# Patient Record
Sex: FEMALE | Race: WHITE | NOT HISPANIC OR LATINO | Employment: FULL TIME | URBAN - METROPOLITAN AREA
[De-identification: names, ages, dates, MRNs, and addresses within clinical notes are randomized per-mention and may not be internally consistent; named-entity substitution may affect disease eponyms.]

---

## 2017-01-16 ENCOUNTER — TELEPHONE (OUTPATIENT)
Dept: NEPHROLOGY | Facility: CLINIC | Age: 27
End: 2017-01-16

## 2017-01-16 NOTE — TELEPHONE ENCOUNTER
Hello, this is Naeem's office from Medicine Specialty on the 3rd floor at [facility] located at [address] [place] [address]. We are reminding you of your upcoming  appointment on 1/31/2017 at 3:10 pm. Please arrive  30 mins prior to your appointment time for check in. Also, please bring an updated medication list including dose of prescribed and over the counter medications you are currently taking or your labeled medication bottles with you to your appointment. If you have any questions or would like to cancel or reschedule your appointment, please call us at [phone].     If you are having labs draw same day you need a lab appointment scheduled 1 hour prior to your visit.    You are welcome to have your labs done 2-7 days before your appointment at any [facility] or [facility] facility. If you have your labs completed before your appointment, please still come 15 minutes early for check-in.     Thank-You for allowing us to be a member of your Health Care Team,     Breanna CHAMBERS CMA

## 2017-01-31 ENCOUNTER — RESEARCH ENCOUNTER (OUTPATIENT)
Dept: RESEARCH | Facility: CLINIC | Age: 27
End: 2017-01-31

## 2017-01-31 ENCOUNTER — OFFICE VISIT (OUTPATIENT)
Dept: NEPHROLOGY | Facility: CLINIC | Age: 27
End: 2017-01-31
Attending: INTERNAL MEDICINE
Payer: MEDICARE

## 2017-01-31 VITALS
TEMPERATURE: 98.3 F | DIASTOLIC BLOOD PRESSURE: 85 MMHG | OXYGEN SATURATION: 97 % | BODY MASS INDEX: 37.17 KG/M2 | HEIGHT: 66 IN | HEART RATE: 69 BPM | SYSTOLIC BLOOD PRESSURE: 123 MMHG | WEIGHT: 231.3 LBS

## 2017-01-31 DIAGNOSIS — Z94.0 KIDNEY REPLACED BY TRANSPLANT: ICD-10-CM

## 2017-01-31 DIAGNOSIS — Z94.0 S/P KIDNEY TRANSPLANT: Primary | ICD-10-CM

## 2017-01-31 DIAGNOSIS — Z48.298 AFTERCARE FOLLOWING ORGAN TRANSPLANT: ICD-10-CM

## 2017-01-31 DIAGNOSIS — Z94.0 S/P KIDNEY TRANSPLANT: ICD-10-CM

## 2017-01-31 DIAGNOSIS — Z79.899 ENCOUNTER FOR LONG-TERM CURRENT USE OF MEDICATION: ICD-10-CM

## 2017-01-31 LAB
ALBUMIN SERPL-MCNC: 4.1 G/DL (ref 3.4–5)
ALBUMIN UR-MCNC: NEGATIVE MG/DL
ALP SERPL-CCNC: 80 U/L (ref 40–150)
ALT SERPL W P-5'-P-CCNC: 50 U/L (ref 0–50)
ANION GAP SERPL CALCULATED.3IONS-SCNC: 9 MMOL/L (ref 3–14)
APPEARANCE UR: ABNORMAL
AST SERPL W P-5'-P-CCNC: 38 U/L (ref 0–45)
BACTERIA #/AREA URNS HPF: ABNORMAL /HPF
BASOPHILS # BLD AUTO: 0.1 10E9/L (ref 0–0.2)
BASOPHILS NFR BLD AUTO: 0.6 %
BILIRUB SERPL-MCNC: 0.5 MG/DL (ref 0.2–1.3)
BILIRUB UR QL STRIP: NEGATIVE
BUN SERPL-MCNC: 17 MG/DL (ref 7–30)
CALCIUM SERPL-MCNC: 9.3 MG/DL (ref 8.5–10.1)
CHLORIDE SERPL-SCNC: 105 MMOL/L (ref 94–109)
CO2 SERPL-SCNC: 24 MMOL/L (ref 20–32)
COLOR UR AUTO: YELLOW
CREAT SERPL-MCNC: 1.06 MG/DL (ref 0.52–1.04)
CREAT UR-MCNC: 46 MG/DL
DIFFERENTIAL METHOD BLD: NORMAL
EOSINOPHIL # BLD AUTO: 0.1 10E9/L (ref 0–0.7)
EOSINOPHIL NFR BLD AUTO: 1.6 %
ERYTHROCYTE [DISTWIDTH] IN BLOOD BY AUTOMATED COUNT: 12.6 % (ref 10–15)
GFR SERPL CREATININE-BSD FRML MDRD: 62 ML/MIN/1.7M2
GLUCOSE SERPL-MCNC: 80 MG/DL (ref 70–99)
GLUCOSE UR STRIP-MCNC: NEGATIVE MG/DL
HCT VFR BLD AUTO: 43.1 % (ref 35–47)
HGB BLD-MCNC: 14.2 G/DL (ref 11.7–15.7)
HGB UR QL STRIP: NEGATIVE
IMM GRANULOCYTES # BLD: 0 10E9/L (ref 0–0.4)
IMM GRANULOCYTES NFR BLD: 0.4 %
KETONES UR STRIP-MCNC: NEGATIVE MG/DL
LEUKOCYTE ESTERASE UR QL STRIP: ABNORMAL
LYMPHOCYTES # BLD AUTO: 3.6 10E9/L (ref 0.8–5.3)
LYMPHOCYTES NFR BLD AUTO: 43.9 %
MCH RBC QN AUTO: 28.1 PG (ref 26.5–33)
MCHC RBC AUTO-ENTMCNC: 32.9 G/DL (ref 31.5–36.5)
MCV RBC AUTO: 85 FL (ref 78–100)
MONOCYTES # BLD AUTO: 0.6 10E9/L (ref 0–1.3)
MONOCYTES NFR BLD AUTO: 7.2 %
MUCOUS THREADS #/AREA URNS LPF: PRESENT /LPF
NEUTROPHILS # BLD AUTO: 3.8 10E9/L (ref 1.6–8.3)
NEUTROPHILS NFR BLD AUTO: 46.3 %
NITRATE UR QL: NEGATIVE
NRBC # BLD AUTO: 0 10*3/UL
NRBC BLD AUTO-RTO: 0 /100
PH UR STRIP: 6 PH (ref 5–7)
PLATELET # BLD AUTO: 234 10E9/L (ref 150–450)
POTASSIUM SERPL-SCNC: 4.5 MMOL/L (ref 3.4–5.3)
PROT SERPL-MCNC: 8.1 G/DL (ref 6.8–8.8)
PROT UR-MCNC: 0.08 G/L
PROT/CREAT 24H UR: 0.17 G/G CR (ref 0–0.2)
RBC # BLD AUTO: 5.06 10E12/L (ref 3.8–5.2)
RBC #/AREA URNS AUTO: 1 /HPF (ref 0–2)
SODIUM SERPL-SCNC: 138 MMOL/L (ref 133–144)
SP GR UR STRIP: 1.01 (ref 1–1.03)
SQUAMOUS #/AREA URNS AUTO: 7 /HPF (ref 0–1)
URN SPEC COLLECT METH UR: ABNORMAL
UROBILINOGEN UR STRIP-MCNC: 0 MG/DL (ref 0–2)
WBC # BLD AUTO: 8.2 10E9/L (ref 4–11)
WBC #/AREA URNS AUTO: 6 /HPF (ref 0–2)

## 2017-01-31 PROCEDURE — 80053 COMPREHEN METABOLIC PANEL: CPT | Performed by: INTERNAL MEDICINE

## 2017-01-31 PROCEDURE — 84156 ASSAY OF PROTEIN URINE: CPT | Performed by: INTERNAL MEDICINE

## 2017-01-31 PROCEDURE — 87799 DETECT AGENT NOS DNA QUANT: CPT | Performed by: INTERNAL MEDICINE

## 2017-01-31 PROCEDURE — 99213 OFFICE O/P EST LOW 20 MIN: CPT | Mod: ZF

## 2017-01-31 PROCEDURE — 36415 COLL VENOUS BLD VENIPUNCTURE: CPT | Performed by: INTERNAL MEDICINE

## 2017-01-31 PROCEDURE — 85025 COMPLETE CBC W/AUTO DIFF WBC: CPT | Performed by: INTERNAL MEDICINE

## 2017-01-31 PROCEDURE — 81001 URINALYSIS AUTO W/SCOPE: CPT | Performed by: INTERNAL MEDICINE

## 2017-01-31 ASSESSMENT — PAIN SCALES - GENERAL: PAINLEVEL: MODERATE PAIN (4)

## 2017-01-31 NOTE — Clinical Note
1/31/2017       RE: Carol Menjivar  [address] [address]       REASON FOR VISIT:  Ms. Menjivar is a 26-year-old woman here for followup after a living donor kidney transplant in 07/2006 from her sister who is a 1DR match.        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  Creatinine has been stable at 0.8 to 1.1.  Current immunosuppression is cyclosporine and CellCept 750 b.i.d.  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  She is also PPD-positive and treated for that with INH for 9 months in 2005.  Her DSA level has not been checked since 2006, at which time it was negative.       The patient had an episode of shingles in 08/2016 and was treated with Valtrex.  The lesions were localized in the right buttock and hip area.       The patient has been tired for approximately 1 year.  She sleeps 7-8 hours per night and does not wake up feeling refreshed.  She has had 3 episodes of upper respiratory illness in the last 6 months with 3 courses of Augmentin given.  She had low-grade fevers and some sinus symptoms.  She had a negative chest x-ray in November.  She has some mild chest pain that comes and goes across the chest.  It does not radiate.  It is not associated with inspiration.  She has no sweats, no fevers, no weight loss (she has gained 6 pounds).  She has increased stress at work.  She has no joint pains, no problems with urination, no history of UTIs.  She is followed by an eye doctor who is not ready to remove her maturing cataract in the left eye because of concern that this will make her vision worse.  She still has reasonable vision.  She uses a cane to get around but is able to see me and the computer keyboard.       REVIEW OF SYSTEMS:  Comprehensive review of systems was completed and negative except as in the HPI.      PAST  MEDICAL HISTORY:   1.  Status post living donor transplant in 2006.   2.  Senior-Loken syndrome with blindness.   3.  History of pseudotumor cerebri, last episode in 2013, treated with Diamox.    4.  Positive PPD, treated with INH.   5.  CMV disease, treated in 2006.   6.  Shingles, treated in 2016.   7.  Obesity, BMI 37 and stable.       SOCIAL HISTORY:  She exercises on a treadmill.  She does not smoke or drink alcohol.  She has gained    6 pounds since her last clinic visit 1 year ago.  She works in the "[employer].  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  She recently went to [place] with her family (Martha) to celebrate the 10-year anniversary of her transplant.      MEDICATIONS:  See below.      PHYSICAL EXAMINATION:   VITAL SIGNS:  Blood pressure 119/85, weight 231 pounds, BMI 37.   GENERAL:  She is well-developed, well-nourished.   HEENT:  She has a disconjugate gaze.  Normal dentition.   NECK:  No cervical adenopathy.   LUNGS:  Clear bilaterally.   CARDIAC:  Regular sinus rhythm.  No murmurs, rubs or gallops.   LOWER EXTREMITIES:  No peripheral edema.       LABORATORY TESTS:  Protein 0.2 gram per gram.  Creatinine 1.0.  Last cyclosporine level 90 in 10/2016.  Hemoglobin 14.2, white count 8.2, differential unremarkable.  Urinalysis is normal with    7 squamous cells and 6 white cells.  Liver function tests normal, albumin 4.1.   Electrolytes/Renal -   Recent Labs   Lab Test  01/31/17   1654  07/26/13   1156  12/28/12   1019   01/07/09   1250   NA  138  141  141   < >  144   POTASSIUM  4.5  4.4  4.8   < >  4.3   CHLORIDE  105  103  103   < >  112*   CO2  24  29  26   < >  21   BUN  17  12  17   < >  22   CR  1.06*  0.96  1.04   < >  1.42*   GLC  80  91  86   < >  78   LACHO  9.3  9.3  9.3   < >  9.5   MAG   --    --    --    --   1.8   PHOS   --    --    --    --   3.3    < > = values in this interval not displayed.        CBC -   Recent Labs   Lab Test  01/31/17   1654  07/26/13   1156  12/28/12   1019   WBC  8.2  6.0  8.0   HGB  14.2  14.7  15.2   PLT  234  211  228       LFTs -   Recent Labs   Lab Test  01/31/17   1654  12/22/08   0911   ALKPHOS  80  65   BILITOTAL  0.5  0.5   ALT  50  47   AST  38  52*   PROTTOTAL  8.1  7.7   ALBUMIN  4.1  4.3       Coags -   Recent Labs   Lab Test  01/07/09   1250   INR  1.07       Medications    Current Outpatient Prescriptions   Medication Sig Dispense Refill     atenolol (TENORMIN) 25 MG tablet Take 1 tablet (25 mg) by mouth daily 90 tablet 3     cycloSPORINE modified (GENERIC EQUIVALENT) 25 MG capsule Take 4 capsules (100 mg) by mouth 2 times daily 240 capsule 6     CELLCEPT 250 MG PO CAPSULE Take 3 capsules (750 mg) by mouth 2 times daily 180 capsule 11     sulfamethoxazole-trimethoprim (BACTRIM) 400-80 MG per tablet Take 1 tablet by mouth daily 28 tablet 3        IMPRESSION:   1.  Allograft function.  Good.   2.  Immunosuppression.  Stable.  Target cyclosporine .    3.  Fatigue.  She has chronic fatigue which predates her shingles episode.  I did a complete set of labs today.  She has normal liver function, normal kidney function, no anemia, normal white count, and her albumin is 4.1.  We discussed the possibility of this being secondary to stress at work and that perhaps she is not sleeping well.  She does try to drink 4 liters of water every day, so she is up quite a bit at night to urinate.  She will see Dr. Moran to determine whether she would be a candidate for the shingles vaccine after her recent shingles episode and whether there is anything further to be done for her fatigue in terms of infection.  I have also asked her to stop her atenolol since her blood pressure is low at home (100/60).  However, this would not cause fatigue and lack of refreshed sleep.   4.  Recent episode of shingles.  Referral to Dr. Sonja Moran regarding further future prophylaxis.   5.   Hypertension.  Blood pressure control is overly aggressive and atenolol has some CNS effects, although she has been on it for years.  We will stop it for now and monitor the blood pressure.       PLAN:   1.  Patient to decrease water intake and drink only to thirst.    2.  Stop atenolol.    3.  Check EBV and CMV PCR today (sent).    4.  Increase exercise if possible.         ASHLEY RIVAS MD             D: 2017 19:21   T: 2017 08:12   MT: PHYLLIS      Name:     KATHRYN TAYLOR   MRN:      [id number]        Account:      [id number]   :      1990           Service Date: 2017      Document: [id number]

## 2017-01-31 NOTE — NURSING NOTE
"Chief Complaint   Patient presents with     RECHECK     YEARLY EXAM KIDNEY TRANSPLANT       Initial Pulse 66  Temp(Src) 98.3  F (36.8  C) (Oral)  Ht 1.676 m (5' 6\")  Wt 104.917 kg (231 lb 4.8 oz)  BMI 37.35 kg/m2  SpO2 97% Estimated body mass index is 37.35 kg/(m^2) as calculated from the following:    Height as of this encounter: 1.676 m (5' 6\").    Weight as of this encounter: 104.917 kg (231 lb 4.8 oz).  BP completed using cuff size: chris CHAMBERS CMA    "

## 2017-01-31 NOTE — MR AVS SNAPSHOT
After Visit Summary   1/31/2017    Carol Menjivar    MRN: [id number]           Patient Information     Date Of Birth          1990        Visit Information        Provider Department      1/31/2017 3:10 PM Noni Hartley MD [facility]        Today's Diagnoses     S/P kidney transplant    -  1       Care Instructions    1.  Stop atenolol   2.  See Dr Moran re lay, travel  3.  Drink water to thirst          Follow-ups after your visit        Additional Services     Infectious Disease Referral       Consult for Dr Luis Moran.  Pt with shingles, traveling to [facility] this summer.  Kidney transplant                  Follow-up notes from your care team     Return in about 1 year (around 1/31/2018).      Your next 10 appointments already scheduled     Mar 31, 2017  9:00 AM   (Arrive by 8:45 AM)   New Patient Visit with Sonja Moran MD   [facility] ([facility])    [address] [place] [id number] [phone]            Jan 30, 2018  2:45 PM   (Arrive by 2:15 PM)   Return Kidney Transplant with Noni Hartley MD   [facility] ([facility])    [address] [place] [id number] [phone]              Future tests that were ordered for you today     Open Future Orders        Priority Expected Expires Ordered    EBV DNA PCR Quantitative Whole Blood Routine  3/2/2017 1/31/2017    CMV DNA quantification Routine  3/2/2017 1/31/2017    Comprehensive metabolic panel Routine  3/2/2017 1/31/2017    CBC with platelets differential Routine  3/2/2017 1/31/2017            Who to contact     If you have questions or need follow up information about today's clinic visit or your schedule please contact [facility] directly at [phone].  Normal or non-critical lab and imaging results will be communicated to you by MyChart, letter or  "phone within 4 business days after the clinic has received the results. If you do not hear from us within 7 days, please contact the clinic through [employer] or phone. If you have a critical or abnormal lab result, we will notify you by phone as soon as possible.  Submit refill requests through [employer] or call your pharmacy and they will forward the refill request to us. Please allow 3 business days for your refill to be completed.          Additional Information About Your Visit        castaclipharswabr Information     [employer] lets you send messages to your doctor, view your test results, renew your prescriptions, schedule appointments and more. To sign up, go to [email or web address] . Click on \"Log in\" on the left side of the screen, which will take you to the Welcome page. Then click on \"Sign up Now\" on the right side of the page.     You will be asked to enter the access code listed below, as well as some personal information. Please follow the directions to create your username and password.     Your access code is: [email or web address]  Expires: 2017  6:30 AM     Your access code will  in 90 days. If you need help or a new code, please call your [place] clinic or [phone].        Care EveryWhere ID     This is your Care EveryWhere ID. This could be used by other organizations to access your [place] medical records  [id number]        Your Vitals Were     Pulse Temperature Height BMI (Body Mass Index) Pulse Oximetry       69 98.3  F (36.8  C) (Oral) 1.676 m (5' 6\") 37.35 kg/m2 97%        Blood Pressure from Last 3 Encounters:   17 123/85   16 126/85   10/02/14 120/81    Weight from Last 3 Encounters:   17 104.917 kg (231 lb 4.8 oz)   16 102.059 kg (225 lb)   10/02/14 103.874 kg (229 lb)              We Performed the Following     Infectious Disease Referral     Routine UA with micro reflex to culture        Primary Care Provider Office Phone # Fax #    Katharina Leija MD " [phone]       [facility] [address] [place] [address]        Thank you!     Thank you for choosing [facility]  for your care. Our goal is always to provide you with excellent care. Hearing back from our patients is one way we can continue to improve our services. Please take a few minutes to complete the written survey that you may receive in the mail after your visit with us. Thank you!             Your Updated Medication List - Protect others around you: Learn how to safely use, store and throw away your medicines at www.disposemymeds.org.          This list is accurate as of: 1/31/17  4:31 PM.  Always use your most recent med list.                   Brand Name Dispense Instructions for use    atenolol 25 MG tablet    TENORMIN    90 tablet    Take 1 tablet (25 mg) by mouth daily       cycloSPORINE modified 25 MG capsule    GENERIC EQUIVALENT    240 capsule    Take 4 capsules (100 mg) by mouth 2 times daily       mycophenolate capsule     180 capsule    Take 3 capsules (750 mg) by mouth 2 times daily       sulfamethoxazole-trimethoprim 400-80 MG per tablet    BACTRIM    28 tablet    Take 1 tablet by mouth daily

## 2017-02-01 LAB
CMV DNA SPEC NAA+PROBE-ACNC: NORMAL [IU]/ML
CMV DNA SPEC NAA+PROBE-LOG#: NORMAL {LOG_IU}/ML
SPECIMEN SOURCE: NORMAL

## 2017-02-01 NOTE — PROGRESS NOTES
REASON FOR VISIT:  Ms. Menjivar is a 26-year-old woman here for followup after a living donor kidney transplant in 07/2006 from her sister who is a 1DR match.        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  Creatinine has been stable at 0.8 to 1.1.  Current immunosuppression is cyclosporine and CellCept 750 b.i.d.  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  She is also PPD-positive and treated for that with INH for 9 months in 2005.  Her DSA level has not been checked since 2006, at which time it was negative.       The patient had an episode of shingles in 08/2016 and was treated with Valtrex.  The lesions were localized in the right buttock and hip area.       The patient has been tired for approximately 1 year.  She sleeps 7-8 hours per night and does not wake up feeling refreshed.  She has had 3 episodes of upper respiratory illness in the last 6 months with 3 courses of Augmentin given.  She had low-grade fevers and some sinus symptoms.  She had a negative chest x-ray in November.  She has some mild chest pain that comes and goes across the chest.  It does not radiate.  It is not associated with inspiration.  She has no sweats, no fevers, no weight loss (she has gained 6 pounds).  She has increased stress at work.  She has no joint pains, no problems with urination, no history of UTIs.  She is followed by an eye doctor who is not ready to remove her maturing cataract in the left eye because of concern that this will make her vision worse.  She still has reasonable vision.  She uses a cane to get around but is able to see me and the computer keyboard.       REVIEW OF SYSTEMS:  Comprehensive review of systems was completed and negative except as in the HPI.      PAST MEDICAL HISTORY:   1.  Status post living donor transplant in 2006.   2.  Senior-Loken  syndrome with blindness.   3.  History of pseudotumor cerebri, last episode in 2013, treated with Diamox.    4.  Positive PPD, treated with INH.   5.  CMV disease, treated in 2006.   6.  Shingles, treated in 2016.   7.  Obesity, BMI 37 and stable.       SOCIAL HISTORY:  She exercises on a treadmill.  She does not smoke or drink alcohol.  She has gained    6 pounds since her last clinic visit 1 year ago.  She works in the [employer].  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  She recently went to [place] with her family (Martha) to celebrate the 10-year anniversary of her transplant.      MEDICATIONS:  See below.      PHYSICAL EXAMINATION:   VITAL SIGNS:  Blood pressure 119/85, weight 231 pounds, BMI 37.   GENERAL:  She is well-developed, well-nourished.   HEENT:  She has a disconjugate gaze.  Normal dentition.   NECK:  No cervical adenopathy.   LUNGS:  Clear bilaterally.   CARDIAC:  Regular sinus rhythm.  No murmurs, rubs or gallops.   LOWER EXTREMITIES:  No peripheral edema.       LABORATORY TESTS:  Protein 0.2 gram per gram.  Creatinine 1.0.  Last cyclosporine level 90 in 10/2016.  Hemoglobin 14.2, white count 8.2, differential unremarkable.  Urinalysis is normal with    7 squamous cells and 6 white cells.  Liver function tests normal, albumin 4.1.   Electrolytes/Renal -   Recent Labs   Lab Test  01/31/17   1654  07/26/13   1156  12/28/12   1019   01/07/09   1250   NA  138  141  141   < >  144   POTASSIUM  4.5  4.4  4.8   < >  4.3   CHLORIDE  105  103  103   < >  112*   CO2  24  29  26   < >  21   BUN  17  12  17   < >  22   CR  1.06*  0.96  1.04   < >  1.42*   GLC  80  91  86   < >  78   LACHO  9.3  9.3  9.3   < >  9.5   MAG   --    --    --    --   1.8   PHOS   --    --    --    --   3.3    < > = values in this interval not displayed.       CBC -   Recent Labs   Lab Test  01/31/17   1654  07/26/13   1156  12/28/12   1019   WBC   8.2  6.0  8.0   HGB  14.2  14.7  15.2   PLT  234  211  228       LFTs -   Recent Labs   Lab Test  01/31/17   1654  12/22/08   0911   ALKPHOS  80  65   BILITOTAL  0.5  0.5   ALT  50  47   AST  38  52*   PROTTOTAL  8.1  7.7   ALBUMIN  4.1  4.3       Coags -   Recent Labs   Lab Test  01/07/09   1250   INR  1.07       Medications    Current Outpatient Prescriptions   Medication Sig Dispense Refill     atenolol (TENORMIN) 25 MG tablet Take 1 tablet (25 mg) by mouth daily 90 tablet 3     cycloSPORINE modified (GENERIC EQUIVALENT) 25 MG capsule Take 4 capsules (100 mg) by mouth 2 times daily 240 capsule 6     CELLCEPT 250 MG PO CAPSULE Take 3 capsules (750 mg) by mouth 2 times daily 180 capsule 11     sulfamethoxazole-trimethoprim (BACTRIM) 400-80 MG per tablet Take 1 tablet by mouth daily 28 tablet 3        IMPRESSION:   1.  Allograft function.  Good.   2.  Immunosuppression.  Stable.  Target cyclosporine .    3.  Fatigue.  She has chronic fatigue which predates her shingles episode.  I did a complete set of labs today.  She has normal liver function, normal kidney function, no anemia, normal white count, and her albumin is 4.1.  We discussed the possibility of this being secondary to stress at work and that perhaps she is not sleeping well.  She does try to drink 4 liters of water every day, so she is up quite a bit at night to urinate.  She will see Dr. Moran to determine whether she would be a candidate for the shingles vaccine after her recent shingles episode and whether there is anything further to be done for her fatigue in terms of infection.  I have also asked her to stop her atenolol since her blood pressure is low at home (100/60).  However, this would not cause fatigue and lack of refreshed sleep.   4.  Recent episode of shingles.  Referral to Dr. Sonja Moran regarding further future prophylaxis.   5.  Hypertension.  Blood pressure control is overly aggressive and atenolol has some CNS effects,  although she has been on it for years.  We will stop it for now and monitor the blood pressure.       PLAN:   1.  Patient to decrease water intake and drink only to thirst.    2.  Stop atenolol.    3.  Check EBV and CMV PCR today (sent).    4.  Increase exercise if possible.         ASHLEY RIVAS MD             D: 2017 19:21   T: 2017 08:12   MT: DP      Name:     KATHRYN TAYLOR   MRN:      [id number]        Account:      [id number]   :      1990           Service Date: 2017      Document: [id number]

## 2017-02-04 LAB
EBV DNA # SPEC NAA+PROBE: NORMAL {COPIES}/ML
EBV DNA SPEC NAA+PROBE-LOG#: NORMAL {LOG_COPIES}/ML

## 2017-02-21 NOTE — PROGRESS NOTES
Transplant Research Organization Informed Consent Process Documentation  Study Name: Female Urinary Microbiome, Chronic Graft Dysfunction and Kidney Transplantation (IRB # [id number])  ICF Version Date / IRB Approval Date:  Version dt November 11, 2016,   IRB Approval dt: 12/16/2016  Date of Approach/Consent: 1/31/2017    The subject was screened and meets all of the inclusion criteria and none of the exclusion criteria is met.    The subject was told:  -that the study involves research  -the purpose of the research study  -the expected duration of the study and the approximate number of subject sought  -of procedures that are identified as experimental  -of reasonably foreseeable risks or discomforts to the subject  -of any benefits to the subject or others that may be expected from the research  -of alternative procedures and/or treatment  -how the confidentiality of records would be maintained  -whether or not compensation and medical treatments are available should injury occur as a result of the study  -who to contact if they have questions related to the research study or questions regarding research subjects' rights  -that participation is completely voluntary and that their decision to or not to participate will have no impact on their relationships with the N and the staff    No study procedures were completed prior to the consent being obtained.  The use of historical information (lab or assessments) used for the purpose of the study was approved by subject.  The subject was fully aware that we would be reviewing their medical record for the study.  The subject demonstrated an understanding of what the study involved.  Specifically, how this study differed from standard of care at our center and what was required of the subject as part of the study.  The subject reviewed the consent form and was given the opportunity to ask questions before signing.  Questions and concerns were answered by the study  staff and/or study physician.  A copy of the signed informed consent document was provided to the subject.  [] Yes [x] No  The subject was offered a copy of the signed informed consent but declined. [x] Yes [] No  The consent require the use of a :   [] Yes [x]  No     A 'short form' consent was used:     [] Yes [x] No         If yes, provide name of witness: N/A  The subject required a legally-authorized representative (LAR) to sign on their behalf:                                                    [] Yes  [x] No   If yes, please record name of LAR: N/A    Consent obtained in the presence of Tiana Melendez, clinic nurse, as the participant is visually impaired.     Questions to Evaluate Subject Comprehension of Study  Adult or Legally Authorized Representative (LAR) for a Dependent:  Question: Adequate Response? If No, explain what actions were taken   What is the purpose of this study? [x] Yes  [] No   What will you be asked to do to participate in this study?  [x] Yes  [] No   How many study visits will there be? [x] Yes  [] No

## 2017-03-07 DIAGNOSIS — Z94.0 KIDNEY REPLACED BY TRANSPLANT: ICD-10-CM

## 2017-03-07 RX ORDER — MYCOPHENOLATE MOFETIL 250 MG/1
750 CAPSULE ORAL 2 TIMES DAILY
Qty: 180 CAPSULE | Refills: 11 | Status: SHIPPED | OUTPATIENT
Start: 2017-03-07 | End: 2018-04-04

## 2017-03-07 NOTE — TELEPHONE ENCOUNTER
Drug Name: cellcept 250  Last Fill Date: 1/5/17  Quantity: 180    Sharon Conrad   [facility]  [phone]

## 2017-03-30 ENCOUNTER — CARE COORDINATION (OUTPATIENT)
Dept: NEPHROLOGY | Facility: CLINIC | Age: 27
End: 2017-03-30

## 2017-03-30 NOTE — PROGRESS NOTES
Received voicemail that patient was experiencing chest pain following medication changes in January (stopped atenolol). Left message for patient to call back.    Tiana Yost RN

## 2017-04-03 NOTE — PROGRESS NOTES
Reason for Call    Patient reports intermittent chest pain since November. Sits in the lower, central region of the chest. Feels sore, occasionally sharp. None present now. No discernable pattern--pretty random episodes. She stopped atenolol in January, following her appointment. Chest pain worsened 3 weeks ago. She restarted atenolol on Thursday, 3/30. No change in symptoms yet.     Also notes ongoing fatigue, occasional shortness of breath, and GI upset (possibly stress related). Update sent to Dr. Hartley.    Collaboration    Above discussed with Dr. Hartley.      She saw Dr Garcia yesterday for fatigue so we'll see if anything came of that. As for CP, seems unlikely she would have angina.  I can look and see if there are any cardiac effects of her syndrome.  BP was ok in clinic. If she feels better on amlodipine then she can take it.  It does have CNS effects and I thought her BP might be too low.  Can you please call her and see how the CP is now?  Also her last CSA level was too high and she was supposed to repeat it hopefully.     I dont see any cardiac associations but if she keeps having CP her PCp could refer her to cards if there is a concern.       Plan    1. Repeat CSA level  2. Continue on atenolol    Patient was given an opportunity to ask questions and have those questions answered to her satisfaction.  Patient verbalized understanding of instructions provided and agreed to plan of care.    Tiana Yost RN

## 2017-04-04 ENCOUNTER — TELEPHONE (OUTPATIENT)
Dept: TRANSPLANT | Facility: CLINIC | Age: 27
End: 2017-04-04

## 2017-04-04 DIAGNOSIS — Z94.0 KIDNEY REPLACED BY TRANSPLANT: Primary | ICD-10-CM

## 2017-04-04 RX ORDER — CYCLOSPORINE 25 MG/1
100 CAPSULE, LIQUID FILLED ORAL 2 TIMES DAILY
Qty: 240 CAPSULE | Refills: 11 | Status: SHIPPED | OUTPATIENT
Start: 2017-04-04 | End: 2017-04-13

## 2017-04-04 NOTE — TELEPHONE ENCOUNTER
Lupe, I scheduled Carol for lab draw on 04/12/2017 at 09:15AM., not sure if she needs lab order in EPIC.  Thanks!

## 2017-04-12 ENCOUNTER — OFFICE VISIT (OUTPATIENT)
Dept: INFECTIOUS DISEASES | Facility: CLINIC | Age: 27
End: 2017-04-12
Attending: INTERNAL MEDICINE
Payer: MEDICARE

## 2017-04-12 VITALS
SYSTOLIC BLOOD PRESSURE: 138 MMHG | BODY MASS INDEX: 37.61 KG/M2 | HEART RATE: 60 BPM | WEIGHT: 234 LBS | DIASTOLIC BLOOD PRESSURE: 88 MMHG | TEMPERATURE: 97.6 F | HEIGHT: 66 IN

## 2017-04-12 DIAGNOSIS — G93.32 CHRONIC FATIGUE SYNDROME: ICD-10-CM

## 2017-04-12 DIAGNOSIS — G93.32 CHRONIC FATIGUE SYNDROME: Primary | ICD-10-CM

## 2017-04-12 DIAGNOSIS — Z23 NEED FOR PNEUMOCOCCAL VACCINATION: ICD-10-CM

## 2017-04-12 DIAGNOSIS — Z48.298 AFTERCARE FOLLOWING ORGAN TRANSPLANT: ICD-10-CM

## 2017-04-12 DIAGNOSIS — Z79.899 ENCOUNTER FOR LONG-TERM CURRENT USE OF MEDICATION: ICD-10-CM

## 2017-04-12 DIAGNOSIS — R19.7 DIARRHEA, UNSPECIFIED TYPE: ICD-10-CM

## 2017-04-12 DIAGNOSIS — Z94.0 KIDNEY REPLACED BY TRANSPLANT: ICD-10-CM

## 2017-04-12 LAB
ANION GAP SERPL CALCULATED.3IONS-SCNC: 6 MMOL/L (ref 3–14)
BUN SERPL-MCNC: 10 MG/DL (ref 7–30)
CALCIUM SERPL-MCNC: 9.2 MG/DL (ref 8.5–10.1)
CHLORIDE SERPL-SCNC: 105 MMOL/L (ref 94–109)
CO2 SERPL-SCNC: 26 MMOL/L (ref 20–32)
CREAT SERPL-MCNC: 0.94 MG/DL (ref 0.52–1.04)
CREAT UR-MCNC: 36 MG/DL
CRYPTOC AG SPEC QL: NORMAL
CYCLOSPORINE BLD LC/MS/MS-MCNC: 176 UG/L (ref 50–400)
ERYTHROCYTE [DISTWIDTH] IN BLOOD BY AUTOMATED COUNT: 12.4 % (ref 10–15)
GFR SERPL CREATININE-BSD FRML MDRD: 70 ML/MIN/1.7M2
GLUCOSE SERPL-MCNC: 103 MG/DL (ref 70–99)
HCT VFR BLD AUTO: 43.5 % (ref 35–47)
HGB BLD-MCNC: 14.4 G/DL (ref 11.7–15.7)
MCH RBC QN AUTO: 28.1 PG (ref 26.5–33)
MCHC RBC AUTO-ENTMCNC: 33.1 G/DL (ref 31.5–36.5)
MCV RBC AUTO: 85 FL (ref 78–100)
MICRO REPORT STATUS: NORMAL
PLATELET # BLD AUTO: 216 10E9/L (ref 150–450)
POTASSIUM SERPL-SCNC: 4.2 MMOL/L (ref 3.4–5.3)
PROT UR-MCNC: 0.06 G/L
PROT/CREAT 24H UR: 0.16 G/G CR (ref 0–0.2)
RBC # BLD AUTO: 5.12 10E12/L (ref 3.8–5.2)
SODIUM SERPL-SCNC: 137 MMOL/L (ref 133–144)
SPECIMEN SOURCE: NORMAL
TME LAST DOSE: 2120 H
TSH SERPL DL<=0.005 MIU/L-ACNC: 2.27 MU/L (ref 0.4–4)
WBC # BLD AUTO: 6.6 10E9/L (ref 4–11)

## 2017-04-12 PROCEDURE — 86635 COCCIDIOIDES ANTIBODY: CPT | Performed by: INTERNAL MEDICINE

## 2017-04-12 PROCEDURE — 87899 AGENT NOS ASSAY W/OPTIC: CPT | Performed by: INTERNAL MEDICINE

## 2017-04-12 PROCEDURE — 86360 T CELL ABSOLUTE COUNT/RATIO: CPT | Performed by: INTERNAL MEDICINE

## 2017-04-12 PROCEDURE — 80048 BASIC METABOLIC PNL TOTAL CA: CPT | Performed by: INTERNAL MEDICINE

## 2017-04-12 PROCEDURE — 99212 OFFICE O/P EST SF 10 MIN: CPT | Mod: 25

## 2017-04-12 PROCEDURE — 87385 HISTOPLASMA CAPSUL AG IA: CPT | Performed by: INTERNAL MEDICINE

## 2017-04-12 PROCEDURE — 80158 DRUG ASSAY CYCLOSPORINE: CPT | Performed by: INTERNAL MEDICINE

## 2017-04-12 PROCEDURE — G0009 ADMIN PNEUMOCOCCAL VACCINE: HCPCS

## 2017-04-12 PROCEDURE — 86359 T CELLS TOTAL COUNT: CPT | Performed by: INTERNAL MEDICINE

## 2017-04-12 PROCEDURE — 85027 COMPLETE CBC AUTOMATED: CPT | Performed by: INTERNAL MEDICINE

## 2017-04-12 PROCEDURE — 86612 BLASTOMYCES ANTIBODY: CPT | Performed by: INTERNAL MEDICINE

## 2017-04-12 PROCEDURE — 25000128 H RX IP 250 OP 636: Mod: ZF | Performed by: INTERNAL MEDICINE

## 2017-04-12 PROCEDURE — 36415 COLL VENOUS BLD VENIPUNCTURE: CPT | Performed by: INTERNAL MEDICINE

## 2017-04-12 PROCEDURE — 86698 HISTOPLASMA ANTIBODY: CPT | Performed by: INTERNAL MEDICINE

## 2017-04-12 PROCEDURE — 84156 ASSAY OF PROTEIN URINE: CPT | Performed by: INTERNAL MEDICINE

## 2017-04-12 PROCEDURE — 84443 ASSAY THYROID STIM HORMONE: CPT | Performed by: INTERNAL MEDICINE

## 2017-04-12 PROCEDURE — 86606 ASPERGILLUS ANTIBODY: CPT | Performed by: INTERNAL MEDICINE

## 2017-04-12 PROCEDURE — 90670 PCV13 VACCINE IM: CPT | Mod: ZF | Performed by: INTERNAL MEDICINE

## 2017-04-12 PROCEDURE — 87798 DETECT AGENT NOS DNA AMP: CPT | Performed by: INTERNAL MEDICINE

## 2017-04-12 RX ADMIN — PNEUMOCOCCAL 13-VALENT CONJUGATE VACCINE 0.5 ML: 2.2; 2.2; 2.2; 2.2; 2.2; 4.4; 2.2; 2.2; 2.2; 2.2; 2.2; 2.2; 2.2 INJECTION, SUSPENSION INTRAMUSCULAR at 10:49

## 2017-04-12 ASSESSMENT — PAIN SCALES - GENERAL: PAINLEVEL: NO PAIN (0)

## 2017-04-12 NOTE — MR AVS SNAPSHOT
After Visit Summary   4/12/2017    Carol Menjivar    MRN: [id number]           Patient Information     Date Of Birth          1990        Visit Information        Provider Department      4/12/2017 10:00 AM Ronan Garcia MD [facility] and Infectious Diseases        Today's Diagnoses     Chronic fatigue syndrome    -  1    Need for pneumococcal vaccination          Care Instructions    We will give you the prevnar 13 vaccine today.  In 8 weeks or later you can have the 2nd dose of the pneumococcal 23 valent vaccine.  I will check for malaria prophylaxis needed when you go to [facility] and any other vaccines that you might need.  Please have the blood work and CXR done today while you are here.  We will give you a stool kit for you to take home. Please drop off a stool sample to a local [place] lab.          Follow-ups after your visit        Follow-up notes from your care team     Return if symptoms worsen or fail to improve.      Your next 10 appointments already scheduled     Jan 30, 2018  2:45 PM CST   (Arrive by 2:15 PM)   Return Kidney Transplant with Noni Hartley MD   [facility] ([facility])    [address] [place] [id number] [phone]              Future tests that were ordered for you today     Open Future Orders        Priority Expected Expires Ordered    Cryptococcus antigen Routine  4/14/2017 4/12/2017    Parvovirus B19 DNA PCR Routine  4/14/2017 4/12/2017    T cell subset profile Routine  4/14/2017 4/12/2017    X-ray Chest 2 vws* Routine 4/12/2017 4/12/2018 4/12/2017    Histoplasma Capsulatum Antigen (Non Blood) Routine 4/12/2017 4/14/2017 4/12/2017    TSH with free T4 reflex Routine  4/14/2017 4/12/2017    Fungal antibodies Routine 4/12/2017 4/12/2018 4/12/2017            Who to contact     If you have questions or need follow up information about today's clinic visit or your schedule please contact  "[facility] AND INFECTIOUS DISEASES directly at [phone].  Normal or non-critical lab and imaging results will be communicated to you by MyChart, letter or phone within 4 business days after the clinic has received the results. If you do not hear from us within 7 days, please contact the clinic through MyChart or phone. If you have a critical or abnormal lab result, we will notify you by phone as soon as possible.  Submit refill requests through [employer] or call your pharmacy and they will forward the refill request to us. Please allow 3 business days for your refill to be completed.          Additional Information About Your Visit        [employer] Information     [employer] lets you send messages to your doctor, view your test results, renew your prescriptions, schedule appointments and more. To sign up, go to [email or web address] . Click on \"Log in\" on the left side of the screen, which will take you to the Welcome page. Then click on \"Sign up Now\" on the right side of the page.     You will be asked to enter the access code listed below, as well as some personal information. Please follow the directions to create your username and password.     Your access code is: [email or web address]  Expires: 2017  7:30 AM     Your access code will  in 90 days. If you need help or a new code, please call your [facility] clinic or [phone].        Care EveryWhere ID     This is your Care EveryWhere ID. This could be used by other organizations to access your [facility] medical records  [id number]        Your Vitals Were     Pulse Temperature Height BMI (Body Mass Index)          60 97.6  F (36.4  C) (Oral) 1.676 m (5' 6\") 37.77 kg/m2         Blood Pressure from Last 3 Encounters:   17 138/88   17 123/85   16 126/85    Weight from Last 3 Encounters:   17 106.1 kg (234 lb)   17 104.9 kg (231 lb 4.8 oz)   16 102.1 kg (225 lb)               Primary Care Provider Office Phone # " Fax #    Katharina Leija [phone]       [address] [place] [address]        Thank you!     Thank you for choosing [facility] AND INFECTIOUS DISEASES  for your care. Our goal is always to provide you with excellent care. Hearing back from our patients is one way we can continue to improve our services. Please take a few minutes to complete the written survey that you may receive in the mail after your visit with us. Thank you!             Your Updated Medication List - Protect others around you: Learn how to safely use, store and throw away your medicines at www.disposemymeds.org.          This list is accurate as of: 4/12/17 10:32 AM.  Always use your most recent med list.                   Brand Name Dispense Instructions for use    atenolol 25 MG tablet    TENORMIN    90 tablet    Take 1 tablet (25 mg) by mouth daily       cycloSPORINE modified capsule     240 capsule    Take 4 capsules (100 mg) by mouth 2 times daily       mycophenolate capsule     180 capsule    Take 3 capsules (750 mg) by mouth 2 times daily       sulfamethoxazole-trimethoprim 400-80 MG per tablet    BACTRIM    28 tablet    Take 1 tablet by mouth daily

## 2017-04-12 NOTE — PROGRESS NOTES
[facility]  Transplant Infectious Disease Consult Note - New Patient     Patient:  Carol Menjivar, Date of birth 1990, Medical record number [id number]  Date of Visit:  04/12/2017  Consult requested by Dr. Hartley for evaluation of shingles vaccine and fatigue         Assessment and Recommendations:   Recommendations:  - do not recommend live vaccines, this includes the shingles vaccine  - administered prevnar 13 vaccine today.  This should be followed by the pneumococcal 23 valent vaccine in 8 weeks  - Carol declined the influenza vaccine today  - Ordered the following: CXR, cryptococcus antigen, urine histo ag, fungal ab's, parvo B19 PCR and T cell subsets  - Ordered Stool studies: O&P, c.diff, enteric panel, cryptosporidium, microsporidium, giardia.     Will arrange for the following vaccines: Hep A and the Typhoid shot (not oral vaccine).     No f/u scheduled at this time.     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    ID issues:  - Shingles episode 8/2016: treated w/ valtrex, since healed and resolved.  She does not have current shingles episodes, thus I do not recommend secondary prophylaxis.  The shingles vaccine is a live virus, thus also would not recommend the vaccine.  In the near future the non-live shingles vaccine might be available.  She could be a candidate for this.    -  Upcoming trip to [place] in August: Based on the CDC website for travel, Hep A and Typhoid vaccines are needed.  Hep A is two doses 6 months apart.  She can receive the 1st does now, and again in 6 months which will be after her return.  The inactivated Typhoid vaccine (ie the shot not oral vaccine).  This can be repeated every two years if ongoing travel is anticipated.  Malaria prophylaxis: either mefloquine, doxy or malarone.  She does not need the MIROSLAVA or yellow fever vaccine.  Education re eat only cooked or peeled foods, bottled water.   Prevention of mosquito bites (bednets, long sleeve and pants, and using deet.     - Diarrhea:  Ongoing for 1.5 weeks.  Ordered above stool studies.  I do not see that these were collected on last check as of May 11th.     - Fatigue: Ongoing for 1.5 years.  No fevers, weight loss or night sweats. She does have a lot of ROS positive including waking up with a dry throat w/ dry cough, teeth pain, sharp left sided chest pain and mostly sinus symptoms of rhinorrhea, teeth pain, HA w/ ENT evaluations and irrigations.  Tough to put this all together.  Thus far CMV and EBV PCRs undetectable.  TSH checked and was w/in normal level.  Checking above studies, most of which at this time have returned negative.  Urine histo ag negative, fungal abx's negative.     Other ID issues:  - prophylaxis: none  - serostatus: CMV R+, EBV R+  - immunizations: see above  - Immunology:  Checked CD4 count which returned >800.  - isolation: good hand hygiene    Attestation:  I have reviewed today's vital signs, medications, labs and imaging.      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      Past ID history includes + PPD s/p 9 months of INH in 2005, CMV disease and singles 8/2016 treated w/ valtrex.     ID is seeing her today for consideration of shingles vaccine and evaluation of ongoing fatigue.  Carol reports fatigue for 1.5 years, getting worse, work takes a lot longer than it used to. She notes the following: feeling cold for 1.5 years, sharp chest pain on the left side, headaches and fluid in her ears, tooth pain.  She wakes up with a dry throat w/ dry cough and fatigue.  Some rhinorrhea. No fevers, sweats or weight loss.  She does use sinus sprays which help.  Also notable is 1.5 weeks of abdominal pain and diarrhea w/ 5-7 stools / day described as mushy bumps.      TSH  checked 4/12/217, which was normal.  CMV and EBV PCRs also checked 4/12/17 and normal.     She is employed w/ [employer].  She is planning a trip to [facility] in August for two weeks, [place] [employer] [place]       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  Reviewed non contributory  No family history on file.  Reviewed non contributory  Social History     Social History     Marital status: Single     Spouse name: N/A     Number of children: N/A     Years of education: N/A     Occupational History     Not on file.     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  Single, no children.  Blind.       Immunization History   Administered Date(s) Administered     Influenza (IIV3) 01/04/2013       Patient Active Problem List   Diagnosis     S/P kidney transplant     Senior-Loken syndrome     Pseudotumor cerebri     Legally blind     Retinitis pigmentosa     Vitamin D deficiency     Obesity     Insulin resistance     PCOS (polycystic ovarian syndrome)     Encounter for long-term current use of medication     Cataract       Current Outpatient Prescriptions   Medication     NEORAL 25 MG PO CAPSULE     CELLCEPT 250 MG PO CAPSULE     atenolol (TENORMIN) 25 MG tablet     sulfamethoxazole-trimethoprim (BACTRIM) 400-80 MG per tablet     Current  "Facility-Administered Medications   Medication     pneumococcal (PREVNAR 13) injection 0.5 mL         pneumococcal  0.5 mL Intramuscular Once     Infusions:          Allergies:   No Known Allergies       Physical Exam:   /88  Pulse 60  Temp 97.6  F (36.4  C) (Oral)  Ht 1.676 m (5' 6\")  Wt 106.1 kg (234 lb)  BMI 37.77 kg/m2    Physical Examination:  GENERAL:  well-developed, well-nourished, obese female ambulating well.   HEENT:  Head is normocephalic, atraumatic   EYES:  Eyes have anicteric sclerae without conjunctival injection or stigmata of endocarditis.    ENT:  Oropharynx is moist without exudates or ulcers. Tongue is midline. Good dentition.  NECK:  Supple. No cervical lymphadenopathy. Thick neck.   LUNGS:  Clear to auscultation bilateral.   CARDIOVASCULAR:  Regular rate and rhythm with no murmurs, gallops or rubs.  ABDOMEN:  Normal bowel sounds, soft, nontender. No appreciable hepatosplenomegaly.  SKIN:  No acute rashes.  No stigmata of endocarditis.  NEUROLOGIC:  Grossly nonfocal. Active x4 extremities  No CVA or spinal tenderness         Laboratory Data:     CD4 counts    Absolute CD4   Date Value Ref Range Status   05/27/2009 614 mm3 Final   12/22/2008 585 mm3 Final   01/11/2008 434 mm3 Final     Inflammatory Markers  No lab results found.  Immune Globulin Studies  No lab results found.  Metabolic Studies     Recent Labs   Lab Test  04/12/17   0929  01/31/17   1654  07/26/13   1156  12/28/12   1019  07/27/12   1202  07/27/12   1007  08/29/11   0908   NA  137  138  141  141   --   140  142   POTASSIUM  4.2  4.5  4.4  4.8   --   4.4  5.0   CHLORIDE  105  105  103  103   --   109  104   CO2  26  24  29  26   --   20  30   ANIONGAP  6  9  9  12   --   10  8   BUN  10  17  12  17   --   17  15   CR  0.94  1.06*  0.96  1.04  1.36*  1.44*  1.16*   GFRESTIMATED  70  62  71  65  48*  45*  58*   GLC  103*  80  91  86   --   86  94   LACHO  9.2  9.3  9.3  9.3   --   9.2  9.7       Hepatic Studies  Recent " Labs   Lab Test  01/31/17   1654   BILITOTAL  0.5   ALKPHOS  80   ALBUMIN  4.1   AST  38   ALT  50       Hematology Studies    Recent Labs   Lab Test  04/12/17   0929  01/31/17   1654  07/26/13   1156  12/28/12   1019  07/27/12   1007  08/29/11   0908   05/27/09   0906   WBC  6.6  8.2  6.0  8.0  7.2  6.0   < >  3.5*  3.6*   ANEU   --   3.8   --    --    --    --    --   1.8   ALYM   --   3.6   --    --    --    --    --   1.4   GALE   --   0.6   --    --    --    --    --   0.2   AEOS   --   0.1   --    --    --    --    --   0.1   HGB  14.4  14.2  14.7  15.2  14.7  13.9   < >  12.9  13.1   HCT  43.5  43.1  44.0  45.2  44.9  42.8   < >  39.3  39.1   MCV  85  85  85  85  85  83   < >  91  91   PLT  216  234  211  228  185  203   < >  169  172    < > = values in this interval not displayed.       Thyroid Studies     Recent Labs   Lab Test  07/26/13   1156  07/27/12   1202  06/01/09   1223   TSH  1.23  2.38  2.05   T4  1.16   --    --        Urine Studies  Recent Labs   Lab Test  01/31/17   1713  06/01/09   1151   URINEPH  6.0  7.5*   NITRITE  Negative  Negative   LEUKEST  Small*  Negative   WBCU  6*  2       Microbiology:  Parvo B19 PCR blood 4/12/17 negative  Crypto serum 4/12/17 negative  Fungal ab's 4/12/17 negative    Virology:  CMV viral loads    Recent Labs   Lab Test  01/31/17   1654  08/29/11   0908  07/21/11   1106  06/22/11   0910  05/23/11   1002  01/05/11   1102  12/21/10   1534  05/18/10   1542  01/05/10   0948  10/08/09   0717   CSPEC  Plasma, EDTA anticoagulant  Whole blood, EDTA anticoagulant  Whole blood, EDTA anticoagulant  Whole blood, EDTA anticoagulant  Whole blood, EDTA anticoagulant  Whole blood, EDTA anticoagulant  Whole blood, EDTA anticoagulant  Whole blood, EDTA anticoagulant  Whole blood, EDTA anticoagulant  Whole blood, EDTA anticoagulant   CMQNT   --   <100 No CMV DNA detected.  <100 No CMV DNA detected.  <100 No CMV DNA detected.  <100 No CMV DNA detected.  <100  <100  <100  <100   <100       EBV viral loads     EBV DNA Copies/mL   Date Value Ref Range Status   01/31/2017 EBV DNA Not Detected EBVNEG [Copies]/mL Final   06/01/2009 <1000 <1000 Copies/mL Final   12/22/2008 <1000 <1000 Copies/mL Final   07/18/2007 <1000 <1000 Copies/mL Final       Hepatitis C Testing   Hepatitis C Antibody   Date Value Ref Range Status   07/11/2006 Negative NEG Final   04/25/2006 Negative NEG Final       Serostatus:  CMV IgG Antibody   Date Value Ref Range Status   12/21/2007 73.2 EU/mL Final     Comment:     Positive for anti-CMV IgG     CMV IgM Antibody   Date Value Ref Range Status   05/27/2009 3.30 (H) <0.90 Final     Comment:     Positive, suggests current or recent infection.     CMV Antibody IgM Qual   Date Value Ref Range Status   12/21/2007 Positive (A) NEG Final     Comment:      Positive, suggests current or recent infection.   04/25/2006 Negative NEG Final     Herpes Simplex Virus IgG Antibody   Date Value Ref Range Status   04/25/2006 0.21 Index Value Final     Comment:     Negative, suggests no immunologic exposure.       EBV IgG Antibody Interpretation   Date Value Ref Range Status   07/17/2006 Positive, suggests immunologic exposure.  Final   04/25/2006 Positive, suggests immunologic exposure.  Final       Imaging:  CXR: 4/12/17: Lungs are clear. Heart and mediastinum are normal. No pneumothorax. No pleural effusion. No acute bony abnormality.

## 2017-04-12 NOTE — LETTER
4/12/2017       RE: Carol Menjivar  [address] [facility] [address] [facility] [address]     Dear Colleague,    Thank you for referring your patient, Carol Menjivar, to the [facility] AND INFECTIOUS DISEASES at [facility]. Please see a copy of my visit note below.      [facility]  Transplant Infectious Disease Consult Note - New Patient     Patient:  Carol Menjivar, Date of birth 1990, Medical record number [id number]  Date of Visit:  04/12/2017  Consult requested by   for evaluation of          Assessment and Recommendations:   Recommendations:  - do not recommend live vaccines, this includes the shingles vaccine  - administered prevnar 13 vaccine today.  This should be followed by the pneumococcal 23 valent vaccine in 8 weeks  - Carol declined the influenza vaccine today  - Ordered the following: CXR, cryptococcus antigen, urine histo ag, fungal ab's, parvo B19 PCR and T cell subsets  - Ordered Stool studies: O&P, c.diff, enteric panel, cryptosporidium, microsporidium, giardia.     No f/u scheduled at this time.     Assessment:        Attestation:  I have reviewed today's vital signs, medications, labs and imaging.      Ronan Garcia,   Pager [phone]           History of Infectious Disease Illness:         Transplants:  7/18/2006 (Kidney), Postoperative day:  3921    Past Medical History:   Diagnosis Date     Cataract 2015     CMV disease (H) 2006    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     Legally blind      PPD positive, treated 2006    9 months of INH     Pseudotumor cerebri     on Diamox     Retinitis pigmentosa      S/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      No past surgical history on file.  Reviewed non contributory  No family history on file.  Reviewed non contributory  Social History     Social History     Marital status: Single     Spouse name: N/A     Number of children: N/A     Years of  "education: N/A     Occupational History     Not on file.     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  Single, no children.  Blind.     Immunization History   Administered Date(s) Administered     Influenza (IIV3) 01/04/2013     Patient Active Problem List   Diagnosis     S/P kidney transplant     Senior-Loken syndrome     Pseudotumor cerebri     Legally blind     Retinitis pigmentosa     Vitamin D deficiency     Obesity     Insulin resistance     PCOS (polycystic ovarian syndrome)     Encounter for long-term current use of medication     Cataract       Current Outpatient Prescriptions   Medication     NEORAL 25 MG PO CAPSULE     CELLCEPT 250 MG PO CAPSULE     atenolol (TENORMIN) 25 MG tablet     sulfamethoxazole-trimethoprim (BACTRIM) 400-80 MG per tablet     Current Facility-Administered Medications   Medication     pneumococcal (PREVNAR 13) injection 0.5 mL         pneumococcal  0.5 mL Intramuscular Once     Infusions:          Allergies:   No Known Allergies       Physical Exam:   /88  Pulse 60  Temp 97.6  F (36.4  C) (Oral)  Ht 1.676 m (5' 6\")  Wt 106.1 kg (234 lb)  BMI 37.77 kg/m2    Physical Examination:  GENERAL:  well-developed, well-nourished, obese female ambulating well.   HEENT:  Head is normocephalic, atraumatic   EYES:  Eyes have anicteric sclerae without conjunctival injection or stigmata of endocarditis.    ENT:  Oropharynx is moist without exudates or ulcers. Tongue is midline. Good dentition.  NECK:  Supple. No cervical lymphadenopathy. Thick neck.   LUNGS:  Clear to auscultation bilateral.   CARDIOVASCULAR:  Regular rate and rhythm with no murmurs, gallops or rubs.  ABDOMEN:  Normal bowel sounds, soft, nontender. No " appreciable hepatosplenomegaly.  SKIN:  No acute rashes.  No stigmata of endocarditis.  NEUROLOGIC:  Grossly nonfocal. Active x4 extremities  No CVA or spinal tenderness         Laboratory Data:     CD4 counts    Absolute CD4   Date Value Ref Range Status   05/27/2009 614 mm3 Final   12/22/2008 585 mm3 Final   01/11/2008 434 mm3 Final     Inflammatory Markers  No lab results found.  Immune Globulin Studies  No lab results found.  Metabolic Studies     Recent Labs   Lab Test  04/12/17   0929  01/31/17   1654  07/26/13   1156  12/28/12   1019  07/27/12   1202  07/27/12   1007  08/29/11   0908   NA  137  138  141  141   --   140  142   POTASSIUM  4.2  4.5  4.4  4.8   --   4.4  5.0   CHLORIDE  105  105  103  103   --   109  104   CO2  26  24  29  26   --   20  30   ANIONGAP  6  9  9  12   --   10  8   BUN  10  17  12  17   --   17  15   CR  0.94  1.06*  0.96  1.04  1.36*  1.44*  1.16*   GFRESTIMATED  70  62  71  65  48*  45*  58*   GLC  103*  80  91  86   --   86  94   LACHO  9.2  9.3  9.3  9.3   --   9.2  9.7       Hepatic Studies  Recent Labs   Lab Test  01/31/17 1654   BILITOTAL  0.5   ALKPHOS  80   ALBUMIN  4.1   AST  38   ALT  50       Hematology Studies    Recent Labs   Lab Test  04/12/17   0929  01/31/17   1654  07/26/13   1156  12/28/12   1019  07/27/12   1007  08/29/11   0908   05/27/09   0906   WBC  6.6  8.2  6.0  8.0  7.2  6.0   < >  3.5*  3.6*   ANEU   --   3.8   --    --    --    --    --   1.8   ALYM   --   3.6   --    --    --    --    --   1.4   GALE   --   0.6   --    --    --    --    --   0.2   AEOS   --   0.1   --    --    --    --    --   0.1   HGB  14.4  14.2  14.7  15.2  14.7  13.9   < >  12.9  13.1   HCT  43.5  43.1  44.0  45.2  44.9  42.8   < >  39.3  39.1   MCV  85  85  85  85  85  83   < >  91  91   PLT  216  234  211  228  185  203   < >  169  172    < > = values in this interval not displayed.       Thyroid Studies     Recent Labs   Lab Test  07/26/13   1156  07/27/12   1202  06/01/09    1223   TSH  1.23  2.38  2.05   T4  1.16   --    --        Urine Studies  Recent Labs   Lab Test  01/31/17   1713  06/01/09   1151   URINEPH  6.0  7.5*   NITRITE  Negative  Negative   LEUKEST  Small*  Negative   WBCU  6*  2       Microbiology:      Virology:  CMV viral loads    Recent Labs   Lab Test  01/31/17   1654  08/29/11   0908  07/21/11   1106  06/22/11   0910  05/23/11   1002  01/05/11   1102  12/21/10   1534  05/18/10   1542  01/05/10   0948  10/08/09   0717   CSPEC  Plasma, EDTA anticoagulant  Whole blood, EDTA anticoagulant  Whole blood, EDTA anticoagulant  Whole blood, EDTA anticoagulant  Whole blood, EDTA anticoagulant  Whole blood, EDTA anticoagulant  Whole blood, EDTA anticoagulant  Whole blood, EDTA anticoagulant  Whole blood, EDTA anticoagulant  Whole blood, EDTA anticoagulant   CMQNT   --   <100 No CMV DNA detected.  <100 No CMV DNA detected.  <100 No CMV DNA detected.  <100 No CMV DNA detected.  <100  <100  <100  <100  <100       EBV viral loads     EBV DNA Copies/mL   Date Value Ref Range Status   01/31/2017 EBV DNA Not Detected EBVNEG [Copies]/mL Final   06/01/2009 <1000 <1000 Copies/mL Final   12/22/2008 <1000 <1000 Copies/mL Final   07/18/2007 <1000 <1000 Copies/mL Final       Hepatitis C Testing   Hepatitis C Antibody   Date Value Ref Range Status   07/11/2006 Negative NEG Final   04/25/2006 Negative NEG Final       Serostatus:  CMV IgG Antibody   Date Value Ref Range Status   12/21/2007 73.2 EU/mL Final     Comment:     Positive for anti-CMV IgG     CMV IgM Antibody   Date Value Ref Range Status   05/27/2009 3.30 (H) <0.90 Final     Comment:     Positive, suggests current or recent infection.     CMV Antibody IgM Qual   Date Value Ref Range Status   12/21/2007 Positive (A) NEG Final     Comment:      Positive, suggests current or recent infection.   04/25/2006 Negative NEG Final     Herpes Simplex Virus IgG Antibody   Date Value Ref Range Status   04/25/2006 0.21 Index Value Final      Comment:     Negative, suggests no immunologic exposure.       EBV IgG Antibody Interpretation   Date Value Ref Range Status   07/17/2006 Positive, suggests immunologic exposure.  Final   04/25/2006 Positive, suggests immunologic exposure.  Final       Imaging:      Again, thank you for allowing me to participate in the care of your patient.      Sincerely,    Ronan Garcia MD

## 2017-04-12 NOTE — NURSING NOTE
"Chief Complaint   Patient presents with     New Patient     Shingles fatigue and also going to [place] in August       Initial /88  Pulse 60  Temp 97.6  F (36.4  C) (Oral)  Ht 1.676 m (5' 6\")  Wt 106.1 kg (234 lb)  BMI 37.77 kg/m2 Estimated body mass index is 37.77 kg/(m^2) as calculated from the following:    Height as of this encounter: 1.676 m (5' 6\").    Weight as of this encounter: 106.1 kg (234 lb).  Medication Reconciliation: complete  "

## 2017-04-12 NOTE — PATIENT INSTRUCTIONS
We will give you the prevnar 13 vaccine today.  In 8 weeks or later you can have the 2nd dose of the pneumococcal 23 valent vaccine.  I will check for malaria prophylaxis needed when you go to [place] and any other vaccines that you might need.  Please have the blood work and CXR done today while you are here.  We will give you a stool kit for you to take home. Please drop off a stool sample to a local [place] lab.

## 2017-04-13 DIAGNOSIS — Z94.0 KIDNEY REPLACED BY TRANSPLANT: Primary | ICD-10-CM

## 2017-04-13 LAB
CD3 CELLS # BLD: 1684 CELLS/UL (ref 603–2990)
CD3 CELLS NFR BLD: 77 % (ref 49–84)
CD3+CD4+ CELLS # BLD: 830 CELLS/UL (ref 441–2156)
CD3+CD4+ CELLS NFR BLD: 38 % (ref 28–63)
CD3+CD4+ CELLS/CD3+CD8+ CLL BLD: 1.09 % (ref 1.4–2.6)
CD3+CD8+ CELLS # BLD: 759 CELLS/UL (ref 125–1312)
CD3+CD8+ CELLS NFR BLD: 35 % (ref 10–40)
IFC SPECIMEN: ABNORMAL
IMMUNODEFICIENCY MARKERS SPEC-IMP: ABNORMAL

## 2017-04-13 RX ORDER — CYCLOSPORINE 25 MG/1
75 CAPSULE, LIQUID FILLED ORAL 2 TIMES DAILY
Qty: 180 CAPSULE | Refills: 11 | Status: SHIPPED | OUTPATIENT
Start: 2017-04-13 | End: 2017-05-24

## 2017-04-13 NOTE — TELEPHONE ENCOUNTER
Cyclosporine level 176. Reported as 12-hour level.  Neoral dose 100 mg twice a day.    PLAN:  - confirm that this was, in fact, a 12-hour level  - if yes (or at least 11 hours after dose), then decrease Neoral dose to 75 mg twice a day, then check cyclosporine level 1-2 weeks after dose change  - if less than 11 hours, continue on same dose and repeat cyclosporine level taking care to get a good 12-hour level (11-13 hours acceptable)    TASK:  - call patient with questions and instruction per plan.

## 2017-04-13 NOTE — LETTER
PHYSICIAN ORDERS      DATE & TIME ISSUED: 2017 2:02 PM  PATIENT NAME: Carol Menjivar   : 1990     [facility] MR#  [id number]     DIAGNOSIS:  Kidney Transplant   ICD-10 CODE: Z94.0     Please repeat the following lab in 1-2 weeks:  Cyclosporine level      Any questions please call: [phone]    Please fax these results to [phone].    .

## 2017-04-13 NOTE — TELEPHONE ENCOUNTER
Spoke to patient regarding CSA level = 176.  Patient confirms good 12 hour trough level and current dose of 100 mg BID.  Patient verbalizes understanding of medication dose decrease to 75 mg BID and will repeat labs in 2 weeks.  Current lab orders faxed to [facility].

## 2017-04-13 NOTE — LETTER
PHYSICIAN ORDERS      DATE & TIME ISSUED: 2017 2:02 PM  PATIENT NAME: Carol Menjivar   : 1990     [facility] MR#  [id number]     DIAGNOSIS:  Kidney Transplant   ICD-10 CODE: Z94.0     Please repeat the following lab in 1-2 weeks:  Cyclosporine level      Any questions please call: [phone]    Please fax these results to [phone].    .

## 2017-04-14 ENCOUNTER — RECORDS - HEALTHEAST (OUTPATIENT)
Dept: ADMINISTRATIVE | Facility: OTHER | Age: 27
End: 2017-04-14

## 2017-04-15 LAB
ASPERGILLUS AB TITR SER CF: NORMAL {TITER}
B DERMAT AB TITR SER CF: NORMAL {TITER}
COCCIDIOIDES AB TITR SER CF: NORMAL {TITER}
H CAPSUL MYC AB TITR SER CF: NORMAL {TITER}
H CAPSUL YST AB TITR SER CF: NORMAL {TITER}
LAB SCANNED RESULT: NORMAL

## 2017-04-16 LAB
B19V DNA SER QL NAA+PROBE: NORMAL
SPECIMEN SOURCE: NORMAL

## 2017-05-19 PROCEDURE — 80158 DRUG ASSAY CYCLOSPORINE: CPT | Performed by: INTERNAL MEDICINE

## 2017-05-22 DIAGNOSIS — Z79.899 ENCOUNTER FOR LONG-TERM CURRENT USE OF MEDICATION: ICD-10-CM

## 2017-05-22 DIAGNOSIS — Z94.0 KIDNEY REPLACED BY TRANSPLANT: ICD-10-CM

## 2017-05-22 DIAGNOSIS — Z48.298 AFTERCARE FOLLOWING ORGAN TRANSPLANT: Primary | ICD-10-CM

## 2017-05-22 LAB
CYCLOSPORINE BLD LC/MS/MS-MCNC: ABNORMAL UG/L (ref 50–400)
TME LAST DOSE: ABNORMAL H

## 2017-05-23 ENCOUNTER — TELEPHONE (OUTPATIENT)
Dept: TRANSPLANT | Facility: CLINIC | Age: 27
End: 2017-05-23

## 2017-05-23 DIAGNOSIS — Z94.0 KIDNEY REPLACED BY TRANSPLANT: ICD-10-CM

## 2017-05-23 NOTE — TELEPHONE ENCOUNTER
Call placed to patient: No answer. Voice message left requesting patient return call to discuss CSA level. Will try back

## 2017-05-23 NOTE — TELEPHONE ENCOUNTER
CSA level < 25   Goal 75  Please phone patient and ask if last CSA level was good.  Level is low.  If level good, increase CSA dose from 75 mg Bid to 100 mg Bid.  Repeat labs in 1-2 onths

## 2017-05-23 NOTE — LETTER
PHYSICIAN ORDERS      DATE & TIME ISSUED: May 24, 2017 10:53 AM  PATIENT NAME: Carol Menjivar   : 1990     [facility] MR#  [id number]     DIAGNOSIS:  Kidney Transplant  ICD-10 CODE: Z94.0      Please recheck the following lab in 1-2 weeks of dose change    Cyclosporine Level      Any questions please call: [phone]    Please fax these results to [phone].    .

## 2017-05-24 NOTE — TELEPHONE ENCOUNTER
F\U call placed to patient: Patient confirms current dose and an eleven hour lab draw. Patient verbalize understanding to increase dose to 100 mg twice a day and recheck level in 1-2 week. Order and rx sent

## 2017-06-05 ENCOUNTER — TELEPHONE (OUTPATIENT)
Dept: TRANSPLANT | Facility: CLINIC | Age: 27
End: 2017-06-05

## 2017-06-05 NOTE — TELEPHONE ENCOUNTER
Carol will need updated lab order.  Carol is going in this week for the repeat of her CSA  As ordered on 05/24/2017

## 2017-06-05 NOTE — LETTER
PHYSICIAN ORDERS      DATE & TIME ISSUED: 2017 12:12 PM  PATIENT NAME: Carol Menjivar   : 1990     [facility] MR#  [id number]     DIAGNOSIS:  Kidney Transplant  ICD-10 CODE: Z94.0      Please check the following lab this week    Cyclosporine Level    Any questions please call: [phone]    Please fax these results to [phone].    .

## 2017-06-07 ENCOUNTER — TRANSFERRED RECORDS (OUTPATIENT)
Dept: HEALTH INFORMATION MANAGEMENT | Facility: CLINIC | Age: 27
End: 2017-06-07

## 2017-07-11 ENCOUNTER — TELEPHONE (OUTPATIENT)
Dept: INFECTIOUS DISEASES | Facility: CLINIC | Age: 27
End: 2017-07-11

## 2017-07-11 NOTE — TELEPHONE ENCOUNTER
Pt asking Dr. Garcia call her to F/U on previous discussion on trip to [place]. Does not know if she should receive anything specific in preparation. Worried as she is leaving in August, wants to be prepared. Please advise at [phone]. Sent to [facility].

## 2017-07-12 RX ORDER — CYCLOSPORINE 25 MG/1
100 CAPSULE, LIQUID FILLED ORAL 2 TIMES DAILY
Qty: 720 CAPSULE | Refills: 3 | Status: SHIPPED | OUTPATIENT
Start: 2017-07-12 | End: 2018-07-17

## 2017-07-13 ENCOUNTER — TELEPHONE (OUTPATIENT)
Dept: TRANSPLANT | Facility: CLINIC | Age: 27
End: 2017-07-13

## 2017-07-13 DIAGNOSIS — Z48.298 AFTERCARE FOLLOWING ORGAN TRANSPLANT: ICD-10-CM

## 2017-07-13 DIAGNOSIS — Z94.0 KIDNEY REPLACED BY TRANSPLANT: ICD-10-CM

## 2017-07-13 DIAGNOSIS — Z94.0 KIDNEY TRANSPLANT RECIPIENT: Primary | ICD-10-CM

## 2017-07-13 DIAGNOSIS — Z79.899 ENCOUNTER FOR LONG-TERM CURRENT USE OF MEDICATION: ICD-10-CM

## 2017-07-13 DIAGNOSIS — Z79.60 LONG-TERM USE OF IMMUNOSUPPRESSANT MEDICATION: ICD-10-CM

## 2017-07-13 PROCEDURE — 80158 DRUG ASSAY CYCLOSPORINE: CPT | Performed by: INTERNAL MEDICINE

## 2017-07-13 NOTE — LETTER
OUTPATIENT LABORATORY TEST ORDER    Patient Name: Carol Menjivar                   Transplant Date: 07-  YOB: 1990                              Issue Date & Time: July 13, 20178:32 AM  [facility] MR: [id number]                           Exp. Date (1 year after date issued)      Diagnoses: Kidney Transplant (ICD-10 Z94.0)   Long term use of medications (ICD-10  Z79.899)     Lab results to be available on the same day drawn.   Patient should release information to the [facility].  Please fax to the Transplant Center at [phone].      Every 2 Month(s)  ?CBC and Platelet  ?Basic Metabolic Panel (Sodium, Potassium, Chloride, CO2, Creatinine, Urea Nitrogen,       Glucose, Calcium)           Cyclosporine drug level              Every 6 Months       Due:  and                                        ?Urine for protein/creatinine     HIV, HBsAb, HBcAb, HCV  If you have any questions, please call The Transplant Center at [phone] or [phone].    Please fax labs to [phone]  .

## 2017-07-14 ENCOUNTER — TELEPHONE (OUTPATIENT)
Dept: TRANSPLANT | Facility: CLINIC | Age: 27
End: 2017-07-14

## 2017-07-14 LAB
CYCLOSPORINE BLD LC/MS/MS-MCNC: 32 UG/L (ref 50–400)
TME LAST DOSE: ABNORMAL H

## 2017-07-14 NOTE — TELEPHONE ENCOUNTER
Patient states she may have missed 1-2 doses within the last week, will keep dose same and  repeat CSA next week. Order faxed to [facility].

## 2017-07-14 NOTE — LETTER
PHYSICIAN ORDERS      DATE & TIME ISSUED: 2017 2:44 PM  PATIENT NAME: Carol Menjivar   : 1990     [facility] MR#  [id number]     DIAGNOSIS:  Kidney Transplant  ICD-10 CODE: Z94.0     Patient will be into lab to repeat Cyclosporine level.    Any questions please call: [phone]    Please fax these results to [phone].    .

## 2017-07-17 DIAGNOSIS — Z94.0 KIDNEY REPLACED BY TRANSPLANT: ICD-10-CM

## 2017-07-17 RX ORDER — ATENOLOL 25 MG/1
25 TABLET ORAL DAILY
Qty: 90 TABLET | Refills: 3 | Status: SHIPPED | OUTPATIENT
Start: 2017-07-17 | End: 2018-08-24

## 2017-07-17 NOTE — TELEPHONE ENCOUNTER
Last Office Visit with Nephrologist:  2/1/17.  Medication refilled per Nephrology Clinic protocol.    Tiana Yost RN

## 2017-07-17 NOTE — TELEPHONE ENCOUNTER
Drug Name: atenolol 25mg  Last Fill Date: 4/7/17  Quantity: Eliza    Sharon Anamaria   [facility]  [phone]

## 2017-07-19 DIAGNOSIS — Z71.84 TRAVEL ADVICE ENCOUNTER: Primary | ICD-10-CM

## 2017-07-19 RX ORDER — ATOVAQUONE AND PROGUANIL HYDROCHLORIDE 250; 100 MG/1; MG/1
1 TABLET, FILM COATED ORAL DAILY
Qty: 30 TABLET | Refills: 0 | Status: SHIPPED | OUTPATIENT
Start: 2017-07-19 | End: 2018-10-15

## 2017-07-19 NOTE — TELEPHONE ENCOUNTER
"\"Hermelinda Almaraz, I agree that she needs the Hep A and inactivated typhoid vaccine, IM.  I prefer malarone or mefloquine, if there is one or the other that her insurance will cover.       Please let me know if you are able to set those things up.     Much appreciated!   Ronan [id number]\"     Will order in subsequent encounter.  Rufina Sol RN    "

## 2017-07-27 NOTE — NURSING NOTE
LVM with pt and spoke with her mother to have her give us a call back to make appt for vaccines and discuss tx plan.  Rufina Sol RN

## 2017-10-12 DIAGNOSIS — Z79.899 ENCOUNTER FOR LONG-TERM CURRENT USE OF MEDICATION: ICD-10-CM

## 2017-10-12 DIAGNOSIS — Z48.298 AFTERCARE FOLLOWING ORGAN TRANSPLANT: ICD-10-CM

## 2017-10-12 DIAGNOSIS — Z94.0 KIDNEY REPLACED BY TRANSPLANT: ICD-10-CM

## 2017-10-12 PROCEDURE — 80158 DRUG ASSAY CYCLOSPORINE: CPT | Performed by: INTERNAL MEDICINE

## 2017-10-16 LAB
CYCLOSPORINE BLD LC/MS/MS-MCNC: 93 UG/L (ref 50–400)
TME LAST DOSE: NORMAL H

## 2017-12-12 ENCOUNTER — TELEPHONE (OUTPATIENT)
Dept: TRANSPLANT | Facility: CLINIC | Age: 27
End: 2017-12-12

## 2017-12-12 NOTE — TELEPHONE ENCOUNTER
Patient Call: Insurance  Reason for Call: Paperwork questions, Needs letter stating successful Kidney tx for insurance purposes.

## 2017-12-12 NOTE — LETTER
2017        To Whom It May Concern:    Regarding: Carol Menjivar  : 1990        Ms. Menjivar received a kidney transplant on 2006 at the [facility] ([facility]    Carol Menjivar has not required dialysis since date of transplant.      Please contact the transplant office for further questions.      Sincerely,      .

## 2018-01-16 DIAGNOSIS — Z94.0 KIDNEY REPLACED BY TRANSPLANT: Primary | ICD-10-CM

## 2018-01-16 DIAGNOSIS — Z48.298 AFTERCARE FOLLOWING ORGAN TRANSPLANT: ICD-10-CM

## 2018-01-16 DIAGNOSIS — Z79.899 ENCOUNTER FOR LONG-TERM CURRENT USE OF MEDICATION: ICD-10-CM

## 2018-01-17 ENCOUNTER — TELEPHONE (OUTPATIENT)
Dept: TRANSPLANT | Facility: CLINIC | Age: 28
End: 2018-01-17

## 2018-01-17 NOTE — LETTER
The Transplant Center  Room 2[address]200  [facility],  [address]  [address]  Tel [phone]  Toll Free [phone]                OUTPATIENT LABORATORY TEST ORDER    Patient Name: Carol Menjivar  Transplant Date: 7/18/2006 (Kidney)  YOB: 1990  Issue Date & Time:January 17, 20182:01 PM    [facility] MR:  [id number]  Exp. Date (1 year after date issued)      Diagnoses: Kidney Transplant (ICD-10  Z94.0)   Long term use of medications (ICD-10  Z79.899)     Lab results to be available on the same day drawn.   Patient should release information to the [facility].  Please fax to the Transplant Center at [phone].    Every 2 Months  - CBC and Platelet  - Basic Metabolic Panel (Sodium, Potassium, Chloride, CO2, Creatinine, BUN,    Glucose, Calcium)        - Cyclosporine drug level        Every 6 Months: Due: January and July                              - Urine for protein/creatinine ratio                                                                                                                                                                                                                Please fax lab results to the Transplant Office: [phone].   HIV, HBsAb, HBcAb, HCV  If you have any questions, please call The Transplant Center at [phone] or [phone].

## 2018-01-18 ENCOUNTER — TELEPHONE (OUTPATIENT)
Dept: TRANSPLANT | Facility: CLINIC | Age: 28
End: 2018-01-18

## 2018-01-18 NOTE — TELEPHONE ENCOUNTER
[facility] Prior Authorization Team   Phone: [phone]  Fax: [phone]    PA Initiation    Medication: NEORAL 25 MG PO CAPSULE   Insurance Company: [employer] - Phone [phone] Fax [phone]  Pharmacy Filling the Rx: [facility] - [place], [place] - [address]  Filling Pharmacy Phone: [phone]  Filling Pharmacy Fax: [phone]  Start Date: 1/18/2018

## 2018-01-29 NOTE — TELEPHONE ENCOUNTER
[facility] Prior Authorization Team   Phone: [phone]  Fax: [phone]    Prior Authorization Approval    Authorization Effective Date: 1/24/2018  Authorization Expiration Date: 12/31/2099  Medication: NEORAL 25 MG PO CAPSULE   Approved Dose/Quantity: Take 4 capsules (100 mg) by mouth 2 times daily / #240  Reference #: CMM KEY#: [id number]   Insurance Company: [employer] - Phone [phone] Fax [phone]  Expected CoPay: $0.00     CoPay Card Available:      Foundation Assistance Needed:    Which Pharmacy is filling the prescription (Not needed for infusion/clinic administered): [facility] - [place], [place] - [address]  Pharmacy Notified: Yes  Patient Notified: Yes    Approval letter not available at this time.

## 2018-01-30 ENCOUNTER — OFFICE VISIT (OUTPATIENT)
Dept: NEPHROLOGY | Facility: CLINIC | Age: 28
End: 2018-01-30
Attending: INTERNAL MEDICINE
Payer: COMMERCIAL

## 2018-01-30 VITALS
TEMPERATURE: 97.9 F | OXYGEN SATURATION: 97 % | DIASTOLIC BLOOD PRESSURE: 86 MMHG | WEIGHT: 234.4 LBS | BODY MASS INDEX: 37.67 KG/M2 | HEIGHT: 66 IN | SYSTOLIC BLOOD PRESSURE: 121 MMHG | HEART RATE: 75 BPM

## 2018-01-30 DIAGNOSIS — Z48.298 AFTERCARE FOLLOWING ORGAN TRANSPLANT: ICD-10-CM

## 2018-01-30 DIAGNOSIS — Z94.0 S/P KIDNEY TRANSPLANT: Primary | ICD-10-CM

## 2018-01-30 DIAGNOSIS — Z94.0 KIDNEY REPLACED BY TRANSPLANT: ICD-10-CM

## 2018-01-30 DIAGNOSIS — E55.9 VITAMIN D DEFICIENCY: ICD-10-CM

## 2018-01-30 DIAGNOSIS — E66.812 CLASS 2 OBESITY WITHOUT SERIOUS COMORBIDITY WITH BODY MASS INDEX (BMI) OF 37.0 TO 37.9 IN ADULT, UNSPECIFIED OBESITY TYPE: ICD-10-CM

## 2018-01-30 DIAGNOSIS — Z79.899 ENCOUNTER FOR LONG-TERM CURRENT USE OF MEDICATION: ICD-10-CM

## 2018-01-30 DIAGNOSIS — D63.1 ANEMIA IN CHRONIC KIDNEY DISEASE (CODE): ICD-10-CM

## 2018-01-30 DIAGNOSIS — Z94.0 S/P KIDNEY TRANSPLANT: ICD-10-CM

## 2018-01-30 LAB
ANION GAP SERPL CALCULATED.3IONS-SCNC: 6 MMOL/L (ref 3–14)
BUN SERPL-MCNC: 16 MG/DL (ref 7–30)
CALCIUM SERPL-MCNC: 9.1 MG/DL (ref 8.5–10.1)
CHLORIDE SERPL-SCNC: 105 MMOL/L (ref 94–109)
CO2 SERPL-SCNC: 29 MMOL/L (ref 20–32)
CREAT SERPL-MCNC: 1.05 MG/DL (ref 0.52–1.04)
CREAT UR-MCNC: 24 MG/DL
DEPRECATED CALCIDIOL+CALCIFEROL SERPL-MC: 32 UG/L (ref 20–75)
ERYTHROCYTE [DISTWIDTH] IN BLOOD BY AUTOMATED COUNT: 12.5 % (ref 10–15)
FERRITIN SERPL-MCNC: 19 NG/ML (ref 12–150)
GFR SERPL CREATININE-BSD FRML MDRD: 62 ML/MIN/1.7M2
GLUCOSE SERPL-MCNC: 85 MG/DL (ref 70–99)
HCT VFR BLD AUTO: 40.3 % (ref 35–47)
HGB BLD-MCNC: 13.5 G/DL (ref 11.7–15.7)
IRON SATN MFR SERPL: 19 % (ref 15–46)
IRON SERPL-MCNC: 55 UG/DL (ref 35–180)
MCH RBC QN AUTO: 28.4 PG (ref 26.5–33)
MCHC RBC AUTO-ENTMCNC: 33.5 G/DL (ref 31.5–36.5)
MCV RBC AUTO: 85 FL (ref 78–100)
PLATELET # BLD AUTO: 205 10E9/L (ref 150–450)
POTASSIUM SERPL-SCNC: 4 MMOL/L (ref 3.4–5.3)
PROT UR-MCNC: <0.05 G/L
PROT/CREAT 24H UR: NORMAL G/G CR (ref 0–0.2)
PTH-INTACT SERPL-MCNC: 30 PG/ML (ref 12–72)
RBC # BLD AUTO: 4.75 10E12/L (ref 3.8–5.2)
SODIUM SERPL-SCNC: 140 MMOL/L (ref 133–144)
TIBC SERPL-MCNC: 296 UG/DL (ref 240–430)
WBC # BLD AUTO: 7 10E9/L (ref 4–11)

## 2018-01-30 PROCEDURE — 36415 COLL VENOUS BLD VENIPUNCTURE: CPT | Performed by: INTERNAL MEDICINE

## 2018-01-30 PROCEDURE — G0463 HOSPITAL OUTPT CLINIC VISIT: HCPCS | Mod: ZF

## 2018-01-30 PROCEDURE — 80048 BASIC METABOLIC PNL TOTAL CA: CPT | Performed by: INTERNAL MEDICINE

## 2018-01-30 PROCEDURE — 82306 VITAMIN D 25 HYDROXY: CPT | Performed by: INTERNAL MEDICINE

## 2018-01-30 PROCEDURE — 82728 ASSAY OF FERRITIN: CPT | Performed by: INTERNAL MEDICINE

## 2018-01-30 PROCEDURE — 85027 COMPLETE CBC AUTOMATED: CPT | Performed by: INTERNAL MEDICINE

## 2018-01-30 PROCEDURE — 84156 ASSAY OF PROTEIN URINE: CPT | Performed by: INTERNAL MEDICINE

## 2018-01-30 PROCEDURE — 83550 IRON BINDING TEST: CPT | Performed by: INTERNAL MEDICINE

## 2018-01-30 PROCEDURE — 83970 ASSAY OF PARATHORMONE: CPT | Performed by: INTERNAL MEDICINE

## 2018-01-30 PROCEDURE — 83540 ASSAY OF IRON: CPT | Performed by: INTERNAL MEDICINE

## 2018-01-30 ASSESSMENT — PAIN SCALES - GENERAL: PAINLEVEL: NO PAIN (0)

## 2018-01-30 NOTE — NURSING NOTE
"Chief Complaint   Patient presents with     RECHECK     kidney transplant       Initial /86 (BP Location: Right arm, Patient Position: Sitting, Cuff Size: Adult Large)  Pulse 75  Temp 97.9  F (36.6  C) (Oral)  Ht 1.676 m (5' 6\")  Wt 106.3 kg (234 lb 6.4 oz)  SpO2 97%  BMI 37.83 kg/m2 Estimated body mass index is 37.83 kg/(m^2) as calculated from the following:    Height as of this encounter: 1.676 m (5' 6\").    Weight as of this encounter: 106.3 kg (234 lb 6.4 oz).  Medication Reconciliation: complete     Carina Tejada MA    "

## 2018-01-30 NOTE — LETTER
1/30/2018      RE: Carol Menjivar  [address] [facility] [address]       Service Date: 01/30/2018      REASON FOR VISIT:  Ms. Menjivar is a 27-year-old woman here for followup after living donor kidney transplant in 07/2006 from her sister who was a single haplotype match.       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  Baseline creatinine 0.8-1.0.  Current immunosuppression is cyclosporine, CellCept 750 b.i.d.  Most recent cyclosporine level was 93.  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  DSA last checked in 2006 is negative.      The patient has had a variety infections including CMV disease post-transplant, positive PPD treated with INH in 2005, 3 upper respiratory illnesses in 2017, shingles in 2016.      Today in clinic she has a new complaint of palpitations.  When she is falling asleep at night she notices a racing heart rate, she also feels this sometimes in conjunction with chest pain.  When she stopped the atenolol after her last clinic visit, she had more episodes of tachycardia.  She has not seen a cardiologist yet for that.      She has history of vision loss which is stable currently, she sees better out of her right eye.  She had cataract surgery so has better light perception and color vision.  Occasionally has sudden vision loss.  She uses a cane and uses a speaking key board at work.  She is learning brail.      She is interested in medication interactions with pregnancy since she has a boyfriend who she will probably  in the next year.      REVIEW OF SYSTEMS:  Comprehensive review of systems is completed and negative except as above.      PAST MEDICAL HISTORY:   1.  Status post living donor transplant in 2006.   2.  Senior-Loken syndrome with blindness.   3.  History of pseudotumor cerebri, last episode 2013, treated with Diamox.   4.  Positive PPD,  treated with INH remotely.   5.  History of CMV disease treated in 2006.   6.  Shingles treated in 2016.   7.  Obesity, BMI 37 and not changed in the past several years.      SOCIAL HISTORY:  She works in the [employer].  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  She uses a treadmill and the elliptical to get exercise on a regular basis, does not smoke or drink alcohol.      PHYSICAL EXAMINATION:   VITAL SIGNS:  Blood pressure 125/86, weight 234, BMI 37.   HEENT:  Disconjugate gaze.  Most of her vision appears to be out of the right eye.   SKIN:  No skin lesions.   LUNGS:  Clear.   CARDIAC:  Regular sinus rhythm, no murmurs, rubs or gallops.  No peripheral edema.      LABORATORY DATA:  Last cyclosporine level was 93.  Electrolytes/Renal -   Recent Labs   Lab Test  01/30/18   1403  04/12/17   0929  01/31/17   1654   NA  140  137  138   POTASSIUM  4.0  4.2  4.5   CHLORIDE  105  105  105   CO2  29  26  24   BUN  16  10  17   CR  1.05*  0.94  1.06*   GLC  85  103*  80   LACHO  9.1  9.2  9.3       CBC -   Recent Labs   Lab Test  01/30/18   1403  04/12/17   0929  01/31/17   1654   WBC  7.0  6.6  8.2   HGB  13.5  14.4  14.2   PLT  205  216  234       LFTs -   Recent Labs   Lab Test  01/31/17   1654   ALKPHOS  80   BILITOTAL  0.5   ALT  50   AST  38   PROTTOTAL  8.1   ALBUMIN  4.1       Coags - No lab results found.    Iron Panel -   Recent Labs   Lab Test  01/30/18   1403   IRON  55   IRONSAT  19   CONNIE  19       Endocrine -   Recent Labs   Lab Test  04/12/17   1128  07/26/13   1156  07/27/12   1202   A1C   --   5.0  5.3   TSH  2.27  1.23  2.38         IMPRESSION:   1.  Allograft function excellent.   2.  Immunosuppression.  Stable immunosuppression and would not change it at this point.   3.  Possible pregnancy.  I advised her we would switch her CellCept to Imuran if she decides to become pregnant or if there is any risk of that happening without intention.   4.  Palpitations.  She has possible SVT  or some other arrhythmia and she should see a cardiologist.  She will contact her primary care provider to get a referral locally.      PLAN:   1.  No change in immunosuppression.   2.  Cardiology referral.   3.  Patient to see a dermatologist for a baseline check.   4.  Change immunosuppression if pregnancy is contemplated.         NONI RIVAS MD      Current Outpatient Prescriptions   Medication Sig Dispense Refill     atenolol (TENORMIN) 25 MG tablet Take 1 tablet (25 mg) by mouth daily 90 tablet 3     NEORAL 25 MG PO CAPSULE Take 4 capsules (100 mg) by mouth 2 times daily 720 capsule 3     CELLCEPT 250 MG PO CAPSULE Take 3 capsules (750 mg) by mouth 2 times daily 180 capsule 11     sulfamethoxazole-trimethoprim (BACTRIM) 400-80 MG per tablet Take 1 tablet by mouth daily 28 tablet 3     atovaquone-proguanil (MALARONE) 250-100 MG per tablet Take 1 tablet by mouth daily Start 2 days before travel and continue 7 days after return. (Patient not taking: Reported on 2018) 30 tablet 0              D: 2018   T: 2018   MT: LG      Name:     KATHRYN TAYLOR   MRN:      [id number]        Account:      [id number]   :      1990           Service Date: 2018      Document: [id number]        Noni Rivas MD

## 2018-01-30 NOTE — PATIENT INSTRUCTIONS
1.  See cardiologist re SVT symptoms  2.  See derm re skin cancer check  3.  Call if pregnancy is contemplated

## 2018-01-30 NOTE — MR AVS SNAPSHOT
After Visit Summary   1/30/2018    Carol Menjivar    MRN: [id number]           Patient Information     Date Of Birth          1990        Visit Information        Provider Department      1/30/2018 2:45 PM Noni Hartley MD [facility] Ne[facility]        Care Instructions    1.  See cardiologist re SVT symptoms  2.  See derm re skin cancer check  3.  Call if pregnancy is contemplated          Follow-ups after your visit        Follow-up notes from your care team     Return in about 1 year (around 1/30/2019).      Your next 10 appointments already scheduled     Apr 11, 2018 11:15 AM CDT   (Arrive by 11:00 AM)   New Patient Visit with Marilu Walker PA-C   [facility] Dermat[facility] ([facility])    [address]  3rd Floor  [facility] [id number] [phone]            Jan 29, 2019 12:00 PM CST   Lab with  LAB   [facility] Lab ([facility])    [address]  1st Floor  [facility] [id number] [phone]            Jan 29, 2019  1:05 PM CST   (Arrive by 12:35 PM)   Return Kidney Transplant with Noni Hartley MD   [facility]phrology ([facility])    [address]  [address]  [facility] [id number] [phone]              Future tests that were ordered for you today     Open Future Orders        Priority Expected Expires Ordered    Cyclosporine Routine 1/30/2018 1/30/2019 1/30/2018            Who to contact     If you have questions or need follow up information about today's clinic visit or your schedule please contact [facility] NE[facility] directly at [phone].  Normal or non-critical lab and imaging results will be communicated to you by MyChart, letter or phone within 4 business days after the clinic has received the results. If you do not hear from us within 7 days, please contact the clinic through MyChart or phone. If you have a critical or abnormal lab result, we will notify  "you by phone as soon as possible.  Submit refill requests through Y'all or call your pharmacy and they will forward the refill request to us. Please allow 3 business days for your refill to be completed.          Additional Information About Your Visit        Ankihart Information     Y'all lets you send messages to your doctor, view your test results, renew your prescriptions, schedule appointments and more. To sign up, go to [email or web address] . Click on \"Log in\" on the left side of the screen, which will take you to the Welcome page. Then click on \"Sign up Now\" on the right side of the page.     You will be asked to enter the access code listed below, as well as some personal information. Please follow the directions to create your username and password.     Your access code is: [email or web address]  Expires: 2018  6:30 AM     Your access code will  in 90 days. If you need help or a new code, please call your [place] clinic or [phone].        Care EveryWhere ID     This is your Care EveryWhere ID. This could be used by other organizations to access your [place] medical records  [id number]        Your Vitals Were     Pulse Temperature Height Pulse Oximetry BMI (Body Mass Index)       75 97.9  F (36.6  C) (Oral) 1.676 m (5' 6\") 97% 37.83 kg/m2        Blood Pressure from Last 3 Encounters:   18 121/86   17 138/88   17 123/85    Weight from Last 3 Encounters:   18 106.3 kg (234 lb 6.4 oz)   17 106.1 kg (234 lb)   17 104.9 kg (231 lb 4.8 oz)              Today, you had the following     No orders found for display       Primary Care Provider Office Phone # Fax #    Katharina Leija [phone]       [facility] [address] [place] [address]        Equal Access to Services     CATRACHITO GUNDERSON AH: Enzo Jean-Baptiste, chika rudolph, red germansh la'aan ah. So [facility] " [phone].    ATENCIÓN: Si shauna zamudio, tiene a gray disposición servicios gratuitos de asistencia lingüística. Driss flores [phone].    We comply with applicable federal civil rights laws and [place] laws. We do not discriminate on the basis of race, color, national origin, age, disability, sex, sexual orientation, or gender identity.            Thank you!     Thank you for choosing [facility] NE[facility]OLOG[facility]  for your care. Our goal is always to provide you with excellent care. Hearing back from our patients is one way we can continue to improve our services. Please take a few minutes to complete the written survey that you may receive in the mail after your visit with us. Thank you!             Your Updated Medication List - Protect others around you: Learn how to safely use, store and throw away your medicines at www.disposemymeds.org.          This list is accurate as of 1/30/18  4:09 PM.  Always use your most recent med list.                   Brand Name Dispense Instructions for use Diagnosis    atenolol 25 MG tablet    TENORMIN    90 tablet    Take 1 tablet (25 mg) by mouth daily    Kidney replaced by transplant       atovaquone-proguanil 250-100 MG per tablet    MALARONE    30 tablet    Take 1 tablet by mouth daily Start 2 days before travel and continue 7 days after return.    Travel advice encounter       cycloSPORINE modified 25 MG capsule     720 capsule    Take 4 capsules (100 mg) by mouth 2 times daily    Kidney replaced by transplant       mycophenolate 250 MG capsule     180 capsule    Take 3 capsules (750 mg) by mouth 2 times daily    Kidney replaced by transplant       sulfamethoxazole-trimethoprim 400-80 MG per tablet    BACTRIM    28 tablet    Take 1 tablet by mouth daily    Kidney replaced by transplant

## 2018-01-31 ENCOUNTER — TELEPHONE (OUTPATIENT)
Dept: TRANSPLANT | Facility: CLINIC | Age: 28
End: 2018-01-31

## 2018-01-31 DIAGNOSIS — Z94.0 KIDNEY TRANSPLANTED: Primary | ICD-10-CM

## 2018-01-31 DIAGNOSIS — Z48.298 AFTERCARE FOLLOWING ORGAN TRANSPLANT: ICD-10-CM

## 2018-01-31 DIAGNOSIS — T86.10 COMPLICATIONS, KIDNEY TRANSPLANT: ICD-10-CM

## 2018-01-31 NOTE — TELEPHONE ENCOUNTER
January 31, 2018: I spoke with Carol who called to schedule a cardiology appointment. She assured me that Dr. Hartley advised her to see someone.    I told her that I would pass the information to Juan Carlos, her coordinator, who would need to put in an order.    Jolly Quinones  Transplant   [phone]

## 2018-01-31 NOTE — TELEPHONE ENCOUNTER
Cardiology Referral orders placed. C/o palpitaions and some chest pain.  Message sent to Christi Heller to schedule.

## 2018-01-31 NOTE — PROGRESS NOTES
Service Date: 01/30/2018      REASON FOR VISIT:  Ms. Menjivar is a 27-year-old woman here for followup after living donor kidney transplant in 07/2006 from her sister who was a single haplotype match.       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  Baseline creatinine 0.8-1.0.  Current immunosuppression is cyclosporine, CellCept 750 b.i.d.  Most recent cyclosporine level was 93.  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  DSA last checked in 2006 is negative.      The patient has had a variety infections including CMV disease post-transplant, positive PPD treated with INH in 2005, 3 upper respiratory illnesses in 2017, shingles in 2016.      Today in clinic she has a new complaint of palpitations.  When she is falling asleep at night she notices a racing heart rate, she also feels this sometimes in conjunction with chest pain.  When she stopped the atenolol after her last clinic visit, she had more episodes of tachycardia.  She has not seen a cardiologist yet for that.      She has history of vision loss which is stable currently, she sees better out of her right eye.  She had cataract surgery so has better light perception and color vision.  Occasionally has sudden vision loss.  She uses a cane and uses a speaking key board at work.  She is learning brail.      She is interested in medication interactions with pregnancy since she has a boyfriend who she will probably  in the next year.      REVIEW OF SYSTEMS:  Comprehensive review of systems is completed and negative except as above.      PAST MEDICAL HISTORY:   1.  Status post living donor transplant in 2006.   2.  Senior-Loken syndrome with blindness.   3.  History of pseudotumor cerebri, last episode 2013, treated with Diamox.   4.  Positive PPD, treated with INH remotely.   5.  History of CMV disease treated in 2006.   6.  Shingles  treated in 2016.   7.  Obesity, BMI 37 and not changed in the past several years.      SOCIAL HISTORY:  She works in the [employer].  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  She uses a treadmill and the elliptical to get exercise on a regular basis, does not smoke or drink alcohol.      PHYSICAL EXAMINATION:   VITAL SIGNS:  Blood pressure 125/86, weight 234, BMI 37.   HEENT:  Disconjugate gaze.  Most of her vision appears to be out of the right eye.   SKIN:  No skin lesions.   LUNGS:  Clear.   CARDIAC:  Regular sinus rhythm, no murmurs, rubs or gallops.  No peripheral edema.      LABORATORY DATA:  Last cyclosporine level was 93.  Electrolytes/Renal -   Recent Labs   Lab Test  01/30/18   1403  04/12/17   0929  01/31/17   1654   NA  140  137  138   POTASSIUM  4.0  4.2  4.5   CHLORIDE  105  105  105   CO2  29  26  24   BUN  16  10  17   CR  1.05*  0.94  1.06*   GLC  85  103*  80   LACHO  9.1  9.2  9.3       CBC -   Recent Labs   Lab Test  01/30/18   1403  04/12/17   0929  01/31/17   1654   WBC  7.0  6.6  8.2   HGB  13.5  14.4  14.2   PLT  205  216  234       LFTs -   Recent Labs   Lab Test  01/31/17   1654   ALKPHOS  80   BILITOTAL  0.5   ALT  50   AST  38   PROTTOTAL  8.1   ALBUMIN  4.1       Coags - No lab results found.    Iron Panel -   Recent Labs   Lab Test  01/30/18   1403   IRON  55   IRONSAT  19   CONNIE  19       Endocrine -   Recent Labs   Lab Test  04/12/17   1128  07/26/13   1156  07/27/12   1202   A1C   --   5.0  5.3   TSH  2.27  1.23  2.38         IMPRESSION:   1.  Allograft function excellent.   2.  Immunosuppression.  Stable immunosuppression and would not change it at this point.   3.  Possible pregnancy.  I advised her we would switch her CellCept to Imuran if she decides to become pregnant or if there is any risk of that happening without intention.   4.  Palpitations.  She has possible SVT or some other arrhythmia and she should see a cardiologist.  She will contact her primary  care provider to get a referral locally.      PLAN:   1.  No change in immunosuppression.   2.  Cardiology referral.   3.  Patient to see a dermatologist for a baseline check.   4.  Change immunosuppression if pregnancy is contemplated.         ASHLEY RIVAS MD      Current Outpatient Prescriptions   Medication Sig Dispense Refill     atenolol (TENORMIN) 25 MG tablet Take 1 tablet (25 mg) by mouth daily 90 tablet 3     NEORAL 25 MG PO CAPSULE Take 4 capsules (100 mg) by mouth 2 times daily 720 capsule 3     CELLCEPT 250 MG PO CAPSULE Take 3 capsules (750 mg) by mouth 2 times daily 180 capsule 11     sulfamethoxazole-trimethoprim (BACTRIM) 400-80 MG per tablet Take 1 tablet by mouth daily 28 tablet 3     atovaquone-proguanil (MALARONE) 250-100 MG per tablet Take 1 tablet by mouth daily Start 2 days before travel and continue 7 days after return. (Patient not taking: Reported on 2018) 30 tablet 0              D: 2018   T: 2018   MT: POLI      Name:     KATHRYN TAYLOR   MRN:      [id number]        Account:      [id number]   :      1990           Service Date: 2018      Document: [id number]

## 2018-02-01 ENCOUNTER — TELEPHONE (OUTPATIENT)
Dept: PEDIATRICS | Age: 28
End: 2018-02-01

## 2018-02-13 ENCOUNTER — TELEPHONE (OUTPATIENT)
Dept: PEDIATRICS | Age: 28
End: 2018-02-13

## 2018-02-23 ENCOUNTER — TELEPHONE (OUTPATIENT)
Dept: TRANSPLANT | Facility: CLINIC | Age: 28
End: 2018-02-23

## 2018-02-23 NOTE — TELEPHONE ENCOUNTER
PA Initiation    Medication: cellcept  Insurance Company: [employer] - Phone [phone] Fax [phone]  Pharmacy Filling the Rx: [facility] - [place], [place] - [address]  Filling Pharmacy Phone: [phone]  Filling Pharmacy Fax: [phone]  Start Date: 2/23/2018    [facility] Authorization Team   Phone: [phone]  Fax: [phone]

## 2018-03-14 ENCOUNTER — OFFICE VISIT (OUTPATIENT)
Dept: CONSULT | Facility: CLINIC | Age: 28
End: 2018-03-14
Attending: GENETIC COUNSELOR, MS
Payer: COMMERCIAL

## 2018-03-14 ENCOUNTER — TRANSFERRED RECORDS (OUTPATIENT)
Dept: HEALTH INFORMATION MANAGEMENT | Facility: CLINIC | Age: 28
End: 2018-03-14

## 2018-03-14 DIAGNOSIS — H35.50 SENIOR-LOKEN SYNDROME: Primary | ICD-10-CM

## 2018-03-14 DIAGNOSIS — H35.52 RETINITIS PIGMENTOSA: ICD-10-CM

## 2018-03-14 DIAGNOSIS — Z94.0 S/P KIDNEY TRANSPLANT: ICD-10-CM

## 2018-03-14 DIAGNOSIS — H54.8 LEGALLY BLIND: ICD-10-CM

## 2018-03-14 DIAGNOSIS — Q61.5 SENIOR-LOKEN SYNDROME: Primary | ICD-10-CM

## 2018-03-14 DIAGNOSIS — Q87.89 SENIOR-LOKEN SYNDROME: Primary | ICD-10-CM

## 2018-03-14 PROCEDURE — 40000795 ZZHCL STATISTIC DNA PROCESS AND HOLD: Performed by: MEDICAL GENETICS

## 2018-03-14 PROCEDURE — 96040 ZZH GENETIC COUNSELING, EACH 30 MINUTES: CPT | Mod: ZF | Performed by: GENETIC COUNSELOR, MS

## 2018-03-14 PROCEDURE — 36415 COLL VENOUS BLD VENIPUNCTURE: CPT | Performed by: MEDICAL GENETICS

## 2018-03-14 NOTE — LETTER
3/14/2018      RE: Carol Menjivar  [address]  [facility] [address]       Presenting Information: Carol was seen for a genetic counseling visit on 3/14/2018 to facilitate genetic testing for Senior-Loken syndrome. Carol has a history of renal transplant and progressive vision loss and has been given a clinical diagnosis of Senior-Loken syndrome. Some of her records refer to her as having Retinitis Pigmentosa (RP); however, Carol's symptoms don't necessarily fit with RP. She had genetic testing in 2008 for two genes, RP1 and [id number], that was normal. Otherwise, she has not had genetic testing.    Family History:  A three generation pedigree was obtained today and scanned into the EMR.  The following information is significant:  Carol is the youngest of two daughters born to her parents together. Her sister is 31 years of age and is reportedly healthy. Her sister was her kidney donor. The sister has a healthy 3 year old son and is pregnant for her second child.     Gini's father is 61 and has a history of thyroid disease. He has had cataract surgery. Felix's mother is 66 and had a benign neuroma removed from near her ear. This was not an acoustic neuroma. Her mother has also had cataract surgery. Her mother's maternal uncle had renal failure, but the cause is unknown. There is no other family history of renal disease and no history of progressive vision loss.     The families are of [place] (paternal) and  (maternal) ancestry. There is no known consanguinity.      Discussion:   From Genetics Home Reference    Senior-Løken syndrome is a rare disorder characterized by the combination of two specific features: a kidney condition called nephronophthisis and an eye condition known as Adrienne congenital amaurosis.    Nephronophthisis causes fluid-filled cysts to develop in the kidneys beginning in childhood. These cysts impair kidney function, initially causing increased urine production  (polyuria), excessive thirst (polydipsia), general weakness, and extreme tiredness (fatigue). Nephronophthisis leads to end-stage renal disease (ESRD) later in childhood or in adolescence. ESRD is a life-threatening failure of kidney function that occurs when the kidneys are no longer able to filter fluids and waste products from the body effectively.    Adrienne congenital amaurosis primarily affects the retina, which is the specialized tissue at the back of the eye that detects light and color. This condition causes vision problems, including an increased sensitivity to light (photophobia), involuntary movements of the eyes (nystagmus), and extreme farsightedness (hyperopia). Some people with Senior-Løken syndrome develop the signs of Adrienne congenital amaurosis within the first few years of life, while others do not develop vision problems until later in childhood.    This is a recessive genetic condition. We reviewed recessive inheritance. To date, there are 7 genes associated with Senior-Loken syndrome. Not everyone with symptoms of this condition will have an identifiable mutation; therefore, there are likely additional genes for Senior-Loken that have not yet been discovered.     Next Generation Sequencing or NGS (CPT code 53822)  allows us to rapidly sequence DNA so that we can read, chemical base by chemical base, through a gene looking for spelling mistakes or mutations. Using NGS we can sequence large numbers of genes associated with particular symptoms in groups or panels (Senior-Loken panel: RNY461,IQCB1,NPHP1,NPHP4,SDCCAG8,KHUH4WB5,WDR19) This testing will be done by the [facility].    We discussed that there are three possible results from NGS:    Negative: meaning normal or no mutations are identified in the genes that were tested/sequenced    Positive: meaning a mutation that is known to be associated with a particular set of symptoms is identified; this would confirm Carol's  diagnosis and would allow for accurate genetic risk assessment for her future offspring and for relatives' offspring.     Variant of uncertain significance (VUS): meaning a change in the DNA sequence of a particular gene was seen but it is not known if it explains the symptoms.If a VUS is detected, the laboratory may request a blood sample from both parents to help clarify Carol's results.     Results from NGS take approximately 10 weeks to return. I will contact Carol when results are complete. Initially we will hold the sample until we have prior authorization from her insurance company.         Plan:  1. A three generation pedigree was obtained and scanned into the EMR.  2. The genetics and inheritance of Senior-Loken syndrome were reviewed today.  Consent was obtained and blood was drawn and sent for Next Gen Sequencing of CZI989,IQCB1,NPHP1,NPHP4,SDCCAG8,IUGA1MM8,and WDR19  3. Contact information was provided to the family and additional questions or concerns were denied.    Monique Chappell GC  Certified Genetic Counselor  Phone: [phone]    Approximate Time Spent in Consultation: 30 minutes    CC: patient

## 2018-03-14 NOTE — MR AVS SNAPSHOT
After Visit Summary   3/14/2018    Carol Menjivar    MRN: [id number]           Patient Information     Date Of Birth          1990        Visit Information        Provider Department      3/14/2018 10:00 AM Monique Chappell GC [facility]        Today's Diagnoses     Senior-Loken syndrome    -  1    S/P kidney transplant        Legally blind        Retinitis pigmentosa           Follow-ups after your visit        Your next 10 appointments already scheduled     Mar 28, 2018 10:00 AM CDT   (Arrive by 9:45 AM)   NEW PANCREAS/KIDNEY TRANSPLANT WORK-UP with Mayo Lopez MD   [facility] ([facility] [place])    [address]  [address]  [facility] [id number] [phone]            Apr 11, 2018 11:00 AM CDT   (Arrive by 10:45 AM)   New Patient Visit with Delia Cornejo MD   [facility] ([facility])    [address]  3rd Floor  [place] [id number] [phone]            Jan 29, 2019 12:00 PM CST   Lab with  LAB   [facility] ([facility])    [address]  1st Floor  [facility] [id number] [phone]            Jan 29, 2019  1:05 PM CST   (Arrive by 12:35 PM)   Return Kidney Transplant with Noni Hartley MD   [facility] ([facility])    [address]  [address]  [facility] [id number] [phone]              Who to contact     Please call your clinic at [phone] to:    Ask questions about your health    Make or cancel appointments    Discuss your medicines    Learn about your test results    Speak to your doctor            Additional Information About Your Visit        MyChart Information     [facility] is an electronic gateway that provides easy, online access to your medical records. With [facility], you can request a clinic appointment, read your test results, renew a prescription or communicate with your  care team.     To sign up for [facility] visit the website at [email or web address]   You will be asked to enter the access code listed below, as well as some personal information. Please follow the directions to create your username and password.     Your access code is: [email or web address]  Expires: 2018  7:30 AM     Your access code will  in 90 days. If you need help or a new code, please contact your [facility] or call [phone] for assistance.        Care EveryWhere ID     This is your Care EveryWhere ID. This could be used by other organizations to access your [place] medical records  [id number]         Blood Pressure from Last 3 Encounters:   18 121/86   17 138/88   17 123/85    Weight from Last 3 Encounters:   18 234 lb 6.4 oz (106.3 kg)   17 234 lb (106.1 kg)   17 231 lb 4.8 oz (104.9 kg)              We Performed the Following     Next Generation Sequencing        Primary Care Provider Office Phone # Fax #    Katharina Leija [phone]       [address]   [facility] [address]        Equal Access to Services     CATRACHITO GUNDERSON AH: Hadii aad ku hadasho Soomaali, waaxda luqadaha, qaybta kaalmada adeegyada, waxay idiin hayaan patti merida. So [facility] [phone].    ATENCIÓN: Si habla español, tiene a gray disposición servicios gratuitos de asistencia lingüística. Llame al [phone].    We comply with applicable federal civil rights laws and [place] laws. We do not discriminate on the basis of race, color, national origin, age, disability, sex, sexual orientation, or gender identity.            Thank you!     Thank you for choosing [facility]  for your care. Our goal is always to provide you with excellent care. Hearing back from our patients is one way we can continue to improve our services. Please take a few minutes to complete the written survey that you may receive in  the mail after your visit with us. Thank you!             Your Updated Medication List - Protect others around you: Learn how to safely use, store and throw away your medicines at www.disposemymeds.org.          This list is accurate as of 3/14/18 11:59 PM.  Always use your most recent med list.                   Brand Name Dispense Instructions for use Diagnosis    atenolol 25 MG tablet    TENORMIN    90 tablet    Take 1 tablet (25 mg) by mouth daily    Kidney replaced by transplant       atovaquone-proguanil 250-100 MG per tablet    MALARONE    30 tablet    Take 1 tablet by mouth daily Start 2 days before travel and continue 7 days after return.    Travel advice encounter       cycloSPORINE modified 25 MG capsule     720 capsule    Take 4 capsules (100 mg) by mouth 2 times daily    Kidney replaced by transplant       mycophenolate 250 MG capsule     180 capsule    Take 3 capsules (750 mg) by mouth 2 times daily    Kidney replaced by transplant       sulfamethoxazole-trimethoprim 400-80 MG per tablet    BACTRIM    28 tablet    Take 1 tablet by mouth daily    Kidney replaced by transplant

## 2018-03-15 NOTE — PROGRESS NOTES
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 Carol has a history of renal transplant and progressive vision loss and has been given a clinical diagnosis of Senior-Loken syndrome. Some of her records refer to her as having Retinitis Pigmentosa (RP); however, Carol's symptoms don't necessarily fit with RP. She had genetic testing in 2008 for two genes, RP1 and X3YNBF1, that was normal. Otherwise, she has not had genetic testing.    Family History:  A three generation pedigree was obtained today and scanned into the EMR.  The following information is significant:  Carol is the youngest of two daughters born to her parents together. Her sister is 31 years of age and is reportedly healthy. Her sister was her kidney donor. The sister has a healthy 3 year old son and is pregnant for her second child.     Gini's father is 61 and has a history of thyroid disease. He has had cataract surgery. Felix's mother is 66 and had a benign neuroma removed from near her ear. This was not an acoustic neuroma. Her mother has also had cataract surgery. Her mother's maternal uncle had renal failure, but the cause is unknown. There is no other family history of renal disease and no history of progressive vision loss.     The families are of [place] (paternal) and [place] (maternal) ancestry. There is no known consanguinity.      Discussion:   From Genetics Home Reference    Senior-Løken syndrome is a rare disorder characterized by the combination of two specific features: a kidney condition called nephronophthisis and an eye condition known as Adrienne congenital amaurosis.    Nephronophthisis causes fluid-filled cysts to develop in the kidneys beginning in childhood. These cysts impair kidney function, initially causing increased urine production (polyuria), excessive thirst (polydipsia), general weakness, and extreme tiredness (fatigue).  Nephronophthisis leads to end-stage renal disease (ESRD) later in childhood or in adolescence. ESRD is a life-threatening failure of kidney function that occurs when the kidneys are no longer able to filter fluids and waste products from the body effectively.    Adrienne congenital amaurosis primarily affects the retina, which is the specialized tissue at the back of the eye that detects light and color. This condition causes vision problems, including an increased sensitivity to light (photophobia), involuntary movements of the eyes (nystagmus), and extreme farsightedness (hyperopia). Some people with Senior-Løken syndrome develop the signs of Adrienne congenital amaurosis within the first few years of life, while others do not develop vision problems until later in childhood.    This is a recessive genetic condition. We reviewed recessive inheritance. To date, there are 7 genes associated with Senior-Loken syndrome. Not everyone with symptoms of this condition will have an identifiable mutation; therefore, there are likely additional genes for Senior-Loken that have not yet been discovered.     Next Generation Sequencing or NGS (CPT code 38985)  allows us to rapidly sequence DNA so that we can read, chemical base by chemical base, through a gene looking for spelling mistakes or mutations. Using NGS we can sequence large numbers of genes associated with particular symptoms in groups or panels (Senior-Loken panel: ZDR776,IQCB1,NPHP1,NPHP4,SDCCAG8,GMGU5HQ7,WDR19) This testing will be done by the [facility].    We discussed that there are three possible results from NGS:    Negative: meaning normal or no mutations are identified in the genes that were tested/sequenced    Positive: meaning a mutation that is known to be associated with a particular set of symptoms is identified; this would confirm Carol's diagnosis and would allow for accurate genetic risk assessment for her future offspring and  for relatives' offspring.     Variant of uncertain significance (VUS): meaning a change in the DNA sequence of a particular gene was seen but it is not known if it explains the symptoms.If a VUS is detected, the laboratory may request a blood sample from both parents to help clarify Carol's results.     Results from NGS take approximately 10 weeks to return. I will contact Carol when results are complete. Initially we will hold the sample until we have prior authorization from her insurance company.         Plan:  1. A three generation pedigree was obtained and scanned into the EMR.  2. The genetics and inheritance of Senior-Loken syndrome were reviewed today.  Consent was obtained and blood was drawn and sent for Next Gen Sequencing of JQP440,IQCB1,NPHP1,NPHP4,SDCCAG8,KACU4ZC8,and WDR19  3. Contact information was provided to the family and additional questions or concerns were denied.    Monique Chappell GC  Certified Genetic Counselor  Phone: [phone]    Approximate Time Spent in Consultation: 30 minutes    CC: patient

## 2018-03-19 LAB — COPATH REPORT: NORMAL

## 2018-03-20 PROBLEM — R07.9 CHEST PAIN: Status: ACTIVE | Noted: 2018-03-20

## 2018-03-20 PROBLEM — R00.2 PALPITATIONS: Status: ACTIVE | Noted: 2018-03-20

## 2018-03-23 ENCOUNTER — TELEPHONE (OUTPATIENT)
Dept: TRANSPLANT | Facility: CLINIC | Age: 28
End: 2018-03-23

## 2018-03-23 NOTE — TELEPHONE ENCOUNTER
Patient Call: Patient Call: Transplant Lab/Orders  Route to LPN  Post Transplant Days: 4266  When patient is less than 60 days post-transplant, route high priority  Reason for Call: Annual lab reorder  [place] [facility] [place] 03/28/18  May need lab order in EPIC  Callback needed? No

## 2018-03-28 ENCOUNTER — OFFICE VISIT (OUTPATIENT)
Dept: CARDIOLOGY | Facility: CLINIC | Age: 28
End: 2018-03-28
Attending: INTERNAL MEDICINE
Payer: COMMERCIAL

## 2018-03-28 VITALS
HEIGHT: 66 IN | WEIGHT: 236.5 LBS | BODY MASS INDEX: 38.01 KG/M2 | HEART RATE: 88 BPM | OXYGEN SATURATION: 96 % | SYSTOLIC BLOOD PRESSURE: 131 MMHG | DIASTOLIC BLOOD PRESSURE: 89 MMHG

## 2018-03-28 DIAGNOSIS — G47.19 EXCESSIVE DAYTIME SLEEPINESS: ICD-10-CM

## 2018-03-28 DIAGNOSIS — Z94.0 KIDNEY REPLACED BY TRANSPLANT: ICD-10-CM

## 2018-03-28 DIAGNOSIS — Z48.298 AFTERCARE FOLLOWING ORGAN TRANSPLANT: ICD-10-CM

## 2018-03-28 DIAGNOSIS — R00.2 PALPITATIONS: Primary | ICD-10-CM

## 2018-03-28 DIAGNOSIS — Z79.899 ENCOUNTER FOR LONG-TERM CURRENT USE OF MEDICATION: ICD-10-CM

## 2018-03-28 LAB
ANION GAP SERPL CALCULATED.3IONS-SCNC: 5 MMOL/L (ref 3–14)
BUN SERPL-MCNC: 17 MG/DL (ref 7–30)
CALCIUM SERPL-MCNC: 9.3 MG/DL (ref 8.5–10.1)
CHLORIDE SERPL-SCNC: 106 MMOL/L (ref 94–109)
CO2 SERPL-SCNC: 27 MMOL/L (ref 20–32)
CREAT SERPL-MCNC: 1.06 MG/DL (ref 0.52–1.04)
CYCLOSPORINE BLD LC/MS/MS-MCNC: 77 UG/L (ref 50–400)
ERYTHROCYTE [DISTWIDTH] IN BLOOD BY AUTOMATED COUNT: 12.5 % (ref 10–15)
GFR SERPL CREATININE-BSD FRML MDRD: 61 ML/MIN/1.7M2
GLUCOSE SERPL-MCNC: 101 MG/DL (ref 70–99)
HCT VFR BLD AUTO: 42 % (ref 35–47)
HGB BLD-MCNC: 13.8 G/DL (ref 11.7–15.7)
MCH RBC QN AUTO: 27.9 PG (ref 26.5–33)
MCHC RBC AUTO-ENTMCNC: 32.9 G/DL (ref 31.5–36.5)
MCV RBC AUTO: 85 FL (ref 78–100)
PLATELET # BLD AUTO: 221 10E9/L (ref 150–450)
POTASSIUM SERPL-SCNC: 4.2 MMOL/L (ref 3.4–5.3)
RBC # BLD AUTO: 4.95 10E12/L (ref 3.8–5.2)
SODIUM SERPL-SCNC: 138 MMOL/L (ref 133–144)
TME LAST DOSE: NORMAL H
WBC # BLD AUTO: 5.7 10E9/L (ref 4–11)

## 2018-03-28 PROCEDURE — G0463 HOSPITAL OUTPT CLINIC VISIT: HCPCS | Mod: 25,ZF

## 2018-03-28 PROCEDURE — 36415 COLL VENOUS BLD VENIPUNCTURE: CPT | Performed by: INTERNAL MEDICINE

## 2018-03-28 PROCEDURE — 80048 BASIC METABOLIC PNL TOTAL CA: CPT | Performed by: INTERNAL MEDICINE

## 2018-03-28 PROCEDURE — 85027 COMPLETE CBC AUTOMATED: CPT | Performed by: INTERNAL MEDICINE

## 2018-03-28 PROCEDURE — 99203 OFFICE O/P NEW LOW 30 MIN: CPT | Mod: ZP | Performed by: INTERNAL MEDICINE

## 2018-03-28 PROCEDURE — 80158 DRUG ASSAY CYCLOSPORINE: CPT | Performed by: INTERNAL MEDICINE

## 2018-03-28 PROCEDURE — 93010 ELECTROCARDIOGRAM REPORT: CPT | Mod: ZP | Performed by: INTERNAL MEDICINE

## 2018-03-28 PROCEDURE — 93005 ELECTROCARDIOGRAM TRACING: CPT | Mod: ZF

## 2018-03-28 ASSESSMENT — PAIN SCALES - GENERAL: PAINLEVEL: NO PAIN (0)

## 2018-03-28 NOTE — LETTER
3/28/2018      RE: Carol Menjivar  [address]  [facility] [address]       2018.        Katharina Leija MD   [facility] [address] [place] [address]      Dear Dr. Leija:      It was a pleasure participating in the care of your patient, Ms. Carol Menjivar.  As you know, she is a 27-year-old lady whom I see today for palpitations.      Her past medical history is significant for the followin.  Hypertension.   2.  Status post kidney transplant in .   3.  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   4.  Pseudotumor cerebri.   5.  Positive PPD.   6.  Shingles.      She saw Dr. Hartley on 2018 and at that time was complaining of palpitations.  She was sent here for further evaluation.        She has noticed that occasionally when she has taken a hot bath she feels her heart racing for about 5 minutes and it makes her short of breath but not dizzy and she does not lose consciousness.  It happens about once a month and last fall she had another episode while going to bed and felt her heart racing at that time.  It has not increased in frequency or severity but she does not have a good explanation of why it races.  She takes in 2 cups of tea a day, but denies other stimulant use.      REVIEW OF SYSTEMS:  Positive for unrestful sleep and hypersomnolence.  She says it was worse 2 years ago but it is still bothering her now as she complains of chronic fatigue and does not feel refreshed after a full night's sleep.      She otherwise denies maria e chest pressure or pain or exertional chest pain.  She denies shortness of breath, PND, orthopnea, edema, syncope or near-syncope.      In terms of her cardiac risk factors, no history of diabetes, she does have a history of hypertension.  No history of smoking or drinking.  Her  grandfather had heart trouble in his 60s.  She does not know her cholesterol.      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  Her father is a .  Her sister lives in [place] and her roommate in [place] and she likes to travel and visits her friends and relatives.      She otherwise is able to exercise for 3-4 miles an hour on the elliptical machine and treadmill for about an hour at a time.          CURRENT MEDICATIONS:  Atenolol 25 mg a day, Neoral, CellCept, Malarone which is atovaquone/proguanil TMP sulfa.      PHYSICAL EXAMINATION:     VITAL SIGNS:  Blood pressure is 131/89 with a pulse of 88.  Her weight is 236 pounds.   NECK:  Exam reveals no obvious jugular venous distention.   LUNGS:  Clear to auscultation.  Respiratory effort is normal.   CARDIAC:  Reveals a regular rate and rhythm, no obvious murmur or gallop appreciated.   ABDOMEN:  Belly soft, nontender.   EXTREMITIES:  Without gross edema.      EKG today reveals normal sinus rhythm at a rate of 82 beats per minute, no gross ST changes.      LABORATORY DATA:  01/30/2018 potassium 4.0, GFR and hemoglobin normal.  TSH was normal on 04/12/2017.        IMPRESSION:      Carol is a 27-year-old lady with retinitis pigmentosa and Senior-Loken syndrome, status post kidney transplant in 2006, who presents with several active cardiac issues:     1.  Palpitations of unclear etiology.      She experiences her palpitations for 5 minutes about once a month.  She does feel short of breath with them but does not feel dizzy or lightheaded and has not lost consciousness.  They occur sometimes when she is lying down to go to sleep or after or during a hot bath.  Further noninvasive evaluation would be indicated.     2.  Hypersomnolence and unrestful sleep.      The patient feels extremely tired even after a full  night's sleep and has been battling fatigue for quite some time.  Further evaluation would also be indicated.        PLAN:     1.  Echocardiogram to rule out significant structural pathology.     2.  Sleep workup to rule out significant sleep disorder, considering her unrestful sleep and hypersomnolence.     3.  Event monitor to hopefully capture one of her episodes of palpitations for more firm diagnosis and treatment.     4.  Further updates will follow.      Once again, it was a pleasure participating in the care of your patient, Ms. Carol Taylor.  Please feel free to contact me anytime if you have any questions regarding her care in the future.         Sincerely,      ERNESTO CAMACHO MD        cc: Noni Leija MD        D: 2018   T: 2018   MT: BLANCHE      Name:     CAROL TAYLOR   MRN:      [id number]        Account:      [id number]   :      1990      Document: [id number]

## 2018-03-28 NOTE — LETTER
3/28/2018      RE: Carol Menjivar  [address]  [facility] [address]       Dear Colleague,    Thank you for the opportunity to participate in the care of your patient, Carol Menjivar, at the [facility]. Please see a copy of my visit note below.    [id number]      Please do not hesitate to contact me if you have any questions/concerns.     Sincerely,     Mayo Lopez MD

## 2018-03-28 NOTE — PATIENT INSTRUCTIONS
You were seen today in the Cardiovascular Clinic at the [facility].     Cardiology Providers you saw during your visit:  Dr Jessica MD    Diagnosis:  Chest pains and palpitations, Post Kidney Tx in 2006.    Results:  None    Recommendations:  Echocardiogram and 30 day even monitor. Also, referral has been placed for sleep medicine evaluation.  They will contact you to schedule; however, if you do not hear from them, there number is [phone].    Follow-up:  TBD based on echocardiogram and event monitor results.      For scheduling needs [phone] option 1 and the option 1 again.  Nursing questions: [phone] option 1 then chose option 3 for the triage nurse.  For emergencies call 911.    Thank you for your visit today!     Please call if you have any questions or concerns.    Jose Carlos Matthews RN  General Cardiology Nurse Coordinator  [facility]

## 2018-03-28 NOTE — NURSING NOTE
Cardiac Monitors: Patient was informed of need for placement of event monitor. Pt is going to attempt to complete echocardiogram first, so monitor will be placed at later date. Patient demonstrated understanding of this information and agreed to call with further questions or concerns.    Cardiac Testing: Patient given instructions regarding  echocardiogram . Discussed purpose, preparation, procedure and when to expect results reported back to the patient. Patient demonstrated understanding of this information and agreed to call with further questions or concerns.    Med Reconcile: Reviewed and verified all current medications with the patient. The updated medication list was printed and given to the patient.    Return Appointment: Patient given instructions regarding scheduling next clinic visit.  F/U visit will be TBD based off ECHO and event monitor results.  Patient demonstrated understanding of this information and agreed to call with further questions or concerns.    Patient stated she understood all health information given and agreed to call with further questions or concerns.    Allison Moraes LPN

## 2018-03-28 NOTE — MR AVS SNAPSHOT
After Visit Summary   3/28/2018    Carol Menjivar    MRN: [id number]           Patient Information     Date Of Birth          1990        Visit Information        Provider Department      3/28/2018 10:00 AM Mayo Lopez MD [facility] [place]        Today's Diagnoses     Palpitations    -  1    Excessive daytime sleepiness          Care Instructions    You were seen today in the Cardiovascular Clinic at the [facility].     Cardiology Providers you saw during your visit:  Dr Jessica MD    Diagnosis:  Chest pains and palpitations, Post Kidney Tx in 2006.    Results:  None    Recommendations:  Echocardiogram and 30 day even monitor. Also, referral has been placed for sleep medicine evaluation.  They will contact you to schedule; however, if you do not hear from them, there number is [phone].    Follow-up:  TBD based on echocardiogram and event monitor results.      For scheduling needs [phone] option 1 and the option 1 again.  Nursing questions: [phone] option 1 then chose option 3 for the triage nurse.  For emergencies call 911.    Thank you for your visit today!     Please call if you have any questions or concerns.    Jose Carlos Matthews RN  General Cardiology Nurse Coordinator  [facility] - [facility]              Follow-ups after your visit        Additional Services     SLEEP EVALUATION & MANAGEMENT REFERRAL - [facility]  [phone] (Age 2 and up)       Please be aware that coverage of these services is subject to the terms and limitations of your health insurance plan.  Call member services at your health plan with any benefit or coverage questions.      Please bring the following to your appointment:    >>   List of current medications   >>   This referral request   >>   Any documents/labs given to you for this referral                      Your next 10 appointments already scheduled     Apr  11, 2018 10:00 AM CDT   Ech Complete with [id number]   [facility] ([facility])    [address] [facility]  3rd Floor  [facility] [id number] [phone]           1. Please bring or wear a comfortable two-piece outfit. 2. You may eat, drink and take your normal medicines. 3. For any questions that cannot be answered, please contact the ordering physician            Apr 11, 2018 11:00 AM CDT   (Arrive by 10:45 AM)   New Patient Visit with Delia Cornejo MD   [facility] ([facility])    [address] [facility]  3rd Floor  [facility] [id number] [phone]            Apr 11, 2018  3:00 PM CDT   (Arrive by 2:45 PM)   Event Monitor Visit with  Cvc Monitor [facility] ([facility])    [address] [facility] [address]  [address]  [facility] [id number] [phone]            Jan 29, 2019 12:00 PM CST   Lab with  LAB   [facility] ([facility])    [address] [facility]  1st Floor  [facility] [id number] [phone]            Jan 29, 2019  1:05 PM CST   (Arrive by 12:35 PM)   Return Kidney Transplant with Noni Hartley MD   [facility] ([facility])    [address] [place] [address]  [address]  [facility] [id number] [phone]              Future tests that were ordered for you today     Open Future Orders        Priority Expected Expires Ordered    SLEEP EVALUATION & MANAGEMENT REFERRAL - ADULT -[facility]  [phone] (Age 2 and up) Routine  3/28/2019 3/28/2018    Echo Complete Routine 3/28/2018 6/26/2018 3/28/2018    Cardiac Event Monitor - Peds/Adult Routine 3/28/2018 6/26/2018 3/28/2018            Who to contact     If you have questions or need follow up information about today's clinic visit or your schedule please contact [facility] directly at [phone].  Normal or non-critical  "lab and imaging results will be communicated to you by MyChart, letter or phone within 4 business days after the clinic has received the results. If you do not hear from us within 7 days, please contact the clinic through [employer] or phone. If you have a critical or abnormal lab result, we will notify you by phone as soon as possible.  Submit refill requests through [employer] or call your pharmacy and they will forward the refill request to us. Please allow 3 business days for your refill to be completed.          Additional Information About Your Visit        Retellityhar15Five Information     [employer] lets you send messages to your doctor, view your test results, renew your prescriptions, schedule appointments and more. To sign up, go to [email or web address] . Click on \"Log in\" on the left side of the screen, which will take you to the Welcome page. Then click on \"Sign up Now\" on the right side of the page.     You will be asked to enter the access code listed below, as well as some personal information. Please follow the directions to create your username and password.     Your access code is: [email or web address]  Expires: 2018  7:30 AM     Your access code will  in 90 days. If you need help or a new code, please call your [place] clinic or [phone].        Care EveryWhere ID     This is your Care EveryWhere ID. This could be used by other organizations to access your [place] medical records  [id number]        Your Vitals Were     Pulse Height Pulse Oximetry BMI (Body Mass Index)          88 1.676 m (5' 6\") 96% 38.17 kg/m2         Blood Pressure from Last 3 Encounters:   18 131/89   18 121/86   17 138/88    Weight from Last 3 Encounters:   18 107.3 kg (236 lb 8 oz)   18 106.3 kg (234 lb 6.4 oz)   17 106.1 kg (234 lb)              We Performed the Following     EKG 12-lead, tracing only (Same Day)        Primary Care Provider Office Phone # Fax #    Katharina Leija MD " [phone]       [facility] [address] [place] [address]        Equal Access to Services     CATRACHITO GUNDERSON : Hadii aad ku hadaleksandr Jean-Baptiste, wapenelopeda lucassy, qabirdieta kafélixda darlyn, red diazgloria ric So [facility] [phone].    ATENCIÓN: Si habla español, tiene a gray disposición servicios gratuitos de asistencia lingüística. Margieame al [phone].    We comply with applicable federal civil rights laws and [place] laws. We do not discriminate on the basis of race, color, national origin, age, disability, sex, sexual orientation, or gender identity.            Thank you!     Thank you for choosing [facility]  for your care. Our goal is always to provide you with excellent care. Hearing back from our patients is one way we can continue to improve our services. Please take a few minutes to complete the written survey that you may receive in the mail after your visit with us. Thank you!             Your Updated Medication List - Protect others around you: Learn how to safely use, store and throw away your medicines at www.disposemymeds.org.          This list is accurate as of 3/28/18 11:06 AM.  Always use your most recent med list.                   Brand Name Dispense Instructions for use Diagnosis    atenolol 25 MG tablet    TENORMIN    90 tablet    Take 1 tablet (25 mg) by mouth daily    Kidney replaced by transplant       atovaquone-proguanil 250-100 MG per tablet    MALARONE    30 tablet    Take 1 tablet by mouth daily Start 2 days before travel and continue 7 days after return.    Travel advice encounter       cycloSPORINE modified 25 MG capsule     720 capsule    Take 4 capsules (100 mg) by mouth 2 times daily    Kidney replaced by transplant       mycophenolate 250 MG capsule     180 capsule    Take 3 capsules (750 mg) by mouth 2 times daily    Kidney replaced by transplant       sulfamethoxazole-trimethoprim 400-80 MG per tablet    BACTRIM     28 tablet    Take 1 tablet by mouth daily    Kidney replaced by transplant

## 2018-03-28 NOTE — NURSING NOTE
Chief Complaint   Patient presents with     New Patient     27 year old female with history of s/p kidney transplant, palpitations and chest pain-- needs EKG     Vitals were taken and medications were reconciled. EKG was performed.  MICHAEL Correa  9:44 AM

## 2018-03-28 NOTE — PROGRESS NOTES
2018.        Katharina Leija MD   [facility] [address] [place] [address]      Dear Dr. Leija:      It was a pleasure participating in the care of your patient, Ms. Carol Menjivar.  As you know, she is a 27-year-old lady whom I see today for palpitations.      Her past medical history is significant for the followin.  Hypertension.   2.  Status post kidney transplant in .   3.  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   4.  Pseudotumor cerebri.   5.  Positive PPD.   6.  Shingles.      She saw Dr. Hartley on 2018 and at that time was complaining of palpitations.  She was sent here for further evaluation.        She has noticed that occasionally when she has taken a hot bath she feels her heart racing for about 5 minutes and it makes her short of breath but not dizzy and she does not lose consciousness.  It happens about once a month and last fall she had another episode while going to bed and felt her heart racing at that time.  It has not increased in frequency or severity but she does not have a good explanation of why it races.  She takes in 2 cups of tea a day, but denies other stimulant use.      REVIEW OF SYSTEMS:  Positive for unrestful sleep and hypersomnolence.  She says it was worse 2 years ago but it is still bothering her now as she complains of chronic fatigue and does not feel refreshed after a full night's sleep.      She otherwise denies maria e chest pressure or pain or exertional chest pain.  She denies shortness of breath, PND, orthopnea, edema, syncope or near-syncope.      In terms of her cardiac risk factors, no history of diabetes, she does have a history of hypertension.  No history of smoking or drinking.  Her grandfather had heart trouble in his 60s.  She does not know her cholesterol.      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  Her father is a .  Her sister lives in [place] and her roommate in [place] and she likes to travel and visits her friends and relatives.      She otherwise is able to exercise for 3-4 miles an hour on the elliptical machine and treadmill for about an hour at a time.          CURRENT MEDICATIONS:  Atenolol 25 mg a day, Neoral, CellCept, Malarone which is atovaquone/proguanil TMP sulfa.      PHYSICAL EXAMINATION:     VITAL SIGNS:  Blood pressure is 131/89 with a pulse of 88.  Her weight is 236 pounds.   NECK:  Exam reveals no obvious jugular venous distention.   LUNGS:  Clear to auscultation.  Respiratory effort is normal.   CARDIAC:  Reveals a regular rate and rhythm, no obvious murmur or gallop appreciated.   ABDOMEN:  Belly soft, nontender.   EXTREMITIES:  Without gross edema.      EKG today reveals normal sinus rhythm at a rate of 82 beats per minute, no gross ST changes.      LABORATORY DATA:  01/30/2018 potassium 4.0, GFR and hemoglobin normal.  TSH was normal on 04/12/2017.        IMPRESSION:      Carol is a 27-year-old lady with retinitis pigmentosa and Senior-Loken syndrome, status post kidney transplant in 2006, who presents with several active cardiac issues:     1.  Palpitations of unclear etiology.      She experiences her palpitations for 5 minutes about once a month.  She does feel short of breath with them but does not feel dizzy or lightheaded and has not lost consciousness.  They occur sometimes when she is lying down to go to sleep or after or during a hot bath.  Further noninvasive evaluation would be indicated.     2.  Hypersomnolence and unrestful sleep.      The patient feels extremely tired even after a full night's sleep and has been battling fatigue for quite some time.  Further evaluation would also be  indicated.        PLAN:     1.  Echocardiogram to rule out significant structural pathology.     2.  Sleep workup to rule out significant sleep disorder, considering her unrestful sleep and hypersomnolence.     3.  Event monitor to hopefully capture one of her episodes of palpitations for more firm diagnosis and treatment.     4.  Further updates will follow.      Once again, it was a pleasure participating in the care of your patient, Ms. Carol Taylor.  Please feel free to contact me anytime if you have any questions regarding her care in the future.         Sincerely,      ERNESTO CAMACHO MD             D: 2018   T: 2018   MT: BLANCHE      Name:     CAROL TAYLOR   MRN:      [id number]        Account:      [id number]   :      1990      Document: [id number]       cc: Noni Leija MD

## 2018-03-29 LAB — INTERPRETATION ECG - MUSE: NORMAL

## 2018-04-04 DIAGNOSIS — Z94.0 KIDNEY REPLACED BY TRANSPLANT: Primary | ICD-10-CM

## 2018-04-04 RX ORDER — MYCOPHENOLATE MOFETIL 250 MG/1
750 CAPSULE ORAL 2 TIMES DAILY
Qty: 180 CAPSULE | Refills: 11 | Status: SHIPPED | OUTPATIENT
Start: 2018-04-04 | End: 2019-01-25

## 2018-04-04 NOTE — TELEPHONE ENCOUNTER
Drug Name: cellcept 250mg  Last Fill Date: 2/26/18  Quantity: 180      Sharon Anamaria   [facility]  [phone]

## 2018-04-10 ENCOUNTER — TELEPHONE (OUTPATIENT)
Dept: DERMATOLOGY | Facility: CLINIC | Age: 28
End: 2018-04-10

## 2018-04-10 NOTE — TELEPHONE ENCOUNTER
Left message for Pt reminding her of upcoming appt tomorrow at 11 am ( didn't go into details.) Left clinic number in case she had any questions or needed to cancel.

## 2018-04-11 ENCOUNTER — RADIANT APPOINTMENT (OUTPATIENT)
Dept: CARDIOLOGY | Facility: CLINIC | Age: 28
End: 2018-04-11
Payer: COMMERCIAL

## 2018-04-11 ENCOUNTER — OFFICE VISIT (OUTPATIENT)
Dept: DERMATOLOGY | Facility: CLINIC | Age: 28
End: 2018-04-11
Payer: COMMERCIAL

## 2018-04-11 DIAGNOSIS — R00.2 PALPITATIONS: ICD-10-CM

## 2018-04-11 DIAGNOSIS — Z12.83 SKIN EXAM, SCREENING FOR CANCER: Primary | ICD-10-CM

## 2018-04-11 DIAGNOSIS — Z80.8 FAMILY HISTORY OF MELANOMA: ICD-10-CM

## 2018-04-11 DIAGNOSIS — D23.9 DERMATOFIBROMA: ICD-10-CM

## 2018-04-11 DIAGNOSIS — D22.9 MULTIPLE BENIGN NEVI: ICD-10-CM

## 2018-04-11 DIAGNOSIS — Z94.0 HISTORY OF KIDNEY TRANSPLANT: ICD-10-CM

## 2018-04-11 ASSESSMENT — PAIN SCALES - GENERAL: PAINLEVEL: NO PAIN (0)

## 2018-04-11 NOTE — MR AVS SNAPSHOT
After Visit Summary   4/11/2018    Carol Menjivar    MRN: [id number]           Patient Information     Date Of Birth          1990        Visit Information        Provider Department      4/11/2018 11:00 AM Delia Cornejo MD [facility] Dermatology        Care Instructions    Your baseline skin exam looked great! There were no concerning lesions. You should continue to have annual full body skin checks, because transplant patients are at an increased risk of developing skin cancer (particularly squamous cell carcinoma).   You should also be very careful to protect your skin from the sun (see recommendations below).   We will see you in 1 year for your next skin check, but feel free to call for a sooner appointment at any time if you have any new, growing, changing, or bleeding lesions that you are concerned about.     Sun Protection    Recommended use of a broad spectrum sunscreen of at least SPF 30 on all sun exposed sites daily.  Apply 20 minutes prior to exposure and repeat application every two hours or after sweating or swimming.  Avoid any intentional indoor or outdoor tanning.       The ABCDEs of Melanoma    Skin cancer can develop anywhere on the skin. Ask someone for help when checking your skin, especially in hard to see places. If you notice a mole different from others, or that changes, enlarges, itches, or bleeds (even if it is small), you should see a dermatologist.                      Follow-ups after your visit        Follow-up notes from your care team     Return in about 1 year (around 4/11/2019).      Your next 10 appointments already scheduled     Apr 11, 2018  3:00 PM CDT   (Arrive by 2:45 PM)   Event Monitor Visit with  Cvc Monitor [facility] ([facility])    [address] [place] [address]  [address] [place] [id number]   [phone]            Jan 29, 2019 12:00 PM CST   Lab with  LAB   [facility] ([facility])    [address]  1st Floor  [facility] [id number] [phone]            2019  1:05 PM CST   (Arrive by 12:35 PM)   Return Kidney Transplant with Noni Hatrley MD   [facility] Nephrology ([facility])    [address]  [address]  [facility] [id number] [phone]              Who to contact     Please call your clinic at [phone] to:    Ask questions about your health    Make or cancel appointments    Discuss your medicines    Learn about your test results    Speak to your doctor            Additional Information About Your Visit        MyChart Information     Re[employer]edia[employer] is an electronic gateway that provides easy, online access to your medical records. With [employer], you can request a clinic appointment, read your test results, renew a prescription or communicate with your care team.     To sign up for [employer] visit the website at [email or web address] - Share Your Passion!   You will be asked to enter the access code listed below, as well as some personal information. Please follow the directions to create your username and password.     Your access code is: [email or web address]  Expires: 2018  7:30 AM     Your access code will  in 90 days. If you need help or a new code, please contact your [facility] or call [phone] for assistance.        Care EveryWhere ID     This is your Care EveryWhere ID. This could be used by other organizations to access your [place] medical records  [id number]         Blood Pressure from Last 3 Encounters:   18 131/89   18 121/86   17 138/88    Weight from Last 3 Encounters:   18 107.3 kg (236 lb 8 oz)   18 106.3 kg (234 lb 6.4 oz)   17 106.1 kg (234 lb)              Today, you had the following     No orders found for display       Primary Care Provider Office Phone # Fax #    Katharina Leija [phone]       [facility]  CLINIC [address] [place] [address] [address] [place] [address]        Equal Access to Services     CATRACHITO GUNDERSON : Hadii lucita santacruz catrinatrena Marcoali, wapenelopeda luqadaha, qaybta kafélixjhonny armstrongcelsajhonny, red brunolivshahnaz merida. So [facility] [phone].    ATENCIÓN: Si habla español, tiene a gray disposición servicios gratuitos de asistencia lingüística. [facility] [phone].    We comply with applicable federal civil rights laws and [place] laws. We do not discriminate on the basis of race, color, national origin, age, disability, sex, sexual orientation, or gender identity.            Thank you!     Thank you for choosing [facility]ERMAT[facility]  for your care. Our goal is always to provide you with excellent care. Hearing back from our patients is one way we can continue to improve our services. Please take a few minutes to complete the written survey that you may receive in the mail after your visit with us. Thank you!             Your Updated Medication List - Protect others around you: Learn how to safely use, store and throw away your medicines at www.disposemymeds.org.          This list is accurate as of 4/11/18 11:37 AM.  Always use your most recent med list.                   Brand Name Dispense Instructions for use Diagnosis    atenolol 25 MG tablet    TENORMIN    90 tablet    Take 1 tablet (25 mg) by mouth daily    Kidney replaced by transplant       atovaquone-proguanil 250-100 MG per tablet    MALARONE    30 tablet    Take 1 tablet by mouth daily Start 2 days before travel and continue 7 days after return.    Travel advice encounter       cycloSPORINE modified 25 MG capsule     720 capsule    Take 4 capsules (100 mg) by mouth 2 times daily    Kidney replaced by transplant       mycophenolate 250 MG capsule     180 capsule    Take 3 capsules (750 mg) by mouth 2 times daily    Kidney replaced by transplant       sulfamethoxazole-trimethoprim 400-80 MG per tablet    BACTRIM    28 tablet    Take 1 tablet by mouth daily     Kidney replaced by transplant

## 2018-04-11 NOTE — NURSING NOTE
"Dermatology Rooming Note    Carol Menjivar's goals for this visit include:   Chief Complaint   Patient presents with     Skin Check     No spots or mole of concern today. She does note a \"bump\" on her left thigh.      Elda Cabezas, CMA  "

## 2018-04-11 NOTE — PATIENT INSTRUCTIONS
Your baseline skin exam looked great! There were no concerning lesions. You should continue to have annual full body skin checks, because transplant patients are at an increased risk of developing skin cancer (particularly squamous cell carcinoma).   You should also be very careful to protect your skin from the sun (see recommendations below).   We will see you in 1 year for your next skin check, but feel free to call for a sooner appointment at any time if you have any new, growing, changing, or bleeding lesions that you are concerned about.     Sun Protection    Recommended use of a broad spectrum sunscreen of at least SPF 30 on all sun exposed sites daily.  Apply 20 minutes prior to exposure and repeat application every two hours or after sweating or swimming.  Avoid any intentional indoor or outdoor tanning.       The ABCDEs of Melanoma    Skin cancer can develop anywhere on the skin. Ask someone for help when checking your skin, especially in hard to see places. If you notice a mole different from others, or that changes, enlarges, itches, or bleeds (even if it is small), you should see a dermatologist.

## 2018-04-11 NOTE — PROGRESS NOTES
"[facility] Dermatology Note      Dermatology Problem List:  FBSE annually, h/o kidney transplant in 2006 (on cellcept and cyclosporine)  1. Benign nevi, dermatofibroma L thigh  2. Family h/o melanoma in grandfather  3. Benign nevus biopsied years ago    Encounter Date: Apr 11, 2018    CC:   Chief Complaint   Patient presents with     Skin Check     No spots or mole of concern today. She does note a \"bump\" on her left thigh.      History of Present Illness:  Ms. Carol Menjivar is a 27 year old female who presents in consultation from Dr. Noni Hartley for a baseline full body skin examination following kidney transplant in 2006. Patient has no personal history of skin cancer, but reports she did have a benign mole biopsied from her abdomen when she was a teenager. She also has a family history of fatal metastatic melanoma in her grandfather.  Today, Carol's only concern is a bump on her left thigh that has been present for years. She doesn't recall any trauma to the area. No other concerns today; denies any other new, growing, changing, or symptomatic lesions. She is diligent with sun protection.     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    Social History:  The patient works as a LMFT. The patient " denies use of tanning beds.    Family History:  Grandfather with history of fatal metastatic melanoma.    Medications:  Current Outpatient Prescriptions   Medication Sig Dispense Refill     CELLCEPT (BRAND) 250 MG CAPSULE Take 3 capsules (750 mg) by mouth 2 times daily 180 capsule 11     atenolol (TENORMIN) 25 MG tablet Take 1 tablet (25 mg) by mouth daily 90 tablet 3     NEORAL 25 MG PO CAPSULE Take 4 capsules (100 mg) by mouth 2 times daily 720 capsule 3     atovaquone-proguanil (MALARONE) 250-100 MG per tablet Take 1 tablet by mouth daily Start 2 days before travel and continue 7 days after return. (Patient not taking: Reported on 3/28/2018) 30 tablet 0     sulfamethoxazole-trimethoprim (BACTRIM) 400-80 MG per tablet Take 1 tablet by mouth daily (Patient not taking: Reported on 3/28/2018) 28 tablet 3      No Known Allergies    Review of Systems:  -The patient denies frequent sun exposure. The patient denies excessive scarring or problems healing.  -Constitutional: The patient denies fatigue, fevers, chills, unintended weight loss, and night sweats.  -Skin: As above in HPI. No additional skin concerns.    Physical exam:  Vitals: There were no vitals taken for this visit.  GEN: This is a well developed, well-nourished female in no acute distress, in a pleasant mood.    SKIN: Full skin, which includes the head/face, both arms, chest, back, abdomen,both legs, genitalia and/or groin buttocks, digits and/or nails, was examined.  -Crane phototype III  -Trunk, extremities, and left ear with very few medium to dark brown small (<5mm) pigmented macules with even reticular network on dermoscopy.   -Left thigh with small subcutaneous firm nodule that dimples on application of lateral pressure, with overlying hyperpigmented macule.  -There is no lymphadenopathy on palpation of cervical, post auricular, occipital, axillary, inguinal, and popliteal nodes.  -No other lesions of concern on areas examined.      Impression/Plan:  1. H/o kidney transplant in 2006 on immunosuppression; family h/o melanoma in 2nd degree relative    Annual full body skin exams    Discussed photoprotection, self skin exams, and ABCDEs    2. Benign nevi, dermatofibroma    Benign findings, reassurance provided    CC Dr. Noni Hartley on close of this encounter.  Follow-up in 1 year, earlier for new or changing lesions.     Dr. Cornejo staffed the patient.    Staff Involved:  Resident(Teetee Barba)/Staff(as above)  .I, Delia Cornejo MD, saw this patient with the resident and agree with the resident s findings and plan of care as documented in the resident s note.

## 2018-04-11 NOTE — LETTER
"4/11/2018       RE: Carol Menjivar  [address]  [facility] [address]     Dear Colleague,    Thank you for referring your patient, Carol Menjivar, to the [facility] DERMATOLOGY at [facility]. Please see a copy of my visit note below.    [facility] Dermatology Note      Dermatology Problem List:  FBSE annually, h/o kidney transplant in 2006 (on cellcept and cyclosporine)  1. Benign nevi, dermatofibroma L thigh  2. Family h/o melanoma in grandfather  3. Benign nevus biopsied years ago    Encounter Date: Apr 11, 2018    CC:   Chief Complaint   Patient presents with     Skin Check     No spots or mole of concern today. She does note a \"bump\" on her left thigh.      History of Present Illness:  Ms. Carol Menjivar is a 27 year old female who presents in consultation from Dr. Noni Hartley for a baseline full body skin examination following kidney transplant in 2006. Patient has no personal history of skin cancer, but reports she did have a benign mole biopsied from her abdomen when she was a teenager. She also has a family history of fatal metastatic melanoma in her grandfather.  Today, Carol's only concern is a bump on her left thigh that has been present for years. She doesn't recall any trauma to the area. No other concerns today; denies any other new, growing, changing, or symptomatic lesions. She is diligent with sun protection.     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    Social History:  The patient works as a LMFT. The patient denies use of tanning beds.    Family History:  Grandfather with history of fatal metastatic melanoma.    Medications:  Current Outpatient Prescriptions   Medication Sig Dispense Refill     CELLCEPT (BRAND) 250 MG CAPSULE Take 3 capsules (750 mg) by mouth 2 times daily 180 capsule 11     atenolol (TENORMIN) 25 MG tablet Take 1 tablet (25 mg) by mouth daily 90 tablet 3     NEORAL 25 MG PO CAPSULE Take 4 capsules (100 mg) by mouth 2 times daily 720 capsule 3     atovaquone-proguanil (MALARONE) 250-100 MG per tablet Take 1 tablet by mouth daily Start 2 days before travel and continue 7 days after return. (Patient not taking: Reported on 3/28/2018) 30 tablet 0     sulfamethoxazole-trimethoprim (BACTRIM) 400-80 MG per tablet Take 1 tablet by mouth daily (Patient not taking: Reported on 3/28/2018) 28 tablet 3      No Known Allergies    Review of Systems:  -The patient denies frequent sun exposure. The patient denies excessive scarring or problems healing.  -Constitutional: The patient denies fatigue, fevers, chills, unintended weight loss, and night sweats.  -Skin: As above in HPI. No additional skin concerns.    Physical exam:  Vitals: There were no vitals taken for this visit.  GEN: This is a well developed, well-nourished female in no acute distress, in a pleasant mood.    SKIN: Full skin, which includes the head/face, both arms, chest, back, abdomen,both legs, genitalia and/or groin buttocks, digits and/or nails, was examined.  -Crane phototype III  -Trunk, extremities, and left ear with very few medium to dark brown small (<5mm) pigmented macules with even reticular network on dermoscopy.   -Left thigh with small subcutaneous firm  nodule that dimples on application of lateral pressure, with overlying hyperpigmented macule.  -There is no lymphadenopathy on palpation of cervical, post auricular, occipital, axillary, inguinal, and popliteal nodes.  -No other lesions of concern on areas examined.     Impression/Plan:  1. H/o kidney transplant in 2006 on immunosuppression; family h/o melanoma in 2nd degree relative    Annual full body skin exams    Discussed photoprotection, self skin exams, and ABCDEs    2. Benign nevi, dermatofibroma    Benign findings, reassurance provided    Dr. Cornejo staffed the patient.    Staff Involved:  Resident(Teetee Barba)/Staff(as above)  .I, Delia Cornejo MD, saw this patient with the resident and agree with the resident s findings and plan of care as documented in the resident s note.      Again, thank you for allowing me to participate in the care of your patient.      Sincerely,    Delia Cornejo MD    CC Dr. Noni Hartley on close of this encounter.  Follow-up in 1 year, earlier for new or changing lesions.

## 2018-04-19 ENCOUNTER — HOSPITAL ENCOUNTER (OUTPATIENT)
Facility: CLINIC | Age: 28
Setting detail: SPECIMEN
Discharge: HOME | End: 2018-04-19
Admitting: MEDICAL GENETICS
Payer: COMMERCIAL

## 2018-04-19 ENCOUNTER — TELEPHONE (OUTPATIENT)
Dept: CONSULT | Facility: CLINIC | Age: 28
End: 2018-04-19

## 2018-04-19 DIAGNOSIS — H54.8 LEGALLY BLIND: ICD-10-CM

## 2018-04-19 DIAGNOSIS — H35.50 SENIOR-LOKEN SYNDROME: ICD-10-CM

## 2018-04-19 DIAGNOSIS — H35.52 RETINITIS PIGMENTOSA: ICD-10-CM

## 2018-04-19 DIAGNOSIS — Q87.89 SENIOR-LOKEN SYNDROME: ICD-10-CM

## 2018-04-19 DIAGNOSIS — Q61.5 SENIOR-LOKEN SYNDROME: ICD-10-CM

## 2018-04-19 DIAGNOSIS — Z94.0 S/P KIDNEY TRANSPLANT: Primary | ICD-10-CM

## 2018-04-19 DIAGNOSIS — H26.9 CATARACT: ICD-10-CM

## 2018-04-19 PROCEDURE — 81479 UNLISTED MOLECULAR PATHOLOGY: CPT | Performed by: MEDICAL GENETICS

## 2018-04-19 PROCEDURE — 81479 UNLISTED MOLECULAR PATHOLOGY: CPT | Mod: 91 | Performed by: MEDICAL GENETICS

## 2018-04-19 PROCEDURE — 81406 MOPATH PROCEDURE LEVEL 7: CPT | Performed by: MEDICAL GENETICS

## 2018-04-19 NOTE — TELEPHONE ENCOUNTER
Notified Carol that we received prior authorization approval valid from 3/14/18 to 8/31/18. Authorization number is [id number]. Provided Carol with estimated out of pocket cost for testing. Carol expressed understanding and stated that she wants to proceed with testing. We will call when results are available. Carol had no further questions.    Veronica Gonzalez    Division of Genetics  [facility]  P: [phone]

## 2018-06-05 LAB — COPATH REPORT: NORMAL

## 2018-06-07 ENCOUNTER — TELEPHONE (OUTPATIENT)
Dept: CONSULT | Facility: CLINIC | Age: 28
End: 2018-06-07

## 2018-06-07 NOTE — TELEPHONE ENCOUNTER
Left message for Carol. Asked her to call me to discuss test results.    Monique Chappell MS,[facility]  Licensed Genetic Counselor  [email or web address]  [phone]

## 2018-06-19 ENCOUNTER — TELEPHONE (OUTPATIENT)
Dept: CONSULT | Facility: CLINIC | Age: 28
End: 2018-06-19

## 2018-06-19 NOTE — TELEPHONE ENCOUNTER
Carol returned my call to discuss her genetic test results.     Reviewed Next Gen Sequencing results which identified two changes in the SDCCAG8 gene.     This is consistent with a diagnosis of Senior-Loken syndrome. Carol has had a long standing clinical diagnosis Senior-Loken syndrome.     Having her molecular diagnosis will allow for appropriate testing of her partner to determine the risk for recurrence in her offspring. It is likely low, but we would offer SDCCAG8 gene testing to her fiance.    I have recommended that Carol return for a new patient appointment with Dr. Luis Ray sometime this fall. She is agreeable with this plan and asked that a  call her.    A result letter was mailed today to patient and to referring provider.    Monique Chappell MS,[facility]  Licensed Genetic Counselor  [email or web address]  [phone]

## 2018-07-17 DIAGNOSIS — Z94.0 KIDNEY REPLACED BY TRANSPLANT: ICD-10-CM

## 2018-07-17 RX ORDER — CYCLOSPORINE 25 MG/1
100 CAPSULE, LIQUID FILLED ORAL 2 TIMES DAILY
Qty: 720 CAPSULE | Refills: 3 | Status: SHIPPED | OUTPATIENT
Start: 2018-07-17 | End: 2019-08-23

## 2018-07-17 NOTE — TELEPHONE ENCOUNTER
Neoral 25mg  Last FIlled Date 6/19/18  Qty dispensed 240  Thank you very kindly!  Estrella Tolbert [facility]

## 2018-08-24 DIAGNOSIS — Z94.0 KIDNEY REPLACED BY TRANSPLANT: ICD-10-CM

## 2018-08-24 RX ORDER — ATENOLOL 25 MG/1
TABLET ORAL
Qty: 90 TABLET | Refills: 3 | Status: SHIPPED | OUTPATIENT
Start: 2018-08-24 | End: 2019-08-23

## 2018-10-15 ENCOUNTER — OFFICE VISIT (OUTPATIENT)
Dept: FAMILY MEDICINE | Facility: CLINIC | Age: 28
End: 2018-10-15
Payer: COMMERCIAL

## 2018-10-15 VITALS
TEMPERATURE: 97.8 F | RESPIRATION RATE: 18 BRPM | DIASTOLIC BLOOD PRESSURE: 84 MMHG | BODY MASS INDEX: 40.18 KG/M2 | HEART RATE: 79 BPM | SYSTOLIC BLOOD PRESSURE: 120 MMHG | WEIGHT: 256 LBS | OXYGEN SATURATION: 96 % | HEIGHT: 67 IN

## 2018-10-15 DIAGNOSIS — Z00.00 ROUTINE HISTORY AND PHYSICAL EXAMINATION OF ADULT: Primary | ICD-10-CM

## 2018-10-15 DIAGNOSIS — Z94.0 STATUS POST KIDNEY TRANSPLANT: ICD-10-CM

## 2018-10-15 DIAGNOSIS — Z12.4 PAP SMEAR FOR CERVICAL CANCER SCREENING: ICD-10-CM

## 2018-10-15 DIAGNOSIS — H54.8 LEGALLY BLIND: ICD-10-CM

## 2018-10-15 DIAGNOSIS — E28.2 PCOS (POLYCYSTIC OVARIAN SYNDROME): ICD-10-CM

## 2018-10-15 DIAGNOSIS — Z30.017 ENCOUNTER FOR INITIAL PRESCRIPTION OF IMPLANTABLE SUBDERMAL CONTRACEPTIVE: ICD-10-CM

## 2018-10-15 DIAGNOSIS — R53.83 OTHER FATIGUE: ICD-10-CM

## 2018-10-15 DIAGNOSIS — N89.8 VAGINAL DISCHARGE: ICD-10-CM

## 2018-10-15 DIAGNOSIS — H35.52 RETINITIS PIGMENTOSA: ICD-10-CM

## 2018-10-15 LAB
HCG UR QL: NEGATIVE
SPECIMEN SOURCE: NORMAL
WET PREP SPEC: NORMAL

## 2018-10-15 ASSESSMENT — PAIN SCALES - GENERAL: PAINLEVEL: NO PAIN (0)

## 2018-10-15 NOTE — NURSING NOTE
Chief Complaint   Patient presents with     Establish Care     Patient is here to establish care for PCP     Physical     Also here for physical exam; no pap     Contraception     Also here to discus birth control       Rooming Note  Health Maintenance   Health Maintenance Due   Topic Date Due     PAP SCREENING Q3 YR (SYSTEM ASSIGNED)  09/18/2009     INFLUENZA VACCINE (1) 09/01/2018    All health maintenance items discussed to patient.  Patient declined pap smear and Flu vaccine.        Will Dahl CMA ([facility]) at 7:17 AM on 10/15/2018

## 2018-10-15 NOTE — PROGRESS NOTES
[facility]  Primary Care Center   Danny Shi MD  10/15/2018      Chief Complaint:   Establish Care; Physical; and Contraception       History of Present Illness:   Carol Menjivar is a legally blind 28 year old female with a history of Senior-Loken syndrome, retinitis pigmentosa, PCOS, and insulin resistance, who presents alone to establish care for a physical and contraception. She moved to [place] from [place] about 6 months ago. She completed graduate school in [place]. 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     Eye health: She notes that she does have a history of retinitis pigmentosa and is under the care of a physician for this. Carol notes that she does not have full vision impairment and is able to see in certain types of light and shading. She mostly has central vision and notes that her right eyes has better vision than her left.  She also notes a history of cataracts for which she underwent removal surgery in June 2017.     Skin concern: Carol believes that she had an abdominal skin lesion biopsy around the Carol was evaluated by dermatology for a skin check in April 2018. They are currently monitoring 4 moles.     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 She received a kidney from her older sister in 2006 which was placed into her right abdomen. Carol later developed a kidney stone and underwent lithotripsy. Of note, she did have a port placed though never had any shunts or AV fistulas created for treatment.     Immunosuppression: Between 2006 and 2009, Carol was in and out of the hospital frequently and developed CMV disease. She was immunosuppressed during this time and developed a pseudotumor. Carol was treated for this with Diamox. At this time, she is no longer experiencing associated complications.     Fatigue: Over the last couple of years, she has  been experiencing increased fatigue. Carol reports waking up tired and going to bed tired often. She inquires about repeating a TSH level check.     Women's health: As Carol is getting  in about one week, she is looking to start a form of birth control though has not yet done research of options. She has briefly discussed starting birth control with transplant team. Carol inquires about anemia, side effects, and different forms of birth control. She denies recent vaginal discharge. Of note, she has never had a Pap smear as she has never been sexually active. Carol has received the HPV vaccination series in the past.     PCOS: In regard to her PCOS diagnosis, she denies a complete diagnosis though notes that she does have dysmenorrhea. She reports that she did not get her period for about a year while she was in graduate school though she has had periods since.     Health Care Maintenance/Other:  1. Flu shot- deferred     Review of Systems:   Pertinent items are noted in HPI, remainder of complete ROS is negative.      Active Medications:      atenolol (TENORMIN) 25 MG tablet, TAKE ONE TABLET BY MOUTH EVERY DAY, Disp: 90 tablet, Rfl: 3     CELLCEPT (BRAND) 250 MG CAPSULE, Take 3 capsules (750 mg) by mouth 2 times daily, Disp: 180 capsule, Rfl: 11     Lutein-Zeaxanthin (OCUVITE LUTEIN 25 PO), Take 25 mg by mouth daily, Disp: , Rfl:      NEORAL (BRAND) 25 MG CAPSULE, Take 4 capsules (100 mg) by mouth 2 times daily, Disp: 720 capsule, Rfl: 3     Prenatal Vit-Fe Fumarate-FA (PRENATAL PO), Take by mouth daily, Disp: , Rfl:      VITAMIN D, CHOLECALCIFEROL, PO, Take 10,000 Units by mouth daily, Disp: , Rfl: '     Allergies:   Review of patient's allergies indicates no known allergies.      Past Medical History:  Cataract   CMV disease   Legally blind   PPD positive, treated   Pseudotumor cerebri   Retinitis pigmentosa   Senior-Loken syndrome  Vitamin D deficiency   Obesity   Insulin resistance   Polycystic  "ovarian syndrome (PCOS)   Encounter for long-term (current) use of medication   Kidney stone     Past Surgical History:  Right kidney transplant, recipient living and related (2006)   Skin lesion biopsy   Sinus surgery   Cataract surgery   Lithotripsy      Family History:   Melanoma- paternal grandfather (age 69)  Heart disease- maternal side   Type 2 diabetes mellitus   Cataracts- paternal side   Parkinson's- paternal uncle   Alzheimer's disease     She has no family history of Senior-Loken syndrome or retinitis pigmentosa.      Immunization History:   Immunization History   Administered Date(s) Administered     HPV Quadrivalent 06/15/2007, 01/02/2008     Hep B, Peds or Adolescent 12/19/2000, 01/16/2001, 07/10/2001     Influenza (IIV3) PF 01/04/2013     MMR 12/19/2000     Pneumo Conj 13-V (2010&after) 04/12/2017     TD (ADULT, 7+) 01/16/2001, 05/20/2003     TDAP Vaccine (Boostrix) 08/20/2012     Social History:   The patient is single, a nonsmoker, and does not consume alcohol. She is not currently sexually active.      Physical Exam:   /84 (BP Location: Right arm, Patient Position: Sitting, Cuff Size: Adult Large)  Pulse 79  Temp 97.8  F (36.6  C) (Oral)  Resp 18  Ht 1.689 m (5' 6.5\")  Wt 116.1 kg (256 lb)  LMP 10/08/2018  SpO2 96%  Breastfeeding? No  BMI 40.7 kg/m2      BMI= Body mass index is 40.7 kg/(m^2).    GENERAL APPEARANCE: healthy, alert and no distress. Obese, BMI 40.7  EYES: conjunctivae and sclerae normal, pupils are sluggish to light. Exotropia on left eye, right eye follows appropriately looking straight forward.   HENT: ear canals and TM's normal, nose and mouth without ulcers or lesions, oropharynx clear and oral mucous membranes moist  NECK: no adenopathy, no asymmetry, masses, or scars and thyroid normal to palpation  RESP: lungs clear to auscultation - no rales, rhonchi or wheezes  CV: regular rate and rhythm, normal S1 S2, no S3 or S4, no murmur, click or rub, no peripheral edema " and peripheral pulses strong  BREAST: Bilateral normal tissue, no significant tenderness, no nipple discharge, no axillary adenopathy   ABDOMEN: Truncal obesity with a few stria, Mild hirsutism of abdominal and chin. Well healed scar on right side from transplant with fullness in area of transplant otherwise soft, nontender, no hepatosplenomegaly, no masses and bowel sounds normal   (female): normal female external genitalia, Skeene and Bartholin's glands normal, vaginal mucosa pink, moist, vaginal introitus, small hymnal ring on posterior aspect rupture with insertion of speculum, well rugated and normal cervix, adnexae, and uterus without masses. At the end of speculum procedure small amount of bleeding controlled with gauze 4x4s from trace hymen remnant tear posteriorly with no bleeding at end of visit. Vaginal discharge has slight odor, wet prep obtained    MS: no musculoskeletal defects are noted and gait is age appropriate without ataxia  SKIN: no suspicious lesions or rashes, depigmented patch on back. Hirsutism of chin.   NEURO: Normal strength and tone, mentation intact and speech normal  PSYCH: mentation appears normal and affect normal/bright     Assessment and Plan:  Carol Menjivar is a legally blind 28 year old female with a history of Senior-Loken syndrome, retinitis pigmentosa, PCOS, and insulin resistance, who presents alone to establish care for a physical and contraception discussion. She has a wellness form tay ODONNELL completed for her today.    1. Status post kidney transplant  History of Senior-Loken syndrome s/p kidney transplant in 2006 from live relative. Baseline creatine falls around 1.1. Creatinine in March 2018 was 1.06.   - Comprehensive metabolic panel  - TSH with free T4 reflex  - CBC with platelets differential  - Cyclosporine    2. Legally blind with a history of retinitis pigmentosa  Carol is currently having this managed by opthalmology. She should continue to follow up as  indicated.     3. Encounter for initial prescription of implantable subdermal contraceptive in the setting of polycystic ovarian syndrome (PCOS)  Carol has been abstinent from having sexual intercourse in the past. We discussed multiple types of birth control options including oral contraception as well as implants and associated procedures. Discussed taking oral medications near the same time daily, otherwise this may result in vaginal spotting. Condoms were discussed for temporary use as an effective birth control method while transitioning to new birth control. Condoms should be used for about one week after starting birth control. All side effects covered. She is currently abstinent and would like to procedure with Nexplanon at a further date. Information provided.   Urine pregnancy test Pap smear completed today as well as pelvic examination.   - HCG qualitative urine  - etonogestrel (IMPLANON/NEXPLANON) 68 MG IMPL  Dispense: 1 each; Refill: 0     4. Pap smear for cervical cancer screening, small hymenal tear bleeding controlled prior to her leaving  Not currently sexually active. No previous history of Pap smear. Pap smear completed today as well as pelvic examination.   - Pap imaged thin layer screen with HPV - recommended age 30 - 65 years (select HPV order below)  - HPV High Risk Types DNA Cervical    5. Vaginal discharge  Examination reveals slight odorous vaginal discharge, therefore wet prep swab was obtained.   - Wet prep     6. Other fatigue  She has been increasingly fatigued over the last couple of years. January 2018 TSH was normal, hemoglobin normal at 13.8. Creatinine was 1.06 (her baseline is 1.1). Laboratory orders placed for further evaluation of fatigue.   - TSH with free T4 reflex  - CBC with platelets differential  - Cyclosporine  My card provided      Follow-up: Carol shoulder return in about two weeks to have an Nexplanon inserted.  She requires a urine pregnancy today and at Nexplanon  visit.         Scribe Disclosure:  I, Leta Guillaumebisiskpatti, am serving as a scribe to document services personally performed by Danny Shi MD at this visit, based upon the provider's statements to me. All documentation has been reviewed by the aforementioned provider prior to being entered into the official medical record.     Portions of this medical record were completed by a scribe with my edits. UPON MY REVIEW AND AUTHENTICATION BY ELECTRONIC SIGNATURE, this confirms (a) I performed the applicable clinical services, and (b) the record is accurate.   Danny Shi MD

## 2018-10-15 NOTE — PATIENT INSTRUCTIONS
Preparing for Nexplanon Insertion     Prior to the Appointment::  Check with your insurance to ensure you have coverage for the Nexplanon lawrence. We will also check for Prior-Authorization.   If you are NOT on birth control, abstain from unprotected intercourse (sex without condoms) for 7 days prior to the appointment.   Failure to do so will result in Nexplanon lawrence not being placed the day of the appointment.   If you are on birth control, continue your current form of birth control until 7 days after the Nexplanon lawrence is inserted.   You need to be taking this correctly.   If you are missing pills, you also need to abstain from unprotected intercourse for 7 days prior to the appointment.   Failure to do so will result in nexplanon not being placed the day of the appointment.     Day of the Appointment:   Please arrive to lab 30 minutes prior to the scheduled appointment for a urine pregnancy test.     After the Appointment:   You will need to keep your arm dry and clean for 24 hours.

## 2018-10-15 NOTE — MR AVS SNAPSHOT
After Visit Summary   10/15/2018    Carol Menjivar    MRN: [id number]           Patient Information     Date Of Birth          1990        Visit Information        Provider Department      10/15/2018 8:15 AM Danny Shi MD [facility]        Today's Diagnoses     Routine history and physical examination of adult    -  1    Pap smear for cervical cancer screening        Legally blind        Retinitis pigmentosa        PCOS (polycystic ovarian syndrome)        Status post kidney transplant        Other fatigue        Encounter for initial prescription of implantable subdermal contraceptive        Vaginal discharge          Care Instructions    Preparing for Nexplanon Insertion     Prior to the Appointment::  Check with your insurance to ensure you have coverage for the Nexplanon lawrence. We will also check for Prior-Authorization.   If you are NOT on birth control, abstain from unprotected intercourse (sex without condoms) for 7 days prior to the appointment.   Failure to do so will result in Nexplanon lawrence not being placed the day of the appointment.   If you are on birth control, continue your current form of birth control until 7 days after the Nexplanon lawrence is inserted.   You need to be taking this correctly.   If you are missing pills, you also need to abstain from unprotected intercourse for 7 days prior to the appointment.   Failure to do so will result in nexplanon not being placed the day of the appointment.     Day of the Appointment:   Please arrive to lab 30 minutes prior to the scheduled appointment for a urine pregnancy test.     After the Appointment:   You will need to keep your arm dry and clean for 24 hours.             Follow-ups after your visit        Your next 10 appointments already scheduled     Oct 22, 2018  8:15 AM CDT   LAB with [facility] ([facility])    [address] [place] [address]    [phone]           Please do not eat 10-12 hours before your appointment if you are coming in fasting for labs on lipids, cholesterol, or glucose (sugar). This does not apply to pregnant women. Water, hot tea and black coffee (with nothing added) are okay. Do not drink other fluids, diet soda or chew gum.            Oct 30, 2018  5:15 PM CDT   LAB with  LAB   [facility] ([facility])    [address] [place] [address]  [address] [facility] [id number] [phone]           Please do not eat 10-12 hours before your appointment if you are coming in fasting for labs on lipids, cholesterol, or glucose (sugar). This does not apply to pregnant women. Water, hot tea and black coffee (with nothing added) are okay. Do not drink other fluids, diet soda or chew gum.            Oct 30, 2018  5:45 PM CDT   (Arrive by 5:30 PM)   Return Visit with MATTHEW Ramos CNP   [facility] ([facility])    [address] [place] [id number] [phone]            Dec 10, 2018  9:00 AM CST   New Genetic Visit with Regis Ray MD   Peds Genetics ([facility])    [facility]  [address] [place] [address]   [phone]            Dec 10, 2018  9:00 AM CST   Genetic Counseling with Harleen Cuadra GC   Peds Genetics ([facility])    [facility]  [address] [place] [address]   [phone]            Jan 29, 2019 12:00 PM CST   Lab with  LAB   [facility] ([facility])    [address] [place] [id number] [phone]            Jan 29, 2019  1:05 PM CST   (Arrive by 12:35 PM)   Return Kidney Transplant with Noni Hartley MD   [facility] ([facility])    [address] [place] [address]  [address]  [facility] [id number] [phone]              Future tests that  "were ordered for you today     Open Future Orders        Priority Expected Expires Ordered    Comprehensive metabolic panel Routine 10/22/2018 10/15/2019 10/15/2018    TSH with free T4 reflex Routine 10/22/2018 10/15/2019 10/15/2018    CBC with platelets differential Routine 10/22/2018 10/15/2019 10/15/2018    Cyclosporine Routine 10/22/2018 10/15/2019 10/15/2018            Who to contact     Please call your clinic at [phone] to:    Ask questions about your health    Make or cancel appointments    Discuss your medicines    Learn about your test results    Speak to your doctor            Additional Information About Your Visit        [employer] Information     [employer] is an electronic gateway that provides easy, online access to your medical records. With [employer]al, you can request a clinic appointment, read your test results, renew a prescription or communicate with your care team.     To sign up for Ar[employer]al visit the website at [email or web address]   You will be asked to enter the access code listed below, as well as some personal information. Please follow the directions to create your username and password.     Your access code is: [email or web address]  Expires: 2018  6:31 AM     Your access code will  in 90 days. If you need help or a new code, please contact your [facility] or call [phone] for assistance.        Care EveryWhere ID     This is your Care EveryWhere ID. This could be used by other organizations to access your [facility] medical records  [id number]        Your Vitals Were     Pulse Temperature Respirations Height Last Period Pulse Oximetry    79 97.8  F (36.6  C) (Oral) 18 1.689 m (5' 6.5\") 10/08/2018 96%    Breastfeeding? BMI (Body Mass Index)                No 40.7 kg/m2           Blood Pressure from Last 3 Encounters:   10/15/18 120/84   18 131/89   18 121/86    Weight from Last 3 Encounters:   10/15/18 116.1 kg (256 lb)   18 " 107.3 kg (236 lb 8 oz)   01/30/18 106.3 kg (234 lb 6.4 oz)              We Performed the Following     HCG qualitative urine     HPV High Risk Types DNA Cervical     Pap imaged thin layer screen with HPV - recommended age 30 - 65 years (select HPV order below)     Wet prep          Today's Medication Changes          These changes are accurate as of 10/15/18  9:02 AM.  If you have any questions, ask your nurse or doctor.               Start taking these medicines.        Dose/Directions    etonogestrel 68 MG Impl   Commonly known as:  IMPLANON/NEXPLANON   Used for:  Encounter for initial prescription of implantable subdermal contraceptive   Started by:  Danny Shi MD        Dose:  1 each   1 each (68 mg) by Subdermal route once for 1 dose   Quantity:  1 each   Refills:  0            Where to get your medicines      Some of these will need a paper prescription and others can be bought over the counter.  Ask your nurse if you have questions.     You don't need a prescription for these medications     etonogestrel 68 MG Impl                Primary Care Provider Office Phone # Fax #    Katharina Leija [phone]       [address]  [place] [address]        Equal Access to Services     [facility] AH: Hadii lucita santacruz hadasho Sonnamdi, waaxda luqadaha, qaybta kaalmada adeegyada, red streeter . So [facility] [phone].    ATENCIÓN: Si habla español, tiene a gray disposición servicios gratuitos de asistencia lingüística. Llame al [phone].    We comply with applicable federal civil rights laws and [place] laws. We do not discriminate on the basis of race, color, national origin, age, disability, sex, sexual orientation, or gender identity.            Thank you!     Thank you for choosing [facility]  for your care. Our goal is always to provide you with excellent care. Hearing back from our patients is one way we can  continue to improve our services. Please take a few minutes to complete the written survey that you may receive in the mail after your visit with us. Thank you!             Your Updated Medication List - Protect others around you: Learn how to safely use, store and throw away your medicines at www.disposemymeds.org.          This list is accurate as of 10/15/18  9:02 AM.  Always use your most recent med list.                   Brand Name Dispense Instructions for use Diagnosis    atenolol 25 MG tablet    TENORMIN    90 tablet    TAKE ONE TABLET BY MOUTH EVERY DAY    Kidney replaced by transplant       cycloSPORINE modified 25 MG capsule     720 capsule    Take 4 capsules (100 mg) by mouth 2 times daily    Kidney replaced by transplant       etonogestrel 68 MG Impl    IMPLANON/NEXPLANON    1 each    1 each (68 mg) by Subdermal route once for 1 dose    Encounter for initial prescription of implantable subdermal contraceptive       mycophenolate 250 MG capsule     180 capsule    Take 3 capsules (750 mg) by mouth 2 times daily    Kidney replaced by transplant       OCUVITE LUTEIN 25 PO      Take 25 mg by mouth daily        PRENATAL PO      Take by mouth daily        VITAMIN D (CHOLECALCIFEROL) PO      Take 1,000 Units by mouth daily

## 2018-10-15 NOTE — LETTER
Patient:  Carol Taylor  :   1990  MRN:     [id number]        Ms. Carol Taylor  [address]  [facility] [place] [facility] [address]        2018    Dear Ms. Taylor,    Thank you for choosing the [facility] for your healthcare needs.  We appreciate the opportunity to serve you.    The following are your recent test results.     Results for orders placed or performed in visit on 10/15/18   HCG qualitative urine   Result Value Ref Range    HCG Qual Urine Negative NEG^Negative   Pap imaged thin layer screen with HPV - recommended age 30 - 65 years (select HPV order below)   Result Value Ref Range    PAP NIL     Copath Report         Patient Name: CAROL TAYLOR  MR#: [id number]  Specimen #: [id number]  Collected: 10/15/2018  Received: 10/15/2018  Reported: 10/17/2018 09:21  Ordering Phy(s): JOSE MARIA ORTIZ    For improved result formatting, select 'View Enhanced Report Format' under   Linked Documents section.    SPECIMEN/STAIN PROCESS:  Pap imaged thin layer prep screening (Surepath, FocalPoint with guided   screening)       Pap-Cyto x 1, HPV ordered x 1    SOURCE: Cervical, endocervical  ----------------------------------------------------------------   Pap imaged thin layer prep screening (Surepath, FocalPoint with guided   screening)  SPECIMEN ADEQUACY:  Satisfactory for evaluation.  -Transformation zone component present.    CYTOLOGIC INTERPRETATION:    Negative for intraepithelial lesion or malignancy    Electronically signed out by:  ANDREZ Diop (ASCP)    Processed and screened at [facility],   [facility]    CLINICAL HISTORY:  LMP: 10/8/2018  First pap t est,    Papanicolaou Test Limitations:  Cervical cytology is a screening test with   limited sensitivity; regular  screening is critical for cancer prevention; Pap tests are primarily   effective for the diagnosis/prevention of  squamous cell  carcinoma, not adenocarcinomas or other cancers.    TESTING LAB LOCATION:  [facility], [facility] [address] [place]  [address] [phone]    COLLECTION SITE:  Client:  [facility]  Location: [facility] (B)       HPV High Risk Types DNA Cervical   Result Value Ref Range    HPV Source SurePath     HPV 16 DNA Negative NEG^Negative    HPV 18 DNA Negative NEG^Negative    Other HR HPV Negative NEG^Negative    Final Diagnosis This patient's sample is negative for HPV DNA.     Specimen Description Cervical Cells    Wet prep   Result Value Ref Range    Specimen Description Vagina     Wet Prep No motile Trichomonas seen     Wet Prep No yeast seen     Wet Prep PMNs seen  Moderate       Wet Prep No clue cells seen          Your PAP was normal NIL no HPV good result and may have next in 5 years 10/2023.   We did an infection check called a wet prep showing no bacteria or yeast, good result.   Pregnancy test was negative in preparation for contraceptive planning.     Please contact us if you have any questions or concerns.  We look forward to serving your needs in the future.      Best wishes,         Danny Shi MD

## 2018-10-17 LAB
COPATH REPORT: NORMAL
PAP: NORMAL

## 2018-10-18 LAB
FINAL DIAGNOSIS: NORMAL
HPV HR 12 DNA CVX QL NAA+PROBE: NEGATIVE
HPV16 DNA SPEC QL NAA+PROBE: NEGATIVE
HPV18 DNA SPEC QL NAA+PROBE: NEGATIVE
SPECIMEN DESCRIPTION: NORMAL
SPECIMEN SOURCE CVX/VAG CYTO: NORMAL

## 2018-10-22 DIAGNOSIS — Z30.017 ENCOUNTER FOR INITIAL PRESCRIPTION OF IMPLANTABLE SUBDERMAL CONTRACEPTIVE: ICD-10-CM

## 2018-10-22 DIAGNOSIS — Z94.0 STATUS POST KIDNEY TRANSPLANT: ICD-10-CM

## 2018-10-22 DIAGNOSIS — R53.83 OTHER FATIGUE: ICD-10-CM

## 2018-10-22 LAB
ALBUMIN SERPL-MCNC: 3.6 G/DL (ref 3.4–5)
ALP SERPL-CCNC: 72 U/L (ref 40–150)
ALT SERPL W P-5'-P-CCNC: 34 U/L (ref 0–50)
ANION GAP SERPL CALCULATED.3IONS-SCNC: 7 MMOL/L (ref 3–14)
AST SERPL W P-5'-P-CCNC: 26 U/L (ref 0–45)
BASOPHILS # BLD AUTO: 0.1 10E9/L (ref 0–0.2)
BASOPHILS NFR BLD AUTO: 0.8 %
BILIRUB SERPL-MCNC: 0.6 MG/DL (ref 0.2–1.3)
BUN SERPL-MCNC: 13 MG/DL (ref 7–30)
CALCIUM SERPL-MCNC: 8.6 MG/DL (ref 8.5–10.1)
CHLORIDE SERPL-SCNC: 104 MMOL/L (ref 94–109)
CO2 SERPL-SCNC: 27 MMOL/L (ref 20–32)
CREAT SERPL-MCNC: 1.14 MG/DL (ref 0.52–1.04)
CYCLOSPORINE BLD LC/MS/MS-MCNC: 99 UG/L (ref 50–400)
DIFFERENTIAL METHOD BLD: NORMAL
EOSINOPHIL # BLD AUTO: 0.2 10E9/L (ref 0–0.7)
EOSINOPHIL NFR BLD AUTO: 2.5 %
ERYTHROCYTE [DISTWIDTH] IN BLOOD BY AUTOMATED COUNT: 12.4 % (ref 10–15)
GFR SERPL CREATININE-BSD FRML MDRD: 56 ML/MIN/1.7M2
GLUCOSE SERPL-MCNC: 107 MG/DL (ref 70–99)
HCG UR QL: NEGATIVE
HCT VFR BLD AUTO: 40.1 % (ref 35–47)
HGB BLD-MCNC: 13.2 G/DL (ref 11.7–15.7)
IMM GRANULOCYTES # BLD: 0 10E9/L (ref 0–0.4)
IMM GRANULOCYTES NFR BLD: 0.3 %
LYMPHOCYTES # BLD AUTO: 3 10E9/L (ref 0.8–5.3)
LYMPHOCYTES NFR BLD AUTO: 46.5 %
MCH RBC QN AUTO: 28.2 PG (ref 26.5–33)
MCHC RBC AUTO-ENTMCNC: 32.9 G/DL (ref 31.5–36.5)
MCV RBC AUTO: 86 FL (ref 78–100)
MONOCYTES # BLD AUTO: 0.4 10E9/L (ref 0–1.3)
MONOCYTES NFR BLD AUTO: 6.7 %
NEUTROPHILS # BLD AUTO: 2.8 10E9/L (ref 1.6–8.3)
NEUTROPHILS NFR BLD AUTO: 43.2 %
NRBC # BLD AUTO: 0 10*3/UL
NRBC BLD AUTO-RTO: 0 /100
PLATELET # BLD AUTO: 228 10E9/L (ref 150–450)
POTASSIUM SERPL-SCNC: 4.1 MMOL/L (ref 3.4–5.3)
PROT SERPL-MCNC: 7.3 G/DL (ref 6.8–8.8)
RBC # BLD AUTO: 4.68 10E12/L (ref 3.8–5.2)
SODIUM SERPL-SCNC: 138 MMOL/L (ref 133–144)
TME LAST DOSE: NORMAL H
TSH SERPL DL<=0.005 MIU/L-ACNC: 2.15 MU/L (ref 0.4–4)
WBC # BLD AUTO: 6.5 10E9/L (ref 4–11)

## 2018-10-26 NOTE — PROGRESS NOTES
[facility]   Danny Shi MD  10/29/2018     Procedure Note - Etonogestrel Implant Insertion     HPI: Carol Menjivar is a patient of mine, (Dr. Danny Shi) here for Nexplanon/Implanon (etonogestrel implant) insertion.   Indication: unwanted fertility  LMP   10/08-10/12, 2018  STI history:   Negative   Prev Contraception:  Abstinent  Smoking?  No    Counselling and Consent:  Affirmation of informed consent was signed and scanned into the medical record. Risks, benefits and alternatives were discussed. Discussed potential side effects of the etonogestrel implant including the risk of irregular bleeding that may persist across the 3 yrs of use.  Instructed on use of condoms for STI prevention.  Patient's questions were elicited and answered.      Procedure safety checklist was completed:  Yes  Time Out (Pause for the Cause) completed: Yes    Labs: UPT negative 10/15/18, 10/22/18, 10/29/18    Preoperative Diagnosis:  Unwanted fertility  Postoperative Diagnosis:  same     Technique:   Skin prep Betadine  Anesthesia 1.5% lidocaine, with epi. 3 cc's  Suture  No; compression bandage and steri strips over incision  EBL:   minimal  Complications: No  Tolerance:  Pt tolerated procedure well and was in stable condition.     Pt was positioned on exam table with left arm flexed and externally rotated. Area was marked for insertion 8cm from the medial epicondyle along the sulcus between the biceps and triceps. Anesthesia provided at the insertion site and along the insertion track after area was prepped with betadine. Etonogestrel implant was then inserted subdermally in usual fashion. Provider and patient confirmed placement by palpating the device. Pressure dressing applied and procedure complete.     Follow up:  Pt was instructed to call if bleeding, severe pain or foul smell.  Instructed to remove pressure dressing after 24 hours, then may keep insertion site covered with a bandaid until it is  healed.  Instructed that she requires removal or replacement of the device in 3 years.  Lot Number: [id number]    2020.    Removal date: 10/29/2021  Contact me if concerns.  Follow up in 1 year for annual exam.     Scribe Disclosure:   I, Luz Elena Schwab, am serving as a scribe to document services personally performed by Danny Shi MD at this visit, based upon the provider's statements to me. All documentation has been reviewed by the aforementioned provider prior to being entered into the official medical record.     Portions of this medical record were completed by a scribe. UPON MY REVIEW AND AUTHENTICATION BY ELECTRONIC SIGNATURE, this confirms (a) I performed the applicable clinical services, and (b) the record is accurate.     Danny Shi MD

## 2018-10-28 ENCOUNTER — TELEPHONE (OUTPATIENT)
Dept: FAMILY MEDICINE | Facility: CLINIC | Age: 28
End: 2018-10-28

## 2018-10-28 DIAGNOSIS — Z30.017 ENCOUNTER FOR INITIAL PRESCRIPTION OF IMPLANTABLE SUBDERMAL CONTRACEPTIVE: Primary | ICD-10-CM

## 2018-10-28 NOTE — TELEPHONE ENCOUNTER
----- Message from Aicha Douglass sent at 10/25/2018  2:50 PM CDT -----  Regarding: re: lab orders  Per appointment note, patient will need a pregnancy test done in lab on Monday 10/29/18, no orders placed.  Please place before appointment.  For questions please call [phone] thank you.

## 2018-10-28 NOTE — TELEPHONE ENCOUNTER
Diagnoses and all orders for this visit:    Encounter for initial prescription of implantable subdermal contraceptive  -     HCG qualitative urine; Future    Danny Shi MD

## 2018-10-29 ENCOUNTER — OFFICE VISIT (OUTPATIENT)
Dept: FAMILY MEDICINE | Facility: CLINIC | Age: 28
End: 2018-10-29
Payer: COMMERCIAL

## 2018-10-29 VITALS
DIASTOLIC BLOOD PRESSURE: 86 MMHG | TEMPERATURE: 98.6 F | SYSTOLIC BLOOD PRESSURE: 129 MMHG | HEIGHT: 66 IN | OXYGEN SATURATION: 98 % | RESPIRATION RATE: 12 BRPM | HEART RATE: 69 BPM | BODY MASS INDEX: 41.14 KG/M2 | WEIGHT: 256 LBS

## 2018-10-29 DIAGNOSIS — Z94.0 S/P KIDNEY TRANSPLANT: ICD-10-CM

## 2018-10-29 DIAGNOSIS — Z30.017 ENCOUNTER FOR INITIAL PRESCRIPTION OF IMPLANTABLE SUBDERMAL CONTRACEPTIVE: ICD-10-CM

## 2018-10-29 DIAGNOSIS — Z30.017 ENCOUNTER FOR INITIAL PRESCRIPTION OF IMPLANTABLE SUBDERMAL CONTRACEPTIVE: Primary | ICD-10-CM

## 2018-10-29 LAB — HCG UR QL: NEGATIVE

## 2018-10-29 RX ORDER — LIDOCAINE HYDROCHLORIDE AND EPINEPHRINE 15; 5 MG/ML; UG/ML
3 INJECTION, SOLUTION EPIDURAL ONCE
Qty: 3 ML | Refills: 0
Start: 2018-10-29 | End: 2018-10-29

## 2018-10-29 ASSESSMENT — PAIN SCALES - GENERAL: PAINLEVEL: NO PAIN (0)

## 2018-10-29 NOTE — MR AVS SNAPSHOT
After Visit Summary   10/29/2018    Carol Menjivar    MRN: [id number]           Patient Information     Date Of Birth          1990        Visit Information        Provider Department      10/29/2018 7:00 AM Danny Shi MD [facility]        Today's Diagnoses     Encounter for initial prescription of implantable subdermal contraceptive    -  1    S/P kidney transplant          Care Instructions    Chief Complaint   Patient presents with     Contraception     patient is here for a nexplanon insertion     LOUISE MOORE at 6:47 AM on 10/29/2018.  NEXPLANON AFTERCARE INSTRUCTIONS     You may have some pain at the site of the Nexplanon insertion. You can help relieve the discomfort with Tylenol (acetaminophen), Aspirin or Advil (ibuprofen). If your discomfort worsens or you notice redness spreading on the skin around the insertion site, please call the clinic.       Irregular bleeding is common with Nexplanon, especially in the first 6-12 months of use. After one year, approximately 20% of women who use Nexplanon will stop having periods completely. Some women have longer, heavier periods. Some women will have increased spotting between periods. You may find that your periods may be hard to predict.       The Nexplanon does not protect against sexually transmitted infections including the AIDS virus (HIV), warts (HPV), gonorrhea, Chlamydia, and herpes. Condoms should be used to decrease the risk sexually transmitted infections. If you think that you have been exposed to a sexually transmitted infection, please call the clinic.       If you had Nexplanon placed for birth control, it is effective immediately if it was inserted within five days after the start of your period. If you have Nexplanon inserted at any other time during your menstrual cycle, use another method of birth control, like condoms for at least 7 days.       The Nexplanon should be removed and/or replaced  by a health care provider after three years.   Warning Signs   Call the clinic if any of the following occurs:     You have bleeding, pus, or increasing redness, or pain at insertion site.     You have fever or chills     The implant comes out or you have concerns about its location.     You have a positive pregnancy test or suspect you might be pregnant.             Follow-ups after your visit        Your next 10 appointments already scheduled     Dec 10, 2018  9:00 AM CST   New Genetic Visit with Regis Ray MD   Peds Genetics ([facility])    [facility]  [address] [place] [id number] [phone]            Dec 10, 2018  9:00 AM CST   Genetic Counseling with CELINE [facility] ([facility])    [facility]  [address] [place] [id number] [phone]            Jan 29, 2019 12:00 PM CST   Lab with  LAB   [facility] ([facility])    [address] [place] [address]  1st Floor  [facility] [id number] [phone]            Jan 29, 2019  1:05 PM CST   (Arrive by 12:35 PM)   Return Kidney Transplant with Noni Hartley MD   [facility] Nephrology ([facility])    [address] [place] [address]  [address]  [facility] [address] [phone]              Who to contact     Please call your clinic at [phone] to:    Ask questions about your health    Make or cancel appointments    Discuss your medicines    Learn about your test results    Speak to your doctor            Additional Information About Your Visit        Roadmunkhart Information     Codemasters is an electronic gateway that provides easy, online access to your medical records. With Codemasters, you can request a clinic appointment, read your test results, renew a prescription or communicate with your care team.     To sign up for Codemasters visit the website at [email or web address]   You will be asked to enter the  "access code listed below, as well as some personal information. Please follow the directions to create your username and password.     Your access code is: [email or web address]  Expires: 2018  6:31 AM     Your access code will  in 90 days. If you need help or a new code, please contact your [facility] or call [phone] for assistance.        Care EveryWhere ID     This is your Care EveryWhere ID. This could be used by other organizations to access your [place] medical records  [id number]        Your Vitals Were     Pulse Temperature Respirations Height Last Period Pulse Oximetry    69 98.6  F (37  C) 12 1.676 m (5' 6\") 10/08/2018 98%    BMI (Body Mass Index)                   41.32 kg/m2            Blood Pressure from Last 3 Encounters:   10/29/18 129/86   10/15/18 120/84   18 131/89    Weight from Last 3 Encounters:   10/29/18 116.1 kg (256 lb)   10/15/18 116.1 kg (256 lb)   18 107.3 kg (236 lb 8 oz)              Today, you had the following     No orders found for display         Today's Medication Changes          These changes are accurate as of 10/29/18  7:45 AM.  If you have any questions, ask your nurse or doctor.               These medicines have changed or have updated prescriptions.        Dose/Directions    * etonogestrel 68 MG Impl   Commonly known as:  IMPLANON/NEXPLANON   This may have changed:  Another medication with the same name was added. Make sure you understand how and when to take each.   Used for:  Encounter for initial prescription of implantable subdermal contraceptive   Changed by:  Danny Shi MD        Dose:  1 each   1 each (68 mg) by Subdermal route once for 1 dose   Quantity:  1 each   Refills:  0       * etonogestrel 68 MG Impl   Commonly known as:  IMPLANON/NEXPLANON   This may have changed:  You were already taking a medication with the same name, and this prescription was added. Make sure you understand how and when to " take each.   Used for:  Encounter for initial prescription of implantable subdermal contraceptive   Changed by:  Danny Shi MD        Dose:  1 each   1 each (68 mg) by Subdermal route once for 1 dose   Quantity:  1 each   Refills:  0       * Notice:  This list has 2 medication(s) that are the same as other medications prescribed for you. Read the directions carefully, and ask your doctor or other care provider to review them with you.         Where to get your medicines      Some of these will need a paper prescription and others can be bought over the counter.  Ask your nurse if you have questions.     Bring a paper prescription for each of these medications     etonogestrel 68 MG Impl                Primary Care Provider Office Phone # Fax #    Katharina Leija [phone]       [address] [place] [address]        Equal Access to Services     [facility]: Enzo tate Sonnamdi, waaxjhonny luqadaha, qaybta kaalmajhonny santizo, red streeter . So [facility] [phone].    ATENCIÓN: Si habla español, tiene a gray disposición servicios gratuitos de asistencia lingüística. [facility] [phone].    We comply with applicable federal civil rights laws and [place] laws. We do not discriminate on the basis of race, color, national origin, age, disability, sex, sexual orientation, or gender identity.            Thank you!     Thank you for choosing [facility]  for your care. Our goal is always to provide you with excellent care. Hearing back from our patients is one way we can continue to improve our services. Please take a few minutes to complete the written survey that you may receive in the mail after your visit with us. Thank you!             Your Updated Medication List - Protect others around you: Learn how to safely use, store and throw away your medicines at www.disposemymeds.org.          This list is  accurate as of 10/29/18  7:45 AM.  Always use your most recent med list.                   Brand Name Dispense Instructions for use Diagnosis    atenolol 25 MG tablet    TENORMIN    90 tablet    TAKE ONE TABLET BY MOUTH EVERY DAY    Kidney replaced by transplant       cycloSPORINE modified 25 MG capsule     720 capsule    Take 4 capsules (100 mg) by mouth 2 times daily    Kidney replaced by transplant       * etonogestrel 68 MG Impl    IMPLANON/NEXPLANON    1 each    1 each (68 mg) by Subdermal route once for 1 dose    Encounter for initial prescription of implantable subdermal contraceptive       * etonogestrel 68 MG Impl    IMPLANON/NEXPLANON    1 each    1 each (68 mg) by Subdermal route once for 1 dose    Encounter for initial prescription of implantable subdermal contraceptive       mycophenolate 250 MG capsule     180 capsule    Take 3 capsules (750 mg) by mouth 2 times daily    Kidney replaced by transplant       OCUVITE LUTEIN 25 PO      Take 25 mg by mouth daily        PRENATAL PO      Take by mouth daily        VITAMIN D (CHOLECALCIFEROL) PO      Take 1,000 Units by mouth daily        * Notice:  This list has 2 medication(s) that are the same as other medications prescribed for you. Read the directions carefully, and ask your doctor or other care provider to review them with you.

## 2018-10-29 NOTE — NURSING NOTE
Chief Complaint   Patient presents with     Contraception     patient is here for a nexplanon insertion     LOUISE MOORE at 6:46 AM on 10/29/2018.

## 2018-10-29 NOTE — PATIENT INSTRUCTIONS
Chief Complaint   Patient presents with     Contraception     patient is here for a nexplanon insertion     LOUISE MOORE at 6:47 AM on 10/29/2018.  NEXPLANON AFTERCARE INSTRUCTIONS     You may have some pain at the site of the Nexplanon insertion. You can help relieve the discomfort with Tylenol (acetaminophen), Aspirin or Advil (ibuprofen). If your discomfort worsens or you notice redness spreading on the skin around the insertion site, please call the clinic.       Irregular bleeding is common with Nexplanon, especially in the first 6-12 months of use. After one year, approximately 20% of women who use Nexplanon will stop having periods completely. Some women have longer, heavier periods. Some women will have increased spotting between periods. You may find that your periods may be hard to predict.       The Nexplanon does not protect against sexually transmitted infections including the AIDS virus (HIV), warts (HPV), gonorrhea, Chlamydia, and herpes. Condoms should be used to decrease the risk sexually transmitted infections. If you think that you have been exposed to a sexually transmitted infection, please call the clinic.       If you had Nexplanon placed for birth control, it is effective immediately if it was inserted within five days after the start of your period. If you have Nexplanon inserted at any other time during your menstrual cycle, use another method of birth control, like condoms for at least 7 days.       The Nexplanon should be removed and/or replaced by a health care provider after three years.   Warning Signs   Call the clinic if any of the following occurs:     You have bleeding, pus, or increasing redness, or pain at insertion site.     You have fever or chills     The implant comes out or you have concerns about its location.     You have a positive pregnancy test or suspect you might be pregnant.

## 2018-11-26 ENCOUNTER — TELEPHONE (OUTPATIENT)
Dept: INTERNAL MEDICINE | Facility: CLINIC | Age: 28
End: 2018-11-26

## 2018-11-26 DIAGNOSIS — Z30.46 ENCOUNTER FOR SURVEILLANCE OF NEXPLANON SUBDERMAL CONTRACEPTIVE: ICD-10-CM

## 2018-11-26 DIAGNOSIS — N94.10 DYSPAREUNIA IN FEMALE: Primary | ICD-10-CM

## 2018-11-26 NOTE — TELEPHONE ENCOUNTER
Health Call Center    Phone Message    May a detailed message be left on voicemail: yes    Reason for Call: Other: Pt is requesting Dr Shi provide her with a referral to a gynocologist.  please call back at [phone]     Action Taken: Message routed to:  [facility] primary care

## 2018-11-26 NOTE — TELEPHONE ENCOUNTER
[facility]    Phone Message    May a detailed message be left on voicemail: yes    Reason for Call: Other: Pt returning phone call - Please leave a detailed message if pt does not      Action Taken: Message routed to:  [facility] ([facility]): MANPREET

## 2018-11-26 NOTE — TELEPHONE ENCOUNTER
Message left at  the patient's mobile phone to call clinic back.  Robin Villegas LPN at 1:44 PM on 11/26/2018

## 2018-11-26 NOTE — TELEPHONE ENCOUNTER
Message left to patient's mobile voicemail to call clinic back .  Robin Villegas LPN at 3:26 PM on 11/26/2018

## 2018-11-27 NOTE — TELEPHONE ENCOUNTER
Patient was reached by phone and Dr. Shi's message was relayed.  RN let her know about HCG urine test ordered if she wants to drop a sample off at any [place] lab.  RN also gave scheduling number for GYN referral.    Lupe Livingston RN on 11/27/2018 at 10:23 AM

## 2018-11-27 NOTE — TELEPHONE ENCOUNTER
It is normal to not have menses after Nexplanon implant. If she is concerned will order urine preg to confirm negative as she has recent onset of sexual activity.  For dyspareunia will refer to GYN.  Carol chart reviewed today for referral.    Diagnoses and all orders for this visit:    Dyspareunia in female  -     OB/GYN REFERRAL    Encounter for surveillance of Nexplanon subdermal contraceptive  -     OB/GYN REFERRAL  -     HCG qualitative urine; Future      Best wishes,  Danny Shi MD

## 2018-11-27 NOTE — TELEPHONE ENCOUNTER
Patient contacted clinic and stated that she has not had her period after Nexplanon was implanted on 10/29/18.  She also mentioned that she is having difficulty with intercourse as she has just gotten . She would like a referral to gyn.  Will inform ALEX.  Robin Villegas LPN at 9:33 AM on 11/27/2018

## 2018-11-29 ENCOUNTER — OFFICE VISIT (OUTPATIENT)
Dept: OBGYN | Facility: CLINIC | Age: 28
End: 2018-11-29
Attending: MIDWIFE
Payer: COMMERCIAL

## 2018-11-29 VITALS
HEART RATE: 71 BPM | HEIGHT: 66 IN | SYSTOLIC BLOOD PRESSURE: 127 MMHG | BODY MASS INDEX: 41.51 KG/M2 | DIASTOLIC BLOOD PRESSURE: 87 MMHG | WEIGHT: 258.3 LBS

## 2018-11-29 DIAGNOSIS — N94.10 DYSPAREUNIA IN FEMALE: ICD-10-CM

## 2018-11-29 PROCEDURE — G0463 HOSPITAL OUTPT CLINIC VISIT: HCPCS | Mod: ZF

## 2018-11-29 NOTE — NURSING NOTE
Chief Complaint   Patient presents with     Other     newly  and could not get  inside of her doing intercourse.      There are no preventive care reminders to display for this patient.    Patient states this is the first time this has happened. Patient states she has struggled with tampons in the past but never thought it would be an issue sexually. Verónica Johnson CMA on 11/29/2018 at 9:49 AM

## 2018-11-29 NOTE — LETTER
"11/29/2018       RE: Carol Menjivar  [address]  [facility] [address]     Dear Colleague,    Thank you for referring your patient, Carol Menjivar, to the [facility] at [facility]. Please see a copy of my visit note below.      SUBJECTIVE:  Carol Menjivar is an 28 year old  Female, No obstetric history on file., who presents with complaints of dyspareunia.  Pt had not attempted sexual intercourse until wedding night oct 12.  They have been unable to penetrate past the tip of the penis   Predisposing factors   Pt has never been able to use tampons for her menses .  Cycles are regular monthly q 28-30 days prior to nexplanon insert now no bleeding since placement.   They would like to try to conceive in approx 2 years from now.  Plans nexplanon until closer to that time.     Pt states they have tried lubricant  She feels relaxed aroused.  He has been able to insert 1 finger and only the tip of penis.  Pt did have a normal pap/ speculum exam annual exam in October.  Wet prep was negative at that time.  Pt denies any symptoms of vaginitis no itching burning odor to discharge.    She has achieved orgasm with manual stimulation.    Pt states she believes hymen was opened with first pelvic exam in Oct .  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  Abdomen: benign, soft, nontender, no masses.  CVA tenderness to percussion.    Pelvic Exam:  EG/BUS: Normal genitalia and Bartholin's, Urethra, [place]'s normal  Q tip test neg. No pain on touch to EG or with ext exam    Vagina: moist, pink, rugae with creamy, white and odorlesssecretions hymenal ring present patent. Admits 1 FB easily pt comfortable reports not painful    Cervix: no visualized. No speculum used today manual exam only   Uterus:anteverted,  and small, smooth, firm, mobile w/o pain   Adnexa:Within normal limits and No masses, nodularity, tenderness  Rectum:anus normal    POCT UA: not done.  POCT Wet prep:not done  POCT UPT: not done neg at annual prior to nexplanon placement     ASSESSMENT:   Encounter Diagnoses   Name Primary?     Dyspareunia in female           PLAN:       Discussed range of normal.  Referred pt to vaginismus"2,10E+07" for vaginal dilator kit with audio and visual training packet. Discussed silicone based lubricants  Written instructions for spouse also to review as a couple re: gradual use of dilators.  Taking it slow as much time as needed to gradually dilate vaginal opening. Discussed kegel exercises relax, contract and focus on relaxation of the pelvic floor muscles.    Offered referral to PT for pelvic floor therapy biofeedback   Offered referral to human sexuality clinic prn after work with dilators at home if symptoms have not improved.   Offered appt with MD if hymenal ring not loosening with dilators   Return to clinic prn if symptoms persist or worsen.  Discussed per pt request plan for MFM consult re: complex medical hx including transplant prior to attempting conception in next couple of years.  Discussed a plan for nexplanon removal when desires        Again, thank you for allowing me to participate in the care of your patient.      Sincerely,    MATTHEW Barry CNM

## 2018-11-29 NOTE — MR AVS SNAPSHOT
After Visit Summary   11/29/2018    Carol Menjivar    MRN: [id number]           Patient Information     Date Of Birth          1990        Visit Information        Provider Department      11/29/2018 9:30 AM Silvia Elise APRN CN [facility]        Today's Diagnoses     Dyspareunia in female          Care Instructions    Pt referred to [email or web address]           Follow-ups after your visit        Follow-up notes from your care team     Return in 1 month (on 12/29/2018), or if symptoms worsen or fail to improve, for Return if symptoms persist.      Your next 10 appointments already scheduled     Dec 10, 2018  9:00 AM CST   New Genetic Visit with MD Meme [facility] ([facility])    [facility]  [address] [place] [address] [id number] [phone]            Dec 10, 2018  9:00 AM CST   Genetic Counseling with CELINE [facility] ([facility])    [facility]  [address] [place] [address] [phone]            Jan 25, 2019  3:30 PM CST   Lab with  LAB   [facility] ([facility])    [address]  [facility] [address] [phone]            Jan 25, 2019  4:05 PM CST   (Arrive by 3:35 PM)   Return Kidney Transplant with  Kidney/Pancreas Recipient [address] [place] [address] Nephrology ([facility])    [address] [facility]  [address] [place] [address] [phone]              Who to contact     Please call your clinic at [phone] to:    Ask questions about your health    Make or cancel appointments    Discuss your medicines    Learn about your test results    Speak to your doctor            Additional Information About Your Visit        The BoxharSIM Digital Information     INTEX Program is an electronic gateway that provides easy, online access to your medical records. With INTEX Program, you can request a clinic  "appointment, read your test results, renew a prescription or communicate with your care team.     To sign up for Formisimohart visit the website at [email or web address]   You will be asked to enter the access code listed below, as well as some personal information. Please follow the directions to create your username and password.     Your access code is: [email or web address]  Expires: 2018  5:31 AM     Your access code will  in 90 days. If you need help or a new code, please contact your [facility] or call [phone] for assistance.        Care EveryWhere ID     This is your Care EveryWhere ID. This could be used by other organizations to access your [place] medical records  [id number]        Your Vitals Were     Pulse Height BMI (Body Mass Index)             71 1.676 m (5' 6\") 41.69 kg/m2          Blood Pressure from Last 3 Encounters:   18 127/87   10/29/18 129/86   10/15/18 120/84    Weight from Last 3 Encounters:   18 117.2 kg (258 lb 4.8 oz)   10/29/18 116.1 kg (256 lb)   10/15/18 116.1 kg (256 lb)              Today, you had the following     No orders found for display       Primary Care Provider Office Phone # Fax #    Danny Shi [phone]       [address] [address]        Equal Access to Services     BASIM GUNDERSON : Hadii lucita fritzo Sonnamdi, waaxda luqadaha, qaybta kaalmada ademarybethyajhonny, red streeter . So [facility] [place] [phone].    ATENCIÓN: Si habla virgilañol, tiene a gray disposición servicios gratuitos de asistencia lingüística. Llame al [phone].    We comply with applicable federal civil rights laws and [place] laws. We do not discriminate on the basis of race, color, national origin, age, disability, sex, sexual orientation, or gender identity.            Thank you!     Thank you for choosing [facility]  for your care. Our goal is always to provide " you with excellent care. Hearing back from our patients is one way we can continue to improve our services. Please take a few minutes to complete the written survey that you may receive in the mail after your visit with us. Thank you!             Your Updated Medication List - Protect others around you: Learn how to safely use, store and throw away your medicines at www.disposemymeds.org.          This list is accurate as of 11/29/18 11:24 AM.  Always use your most recent med list.                   Brand Name Dispense Instructions for use Diagnosis    atenolol 25 MG tablet    TENORMIN    90 tablet    TAKE ONE TABLET BY MOUTH EVERY DAY    Kidney replaced by transplant       cycloSPORINE modified 25 MG capsule     720 capsule    Take 4 capsules (100 mg) by mouth 2 times daily    Kidney replaced by transplant       etonogestrel 68 MG Impl    IMPLANON/NEXPLANON    1 each    1 each (68 mg) by Subdermal route once for 1 dose    Encounter for initial prescription of implantable subdermal contraceptive       mycophenolate 250 MG capsule     180 capsule    Take 3 capsules (750 mg) by mouth 2 times daily    Kidney replaced by transplant       OCUVITE LUTEIN 25 PO      Take 25 mg by mouth daily        PRENATAL PO      Take by mouth daily        VITAMIN D (CHOLECALCIFEROL) PO      Take 1,000 Units by mouth daily

## 2018-11-29 NOTE — PROGRESS NOTES
"  SUBJECTIVE:  Carol Menjivar is an 28 year old  Female, No obstetric history on file., who presents with complaints of dyspareunia.  Pt had not attempted sexual intercourse until wedding night oct 12.  They have been unable to penetrate past the tip of the penis   Predisposing factors   Pt has never been able to use tampons for her menses .  Cycles are regular monthly q 28-30 days prior to nexplanon insert now no bleeding since placement.   They would like to try to conceive in approx 2 years from now.  Plans nexplanon until closer to that time.     Pt states they have tried lubricant  She feels relaxed aroused.  He has been able to insert 1 finger and only the tip of penis.  Pt did have a normal pap/ speculum exam annual exam in October.  Wet prep was negative at that time.  Pt denies any symptoms of vaginitis no itching burning odor to discharge.    She has achieved orgasm with manual stimulation.    Pt states she believes hymen was opened with first pelvic exam in Oct .  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  Abdomen: benign, soft, nontender, no masses.  CVA tenderness to percussion.    Pelvic Exam:  EG/BUS: Normal genitalia and Bartholin's, Urethra, [place]'s normal  Q tip test neg. No pain on touch to EG or with ext exam    Vagina: moist, pink, rugae with creamy, white and odorlesssecretions hymenal ring " present patent. Admits 1 FB easily pt comfortable reports not painful    Cervix: no visualized. No speculum used today manual exam only   Uterus:anteverted,  and small, smooth, firm, mobile w/o pain   Adnexa:Within normal limits and No masses, nodularity, tenderness  Rectum:anus normal    POCT UA: not done.  POCT Wet prep:not done  POCT UPT: not done neg at annual prior to nexplanon placement     ASSESSMENT:   Encounter Diagnoses   Name Primary?     Dyspareunia in female           PLAN:       Discussed range of normal.  Referred pt to [employer] for vaginal dilator kit with audio and visual training packet. Discussed silicone based lubricants  Written instructions for spouse also to review as a couple re: gradual use of dilators.  Taking it slow as much time as needed to gradually dilate vaginal opening. Discussed kegel exercises relax, contract and focus on relaxation of the pelvic floor muscles.    Offered referral to PT for pelvic floor therapy biofeedback   Offered referral to human sexuality clinic prn after work with dilators at home if symptoms have not improved.   Offered appt with MD if hymenal ring not loosening with dilators   Return to clinic prn if symptoms persist or worsen.  Discussed per pt request plan for MFM consult re: complex medical hx including transplant prior to attempting conception in next couple of years.  Discussed a plan for nexplanon removal when desires

## 2018-12-18 ENCOUNTER — TELEPHONE (OUTPATIENT)
Dept: NEPHROLOGY | Facility: CLINIC | Age: 28
End: 2018-12-18

## 2018-12-18 NOTE — TELEPHONE ENCOUNTER
Left voicemail for patient to call back (follow up from last transplant appointment).    Tiana Yost RN

## 2018-12-19 NOTE — TELEPHONE ENCOUNTER
Spoke with patient for transplant follow up. States she's feeling well overall. Denied questions or concerns ahead of her appointment.    Tiana Yost RN

## 2018-12-22 ENCOUNTER — DOCUMENTATION ONLY (OUTPATIENT)
Dept: TRANSPLANT | Facility: CLINIC | Age: 28
End: 2018-12-22

## 2018-12-22 DIAGNOSIS — Z94.0 KIDNEY TRANSPLANTED: Primary | ICD-10-CM

## 2018-12-22 DIAGNOSIS — Z48.298 AFTERCARE FOLLOWING ORGAN TRANSPLANT: ICD-10-CM

## 2018-12-22 NOTE — LETTER
PHYSICIAN ORDERS    DATE & TIME ISSUED: 2018 11:18 PM  PATIENT NAME: Carol Menjivar   : 1990     [facility] MR# [if applicable]: [id number]     DIAGNOSIS / ICD - 10 CODES    Kidney Transplanted (Z94.0)    After Care Following Organ Transplant (Z48.298)    Long Term Use of Medication (Z79.899)      Every 3 months    Hemogram and Platelet    Basic Metabolic Panel (Sodium,potassium,chloride,CO2,creatinine,urea,nitrogen,glucose,calcium)    Cyclosporine drug level    Every 6 months    Urine for protein creatinine ratio      Patient should release information to the [facility].   Please fax results to the Transplant Center at [phone].  Any questions please call [phone] or [phone].      .

## 2018-12-22 NOTE — LETTER
PHYSICIAN ORDERS    DATE & TIME ISSUED: 2018 11:18 PM  PATIENT NAME: Carol Menjivar   : 1990     [facility] MR# [if applicable]: [id number]     DIAGNOSIS / ICD - 10 CODES    Kidney Transplanted (Z94.0)    After Care Following Organ Transplant (Z48.298)    Long Term Use of Medication (Z79.899)      Every 3 months    Hemogram and Platelet    Basic Metabolic Panel (Sodium,potassium,chloride,CO2,creatinine,urea,nitrogen,glucose,calcium)    Cyclosporine drug level    Every 6 months    Urine for protein creatinine ratio      Patient should release information to the [facility].   Please fax results to the Transplant Center at [phone].  Any questions please call [phone] or [phone].      .

## 2018-12-23 NOTE — PROGRESS NOTES
Chart Prep    Clinic Visit on:  1/25/19    Last lab completed:  10/22/18    Lab letter updated: 12/22/18    Lab orders faxed to:   [facility] [phone] (Phone)  [phone] (Fax)       Lab orders up to date in Epic.

## 2019-01-21 ENCOUNTER — TELEPHONE (OUTPATIENT)
Dept: DERMATOLOGY | Facility: CLINIC | Age: 29
End: 2019-01-21

## 2019-01-21 NOTE — TELEPHONE ENCOUNTER
[facility] Call Center    Phone Message    May a detailed message be left on voicemail: yes    Reason for Call: Other: Pt claims dermatology left her a message but no one on backline claimed the call.  Please check with members who are away.     Action Taken: Message routed to:  [facility] dermatology

## 2019-01-25 ENCOUNTER — OFFICE VISIT (OUTPATIENT)
Dept: NEPHROLOGY | Facility: CLINIC | Age: 29
End: 2019-01-25
Attending: INTERNAL MEDICINE
Payer: COMMERCIAL

## 2019-01-25 VITALS
BODY MASS INDEX: 42.09 KG/M2 | DIASTOLIC BLOOD PRESSURE: 90 MMHG | WEIGHT: 260.8 LBS | OXYGEN SATURATION: 95 % | HEART RATE: 84 BPM | TEMPERATURE: 98.1 F | SYSTOLIC BLOOD PRESSURE: 139 MMHG

## 2019-01-25 DIAGNOSIS — Z48.298 AFTERCARE FOLLOWING ORGAN TRANSPLANT: ICD-10-CM

## 2019-01-25 DIAGNOSIS — D84.9 IMMUNOSUPPRESSION (H): ICD-10-CM

## 2019-01-25 DIAGNOSIS — E55.9 HYPOVITAMINOSIS D: ICD-10-CM

## 2019-01-25 DIAGNOSIS — I15.1 HTN, KIDNEY TRANSPLANT RELATED: ICD-10-CM

## 2019-01-25 DIAGNOSIS — Z30.09 BIRTH CONTROL COUNSELING: ICD-10-CM

## 2019-01-25 DIAGNOSIS — E66.813 CLASS 3 SEVERE OBESITY WITH SERIOUS COMORBIDITY AND BODY MASS INDEX (BMI) OF 40.0 TO 44.9 IN ADULT, UNSPECIFIED OBESITY TYPE (H): ICD-10-CM

## 2019-01-25 DIAGNOSIS — Z94.0 KIDNEY REPLACED BY TRANSPLANT: Primary | ICD-10-CM

## 2019-01-25 DIAGNOSIS — Z94.0 KIDNEY TRANSPLANTED: ICD-10-CM

## 2019-01-25 DIAGNOSIS — Z94.0 HTN, KIDNEY TRANSPLANT RELATED: ICD-10-CM

## 2019-01-25 DIAGNOSIS — E66.01 CLASS 3 SEVERE OBESITY WITH SERIOUS COMORBIDITY AND BODY MASS INDEX (BMI) OF 40.0 TO 44.9 IN ADULT, UNSPECIFIED OBESITY TYPE (H): ICD-10-CM

## 2019-01-25 LAB
ANION GAP SERPL CALCULATED.3IONS-SCNC: 6 MMOL/L (ref 3–14)
BUN SERPL-MCNC: 19 MG/DL (ref 7–30)
CALCIUM SERPL-MCNC: 9 MG/DL (ref 8.5–10.1)
CHLORIDE SERPL-SCNC: 107 MMOL/L (ref 94–109)
CO2 SERPL-SCNC: 26 MMOL/L (ref 20–32)
CREAT SERPL-MCNC: 1.06 MG/DL (ref 0.52–1.04)
CREAT UR-MCNC: 66 MG/DL
CYCLOSPORINE BLD LC/MS/MS-MCNC: 162 UG/L (ref 50–400)
ERYTHROCYTE [DISTWIDTH] IN BLOOD BY AUTOMATED COUNT: 12.4 % (ref 10–15)
GFR SERPL CREATININE-BSD FRML MDRD: 70 ML/MIN/{1.73_M2}
GLUCOSE SERPL-MCNC: 121 MG/DL (ref 70–99)
HCT VFR BLD AUTO: 42.2 % (ref 35–47)
HGB BLD-MCNC: 13.6 G/DL (ref 11.7–15.7)
MCH RBC QN AUTO: 27.4 PG (ref 26.5–33)
MCHC RBC AUTO-ENTMCNC: 32.2 G/DL (ref 31.5–36.5)
MCV RBC AUTO: 85 FL (ref 78–100)
PLATELET # BLD AUTO: 192 10E9/L (ref 150–450)
POTASSIUM SERPL-SCNC: 4.2 MMOL/L (ref 3.4–5.3)
PROT UR-MCNC: 0.31 G/L
PROT/CREAT 24H UR: 0.47 G/G CR (ref 0–0.2)
RBC # BLD AUTO: 4.97 10E12/L (ref 3.8–5.2)
SODIUM SERPL-SCNC: 139 MMOL/L (ref 133–144)
TME LAST DOSE: NORMAL H
WBC # BLD AUTO: 8.6 10E9/L (ref 4–11)

## 2019-01-25 PROCEDURE — G0463 HOSPITAL OUTPT CLINIC VISIT: HCPCS | Mod: ZF

## 2019-01-25 PROCEDURE — 80158 DRUG ASSAY CYCLOSPORINE: CPT | Performed by: INTERNAL MEDICINE

## 2019-01-25 PROCEDURE — 80048 BASIC METABOLIC PNL TOTAL CA: CPT | Performed by: INTERNAL MEDICINE

## 2019-01-25 PROCEDURE — 85027 COMPLETE CBC AUTOMATED: CPT | Performed by: INTERNAL MEDICINE

## 2019-01-25 PROCEDURE — 84156 ASSAY OF PROTEIN URINE: CPT | Performed by: INTERNAL MEDICINE

## 2019-01-25 RX ORDER — MYCOPHENOLIC ACID 180 MG/1
540 TABLET, DELAYED RELEASE ORAL 2 TIMES DAILY
Qty: 540 TABLET | Refills: 3 | Status: SHIPPED | OUTPATIENT
Start: 2019-01-25 | End: 2020-01-30

## 2019-01-25 ASSESSMENT — PAIN SCALES - GENERAL: PAINLEVEL: NO PAIN (0)

## 2019-01-25 NOTE — LETTER
2019      RE: Carol Menjivar  [address]  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    - Baseline Cr ~ 0.8-1.0; Stable   - Proteinuria: Not checked recently   - Date of DSA last checked: Negative Latest DSA: Yes,    - BK Viremia: Not checked recently   - Kidney Tx Biopsy: No    # Immunosuppression: Cyclosporine (goal  Most recent cyclosporine level was 93)   - Changes: Yes - Change to Cellcept to myfortic to alleviate nausea.     # Hypertension: Controlled; Goal BP: < 130/80   - Changes: No    # Mineral Bone Disorder:    - Secondary renal hyperparathyroidism; PTH level is: Not checked recently   - Vitamin D; level is: Normal continue vitamin d.   - Calcium; level is: Normal   - Phosphorus; level is: Not checked recently     # Electrolytes:   - Potassium; level: Normal  - Magnesium; level: Not checked recently  - Bicarbonate; level: Not checked recently    # Skin Cancer Risk:    - Discussed sun protection and recommend regular follow up with Dermatology.    # Medical Compliance: Yes     #obesity: continue to work on weight loss.     #pregnancy planning: The patient is currently not planning on becoming pregnant in the next 12 months.  I did state that in January of next year that if she is planning to become pregnant we could switch her off of Myfortic and onto azathioprine in preparation for pregnancy planning.  At that time we could consider switching cyclosporine to tacrolimus as well.    She understands that she should not attempt pregnancy while on mmf / mpa. She currently uses the implanon for birth control.     Follow-up: 12 months    # Transplant History:  Etiology of kidney failure: Senior-Loken  nery  Tx: LDKT  Transplant: 7/18/2006 (Kidney)  Donor Type: Living Donor Class: Standard Criteria Donor  Significant changes in immunosuppression: None  Significant transplant-related complications: None    Transplant Office Phone Number: [phone]    Assessment and plan was discussed with the patient and she voiced her understanding and agreement.    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    The patient was last evaluated on 1/30/208 by Dr. Hartley. Please see note for further details.    Today, the patient is doing well. She plans to eventually become pregnant in 2020. She reports intermittent nausea, heartburn and heart racing.      Recent Hospitalizations:  [x] No [] Yes    New Medical Issues: [x] No [] Yes    Decreased energy: [x] No [] Yes    Chest pain or SOB with exertion:  [x] No [] Yes    Appetite change or weight change: [x] No [] Yes    Nausea, vomiting or diarrhea:  [] No [x] Yes See above.   Fever, sweats or chills: [x] No [] Yes    Leg swelling: [x] No [] Yes      Other medical issues:  No    Home BP: Doesn't know     Review of Systems:   A comprehensive review of systems was obtained and negative, except as noted in the HPI or past medical history.     Active Medications:     Current Outpatient Medications:      atenolol (TENORMIN) 25 MG tablet, TAKE ONE TABLET BY MOUTH EVERY DAY, Disp: 90 tablet, Rfl: 3     CELLCEPT (BRAND) 250 MG CAPSULE, Take 3 capsules (750 mg) by mouth 2 times daily, Disp: 180 capsule, Rfl: 11     etonogestrel (IMPLANON/NEXPLANON) 68 MG IMPL, 1 each (68 mg) by Subdermal route once for 1 dose, Disp: 1 each, Rfl: 0     etonogestrel (IMPLANON/NEXPLANON) 68 MG IMPL, 1 each (68 mg) by Subdermal route once for 1 dose, Disp: 1  each, Rfl: 0     Lutein-Zeaxanthin (OCUVITE LUTEIN 25 PO), Take 25 mg by mouth daily, Disp: , Rfl:      NEORAL (BRAND) 25 MG CAPSULE, Take 4 capsules (100 mg) by mouth 2 times daily, Disp: 720 capsule, Rfl: 3     Prenatal Vit-Fe Fumarate-FA (PRENATAL PO), Take by mouth daily, Disp: , Rfl:      VITAMIN D, CHOLECALCIFEROL, PO, Take 1,000 Units by mouth daily , Disp: , Rfl:       Allergies:   Patient has no known allergies.      Active Medical Problems:  Senior-Loken syndrome  Pseudotumor cerebri  Legally blind  Retinitis pigmentosa  Vitamin D deficiency  Obesity  Insulin resistance  PCOS (polycystic ovarian syndrome)  Cataract  Chest pains  Palpitations  Encounter for initial prescription of implantable subdermal contraceptive     Social History:   The patient has never smoked or used smokeless tobacco. She also denies alcohol and drug use.    Physical Exam:   There were no vitals taken for this visit.   Wt Readings from Last 4 Encounters:   11/29/18 117.2 kg (258 lb 4.8 oz)   10/29/18 116.1 kg (256 lb)   10/15/18 116.1 kg (256 lb)   03/28/18 107.3 kg (236 lb 8 oz)     GENERAL APPEARANCE: alert and no distress  HENT: mouth without ulcers or lesions  LYMPHATICS: no cervical or supraclavicular nodes  RESP: lungs clear to auscultation - no rales, rhonchi or wheezes  CV: regular rhythm, normal rate, no rub, no murmur  EDEMA: no LE edema bilaterally  ABDOMEN: soft, nondistended, nontender, bowel sounds normal  MS: extremities normal - no gross deformities noted, no evidence of inflammation in joints, no muscle tenderness  SKIN: no rash    Data:     Renal Latest Ref Rng & Units 10/22/2018 3/28/2018 1/30/2018   Na 133 - 144 mmol/L 138 138 140   Na (external) 135 - 145 mmol/L - - -   K 3.4 - 5.3 mmol/L 4.1 4.2 4.0   K (external) 3.5 - 5.0 mmol/L - - -   Cl 94 - 109 mmol/L 104 106 105   Cl (external) 98 - 110 mmol/L - - -   CO2 20 - 32 mmol/L 27 27 29   CO2 (external) 21 - 31 mmol/L - - -   BUN 7 - 30 mg/dL 13 17 16   BUN  (external) 8 - 25 mg/dL - - -   Cr 0.52 - 1.04 mg/dL 1.14(H) 1.06(H) 1.05(H)   Cr (external) 0.57 - 1.11 mg/dL - - -   Glucose 70 - 99 mg/dL 107(H) 101(H) 85   Glucose (external) 65 - 100 mg/dL - - -   Ca  8.5 - 10.1 mg/dL 8.6 9.3 9.1   Ca (external) 8.5 - 10.5 mg/dL - - -   Mg 1.6 - 2.3 mg/dL - - -     Bone Health Latest Ref Rng & Units 1/30/2018 6/1/2009 1/8/2009   Phos 2.5 - 4.5 mg/dL - - -   PTHi 12 - 72 pg/mL 30 65 39   Vit D Def 20 - 75 ug/L 32 - -     Heme Latest Ref Rng & Units 10/22/2018 3/28/2018 1/30/2018   WBC 4.0 - 11.0 10e9/L 6.5 5.7 7.0   WBC (external) 4.5 - 11.0 thou/cu mm - - -   Hgb 11.7 - 15.7 g/dL 13.2 13.8 13.5   Hgb (external) 12.0 - 16.0 g/dL - - -   Plt 150 - 450 10e9/L 228 221 205   Plt (external) 140 - 440 thou/cu mm - - -     Liver Latest Ref Rng & Units 10/22/2018 1/31/2017 12/22/2008   AP 40 - 150 U/L 72 80 65   TBili 0.2 - 1.3 mg/dL 0.6 0.5 0.5   ALT 0 - 50 U/L 34 50 47   AST 0 - 45 U/L 26 38 52(H)   Tot Protein 6.8 - 8.8 g/dL 7.3 8.1 7.7   Albumin 3.4 - 5.0 g/dL 3.6 4.1 4.3     Pancreas Latest Ref Rng & Units 7/26/2013 7/27/2012 6/3/2008   A1C 4.3 - 6.0 % 5.0 5.3 -   Amylase 30 - 110 U/L - - 58   Lipase 20 - 250 U/L - - -     Iron studies Latest Ref Rng & Units 1/30/2018 7/11/2006 6/26/2006   Iron 35 - 180 ug/dL 55 56 75   Iron sat 15 - 46 % 19 - -   Ferritin 12 - 150 ng/mL 19 473(H) 914(H)     [facility] Txp Virology Latest Ref Rng & Units 1/31/2017 8/29/2011 7/21/2011   CMV IgG EU/mL - - -   CMV IgM <0.90 - - -   CMV IgM Interp <0.90 - - -   CVM DNA Quant - Plasma, EDTA anticoagulant Whole blood, EDTA anticoagulant Whole blood, EDTA anticoagulant   CMV Quant <100 Copies/mL - <100  No CMV DNA detected. <100  No CMV DNA detected.   CMV QT Log <2.0 Log copies/mL - <2.0  The Cytomegalovirus DNA Quantitation assay is a real-time polymerase chain   reaction (PCR) utilizing analyte specific reagents manufactured by [facility]   [facility]. Analyte Specific Reagents (ASRs) are used in many  laboratory   tests necessary for standard medical care and generally do not require FDA   approval.   This test was developed and its performance characteristics determined by   [facility].  It has not been   cleared or approved by the US Food and Drug Administration. <2.0  The Cytomegalovirus DNA Quantitation assay is a real-time polymerase chain   reaction (PCR) utilizing analyte specific reagents manufactured by [facility]. Analyte Specific Reagents (ASRs) are used in many laboratory   tests necessary for standard medical care and generally do not require FDA   approval.   This test was developed and its performance characteristics determined by   [facility].  It has not been   cleared or approved by the US Food and Drug Administration.   BK Spec - - - -   BK Res <1000 copies/mL - - -   BK Log <3.0 Log copies/mL - - -   EBV IgG - - - -   Hep B Core NEG - - -   Hep B Surf 0.0 - 4.9 mIU/mL - - -   HIV 1&2 NEG - - -     Scribe Disclosure:   I, Leta Bryan, am serving as a scribe to document services personally performed by Dr. Parker at this visit, based upon the provider's statements to me. All documentation has been reviewed by the aforementioned provider prior to being entered into the official medical record.     Portions of this medical record were completed by a scribe. UPON MY REVIEW AND AUTHENTICATION BY ELECTRONIC SIGNATURE, this confirms (a) I performed the applicable clinical services, and (b) the record is accurate.           Kidney/Pancreas Recipient

## 2019-01-25 NOTE — NURSING NOTE
"Chief Complaint   Patient presents with     RECHECK     Follow up Kidney TX     Vital signs:  Temp: 98.1  F (36.7  C) Temp src: Oral BP: 139/90 Pulse: 84     SpO2: 95 %       Weight: 118.3 kg (260 lb 12.8 oz)  Estimated body mass index is 42.09 kg/m  as calculated from the following:    Height as of 11/29/18: 1.676 m (5' 6\").    Weight as of this encounter: 118.3 kg (260 lb 12.8 oz).        Zahra Vásquez    "

## 2019-01-25 NOTE — PROGRESS NOTES
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    - Baseline Cr ~ 0.8-1.0; Stable   - Proteinuria: Not checked recently   - Date of DSA last checked: Negative Latest DSA: Yes,    - BK Viremia: Not checked recently   - Kidney Tx Biopsy: No    # Immunosuppression: Cyclosporine (goal  Most recent cyclosporine level was 93)   - Changes: Yes - Change to Cellcept to myfortic to alleviate nausea.     # Hypertension: Controlled; Goal BP: < 130/80   - Changes: No    # Mineral Bone Disorder:    - Secondary renal hyperparathyroidism; PTH level is: Not checked recently   - Vitamin D; level is: Normal continue vitamin d.   - Calcium; level is: Normal   - Phosphorus; level is: Not checked recently     # Electrolytes:   - Potassium; level: Normal  - Magnesium; level: Not checked recently  - Bicarbonate; level: Not checked recently    # Skin Cancer Risk:    - Discussed sun protection and recommend regular follow up with Dermatology.    # Medical Compliance: Yes     #obesity: continue to work on weight loss.     #pregnancy planning: The patient is currently not planning on becoming pregnant in the next 12 months.  I did state that in January of next year that if she is planning to become pregnant we could switch her off of Myfortic and onto azathioprine in preparation for pregnancy planning.  At that time we could consider switching cyclosporine to tacrolimus as well.    She understands that she should not attempt pregnancy while on mmf / mpa. She currently uses the implanon for birth control.     Follow-up: 12 months    # Transplant History:  Etiology of kidney failure: Senior-Losusie gabriel  Tx: LDKT  Transplant: 2006 (Kidney)  Donor Type: Living Donor Class: Standard  Criteria Donor  Significant changes in immunosuppression: None  Significant transplant-related complications: None    Transplant Office Phone Number: [phone]    Assessment and plan was discussed with the patient and she voiced her understanding and agreement.    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    The patient was last evaluated on 1/30/208 by Dr. Hartley. Please see note for further details.    Today, the patient is doing well. She plans to eventually become pregnant in 2020. She reports intermittent nausea, heartburn and heart racing.      Recent Hospitalizations:  [x] No [] Yes    New Medical Issues: [x] No [] Yes    Decreased energy: [x] No [] Yes    Chest pain or SOB with exertion:  [x] No [] Yes    Appetite change or weight change: [x] No [] Yes    Nausea, vomiting or diarrhea:  [] No [x] Yes See above.   Fever, sweats or chills: [x] No [] Yes    Leg swelling: [x] No [] Yes      Other medical issues:  No    Home BP: Doesn't know     Review of Systems:   A comprehensive review of systems was obtained and negative, except as noted in the HPI or past medical history.     Active Medications:     Current Outpatient Medications:      atenolol (TENORMIN) 25 MG tablet, TAKE ONE TABLET BY MOUTH EVERY DAY, Disp: 90 tablet, Rfl: 3     CELLCEPT (BRAND) 250 MG CAPSULE, Take 3 capsules (750 mg) by mouth 2 times daily, Disp: 180 capsule, Rfl: 11     etonogestrel (IMPLANON/NEXPLANON) 68 MG IMPL, 1 each (68 mg) by Subdermal route once for 1 dose, Disp: 1 each, Rfl: 0     etonogestrel (IMPLANON/NEXPLANON) 68 MG IMPL, 1 each (68 mg) by Subdermal route once for 1 dose, Disp: 1 each, Rfl: 0     Lutein-Zeaxanthin (OCUVITE LUTEIN 25 PO), Take 25 mg by mouth daily, Disp:  , Rfl:      NEORAL (BRAND) 25 MG CAPSULE, Take 4 capsules (100 mg) by mouth 2 times daily, Disp: 720 capsule, Rfl: 3     Prenatal Vit-Fe Fumarate-FA (PRENATAL PO), Take by mouth daily, Disp: , Rfl:      VITAMIN D, CHOLECALCIFEROL, PO, Take 1,000 Units by mouth daily , Disp: , Rfl:       Allergies:   Patient has no known allergies.      Active Medical Problems:  Senior-Loken syndrome  Pseudotumor cerebri  Legally blind  Retinitis pigmentosa  Vitamin D deficiency  Obesity  Insulin resistance  PCOS (polycystic ovarian syndrome)  Cataract  Chest pains  Palpitations  Encounter for initial prescription of implantable subdermal contraceptive     Social History:   The patient has never smoked or used smokeless tobacco. She also denies alcohol and drug use.    Physical Exam:   There were no vitals taken for this visit.   Wt Readings from Last 4 Encounters:   11/29/18 117.2 kg (258 lb 4.8 oz)   10/29/18 116.1 kg (256 lb)   10/15/18 116.1 kg (256 lb)   03/28/18 107.3 kg (236 lb 8 oz)     GENERAL APPEARANCE: alert and no distress  HENT: mouth without ulcers or lesions  LYMPHATICS: no cervical or supraclavicular nodes  RESP: lungs clear to auscultation - no rales, rhonchi or wheezes  CV: regular rhythm, normal rate, no rub, no murmur  EDEMA: no LE edema bilaterally  ABDOMEN: soft, nondistended, nontender, bowel sounds normal  MS: extremities normal - no gross deformities noted, no evidence of inflammation in joints, no muscle tenderness  SKIN: no rash    Data:     Renal Latest Ref Rng & Units 10/22/2018 3/28/2018 1/30/2018   Na 133 - 144 mmol/L 138 138 140   Na (external) 135 - 145 mmol/L - - -   K 3.4 - 5.3 mmol/L 4.1 4.2 4.0   K (external) 3.5 - 5.0 mmol/L - - -   Cl 94 - 109 mmol/L 104 106 105   Cl (external) 98 - 110 mmol/L - - -   CO2 20 - 32 mmol/L 27 27 29   CO2 (external) 21 - 31 mmol/L - - -   BUN 7 - 30 mg/dL 13 17 16   BUN (external) 8 - 25 mg/dL - - -   Cr 0.52 - 1.04 mg/dL 1.14(H) 1.06(H) 1.05(H)   Cr (external)  0.57 - 1.11 mg/dL - - -   Glucose 70 - 99 mg/dL 107(H) 101(H) 85   Glucose (external) 65 - 100 mg/dL - - -   Ca  8.5 - 10.1 mg/dL 8.6 9.3 9.1   Ca (external) 8.5 - 10.5 mg/dL - - -   Mg 1.6 - 2.3 mg/dL - - -     Bone Health Latest Ref Rng & Units 1/30/2018 6/1/2009 1/8/2009   Phos 2.5 - 4.5 mg/dL - - -   PTHi 12 - 72 pg/mL 30 65 39   Vit D Def 20 - 75 ug/L 32 - -     Heme Latest Ref Rng & Units 10/22/2018 3/28/2018 1/30/2018   WBC 4.0 - 11.0 10e9/L 6.5 5.7 7.0   WBC (external) 4.5 - 11.0 thou/cu mm - - -   Hgb 11.7 - 15.7 g/dL 13.2 13.8 13.5   Hgb (external) 12.0 - 16.0 g/dL - - -   Plt 150 - 450 10e9/L 228 221 205   Plt (external) 140 - 440 thou/cu mm - - -     Liver Latest Ref Rng & Units 10/22/2018 1/31/2017 12/22/2008   AP 40 - 150 U/L 72 80 65   TBili 0.2 - 1.3 mg/dL 0.6 0.5 0.5   ALT 0 - 50 U/L 34 50 47   AST 0 - 45 U/L 26 38 52(H)   Tot Protein 6.8 - 8.8 g/dL 7.3 8.1 7.7   Albumin 3.4 - 5.0 g/dL 3.6 4.1 4.3     Pancreas Latest Ref Rng & Units 7/26/2013 7/27/2012 6/3/2008   A1C 4.3 - 6.0 % 5.0 5.3 -   Amylase 30 - 110 U/L - - 58   Lipase 20 - 250 U/L - - -     Iron studies Latest Ref Rng & Units 1/30/2018 7/11/2006 6/26/2006   Iron 35 - 180 ug/dL 55 56 75   Iron sat 15 - 46 % 19 - -   Ferritin 12 - 150 ng/mL 19 473(H) 914(H)     [facility] Txp Virology Latest Ref Rng & Units 1/31/2017 8/29/2011 7/21/2011   CMV IgG EU/mL - - -   CMV IgM <0.90 - - -   CMV IgM Interp <0.90 - - -   CVM DNA Quant - Plasma, EDTA anticoagulant Whole blood, EDTA anticoagulant Whole blood, EDTA anticoagulant   CMV Quant <100 Copies/mL - <100  No CMV DNA detected. <100  No CMV DNA detected.   CMV QT Log <2.0 Log copies/mL - <2.0  The Cytomegalovirus DNA Quantitation assay is a real-time polymerase chain   reaction (PCR) utilizing analyte specific reagents manufactured by [employer]   [facility]. Analyte Specific Reagents (ASRs) are used in many laboratory   tests necessary for standard medical care and generally do not require FDA   approval.    This test was developed and its performance characteristics determined by   [facility].  It has not been   cleared or approved by the US Food and Drug Administration. <2.0  The Cytomegalovirus DNA Quantitation assay is a real-time polymerase chain   reaction (PCR) utilizing analyte specific reagents manufactured by [facility]. Analyte Specific Reagents (ASRs) are used in many laboratory   tests necessary for standard medical care and generally do not require FDA   approval.   This test was developed and its performance characteristics determined by   [facility].  It has not been   cleared or approved by the US Food and Drug Administration.   BK Spec - - - -   BK Res <1000 copies/mL - - -   BK Log <3.0 Log copies/mL - - -   EBV IgG - - - -   Hep B Core NEG - - -   Hep B Surf 0.0 - 4.9 mIU/mL - - -   HIV 1&2 NEG - - -     Scribe Disclosure:   I, Leta Bryan, am serving as a scribe to document services personally performed by Dr. Parker at this visit, based upon the provider's statements to me. All documentation has been reviewed by the aforementioned provider prior to being entered into the official medical record.     Portions of this medical record were completed by a scribe. UPON MY REVIEW AND AUTHENTICATION BY ELECTRONIC SIGNATURE, this confirms (a) I performed the applicable clinical services, and (b) the record is accurate.

## 2019-01-28 ENCOUNTER — TELEPHONE (OUTPATIENT)
Dept: TRANSPLANT | Facility: CLINIC | Age: 29
End: 2019-01-28

## 2019-01-28 DIAGNOSIS — Z94.0 KIDNEY REPLACED BY TRANSPLANT: Primary | ICD-10-CM

## 2019-01-28 NOTE — TELEPHONE ENCOUNTER
ISSUE:   (s/b 75) - appears this may have been less than a 12 hour trough    PLAN:  Call placed to pt. No answer. Left detailed vm asking for return call.

## 2019-01-29 ENCOUNTER — TELEPHONE (OUTPATIENT)
Dept: TRANSPLANT | Facility: CLINIC | Age: 29
End: 2019-01-29

## 2019-01-29 NOTE — TELEPHONE ENCOUNTER
Second call placed to pt. No answer. Left detailed vm asking that she repeat CSA level, emphasizing 12 hour level.   Call placed to home number, this information has also been relayed to Carol's mother, who voices understanding of above. Lab order placed.

## 2019-01-29 NOTE — TELEPHONE ENCOUNTER
Call returned from pt. She states that this was not a 12 hour level on CSA and she plans to obtain accurate CSA level in the near future. Order in place.

## 2019-06-06 DIAGNOSIS — Z30.46 ENCOUNTER FOR SURVEILLANCE OF NEXPLANON SUBDERMAL CONTRACEPTIVE: ICD-10-CM

## 2019-06-06 DIAGNOSIS — Z94.0 KIDNEY TRANSPLANTED: ICD-10-CM

## 2019-06-06 DIAGNOSIS — Z48.298 AFTERCARE FOLLOWING ORGAN TRANSPLANT: ICD-10-CM

## 2019-06-06 LAB
ANION GAP SERPL CALCULATED.3IONS-SCNC: 6 MMOL/L (ref 3–14)
BUN SERPL-MCNC: 18 MG/DL (ref 7–30)
CALCIUM SERPL-MCNC: 9.4 MG/DL (ref 8.5–10.1)
CHLORIDE SERPL-SCNC: 107 MMOL/L (ref 94–109)
CO2 SERPL-SCNC: 27 MMOL/L (ref 20–32)
CREAT SERPL-MCNC: 1.17 MG/DL (ref 0.52–1.04)
CYCLOSPORINE BLD LC/MS/MS-MCNC: 82 UG/L (ref 50–400)
ERYTHROCYTE [DISTWIDTH] IN BLOOD BY AUTOMATED COUNT: 12.6 % (ref 10–15)
GFR SERPL CREATININE-BSD FRML MDRD: 62 ML/MIN/{1.73_M2}
GLUCOSE SERPL-MCNC: 102 MG/DL (ref 70–99)
HCG UR QL: NEGATIVE
HCT VFR BLD AUTO: 44.2 % (ref 35–47)
HGB BLD-MCNC: 14.1 G/DL (ref 11.7–15.7)
MCH RBC QN AUTO: 27.9 PG (ref 26.5–33)
MCHC RBC AUTO-ENTMCNC: 31.9 G/DL (ref 31.5–36.5)
MCV RBC AUTO: 87 FL (ref 78–100)
PLATELET # BLD AUTO: 204 10E9/L (ref 150–450)
POTASSIUM SERPL-SCNC: 4.8 MMOL/L (ref 3.4–5.3)
RBC # BLD AUTO: 5.06 10E12/L (ref 3.8–5.2)
SODIUM SERPL-SCNC: 139 MMOL/L (ref 133–144)
TME LAST DOSE: NORMAL H
WBC # BLD AUTO: 7.7 10E9/L (ref 4–11)

## 2019-08-23 DIAGNOSIS — Z94.0 KIDNEY REPLACED BY TRANSPLANT: ICD-10-CM

## 2019-08-23 RX ORDER — CYCLOSPORINE 25 MG/1
CAPSULE, LIQUID FILLED ORAL
Qty: 720 CAPSULE | Refills: 3 | Status: SHIPPED | OUTPATIENT
Start: 2019-08-23 | End: 2020-08-18

## 2019-08-23 RX ORDER — ATENOLOL 25 MG/1
TABLET ORAL
Qty: 90 TABLET | Refills: 3 | Status: SHIPPED | OUTPATIENT
Start: 2019-08-23 | End: 2019-12-11 | Stop reason: DRUGHIGH

## 2019-11-12 ENCOUNTER — TELEPHONE (OUTPATIENT)
Dept: TRANSPLANT | Facility: CLINIC | Age: 29
End: 2019-11-12

## 2019-12-11 ENCOUNTER — OFFICE VISIT (OUTPATIENT)
Dept: FAMILY MEDICINE | Facility: CLINIC | Age: 29
End: 2019-12-11
Payer: COMMERCIAL

## 2019-12-11 VITALS
HEART RATE: 86 BPM | WEIGHT: 260 LBS | BODY MASS INDEX: 41.78 KG/M2 | DIASTOLIC BLOOD PRESSURE: 89 MMHG | SYSTOLIC BLOOD PRESSURE: 146 MMHG | OXYGEN SATURATION: 98 % | RESPIRATION RATE: 18 BRPM | HEIGHT: 66 IN

## 2019-12-11 DIAGNOSIS — I10 ESSENTIAL HYPERTENSION: ICD-10-CM

## 2019-12-11 DIAGNOSIS — E55.9 VITAMIN D DEFICIENCY: ICD-10-CM

## 2019-12-11 DIAGNOSIS — Z94.0 STATUS POST KIDNEY TRANSPLANT: ICD-10-CM

## 2019-12-11 DIAGNOSIS — R00.2 PALPITATIONS: ICD-10-CM

## 2019-12-11 DIAGNOSIS — Z00.00 ROUTINE HISTORY AND PHYSICAL EXAMINATION OF ADULT: Primary | ICD-10-CM

## 2019-12-11 DIAGNOSIS — Z23 ENCOUNTER FOR IMMUNIZATION: ICD-10-CM

## 2019-12-11 RX ORDER — METOPROLOL SUCCINATE 50 MG/1
50 TABLET, EXTENDED RELEASE ORAL DAILY
Qty: 90 TABLET | Refills: 3 | Status: SHIPPED | OUTPATIENT
Start: 2019-12-11 | End: 2020-01-06

## 2019-12-11 ASSESSMENT — MIFFLIN-ST. JEOR: SCORE: 1911.1

## 2019-12-11 ASSESSMENT — PAIN SCALES - GENERAL: PAINLEVEL: MILD PAIN (3)

## 2019-12-11 NOTE — NURSING NOTE
Chief Complaint   Patient presents with     Physical     pt. is here for a physical and she states that she has been having heart palpatations/racing since october        Agnieszka Eldridge, EMT

## 2019-12-11 NOTE — PATIENT INSTRUCTIONS
Cardiovascular [phone] [place] [place]   Primary Care Center Medication Refill Request Information:  * Please contact your pharmacy regarding ANY request for medication refills.  ** [facility] Prescription Fax = [phone]  * Please allow 3 business days for routine medication refills.  * Please allow 5 business days for controlled substance medication refills.     Primary Care Center Test Result notification information:  *You will be notified with in 7-10 days of your appointment day regarding the results of your test.  If you are on MyChart you will be notified as soon as the provider has reviewed the results and signed off on them.

## 2019-12-11 NOTE — PROGRESS NOTES
[facility]  Primary Care Center   Danny Shi MD  12/11/2019      Chief Complaint:   Physical  Palpitations   Elevated Blood pressure     History of Present Illness:   Carol Menjivar is a 29 year old legally blind female with a history of Senior-Loken syndrome, retinitis pigmentosa, PCOS, and insulin resistance who presents alone for a physical. She has noted palpations and elevated blood pressure     Rapid heart rate: She has noticed an intermittent rapid heart rate everyday since October 2019. She notes head throbbing pain associated with these episodes. She denies feeling anxious in the moment and notes she could simply be watching TV when this happens. She experiences this at least once a day, noting it could be at night or in the morning. If she experiences this sensation at night, she will feel exhausted the following morning. She additionally reports feeling winded after walking up a flight of stairs. She is currently experiencing this rapid heart rate sensation but her heart rater is regular and in normal range. She has been managing her symptoms with Atenolol 25 mg daily since her renal transplant and Tylenol as needed. She denies any arm pain. She did see cardiology two year ago when she was having heart symptoms and had an echocardiogram normal. At that time, her symptoms were stress related.     Other concerns discussed:  1. Pap smear due in 2023  2. Weight stable for the past year after gain around her marriage ( but she knows she needs to lose weight)     Review of Systems:   Pertinent items are noted in HPI or as in patient entered ROS below, remainder of complete ROS is negative.     Active Medications:      atenolol (TENORMIN) 25 MG tablet, TAKE ONE TABLET BY MOUTH EVERY DAY, Disp: 90 tablet, Rfl: 3     MYFORTIC (BRAND) 180 MG EC tablet, Take 3 tablets (540 mg) by mouth 2 times daily, Disp: 540 tablet, Rfl: 3     NEORAL (BRAND) 25 MG capsule, TAKE 4 CAPSULES BY MOUTH 2 TIMES A DAY (DAW1),  "Disp: 720 capsule, Rfl: 3     Prenatal Vit-Fe Fumarate-FA (PRENATAL PO), Take by mouth daily, Disp: , Rfl:      VITAMIN D, CHOLECALCIFEROL, PO, Take 1,000 Units by mouth daily , Disp: , Rfl:      etonogestrel (IMPLANON/NEXPLANON) 68 MG IMPL, 1 each (68 mg) by Subdermal route once for 1 dose, Disp: 1 each, Rfl: 0     etonogestrel (IMPLANON/NEXPLANON) 68 MG IMPL, 1 each (68 mg) by Subdermal route once for 1 dose, Disp: 1 each, Rfl: 0     Lutein-Zeaxanthin (OCUVITE LUTEIN 25 PO), Take 25 mg by mouth daily, Disp: , Rfl:       Allergies:   Patient has no known allergies.      Past Medical History:  CMV disease  Legally blind  PPD positive, treated  Pseudotumor cerebri  Retinitis pigmentosa  Senior-Loken syndrome  Vitamin D deficiency   Obesity  Polycystic ovarian syndrome  Palpitations  Insulin resistance      Past Surgical History:  Lithotripsy   Nexplanon insertion, left arm - 10/29/18 (removal date 10/29/21)  Sinus surgery - 2001  Transplant kidney recipient living related, right - 07/2006    Family History:   Skin cancer, melanoma - paternal grandfather   Parkinsonism - paternal uncle       Social History:   The patient is single, a nonsmoker, and does not consume alcohol.        Physical Exam:   BP (!) 146/89   Pulse 86   Resp 18   Ht 1.676 m (5' 6\")   Wt 117.9 kg (260 lb)   SpO2 98%   Breastfeeding No   BMI 41.97 kg/m     BMI= Body mass index is 41.97 kg/m .    GENERAL APPEARANCE: healthy, alert and no distress. Obese.  EYES: conjunctivae and sclerae normal, pupils are sluggish to light. Exotropia on left eye, right eye follows appropriately looking straight forward.   HENT: Slight scarring in right ear, left ear canal normal and TM normal, nose and mouth without ulcers or lesions, oropharynx clear and oral mucous membranes moist  NECK: no adenopathy, no asymmetry, masses, or scars and thyroid normal to palpation  RESP: lungs clear to auscultation - no rales, rhonchi or wheezes  CV: regular rate and rhythm, " normal S1 S2, no S3 or S4, no murmur, click or rub, no peripheral edema and peripheral pulses strong  BREAST: Bilateral normal tissue, no significant tenderness, no nipple discharge, no axillary adenopathy   ABDOMEN: Truncal obesity with a few stria, Mild hirsutism of abdominal and chin. Well healed scar on right side from transplant with fullness in area of transplant otherwise soft, nontender, no hepatosplenomegaly, no masses and bowel sounds normal   MS: no musculoskeletal defects are noted and gait is age appropriate without ataxia  SKIN: no suspicious lesions or rashes, depigmented patch on back. Hirsutism of chin.  NEURO: Normal strength and tone, mentation intact and speech normal, negative pronator drift  PSYCH: mentation appears normal and affect normal/bright    Imaging  [facility]  Echocardiography Laboratory     Name: KATHRYN TAYLOR  MRN: [id number]  : 1990  Study Date: 2018 10:05 AM  Age: 27 yrs  Gender: Female  [place] [facility] [place]  Reason For Study: , Palpitations  Ordering Physician: ERNESTO CAMACHO  Referring Physician: ERNESTO CAMACHO  Performed By: Bear Ji     BSA: 2.2 m2  Height: 68 in  Weight: 236 lb  HR: 72  BP: 131/89 mmHg  _____________________________________________________________________________        Procedure  Echocardiogram with two-dimensional, color and spectral Doppler performed.  _____________________________________________________________________________        Interpretation Summary  Left ventricular function, chamber size, wall motion, and wall thickness are  normal.The EF is 55-60%.  The right ventricle is normal size. Global right ventricular function is  normal.  Both atria appear normal.  No major valvular pathology.  Previous study not available for comparison.  _____________________________________________________________________________     Left Ventricle  Left ventricular function, chamber size, wall  motion, and wall thickness are  normal.The EF is 55-60%. Left ventricular diastolic function is normal.     Right Ventricle  The right ventricle is normal size. Global right ventricular function is  normal.     Atria  Both atria appear normal. The atrial septum is intact as assessed by color  Doppler .     Mitral Valve  The mitral valve is normal. Trace mitral insufficiency is present.        Aortic Valve  Aortic valve is normal in structure and function. The aortic valve is  tricuspid.     Tricuspid Valve  The tricuspid valve is normal. Trace tricuspid insufficiency is present.  Pulmonary artery systolic pressure cannot be assessed. The peak velocity of  the tricuspid regurgitant jet is not obtainable.     Pulmonic Valve  The pulmonic valve is normal. Trace pulmonic insufficiency is present.     Vessels  The aorta root is normal. The thoracic aorta is normal. The pulmonary artery  is normal. The inferior vena cava was normal in size with preserved  respiratory variability. Estimated mean right atrial pressure is 3 mmHg.     Pericardium  No pericardial effusion is present.        Compared to Previous Study  Previous study not available for comparison.     Attestation  I have personally viewed the imaging and agree with the interpretation and  report as documented by the fellow, Terence Villarreal, and/or edited by me.     Report approved by: Chloé Womack 04/11/2018 12:43 PM     Assessment and Plan:  Carol Menjivar is a 29 year old legally blind female with a history of Senior-Loken syndrome, retinitis pigmentosa, PCOS, and insulin resistance who presents alone for a physical. She has noted palpations and elevated blood pressure.   Palpitations, Essential hypertension  She has noticed an intermittent rapid heart rate for the past few months with associated head pain. She will switch from Atenolol  to Metoprolol for her blood pressure management as BP is slightly elevated today. Referral made to cardiology for  further evaluation. She will plan to follow-up with her blood pressure at her visit with cardiology. Advised her to go to the ED if she experiences symptoms such as chest pain, nausea, sweating, or weakness.   - metoprolol succinate ER (TOPROL-XL) 50 MG 24 hr tablet  Dispense: 90 tablet; Refill: 3  - CBC with platelets differential  - TSH with free T4 reflex  - CARDIOLOGY EVAL ADULT REFERRAL  - Comprehensive metabolic panel  - Lipid panel reflex to direct LDL Fasting    Encounter for immunization  Due to renal transplant  - Pneumococcal vaccine 23 valent PPSV23  (Pneumovax) [[id number]]  - ADMIN MEDICARE: Pneumococcal Vaccine ()    Status post kidney transplant  She has a follow-up appointment with her new kidney transplant doctor in February 2020. Future fasting routine labs as ordered below as she missed her lab draw in September  - Comprehensive metabolic panel  - Cyclosporine  - Protein  random urine with Creat Ratio  - Parathyroid Hormone Intact  - Vitamin D Deficiency    Vitamin D deficiency  - Vitamin D Deficiency    Follow-up: 6 months     All questions were addressed and the patient voiced understanding and agreement with the above.      Scribe Disclosure:  I, Ashley Ray, am serving as a scribe to document services personally performed by Danny Shi MD at this visit, based upon the provider's statements to me. All documentation has been reviewed by the aforementioned provider prior to being entered into the official medical record.     Portions of this medical record were completed by a scribe. UPON MY REVIEW AND AUTHENTICATION BY ELECTRONIC SIGNATURE, this confirms (a) I performed the applicable clinical services, and (b) the record is accurate.   Danny Shi MD

## 2019-12-12 NOTE — NURSING NOTE
Carol Menjivar comes into clinic today at the request of Jose Guadalupe, Ordering Provider for an immunization/s.    I gave the Pneumococcal 23 immunization/s while the patient was in clinic. They received an informational sheet and I explained the common side effects of the injection. The patient tolerated the procedure well and had no complications while here in clinic.     This service provided today was under the supervising provider of the [facility], who was available if needed.    Chente Cabrera CMA, EMT at 7:51 PM on 12/11/2019.

## 2019-12-13 ENCOUNTER — TELEPHONE (OUTPATIENT)
Dept: TRANSPLANT | Facility: CLINIC | Age: 29
End: 2019-12-13

## 2019-12-13 ENCOUNTER — TELEPHONE (OUTPATIENT)
Dept: FAMILY MEDICINE | Facility: CLINIC | Age: 29
End: 2019-12-13

## 2019-12-13 DIAGNOSIS — Z79.899 ENCOUNTER FOR LONG-TERM CURRENT USE OF MEDICATION: ICD-10-CM

## 2019-12-13 DIAGNOSIS — E55.9 VITAMIN D DEFICIENCY: ICD-10-CM

## 2019-12-13 DIAGNOSIS — R00.2 PALPITATIONS: ICD-10-CM

## 2019-12-13 DIAGNOSIS — Z48.298 AFTERCARE FOLLOWING ORGAN TRANSPLANT: Primary | ICD-10-CM

## 2019-12-13 DIAGNOSIS — Z94.0 STATUS POST KIDNEY TRANSPLANT: ICD-10-CM

## 2019-12-13 DIAGNOSIS — I10 ESSENTIAL HYPERTENSION: ICD-10-CM

## 2019-12-13 DIAGNOSIS — Z94.0 KIDNEY REPLACED BY TRANSPLANT: ICD-10-CM

## 2019-12-13 LAB
ALBUMIN SERPL-MCNC: 3.5 G/DL (ref 3.4–5)
ALP SERPL-CCNC: 92 U/L (ref 40–150)
ALT SERPL W P-5'-P-CCNC: 51 U/L (ref 0–50)
ANION GAP SERPL CALCULATED.3IONS-SCNC: 5 MMOL/L (ref 3–14)
AST SERPL W P-5'-P-CCNC: 21 U/L (ref 0–45)
BASOPHILS # BLD AUTO: 0.1 10E9/L (ref 0–0.2)
BASOPHILS NFR BLD AUTO: 0.7 %
BILIRUB SERPL-MCNC: 0.6 MG/DL (ref 0.2–1.3)
BUN SERPL-MCNC: 18 MG/DL (ref 7–30)
CALCIUM SERPL-MCNC: 9 MG/DL (ref 8.5–10.1)
CHLORIDE SERPL-SCNC: 108 MMOL/L (ref 94–109)
CHOLEST SERPL-MCNC: 186 MG/DL
CO2 SERPL-SCNC: 25 MMOL/L (ref 20–32)
CREAT SERPL-MCNC: 1.31 MG/DL (ref 0.52–1.04)
CREAT UR-MCNC: 79 MG/DL
CYCLOSPORINE BLD LC/MS/MS-MCNC: 86 UG/L (ref 50–400)
DEPRECATED CALCIDIOL+CALCIFEROL SERPL-MC: 26 UG/L (ref 20–75)
DIFFERENTIAL METHOD BLD: NORMAL
EOSINOPHIL # BLD AUTO: 0.1 10E9/L (ref 0–0.7)
EOSINOPHIL NFR BLD AUTO: 1.6 %
ERYTHROCYTE [DISTWIDTH] IN BLOOD BY AUTOMATED COUNT: 13 % (ref 10–15)
GFR SERPL CREATININE-BSD FRML MDRD: 54 ML/MIN/{1.73_M2}
GLUCOSE SERPL-MCNC: 97 MG/DL (ref 70–99)
HCT VFR BLD AUTO: 40.9 % (ref 35–47)
HDLC SERPL-MCNC: 42 MG/DL
HGB BLD-MCNC: 13.3 G/DL (ref 11.7–15.7)
IMM GRANULOCYTES # BLD: 0 10E9/L (ref 0–0.4)
IMM GRANULOCYTES NFR BLD: 0.3 %
LDLC SERPL CALC-MCNC: 98 MG/DL
LYMPHOCYTES # BLD AUTO: 3.4 10E9/L (ref 0.8–5.3)
LYMPHOCYTES NFR BLD AUTO: 46 %
MCH RBC QN AUTO: 28.2 PG (ref 26.5–33)
MCHC RBC AUTO-ENTMCNC: 32.5 G/DL (ref 31.5–36.5)
MCV RBC AUTO: 87 FL (ref 78–100)
MONOCYTES # BLD AUTO: 0.5 10E9/L (ref 0–1.3)
MONOCYTES NFR BLD AUTO: 7 %
NEUTROPHILS # BLD AUTO: 3.3 10E9/L (ref 1.6–8.3)
NEUTROPHILS NFR BLD AUTO: 44.4 %
NONHDLC SERPL-MCNC: 144 MG/DL
NRBC # BLD AUTO: 0 10*3/UL
NRBC BLD AUTO-RTO: 0 /100
PLATELET # BLD AUTO: 210 10E9/L (ref 150–450)
POTASSIUM SERPL-SCNC: 4.4 MMOL/L (ref 3.4–5.3)
PROT SERPL-MCNC: 7.4 G/DL (ref 6.8–8.8)
PROT UR-MCNC: 0.3 G/L
PROT/CREAT 24H UR: 0.38 G/G CR (ref 0–0.2)
PTH-INTACT SERPL-MCNC: 36 PG/ML (ref 18–80)
RBC # BLD AUTO: 4.72 10E12/L (ref 3.8–5.2)
SODIUM SERPL-SCNC: 138 MMOL/L (ref 133–144)
TME LAST DOSE: 1930 H
TRIGL SERPL-MCNC: 228 MG/DL
TSH SERPL DL<=0.005 MIU/L-ACNC: 2.91 MU/L (ref 0.4–4)
WBC # BLD AUTO: 7.5 10E9/L (ref 4–11)

## 2019-12-13 NOTE — LETTER
PHYSICIAN ORDERS      DATE & TIME ISSUED: 2019 12:28 PM  PATIENT NAME: Carol Menjivar   : 1990     [facility] MR# [if applicable]: [id number]     DIAGNOSIS:  Kidney transplant   ICD-10 CODE: Z94.0      Please complete the following lab in 1-2 weeks  BMP    Any questions please call: [phone] option 5    Please fax results to [phone].    .

## 2019-12-13 NOTE — TELEPHONE ENCOUNTER
ISSUE:  Cr 1.3 (baseline ~ 1.0)  Cyclosporine level 86- within goal range  Lab orders need to be updated    PLAN:  Pt recently seen by PCP with concerns of heart palpitations, rise in Cr may be cardiac related  Call pt and check if she is dehydrated? Any recent illness? New or missed meds?  Recommend repeating labs in 1-2 weeks    OUTCOME:  Call placed to patient. LVM requesting return call to transplant office to discuss lab results.     LPN TASK:   Update standing lab orders (> 5 years post kidney tx, on CSA, change MD to Dr Parker)  Place orders for repeat BMP

## 2019-12-13 NOTE — TELEPHONE ENCOUNTER
December 13, 2019  Please call her to review labs.  Results for KATHRYN TAYLOR (MRN [id number]) as of 12/13/2019 14:30   Ref. Range 12/13/2019 07:01 12/13/2019 07:08   Sodium Latest Ref Range: 133 - 144 mmol/L 138    Potassium Latest Ref Range: 3.4 - 5.3 mmol/L 4.4    Chloride Latest Ref Range: 94 - 109 mmol/L 108    Carbon Dioxide Latest Ref Range: 20 - 32 mmol/L 25    Urea Nitrogen Latest Ref Range: 7 - 30 mg/dL 18    Creatinine Latest Ref Range: 0.52 - 1.04 mg/dL 1.31 (H)    GFR Estimate Latest Ref Range: >60 mL/min/1.73_m2 54 (L)    GFR Estimate If Black Latest Ref Range: >60 mL/min/1.73_m2 63    Calcium Latest Ref Range: 8.5 - 10.1 mg/dL 9.0    Anion Gap Latest Ref Range: 3 - 14 mmol/L 5    Albumin Latest Ref Range: 3.4 - 5.0 g/dL 3.5    Protein Total Latest Ref Range: 6.8 - 8.8 g/dL 7.4    Bilirubin Total Latest Ref Range: 0.2 - 1.3 mg/dL 0.6    Alkaline Phosphatase Latest Ref Range: 40 - 150 U/L 92    ALT Latest Ref Range: 0 - 50 U/L 51 (H)    AST Latest Ref Range: 0 - 45 U/L 21    Cholesterol Latest Ref Range: <200 mg/dL 186    Creatinine Urine Latest Units: mg/dL  79   HDL Cholesterol Latest Ref Range: >49 mg/dL 42 (L)    LDL Cholesterol Calculated Latest Ref Range: <100 mg/dL 98    Non HDL Cholesterol Latest Ref Range: <130 mg/dL 144 (H)    Protein Random Urine Latest Units: g/L  0.30   Protein Total Urine g/gr Creatinine Latest Ref Range: 0 - 0.2 g/g Cr  0.38 (H)   Triglycerides Latest Ref Range: <150 mg/dL 228 (H)      Her creatinine has increased therefore we need to keep her blood pressure under better control.   Please call her pharmacy to stop previous Atenolol and make sure they received the Metoprolol order.   Please make a follow-up appointment with me within 2-4 weeks after starting the metoprolol.  Need to inform her renal transplant team as I think she has a change of providers and would recommend earlier appt.  Best wishes,  Danny Shi MD

## 2019-12-13 NOTE — TELEPHONE ENCOUNTER
Patient Call: Voicemail  Date/Time: Dec 13, 2019 11:42:46 AM  Reason for call: Pt called regarding her labs

## 2019-12-13 NOTE — TELEPHONE ENCOUNTER
Patient was reached by phone, and an appt was made for her to follow up with Dr. Shi on 1/6/20.  Pharmacy was called and message was relayed to update med list with metoprolol, and discontinue atenolol.    Lupe Livingston RN on 12/13/2019 at 3:59 PM

## 2019-12-13 NOTE — TELEPHONE ENCOUNTER
Call returned to patient. Patient state that her PCP stopped her atenolol; began her on metoprolol; and instructed her to schedule an appointment with a cardiologist. Patient note that she was dehydrated d\t traveling over the holiday w\e. Patient v\u to improve hydration and repeat labs in 1-2 weeks. Order sent

## 2019-12-16 ENCOUNTER — TRANSFERRED RECORDS (OUTPATIENT)
Dept: HEALTH INFORMATION MANAGEMENT | Facility: CLINIC | Age: 29
End: 2019-12-16

## 2019-12-16 NOTE — TELEPHONE ENCOUNTER
RECORDS RECEIVED FROM:   DATE RECEIVED:   NOTES STATUS DETAILS   OFFICE NOTE from referring provider    N/A    OFFICE NOTE from other cardiologist    Internal Saw Dr. Lopez 3-28-18   DISCHARGE SUMMARY from hospital    N/A    DISCHARGE REPORT from the ER   N/A    OPERATIVE REPORT    N/A    MEDICATION LIST   Internal    LABS     BMP   Internal 6-6-19   CBC   Internal 12-13-19   CMP   Internal 12-13-19   Lipids   Internal 12-13-19   TSH   Internal 12-13-19   DIAGNOSTIC PROCEDURES     EKG   Internal 3-28-18   Monitor Reports   N/A    IMAGING (DISC & REPORT)      Echo   Internal 4-11-18   Stress Tests   N/A    Cath   N/A    MRI/MRA   N/A    CT/CTA   N/A

## 2020-01-06 ENCOUNTER — OFFICE VISIT (OUTPATIENT)
Dept: FAMILY MEDICINE | Facility: CLINIC | Age: 30
End: 2020-01-06
Payer: COMMERCIAL

## 2020-01-06 VITALS
HEART RATE: 88 BPM | BODY MASS INDEX: 43.79 KG/M2 | DIASTOLIC BLOOD PRESSURE: 91 MMHG | SYSTOLIC BLOOD PRESSURE: 132 MMHG | WEIGHT: 271.3 LBS | OXYGEN SATURATION: 98 %

## 2020-01-06 DIAGNOSIS — R00.2 PALPITATIONS: ICD-10-CM

## 2020-01-06 DIAGNOSIS — I10 ESSENTIAL HYPERTENSION: ICD-10-CM

## 2020-01-06 DIAGNOSIS — E28.2 PCOS (POLYCYSTIC OVARIAN SYNDROME): ICD-10-CM

## 2020-01-06 DIAGNOSIS — Z94.0 STATUS POST KIDNEY TRANSPLANT: ICD-10-CM

## 2020-01-06 DIAGNOSIS — H35.52 RETINITIS PIGMENTOSA: ICD-10-CM

## 2020-01-06 DIAGNOSIS — Z94.0 S/P KIDNEY TRANSPLANT: ICD-10-CM

## 2020-01-06 DIAGNOSIS — I10 ESSENTIAL HYPERTENSION: Primary | ICD-10-CM

## 2020-01-06 LAB
ANION GAP SERPL CALCULATED.3IONS-SCNC: 4 MMOL/L (ref 3–14)
BUN SERPL-MCNC: 17 MG/DL (ref 7–30)
CALCIUM SERPL-MCNC: 9.4 MG/DL (ref 8.5–10.1)
CHLORIDE SERPL-SCNC: 108 MMOL/L (ref 94–109)
CO2 SERPL-SCNC: 27 MMOL/L (ref 20–32)
CREAT SERPL-MCNC: 1.14 MG/DL (ref 0.52–1.04)
GFR SERPL CREATININE-BSD FRML MDRD: 64 ML/MIN/{1.73_M2}
GLUCOSE SERPL-MCNC: 99 MG/DL (ref 70–99)
POTASSIUM SERPL-SCNC: 4.1 MMOL/L (ref 3.4–5.3)
SODIUM SERPL-SCNC: 139 MMOL/L (ref 133–144)

## 2020-01-06 RX ORDER — METOPROLOL SUCCINATE 50 MG/1
100 TABLET, EXTENDED RELEASE ORAL DAILY
Qty: 90 TABLET | Refills: 3 | COMMUNITY
Start: 2020-01-06 | End: 2020-02-04

## 2020-01-06 ASSESSMENT — PAIN SCALES - GENERAL: PAINLEVEL: NO PAIN (0)

## 2020-01-06 NOTE — PROGRESS NOTES
"[facility]  Primary Care Center   Danny Shi MD  01/06/2020      Chief Complaint:   Hypertension  Dyspnea with exertion    History of Present Illness:   Carol Guillaume is a 29 year old female with a history of Senior-Loken syndrome s/p kidney transplant, pseudotumor cerebri, retinitis pigmentosa, and polycystic ovarian syndrome (PCOS) who presents for follow up of hypertension and dyspnea with exertion    Palpitations and chest pain:  She switched to metoprolol succinate and feels her cardiac symptoms are slightly improved, but she still has \"random, sharp\" chest pain and dyspnea with exertion. She denies heart racing or palpitations in the last two weeks, but is still winded going up stairs and still feels exhausted and fatigued. She will be seeing cardiology on 1/17. She does not have a blood pressure cuff at home so has not been checking at home. Her blood pressure in clinic today is 156/88 and pulse is 111. Recheck at the end of the visit: 132/91 with pulse of 88. She reports she is not anxious this morning and was not in any hurry getting to the clinic. Of note, her weight is up 11 pounds since our visit on 12/11 and she is not sure if this could be due to the holidays. She does not think she has taken lisinopril before but is contemplating pregnancy in the next few years.  She currently has Nexplanon and needs to discuss medications for renal transplant change prior to considering conception.  Of note her blood pressure has increased since change of renal med to Myfortic. She denies leg edema.    Senior-Loken syndrome s/p kidney transplant:  She last saw nephrology in January 2019 and at that time switched from Cellcept trade name to generic (Myfortic) due to side effects. She felt improved on the switch but wondering if this could contribute to her increased blood pressure.  She anticipates trying to get pregnant in the next year, so is planning on discussing this at her next nephrology visit in a month " to switch her medications appropriately. She does not currently have an OB/Gyn. She is still taking prenatal vitamins daily.    Lipid labs:  Discussed that her triglyceride levels were high (228) when checked on 12/13. Encouraged her to try DASH diet. She is agreeable to meet with a dietician at this time and is comfortable working on lifestyle changes.     Review of Systems:   Pertinent items are noted in HPI or as in patient entered ROS below, remainder of complete ROS is negative.     Active Medications:      etonogestrel (IMPLANON/NEXPLANON) 68 MG IMPL, 1 each (68 mg) by Subdermal route once for 1 dose, Disp: 1 each, Rfl: 0     Lutein-Zeaxanthin (OCUVITE LUTEIN 25 PO), Take 25 mg by mouth daily, Disp: , Rfl:      metoprolol succinate ER (TOPROL-XL) 50 MG 24 hr tablet, Take 1 tablet (50 mg) by mouth daily, Disp: 90 tablet, Rfl: 3     MYFORTIC (BRAND) 180 MG EC tablet, Take 3 tablets (540 mg) by mouth 2 times daily, Disp: 540 tablet, Rfl: 3     NEORAL (BRAND) 25 MG capsule, TAKE 4 CAPSULES BY MOUTH 2 TIMES A DAY (DAW1), Disp: 720 capsule, Rfl: 3     Prenatal Vit-Fe Fumarate-FA (PRENATAL PO), Take by mouth daily, Disp: , Rfl:      VITAMIN D, CHOLECALCIFEROL, PO, Take 1,000 Units by mouth daily , Disp: , Rfl:       Allergies:   Patient has no known allergies.      Past Medical History:  Cataract  CMV viremia  Legally blind  PPD positive, treated  Pseudotumor cerebri  Retinitis pigmentosa  Senior-Loken syndrome  Vitamin D deficiency  Obesity  Insulin resistance  PCOS     Past Surgical History:  Cataract IOL, bilateral - 06/2017  Lithotripsy  Nexplanon insertion - 10/29/2018  Sinus surgery - 2001  Transplant kidney recipient living unrelated, right - 07/2006    Family History:   Skin cancer - paternal grandfather (melanoma)  Parkinsonism - paternal uncle     Social History:   Carol is , a nonsmoker, and does not consume alcohol. 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  Was  end of November 2018. She is blind.     Physical Exam:   BP (!) 132/91 (BP Location: Right arm, Patient Position: Sitting, Cuff Size: Adult Large)   Pulse 88   Wt 123.1 kg (271 lb 4.8 oz)   SpO2 98%   BMI 43.79 kg/m     GENERAL APPEARANCE: healthy, alert and no distress. Obese.  EYES: conjunctivae and sclerae normalt. Exotropia on left eye, right eye follows appropriately looking straight forward. Legally blind.  RESP: lungs clear to auscultation - no rales, rhonchi or wheezes  CV: regular rate and rhythm, normal S1 S2, no S3 or S4, no murmur, click or rub, no peripheral edema and peripheral pulses strong  MS: no musculoskeletal defects are noted and gait is age appropriate without ataxia  SKIN: no suspicious lesions or rashes, depigmented patch on back. Hirsutism of chin.  NEURO: Normal strength and tone, mentation intact and speech normal  PSYCH: mentation appears normal and affect normal/bright   PHQ-2 Score:     PHQ-2 ( 1999 [employer]) 1/6/2020 12/11/2019   Q1: Little interest or pleasure in doing things 0 0   Q2: Feeling down, depressed or hopeless 0 0   PHQ-2 Score 0 0   Results for KATHRYN PEÑALOZA (MRN [id number]) as of 1/6/2020 11:52   Ref. Range 1/6/2020 07:46   Sodium Latest Ref Range: 133 - 144 mmol/L 139   Potassium Latest Ref Range: 3.4 - 5.3 mmol/L 4.1   Chloride Latest Ref Range: 94 - 109 mmol/L 108   Carbon Dioxide Latest Ref Range: 20 - 32 mmol/L 27   Urea Nitrogen Latest Ref Range: 7 - 30 mg/dL 17   Creatinine Latest Ref Range: 0.52 - 1.04 mg/dL 1.14 (H)   GFR Estimate Latest Ref Range: >60 mL/min/1.73_m2 64   GFR Estimate If Black Latest Ref Range: >60 mL/min/1.73_m2 74   Calcium Latest Ref Range: 8.5 - 10.1 mg/dL 9.4   Anion Gap Latest Ref Range: 3 - 14 mmol/L 4   Glucose Latest Ref Range: 70 - 99 mg/dL 99       Assessment and Plan:  Kathryn Menjivar is a 29 year old female with a history of Senior-Loken syndrome s/p kidney transplant, pseudotumor cerebri,  retinitis pigmentosa, and polycystic ovarian syndrome (PCOS) who presents for follow up of hypertension and chest pain.    Essential hypertension  Her blood pressure in clinic today is still elevated at 156/88 and pulse is 111. Provided DME order for blood pressure cuff, discussed goal is under 140/90, but ideally under 130/80. Discussed trial of ACE inhibitor, but given she is considering pregnancy in the next year, will hold off.  Indicated she should mention pregnancy interest to cardiology when she meets with them this month. Will increase dose of metoprolol succinate to 100 mg, once daily. She can take two tablets of 50 mg until her supply runs out, then can fill 100 mg tablets. Encouraged her to work on lifestyle changes including lowering sodium, fats, and carbohydrates in her diet. Provided referral to nutrition for help with these changes.  - order for DME  Dispense: 1 Device; Refill: 0  - metoprolol succinate ER (TOPROL-XL) 50 MG 24 hr tablet  Dispense: 90 tablet; Refill: 3  - Basic metabolic panel  - NUTRITION REFERRAL    Status post kidney transplant  Noted that her anti-rejection medication, Myfortic, has side effects of racing heart and elevated blood pressure. Will recheck creatinine. She intends to follow-up with nephrology in a month and will discuss her interest in pregnancy with them.  - Basic metabolic panel  Labs returned after visit indicating creatinine is better therefore improvement with BP control  Palpitations  She is no longer having palpitations or racing heart, but still reports fleeting chest pain and shortness of breath with exertion. She is meeting with cardiology on 1/17 for further evaluation.   - metoprolol succinate ER (TOPROL-XL) 50 MG 24 hr tablet  Dispense: 90 tablet; Refill: 3    Retinitis pigmentosa  She is legally blind due to retinitis pigmentosa. No current concerns.    PCOS (polycystic ovarian syndrome)  Encouraged her to continue taking prenatal vitamin daily when  trying to conceive, and she will need her Nexplanon implant removed when she is ready to start this process. She has a history of PCOS. Recommended that she follow her pregnancy with a high-risk OB.    Follow-up: Return in about 2 months (around 3/6/2020).    She will have renal and cardiology evaluation in the interim.  Scribe Disclosure:  I, Mirlande Campos, am serving as a scribe to document services personally performed by Danny Shi MD at this visit, based upon the provider's statements to me. All documentation has been reviewed by the aforementioned provider prior to being entered into the official medical record.     Portions of this medical record were completed by a scribe. UPON MY REVIEW AND AUTHENTICATION BY ELECTRONIC SIGNATURE, this confirms (a) I performed the applicable clinical services, and (b) the record is accurate.   Danny Shi MD

## 2020-01-17 ENCOUNTER — PRE VISIT (OUTPATIENT)
Dept: CARDIOLOGY | Facility: CLINIC | Age: 30
End: 2020-01-17

## 2020-01-17 ENCOUNTER — OFFICE VISIT (OUTPATIENT)
Dept: CARDIOLOGY | Facility: CLINIC | Age: 30
End: 2020-01-17
Attending: FAMILY MEDICINE
Payer: COMMERCIAL

## 2020-01-17 ENCOUNTER — ANCILLARY PROCEDURE (OUTPATIENT)
Dept: CARDIOLOGY | Facility: CLINIC | Age: 30
End: 2020-01-17
Attending: INTERNAL MEDICINE
Payer: COMMERCIAL

## 2020-01-17 VITALS
OXYGEN SATURATION: 80 % | SYSTOLIC BLOOD PRESSURE: 152 MMHG | HEIGHT: 66 IN | BODY MASS INDEX: 43.55 KG/M2 | HEART RATE: 96 BPM | DIASTOLIC BLOOD PRESSURE: 98 MMHG | WEIGHT: 271 LBS

## 2020-01-17 DIAGNOSIS — I15.1 HYPERTENSION SECONDARY TO OTHER RENAL DISORDERS: ICD-10-CM

## 2020-01-17 DIAGNOSIS — H35.50 SENIOR-LOKEN SYNDROME: ICD-10-CM

## 2020-01-17 DIAGNOSIS — R00.2 PALPITATIONS: ICD-10-CM

## 2020-01-17 DIAGNOSIS — E78.5 DYSLIPIDEMIA: ICD-10-CM

## 2020-01-17 DIAGNOSIS — R06.09 DYSPNEA ON EXERTION: ICD-10-CM

## 2020-01-17 DIAGNOSIS — Q61.5 SENIOR-LOKEN SYNDROME: ICD-10-CM

## 2020-01-17 DIAGNOSIS — R06.09 DYSPNEA ON EXERTION: Primary | ICD-10-CM

## 2020-01-17 DIAGNOSIS — Q87.89 SENIOR-LOKEN SYNDROME: ICD-10-CM

## 2020-01-17 PROCEDURE — G0463 HOSPITAL OUTPT CLINIC VISIT: HCPCS | Mod: 25,ZF

## 2020-01-17 PROCEDURE — 99204 OFFICE O/P NEW MOD 45 MIN: CPT | Mod: ZP | Performed by: INTERNAL MEDICINE

## 2020-01-17 PROCEDURE — 93010 ELECTROCARDIOGRAM REPORT: CPT | Mod: ZP | Performed by: INTERNAL MEDICINE

## 2020-01-17 PROCEDURE — 93005 ELECTROCARDIOGRAM TRACING: CPT | Mod: ZF

## 2020-01-17 RX ADMIN — Medication 5 ML: at 14:30

## 2020-01-17 ASSESSMENT — PAIN SCALES - GENERAL: PAINLEVEL: NO PAIN (0)

## 2020-01-17 ASSESSMENT — MIFFLIN-ST. JEOR: SCORE: 1961

## 2020-01-17 NOTE — PATIENT INSTRUCTIONS
"You were seen today in the Cardiovascular Clinic at the [facility].     Cardiology Providers you saw during your visit: Dr. Marium Urrutia     Diagnosis:   Encounter Diagnoses   Name Primary?     Palpitations      Dyspnea on exertion Yes     Senior-Loken syndrome      Hypertension secondary to other renal disorders         Results: Discussed with you today EKG, echo      Orders:   Orders Placed This Encounter   Procedures     Follow-Up with Cardiologist     EKG 12-lead, tracing only (Same Day)     Echocardiogram Complete       Current Medication List  Current Outpatient Medications   Medication Sig Dispense Refill     Lutein-Zeaxanthin (OCUVITE LUTEIN 25 PO) Take 25 mg by mouth daily       metoprolol succinate ER (TOPROL-XL) 50 MG 24 hr tablet Take 2 tablets (100 mg) by mouth daily 90 tablet 3     MYFORTIC (BRAND) 180 MG EC tablet Take 3 tablets (540 mg) by mouth 2 times daily 540 tablet 3     NEORAL (BRAND) 25 MG capsule TAKE 4 CAPSULES BY MOUTH 2 TIMES A DAY (DAW1) 720 capsule 3     order for DME Equipment being ordered: Digital home blood pressure monitor kit with pulse indicator adult cuff 1 Device 0     Prenatal Vit-Fe Fumarate-FA (PRENATAL PO) Take by mouth daily       VITAMIN D, CHOLECALCIFEROL, PO Take 1,000 Units by mouth daily        etonogestrel (IMPLANON/NEXPLANON) 68 MG IMPL 1 each (68 mg) by Subdermal route once for 1 dose 1 each 0       Recommendations:   1. Keep up the exercise routine. Try to do it at least 5 days/week.  2. DASH diet.  3. Schedule an echo at your convenience.  4. Follow up with Dr. Marium Urrutia in 3 months.      Please feel free to call me with any questions or concerns.       Jatinder Sorenson LPN     Questions: [phone].   First press #1 for thesweetlink and then press #4 for \"Medical Questions\" to reach us Cardiology Nurses.     [phone].   First press #1 for the [place] and then press #1     On Call Cardiologist for after hours or on weekends: " [phone]   option #4 and ask to speak to the on-call Cardiologist.          If you need a medication refill please contact your pharmacy.  Please allow 3 business days for your refill to be completed.  ________________________________________________________________________________________________________________________________    Dietary Approaches to Stop Hypertension (The DASH Diet)   What is hypertension?   Hypertension is the term for blood pressure that is consistently higher than normal. Blood pressure is the force of blood against artery walls. Blood pressure can be unhealthy if it is above 120/80. The higher your blood pressure, the greater the health risk.     High blood pressure can be controlled if you take these steps:   Maintain a healthy weight.   Be physically active.   Follow a healthy eating plan, which includes foods lower in salt and sodium.   If you drink alcoholic beverages, do so in moderation.   As noted in this list, diet affects high blood pressure. Following the DASH diet and reducing the amount of sodium in your diet will help lower your blood pressure. It will also help prevent high blood pressure.     What is the DASH diet?   Dietary Approaches to Stop Hypertension (DASH) is a diet that is low in saturated fat, cholesterol, and total fat. It emphasizes fruits, vegetables, and low-fat dairy foods. The DASH diet also includes whole-grain products, fish, poultry, and nuts. It encourages fewer servings of red meat, sweets, and sugar-containing beverages. It is rich in magnesium, potassium, and calcium, as well as protein and fiber.     How do I get started on the DASH diet?   The DASH diet requires no special foods and has no hard-to-follow recipes. Start by seeing how DASH compares with your current eating habits.  The DASH eating plan shown is based on 2,000 calories a day. Your health care provider or a dietitian can help you determine how many calories a day you need. Most adults  need somewhere between 1600 and 2800 calories a day. Serving sizes will vary between 1/2 cup and 1 1/4 cups.     Check the product's nutrition label to determine serving sizes of particular products.    Food Group   Number of Servings   Examples of Serving Size    Grains and grain products   7 to 8   1 slice of bread    1 cup ready-to-eat cold cereal    1/2 cup cooked rice, pasta, or   cereal    Vegetables   4 to 5   1 cup raw leafy vegetable    1/2 cup cooked vegetable    6 oz. vegetable juice    Fruits   4 to 5   1 medium fruit       1/4 cup dried fruit    1/2 cup fresh, frozen, or canned fruit    6 oz fruit juice    Low-fat or fat-free dairy foods   2 to 3   8 oz milk    1 cup yogurt    1 1/2 oz cheese    Lean meats, poultry, or fish   2 or fewer   3 oz cooked lean meat, skinless poultry, or fish    Nuts, seeds, and dry beans   4 to 5 per week   1/3 cup or 1 1/2 oz nuts    1 tablespoon or 1/2 oz seeds    1/2 cup cooked dry beans     Fats and oils   2 to 3   1 teaspoon soft margarine    1 tablespoon low-fat mayonnaise    2 tablespoons light salad dressing    1 teaspoon vegetable oil   Sweets   5 per week   1 tablespoon sugar              8 oz lemonade    1 tablespoon jelly or jam     1/2 oz jelly beans     Make changes gradually. Here are some suggestions that might help:     If you now eat 1 or 2 servings of vegetables a day, add a serving at lunch and another at dinner.   If you don't eat fruit now or have only juice at breakfast, add a serving to your meals or have it as a snack.   Drink milk or water with lunch or dinner instead of soda, sugar-sweetened tea, or alcohol. Choose low-fat (1%) or fat-free (skim) dairy products to reduce how much saturated fat, total fat, cholesterol, and calories you eat. If you have trouble digesting dairy products, try taking lactase enzyme pills or drops (available at drugstores and groceries) with the dairy foods. Or buy lactose-free milk or milk with lactase enzyme added to  it.   Read food labels on margarines and salad dressings to choose products lowest in fat.   If you now eat large portions of meat, cut back gradually--by a half or a third at each meal. Limit meat to 6 ounces a day (2 servings). Three to four ounces is about the size of a deck of cards.   Have 2 or more vegetarian-style (meatless) meals each week. Increase servings of vegetables, rice, pasta, and beans in all meals. Try casseroles and pasta, and stir-balderrama dishes, which have less meat and more vegetables, grains, and beans.   Use fruits canned in their own juice. Fresh fruits require little or no preparation. Dried fruits are a good choice to carry with you or to have ready in the car.   Try these snacks ideas: unsalted pretzels or nuts mixed with raisins, bethany crackers, low-fat and fat-free yogurt and frozen yogurt, popcorn with no salt or butter added, and raw vegetables.   Choose whole grain foods to get more nutrients, including minerals and fiber. For example, choose whole-wheat bread or whole-grain cereals.   Use fresh, frozen, or no-salt-added canned vegetables.     Remember to also reduce the salt and sodium in your diet. Try to have no more than 2000 milligrams (mg) of sodium per day, with a goal of further reducing the sodium to 1500 mg per day. Three important ways to reduce sodium are:     Use reduced-sodium or no-salt-added food products.   Use less salt when you prepare foods and do not add salt to your food at the table.   Read fool labels. Aim for foods that are less than 5 percent of the daily value of sodium.     The DASH eating plan was not designed for weight loss. But it contains many lower calorie foods, such as fruits and vegetables. You can make it lower in calories by replacing higher calorie foods with more fruits and vegetables. Some ideas to increase fruits and vegetables and decrease calories include:     Eat a medium apple instead of four shortbread cookies. You'll save 80 calories.    Eat 1/4 cup of dried apricots instead of a 2-ounce bag of pork rinds. You'll save 230 calories.   Have a hamburger that's 3 ounces instead of 6 ounces. Add a 1/2 cup serving of carrots and a 1/2 cup serving of spinach. You'll save more than 200 calories.   Instead of 5 ounces of chicken, have a stir balderrama with 2 ounces of chicken and 1 and 1/2 cups of raw vegetables. Use a small amount of vegetable oil. You'll save 50 calories.   Have a 1/2 cup serving of low-fat frozen yogurt instead of a 1 and 1/2 ounce milk chocolate bar. You'll save about 110 calories.   Use low-fat or fat-free condiments, such as fat free salad dressings.   Eat smaller portions--cut back gradually.   Use food labels to compare fat content in packaged foods. Items marked low-fat or fat-free may be lower in fat without being lower in calories than their regular versions.   Limit foods with lots of added sugar, such as pies, flavored yogurts, candy bars, ice cream, sherbet, regular soft drinks, and fruit drinks.   Drink water or club soda instead of cola or other soda drinks.     Based on National Institutes of Health Guidelines. Published by [employer].   Copyright   2004 [employer] and/or one of its subsidiaries. All Rights Reserved.

## 2020-01-17 NOTE — NURSING NOTE
Chief Complaint   Patient presents with     New Patient     reason for visit: present for consult for palpitations     Vitals were taken and medications were reconciled. EKG Performed.  Jimena Blandon  12:26 PM

## 2020-01-17 NOTE — PROGRESS NOTES
CARDIOLOGY CLINIC FOLLOW UP    HPI:  Ms. Carol Guillaume is a 28 yo female who receives primary care from Dr. Danny Shi. She was referred for evaluation of palpitations and dyspnea.     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 She was evaluated for palpitations by my colleague, Dr. Lopez, in 2018. Echo was normal. Cardiac monitor was ordered but not completed. 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     Today her main complaint is fatigue and dyspnea on exertion. Symptoms started in September. She notices fatigue and dyspnea with grocery shopping and 1 flight of stairs. Last week she started doing working out with exercise videos from the [employer] and thinks her fatigue is mildly improved. No orthopnea, PND, LE edema.     Her palpitations returned this fall but Dr. Shi changed her atenolol to metoprolol and her palpitations essentially resolved. She had some sharp chest discomfort with the palpitations which has also resolved. She continues to have low grade discomfort over her left chest and around to her left back. It is pretty constant since October. No change with her exercising, eating , position or deep breathing. It is not tender. She wonders if the pain is related to changing from cellcept to to myfortic which happened in February 2019.     ASSESSMENT AND PLAN  Ms. 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      #. Dyspnea on exertion  #. Fatigue  No other heart failure symptoms and she appears euvolemic on exam. Senior Loken syndrome is  associated with septal defect and valve abnormalities. No ASD/VSD noted on prior echo and no valve abnormalities. They were unable to assess PA pressure  - repeat echo for LV function, assess PA pressure, bubble study  - continue exercising to improve conditioning  - she has follow up with Dr. Parker in nephrology to discuss immunosuppression regimen     #. HTN  BP is elevated, may be due to change from atenolol to metoprolol.  She would like to get pregnant in the near future, limiting her therapeutic options.  - I will defer to Dr. Parker and Dr. Shi for antihypertensive choice and timing    #. Dyslipidemia: primarily elevated TGs  - diet has been poor in the past  - I gave her information for DASH diet. If she is unable to make some changes and lose some weight, we may need to start medication in the future    Follow up in 3 months.     Thank you for the opportunity to participate in the care of Ms. Carol Guillaume. Please feel free to contact me with any additional questions or concerns.    El Urrutia MD    Preventive Cardiology  Pager: [phone]    PAST MEDICAL HISTORY:  Patient Active Problem List   Diagnosis     S/P kidney transplant     Senior-Loken syndrome     Pseudotumor cerebri     Legally blind     Retinitis pigmentosa     Vitamin D deficiency     Obesity     Insulin resistance     PCOS (polycystic ovarian syndrome)     Encounter for long-term current use of medication     Cataract     Chest pain     Palpitations     Encounter for initial prescription of implantable subdermal contraceptive     Past Medical History:   Diagnosis Date     Cataract 2015     CMV disease (H) 2006    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     Legally blind      PPD positive, treated 2006    9 months of INH     Pseudotumor cerebri     on Diamox     Retinitis pigmentosa      S/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        CURRENT MEDICATIONS:  Current Outpatient Medications    Medication Sig Dispense Refill     etonogestrel (IMPLANON/NEXPLANON) 68 MG IMPL 1 each (68 mg) by Subdermal route once for 1 dose 1 each 0     Lutein-Zeaxanthin (OCUVITE LUTEIN 25 PO) Take 25 mg by mouth daily       metoprolol succinate ER (TOPROL-XL) 50 MG 24 hr tablet Take 2 tablets (100 mg) by mouth daily 90 tablet 3     MYFORTIC (BRAND) 180 MG EC tablet Take 3 tablets (540 mg) by mouth 2 times daily 540 tablet 3     NEORAL (BRAND) 25 MG capsule TAKE 4 CAPSULES BY MOUTH 2 TIMES A DAY (DAW1) 720 capsule 3     order for DME Equipment being ordered: Digital home blood pressure monitor kit with pulse indicator adult cuff 1 Device 0     Prenatal Vit-Fe Fumarate-FA (PRENATAL PO) Take by mouth daily       VITAMIN D, CHOLECALCIFEROL, PO Take 1,000 Units by mouth daily          PAST SURGICAL HISTORY:  Past Surgical History:   Procedure Laterality Date     cataract bilateral  06/2017     LITHOTRIPSY       nexplanon Left 10/29/2018    Lot Number: [id number] Nexplanon removal date 10/29/2021     SINUS SURGERY  2001     TRANSPLANT KIDNEY RECIPIENT LIVING RELATED Right 07/2006       ALLERGIES  Patient has no known allergies.    FAMILY HX:  Family History   Problem Relation Age of Onset     Skin Cancer Paternal Grandfather         melanoma     Parkinsonism Other         paternal uncle       SOCIAL HX:  Social History     Tobacco Use     Smoking status: Never Smoker     Smokeless tobacco: Never Used   Substance Use Topics     Alcohol use: No     Drug use: No        ROS:  Comprehensive ROS is negative except as noted in HPI.    VITAL SIGNS:  There were no vitals taken for this visit.  There is no height or weight on file to calculate BMI.  Wt Readings from Last 2 Encounters:   01/06/20 123.1 kg (271 lb 4.8 oz)   12/11/19 117.9 kg (260 lb)       PHYSICAL EXAM  Gen: pleasant woman sitting comfortably in NAD  Head: nc/at  Eyes: partially blind  ENT: no OP lesions or erythema  Neck: supple, no LAD  CV: nml s1/s2, no murmur or gallop;  no JVD  Chest: clear lungs  Abd: soft, NT, NABS  Ext: warm, no LE edema  Skin: no rash on limited exam  Neuro: awake, alert, oriented, nml speech and gait  Vasc: 2+ bilateral carotid, brachial, radial, DP and PT pulses; no bruits noted    LABS: personally reviewed  CMP  Recent Labs   Lab Test 01/06/20  0746 12/13/19  0701 06/06/19  0752 01/25/19  1446 10/22/18  0737  01/31/17  1654    138 139 139 138   < > 138   POTASSIUM 4.1 4.4 4.8 4.2 4.1   < > 4.5   CHLORIDE 108 108 107 107 104   < > 105   CO2 27 25 27 26 27   < > 24   ANIONGAP 4 5 6 6 7   < > 9   GLC 99 97 102* 121* 107*   < > 80   BUN 17 18 18 19 13   < > 17   CR 1.14* 1.31* 1.17* 1.06* 1.14*   < > 1.06*   GFRESTIMATED 64 54* 62 70 56*   < > 62   GFRESTBLACK 74 63 72 81 68   < > 74   LACHO 9.4 9.0 9.4 9.0 8.6   < > 9.3   PROTTOTAL  --  7.4  --   --  7.3  --  8.1   ALBUMIN  --  3.5  --   --  3.6  --  4.1   BILITOTAL  --  0.6  --   --  0.6  --  0.5   ALKPHOS  --  92  --   --  72  --  80   AST  --  21  --   --  26  --  38   ALT  --  51*  --   --  34  --  50    < > = values in this interval not displayed.     CBC  Recent Labs   Lab Test 12/13/19  0701 06/06/19  0752 01/25/19  1446 10/22/18  0737   WBC 7.5 7.7 8.6 6.5   RBC 4.72 5.06 4.97 4.68   HGB 13.3 14.1 13.6 13.2   HCT 40.9 44.2 42.2 40.1   MCV 87 87 85 86   MCH 28.2 27.9 27.4 28.2   MCHC 32.5 31.9 32.2 32.9   RDW 13.0 12.6 12.4 12.4    204 192 228     INRNo lab results found.  Recent Labs   Lab Test 12/13/19  0701   CHOL 186   HDL 42*   LDL 98   TRIG 228*       Most recent EKG: personally reviewed and discussed with the patient  1/17/20: NSR, J-point elevation in lateral leads, normal variant    Most recent ECHO: personally reviewed and discussed with the patient  4/11/18 TTE  Left ventricular function, chamber size, wall motion, and wall thickness are normal.The EF is 55-60%.  The right ventricle is normal size. Global right ventricular function is normal.  Both atria appear normal.  No major  valvular pathology.  Previous study not available for comparison.    Most recent STRESS TEST:    NA    Most recent CARDIAC CATH:   NA    Total time spent: 60 minutes, of which >50% was spent in face-to-face patient evaluation, reviewing data with patient, individualized education and coordination of care.

## 2020-01-17 NOTE — LETTER
1/17/2020      RE: Carol Guillaume  [address] [address]       Dear Colleague,    Thank you for the opportunity to participate in the care of your patient, Carol Guillaume, at the [facility] HEART [facility] at [facility]. Please see a copy of my visit note below.    CARDIOLOGY CLINIC FOLLOW UP    HPI:  Ms. Carol Guillaume is a 30 yo female who receives primary care from Dr. Danny Shi. She was referred for evaluation of palpitations and dyspnea.     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 She was evaluated for palpitations by my colleague, Dr. Lopez, in 2018. Echo was normal. Cardiac monitor was ordered but not completed. 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     Today her main complaint is fatigue and dyspnea on exertion. Symptoms started in September. She notices fatigue and dyspnea with grocery shopping and 1 flight of stairs. Last week she started doing working out with exercise videos from the [employer] and thinks her fatigue is mildly improved. No orthopnea, PND, LE edema.     Her palpitations returned this fall but Dr. Shi changed her atenolol to metoprolol and her palpitations essentially resolved. She had some sharp chest discomfort with the palpitations which has also resolved. She continues to have low grade discomfort over her left chest and around to her left back. It is pretty constant since October. No change with her exercising, eating , position or deep breathing. It is not tender. She wonders if the pain is related to changing from cellcept to to myfortic which happened in February 2019.     ASSESSMENT AND PLAN  Ms. 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      #. Dyspnea on exertion  #. Fatigue  No other heart failure symptoms and she appears euvolemic on exam. Senior Loken syndrome is associated with septal defect and valve abnormalities. No ASD/VSD noted on prior echo and no valve abnormalities. They were unable to assess PA pressure  - repeat echo for LV function, assess PA pressure, bubble study  - continue exercising to improve conditioning  - she has follow up with Dr. Parker in nephrology to discuss immunosuppression regimen     #. HTN  BP is elevated, may be due to change from atenolol to metoprolol.  She would like to get pregnant in the near future, limiting her therapeutic options.  - I will defer to Dr. Parker and Dr. Shi for antihypertensive choice and timing    #. Dyslipidemia: primarily elevated TGs  - diet has been poor in the past  - I gave her information for DASH diet. If she is unable to make some changes and lose some weight, we may need to start medication in the future    Follow up in 3 months.     Thank you for the opportunity to participate in the care of Ms. Carol Guillaume. Please feel free to contact me with any additional questions or concerns.    El Urrutia MD    Preventive Cardiology  Pager: [phone]    PAST MEDICAL HISTORY:  Patient Active Problem List   Diagnosis     S/P kidney transplant     Senior-Loken syndrome     Pseudotumor cerebri     Legally blind     Retinitis pigmentosa     Vitamin D deficiency     Obesity     Insulin resistance     PCOS (polycystic ovarian syndrome)     Encounter for long-term current use of medication     Cataract     Chest pain     Palpitations     Encounter for initial prescription of implantable subdermal contraceptive     Past Medical History:   Diagnosis Date     Cataract 2015     CMV disease (H)  2006    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     Legally blind      PPD positive, treated 2006    9 months of INH     Pseudotumor cerebri     on Diamox     Retinitis pigmentosa      S/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        CURRENT MEDICATIONS:  Current Outpatient Medications   Medication Sig Dispense Refill     etonogestrel (IMPLANON/NEXPLANON) 68 MG IMPL 1 each (68 mg) by Subdermal route once for 1 dose 1 each 0     Lutein-Zeaxanthin (OCUVITE LUTEIN 25 PO) Take 25 mg by mouth daily       metoprolol succinate ER (TOPROL-XL) 50 MG 24 hr tablet Take 2 tablets (100 mg) by mouth daily 90 tablet 3     MYFORTIC (BRAND) 180 MG EC tablet Take 3 tablets (540 mg) by mouth 2 times daily 540 tablet 3     NEORAL (BRAND) 25 MG capsule TAKE 4 CAPSULES BY MOUTH 2 TIMES A DAY (DAW1) 720 capsule 3     order for DME Equipment being ordered: Digital home blood pressure monitor kit with pulse indicator adult cuff 1 Device 0     Prenatal Vit-Fe Fumarate-FA (PRENATAL PO) Take by mouth daily       VITAMIN D, CHOLECALCIFEROL, PO Take 1,000 Units by mouth daily          PAST SURGICAL HISTORY:  Past Surgical History:   Procedure Laterality Date     cataract bilateral  06/2017     LITHOTRIPSY       nexplanon Left 10/29/2018    Lot Number: [id number] Nexplanon removal date 10/29/2021     SINUS SURGERY  2001     TRANSPLANT KIDNEY RECIPIENT LIVING RELATED Right 07/2006       ALLERGIES  Patient has no known allergies.    FAMILY HX:  Family History   Problem Relation Age of Onset     Skin Cancer Paternal Grandfather         melanoma     Parkinsonism Other         paternal uncle       SOCIAL HX:  Social History     Tobacco Use     Smoking status: Never Smoker     Smokeless tobacco: Never Used   Substance Use Topics     Alcohol use: No     Drug use: No        ROS:  Comprehensive ROS is negative except as noted in HPI.    VITAL SIGNS:  There were no vitals taken for this visit.  There is no height or weight on file to  calculate BMI.  Wt Readings from Last 2 Encounters:   01/06/20 123.1 kg (271 lb 4.8 oz)   12/11/19 117.9 kg (260 lb)       PHYSICAL EXAM  Gen: pleasant woman sitting comfortably in NAD  Head: nc/at  Eyes: partially blind  ENT: no OP lesions or erythema  Neck: supple, no LAD  CV: nml s1/s2, no murmur or gallop; no JVD  Chest: clear lungs  Abd: soft, NT, NABS  Ext: warm, no LE edema  Skin: no rash on limited exam  Neuro: awake, alert, oriented, nml speech and gait  Vasc: 2+ bilateral carotid, brachial, radial, DP and PT pulses; no bruits noted    LABS: personally reviewed  CMP  Recent Labs   Lab Test 01/06/20  0746 12/13/19  0701 06/06/19  0752 01/25/19  1446 10/22/18  0737  01/31/17  1654    138 139 139 138   < > 138   POTASSIUM 4.1 4.4 4.8 4.2 4.1   < > 4.5   CHLORIDE 108 108 107 107 104   < > 105   CO2 27 25 27 26 27   < > 24   ANIONGAP 4 5 6 6 7   < > 9   GLC 99 97 102* 121* 107*   < > 80   BUN 17 18 18 19 13   < > 17   CR 1.14* 1.31* 1.17* 1.06* 1.14*   < > 1.06*   GFRESTIMATED 64 54* 62 70 56*   < > 62   GFRESTBLACK 74 63 72 81 68   < > 74   LACHO 9.4 9.0 9.4 9.0 8.6   < > 9.3   PROTTOTAL  --  7.4  --   --  7.3  --  8.1   ALBUMIN  --  3.5  --   --  3.6  --  4.1   BILITOTAL  --  0.6  --   --  0.6  --  0.5   ALKPHOS  --  92  --   --  72  --  80   AST  --  21  --   --  26  --  38   ALT  --  51*  --   --  34  --  50    < > = values in this interval not displayed.     CBC  Recent Labs   Lab Test 12/13/19  0701 06/06/19  0752 01/25/19  1446 10/22/18  0737   WBC 7.5 7.7 8.6 6.5   RBC 4.72 5.06 4.97 4.68   HGB 13.3 14.1 13.6 13.2   HCT 40.9 44.2 42.2 40.1   MCV 87 87 85 86   MCH 28.2 27.9 27.4 28.2   MCHC 32.5 31.9 32.2 32.9   RDW 13.0 12.6 12.4 12.4    204 192 228     INRNo lab results found.  Recent Labs   Lab Test 12/13/19  0701   CHOL 186   HDL 42*   LDL 98   TRIG 228*       Most recent EKG: personally reviewed and discussed with the patient  1/17/20: NSR, J-point elevation in lateral leads, normal  variant    Most recent ECHO: personally reviewed and discussed with the patient  4/11/18 TTE  Left ventricular function, chamber size, wall motion, and wall thickness are normal.The EF is 55-60%.  The right ventricle is normal size. Global right ventricular function is normal.  Both atria appear normal.  No major valvular pathology.  Previous study not available for comparison.    Most recent STRESS TEST:    NA    Most recent CARDIAC CATH:   NA    Total time spent: 60 minutes, of which >50% was spent in face-to-face patient evaluation, reviewing data with patient, individualized education and coordination of care.         Please do not hesitate to contact me if you have any questions/concerns.     Sincerely,     El Urrutia MD

## 2020-01-20 LAB — INTERPRETATION ECG - MUSE: NORMAL

## 2020-01-30 DIAGNOSIS — Z94.0 KIDNEY REPLACED BY TRANSPLANT: ICD-10-CM

## 2020-01-30 DIAGNOSIS — D84.9 IMMUNOSUPPRESSION (H): ICD-10-CM

## 2020-01-30 RX ORDER — MYCOPHENOLIC ACID 180 MG/1
540 TABLET, DELAYED RELEASE ORAL 2 TIMES DAILY
Qty: 540 TABLET | Refills: 3 | Status: SHIPPED | OUTPATIENT
Start: 2020-01-30 | End: 2021-08-19 | Stop reason: ALTCHOICE

## 2020-01-31 DIAGNOSIS — R00.2 PALPITATIONS: ICD-10-CM

## 2020-01-31 DIAGNOSIS — I10 ESSENTIAL HYPERTENSION: ICD-10-CM

## 2020-02-03 RX ORDER — METOPROLOL SUCCINATE 50 MG/1
100 TABLET, EXTENDED RELEASE ORAL DAILY
Qty: 90 TABLET | Refills: 3 | OUTPATIENT
Start: 2020-02-03

## 2020-02-04 DIAGNOSIS — R00.2 PALPITATIONS: ICD-10-CM

## 2020-02-04 DIAGNOSIS — I10 ESSENTIAL HYPERTENSION: ICD-10-CM

## 2020-02-04 RX ORDER — METOPROLOL SUCCINATE 50 MG/1
100 TABLET, EXTENDED RELEASE ORAL DAILY
Qty: 180 TABLET | Refills: 3 | Status: SHIPPED | OUTPATIENT
Start: 2020-02-04 | End: 2020-02-20

## 2020-02-04 NOTE — TELEPHONE ENCOUNTER
metoprolol succinate ER (TOPROL-XL) 50 MG 24 hr tablet      HISTORICAL ONLY ON MEDICATION LIST    Last Office Visit : 1-6-2020  Future Office visit:  none    Routing refill request to provider for review/approval because:  Medication is reported/historical.      Kathleen M Doege RN

## 2020-02-04 NOTE — TELEPHONE ENCOUNTER
"Note from last office visit with Dr. Shi on 1/6/20:     \"Will increase dose of metoprolol succinate to 100 mg, once daily. She can take two tablets of 50 mg until her supply runs out, then can fill 100 mg tablets.\"    Lupe Livingston RN on 2/4/2020 at 1:25 PM    "

## 2020-02-04 NOTE — TELEPHONE ENCOUNTER
Please send rx for metoprolol (Rx from 1/6/2020 marked as historical, please send rx)    Thank you!  Estrella Tolbert CPhT  [facility]

## 2020-02-20 ENCOUNTER — OFFICE VISIT (OUTPATIENT)
Dept: NEPHROLOGY | Facility: CLINIC | Age: 30
End: 2020-02-20
Attending: INTERNAL MEDICINE
Payer: COMMERCIAL

## 2020-02-20 VITALS
HEIGHT: 66 IN | TEMPERATURE: 98.4 F | SYSTOLIC BLOOD PRESSURE: 147 MMHG | DIASTOLIC BLOOD PRESSURE: 98 MMHG | HEART RATE: 79 BPM | WEIGHT: 271.5 LBS | BODY MASS INDEX: 43.63 KG/M2 | OXYGEN SATURATION: 97 %

## 2020-02-20 DIAGNOSIS — Z48.298 AFTERCARE FOLLOWING ORGAN TRANSPLANT: Primary | ICD-10-CM

## 2020-02-20 PROCEDURE — G0463 HOSPITAL OUTPT CLINIC VISIT: HCPCS | Mod: ZF

## 2020-02-20 RX ORDER — CARVEDILOL 25 MG/1
12.5 TABLET ORAL 2 TIMES DAILY WITH MEALS
Qty: 180 TABLET | Refills: 3 | Status: SHIPPED | OUTPATIENT
Start: 2020-02-20 | End: 2020-04-17

## 2020-02-20 ASSESSMENT — MIFFLIN-ST. JEOR: SCORE: 1963.27

## 2020-02-20 ASSESSMENT — PAIN SCALES - GENERAL: PAINLEVEL: NO PAIN (0)

## 2020-02-20 NOTE — PROGRESS NOTES
CHRONIC TRANSPLANT NEPHROLOGY VISIT    Assessment & Plan   # LDKT: Stable   - Baseline Cr ~ 1.1-1.3; previously 0.8-1.0   - Proteinuria: Minimal (0.2-0.5 grams)   - Date DSA Last Checked: Not Known      Latest DSA: Not checked recently due to time from transplant   - BK Viremia: Not checked recently due to time from transplant   - Kidney Tx Biopsy: No    # Immunosuppression: Cyclosporine (goal ) and Mycophenolic acid (goal not followed)   - Changes: No    - Changes will be made after cyclosporine level is updated/optimized and patient is within 10 pounds of goal weight, as determined by weight management clinic and high risk maternal - fetal medicine.     - Will stop MPA and start a 5 day regimen of 10 mg prednisone.     - Will start 2 mg/kg Imuran daily, overlapping with the 5 day prednisone and 1 day MPA.    # Infection Prophylaxis:   - PJP: None    # Hypertension: Borderline control;  Goal BP: < 130/80   - Changes: Yes - stop metoprolol. Switch to carvedilol 12.5 mg BID.     - If not controlled in two weeks, would consider increasing to 25 mg BID.    - If more control is needed, would consider adding amlodipine 5 mg nightly.     - Patient was informed of her higher risk for pre-eclampsia, and the importance of starting pregnancy with well controlled blood pressure.     # Mineral Bone Disorder:   - Secondary renal hyperparathyroidism; PTH level: Normal (18-80 pg/ml)        On treatment: None  - Vitamin D; level: Normal        On Supplement: Yes  - Calcium; level: Normal        On Supplement: No  - Phosphorus; level: Not checked recently        On Supplement: No    # Electrolytes:   - Potassium; level: Normal        On Supplement: No  - Magnesium; level: Not checked recently        On Supplement: No  - Bicarbonate; level: Normal        On Supplement: No    # Heart Palpitations: Patient has been worked up by Cardiology. All tests came back negative or normal.     # Pregnancy Planning: The patient has been on  Nexplanon for about a year now. She would like to switch her medications to prepare for conception.    - Recommended waiting three months from switch to let medications get out of system and determine any rejection risks. Patient was informed of birth defects associated with mycophenolate.    - Informed of need to follow closely with high risk maternal - fetal medicine. Referral placed today.    - Educated on necessary qualifications and risks of pregnancy to mother, baby, and kidney.     # Weight Loss: Patient would like to lose weight prior to becoming pregnant to lower risks.    - Referral for weight management clinic.     # Skin Cancer Risk:   - Discussed sun protection and recommend regular follow up with Dermatology.   - Referral placed for Dermatology.     # Medical Compliance: No.  Evidence of labs less than recommended.  - Discussed importance of checking labs regularly as recommended, taking medications as prescribed and attending scheduled medical appointments.    # Transplant History:  Etiology of Kidney Failure: Senior-Loken syndrome   Tx: LDKT  Transplant: 7/18/2006 (Kidney)  Donor Type: Living Donor Class: Standard Criteria Donor  Significant changes in immunosuppression: Generic mycophenolate caused chest pain and palpitations.   Significant transplant-related complications: None    Transplant Office Phone Number: [phone]    Assessment and plan was discussed with the patient and she voiced her understanding and agreement.    Return visit: Return in about 6 months (around 8/20/2020).    Chief Complaint   Ms. Guillaume is a 29 year old here for routine follow up.    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 She was last seen in clinic by Dr. Parker on 1/25/19; please see that note for further details.     Since she was last seen in clinic, the patient reports that she developed sharp  chest pains and heart palpitations after switching from CellCept to Myfortic. She was worked up by cardiology, and all tests came back negative or normal. She did not have a stress test completed at that time. Her blood pressure has also increased. She denies any night sweats or chills. No nausea or vomiting. She denied any other symptoms or concerns today.     Today, the patient came with questions about getting pregnant. She would like to become pregnant sometime in the next year. She denies any history of gout, and she was warned that she cannot be started on any gout or uric acid related medications while on azathioprine. She plans to lose weight prior to pregnancy. She has not had chicken pox before, and only received the vaccines. She had no other questions or concerns in clinic today.     Recent Hospitalizations:  [x] No [] Yes    New Medical Issues: [x] No [] Yes    Decreased energy: [x] No [] Yes    Chest pain or SOB with exertion:  [] No [x] Yes Chest pain    Appetite change or weight change: [x] No [] Yes    Nausea, vomiting or diarrhea:  [x] No [] Yes    Fever, sweats or chills: [x] No [] Yes    Leg swelling: [x] No [] Yes      Home BP: 140-150's systolic     Review of Systems   A comprehensive review of systems was obtained and negative, except as noted in the HPI or PMH.    Problem List   Patient Active Problem List   Diagnosis     S/P kidney transplant     Senior-Loken syndrome     Pseudotumor cerebri     Legally blind     Retinitis pigmentosa     Vitamin D deficiency     Obesity     Insulin resistance     PCOS (polycystic ovarian syndrome)     Encounter for long-term current use of medication     Cataract     Chest pain     Palpitations     Encounter for initial prescription of implantable subdermal contraceptive     Dyslipidemia     Hypertension secondary to other renal disorders       Social History   Social History     Tobacco Use     Smoking status: Never Smoker     Smokeless tobacco: Never Used  "  Substance Use Topics     Alcohol use: No     Drug use: No       Allergies   No Known Allergies    Medications   Current Outpatient Medications   Medication Sig     carvedilol 25 MG PO tablet Take 0.5 tablets (12.5 mg) by mouth 2 times daily (with meals)     etonogestrel (IMPLANON/NEXPLANON) 68 MG IMPL 1 each (68 mg) by Subdermal route once for 1 dose     MYFORTIC (BRAND) 180 MG EC tablet Take 3 tablets (540 mg) by mouth 2 times daily     NEORAL (BRAND) 25 MG capsule TAKE 4 CAPSULES BY MOUTH 2 TIMES A DAY (DAW1)     order for DME Equipment being ordered: Digital home blood pressure monitor kit with pulse indicator adult cuff     Prenatal Vit-Fe Fumarate-FA (PRENATAL PO) Take by mouth daily     VITAMIN D, CHOLECALCIFEROL, PO Take 1,000 Units by mouth daily      No current facility-administered medications for this visit.      Medications Discontinued During This Encounter   Medication Reason     Lutein-Zeaxanthin (OCUVITE LUTEIN 25 PO)      metoprolol succinate ER (TOPROL-XL) 50 MG 24 hr tablet        Physical Exam   Vital Signs: BP (!) 147/98   Pulse 79   Temp 98.4  F (36.9  C) (Oral)   Ht 1.676 m (5' 6\")   Wt 123.2 kg (271 lb 8 oz)   SpO2 97%   BMI 43.82 kg/m      GENERAL APPEARANCE: alert and no distress  HENT: mouth without ulcers or lesions  LYMPHATICS: no cervical or supraclavicular nodes  RESP: lungs clear to auscultation - no rales, rhonchi or wheezes  CV: regular rhythm, normal rate, no rub, no murmur  EDEMA: no LE edema bilaterally  ABDOMEN: soft, nondistended, nontender, bowel sounds normal  MS: extremities normal - no gross deformities noted, no evidence of inflammation in joints, no muscle tenderness  SKIN: no rash  TX KIDNEY: normal      Data     Renal Latest Ref Rng & Units 1/6/2020 12/13/2019 6/6/2019   Na 133 - 144 mmol/L 139 138 139   Na (external) 135 - 145 mmol/L - - -   K 3.4 - 5.3 mmol/L 4.1 4.4 4.8   K (external) 3.5 - 5.0 mmol/L - - -   Cl 94 - 109 mmol/L 108 108 107   Cl (external) 98 " - 110 mmol/L - - -   CO2 20 - 32 mmol/L 27 25 27   CO2 (external) 21 - 31 mmol/L - - -   BUN 7 - 30 mg/dL 17 18 18   BUN (external) 8 - 25 mg/dL - - -   Cr 0.52 - 1.04 mg/dL 1.14(H) 1.31(H) 1.17(H)   Cr (external) 0.57 - 1.11 mg/dL - - -   Glucose 70 - 99 mg/dL 99 97 102(H)   Glucose (external) 65 - 100 mg/dL - - -   Ca  8.5 - 10.1 mg/dL 9.4 9.0 9.4   Ca (external) 8.5 - 10.5 mg/dL - - -   Mg 1.6 - 2.3 mg/dL - - -     Bone Health Latest Ref Rng & Units 12/13/2019 1/30/2018 6/1/2009   Phos 2.5 - 4.5 mg/dL - - -   PTHi 18 - 80 pg/mL 36 30 65   Vit D Def 20 - 75 ug/L 26 32 -     Heme Latest Ref Rng & Units 12/13/2019 6/6/2019 1/25/2019   WBC 4.0 - 11.0 10e9/L 7.5 7.7 8.6   WBC (external) 4.5 - 11.0 thou/cu mm - - -   Hgb 11.7 - 15.7 g/dL 13.3 14.1 13.6   Hgb (external) 12.0 - 16.0 g/dL - - -   Plt 150 - 450 10e9/L 210 204 192   Plt (external) 140 - 440 thou/cu mm - - -     Liver Latest Ref Rng & Units 12/13/2019 10/22/2018 1/31/2017   AP 40 - 150 U/L 92 72 80   TBili 0.2 - 1.3 mg/dL 0.6 0.6 0.5   ALT 0 - 50 U/L 51(H) 34 50   AST 0 - 45 U/L 21 26 38   Tot Protein 6.8 - 8.8 g/dL 7.4 7.3 8.1   Albumin 3.4 - 5.0 g/dL 3.5 3.6 4.1     Pancreas Latest Ref Rng & Units 7/26/2013 7/27/2012 6/3/2008   A1C 4.3 - 6.0 % 5.0 5.3 -   Amylase 30 - 110 U/L - - 58   Lipase 20 - 250 U/L - - -     Iron studies Latest Ref Rng & Units 1/30/2018 7/11/2006 6/26/2006   Iron 35 - 180 ug/dL 55 56 75   Iron sat 15 - 46 % 19 - -   Ferritin 12 - 150 ng/mL 19 473(H) 914(H)     [facility] Txp Virology Latest Ref Rng & Units 1/31/2017 8/29/2011 7/21/2011   CMV IgG EU/mL - - -   CMV IgM <0.90 - - -   CMV IgM Interp <0.90 - - -   CVM DNA Quant - Plasma, EDTA anticoagulant Whole blood, EDTA anticoagulant Whole blood, EDTA anticoagulant   CMV Quant <100 Copies/mL - <100  No CMV DNA detected. <100  No CMV DNA detected.   CMV QT Log <2.0 Log copies/mL - <2.0  The Cytomegalovirus DNA Quantitation assay is a real-time polymerase chain   reaction (PCR) utilizing  analyte specific reagents manufactured by [facility]. Analyte Specific Reagents (ASRs) are used in many laboratory   tests necessary for standard medical care and generally do not require FDA   approval.   This test was developed and its performance characteristics determined by   [facility].  It has not been   cleared or approved by the US Food and Drug Administration. <2.0  The Cytomegalovirus DNA Quantitation assay is a real-time polymerase chain   reaction (PCR) utilizing analyte specific reagents manufactured by [facility]. Analyte Specific Reagents (ASRs) are used in many laboratory   tests necessary for standard medical care and generally do not require FDA   approval.   This test was developed and its performance characteristics determined by   [facility].  It has not been   cleared or approved by the US Food and Drug Administration.   BK Spec - - - -   BK Res <1000 copies/mL - - -   BK Log <3.0 Log copies/mL - - -   EBV IgG - - - -   Hep B Core NEG - - -   Hep B Surf 0.0 - 4.9 mIU/mL - - -   HIV 1&2 NEG - - -     Scribe Disclosure:  I, Fe Carpenter, am serving as a scribe to document services personally performed by Mykel Joy M.D. at this visit, based upon the provider's statements to me. All documentation has been reviewed by the aforementioned provider prior to being entered into the official medical record.

## 2020-02-20 NOTE — LETTER
2/20/2020      RE: Carol GARSIA Markell  [id number] [id number]       CHRONIC TRANSPLANT NEPHROLOGY VISIT    Assessment & Plan   # LDKT: Stable   - Baseline Cr ~ 1.1-1.3; previously 0.8-1.0   - Proteinuria: Minimal (0.2-0.5 grams)   - Date DSA Last Checked: Not Known      Latest DSA: Not checked recently due to time from transplant   - BK Viremia: Not checked recently due to time from transplant   - Kidney Tx Biopsy: No    # Immunosuppression: Cyclosporine (goal ) and Mycophenolic acid (goal not followed)   - Changes: No    - Changes will be made after cyclosporine level is updated/optimized and patient is within 10 pounds of goal weight, as determined by weight management clinic and high risk maternal - fetal medicine.     - Will stop MPA and start a 5 day regimen of 10 mg prednisone.     - Will start 2 mg/kg Imuran daily, overlapping with the 5 day prednisone and 1 day MPA.    # Infection Prophylaxis:   - PJP: None    # Hypertension: Borderline control;  Goal BP: < 130/80   - Changes: Yes - stop metoprolol. Switch to carvedilol 12.5 mg BID.     - If not controlled in two weeks, would consider increasing to 25 mg BID.    - If more control is needed, would consider adding amlodipine 5 mg nightly.     - Patient was informed of her higher risk for pre-eclampsia, and the importance of starting pregnancy with well controlled blood pressure.     # Mineral Bone Disorder:   - Secondary renal hyperparathyroidism; PTH level: Normal (18-80 pg/ml)        On treatment: None  - Vitamin D; level: Normal        On Supplement: Yes  - Calcium; level: Normal        On Supplement: No  - Phosphorus; level: Not checked recently        On Supplement: No    # Electrolytes:   - Potassium; level: Normal        On Supplement: No  - Magnesium; level: Not checked recently        On Supplement: No  - Bicarbonate; level: Normal        On Supplement: No    # Heart Palpitations: Patient has been worked up by Cardiology. All  tests came back negative or normal.     # Pregnancy Planning: The patient has been on Nexplanon for about a year now. She would like to switch her medications to prepare for conception.    - Recommended waiting three months from switch to let medications get out of system and determine any rejection risks. Patient was informed of birth defects associated with mycophenolate.    - Informed of need to follow closely with high risk maternal - fetal medicine. Referral placed today.    - Educated on necessary qualifications and risks of pregnancy to mother, baby, and kidney.     # Weight Loss: Patient would like to lose weight prior to becoming pregnant to lower risks.    - Referral for weight management clinic.     # Skin Cancer Risk:   - Discussed sun protection and recommend regular follow up with Dermatology.   - Referral placed for Dermatology.     # Medical Compliance: No.  Evidence of labs less than recommended.  - Discussed importance of checking labs regularly as recommended, taking medications as prescribed and attending scheduled medical appointments.    # Transplant History:  Etiology of Kidney Failure: Senior-Loken syndrome   Tx: LDKT  Transplant: 7/18/2006 (Kidney)  Donor Type: Living Donor Class: Standard Criteria Donor  Significant changes in immunosuppression: Generic mycophenolate caused chest pain and palpitations.   Significant transplant-related complications: None    Transplant Office Phone Number: [phone]    Assessment and plan was discussed with the patient and she voiced her understanding and agreement.    Return visit: Return in about 6 months (around 8/20/2020).    Chief Complaint   Ms. Guillaume is a 29 year old here for routine follow up.    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 She was last seen in clinic by Dr. Parker on 1/25/19; please see that note for further  details.     Since she was last seen in clinic, the patient reports that she developed sharp chest pains and heart palpitations after switching from CellCept to Myfortic. She was worked up by cardiology, and all tests came back negative or normal. She did not have a stress test completed at that time. Her blood pressure has also increased. She denies any night sweats or chills. No nausea or vomiting. She denied any other symptoms or concerns today.     Today, the patient came with questions about getting pregnant. She would like to become pregnant sometime in the next year. She denies any history of gout, and she was warned that she cannot be started on any gout or uric acid related medications while on azathioprine. She plans to lose weight prior to pregnancy. She has not had chicken pox before, and only received the vaccines. She had no other questions or concerns in clinic today.     Recent Hospitalizations:  [x] No [] Yes    New Medical Issues: [x] No [] Yes    Decreased energy: [x] No [] Yes    Chest pain or SOB with exertion:  [] No [x] Yes Chest pain    Appetite change or weight change: [x] No [] Yes    Nausea, vomiting or diarrhea:  [x] No [] Yes    Fever, sweats or chills: [x] No [] Yes    Leg swelling: [x] No [] Yes      Home BP: 140-150's systolic     Review of Systems   A comprehensive review of systems was obtained and negative, except as noted in the HPI or PMH.    Problem List   Patient Active Problem List   Diagnosis     S/P kidney transplant     Senior-Loken syndrome     Pseudotumor cerebri     Legally blind     Retinitis pigmentosa     Vitamin D deficiency     Obesity     Insulin resistance     PCOS (polycystic ovarian syndrome)     Encounter for long-term current use of medication     Cataract     Chest pain     Palpitations     Encounter for initial prescription of implantable subdermal contraceptive     Dyslipidemia     Hypertension secondary to other renal disorders       Social History  "  Social History     Tobacco Use     Smoking status: Never Smoker     Smokeless tobacco: Never Used   Substance Use Topics     Alcohol use: No     Drug use: No       Allergies   No Known Allergies    Medications   Current Outpatient Medications   Medication Sig     carvedilol 25 MG PO tablet Take 0.5 tablets (12.5 mg) by mouth 2 times daily (with meals)     etonogestrel (IMPLANON/NEXPLANON) 68 MG IMPL 1 each (68 mg) by Subdermal route once for 1 dose     MYFORTIC (BRAND) 180 MG EC tablet Take 3 tablets (540 mg) by mouth 2 times daily     NEORAL (BRAND) 25 MG capsule TAKE 4 CAPSULES BY MOUTH 2 TIMES A DAY (DAW1)     order for DME Equipment being ordered: Digital home blood pressure monitor kit with pulse indicator adult cuff     Prenatal Vit-Fe Fumarate-FA (PRENATAL PO) Take by mouth daily     VITAMIN D, CHOLECALCIFEROL, PO Take 1,000 Units by mouth daily      No current facility-administered medications for this visit.      Medications Discontinued During This Encounter   Medication Reason     Lutein-Zeaxanthin (OCUVITE LUTEIN 25 PO)      metoprolol succinate ER (TOPROL-XL) 50 MG 24 hr tablet        Physical Exam   Vital Signs: BP (!) 147/98   Pulse 79   Temp 98.4  F (36.9  C) (Oral)   Ht 1.676 m (5' 6\")   Wt 123.2 kg (271 lb 8 oz)   SpO2 97%   BMI 43.82 kg/m      GENERAL APPEARANCE: alert and no distress  HENT: mouth without ulcers or lesions  LYMPHATICS: no cervical or supraclavicular nodes  RESP: lungs clear to auscultation - no rales, rhonchi or wheezes  CV: regular rhythm, normal rate, no rub, no murmur  EDEMA: no LE edema bilaterally  ABDOMEN: soft, nondistended, nontender, bowel sounds normal  MS: extremities normal - no gross deformities noted, no evidence of inflammation in joints, no muscle tenderness  SKIN: no rash  TX KIDNEY: normal      Data     Renal Latest Ref Rng & Units 1/6/2020 12/13/2019 6/6/2019   Na 133 - 144 mmol/L 139 138 139   Na (external) 135 - 145 mmol/L - - -   K 3.4 - 5.3 mmol/L " 4.1 4.4 4.8   K (external) 3.5 - 5.0 mmol/L - - -   Cl 94 - 109 mmol/L 108 108 107   Cl (external) 98 - 110 mmol/L - - -   CO2 20 - 32 mmol/L 27 25 27   CO2 (external) 21 - 31 mmol/L - - -   BUN 7 - 30 mg/dL 17 18 18   BUN (external) 8 - 25 mg/dL - - -   Cr 0.52 - 1.04 mg/dL 1.14(H) 1.31(H) 1.17(H)   Cr (external) 0.57 - 1.11 mg/dL - - -   Glucose 70 - 99 mg/dL 99 97 102(H)   Glucose (external) 65 - 100 mg/dL - - -   Ca  8.5 - 10.1 mg/dL 9.4 9.0 9.4   Ca (external) 8.5 - 10.5 mg/dL - - -   Mg 1.6 - 2.3 mg/dL - - -     Bone Health Latest Ref Rng & Units 12/13/2019 1/30/2018 6/1/2009   Phos 2.5 - 4.5 mg/dL - - -   PTHi 18 - 80 pg/mL 36 30 65   Vit D Def 20 - 75 ug/L 26 32 -     Heme Latest Ref Rng & Units 12/13/2019 6/6/2019 1/25/2019   WBC 4.0 - 11.0 10e9/L 7.5 7.7 8.6   WBC (external) 4.5 - 11.0 thou/cu mm - - -   Hgb 11.7 - 15.7 g/dL 13.3 14.1 13.6   Hgb (external) 12.0 - 16.0 g/dL - - -   Plt 150 - 450 10e9/L 210 204 192   Plt (external) 140 - 440 thou/cu mm - - -     Liver Latest Ref Rng & Units 12/13/2019 10/22/2018 1/31/2017   AP 40 - 150 U/L 92 72 80   TBili 0.2 - 1.3 mg/dL 0.6 0.6 0.5   ALT 0 - 50 U/L 51(H) 34 50   AST 0 - 45 U/L 21 26 38   Tot Protein 6.8 - 8.8 g/dL 7.4 7.3 8.1   Albumin 3.4 - 5.0 g/dL 3.5 3.6 4.1     Pancreas Latest Ref Rng & Units 7/26/2013 7/27/2012 6/3/2008   A1C 4.3 - 6.0 % 5.0 5.3 -   Amylase 30 - 110 U/L - - 58   Lipase 20 - 250 U/L - - -     Iron studies Latest Ref Rng & Units 1/30/2018 7/11/2006 6/26/2006   Iron 35 - 180 ug/dL 55 56 75   Iron sat 15 - 46 % 19 - -   Ferritin 12 - 150 ng/mL 19 473(H) 914(H)     [facility] Txp Virology Latest Ref Rng & Units 1/31/2017 8/29/2011 7/21/2011   CMV IgG EU/mL - - -   CMV IgM <0.90 - - -   CMV IgM Interp <0.90 - - -   CVM DNA Quant - Plasma, EDTA anticoagulant Whole blood, EDTA anticoagulant Whole blood, EDTA anticoagulant   CMV Quant <100 Copies/mL - <100  No CMV DNA detected. <100  No CMV DNA detected.   CMV QT Log <2.0 Log copies/mL - <2.0  The  Cytomegalovirus DNA Quantitation assay is a real-time polymerase chain   reaction (PCR) utilizing analyte specific reagents manufactured by [facility]. Analyte Specific Reagents (ASRs) are used in many laboratory   tests necessary for standard medical care and generally do not require FDA   approval.   This test was developed and its performance characteristics determined by   [facility].  It has not been   cleared or approved by the US Food and Drug Administration. <2.0  The Cytomegalovirus DNA Quantitation assay is a real-time polymerase chain   reaction (PCR) utilizing analyte specific reagents manufactured by [facility]. Analyte Specific Reagents (ASRs) are used in many laboratory   tests necessary for standard medical care and generally do not require FDA   approval.   This test was developed and its performance characteristics determined by   [facility].  It has not been   cleared or approved by the US Food and Drug Administration.   BK Spec - - - -   BK Res <1000 copies/mL - - -   BK Log <3.0 Log copies/mL - - -   EBV IgG - - - -   Hep B Core NEG - - -   Hep B Surf 0.0 - 4.9 mIU/mL - - -   HIV 1&2 NEG - - -     Scribe Disclosure:  Fe ODONNELL, am serving as a scribe to document services personally performed by Mykel Joy M.D. at this visit, based upon the provider's statements to me. All documentation has been reviewed by the aforementioned provider prior to being entered into the official medical record.         Kidney/Pancreas Recipient

## 2020-02-20 NOTE — LETTER
2/20/2020       RE: Carol Guillaume  [address] [address]     Dear Colleague,    Thank you for referring your patient, Carol Guillaume, to the [facility] NEPHROLOGY at [facility]. Please see a copy of my visit note below.    CHRONIC TRANSPLANT NEPHROLOGY VISIT    Assessment & Plan   # LDKT: Stable   - Baseline Cr ~ 1.1-1.3; previously 0.8-1.0   - Proteinuria: Minimal (0.2-0.5 grams)   - Date DSA Last Checked: Not Known      Latest DSA: Not checked recently due to time from transplant   - BK Viremia: Not checked recently due to time from transplant   - Kidney Tx Biopsy: No    # Immunosuppression: Cyclosporine (goal ) and Mycophenolic acid (goal not followed)   - Changes: No    - Changes will be made after cyclosporine level is updated/optimized and patient is within 10 pounds of goal weight, as determined by weight management clinic and high risk maternal - fetal medicine.     - Will stop MPA and start a 5 day regimen of 10 mg prednisone.     - Will start 2 mg/kg Imuran daily, overlapping with the 5 day prednisone and 1 day MPA.    # Infection Prophylaxis:   - PJP: None    # Hypertension: Borderline control;  Goal BP: < 130/80   - Changes: Yes - stop metoprolol. Switch to carvedilol 12.5 mg BID.     - If not controlled in two weeks, would consider increasing to 25 mg BID.    - If more control is needed, would consider adding amlodipine 5 mg nightly.     - Patient was informed of her higher risk for pre-eclampsia, and the importance of starting pregnancy with well controlled blood pressure.     # Mineral Bone Disorder:   - Secondary renal hyperparathyroidism; PTH level: Normal (18-80 pg/ml)        On treatment: None  - Vitamin D; level: Normal        On Supplement: Yes  - Calcium; level: Normal        On Supplement: No  - Phosphorus; level: Not checked recently        On Supplement: No    # Electrolytes:   - Potassium; level: Normal        On Supplement: No  -  Magnesium; level: Not checked recently        On Supplement: No  - Bicarbonate; level: Normal        On Supplement: No    # Heart Palpitations: Patient has been worked up by Cardiology. All tests came back negative or normal.     # Pregnancy Planning: The patient has been on Nexplanon for about a year now. She would like to switch her medications to prepare for conception.    - Recommended waiting three months from switch to let medications get out of system and determine any rejection risks. Patient was informed of birth defects associated with mycophenolate.    - Informed of need to follow closely with high risk maternal - fetal medicine. Referral placed today.    - Educated on necessary qualifications and risks of pregnancy to mother, baby, and kidney.     # Weight Loss: Patient would like to lose weight prior to becoming pregnant to lower risks.    - Referral for weight management clinic.     # Skin Cancer Risk:   - Discussed sun protection and recommend regular follow up with Dermatology.   - Referral placed for Dermatology.     # Medical Compliance: No.  Evidence of labs less than recommended.  - Discussed importance of checking labs regularly as recommended, taking medications as prescribed and attending scheduled medical appointments.    # Transplant History:  Etiology of Kidney Failure: Senior-Loken syndrome   Tx: LDKT  Transplant: 7/18/2006 (Kidney)  Donor Type: Living Donor Class: Standard Criteria Donor  Significant changes in immunosuppression: Generic mycophenolate caused chest pain and palpitations.   Significant transplant-related complications: None    Transplant Office Phone Number: [phone]    Assessment and plan was discussed with the patient and she voiced her understanding and agreement.    Return visit: Return in about 6 months (around 8/20/2020).    Chief Complaint   Ms. Guillaume is a 29 year old here for routine follow up.    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 She was last seen in clinic by Dr. Parker on 1/25/19; please see that note for further details.     Since she was last seen in clinic, the patient reports that she developed sharp chest pains and heart palpitations after switching from CellCept to Myfortic. She was worked up by cardiology, and all tests came back negative or normal. She did not have a stress test completed at that time. Her blood pressure has also increased. She denies any night sweats or chills. No nausea or vomiting. She denied any other symptoms or concerns today.     Today, the patient came with questions about getting pregnant. She would like to become pregnant sometime in the next year. She denies any history of gout, and she was warned that she cannot be started on any gout or uric acid related medications while on azathioprine. She plans to lose weight prior to pregnancy. She has not had chicken pox before, and only received the vaccines. She had no other questions or concerns in clinic today.     Recent Hospitalizations:  [x] No [] Yes    New Medical Issues: [x] No [] Yes    Decreased energy: [x] No [] Yes    Chest pain or SOB with exertion:  [] No [x] Yes Chest pain    Appetite change or weight change: [x] No [] Yes    Nausea, vomiting or diarrhea:  [x] No [] Yes    Fever, sweats or chills: [x] No [] Yes    Leg swelling: [x] No [] Yes      Home BP: 140-150's systolic     Review of Systems   A comprehensive review of systems was obtained and negative, except as noted in the HPI or PMH.    Problem List   Patient Active Problem List   Diagnosis     S/P kidney transplant     Senior-Loken syndrome     Pseudotumor cerebri     Legally blind     Retinitis pigmentosa     Vitamin D deficiency     Obesity     Insulin resistance     PCOS (polycystic ovarian syndrome)     Encounter for long-term current use of medication     Cataract     Chest pain      "Palpitations     Encounter for initial prescription of implantable subdermal contraceptive     Dyslipidemia     Hypertension secondary to other renal disorders       Social History   Social History     Tobacco Use     Smoking status: Never Smoker     Smokeless tobacco: Never Used   Substance Use Topics     Alcohol use: No     Drug use: No       Allergies   No Known Allergies    Medications   Current Outpatient Medications   Medication Sig     carvedilol 25 MG PO tablet Take 0.5 tablets (12.5 mg) by mouth 2 times daily (with meals)     etonogestrel (IMPLANON/NEXPLANON) 68 MG IMPL 1 each (68 mg) by Subdermal route once for 1 dose     MYFORTIC (BRAND) 180 MG EC tablet Take 3 tablets (540 mg) by mouth 2 times daily     NEORAL (BRAND) 25 MG capsule TAKE 4 CAPSULES BY MOUTH 2 TIMES A DAY (DAW1)     order for DME Equipment being ordered: Digital home blood pressure monitor kit with pulse indicator adult cuff     Prenatal Vit-Fe Fumarate-FA (PRENATAL PO) Take by mouth daily     VITAMIN D, CHOLECALCIFEROL, PO Take 1,000 Units by mouth daily      No current facility-administered medications for this visit.      Medications Discontinued During This Encounter   Medication Reason     Lutein-Zeaxanthin (OCUVITE LUTEIN 25 PO)      metoprolol succinate ER (TOPROL-XL) 50 MG 24 hr tablet        Physical Exam   Vital Signs: BP (!) 147/98   Pulse 79   Temp 98.4  F (36.9  C) (Oral)   Ht 1.676 m (5' 6\")   Wt 123.2 kg (271 lb 8 oz)   SpO2 97%   BMI 43.82 kg/m      GENERAL APPEARANCE: alert and no distress  HENT: mouth without ulcers or lesions  LYMPHATICS: no cervical or supraclavicular nodes  RESP: lungs clear to auscultation - no rales, rhonchi or wheezes  CV: regular rhythm, normal rate, no rub, no murmur  EDEMA: no LE edema bilaterally  ABDOMEN: soft, nondistended, nontender, bowel sounds normal  MS: extremities normal - no gross deformities noted, no evidence of inflammation in joints, no muscle tenderness  SKIN: no rash  TX " KIDNEY: normal      Data     Renal Latest Ref Rng & Units 1/6/2020 12/13/2019 6/6/2019   Na 133 - 144 mmol/L 139 138 139   Na (external) 135 - 145 mmol/L - - -   K 3.4 - 5.3 mmol/L 4.1 4.4 4.8   K (external) 3.5 - 5.0 mmol/L - - -   Cl 94 - 109 mmol/L 108 108 107   Cl (external) 98 - 110 mmol/L - - -   CO2 20 - 32 mmol/L 27 25 27   CO2 (external) 21 - 31 mmol/L - - -   BUN 7 - 30 mg/dL 17 18 18   BUN (external) 8 - 25 mg/dL - - -   Cr 0.52 - 1.04 mg/dL 1.14(H) 1.31(H) 1.17(H)   Cr (external) 0.57 - 1.11 mg/dL - - -   Glucose 70 - 99 mg/dL 99 97 102(H)   Glucose (external) 65 - 100 mg/dL - - -   Ca  8.5 - 10.1 mg/dL 9.4 9.0 9.4   Ca (external) 8.5 - 10.5 mg/dL - - -   Mg 1.6 - 2.3 mg/dL - - -     Bone Health Latest Ref Rng & Units 12/13/2019 1/30/2018 6/1/2009   Phos 2.5 - 4.5 mg/dL - - -   PTHi 18 - 80 pg/mL 36 30 65   Vit D Def 20 - 75 ug/L 26 32 -     Heme Latest Ref Rng & Units 12/13/2019 6/6/2019 1/25/2019   WBC 4.0 - 11.0 10e9/L 7.5 7.7 8.6   WBC (external) 4.5 - 11.0 thou/cu mm - - -   Hgb 11.7 - 15.7 g/dL 13.3 14.1 13.6   Hgb (external) 12.0 - 16.0 g/dL - - -   Plt 150 - 450 10e9/L 210 204 192   Plt (external) 140 - 440 thou/cu mm - - -     Liver Latest Ref Rng & Units 12/13/2019 10/22/2018 1/31/2017   AP 40 - 150 U/L 92 72 80   TBili 0.2 - 1.3 mg/dL 0.6 0.6 0.5   ALT 0 - 50 U/L 51(H) 34 50   AST 0 - 45 U/L 21 26 38   Tot Protein 6.8 - 8.8 g/dL 7.4 7.3 8.1   Albumin 3.4 - 5.0 g/dL 3.5 3.6 4.1     Pancreas Latest Ref Rng & Units 7/26/2013 7/27/2012 6/3/2008   A1C 4.3 - 6.0 % 5.0 5.3 -   Amylase 30 - 110 U/L - - 58   Lipase 20 - 250 U/L - - -     Iron studies Latest Ref Rng & Units 1/30/2018 7/11/2006 6/26/2006   Iron 35 - 180 ug/dL 55 56 75   Iron sat 15 - 46 % 19 - -   Ferritin 12 - 150 ng/mL 19 473(H) 914(H)     [facility] Txp Virology Latest Ref Rng & Units 1/31/2017 8/29/2011 7/21/2011   CMV IgG EU/mL - - -   CMV IgM <0.90 - - -   CMV IgM Interp <0.90 - - -   CVM DNA Quant - Plasma, EDTA anticoagulant Whole  blood, EDTA anticoagulant Whole blood, EDTA anticoagulant   CMV Quant <100 Copies/mL - <100  No CMV DNA detected. <100  No CMV DNA detected.   CMV QT Log <2.0 Log copies/mL - <2.0  The Cytomegalovirus DNA Quantitation assay is a real-time polymerase chain   reaction (PCR) utilizing analyte specific reagents manufactured by [employer]   [facility]. Analyte Specific Reagents (ASRs) are used in many laboratory   tests necessary for standard medical care and generally do not require FDA   approval.   This test was developed and its performance characteristics determined by   [facility].  It has not been   cleared or approved by the US Food and Drug Administration. <2.0  The Cytomegalovirus DNA Quantitation assay is a real-time polymerase chain   reaction (PCR) utilizing analyte specific reagents manufactured by [facility]   [facility]. Analyte Specific Reagents (ASRs) are used in many laboratory   tests necessary for standard medical care and generally do not require FDA   approval.   This test was developed and its performance characteristics determined by   [facility].  It has not been   cleared or approved by the US Food and Drug Administration.   BK Spec - - - -   BK Res <1000 copies/mL - - -   BK Log <3.0 Log copies/mL - - -   EBV IgG - - - -   Hep B Core NEG - - -   Hep B Surf 0.0 - 4.9 mIU/mL - - -   HIV 1&2 NEG - - -     Scribe Disclosure:  I, Fe Carpenter, am serving as a scribe to document services personally performed by Mykel Joy M.D. at this visit, based upon the provider's statements to me. All documentation has been reviewed by the aforementioned provider prior to being entered into the official medical record.         Again, thank you for allowing me to participate in the care of your patient.      Sincerely,    Kidney/Pancreas Recipient

## 2020-02-25 DIAGNOSIS — H35.50 SENIOR-LOKEN SYNDROME: Primary | ICD-10-CM

## 2020-02-25 DIAGNOSIS — Q61.5 SENIOR-LOKEN SYNDROME: Primary | ICD-10-CM

## 2020-02-25 DIAGNOSIS — Q87.89 SENIOR-LOKEN SYNDROME: Primary | ICD-10-CM

## 2020-02-25 DIAGNOSIS — Z94.0 S/P KIDNEY TRANSPLANT: ICD-10-CM

## 2020-03-03 ENCOUNTER — OFFICE VISIT (OUTPATIENT)
Dept: DERMATOLOGY | Facility: CLINIC | Age: 30
End: 2020-03-03
Payer: COMMERCIAL

## 2020-03-03 DIAGNOSIS — D22.9 MULTIPLE BENIGN NEVI: ICD-10-CM

## 2020-03-03 DIAGNOSIS — L81.4 SOLAR LENTIGO: ICD-10-CM

## 2020-03-03 DIAGNOSIS — D18.01 CHERRY ANGIOMA: ICD-10-CM

## 2020-03-03 DIAGNOSIS — Z94.0 HISTORY OF RENAL TRANSPLANTATION: Primary | ICD-10-CM

## 2020-03-03 DIAGNOSIS — D23.9 DERMATOFIBROMA: ICD-10-CM

## 2020-03-03 ASSESSMENT — PAIN SCALES - GENERAL: PAINLEVEL: NO PAIN (0)

## 2020-03-03 NOTE — LETTER
3/3/2020       RE: Carol Guillaume  [address] [address]     Dear Colleague,    Thank you for referring your patient, Carol Guillaume, to the [facility] DERMATOLOGY at [facility]. Please see a copy of my visit note below.    [facility] Dermatology Note    Dermatology Problem List:  FBSE annually, h/o kidney transplant in 2006 (on cellcept and cyclosporine)  1. Benign nevi, dermatofibroma L thigh  2. Family h/o melanoma in grandfather  3. Benign nevus biopsied years ago    Encounter Date: Mar 3, 2020    CC:  Chief Complaint   Patient presents with     Derm Problem     Carol is here for a transplant skin check, states no concerns.      History of Present Illness:  Ms. Carol Guillaume is a 29 year old female who presents for a skin check. She has a history of a kidney transplant in 2006 and it currently on mycophenolate and cyclosporine. Today she would like to have a full body skin examination and the lesions on her trunk and extremities evaluated.     The patient otherwise denies any new or concerning lesions. No bleeding, painful, pruritic, or changing lesions. They report no personal history of skin cancer. There is a family history of skin cancer (melanoma in grandfather). A history of immunosuppression ( s/p kidney transplant in 2006 on mycophenolate and cyclosporine). No occupational exposure to ultraviolet light or other forms of radiation. Patient is a therapist.  Health otherwise stable. No other skin concerns.     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 She has never used smokeless tobacco. She reports that she does not drink alcohol or use drugs.    Family History:  Family History   Problem Relation Age of Onset     Skin Cancer Paternal Grandfather         melanoma     Hyperlipidemia Mother      Parkinsonism Other         paternal uncle     Atrial fibrillation Paternal Grandmother      Medications:  Current Outpatient Medications   Medication Sig Dispense Refill     carvedilol 25 MG PO tablet Take 0.5 tablets (12.5 mg) by mouth 2 times daily (with meals) 180 tablet 3     MYFORTIC (BRAND) 180 MG EC tablet Take 3 tablets (540 mg) by mouth 2 times daily 540 tablet 3     NEORAL (BRAND) 25 MG capsule TAKE 4 CAPSULES BY MOUTH 2 TIMES A DAY (DAW1) 720 capsule 3     order for DME Equipment being ordered: Digital home blood pressure monitor kit with pulse indicator adult cuff 1 Device 0     Prenatal Vit-Fe Fumarate-FA (PRENATAL PO) Take by mouth daily       VITAMIN D, CHOLECALCIFEROL, PO Take 1,000 Units by mouth daily        etonogestrel (IMPLANON/NEXPLANON) 68 MG IMPL 1 each (68 mg) by Subdermal route once for 1 dose 1 each 0     No Known Allergies    Review of Systems:  -Constitutional: Patient is otherwise  feeling well, in usual state of health.   -Skin: As above in HPI. No additional skin concerns.    Physical exam:  Vitals: There were no vitals taken for this visit.  GEN: This is a well developed, well-nourished female in no acute distress, in a pleasant mood.    SKIN: Full skin, which includes the head/face, both arms, chest, back, abdomen,both legs, genitalia and/or groin buttocks, digits and/or nails, was examined.  - Crane Type III  - Scattered brown macules on sun exposed areas.  - Dome shaped bright red papules on the trunk and extremities.   - Multiple regular brown pigmented macules and papules with normal symmetric reticular network on the trunk and extremities.   - Firm tan/flesh colored papule that dimples with lateral pressure on the left leg.  - Well healed scars on the trunk from prior biopsies.   - No other lesions of concern on areas examined.     Impression/Plan:    1. Hx of kidney transplant in 2006 on immunosuppression    Recommend sunscreens SPF #30 or greater, protective clothing and avoidance of tanning beds.    ABCDs of melanoma were discussed and self skin checks were advised.     2. Multiple clinically benign lesions: solar lentigines, nevi    Recommend sunscreens SPF #30 or greater, protective clothing and avoidance of tanning beds.    ABCDs of melanoma were discussed and self skin checks were advised.     3. Benign findings including: cherry angioma    No further intervention required. Patient to report changes.     Patient reassured of the benign nature of these lesions.    4. Dermatofibroma    We discussed the natural etiology and provided reassurance of the benign nature of the lesions.     Follow-up in one year for a FBSE, earlier for new or changing lesions.     Staff Involved:  Scribe/Staff    Scribe Disclosure  I, Lupe Bacon, am serving as a scribe to document services personally performed by Dr. Harjit Cheung MD, based on data collection and the provider's statements to me.      Provider Disclosure:   The documentation recorded by the scribe accurately reflects the services I personally performed and the decisions made by me.    Harjit Cheung MD    Department of Dermatology  [facility]: Phone: [phone], Fax:[phone]  [facility]: Phone: [phone] Fax: [phone]

## 2020-03-03 NOTE — NURSING NOTE
Dermatology Rooming Note    Carol Guillaume's goals for this visit include:   Chief Complaint   Patient presents with     Derm Problem     Carol is here for a transplant skin check, states no concerns.      Rufina Royal LPN

## 2020-03-04 NOTE — PROGRESS NOTES
[facility] Dermatology Note    Dermatology Problem List:  FBSE annually, h/o kidney transplant in 2006 (on cellcept and cyclosporine)  1. Benign nevi, dermatofibroma L thigh  2. Family h/o melanoma in grandfather  3. Benign nevus biopsied years ago    Encounter Date: Mar 3, 2020    CC:  Chief Complaint   Patient presents with     Derm Problem     Carol is here for a transplant skin check, states no concerns.      History of Present Illness:  Ms. Carol Guillaume is a 29 year old female who presents for a skin check. She has a history of a kidney transplant in 2006 and it currently on mycophenolate and cyclosporine. Today she would like to have a full body skin examination and the lesions on her trunk and extremities evaluated.     The patient otherwise denies any new or concerning lesions. No bleeding, painful, pruritic, or changing lesions. They report no personal history of skin cancer. There is a family history of skin cancer (melanoma in grandfather). A history of immunosuppression ( s/p kidney transplant in 2006 on mycophenolate and cyclosporine). No occupational exposure to ultraviolet light or other forms of radiation. Patient is a therapist.  Health otherwise stable. No other skin concerns.     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 She has never used smokeless tobacco. She reports that she does not drink alcohol or use drugs.    Family History:  Family History   Problem Relation Age of Onset     Skin Cancer Paternal Grandfather         melanoma     Hyperlipidemia Mother      Parkinsonism Other         paternal uncle     Atrial fibrillation Paternal Grandmother      Medications:  Current Outpatient Medications   Medication Sig Dispense Refill     carvedilol 25 MG PO tablet Take 0.5 tablets (12.5 mg) by mouth 2 times daily (with meals) 180 tablet 3     MYFORTIC (BRAND) 180 MG EC tablet Take 3 tablets (540 mg) by mouth 2 times daily 540 tablet 3     NEORAL (BRAND) 25 MG capsule TAKE 4 CAPSULES BY MOUTH 2 TIMES A DAY (DAW1) 720 capsule 3     order for DME Equipment being ordered: Digital home blood pressure monitor kit with pulse indicator adult cuff 1 Device 0     Prenatal Vit-Fe Fumarate-FA (PRENATAL PO) Take by mouth daily       VITAMIN D, CHOLECALCIFEROL, PO Take 1,000 Units by mouth daily        etonogestrel (IMPLANON/NEXPLANON) 68 MG IMPL 1 each (68 mg) by Subdermal route once for 1 dose 1 each 0     No Known Allergies    Review of Systems:  -Constitutional: Patient is otherwise feeling well, in usual state of health.   -Skin: As above in HPI. No additional skin concerns.    Physical exam:  Vitals: There were no vitals taken for this visit.  GEN: This is a well developed, well-nourished female in no acute distress, in a pleasant mood.    SKIN: Full skin, which  includes the head/face, both arms, chest, back, abdomen,both legs, genitalia and/or groin buttocks, digits and/or nails, was examined.  - Crane Type III  - Scattered brown macules on sun exposed areas.  - Dome shaped bright red papules on the trunk and extremities.   - Multiple regular brown pigmented macules and papules with normal symmetric reticular network on the trunk and extremities.   - Firm tan/flesh colored papule that dimples with lateral pressure on the left leg.  - Well healed scars on the trunk from prior biopsies.   - No other lesions of concern on areas examined.     Impression/Plan:    1. Hx of kidney transplant in 2006 on immunosuppression    Recommend sunscreens SPF #30 or greater, protective clothing and avoidance of tanning beds.    ABCDs of melanoma were discussed and self skin checks were advised.     2. Multiple clinically benign lesions: solar lentigines, nevi    Recommend sunscreens SPF #30 or greater, protective clothing and avoidance of tanning beds.    ABCDs of melanoma were discussed and self skin checks were advised.     3. Benign findings including: cherry angioma    No further intervention required. Patient to report changes.     Patient reassured of the benign nature of these lesions.    4. Dermatofibroma    We discussed the natural etiology and provided reassurance of the benign nature of the lesions.     Follow-up in one year for a FBSE, earlier for new or changing lesions.     Staff Involved:  Scribe/Staff    Scribe Disclosure  I, Lupe Bacon, am serving as a scribe to document services personally performed by Dr. Harjit Cheung MD, based on data collection and the provider's statements to me.     Provider Disclosure:   The documentation recorded by the scribe accurately reflects the services I personally performed and the decisions made by me.    Harjit Cheung MD    Department of Dermatology  [facility]  Clinics: Phone: [phone], Fax:[phone]  [facility]: Phone: [phone] Fax: [phone]

## 2020-03-09 ENCOUNTER — ALLIED HEALTH/NURSE VISIT (OUTPATIENT)
Dept: SURGERY | Facility: CLINIC | Age: 30
End: 2020-03-09
Payer: COMMERCIAL

## 2020-03-09 ENCOUNTER — OFFICE VISIT (OUTPATIENT)
Dept: ENDOCRINOLOGY | Facility: CLINIC | Age: 30
End: 2020-03-09
Payer: COMMERCIAL

## 2020-03-09 VITALS
WEIGHT: 269.8 LBS | HEART RATE: 81 BPM | DIASTOLIC BLOOD PRESSURE: 99 MMHG | HEIGHT: 66 IN | OXYGEN SATURATION: 99 % | SYSTOLIC BLOOD PRESSURE: 138 MMHG | BODY MASS INDEX: 43.36 KG/M2

## 2020-03-09 DIAGNOSIS — E66.01 MORBID OBESITY (H): Primary | ICD-10-CM

## 2020-03-09 ASSESSMENT — MIFFLIN-ST. JEOR: SCORE: 1955.55

## 2020-03-09 ASSESSMENT — PAIN SCALES - GENERAL: PAINLEVEL: NO PAIN (0)

## 2020-03-09 NOTE — PROGRESS NOTES
New Medical Weight Management Consult    PATIENT:  Carol Guillaume  MRN:         [id number]  :         1990    Dear Danny Shi,    I had the pleasure of seeing your patient, Carol Guillaume.  Full intake/assessment done to determine barriers to weight loss success and develop a treatment plan.  Carol Guillaume is a 29 year old female interested in treatment of medical problems associated with weight.      Wants to get pregnant within the next year. Needs to lose weight before she gets pregnant.   She has the nexplanon, and is planning to take it out after she loses weight.   Goal weight has not been specified for her.     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  Chronic HTN even after kidney transplant. Now on carvedilol.   Had cardiology eval in 2020, all normal (including echo).     Weight out of highschool was 150 lbs.   Gained weight in college (had pseudotumor cerebri) and was on meds that caused weight gain.   Gained 10 lbs in grad school( weight in the 210-220).   Got  then. Stopped walking then. Gained another 40 lbs since.   Max 271 lb in Dec 2019.     Diet:   Cut out sugar in her tea  Less processed food.    Early BF at 5 am: yogurt + egg  8 am: apple sauce or yogurt  11 am: protein shake  3:30 pm: apple cider  4:30 pm: dinner: varies  Bedtime snack: fruits or nuts  Goes to bed at 8-9 pm    Exercise: got a gym membership. Is going twice a week.      Her weight today is 269 lbs 12.8 oz, Body mass index is 43.55 kg/m ., and she has the following co-morbidities:     3/9/2020   I have the following health issues associated with obesity: Heart Disease, High Blood Pressure   I have the following symptoms associated with obesity: Back Pain, Fatigue       Patient Goals 3/9/2020   I am interested in having a healthier weight to diminish current health problems: Yes   I am interested in having a healthier weight in order to  "prevent future health problems: Yes   I am interested in having a healthier weight in order to have a future surgery: No       Referring Provider 3/9/2020   Please name the provider who referred you to Medical Weight Management.  If you do not know, please answer: \"I Don't Know\". Dr. Nichols       Wt Readings from Last 4 Encounters:   03/09/20 122.4 kg (269 lb 12.8 oz)   02/20/20 123.2 kg (271 lb 8 oz)   01/17/20 122.9 kg (271 lb)   01/06/20 123.1 kg (271 lb 4.8 oz)       Weight History 3/9/2020   How concerned are you about your weight? Somewhat Concerned   Would you describe your weight gain as gradual? Yes   I became overweight: As an Adult   The following factors have contributed to my weight gain:  Started on Medication that Caused Weight Gain, Other   Please list the other factors.  Got    I have tried the following methods to lose weight: Watching Portions or Calories, Exercise   My lowest weight since age 16 was: 215   My highest weight since age 16 was: 269   The most weight I have ever lost was: (lbs) 10   I have the following family history of obesity/being overweight:  My mother is overweight, My father is overweight   Has anyone in your family had weight loss surgery? No   How has your weight changed over the last year?  Gained   How many pounds? 40       Diet Recall 3/9/2020   Glass juice/day 0   Glass milk/day 1   Glass sugary drink/day 0   How many cans/bottles of sugar pop/soda/tea/sports drinks do you drink in a day? 0   Diet drink/day 0   Alcohol Never       Eating Habits 3/9/2020   Generally, my meals include foods like these: bread, pasta, rice, potatoes, corn, crackers, sweet dessert, pop, or juice. A Few Times a Week   Generally, my meals include foods like these: fried meats, brats, burgers, french fries, pizza, cheese, chips, or ice cream. Once a Week   Eat fast food (like McDonalds, [employer], Taco Bell). Less Than Weekly   Eat at a buffet or sit-down restaurant. Less Than Weekly "   Eat most of my meals in front of the TV or computer. Never   Often skip meals, eat at random times, have no regular eating times. A Few Times a Week   Rarely sit down for a meal but snack or graze throughout.  Never   Eat extra snacks between meals. Almost Everyday   Eat most of my food at the end of the day. A Few Times a Week   Eat in the middle of the night or wake up at night to eat. Never   Eat extra snacks to prevent or correct low blood sugar. Never   Eat to prevent acid reflux or stomach pain. Once a Week   Worry about not having enough food to eat. Never   Have you been to the food shelf at least a few times this year? No   I eat when I am depressed. Once a Week   I eat when I am stressed. A Few Times a Week   I eat when I am bored. Never   I eat when I am anxious. A Few Times a Week   I eat when I am happy or as a reward. Once a Week   I feel hungry all the time even if I just have eaten. Never   Feeling full is important to me. Almost Everyday   I finish all the food on my plate even if I am already full. Almost Everyday   I can't resist eating delicious food or walk past the good food/smell. Never   I eat/snack without noticing that I am eating. Never   I eat when I am preparing the meal. Once a Week   I eat more than usual when I see others eating. Once a Week   I have trouble not eating sweets, ice cream, cookies, or chips if they are around the house. A Few Times a Week   I think about food all day. Never   What foods, if any, do you crave? Sweets/Candy/Chocolate   Please list any other foods you crave? back and forth between sugar and salt       Activity/Exercise History 3/9/2020   How much of a typical 12 hour day do you spend sitting? Most of the Day   How much of a typical 12 hour day do you spend lying down? Less Than Half the Day   How much of a typical day do you spend walking/standing? Less Than Half the Day   How many hours (not including work) do you spend on the TV/Video  Games/Computer/Tablet/Phone? 2-3 Hours   How many times a week are you active for the purpose of exercise? 2-3 Times a Week   What keeps you from being more active? Other   How many total minutes do you spend doing some activity for the purpose of exercising when you exercise? More Than 30 Minutes       ROS    PAST MEDICAL HISTORY:  Past Medical History:   Diagnosis Date     Cataract 2015     CMV disease (H) 2006    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     Legally blind      PPD positive, treated 2006    9 months of INH     Pseudotumor cerebri     on Diamox     Retinitis pigmentosa      S/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        Work/Social History Reviewed With Patient 3/9/2020   My employment status is: Full-Time   My job is: mental health professional   How much of your job is spent on the computer or phone? 75%   How many hours do you spend commuting to work daily?  4   What is your marital status? /In a Relationship   If in a relationship, is your significant other overweight? No   Do you have children? Yes   If you have children, are they overweight? No   Who do you live with?   and daughter   Are they supportive of your health goals? Yes   Who does the food shopping?  me       Mental Health History Reviewed With Patient 3/9/2020   Have you ever been physically or sexually abused? No   If yes, do you feel that the abuse is affecting your weight? N/A   If yes, would you like to talk to a counselor about the abuse? N/A   How often in the past 2 weeks have you felt little interest or pleasure in doing things? Not at all   Over the past 2 weeks how often have you felt down, depressed, or hopeless? Not at all       Sleep History Reviewed With Patient 3/9/2020   How many hours do you sleep at night? 8   Do you think that you snore loudly or has anybody ever heard you snore loudly (louder than talking or so loud it can be heard behind a shut door)? No   Has anyone seen or  "heard you stop breathing during your sleep? No   Do you often feel tired, fatigued, or sleepy during the day? Yes   Do you have a TV/Computer in your bedroom? No       MEDICATIONS:   Current Outpatient Medications   Medication Sig Dispense Refill     carvedilol 25 MG PO tablet Take 0.5 tablets (12.5 mg) by mouth 2 times daily (with meals) 180 tablet 3     MYFORTIC (BRAND) 180 MG EC tablet Take 3 tablets (540 mg) by mouth 2 times daily 540 tablet 3     NEORAL (BRAND) 25 MG capsule TAKE 4 CAPSULES BY MOUTH 2 TIMES A DAY (DAW1) 720 capsule 3     order for DME Equipment being ordered: Digital home blood pressure monitor kit with pulse indicator adult cuff 1 Device 0     Prenatal Vit-Fe Fumarate-FA (PRENATAL PO) Take by mouth daily       VITAMIN D, CHOLECALCIFEROL, PO Take 1,000 Units by mouth daily        etonogestrel (IMPLANON/NEXPLANON) 68 MG IMPL 1 each (68 mg) by Subdermal route once for 1 dose 1 each 0       ALLERGIES:   No Known Allergies    PHYSICAL EXAM:  BP (!) 138/99 (BP Location: Left arm, Patient Position: Sitting, Cuff Size: Adult Large)   Pulse 81   Ht 1.676 m (5' 6\")   Wt 122.4 kg (269 lb 12.8 oz)   SpO2 99%   BMI 43.55 kg/m     A & O x 3  HEENT: NCAT, mucous membranes moist  Respirations unlabored  Location of obesity: Central Obesity    ASSESSMENT:  Carol is a patient with mature onset moribid obesity with significant element of familial/genetic influence and with current health consequences. She does need aggressive weight loss plan due to morbid obesity, need to get pregnant.  Carol Guillaume eats a high carb diet, eats a high fat diet, tends to snack/graze throughout day, rarely sitting to eat a true meal and has a disorganized meal pattern.    Her problem is complicated by poor lifestyle choices    PLAN:    Diet:   - quantity: track calories using myfitnesspal  - quality: reduce sugar. Consolidate two breakfasts in 1 .  - timing: consolidate, shorten eating window. Try to eat only 3 times a day. "   Stop cider, reduce apple sauce, avoid sweetened yogurt.     Activity: continue going to gym    Meds: she may be a good candidate for topiramate in the future if she fails dietary approaches first.       RTC:    6 weeks.    TIME: 45 min spent on evaluation, management, counseling, education, & motivational interviewing with greater than 50 % of the total time was spent on counseling and coordinating care    Sincerely,    Damaso Neves MD

## 2020-03-09 NOTE — PATIENT INSTRUCTIONS
"Nutrition Goals  1) Consume 3 meals per day following 9\" Plate method (1/2 plate non-starchy vegetables/fruit, 1/4 plate lean protein, 1/4 plate whole grain starch - no more than 1/2 cup carb/meal)  2) Use Ensure Max instead Plus   3) Eat slowly (20-30 minutes per meal), chewing foods well (25 chews per bite/applesauce consistency)  4) Avoid calorie containing beverages - use herbal tea instead  5) Increase exercise as able. Start with 2 times for 30 mins each times. Bringing your gym bag with you to work.     *Protein Shake Criteria: no more than 210 Calories, at least 20 grams of protein, and less than 10 grams of sugar     Meal Replacement Shake Options:    [employer] [facility] smoothie (160 Calories, 20 g protein)   Premier Protein (160 Calories, 30 g protein)  Slim Fast Advanced Nutrition (180 Calories, 20 g protein)  Muscle Milk, lactose-free, 17 oz bottle (210 Calories, 30 g protein)  Integrated Supplements, no artificial sugars (110 Calories, 20 g protein)  Genepro, unflavored protein powder (60 Calories, 30 g protein)    Follow-up with RD in 2-3 months    Tesha Oquendo, ISABEL, LD  If you would like to schedule or reschedule an appointment with the RD, please call [phone]    Interested in working with a health ?  Health coaches work with you to improve your overall health and wellbeing.  They look at the whole person, and may involve discussion of different areas of life, including, but not limited to the four pillars of health (sleep, exercise, nutrition, and stress management). Discuss with your care team if you would like to start working a health .    Health Coaching-3 Pack:    $99 for three health coaching visits    Visits may be done in person or via phone    Coaching is a partnership between the  and the client; Coaches do not prescribe or diagnose    Coaching helps inspire the client to reach his/her personal goals          "

## 2020-03-09 NOTE — PROGRESS NOTES
"New Weight Management Nutrition Consultation    Carol Guillaume is a 29 year old female presents today for new weight management nutrition consultation.  Patient referred by Dr. Neves on March 9, 2020.    Patient with Co-morbidities of obesity including:  Type II DM no  Renal Failure no  Sleep apnea no  Hypertension yes   Dyslipidemia yes  Joint pain no  Back pain no  GERD no     H/o kidney txp (2006)  Pt states she needs to lose weight to get pregnant.   Pt is blind.     Anthropometrics:  Estimated body mass index is 43.55 kg/m  as calculated from the following:    Height as of an earlier encounter on 3/9/20: 1.676 m (5' 6\").    Weight as of an earlier encounter on 3/9/20: 122.4 kg (269 lb 12.8 oz).     Wt Readings from Last 10 Encounters:   03/09/20 122.4 kg (269 lb 12.8 oz)   02/20/20 123.2 kg (271 lb 8 oz)   01/17/20 122.9 kg (271 lb)   01/06/20 123.1 kg (271 lb 4.8 oz)   12/11/19 117.9 kg (260 lb)   01/25/19 118.3 kg (260 lb 12.8 oz)   11/29/18 117.2 kg (258 lb 4.8 oz)   10/29/18 116.1 kg (256 lb)   10/15/18 116.1 kg (256 lb)   03/28/18 107.3 kg (236 lb 8 oz)       Medications for Weight Loss:  None    NUTRITION HISTORY  See MD note for details.    - avoids seafood  - Takes prenatal with vitamin D  - Liks to cook  - Pt states her mother was a medical professional and she is familiar with a lot of dietary guidelines.     Recent food recall:  Breakfast (5am): 1 cup yogurt ([employer] - reduced fat) + HB egg with 6-10 oz tea (half black tea + 1 % milk)  - 8 am: applesauce or yogurt; oat bran cereal  Lunch: Protein shake (Ensure Plus)  - 3-4 pm: apple cider   Dinner (4:30-5pm): Fruits, veggies, meats (chicken), occ pasta; salad with baked chicken   - HS Snacks: occ cookies, ice cream   Beverages: Tea, apple cider, water    Alcohol: None  Dining out: Once a month    Physical Activity:  Got a gym membership this year, difficult to make it to gym and tired     Work:  therapist   Sleep: 7-8 hours per " "night    MALNUTRITION  % Intake: No decreased intake noted  % Weight Loss: None noted  Subcutaneous Fat Loss: None observed  Muscle Loss: None observed  Fluid Accumulation/Edema: None noted  Malnutrition Diagnosis: Patient does not meet two of the established criteria necessary for diagnosing malnutrition    Nutrition Prescription  Recommended energy/nutrient modification.  Volumetrics    Nutrition Diagnosis  Obesity r/t long history of self-monitoring deficit and excessive energy intake aeb BMI >30.    Nutrition Intervention  Materials/education provided on Volumetric eating to help satiety level on fewer calories; portion control and healthy food choices (Plate Method and Volumetrics handouts), 100 calorie snack choices, meal and snack planning, and mindful eating. Pt using protein shake for past week as replacement for lunch. Shake she was using is too high in calories. Discussed protein shake criteria and gave recommendations for alternate options. Encouraged pt to reduce intake of calorie containing beverages, and discussed calorie free alternatives. Discussed low calorie, low sugar snack options to use if hungry between meals. Discussed importance of exercise and pt states she wants to start bringing exercise close to work so she can do a workout video between appointments. Provided pt with list of goals and RD contact info.     Patient Understanding: good  Expected Compliance: good  Follow-Up Plans: Meal planning     Nutrition Goals  1) Consume 3 meals per day following 9\" Plate method (1/2 plate non-starchy vegetables/fruit, 1/4 plate lean protein, 1/4 plate whole grain starch - no more than 1/2 cup carb/meal)  2) Use Ensure Max instead Plus   3) Eat slowly (20-30 minutes per meal), chewing foods well (25 chews per bite/applesauce consistency)  4) Avoid calorie containing beverages - use herbal tea instead  5) Increase exercise as able. Start with 2 times for 30 mins each times. Bringing your gym bag with " you to work.     *Protein Shake Criteria: no more than 210 Calories, at least 20 grams of protein, and less than 10 grams of sugar     Meal Replacement Shake Options:   [facility] smoothie (160 Calories, 20 g protein)   Premier Protein (160 Calories, 30 g protein)  Slim Fast Advanced Nutrition (180 Calories, 20 g protein)  Muscle Milk, lactose-free, 17 oz bottle (210 Calories, 30 g protein)  Integrated Supplements, no artificial sugars (110 Calories, 20 g protein)  Genepro, unflavored protein powder (60 Calories, 30 g protein)      Follow-Up:  PRN    Time spent with patient: 30 minutes.  Tesha Oquendo RD, LD

## 2020-03-09 NOTE — LETTER
3/9/2020       RE: Carol Guillaume  [address] [facility] [address]     Dear Colleague,    Thank you for referring your patient, Carol Guillaume, to the [facility] MEDICAL WEIGHT MANAGEMENT at [facility]. Please see a copy of my visit note below.      New Medical Weight Management Consult    PATIENT:  Carol Guillaume  MRN:         [id number]  :         1990    Dear Danny Shi,    I had the pleasure of seeing your patient, Carol Guillaume.  Full intake/assessment done to determine barriers to weight loss success and develop a treatment plan.  Carol Guillaume is a 29 year old female interested in treatment of medical problems associated with weight.      Wants to get pregnant within the next year. Needs to lose weight before she gets pregnant.   She has the nexplanon, and is planning to take it out after she loses weight.   Goal weight has not been specified for her.     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  Chronic HTN even after kidney transplant. Now on carvedilol.   Had cardiology eval in 2020, all normal (including echo).     Weight out of highschool was 150 lbs.   Gained weight in college (had pseudotumor cerebri) and was on meds that caused weight gain.   Gained 10 lbs in grad school( weight in the 210-220).   Got  then. Stopped walking then. Gained another 40 lbs since.   Max 271 lb in Dec 2019.     Diet:   Cut out sugar in her tea  Less processed food.    Early BF at 5 am: yogurt + egg  8 am: apple sauce or yogurt  11 am: protein shake  3:30 pm: apple cider  4:30 pm: dinner: varies  Bedtime snack: fruits or nuts  Goes to bed at 8-9 pm    Exercise: got a gym membership. Is going twice a week.      Her weight today is 269 lbs 12.8 oz, Body mass index is 43.55 kg/m ., and she has the following co-morbidities:     3/9/2020   I have the following health issues associated with obesity:  "Heart Disease, High Blood Pressure   I have the following symptoms associated with obesity: Back Pain, Fatigue       Patient Goals 3/9/2020   I am interested in having a healthier weight to diminish current health problems: Yes   I am interested in having a healthier weight in order to prevent future health problems: Yes   I am interested in having a healthier weight in order to have a future surgery: No       Referring Provider 3/9/2020   Please name the provider who referred you to Medical Weight Management.  If you do not know, please answer: \"I Don't Know\". Dr. Nichols       Wt Readings from Last 4 Encounters:   03/09/20 122.4 kg (269 lb 12.8 oz)   02/20/20 123.2 kg (271 lb 8 oz)   01/17/20 122.9 kg (271 lb)   01/06/20 123.1 kg (271 lb 4.8 oz)       Weight History 3/9/2020   How concerned are you about your weight? Somewhat Concerned   Would you describe your weight gain as gradual? Yes   I became overweight: As an Adult   The following factors have contributed to my weight gain:  Started on Medication that Caused Weight Gain, Other   Please list the other factors.  Got    I have tried the following methods to lose weight: Watching Portions or Calories, Exercise   My lowest weight since age 16 was: 215   My highest weight since age 16 was: 269   The most weight I have ever lost was: (lbs) 10   I have the following family history of obesity/being overweight:  My mother is overweight, My father is overweight   Has anyone in your family had weight loss surgery? No   How has your weight changed over the last year?  Gained   How many pounds? 40       Diet Recall 3/9/2020   Glass juice/day 0   Glass milk/day 1   Glass sugary drink/day 0   How many cans/bottles of sugar pop/soda/tea/sports drinks do you drink in a day? 0   Diet drink/day 0   Alcohol Never       Eating Habits 3/9/2020   Generally, my meals include foods like these: bread, pasta, rice, potatoes, corn, crackers, sweet dessert, pop, or juice. A Few " Times a Week   Generally, my meals include foods like these: fried meats, brats, burgers, french fries, pizza, cheese, chips, or ice cream. Once a Week   Eat fast food (like McDonal[employer], [employer], [email or web address]). Less Than Weekly   Eat at a buffet or sit-down restaurant. Less Than Weekly   Eat most of my meals in front of the TV or computer. Never   Often skip meals, eat at random times, have no regular eating times. A Few Times a Week   Rarely sit down for a meal but snack or graze throughout.  Never   Eat extra snacks between meals. Almost Everyday   Eat most of my food at the end of the day. A Few Times a Week   Eat in the middle of the night or wake up at night to eat. Never   Eat extra snacks to prevent or correct low blood sugar. Never   Eat to prevent acid reflux or stomach pain. Once a Week   Worry about not having enough food to eat. Never   Have you been to the food shelf at least a few times this year? No   I eat when I am depressed. Once a Week   I eat when I am stressed. A Few Times a Week   I eat when I am bored. Never   I eat when I am anxious. A Few Times a Week   I eat when I am happy or as a reward. Once a Week   I feel hungry all the time even if I just have eaten. Never   Feeling full is important to me. Almost Everyday   I finish all the food on my plate even if I am already full. Almost Everyday   I can't resist eating delicious food or walk past the good food/smell. Never   I eat/snack without noticing that I am eating. Never   I eat when I am preparing the meal. Once a Week   I eat more than usual when I see others eating. Once a Week   I have trouble not eating sweets, ice cream, cookies, or chips if they are around the house. A Few Times a Week   I think about food all day. Never   What foods, if any, do you crave? Sweets/Candy/Chocolate   Please list any other foods you crave? back and forth between sugar and salt       Activity/Exercise History 3/9/2020   How much of a typical 12 hour day do  you spend sitting? Most of the Day   How much of a typical 12 hour day do you spend lying down? Less Than Half the Day   How much of a typical day do you spend walking/standing? Less Than Half the Day   How many hours (not including work) do you spend on the TV/Video Games/Computer/Tablet/Phone? 2-3 Hours   How many times a week are you active for the purpose of exercise? 2-3 Times a Week   What keeps you from being more active? Other   How many total minutes do you spend doing some activity for the purpose of exercising when you exercise? More Than 30 Minutes       ROS    PAST MEDICAL HISTORY:  Past Medical History:   Diagnosis Date     Cataract 2015     CMV disease (H) 2006    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     Legally blind      PPD positive, treated 2006    9 months of INH     Pseudotumor cerebri     on Diamox     Retinitis pigmentosa      S/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        Work/Social History Reviewed With Patient 3/9/2020   My employment status is: Full-Time   My job is: mental health professional   How much of your job is spent on the computer or phone? 75%   How many hours do you spend commuting to work daily?  4   What is your marital status? /In a Relationship   If in a relationship, is your significant other overweight? No   Do you have children? Yes   If you have children, are they overweight? No   Who do you live with?   and daughter   Are they supportive of your health goals? Yes   Who does the food shopping?  me       Mental Health History Reviewed With Patient 3/9/2020   Have you ever been physically or sexually abused? No   If yes, do you feel that the abuse is affecting your weight? N/A   If yes, would you like to talk to a counselor about the abuse? N/A   How often in the past 2 weeks have you felt little interest or pleasure in doing things? Not at all   Over the past 2 weeks how often have you felt down, depressed, or hopeless? Not at all  "      Sleep History Reviewed With Patient 3/9/2020   How many hours do you sleep at night? 8   Do you think that you snore loudly or has anybody ever heard you snore loudly (louder than talking or so loud it can be heard behind a shut door)? No   Has anyone seen or heard you stop breathing during your sleep? No   Do you often feel tired, fatigued, or sleepy during the day? Yes   Do you have a TV/Computer in your bedroom? No       MEDICATIONS:   Current Outpatient Medications   Medication Sig Dispense Refill     carvedilol 25 MG PO tablet Take 0.5 tablets (12.5 mg) by mouth 2 times daily (with meals) 180 tablet 3     MYFORTIC (BRAND) 180 MG EC tablet Take 3 tablets (540 mg) by mouth 2 times daily 540 tablet 3     NEORAL (BRAND) 25 MG capsule TAKE 4 CAPSULES BY MOUTH 2 TIMES A DAY (DAW1) 720 capsule 3     order for DME Equipment being ordered: Digital home blood pressure monitor kit with pulse indicator adult cuff 1 Device 0     Prenatal Vit-Fe Fumarate-FA (PRENATAL PO) Take by mouth daily       VITAMIN D, CHOLECALCIFEROL, PO Take 1,000 Units by mouth daily        etonogestrel (IMPLANON/NEXPLANON) 68 MG IMPL 1 each (68 mg) by Subdermal route once for 1 dose 1 each 0       ALLERGIES:   No Known Allergies    PHYSICAL EXAM:  BP (!) 138/99 (BP Location: Left arm, Patient Position: Sitting, Cuff Size: Adult Large)   Pulse 81   Ht 1.676 m (5' 6\")   Wt 122.4 kg (269 lb 12.8 oz)   SpO2 99%   BMI 43.55 kg/m     A & O x 3  HEENT: NCAT, mucous membranes moist  Respirations unlabored  Location of obesity: Central Obesity    ASSESSMENT:  Craol is a patient with mature onset moribid obesity with significant element of familial/genetic influence and with current health consequences. She does need aggressive weight loss plan due to morbid obesity, need to get pregnant.  Carol Guillaume eats a high carb diet, eats a high fat diet, tends to snack/graze throughout day, rarely sitting to eat a true meal and has a disorganized meal " pattern.    Her problem is complicated by poor lifestyle choices    PLAN:    Diet:   - quantity: track calories using myfitnesspal  - quality: reduce sugar. Consolidate two breakfasts in 1 .  - timing: consolidate, shorten eating window. Try to eat only 3 times a day.   Stop cider, reduce apple sauce, avoid sweetened yogurt.     Activity: continue going to gym    Meds: she may be a good candidate for topiramate in the future if she fails dietary approaches first.       RTC:    6 weeks.    TIME: 45 min spent on evaluation, management, counseling, education, & motivational interviewing with greater than 50 % of the total time was spent on counseling and coordinating care    Sincerely,    Damaso Neves MD

## 2020-03-09 NOTE — NURSING NOTE
"(   Chief Complaint   Patient presents with     Consult     New weight management.     )    ( Weight: 122.4 kg (269 lb 12.8 oz) )  ( Height: 167.6 cm (5' 6\") )  ( BMI (Calculated): 43.55 )  (   )  (   )  (   )  (   )  ( Waist Circumference (cm): 134 cm )  (   )    ( BP: (!) 138/99 )  (   )  (   )  (   )  ( Pulse: 81 )  (   )  ( SpO2: 99 % )    (   Patient Active Problem List   Diagnosis     S/P kidney transplant     Senior-Loken syndrome     Pseudotumor cerebri     Legally blind     Retinitis pigmentosa     Vitamin D deficiency     Obesity     Insulin resistance     PCOS (polycystic ovarian syndrome)     Encounter for long-term current use of medication     Cataract     Chest pain     Palpitations     Encounter for initial prescription of implantable subdermal contraceptive     Dyslipidemia     Hypertension secondary to other renal disorders    )  (   Current Outpatient Medications   Medication Sig Dispense Refill     carvedilol 25 MG PO tablet Take 0.5 tablets (12.5 mg) by mouth 2 times daily (with meals) 180 tablet 3     MYFORTIC (BRAND) 180 MG EC tablet Take 3 tablets (540 mg) by mouth 2 times daily 540 tablet 3     NEORAL (BRAND) 25 MG capsule TAKE 4 CAPSULES BY MOUTH 2 TIMES A DAY (DAW1) 720 capsule 3     order for DME Equipment being ordered: Digital home blood pressure monitor kit with pulse indicator adult cuff 1 Device 0     Prenatal Vit-Fe Fumarate-FA (PRENATAL PO) Take by mouth daily       VITAMIN D, CHOLECALCIFEROL, PO Take 1,000 Units by mouth daily        etonogestrel (IMPLANON/NEXPLANON) 68 MG IMPL 1 each (68 mg) by Subdermal route once for 1 dose 1 each 0    )  ( Diabetes Eval:    )    ( Pain Eval:  No Pain (0) )    ( Wound Eval:       )    (   History   Smoking Status     Never Smoker   Smokeless Tobacco     Never Used    )    ( Signed By:  Aleida Marx, EMT; March 9, 2020; 12:31 PM )    "

## 2020-04-13 ENCOUNTER — TELEPHONE (OUTPATIENT)
Dept: MATERNAL FETAL MEDICINE | Facility: CLINIC | Age: 30
End: 2020-04-13

## 2020-04-13 NOTE — TELEPHONE ENCOUNTER
Writer called and left message for Carol that due to Covid 19 we are cancelling preconception consults at this time. Patient to call back with questions and/or concerns. Suri Malhotra RN

## 2020-04-16 NOTE — PROGRESS NOTES
"Carol Guillaume is a 29 year old female who is being evaluated via a billable telephone visit.       The patient has been notified of following:      \"This telephone visit will be conducted via a call between you and your physician/provider. We have found that certain health care needs can be provided without the need for a physical exam.  This service lets us provide the care you need with a short phone conversation.  If a prescription is necessary we can send it directly to your pharmacy.  If lab work is needed we can place an order for that and you can then stop by our lab to have the test done at a later time.     Telephone visits are billed at different rates depending on your insurance coverage. During this emergency period, for some insurers they may be billed the same as an in-person visit.  Please reach out to your insurance provider with any questions.     If during the course of the call the physician/provider feels a telephone visit is not appropriate, you will not be charged for this service.\"     Patient has given verbal consent for Telephone visit?  Yes     How would you like to obtain your AVS? Mail a copy    Phone call duration: 16 minutes    Additional provider notes:    CARDIOLOGY CLINIC FOLLOW UP    HPI:  Ms. Carol Guillaume is a 30 yo female who receives primary care from Dr. Danny Shi. She was referred for evaluation of palpitations and dyspnea.     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 She was evaluated for palpitations by my colleague, Dr. Lopez, in 2018. Echo was normal. 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     Her main complaint was fatigue and dyspnea on exertion. Symptoms started in September 2019. She notices fatigue and " dyspnea with grocery shopping and 1 flight of stairs. Palpitations returned but improved with metoprolol.     Interval History  Since the last visit, Carol started a workout program including gym 3 times/week where she would go on the elliptical for 40 minutes and a home video workout once a week. She also had a visit in the weight loss clinic. While on the elliptical, she does experience tachycardia which led to dyspnea but she is able to continue and complete the workout. No chest pain or lightheadedness. She does not have any palpitations or dyspnea at rest. No orthopnea or PND. Unfortunately with pandemic, she is now house bound, working from and homeschooling, so her workout time has dropped.     Dr. Parker changed her metoprolol to carvedilol and her systolic blood pressure decreased but her blood pressure in early March was still 138/99. She does not have a working BP cuff right now.     ASSESSMENT AND PLAN  Ms. 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      #. Dyspnea on exertion  #. Fatigue  No other heart failure symptoms and she appears euvolemic on exam. Senior Loken syndrome is associated with septal defect and valve abnormalities. Her echo showed normal ventricular function, no valve abnormalities, no shunting and normal PA pressure. I suspect much of her dyspnea is related to deconditioning and weight.   - continue exercising to improve conditioning  - continue follow up in weight loss clinic  - if her symptoms do not improve with weight loss or she develops symptoms of angina, we can pursue a stress test    #. HTN  BP improved but was still elevated on carvedilol.   - will increase carvedilol to 25mg BID    #. Dyslipidemia: primarily elevated TGs  - work on lifestyle modifications for  now    Follow up in 6 months.     Thank you for the opportunity to participate in the care of Ms. Carol Guillaume. Please feel free to contact me with any additional questions or concerns.    El Urrutia MD    Preventive Cardiology  Pager: [phone]    PAST MEDICAL HISTORY:  Patient Active Problem List   Diagnosis     S/P kidney transplant     Senior-Loken syndrome     Pseudotumor cerebri     Legally blind     Retinitis pigmentosa     Vitamin D deficiency     Obesity     Insulin resistance     PCOS (polycystic ovarian syndrome)     Encounter for long-term current use of medication     Cataract     Chest pain     Palpitations     Encounter for initial prescription of implantable subdermal contraceptive     Dyslipidemia     Hypertension secondary to other renal disorders     Past Medical History:   Diagnosis Date     Cataract 2015     CMV disease (H) 2006    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     Legally blind      PPD positive, treated 2006    9 months of INH     Pseudotumor cerebri     on Diamox     Retinitis pigmentosa      S/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        CURRENT MEDICATIONS:  Current Outpatient Medications   Medication Sig Dispense Refill     carvedilol 25 MG PO tablet Take 0.5 tablets (12.5 mg) by mouth 2 times daily (with meals) 180 tablet 3     etonogestrel (IMPLANON/NEXPLANON) 68 MG IMPL 1 each (68 mg) by Subdermal route once for 1 dose 1 each 0     MYFORTIC (BRAND) 180 MG EC tablet Take 3 tablets (540 mg) by mouth 2 times daily 540 tablet 3     NEORAL (BRAND) 25 MG capsule TAKE 4 CAPSULES BY MOUTH 2 TIMES A DAY (DAW1) 720 capsule 3     order for DME Equipment being ordered: Digital home blood pressure monitor kit with pulse indicator adult cuff 1 Device 0     Prenatal Vit-Fe Fumarate-FA (PRENATAL PO) Take by mouth daily       VITAMIN D, CHOLECALCIFEROL, PO Take 1,000 Units by mouth daily          PAST SURGICAL HISTORY:  Past Surgical History:    Procedure Laterality Date     cataract bilateral  06/2017     LITHOTRIPSY       nexplanon Left 10/29/2018    Lot Number: [id number] Nexplanon removal date 10/29/2021     SINUS SURGERY  2001     TRANSPLANT KIDNEY RECIPIENT LIVING RELATED Right 07/2006       ALLERGIES  Patient has no known allergies.    FAMILY HX:  Family History   Problem Relation Age of Onset     Skin Cancer Paternal Grandfather         melanoma     Hyperlipidemia Mother      Parkinsonism Other         paternal uncle     Atrial fibrillation Paternal Grandmother        SOCIAL HX:  Social History     Tobacco Use     Smoking status: Never Smoker     Smokeless tobacco: Never Used   Substance Use Topics     Alcohol use: No     Drug use: No        ROS:  Comprehensive ROS is negative except as noted in HPI.    VITAL SIGNS:  There were no vitals taken for this visit.  There is no height or weight on file to calculate BMI.  Wt Readings from Last 2 Encounters:   03/09/20 122.4 kg (269 lb 12.8 oz)   02/20/20 123.2 kg (271 lb 8 oz)       PHYSICAL EXAM  Not done for telephone visit.    LABS: personally reviewed  CMP  Recent Labs   Lab Test 01/06/20  0746 12/13/19  0701 06/06/19  0752 01/25/19  1446 10/22/18  0737  01/31/17  1654    138 139 139 138   < > 138   POTASSIUM 4.1 4.4 4.8 4.2 4.1   < > 4.5   CHLORIDE 108 108 107 107 104   < > 105   CO2 27 25 27 26 27   < > 24   ANIONGAP 4 5 6 6 7   < > 9   GLC 99 97 102* 121* 107*   < > 80   BUN 17 18 18 19 13   < > 17   CR 1.14* 1.31* 1.17* 1.06* 1.14*   < > 1.06*   GFRESTIMATED 64 54* 62 70 56*   < > 62   GFRESTBLACK 74 63 72 81 68   < > 74   LACHO 9.4 9.0 9.4 9.0 8.6   < > 9.3   PROTTOTAL  --  7.4  --   --  7.3  --  8.1   ALBUMIN  --  3.5  --   --  3.6  --  4.1   BILITOTAL  --  0.6  --   --  0.6  --  0.5   ALKPHOS  --  92  --   --  72  --  80   AST  --  21  --   --  26  --  38   ALT  --  51*  --   --  34  --  50    < > = values in this interval not displayed.     CBC  Recent Labs   Lab Test 12/13/19  0715  06/06/19  0752 01/25/19  1446 10/22/18  0737   WBC 7.5 7.7 8.6 6.5   RBC 4.72 5.06 4.97 4.68   HGB 13.3 14.1 13.6 13.2   HCT 40.9 44.2 42.2 40.1   MCV 87 87 85 86   MCH 28.2 27.9 27.4 28.2   MCHC 32.5 31.9 32.2 32.9   RDW 13.0 12.6 12.4 12.4    204 192 228     INRNo lab results found.  Recent Labs   Lab Test 12/13/19  0701   CHOL 186   HDL 42*   LDL 98   TRIG 228*       Most recent EKG: personally reviewed  1/17/20: NSR, J-point elevation in lateral leads, normal variant    Most recent ECHO: personally reviewed and discussed with the patient  1/17/20 TTE  Left ventricular function, chamber size, wall motion, and wall thickness are normal.The EF is 60-65%.  Right ventricular function, chamber size, wall motion, and thickness are normal.  No significant valvular pathology.  Bubble study is negative for shunt.    Most recent STRESS TEST:    NA    Most recent CARDIAC CATH:   NA

## 2020-04-17 ENCOUNTER — VIRTUAL VISIT (OUTPATIENT)
Dept: CARDIOLOGY | Facility: CLINIC | Age: 30
End: 2020-04-17
Attending: INTERNAL MEDICINE
Payer: COMMERCIAL

## 2020-04-17 DIAGNOSIS — R00.2 PALPITATIONS: ICD-10-CM

## 2020-04-17 DIAGNOSIS — R06.09 DYSPNEA ON EXERTION: Primary | ICD-10-CM

## 2020-04-17 DIAGNOSIS — I15.1 HYPERTENSION SECONDARY TO OTHER RENAL DISORDERS: ICD-10-CM

## 2020-04-17 DIAGNOSIS — Z48.298 AFTERCARE FOLLOWING ORGAN TRANSPLANT: ICD-10-CM

## 2020-04-17 PROCEDURE — 99214 OFFICE O/P EST MOD 30 MIN: CPT | Mod: 95 | Performed by: INTERNAL MEDICINE

## 2020-04-17 RX ORDER — CARVEDILOL 25 MG/1
25 TABLET ORAL 2 TIMES DAILY WITH MEALS
Qty: 180 TABLET | Refills: 3 | Status: SHIPPED | OUTPATIENT
Start: 2020-04-17 | End: 2020-08-20

## 2020-04-17 NOTE — PATIENT INSTRUCTIONS
"You were seen today in the Cardiovascular Clinic at the [facility].     Cardiology Providers you saw during your visit: Dr. Marium Urrutia     Diagnosis:   Encounter Diagnoses   Name Primary?     Palpitations      Dyspnea on exertion Yes     Hypertension secondary to other renal disorders      Aftercare following organ transplant         Results: Discussed with you today echo      Orders:   Orders Placed This Encounter   Procedures     Follow-Up with Cardiologist       Current Medication List  Current Outpatient Medications   Medication Sig Dispense Refill     carvedilol (COREG) 25 MG tablet Take 1 tablet (25 mg) by mouth 2 times daily (with meals) 180 tablet 3     MYFORTIC (BRAND) 180 MG EC tablet Take 3 tablets (540 mg) by mouth 2 times daily 540 tablet 3     NEORAL (BRAND) 25 MG capsule TAKE 4 CAPSULES BY MOUTH 2 TIMES A DAY (DAW1) 720 capsule 3     order for DME Equipment being ordered: Digital home blood pressure monitor kit with pulse indicator adult cuff 1 Device 0     Prenatal Vit-Fe Fumarate-FA (PRENATAL PO) Take by mouth daily       VITAMIN D, CHOLECALCIFEROL, PO Take 1,000 Units by mouth daily        etonogestrel (IMPLANON/NEXPLANON) 68 MG IMPL 1 each (68 mg) by Subdermal route once for 1 dose 1 each 0         Medications Discontinued:  Medications Discontinued During This Encounter   Medication Reason     carvedilol 25 MG PO tablet          Recommendations:   1. Increase carvedilol to 25mg BID.  2. Follow up with Dr. Marium Urrutia in 6 months       Please feel free to call me with any questions or concerns.       Jatinder Sorenson LPN     Questions: [phone].   First press #1 for [employer] and then press #4 for \"Medical Questions\" to reach us Cardiology Nurses.     [phone].   First press #1 for the [employer] and then press #1     On Call Cardiologist for after hours or on weekends: [phone]   option #4 and ask to speak to the on-call Cardiologist.          If you need " a medication refill please contact your pharmacy.  Please allow 3 business days for your refill to be completed.  ________________________________________________________________________________________________________________________________       MAR

## 2020-04-17 NOTE — NURSING NOTE
"Carol Guillaume is a 29 year old female who is being evaluated via a billable telephone visit.      The patient has been notified of following:     \"This telephone visit will be conducted via a call between you and your physician/provider. We have found that certain health care needs can be provided without the need for a physical exam.  This service lets us provide the care you need with a short phone conversation.  If a prescription is necessary we can send it directly to your pharmacy.  If lab work is needed we can place an order for that and you can then stop by our lab to have the test done at a later time.    Telephone visits are billed at different rates depending on your insurance coverage. During this emergency period, for some insurers they may be billed the same as an in-person visit.  Please reach out to your insurance provider with any questions.    If during the course of the call the physician/provider feels a telephone visit is not appropriate, you will not be charged for this service.\"    Patient has given verbal consent for Telephone visit?  Yes    How would you like to obtain your AVS? Mail a copy    Medications were reconciled.     Zahra Donald  1:17 PM      "

## 2020-06-05 ENCOUNTER — TELEPHONE (OUTPATIENT)
Dept: TRANSPLANT | Facility: CLINIC | Age: 30
End: 2020-06-05

## 2020-06-05 NOTE — TELEPHONE ENCOUNTER
Filling elsewhere?   Received: Yesterday   Message Contents   Mackenzie Vasquez, RIANNA Hoffmann, Linda Vora, ALEX Mckeon!     Do you know if Carol is filling at another pharmacy?  We last filled her Neoral and MPA on 11/14/19 for a 90 days supply.  We have not filled her new dose of carvedilol of 25mg bid last sent on 4/17/20.     Mackenzie      OUTCOME: Call placed to patient with concern she was late to re-fill her medications: Neoral and Myfortic. Asking for an update; Reminded she is due for labs on VM message as well. Of note, patient had an appointment 2/20/20 to discuss pre-pregnancy planning with Dr. Joy:    # Immunosuppression: Cyclosporine (goal ) and Mycophenolic acid (goal not followed)              - Changes: No               - Changes will be made after cyclosporine level is updated/optimized and patient is within 10 pounds of goal weight, as determined by weight management clinic and high risk maternal - fetal medicine.                           - Will stop MPA and start a 5 day regimen of 10 mg prednisone.                           - Will start 2 mg/kg Imuran daily, overlapping with the 5 day prednisone and 1 day MPA.

## 2020-06-09 NOTE — TELEPHONE ENCOUNTER
Unable to Reach Carol on cell regarding late to refill immunosuppressant medication notice from pharmacy.

## 2020-06-15 NOTE — TELEPHONE ENCOUNTER
"Carol called in to report she refilled prescriptions for immunosuppressant medications today. She reports this happens at least once a year (referring to being told she is late to fill) but she is taking her medications. She is aware she is overdue for lab-work due to COVID-19 concerns and her immunosuppressed status. She reports her appts to see high risk pregnancy provider were cancelled and she was to have her birth control \"Nexplanon\" implant removed in May but that was considered non-essential. She is close to goal on the weight loss management. She is aware of need to update coordinator as plans move forward.    "

## 2020-06-16 ENCOUNTER — TELEPHONE (OUTPATIENT)
Dept: FAMILY MEDICINE | Facility: CLINIC | Age: 30
End: 2020-06-16

## 2020-06-16 ENCOUNTER — NURSE TRIAGE (OUTPATIENT)
Dept: NURSING | Facility: CLINIC | Age: 30
End: 2020-06-16

## 2020-06-16 DIAGNOSIS — Z97.5 NEXPLANON IN PLACE: Primary | ICD-10-CM

## 2020-06-16 NOTE — TELEPHONE ENCOUNTER
Per Patient is returning a call from Dr. Shi and wanting to get a call back, Please Advise. Patient states she is able to answer to call before 1:00pm today or after 4:00pm today

## 2020-06-16 NOTE — TELEPHONE ENCOUNTER
[facility] Call Center    Phone Message    May a detailed message be left on voicemail: yes     Reason for Call: Other: Pt has birth control implant. No cycle for 1.5 years. Last two days heavy menstruation. Please call pt to discuss.      Action Taken: Message routed to:  [facility] ([facility]    Travel Screening: Not Applicable

## 2020-06-16 NOTE — TELEPHONE ENCOUNTER
"    Additional Information    Negative: Shock suspected (e.g., cold/pale/clammy skin, too weak to stand, low BP, rapid pulse)    Negative: Difficult to awaken or acting confused (e.g., disoriented, slurred speech)    Negative: Passed out (i.e., lost consciousness, collapsed and was not responding)    Negative: Sounds like a life-threatening emergency to the triager    Negative: Followed a genital area injury    Negative: Pregnant > 20 weeks  (5 months or more)    Negative: Pregnant < 20 weeks  (less than 5 months)    Negative: Postpartum (from 0 to 6 weeks after delivery)    Negative: Bleeding occurring > 12 months after menopause    Negative: Bleeding from sexual abuse or rape    Negative: [1] Vaginal discharge is main symptom AND [2] small amount of blood    Negative: SEVERE abdominal pain    Negative: SEVERE dizziness (e.g., unable to stand, requires support to walk, feels like passing out now)    Negative: SEVERE vaginal bleeding (i.e., soaking 2 pads or tampons per hour and present 2 or more hours; 1 menstrual cup every 2 hours)    Negative: Patient sounds very sick or weak to the triager    MODERATE vaginal bleeding (i.e., soaking 1 pad or tampon per hour and present > 6 hours; 1 menstrual cup every 6 hours)    Answer Assessment - Initial Assessment Questions  1. AMOUNT: \"Describe the bleeding that you are having.\"     - SPOTTING: spotting, or pinkish / brownish mucous discharge; does not fill panti-liner or pad     - MILD:  less than 1 pad / hour; less than patient's usual menstrual bleeding    - MODERATE: 1-2 pads / hour; 1 menstrual cup every 6 hours; small-medium blood clots (e.g., pea, grape, small coin)    - SEVERE: soaking 2 or more pads/hour for 2 or more hours; 1 menstrual cup every 2 hours; bleeding not contained by pads or continuous red blood from vagina; large blood clots (e.g., golf ball, large coin)       Mild-moderate  2. ONSET: \"When did the bleeding begin?\" \"Is it continuing now?\"      Last " "night  3. MENSTRUAL PERIOD: \"When was the last normal menstrual period?\" \"How is this different than your period?\"      No periods with nexplanon for 1.5 years, this is heavier than her normal were  4. REGULARITY: \"How regular are your periods?\"      n/a  5. ABDOMINAL PAIN: \"Do you have any pain?\" \"How bad is the pain?\"  (e.g., Scale 1-10; mild, moderate, or severe)    - MILD (1-3): doesn't interfere with normal activities, abdomen soft and not tender to touch     - MODERATE (4-7): interferes with normal activities or awakens from sleep, tender to touch     - SEVERE (8-10): excruciating pain, doubled over, unable to do any normal activities       no  6. PREGNANCY: \"Could you be pregnant?\" \"Are you sexually active?\" \"Did you recently give birth?\"      no  7. BREASTFEEDING: \"Are you breastfeeding?\"      no  8. HORMONES: \"Are you taking any hormone medications, prescription or OTC?\" (e.g., birth control pills, estrogen)      no  9. BLOOD THINNERS: \"Do you take any blood thinners?\" (e.g., Coumadin/warfarin, Pradaxa/dabigatran, aspirin)      no  10. CAUSE: \"What do you think is causing the bleeding?\" (e.g., recent gyn surgery, recent gyn procedure; known bleeding disorder, cervical cancer, polycystic ovarian disease, fibroids)          ?  11. HEMODYNAMIC STATUS: \"Are you weak or feeling lightheaded?\" If so, ask: \"Can you stand and walk normally?\"         Ok   12. OTHER SYMPTOMS: \"What other symptoms are you having with the bleeding?\" (e.g., passed tissue, vaginal discharge, fever, menstrual-type cramps)        no    Protocols used: VAGINAL BLEEDING - [id number]      "

## 2020-06-16 NOTE — TELEPHONE ENCOUNTER
June 16, 2020 10:23 AM   I tried to call her. Left number to call back with time frame and number I may reach her. It is acceptable to have vaginal bleeding intermittently with Nexplanon use.   Please indicate if she has further questions.  Best wishes,  Danny Shi MD    She has a heavy menstrual flow yesterday last menses 12/2018. I reviewed  She may have spotting to normal menses with the device. She does not have signs of pregnancy.  Offered to place order for urine pregnancy if any concerns but she does not need to complete this test as she is having normal symptoms.  Danny Shi MD

## 2020-06-18 ENCOUNTER — NURSE TRIAGE (OUTPATIENT)
Dept: NURSING | Facility: CLINIC | Age: 30
End: 2020-06-18

## 2020-06-19 ENCOUNTER — NURSE TRIAGE (OUTPATIENT)
Dept: NURSING | Facility: CLINIC | Age: 30
End: 2020-06-19

## 2020-06-19 ENCOUNTER — TRANSFERRED RECORDS (OUTPATIENT)
Dept: HEALTH INFORMATION MANAGEMENT | Facility: CLINIC | Age: 30
End: 2020-06-19

## 2020-06-19 NOTE — TELEPHONE ENCOUNTER
She has the Nexlon implant implant, and did not have a period for 18 months and started her period on Monday and it has been a heavy flow. She is going through 1 to 2 pads in a 1 to 2 hours and soaking through her cloths. She has occasional light headedness nothing constant. Undecided if she will go in or not right now, but knows she needs to be seen if she continues to bleed heavy or becomes lightheaded again.    Melita Malhotra RN/ [facility]        Reason for Disposition    SEVERE vaginal bleeding (i.e., soaking 2 pads or tampons per hour and present 2 or more hours; 1 menstrual cup every 2 hours)    Additional Information    Negative: Shock suspected (e.g., cold/pale/clammy skin, too weak to stand, low BP, rapid pulse)    Negative: Difficult to awaken or acting confused (e.g., disoriented, slurred speech)    Negative: Passed out (i.e., lost consciousness, collapsed and was not responding)    Negative: Sounds like a life-threatening emergency to the triager    Negative: SEVERE abdominal pain    Negative: SEVERE dizziness (e.g., unable to stand, requires support to walk, feels like passing out now)    Protocols used: VAGINAL BLEEDING - [id number]

## 2020-06-19 NOTE — TELEPHONE ENCOUNTER
"Going to ED for moderate-heavy vaginal bleeding since 6/15    Additional Information    Negative: SEVERE vaginal bleeding (e.g., continuous red blood from vagina, or large blood clots) and very weak (can't stand)    Negative: Passed out (i.e., fainted, collapsed and was not responding)    Negative: Difficult to awaken or acting confused (e.g., disoriented, slurred speech)    Negative: Shock suspected (e.g., cold/pale/clammy skin, too weak to stand, low BP, rapid pulse)    Negative: Sounds like a life-threatening emergency to the triager    Negative: Pregnant > 20 weeks (5 months or more)    Negative: Pregnant < 20 weeks (less than 5 months)    Negative: Postpartum (from 0 to 6 weeks after delivery)    Negative: Vaginal discharge is the main symptom and bleeding is slight    Negative: SEVERE abdominal pain (e.g., excruciating)    Negative: SEVERE dizziness (e.g., unable to stand, requires support to walk, feels like passing out now)    MODERATE vaginal bleeding (i.e., soaking pad or tampon per hour and present > 6 hours; 1 menstrual cup every 6 hours)    Answer Assessment - Initial Assessment Questions  1. AMOUNT: \"Describe the bleeding that you are having.\"     - SPOTTING: spotting, or pinkish / brownish mucous discharge; does not fill panti-liner or pad     - MILD:  less than 1 pad / hour; less than patient's usual menstrual bleeding    - MODERATE: 1-2 pads / hour; 1 menstrual cup every 6 hours; small-medium blood clots (e.g., pea, grape, small coin)    - SEVERE: soaking 2 or more pads/hour for 2 or more hours; 1 menstrual cup every 2 hours; bleeding not contained by pads or continuous red blood from vagina; large blood clots (e.g., golf ball, large coin)       Today is moderate with what she thinks may be clots, she is visually impaired.  Did have something squishy gel like this morning, large in size.  Yesterday for 2 hours between 1-3pm was soaking 1 pad every 30 minutes  2. ONSET: \"When did the bleeding begin?\" " "\"Is it continuing now?\"      Started Monday 6/15/20  3. MENSTRUAL PERIOD: \"When was the last normal menstrual period?\" \"How is this different than your period?\"      Hasn't had menstral cycle since 1/2019, does have nexplanon in place, placed 10/2018  4. REGULARITY: \"How regular are your periods?\"      N/A  5. ABDOMINAL PAIN: \"Do you have any pain?\" \"How bad is the pain?\"  (e.g., Scale 1-10; mild, moderate, or severe)    - MILD (1-3): doesn't interfere with normal activities, abdomen soft and not tender to touch     - MODERATE (4-7): interferes with normal activities or awakens from sleep, tender to touch     - SEVERE (8-10): excruciating pain, doubled over, unable to do any normal activities       Had 9/10 pain yesterday which needed to take tylenol for.  Denies pain/cramping at this time  6. PREGNANCY: \"Could you be pregnant?\" \"Are you sexually active?\" \"Did you recently give birth?\"      Doesn't think could be pregnant as has nexplanon in place.  Is sexually active  7. BREASTFEEDING: \"Are you breastfeeding?\"      N/A  8. HORMONES: \"Are you taking any hormone medications, prescription or OTC?\" (e.g., birth control pills, estrogen)      Nexplanon  9. BLOOD THINNERS: \"Do you take any blood thinners?\" (e.g., Coumadin/warfarin, Pradaxa/dabigatran, aspirin)      No  10. CAUSE: \"What do you think is causing the bleeding?\" (e.g., recent gyn surgery, recent gyn procedure; known bleeding disorder, cervical cancer, polycystic ovarian disease, fibroids)          Doesn't know  11. HEMODYNAMIC STATUS: \"Are you weak or feeling lightheaded?\" If so, ask: \"Can you stand and walk normally?\"         Yesterday felt dizzy when stood up during the 2 hour time frame when was soaking a pad every half hour, denies dizziness today, denies shortness of breath,  did not say anything to her this morning about looking pale, skin is warm and dry  12. OTHER SYMPTOMS: \"What other symptoms are you having with the bleeding?\" (e.g., passed " "tissue, vaginal discharge, fever, menstrual-type cramps)        Denies fever, Monday and Thursday felt \"like I was swimming in blood\"    Protocols used: VAGINAL BLEEDING - [id number]      "

## 2020-06-23 ENCOUNTER — TELEPHONE (OUTPATIENT)
Dept: TRANSPLANT | Facility: CLINIC | Age: 30
End: 2020-06-23

## 2020-06-23 DIAGNOSIS — N39.0 URINARY TRACT INFECTION: ICD-10-CM

## 2020-06-23 DIAGNOSIS — Z94.0 KIDNEY TRANSPLANTED: Primary | ICD-10-CM

## 2020-06-23 DIAGNOSIS — Z48.298 AFTERCARE FOLLOWING ORGAN TRANSPLANT: ICD-10-CM

## 2020-06-23 NOTE — TELEPHONE ENCOUNTER
Pt has an upcming GYN appointment  She wants to review oour recommendations so when she is there she has our optios  Now she has an implant in place  Has had her menses for a wk   Needs to review pregnancy and meds

## 2020-06-23 NOTE — TELEPHONE ENCOUNTER
Patient Call: Voicemail  Date/Time: 6/22/20 @ 3:30 PM  Reason for call: recommendation on contreception

## 2020-06-24 NOTE — TELEPHONE ENCOUNTER
Reports her Neplanon birthcontrol implant may not be working properly. Informed to use at least 2 methods of birthcontrol. Still on mycophenolate. Sees GYN first week of July to discuss removal and for recommendations. She was Lightheaded with heavy vaginal bleeding went to [facility] on 6/19. On iron. Asked about whether BMP was drawn while there or just a CBC. Hgb 11.9. UA/UC done. Denies signs and symptoms of UTI. She states no one followed up on Urine culture results. She was not prescribed antibiotics. Staff message to Dr. Joy to review her UA/UC.

## 2020-06-26 RX ORDER — CEFPODOXIME PROXETIL 100 MG/1
100 TABLET, FILM COATED ORAL 2 TIMES DAILY
Qty: 14 TABLET | Refills: 0 | Status: SHIPPED | OUTPATIENT
Start: 2020-06-26 | End: 2020-07-03

## 2020-06-26 NOTE — TELEPHONE ENCOUNTER
Per Dr. Joy:  UA was abnormal with > 100 WBC and RBC; culture grew group B strep    - if the culture is positive I almost always treat specially if they have symptoms and I am sure its not contaminant. If no allergies to cefpodoxime start 100 mg po bid for 7 days     OUTCOME: Prescription sent to [facility] for positive urine culture. BMP & UA/UC ordered to be done after completion.

## 2020-07-08 ENCOUNTER — TELEPHONE (OUTPATIENT)
Dept: TRANSPLANT | Facility: CLINIC | Age: 30
End: 2020-07-08

## 2020-07-08 NOTE — TELEPHONE ENCOUNTER
Last Hemoglobin was when patient went to ED for vaginal bleeding per chart review. Returned call to Carol.    She seen GYN on Monday 7/6/20: removed birth control. Starting maxroprogestrone (sp?). Hgb down in clinic 10.2. Started on Iron pill 45 mg 2 weeks ago.     Next Monday 7/13/20 has pelvic ultrasound to r/o cyst or tear.     Wonders if kidney could also be cause of low hemoglobin. Carol notes no swelling or edema.     Anemia referral not appropriate for acute bleeding. Follow up with Gynecology to find cause; patient agrees with this. Patient has not yet started birthcontrol pills. Prescription just received by [facility] yesterday per Carol's report.     ___________________________________________

## 2020-08-14 ENCOUNTER — PRE VISIT (OUTPATIENT)
Dept: MATERNAL FETAL MEDICINE | Facility: CLINIC | Age: 30
End: 2020-08-14

## 2020-08-17 DIAGNOSIS — Z79.899 ENCOUNTER FOR LONG-TERM CURRENT USE OF MEDICATION: ICD-10-CM

## 2020-08-17 DIAGNOSIS — N39.0 URINARY TRACT INFECTION: ICD-10-CM

## 2020-08-17 DIAGNOSIS — Z94.0 KIDNEY TRANSPLANTED: ICD-10-CM

## 2020-08-17 DIAGNOSIS — Z97.5 NEXPLANON IN PLACE: ICD-10-CM

## 2020-08-17 DIAGNOSIS — Z48.298 AFTERCARE FOLLOWING ORGAN TRANSPLANT: ICD-10-CM

## 2020-08-17 DIAGNOSIS — Z94.0 KIDNEY REPLACED BY TRANSPLANT: ICD-10-CM

## 2020-08-17 LAB
ALBUMIN UR-MCNC: >499 MG/DL
ANION GAP SERPL CALCULATED.3IONS-SCNC: 7 MMOL/L (ref 3–14)
APPEARANCE UR: ABNORMAL
BACTERIA #/AREA URNS HPF: ABNORMAL /HPF
BILIRUB UR QL STRIP: NEGATIVE
BUN SERPL-MCNC: 25 MG/DL (ref 7–30)
CALCIUM SERPL-MCNC: 9 MG/DL (ref 8.5–10.1)
CHLORIDE SERPL-SCNC: 105 MMOL/L (ref 94–109)
CO2 SERPL-SCNC: 27 MMOL/L (ref 20–32)
COLOR UR AUTO: YELLOW
CREAT SERPL-MCNC: 1.6 MG/DL (ref 0.52–1.04)
CYCLOSPORINE BLD LC/MS/MS-MCNC: 208 UG/L (ref 50–400)
ERYTHROCYTE [DISTWIDTH] IN BLOOD BY AUTOMATED COUNT: 12.3 % (ref 10–15)
GFR SERPL CREATININE-BSD FRML MDRD: 42 ML/MIN/{1.73_M2}
GLUCOSE SERPL-MCNC: 111 MG/DL (ref 70–99)
GLUCOSE UR STRIP-MCNC: NEGATIVE MG/DL
HCG UR QL: NEGATIVE
HCT VFR BLD AUTO: 38.6 % (ref 35–47)
HGB BLD-MCNC: 12.3 G/DL (ref 11.7–15.7)
HGB UR QL STRIP: ABNORMAL
HYALINE CASTS #/AREA URNS LPF: 4 /LPF (ref 0–2)
KETONES UR STRIP-MCNC: NEGATIVE MG/DL
LEUKOCYTE ESTERASE UR QL STRIP: ABNORMAL
MCH RBC QN AUTO: 27.7 PG (ref 26.5–33)
MCHC RBC AUTO-ENTMCNC: 31.9 G/DL (ref 31.5–36.5)
MCV RBC AUTO: 87 FL (ref 78–100)
MUCOUS THREADS #/AREA URNS LPF: PRESENT /LPF
NITRATE UR QL: NEGATIVE
PH UR STRIP: 5 PH (ref 5–7)
PLATELET # BLD AUTO: 213 10E9/L (ref 150–450)
POTASSIUM SERPL-SCNC: 4.1 MMOL/L (ref 3.4–5.3)
RBC # BLD AUTO: 4.44 10E12/L (ref 3.8–5.2)
RBC #/AREA URNS AUTO: 4 /HPF (ref 0–2)
SODIUM SERPL-SCNC: 139 MMOL/L (ref 133–144)
SOURCE: ABNORMAL
SP GR UR STRIP: 1.01 (ref 1–1.03)
SQUAMOUS #/AREA URNS AUTO: 4 /HPF (ref 0–1)
TME LAST DOSE: NORMAL H
UROBILINOGEN UR STRIP-MCNC: 0 MG/DL (ref 0–2)
WBC # BLD AUTO: 7.8 10E9/L (ref 4–11)
WBC #/AREA URNS AUTO: 14 /HPF (ref 0–5)

## 2020-08-18 ENCOUNTER — TELEPHONE (OUTPATIENT)
Dept: TRANSPLANT | Facility: CLINIC | Age: 30
End: 2020-08-18

## 2020-08-18 DIAGNOSIS — N39.0 URINARY TRACT INFECTION: Primary | ICD-10-CM

## 2020-08-18 DIAGNOSIS — Z94.0 KIDNEY REPLACED BY TRANSPLANT: ICD-10-CM

## 2020-08-18 LAB
BACTERIA SPEC CULT: ABNORMAL
BACTERIA SPEC CULT: ABNORMAL
Lab: ABNORMAL
SPECIMEN SOURCE: ABNORMAL

## 2020-08-18 RX ORDER — CYCLOSPORINE 25 MG/1
75 CAPSULE, LIQUID FILLED ORAL 2 TIMES DAILY
Qty: 540 CAPSULE | Refills: 3 | Status: SHIPPED | OUTPATIENT
Start: 2020-08-18 | End: 2021-08-20

## 2020-08-18 NOTE — TELEPHONE ENCOUNTER
Call placed to patient. Patient confirms current dose and accurate trough level. Denies any recent illness or s\s of UTI. No that she recently switched birth control pills. Patient v\u to improve hydration; decrease CSA to 75 mg bid and repeat labs in 1-2 weeks.

## 2020-08-18 NOTE — LETTER
PHYSICIAN ORDERS      DATE & TIME ISSUED: 2020 1:55 PM  PATIENT NAME: Carol Guillaume   : 1990     [facility] MR# [if applicable]: [id number]     DIAGNOSIS:  Kidney transplant   ICD-10 CODE: Z94.0      Please complete the following labs in 1-2 weeks  Cyclosporine level (ensure 12 hours between last dose and blood draw)  BMP    Any questions please call: [phone] option 5    please fax results to [phone].    .

## 2020-08-18 NOTE — TELEPHONE ENCOUNTER
ISSUE:   Cyclosporine level 208 on 8/17/20 at 0717, goal , dose Neoral brand 100 mg BID. Last dose recorded as 8/16/20 at 0730 ap?    Creatinine elevated 1.60; baseline 1.1-1.3  Urinalysis; Urine culture pending. (This order was placed end of June, but just now collected?) Will discuss need for antibiotic with provider.    PLAN:   Please call patient and confirm this was an accurate 12-hour trough. Verify Cyclosporine dose 100 mg BID. Confirm no new medications or illness. Confirm no missed doses. If accurate trough and accurate dose, decrease Cyclosporine dose to 75 mg BID and repeat labs in 1-2 weeks.     What symptoms is the patient having that she had UA/UC collected?  Please assess for s/s of UTI/Kidney Infection:  Fever?  Fatigue/Malaise?  Dysuria/Difficulty voiding?   Retention/inability to empty bladder fully?  Bladder spasm/Abdominal discomfort? Flank/lower back discomfort?  Nausea/Vomiting?  Frequency/Increased urge to urinate?  Cloudy/dark/bloody urine?  Odor/Foul Smelling urine?  Confusion/Falls?    Ensure to hydrate and repeat labs in 1-2 weeks.     Linda Hoffmann, RN, BSN  Solid Organ Transplant, Post Kidney and Pancreas  Transplant Care Coordinator  [phone] option 5

## 2020-08-19 NOTE — PROGRESS NOTES
Maternal-Fetal Medicine Consultation  Requesting Provider Dr. JASON Joy, Nephrology  Performing Provider Dr. BREANA Benítez, [facility]    Carol Guillaume  : 1990  MRN: [id number]    REFERRAL:  Carol Guillaume is a 29 year old sent by Dr. Joy from Nephrology clinic for MFM preconception consultation.    I spent a total of 30 minutes face to face with Carol during today's office   Visit. Over 50% of this time was spent counseling the patient and/or coordinating care regarding pregnancy in the context of a kidney transplant and chronic hypertension as well as an autosomal recessive genetic disorder.       HPI:  Carol Guillaume is a 29 year old G0 here for MFM consultation.    She presents alone today.    She has a history of living donor kidney transplant in  from her sister. 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 She is currently on mycophenolate and cyclosporine and denies history of of graft rejection. Her baseline creatinine is 1.1-1.3. She has an appointment with Dr. Joy, transplant nephrology, to discuss altering her medications. She also has a history of hypertension and was previously on atenolol. She was switched to Carvedilol 25 mg BID and reports good blood pressure control. She also has a history of palpitations for which she was seen by cardiology and underwent normal echocardiogram. She states that her symptoms have overall improved, but does note she has had chest pain on some of her medications before, with a negative workup.     Patient was previously using Nexplanon for contraception, but this was removed in  when she had heavy vaginal bleeding. She is on birth control pills at this time, but reports that she does miss pills occasionally. She is not currently on prenatal vitamins.      She was planning on stopping her mycophenolate earlier, but the pandemic  complicated things. She is aware that she needs reliable contraception with this medication due to significant teratogenic effects. We have recommended barrier contraception in the context of her challenges with taking her progesterone birth control regularly.     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 Auth Provider   amoxicillin-clavulanate (AUGMENTIN) 875-125 MG tablet Take 1 tablet by mouth 2 times daily for 10 days   Mykel Joy MD   carvedilol (COREG) 25 MG tablet Take 1 tablet (25 mg) by mouth 2 times daily (with meals)   El Huber MD   etonogestrel (IMPLANON/NEXPLANON) 68 MG IMPL 1 each (68 mg) by Subdermal route once for 1 dose Taking  Danny Shi MD   MYFORTIC (BRAND) 180 MG EC tablet Take 3 tablets (540 mg) by mouth 2 times daily Taking  Crhis Parker MD   NEORAL (BRAND) 25  MG capsule Take 3 capsules (75 mg) by mouth 2 times daily   Mykel Joy MD   order for DME Equipment being ordered: Digital home blood pressure monitor kit with pulse indicator adult cuff Taking  Danny Shi MD   Prenatal Vit-Fe Fumarate-FA (PRENATAL PO) Take by mouth daily Taking  Reported, Patient   VITAMIN D, CHOLECALCIFEROL, PO Take 1,000 Units by mouth daily  Taking  Reported, Patient       Allergies:  Patient has no known allergies.    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 Lives with  and niece who they have adopted, as well as a cat. Feels safe at home.   Denies use of alcohol, drugs or smoking.    Family History:  Family History   Problem Relation Age of Onset     Skin Cancer Paternal Grandfather         melanoma     Hyperlipidemia Mother      Parkinsonism Other         paternal uncle     Atrial fibrillation Paternal Grandmother      Denies history of genetic disorders, preeclampsia, thromboembolic disease, bleeding disorders, mental retardation    Partner History:  Age: 33  Has cerebral palsy diagnosed age 5, history of cardiomyopathy    ROS:  10-point ROS negative except as in HPI     PHYSICAL EXAM:  Deferred     ASSESSMENT:  Carol Guillaume is a 29 year old G0 here for MFM consultation regarding history of kidney transplant.    History of Kidney Transplant  Patients with kidney disease and kidney transplant have special considerations in pregnancy and these patients are considered high risk. Pregnancies in women with preexisting renal disease are significantly more likely to develop gestational hypertension, preeclampsia, eclampsia.  Increased risk of poor fetal outcomes such as  delivery and fetal growth restriction are present and can be related to immunosuppressant medication. These risks are increased in women with uncontrolled hypertension, though Baljinder blood pressure appears  well-controlled at this time.    An important consideration to how pregnancy will be affect graft function is presence of recent history of graft rejection. If there has been no graft rejection in the past 3 months then it is less likely that graft rejection will take place during pregnancy. Another indicator of how the pregnancy will have affect kidney function is the baseline kidney function prior to pregnancy. Creatinine less than 1.5 is associated with improved outcomes during pregnancy.     Another important consideration to preconception counseling is medication management. Carol is currently on mycophenolate, which is a class D medication. It is associated with spontaneous miscarriage, limb and facial anomalies, cardiac defects, and congenital diaphragmatic hernia. It is recommended that this medication be stopped at least 6 weeks prior to conception.    Cyclosporine is a calcineurin inhibitor, that is metabolized by cytochrome P450, which is upregulated in pregnancy. As a result of this as well as the increase volume of distribution, the dose of this medication generally increases % during pregnancy. It is associated with preeclampsia,  birth, and fetal growth restriction.       RECOMMENDATIONS:  History of Kidney Transplant    Reviewed that mycophenolate is contraindicated during pregnancy due to its teratogenicity. Reviewed that it is recommended to effectively abstain from pregnancy for ideally 3 months after cessation of this medication. Patient to discuss medication adjustment with Dr. Joy, transplant nephrology prior to making any adjustments to her medications. Per her appointment in 2020, patient will likely switch to Imuran and continue cyclosporine.    Continue close follow up with transplant nephrology    During pregnancy, recommend increased frequency of prenatal visits: every 2-4 weeks until the third trimester, then weekly thereafter    Consideration for initiation of  low dose aspirin for prevention of preeclampsia, if nephrology feels this is safe for her graft.    Early detection and treatment of asymptomatic bacteriuria with urine culture at least every trimester    Assessment of renal function every 4-8 weeks, or per nephrology recommendations    Monitor for signs and symptoms of preeclampsia    Monthly ultrasound for fetal growth after initial anatomy scan at 18 weeks    Weekly  surveillance with BPP (or NST and MVP) at 32 weeks  gestation     Delivery is indicated based on secondary sequelae (e.g. hypertension, preeclampsia, fetal growth restriction, fetal distress) and underlying disease; generally 37 to 39 weeks  gestation so as to avoid irreversible renal injury     reserved for obstetric indications. If this is indicated, will consult transplant surgery to be present at the time of the delivery to reduce risk of injury to the graft.    Once pregnant, patient to follow with [facility] team for her pregnancy care. Instructed to call our care coordinators [phone] with pregnancy.     This was discussed with Carol and all questions were answered to her satisfaction.       The patient was seen and evaluated with Dr. Benítez.    Thank you for allowing us to participate in the care of your patient. Please do not hesitate to contact us if you have further questions regarding the management of your patient.     I acted as a scribe for Dr. Benítez.    Garrett Han MD  Maternal Fetal Medicine Fellow    This note, as scribed, accurately reflects the examination, my impressions, and the plan as I discussed it with the patient.     Janice Benítez MD     Maternal Fetal Medicine  [facility] [phone]  [email or web address]

## 2020-08-19 NOTE — TELEPHONE ENCOUNTER
Message   Received: 2 days ago   Message Contents   Jonathan Ordonez MD Blaisdell, Linda Vora RN               Elevated serum creatinine and recommend good hydration and repeat labs.      UA/UC positive for 50,000-100,000 colonies/mL streptococcus agalactiae sero group B. Staff message sent to Dr. Joy for antibiotic orders.

## 2020-08-19 NOTE — TELEPHONE ENCOUNTER
RE: Antibiotic orders needed   Due: Today   Received: Today   Message Contents   Mykel Joy MD Blaisdell, Linda Vora, RN               Augmentin 875 mg po bid for 10 days     OUTCOME: Left VM message for Carol and [facility] prescription [facility]. Also notified Carol via [employer] message.

## 2020-08-20 ENCOUNTER — VIRTUAL VISIT (OUTPATIENT)
Dept: NEPHROLOGY | Facility: CLINIC | Age: 30
End: 2020-08-20
Attending: INTERNAL MEDICINE
Payer: COMMERCIAL

## 2020-08-20 ENCOUNTER — OFFICE VISIT (OUTPATIENT)
Dept: MATERNAL FETAL MEDICINE | Facility: CLINIC | Age: 30
End: 2020-08-20
Attending: INTERNAL MEDICINE
Payer: COMMERCIAL

## 2020-08-20 VITALS — WEIGHT: 269 LBS | TEMPERATURE: 98.2 F | BODY MASS INDEX: 43.42 KG/M2

## 2020-08-20 DIAGNOSIS — Q61.5 SENIOR-LOKEN SYNDROME: ICD-10-CM

## 2020-08-20 DIAGNOSIS — D84.9 IMMUNOSUPPRESSION (H): ICD-10-CM

## 2020-08-20 DIAGNOSIS — I15.1 HTN, KIDNEY TRANSPLANT RELATED: Primary | ICD-10-CM

## 2020-08-20 DIAGNOSIS — Q87.89 SENIOR-LOKEN SYNDROME: Primary | ICD-10-CM

## 2020-08-20 DIAGNOSIS — Z48.298 AFTERCARE FOLLOWING ORGAN TRANSPLANT: ICD-10-CM

## 2020-08-20 DIAGNOSIS — Q87.89 SENIOR-LOKEN SYNDROME: ICD-10-CM

## 2020-08-20 DIAGNOSIS — H35.50 SENIOR-LOKEN SYNDROME: Primary | ICD-10-CM

## 2020-08-20 DIAGNOSIS — Z94.0 HTN, KIDNEY TRANSPLANT RELATED: Primary | ICD-10-CM

## 2020-08-20 DIAGNOSIS — E55.9 VITAMIN D DEFICIENCY: ICD-10-CM

## 2020-08-20 DIAGNOSIS — H35.50 SENIOR-LOKEN SYNDROME: ICD-10-CM

## 2020-08-20 DIAGNOSIS — Z94.0 KIDNEY REPLACED BY TRANSPLANT: ICD-10-CM

## 2020-08-20 DIAGNOSIS — Z94.0 S/P KIDNEY TRANSPLANT: ICD-10-CM

## 2020-08-20 DIAGNOSIS — Q61.5 SENIOR-LOKEN SYNDROME: Primary | ICD-10-CM

## 2020-08-20 PROCEDURE — 40001009 ZZH VIDEO/TELEPHONE VISIT; NO CHARGE

## 2020-08-20 PROCEDURE — 96040 ZZH GENETIC COUNSELING, EACH 30 MINUTES: CPT | Mod: ZF | Performed by: GENETIC COUNSELOR, MS

## 2020-08-20 RX ORDER — CEPHALEXIN 500 MG/1
500 CAPSULE ORAL 3 TIMES DAILY
Qty: 30 CAPSULE | Refills: 0 | Status: SHIPPED | OUTPATIENT
Start: 2020-08-20 | End: 2020-08-20

## 2020-08-20 RX ORDER — LABETALOL 200 MG/1
200 TABLET, FILM COATED ORAL 2 TIMES DAILY
Qty: 180 TABLET | Refills: 3 | Status: SHIPPED | OUTPATIENT
Start: 2020-08-20 | End: 2020-11-19 | Stop reason: ALTCHOICE

## 2020-08-20 ASSESSMENT — PAIN SCALES - GENERAL: PAINLEVEL: SEVERE PAIN (6)

## 2020-08-20 NOTE — PROGRESS NOTES
"Carol Guillaume is a 29 year old female who is being evaluated via a billable video visit.      The patient has been notified of following:     \"This video visit will be conducted via a call between you and your physician/provider. We have found that certain health care needs can be provided without the need for an in-person physical exam.  This service lets us provide the care you need with a video conversation.  If a prescription is necessary we can send it directly to your pharmacy.  If lab work is needed we can place an order for that and you can then stop by our lab to have the test done at a later time.    Video visits are billed at different rates depending on your insurance coverage.  Please reach out to your insurance provider with any questions.    If during the course of the call the physician/provider feels a video visit is not appropriate, you will not be charged for this service.\"    Patient has given verbal consent for Video visit? Yes  How would you like to obtain your AVS? MyChart  Will anyone else be joining your video visit? No    Video-Visit Details    Type of service:  Video Visit    Video Start Time: 1608  Video End Time: 1638    Originating Location (pt. Location): Home    Distant Location (provider location):  [facility]     Platform used for Video Visit: [employer]    Jonathan Ordonez MD      CHRONIC TRANSPLANT NEPHROLOGY VISIT    Assessment & Plan   # LDKT: Increased creatinine at ~ 1.6 with last labs due to unclear etiology.  Patient's CSA level was increased from unclear reason.  Will repeat labs, along with UA and UPC and follow.  Would consider kidney transplant biopsy for creatinine 1.6 or higher.   - Baseline Cr ~ 1.1-1.3   - Proteinuria: Minimal (0.2-0.5 grams)   - Date DSA Last Checked: Aug/2006      Latest DSA: Not checked recently due to time from transplant   - BK Viremia: Not checked recently due to time from transplant   - Kidney Tx Biopsy: Jan 08, 2009; Result: No " Writer spoke with patient contact to inform patient need to be seen in clinic. Call dropped.    diagnostic evidence of acute rejection.  Unremarkable.    # Immunosuppression: Cyclosporine (goal ) and Mycophenolic acid (dose 540 mg every 12 hours)   - Changes: Not at this time due to higher creatinine and reassessment, but with patient wanting to become pregnant, discussed the need to change mycophenolate mofetil to azathioprine prior attempting to become pregnant.  When patient is ready for the change, would start azathioprine at 250 mg daily (~ 2 mg/kg).    # Infection Prophylaxis:   - PJP: None    # Hypertension: Borderline control;  Goal BP: < 130/80   - Changes: Yes - Will change carvedilol to labetalol 200 mg bid.    # Mineral Bone Disorder:   - Vitamin D; level: Low        On supplement: Yes  - Calcium; level: Normal        On supplement: No    # Obesity: Stable weight.   - Recommend weight loss for overall health by increasing exercise and watching caloric intake.    # Menorrhagia: Appears related to birth control and now stopped after changing to progesterone pill.  Followed by Gynecology.    # Pregnancy Planning: Patient is interested in trying to become pregnant.  She has met with high risk OB.  Discussed potential risk to the kidney and the baby with CKD in pregnancy.   - Patient was counseled to not try and become pregnant until recommended medication are made due to risk to the fetus.   - Will first recheck kidney function with recent mild ATILIO for unclear reason.   - Once ready to move forward, will change mycophenolate mofetil to azathioprine, as noted above.  Recommend patient be stable on new medication for at least three months prior to attempting to become pregnant.    # Skin Cancer Risk:    - Discussed sun protection and recommend regular follow up with Dermatology.    # Medical Compliance: Yes     # COVID-19 Virus Review: Discussed COVID-19 virus and the potential medical risks.  Reviewed preventative health recommendations, which includes washing hands for 20 seconds, avoid touching  your face, and social distancing.  Asked patient to inform the transplant center if they are exposed or diagnosed with this virus.    # Transplant History:  Etiology of Kidney Failure: Senior-Loken Syndrome  Tx: LDKT  Transplant: 7/18/2006 (Kidney)  Donor Type: Living Donor Class: Standard Criteria Donor  Significant changes in immunosuppression: Generic mycophenolate mofetil caused chest pain and heart palpitations  Significant transplant-related complications: None    Transplant Office Phone Number: [phone]    Assessment and plan was discussed with the patient and she voiced her understanding and agreement.    Return visit: Return in about 6 months (around 2/20/2021).    Jonathan Ordonez MD    Chief Complaint   Ms. Guillaume is a 29 year old here for kidney transplant and immunosuppression management.    History of Present Illness    Ms. Guillaume reports feeling good overall with some medical complaints.  She has been bothered by menorrhagia all through June with bleeding for 28 days straight and a drop in her hemoglobin.  She saw her Gynecologist and it was felt secondary to Nexplanon implant.  The Nexplanon was stopped and she was started on progesterone pill.  She has not had any menses since.    Her energy level is good, although a bit lower after recent menstrual bleeding, but had been normal.  She isn't too active and really gets minimal exercise.  Denies any chest pain, but some shortness of breath with exertion, which she feels is due to deconditioning.  Appetite is good and weight is stable.  No nausea, vomiting or diarrhea.  No fever, sweats or chills.  No leg swelling.    Recent Hospitalizations:  [x] No [] Yes    New Medical Issues: [] No [x] Yes See above   Decreased energy: [] No [x] Yes With recent menorrhagia   Chest pain or SOB with exertion:  [] No [x] Yes Some shortness of breath   Appetite change or weight change: [x] No [] Yes    Nausea, vomiting or diarrhea:  [x] No [] Yes    Fever, sweats  or chills: [x] No [] Yes    Leg swelling: [x] No [] Yes      Home BP: 130/90s    Review of Systems   A comprehensive review of systems was obtained and negative, except as noted in the HPI or PMH.    Problem List   Patient Active Problem List   Diagnosis     Kidney replaced by transplant     Senior-Loken syndrome     Pseudotumor cerebri     Legally blind     Retinitis pigmentosa     Vitamin D deficiency     Obesity     Insulin resistance     PCOS (polycystic ovarian syndrome)     Cataract     Palpitations     Dyslipidemia     HTN, kidney transplant related     Aftercare following organ transplant     Immunosuppression (H)       Social History   Social History     Tobacco Use     Smoking status: Never Smoker     Smokeless tobacco: Never Used   Substance Use Topics     Alcohol use: No     Drug use: No       Allergies   No Known Allergies    Medications   Current Outpatient Medications   Medication Sig     labetalol (NORMODYNE) 200 MG tablet Take 1 tablet (200 mg) by mouth 2 times daily     MYFORTIC (BRAND) 180 MG EC tablet Take 3 tablets (540 mg) by mouth 2 times daily     NEORAL (BRAND) 25 MG capsule Take 3 capsules (75 mg) by mouth 2 times daily     Prenatal Vit-Fe Fumarate-FA (PRENATAL PO) Take by mouth daily     VITAMIN D, CHOLECALCIFEROL, PO Take 1,000 Units by mouth daily      No current facility-administered medications for this visit.      Medications Discontinued During This Encounter   Medication Reason     etonogestrel (IMPLANON/NEXPLANON) 68 MG IMPL      order for DME      cephALEXin (KEFLEX) 500 MG capsule      carvedilol (COREG) 25 MG tablet        Physical Exam   Vital Signs: Temp 98.2  F (36.8  C) (Temporal)   Wt 122 kg (269 lb)   BMI 43.42 kg/m      GENERAL APPEARANCE: alert and no distress  HENT: no obvious abnormalities on appearance  RESP: breathing appears unremarkable with normal rate, no audible wheezing or cough and no apparent shortness of breath with conversation  MS: extremities normal -  no gross deformities noted  SKIN: no apparent rash and normal skin tone  NEURO: speech is clear with no obvious neurological deficits  PSYCH: mentation appears normal and affect normal    Data     Renal Latest Ref Rng & Units 8/17/2020 1/6/2020 12/13/2019   Na 133 - 144 mmol/L 139 139 138   Na (external) 135 - 145 mmol/L - - -   K 3.4 - 5.3 mmol/L 4.1 4.1 4.4   K (external) 3.5 - 5.0 mmol/L - - -   Cl 94 - 109 mmol/L 105 108 108   Cl (external) 98 - 110 mmol/L - - -   CO2 20 - 32 mmol/L 27 27 25   CO2 (external) 21 - 31 mmol/L - - -   BUN 7 - 30 mg/dL 25 17 18   BUN (external) 8 - 25 mg/dL - - -   Cr 0.52 - 1.04 mg/dL 1.60(H) 1.14(H) 1.31(H)   Cr (external) 0.57 - 1.11 mg/dL - - -   Glucose 70 - 99 mg/dL 111(H) 99 97   Glucose (external) 65 - 100 mg/dL - - -   Ca  8.5 - 10.1 mg/dL 9.0 9.4 9.0   Ca (external) 8.5 - 10.5 mg/dL - - -   Mg 1.6 - 2.3 mg/dL - - -     Bone Health Latest Ref Rng & Units 12/13/2019 1/30/2018 6/1/2009   Phos 2.5 - 4.5 mg/dL - - -   PTHi 18 - 80 pg/mL 36 30 65   Vit D Def 20 - 75 ug/L 26 32 -     Heme Latest Ref Rng & Units 8/17/2020 12/13/2019 6/6/2019   WBC 4.0 - 11.0 10e9/L 7.8 7.5 7.7   WBC (external) 4.5 - 11.0 thou/cu mm - - -   Hgb 11.7 - 15.7 g/dL 12.3 13.3 14.1   Hgb (external) 12.0 - 16.0 g/dL - - -   Plt 150 - 450 10e9/L 213 210 204   Plt (external) 140 - 440 thou/cu mm - - -   ABSOLUTE NEUTROPHIL 1.6 - 8.3 10e9/L - 3.3 -   ABSOLUTE LYMPHOCYTES 0.8 - 5.3 10e9/L - 3.4 -   ABSOLUTE MONOCYTES 0.0 - 1.3 10e9/L - 0.5 -   ABSOLUTE EOSINOPHILS 0.0 - 0.7 10e9/L - 0.1 -   ABSOLUTE BASOPHILS 0.0 - 0.2 10e9/L - 0.1 -   ABS IMMATURE GRANULOCYTES 0 - 0.4 10e9/L - 0.0 -   ABSOLUTE NUCLEATED RBC - - 0.0 -     Liver Latest Ref Rng & Units 12/13/2019 10/22/2018 1/31/2017   AP 40 - 150 U/L 92 72 80   TBili 0.2 - 1.3 mg/dL 0.6 0.6 0.5   ALT 0 - 50 U/L 51(H) 34 50   AST 0 - 45 U/L 21 26 38   Tot Protein 6.8 - 8.8 g/dL 7.4 7.3 8.1   Albumin 3.4 - 5.0 g/dL 3.5 3.6 4.1     Pancreas Latest Ref Rng & Units  7/26/2013 7/27/2012 6/3/2008   A1C 4.3 - 6.0 % 5.0 5.3 -   Amylase 30 - 110 U/L - - 58   Lipase 20 - 250 U/L - - -     Iron studies Latest Ref Rng & Units 1/30/2018 7/11/2006 6/26/2006   Iron 35 - 180 ug/dL 55 56 75   Iron sat 15 - 46 % 19 - -   Ferritin 12 - 150 ng/mL 19 473(H) 914(H)     [facility] Txp Virology Latest Ref Rng & Units 1/31/2017 8/29/2011 7/21/2011   CMV IgG EU/mL - - -   CMV IgM <0.90 - - -   CMV IgM Interp <0.90 - - -   CVM DNA Quant - Plasma, EDTA anticoagulant Whole blood, EDTA anticoagulant Whole blood, EDTA anticoagulant   CMV Quant <100 Copies/mL - <100  No CMV DNA detected. <100  No CMV DNA detected.   CMV QT Log <2.0 Log copies/mL - <2.0  The Cytomegalovirus DNA Quantitation assay is a real-time polymerase chain   reaction (PCR) utilizing analyte specific reagents manufactured by [employer]   [facility]. Analyte Specific Reagents (ASRs) are used in many laboratory   tests necessary for standard medical care and generally do not require FDA   approval.   This test was developed and its performance characteristics determined by   [facility].  It has not been   cleared or approved by the US Food and Drug Administration. <2.0  The Cytomegalovirus DNA Quantitation assay is a real-time polymerase chain   reaction (PCR) utilizing analyte specific reagents manufactured by [facility]   [facility]. Analyte Specific Reagents (ASRs) are used in many laboratory   tests necessary for standard medical care and generally do not require FDA   approval.   This test was developed and its performance characteristics determined by   [facility].  It has not been   cleared or approved by the US Food and Drug Administration.   CMV QUANT IU/ML CMVND [IU]/mL CMV DNA Not Detected   The BARBI AmpliPrep/BARBI TaqMan CMV Test is an FDA-approved in vitro nucleic   acid amplification test for the quantitation of cytomegalovirus DNA in human    plasma (EDTA plasma) using the [employer] AmpliPrep Instrument for automated viral   nucleic acid extraction and the [employer] TaqMan Analyzer or BARBI TaqMan for   automated Real Time amplification and detection of the viral nucleic acid   target.   Titer results are reported in International Units/mL (IU/mL using 1st WHO   International standard for Human Cytomegalovirus for Nucleic Acid Amplification   based assays. The conversion factor between CMV DNA copis/mL (as defined by the   Roche BARBI TaqMan CMV test) and International Units is the CMV DNA   concentration in IU/mL x 1.1 copies/IU = CMV DNA in copies/mL.   This assay has received FDA approval for the testing of human plasma only. The   [facility] at the [facility], [place], has validated the pe  rformance characteristics of the Roche   CMV assay for plasma, bronchial alveolar lavage/wash and urine.   - -   LOG IU/ML OF CMVQNT <2.1 [Log:IU]/mL Not Calculated - -   BK Spec - - - -   BK Res <1000 copies/mL - - -   BK Log <3.0 Log copies/mL - - -   EBV IgG - - - -   EBV DNA COPIES/ML EBVNEG [Copies]/mL EBV DNA Not Detected - -   EBV DNA LOG OF COPIES <2.7 [Log:copies]/mL Not Calculated   The Real-Time quantitative EBV assay was developed and its performance   characteristics determined by the [facility] at   the [facility] in [place], [place].  The   primers and probes are Analyte Specific Reagents (ASRs) manufactured  by   [employer].   ASRs are used in many laboratory tests necessary for standard medical care and   generally do not require U.S. Food and Drug Administration approval.  The FDA   has determined that such clearance or approval is not necessary.  This test is   used for clinical purposes.  It should not be regarded as investigational or   research.   This laboratory is certified under the Clinical Laboratory Improvement   Amendments  of 1988 (CLIA-88) as qualified to perform high complexity clinical   laboratory testing.   The quantitative range of this assay is 500-22,500,00 copies/mL (2.7-7.4 log   copies/mL).  A negative result does not rule out the presence of PCR inhibitors     in the patient specimen or EBV DNA nucleic acid in concentrations below the   level of detection of the assay.  Inhibition may also lead to underestimation   of viral quantitation.   - -   Hep B Core NEG - - -   Hep B Surf 0.0 - 4.9 mIU/mL - - -   HIV 1&2 NEG - - -

## 2020-08-20 NOTE — LETTER
"8/20/2020       RE: Carol Guillaume  [address] [address]     Dear Colleague,    Thank you for referring your patient, Carol Guillaume, to the [facility] NEPHROLOGY at [facility]. Please see a copy of my visit note below.    Carol Guillaume is a 29 year old female who is being evaluated via a billable video visit.      The patient has been notified of following:     \"This video visit will be conducted via a call between you and your physician/provider. We have found that certain health care needs can be provided without the need for an in-person physical exam.  This service lets us provide the care you need with a video conversation.  If a prescription is necessary we can send it directly to your pharmacy.  If lab work is needed we can place an order for that and you can then stop by our lab to have the test done at a later time.    Video visits are billed at different rates depending on your insurance coverage.  Please reach out to your insurance provider with any questions.    If during the course of the call the physician/provider feels a video visit is not appropriate, you will not be charged for this service.\"    Patient has given verbal consent for Video visit? Yes  How would you like to obtain your AVS? MyChart  Will anyone else be joining your video visit? No    Video-Visit Details    Type of service:  Video Visit    Video Start Time: 1608  Video End Time: 1638    Originating Location (pt. Location): Home    Distant Location (provider location):  [facility] NEPHROLOGY     Platform used for Video Visit: [employer]    Jonathan Ordonez MD      CHRONIC TRANSPLANT NEPHROLOGY VISIT    Assessment & Plan   # LDKT: Increased creatinine at ~ 1.6 with last labs due to unclear etiology.  Patient's CSA level was increased from unclear reason.  Will repeat labs, along with UA and UPC and follow.  Would consider kidney transplant biopsy for creatinine 1.6 or higher.   - " Baseline Cr ~ 1.1-1.3   - Proteinuria: Minimal (0.2-0.5 grams)   - Date DSA Last Checked: Aug/2006      Latest DSA: Not checked recently due to time from transplant   - BK Viremia: Not checked recently due to time from transplant   - Kidney Tx Biopsy: Jan 08, 2009; Result: No diagnostic evidence of acute rejection.  Unremarkable.    # Immunosuppression: Cyclosporine (goal ) and Mycophenolic acid (dose 540 mg every 12 hours)   - Changes: Not at this time due to higher creatinine and reassessment, but with patient wanting to become pregnant, discussed the need to change mycophenolate mofetil to azathioprine prior attempting to become pregnant.  When patient is ready for the change, would start azathioprine at 250 mg daily (~ 2 mg/kg).    # Infection Prophylaxis:   - PJP: None    # Hypertension: Borderline control;  Goal BP: < 130/80   - Changes: Yes - Will change carvedilol to labetalol 200 mg bid.    # Mineral Bone Disorder:   - Vitamin D; level: Low        On supplement: Yes  - Calcium; level: Normal        On supplement: No    # Obesity: Stable weight.   - Recommend weight loss for overall health by increasing exercise and watching caloric intake.    # Menorrhagia: Appears related to birth control and now stopped after changing to progesterone pill.  Followed by Gynecology.    # Pregnancy Planning: Patient is interested in trying to become pregnant.  She has met with high risk OB.  Discussed potential risk to the kidney and the baby with CKD in pregnancy.   - Patient was counseled to not try and become pregnant until recommended medication are made due to risk to the fetus.   - Will first recheck kidney function with recent mild ATILIO for unclear reason.   - Once ready to move forward, will change mycophenolate mofetil to azathioprine, as noted above.  Recommend patient be stable on new medication for at least three months prior to attempting to become pregnant.    # Skin Cancer Risk:    - Discussed sun  protection and recommend regular follow up with Dermatology.    # Medical Compliance: Yes     # COVID-19 Virus Review: Discussed COVID-19 virus and the potential medical risks.  Reviewed preventative health recommendations, which includes washing hands for 20 seconds, avoid touching your face, and social distancing.  Asked patient to inform the transplant center if they are exposed or diagnosed with this virus.    # Transplant History:  Etiology of Kidney Failure: Senior-Loken Syndrome  Tx: LDKT  Transplant: 7/18/2006 (Kidney)  Donor Type: Living Donor Class: Standard Criteria Donor  Significant changes in immunosuppression: Generic mycophenolate mofetil caused chest pain and heart palpitations  Significant transplant-related complications: None    Transplant Office Phone Number: [phone]    Assessment and plan was discussed with the patient and she voiced her understanding and agreement.    Return visit: Return in about 6 months (around 2/20/2021).    Jonathan Ordonez MD    Chief Complaint   Ms. Guillaume is a 29 year old here for kidney transplant and immunosuppression management.    History of Present Illness    Ms. Guillaume reports feeling good overall with some medical complaints.  She has been bothered by menorrhagia all through June with bleeding for 28 days straight and a drop in her hemoglobin.  She saw her Gynecologist and it was felt secondary to Nexplanon implant.  The Nexplanon was stopped and she was started on progesterone pill.  She has not had any menses since.    Her energy level is good, although a bit lower after recent menstrual bleeding, but had been normal.  She isn't too active and really gets minimal exercise.  Denies any chest pain, but some shortness of breath with exertion, which she feels is due to deconditioning.  Appetite is good and weight is stable.  No nausea, vomiting or diarrhea.  No fever, sweats or chills.  No leg swelling.    Recent Hospitalizations:  [x] No [] Yes    New  Medical Issues: [] No [x] Yes See above   Decreased energy: [] No [x] Yes With recent menorrhagia   Chest pain or SOB with exertion:  [] No [x] Yes Some shortness of breath   Appetite change or weight change: [x] No [] Yes    Nausea, vomiting or diarrhea:  [x] No [] Yes    Fever, sweats or chills: [x] No [] Yes    Leg swelling: [x] No [] Yes      Home BP: 130/90s    Review of Systems   A comprehensive review of systems was obtained and negative, except as noted in the HPI or PMH.    Problem List   Patient Active Problem List   Diagnosis     Kidney replaced by transplant     Senior-Loken syndrome     Pseudotumor cerebri     Legally blind     Retinitis pigmentosa     Vitamin D deficiency     Obesity     Insulin resistance     PCOS (polycystic ovarian syndrome)     Cataract     Palpitations     Dyslipidemia     HTN, kidney transplant related     Aftercare following organ transplant     Immunosuppression (H)       Social History   Social History     Tobacco Use     Smoking status: Never Smoker     Smokeless tobacco: Never Used   Substance Use Topics     Alcohol use: No     Drug use: No       Allergies   No Known Allergies    Medications   Current Outpatient Medications   Medication Sig     labetalol (NORMODYNE) 200 MG tablet Take 1 tablet (200 mg) by mouth 2 times daily     MYFORTIC (BRAND) 180 MG EC tablet Take 3 tablets (540 mg) by mouth 2 times daily     NEORAL (BRAND) 25 MG capsule Take 3 capsules (75 mg) by mouth 2 times daily     Prenatal Vit-Fe Fumarate-FA (PRENATAL PO) Take by mouth daily     VITAMIN D, CHOLECALCIFEROL, PO Take 1,000 Units by mouth daily      No current facility-administered medications for this visit.      Medications Discontinued During This Encounter   Medication Reason     etonogestrel (IMPLANON/NEXPLANON) 68 MG IMPL      order for DME      cephALEXin (KEFLEX) 500 MG capsule      carvedilol (COREG) 25 MG tablet        Physical Exam   Vital Signs: Temp 98.2  F (36.8  C) (Temporal)   Wt 122  kg (269 lb)   BMI 43.42 kg/m      GENERAL APPEARANCE: alert and no distress  HENT: no obvious abnormalities on appearance  RESP: breathing appears unremarkable with normal rate, no audible wheezing or cough and no apparent shortness of breath with conversation  MS: extremities normal - no gross deformities noted  SKIN: no apparent rash and normal skin tone  NEURO: speech is clear with no obvious neurological deficits  PSYCH: mentation appears normal and affect normal    Data     Renal Latest Ref Rng & Units 8/17/2020 1/6/2020 12/13/2019   Na 133 - 144 mmol/L 139 139 138   Na (external) 135 - 145 mmol/L - - -   K 3.4 - 5.3 mmol/L 4.1 4.1 4.4   K (external) 3.5 - 5.0 mmol/L - - -   Cl 94 - 109 mmol/L 105 108 108   Cl (external) 98 - 110 mmol/L - - -   CO2 20 - 32 mmol/L 27 27 25   CO2 (external) 21 - 31 mmol/L - - -   BUN 7 - 30 mg/dL 25 17 18   BUN (external) 8 - 25 mg/dL - - -   Cr 0.52 - 1.04 mg/dL 1.60(H) 1.14(H) 1.31(H)   Cr (external) 0.57 - 1.11 mg/dL - - -   Glucose 70 - 99 mg/dL 111(H) 99 97   Glucose (external) 65 - 100 mg/dL - - -   Ca  8.5 - 10.1 mg/dL 9.0 9.4 9.0   Ca (external) 8.5 - 10.5 mg/dL - - -   Mg 1.6 - 2.3 mg/dL - - -     Bone Health Latest Ref Rng & Units 12/13/2019 1/30/2018 6/1/2009   Phos 2.5 - 4.5 mg/dL - - -   PTHi 18 - 80 pg/mL 36 30 65   Vit D Def 20 - 75 ug/L 26 32 -     Heme Latest Ref Rng & Units 8/17/2020 12/13/2019 6/6/2019   WBC 4.0 - 11.0 10e9/L 7.8 7.5 7.7   WBC (external) 4.5 - 11.0 thou/cu mm - - -   Hgb 11.7 - 15.7 g/dL 12.3 13.3 14.1   Hgb (external) 12.0 - 16.0 g/dL - - -   Plt 150 - 450 10e9/L 213 210 204   Plt (external) 140 - 440 thou/cu mm - - -   ABSOLUTE NEUTROPHIL 1.6 - 8.3 10e9/L - 3.3 -   ABSOLUTE LYMPHOCYTES 0.8 - 5.3 10e9/L - 3.4 -   ABSOLUTE MONOCYTES 0.0 - 1.3 10e9/L - 0.5 -   ABSOLUTE EOSINOPHILS 0.0 - 0.7 10e9/L - 0.1 -   ABSOLUTE BASOPHILS 0.0 - 0.2 10e9/L - 0.1 -   ABS IMMATURE GRANULOCYTES 0 - 0.4 10e9/L - 0.0 -   ABSOLUTE NUCLEATED RBC - - 0.0 -     Liver  Latest Ref Rng & Units 12/13/2019 10/22/2018 1/31/2017   AP 40 - 150 U/L 92 72 80   TBili 0.2 - 1.3 mg/dL 0.6 0.6 0.5   ALT 0 - 50 U/L 51(H) 34 50   AST 0 - 45 U/L 21 26 38   Tot Protein 6.8 - 8.8 g/dL 7.4 7.3 8.1   Albumin 3.4 - 5.0 g/dL 3.5 3.6 4.1     Pancreas Latest Ref Rng & Units 7/26/2013 7/27/2012 6/3/2008   A1C 4.3 - 6.0 % 5.0 5.3 -   Amylase 30 - 110 U/L - - 58   Lipase 20 - 250 U/L - - -     Iron studies Latest Ref Rng & Units 1/30/2018 7/11/2006 6/26/2006   Iron 35 - 180 ug/dL 55 56 75   Iron sat 15 - 46 % 19 - -   Ferritin 12 - 150 ng/mL 19 473(H) 914(H)     [facility] Txp Virology Latest Ref Rng & Units 1/31/2017 8/29/2011 7/21/2011   CMV IgG EU/mL - - -   CMV IgM <0.90 - - -   CMV IgM Interp <0.90 - - -   CVM DNA Quant - Plasma, EDTA anticoagulant Whole blood, EDTA anticoagulant Whole blood, EDTA anticoagulant   CMV Quant <100 Copies/mL - <100  No CMV DNA detected. <100  No CMV DNA detected.   CMV QT Log <2.0 Log copies/mL - <2.0  The Cytomegalovirus DNA Quantitation assay is a real-time polymerase chain   reaction (PCR) utilizing analyte specific reagents manufactured by [employer]   [facility]. Analyte Specific Reagents (ASRs) are used in many laboratory   tests necessary for standard medical care and generally do not require FDA   approval.   This test was developed and its performance characteristics determined by   [facility].  It has not been   cleared or approved by the US Food and Drug Administration. <2.0  The Cytomegalovirus DNA Quantitation assay is a real-time polymerase chain   reaction (PCR) utilizing analyte specific reagents manufactured by [employer]   [facility]. Analyte Specific Reagents (ASRs) are used in many laboratory   tests necessary for standard medical care and generally do not require FDA   approval.   This test was developed and its performance characteristics determined by   [facility].  It  has not been   cleared or approved by the US Food and Drug Administration.   CMV QUANT IU/ML CMVND [IU]/mL CMV DNA Not Detected   The BARBI AmpliPrep/BARBI TaqMan CMV Test is an FDA-approved in vitro nucleic   acid amplification test for the quantitation of cytomegalovirus DNA in human   plasma (EDTA plasma) using the [employer] AmpliPrep Instrument for automated viral   nucleic acid extraction and the BARBI TaqMan Analyzer or BARBI TaqMan for   automated Real Time amplification and detection of the viral nucleic acid   target.   Titer results are reported in International Units/mL (IU/mL using 1st WHO   International standard for Human Cytomegalovirus for Nucleic Acid Amplification   based assays. The conversion factor between CMV DNA copis/mL (as defined by the   Roche BARBI TaqMan CMV test) and International Units is the CMV DNA   concentration in IU/mL x 1.1 copies/IU = CMV DNA in copies/mL.   This assay has received FDA approval for the testing of human plasma only. The   Infectious Disease Diagnostic Laboratory at the [facility], [place], has validated the pe  rformance characteristics of the Roche   CMV assay for plasma, bronchial alveolar lavage/wash and urine.   - -   LOG IU/ML OF CMVQNT <2.1 [Log:IU]/mL Not Calculated - -   BK Spec - - - -   BK Res <1000 copies/mL - - -   BK Log <3.0 Log copies/mL - - -   EBV IgG - - - -   EBV DNA COPIES/ML EBVNEG [Copies]/mL EBV DNA Not Detected - -   EBV DNA LOG OF COPIES <2.7 [Log:copies]/mL Not Calculated   The Real-Time quantitative EBV assay was developed and its performance   characteristics determined by the Infectious Diseases Diagnostic Laboratory at   the [facility] in [place], [place].  The   primers and probes are Analyte Specific Reagents (ASRs) manufactured  by   [employer].   ASRs are used in many laboratory tests necessary for standard medical care and   generally do not require U.S. Food and Drug  Administration approval.  The FDA   has determined that such clearance or approval is not necessary.  This test is   used for clinical purposes.  It should not be regarded as investigational or   research.   This laboratory is certified under the Clinical Laboratory Improvement   Amendments of 1988 (CLIA-88) as qualified to perform high complexity clinical   laboratory testing.   The quantitative range of this assay is 500-22,500,00 copies/mL (2.7-7.4 log   copies/mL).  A negative result does not rule out the presence of PCR inhibitors     in the patient specimen or EBV DNA nucleic acid in concentrations below the   level of detection of the assay.  Inhibition may also lead to underestimation   of viral quantitation.   - -   Hep B Core NEG - - -   Hep B Surf 0.0 - 4.9 mIU/mL - - -   HIV 1&2 NEG - - -                    Again, thank you for allowing me to participate in the care of your patient.      Sincerely,    Kidney/Pancreas Recipient

## 2020-08-20 NOTE — PROGRESS NOTES
Pt presents to [facility] for a preconception consult due to hx of kidney transplant. Pt met with Gc. See separate note for today's discussion. Pt met with Dr. Benítez and Dr. Han. See note for today's consult discussion and recommendations. Questions answered. No further follow up at [facility] at this time. Pt discharged stable. Teetee Ahn RN

## 2020-08-20 NOTE — PROGRESS NOTES
[facility]  Genetic Counseling Consult    Patient: Carol Guillaume YOB: 1990   Date of Service: 20      Carol Guillaume was seen at [facility] for preconception genetic consultation to discuss pregnancy risks and management due to renal transplant in .       Impression/Plan:   Carol had a preconception genetic counseling encounter only. Carol's spouse, David was not present. However, they may be interested in carrier screening for him for Senior-Loken syndrome, of which Carol has a diagnosis. They are encouraged to contact us at any time to set up that testing.    Carol also had a Maternal Fetal Medicine consultation given her complex medical history . Please see that note for more details.     Pregnancy History:   /Parity: G0     Current Age: 29 year old  Carol and her partner, David, 33, are not actively trying for pregnancy at this point. However, she does admit to taking some oral contraceptive pills at inconsistent times. Prior Carol was on Nexapolon but needed to have it removed after complications.     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 This testing was performed in 2018 and confirmed a clinical diagnosis. 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 Carol's sister was the renal donor. Please see genetics notes from 2018.    Carol had a MFM consultation today to discuss her current medications and recommendations for a future pregnancy        Family History:   A three-generation pedigree was obtained, and is scanned under the  Media  tab.   The following significant findings were reported by Carol:    Carol's family  "history is largely unremarkable. Both Carol's mother and father have a history of cataract surgery. Her father also has a history of thyroid disease. Her mother has no siblings. Her mother's maternal uncle had a history of renal failure and recently passed away. His cause of renal failures seems unrelated to Carol's history.     Carol's partner, Julian Hernandez, has a history of healthy problems. He was diagnosed with cerebral palsy at age 3 from \"in utero event\" which causes some muscle coordination problems such as gripping a pencil or pulling his socks on. He also needed growth hormones pre-puberty and Carol is unsure if an evaluation ever explained the need for the hormones. He also has a diagnosis of ADHD. In addition, David was recently diagnosed with cardiomyopathy and now has a pacemaker. Carol also mentions that David's mother recently informed him she was told he could possibly not have children. Carol is unsure what his mother meant with this comment and thought maybe she was implying risks associated with the hormone treatments. They are planning to speak to this mother more. Per Carol, David is schedule to see someone in genetics soon, likely in cardiology. Given his complex medical history it is difficult to determine if the different health problems are related.       David's sister has a history of multiple mental health disorders. She has two children, ages 8 and 11. David and Carol have full custody of the younger daughter and partial custody of the older daughter. They are both healthy but recovering from childhood trauma.       David's mother has dystronia secondary to a blood clot. The remaining family history is negative for blood clots. His maternal grandfather  in his 40-50s from sudden cardiac complication. We discussed this could be related to David's cardiomyopathy.     David's paternal family history is significant for heart " attacks.       Otherwise, the reported family history is negative for multiple miscarriages, stillbirths, birth defects, mental retardation, known genetic conditions, and consanguinity.       Carrier Screening:   Expanded carrier screening for mutations in a large panel of genes associated with autosomal recessive conditions including cystic fibrosis, spinal muscular atrophy, and others, is now available.    The patient has declined the carrier screening options reviewed today. However she is aware these options are available at any time.    Carol and David are interested in having carrier screening for him specifically for Senior-Loken syndrome. She mentioned David has an upcoming appointment with cardiology genetics and he may ask for the testing there. We discussed that group may not be prepared to set up testing for that but they can notify us and MFM could always help with the carrier testing as well. We discussed that given the incidence of the condition at 1in 100,000 there is an approximately 1 in 136 or 0.3% chance for them to have a child with the condition. If David was found to be a carrier, the chance would increase to 25%. We briefly discussed that couples whom are both carrier for an autosotomal recessive condition can choose IVF to screen the embryos for the condition as well as test prenatally with diagnostic testing.        Risk Assessment for Chromosome Conditions:   The risk for fetal chromosome abnormalities increases with maternal age. We discussed briefly that Carol is in the low-risk category for aneuploidy right now and that chances increase with age. We did not discuss the conditions in detail, However, we did review what a typical genetic counseling visit is like during the first trimester of an active pregnancy.       At age 31 at delivery, the risk to have a baby with Down syndrome is 1 in 625.     At age 31 at delivery, the risk to have a baby with any chromosome  abnormality is 1 in 312.       At age 35 at delivery, the risk to have a baby with Down syndrome is 1 in 385.     At age 35 at delivery, the risk to have a baby with any chromosome abnormality is 1 in 202.       At age 37 at delivery, the risk to have a baby with Down syndrome is 1 in 227.    At age 37 at delivery, the risk to have a baby with any chromosome abnormality is 1 in 129.        Testing Options:   We briefly discussed the following options:   Non-invasive Prenatal Testing (NIPT)    Maternal plasma cell-free DNA testing; first trimester ultrasound with nuchal translucency and nasal bone assessment is recommended, when appropriate    Screens for fetal trisomy 21, trisomy 13, trisomy 18, and sex chromosome aneuploidy    Cannot screen for open neural tube defects; maternal serum AFP after 15 weeks is recommended     Genetic Amniocentesis    Invasive procedure typically performed in the second trimester by which amniotic fluid is obtained for the purpose of chromosome analysis and/or other prenatal genetic analysis    Diagnostic results; >99% sensitivity for fetal chromosome abnormalities    AFAFP measurement tests for open neural tube defects     Comprehensive (Level II) ultrasound: Detailed ultrasound performed between 18-22 weeks gestation to screen for major birth defects and markers for aneuploidy.    We reviewed the benefits and limitations of this testing.  Screening tests provide a risk assessment specific to the pregnancy for certain fetal chromosome abnormalities, but cannot definitively diagnose or exclude a fetal chromosome abnormality.  Follow-up genetic counseling and consideration of diagnostic testing is recommended with any abnormal screening result.      It was a pleasure to be involved with Carol s care. Face-to-face time of the meeting was 45 minutes.    Zahra Frias MS, [facility]  Licensed Genetic Counselor   [facility]  Maternal Fetal Medicine  [email or web address]  Office: [phone]  Pager [phone]  MFM: [phone]   Fax: [phone]

## 2020-08-20 NOTE — LETTER
"8/20/2020      RE: Carol Guillaume  [address] [facility] [address]       Carol Guillaume is a 29 year old female who is being evaluated via a billable video visit.      The patient has been notified of following:     \"This video visit will be conducted via a call between you and your physician/provider. We have found that certain health care needs can be provided without the need for an in-person physical exam.  This service lets us provide the care you need with a video conversation.  If a prescription is necessary we can send it directly to your pharmacy.  If lab work is needed we can place an order for that and you can then stop by our lab to have the test done at a later time.    Video visits are billed at different rates depending on your insurance coverage.  Please reach out to your insurance provider with any questions.    If during the course of the call the physician/provider feels a video visit is not appropriate, you will not be charged for this service.\"    Patient has given verbal consent for Video visit? Yes  How would you like to obtain your AVS? MyChart  Will anyone else be joining your video visit? No    Video-Visit Details    Type of service:  Video Visit    Video Start Time: 1608  Video End Time: 1638    Originating Location (pt. Location): Home    Distant Location (provider location):  [facility]     Platform used for Video Visit: [employer]    Jonathan Ordonez MD      CHRONIC TRANSPLANT NEPHROLOGY VISIT    Assessment & Plan   # LDKT: Increased creatinine at ~ 1.6 with last labs due to unclear etiology.  Patient's CSA level was increased from unclear reason.  Will repeat labs, along with UA and UPC and follow.  Would consider kidney transplant biopsy for creatinine 1.6 or higher.   - Baseline Cr ~ 1.1-1.3   - Proteinuria: Minimal (0.2-0.5 grams)   - Date DSA Last Checked: Aug/2006      Latest DSA: Not checked recently due to time from transplant   - BK Viremia: Not checked " recently due to time from transplant   - Kidney Tx Biopsy: Jan 08, 2009; Result: No diagnostic evidence of acute rejection.  Unremarkable.    # Immunosuppression: Cyclosporine (goal ) and Mycophenolic acid (dose 540 mg every 12 hours)   - Changes: Not at this time due to higher creatinine and reassessment, but with patient wanting to become pregnant, discussed the need to change mycophenolate mofetil to azathioprine prior attempting to become pregnant.  When patient is ready for the change, would start azathioprine at 250 mg daily (~ 2 mg/kg).    # Infection Prophylaxis:   - PJP: None    # Hypertension: Borderline control;  Goal BP: < 130/80   - Changes: Yes - Will change carvedilol to labetalol 200 mg bid.    # Mineral Bone Disorder:   - Vitamin D; level: Low        On supplement: Yes  - Calcium; level: Normal        On supplement: No    # Obesity: Stable weight.   - Recommend weight loss for overall health by increasing exercise and watching caloric intake.    # Menorrhagia: Appears related to birth control and now stopped after changing to progesterone pill.  Followed by Gynecology.    # Pregnancy Planning: Patient is interested in trying to become pregnant.  She has met with high risk OB.  Discussed potential risk to the kidney and the baby with CKD in pregnancy.   - Patient was counseled to not try and become pregnant until recommended medication are made due to risk to the fetus.   - Will first recheck kidney function with recent mild ATILIO for unclear reason.   - Once ready to move forward, will change mycophenolate mofetil to azathioprine, as noted above.  Recommend patient be stable on new medication for at least three months prior to attempting to become pregnant.    # Skin Cancer Risk:    - Discussed sun protection and recommend regular follow up with Dermatology.    # Medical Compliance: Yes     # COVID-19 Virus Review: Discussed COVID-19 virus and the potential medical risks.  Reviewed preventative  health recommendations, which includes washing hands for 20 seconds, avoid touching your face, and social distancing.  Asked patient to inform the transplant center if they are exposed or diagnosed with this virus.    # Transplant History:  Etiology of Kidney Failure: Senior-Loken Syndrome  Tx: LDKT  Transplant: 7/18/2006 (Kidney)  Donor Type: Living Donor Class: Standard Criteria Donor  Significant changes in immunosuppression: Generic mycophenolate mofetil caused chest pain and heart palpitations  Significant transplant-related complications: None    Transplant Office Phone Number: [phone]    Assessment and plan was discussed with the patient and she voiced her understanding and agreement.    Return visit: Return in about 6 months (around 2/20/2021).    Jonathan Ordonez MD    Chief Complaint   Ms. Guillaume is a 29 year old here for kidney transplant and immunosuppression management.    History of Present Illness    Ms. Guillaume reports feeling good overall with some medical complaints.  She has been bothered by menorrhagia all through June with bleeding for 28 days straight and a drop in her hemoglobin.  She saw her Gynecologist and it was felt secondary to Nexplanon implant.  The Nexplanon was stopped and she was started on progesterone pill.  She has not had any menses since.    Her energy level is good, although a bit lower after recent menstrual bleeding, but had been normal.  She isn't too active and really gets minimal exercise.  Denies any chest pain, but some shortness of breath with exertion, which she feels is due to deconditioning.  Appetite is good and weight is stable.  No nausea, vomiting or diarrhea.  No fever, sweats or chills.  No leg swelling.    Recent Hospitalizations:  [x] No [] Yes    New Medical Issues: [] No [x] Yes See above   Decreased energy: [] No [x] Yes With recent menorrhagia   Chest pain or SOB with exertion:  [] No [x] Yes Some shortness of breath   Appetite change or weight  change: [x] No [] Yes    Nausea, vomiting or diarrhea:  [x] No [] Yes    Fever, sweats or chills: [x] No [] Yes    Leg swelling: [x] No [] Yes      Home BP: 130/90s    Review of Systems   A comprehensive review of systems was obtained and negative, except as noted in the HPI or PMH.    Problem List   Patient Active Problem List   Diagnosis     Kidney replaced by transplant     Senior-Loken syndrome     Pseudotumor cerebri     Legally blind     Retinitis pigmentosa     Vitamin D deficiency     Obesity     Insulin resistance     PCOS (polycystic ovarian syndrome)     Cataract     Palpitations     Dyslipidemia     HTN, kidney transplant related     Aftercare following organ transplant     Immunosuppression (H)       Social History   Social History     Tobacco Use     Smoking status: Never Smoker     Smokeless tobacco: Never Used   Substance Use Topics     Alcohol use: No     Drug use: No       Allergies   No Known Allergies    Medications   Current Outpatient Medications   Medication Sig     labetalol (NORMODYNE) 200 MG tablet Take 1 tablet (200 mg) by mouth 2 times daily     MYFORTIC (BRAND) 180 MG EC tablet Take 3 tablets (540 mg) by mouth 2 times daily     NEORAL (BRAND) 25 MG capsule Take 3 capsules (75 mg) by mouth 2 times daily     Prenatal Vit-Fe Fumarate-FA (PRENATAL PO) Take by mouth daily     VITAMIN D, CHOLECALCIFEROL, PO Take 1,000 Units by mouth daily      No current facility-administered medications for this visit.      Medications Discontinued During This Encounter   Medication Reason     etonogestrel (IMPLANON/NEXPLANON) 68 MG IMPL      order for DME      cephALEXin (KEFLEX) 500 MG capsule      carvedilol (COREG) 25 MG tablet        Physical Exam   Vital Signs: Temp 98.2  F (36.8  C) (Temporal)   Wt 122 kg (269 lb)   BMI 43.42 kg/m      GENERAL APPEARANCE: alert and no distress  HENT: no obvious abnormalities on appearance  RESP: breathing appears unremarkable with normal rate, no audible wheezing or  cough and no apparent shortness of breath with conversation  MS: extremities normal - no gross deformities noted  SKIN: no apparent rash and normal skin tone  NEURO: speech is clear with no obvious neurological deficits  PSYCH: mentation appears normal and affect normal    Data     Renal Latest Ref Rng & Units 8/17/2020 1/6/2020 12/13/2019   Na 133 - 144 mmol/L 139 139 138   Na (external) 135 - 145 mmol/L - - -   K 3.4 - 5.3 mmol/L 4.1 4.1 4.4   K (external) 3.5 - 5.0 mmol/L - - -   Cl 94 - 109 mmol/L 105 108 108   Cl (external) 98 - 110 mmol/L - - -   CO2 20 - 32 mmol/L 27 27 25   CO2 (external) 21 - 31 mmol/L - - -   BUN 7 - 30 mg/dL 25 17 18   BUN (external) 8 - 25 mg/dL - - -   Cr 0.52 - 1.04 mg/dL 1.60(H) 1.14(H) 1.31(H)   Cr (external) 0.57 - 1.11 mg/dL - - -   Glucose 70 - 99 mg/dL 111(H) 99 97   Glucose (external) 65 - 100 mg/dL - - -   Ca  8.5 - 10.1 mg/dL 9.0 9.4 9.0   Ca (external) 8.5 - 10.5 mg/dL - - -   Mg 1.6 - 2.3 mg/dL - - -     Bone Health Latest Ref Rng & Units 12/13/2019 1/30/2018 6/1/2009   Phos 2.5 - 4.5 mg/dL - - -   PTHi 18 - 80 pg/mL 36 30 65   Vit D Def 20 - 75 ug/L 26 32 -     Heme Latest Ref Rng & Units 8/17/2020 12/13/2019 6/6/2019   WBC 4.0 - 11.0 10e9/L 7.8 7.5 7.7   WBC (external) 4.5 - 11.0 thou/cu mm - - -   Hgb 11.7 - 15.7 g/dL 12.3 13.3 14.1   Hgb (external) 12.0 - 16.0 g/dL - - -   Plt 150 - 450 10e9/L 213 210 204   Plt (external) 140 - 440 thou/cu mm - - -   ABSOLUTE NEUTROPHIL 1.6 - 8.3 10e9/L - 3.3 -   ABSOLUTE LYMPHOCYTES 0.8 - 5.3 10e9/L - 3.4 -   ABSOLUTE MONOCYTES 0.0 - 1.3 10e9/L - 0.5 -   ABSOLUTE EOSINOPHILS 0.0 - 0.7 10e9/L - 0.1 -   ABSOLUTE BASOPHILS 0.0 - 0.2 10e9/L - 0.1 -   ABS IMMATURE GRANULOCYTES 0 - 0.4 10e9/L - 0.0 -   ABSOLUTE NUCLEATED RBC - - 0.0 -     Liver Latest Ref Rng & Units 12/13/2019 10/22/2018 1/31/2017   AP 40 - 150 U/L 92 72 80   TBili 0.2 - 1.3 mg/dL 0.6 0.6 0.5   ALT 0 - 50 U/L 51(H) 34 50   AST 0 - 45 U/L 21 26 38   Tot Protein 6.8 - 8.8 g/dL  7.4 7.3 8.1   Albumin 3.4 - 5.0 g/dL 3.5 3.6 4.1     Pancreas Latest Ref Rng & Units 7/26/2013 7/27/2012 6/3/2008   A1C 4.3 - 6.0 % 5.0 5.3 -   Amylase 30 - 110 U/L - - 58   Lipase 20 - 250 U/L - - -     Iron studies Latest Ref Rng & Units 1/30/2018 7/11/2006 6/26/2006   Iron 35 - 180 ug/dL 55 56 75   Iron sat 15 - 46 % 19 - -   Ferritin 12 - 150 ng/mL 19 473(H) 914(H)     [facility] Txp Virology Latest Ref Rng & Units 1/31/2017 8/29/2011 7/21/2011   CMV IgG EU/mL - - -   CMV IgM <0.90 - - -   CMV IgM Interp <0.90 - - -   CVM DNA Quant - Plasma, EDTA anticoagulant Whole blood, EDTA anticoagulant Whole blood, EDTA anticoagulant   CMV Quant <100 Copies/mL - <100  No CMV DNA detected. <100  No CMV DNA detected.   CMV QT Log <2.0 Log copies/mL - <2.0  The Cytomegalovirus DNA Quantitation assay is a real-time polymerase chain   reaction (PCR) utilizing analyte specific reagents manufactured by [employer]   [facility]. Analyte Specific Reagents (ASRs) are used in many laboratory   tests necessary for standard medical care and generally do not require FDA   approval.   This test was developed and its performance characteristics determined by   [facility].  It has not been   cleared or approved by the US Food and Drug Administration. <2.0  The Cytomegalovirus DNA Quantitation assay is a real-time polymerase chain   reaction (PCR) utilizing analyte specific reagents manufactured by [employer]   [facility]. Analyte Specific Reagents (ASRs) are used in many laboratory   tests necessary for standard medical care and generally do not require FDA   approval.   This test was developed and its performance characteristics determined by   [facility].  It has not been   cleared or approved by the US Food and Drug Administration.   CMV QUANT IU/ML CMVND [IU]/mL CMV DNA Not Detected   The BARBI AmpliPrep/BARBI TaqMan CMV Test is an FDA-approved in vitro  nucleic   acid amplification test for the quantitation of cytomegalovirus DNA in human   plasma (EDTA plasma) using the [employer] AmpliPrep Instrument for automated viral   nucleic acid extraction and the BARBI TaqMan Analyzer or BARBI TaqMan for   automated Real Time amplification and detection of the viral nucleic acid   target.   Titer results are reported in International Units/mL (IU/mL using 1st WHO   International standard for Human Cytomegalovirus for Nucleic Acid Amplification   based assays. The conversion factor between CMV DNA copis/mL (as defined by the   Roche BARBI TaqMan CMV test) and International Units is the CMV DNA   concentration in IU/mL x 1.1 copies/IU = CMV DNA in copies/mL.   This assay has received FDA approval for the testing of human plasma only. The   Infectious Disease Diagnostic Laboratory at the [facility], [place], has validated the pe  rformance characteristics of the Roche   CMV assay for plasma, bronchial alveolar lavage/wash and urine.   - -   LOG IU/ML OF CMVQNT <2.1 [Log:IU]/mL Not Calculated - -   BK Spec - - - -   BK Res <1000 copies/mL - - -   BK Log <3.0 Log copies/mL - - -   EBV IgG - - - -   EBV DNA COPIES/ML EBVNEG [Copies]/mL EBV DNA Not Detected - -   EBV DNA LOG OF COPIES <2.7 [Log:copies]/mL Not Calculated   The Real-Time quantitative EBV assay was developed and its performance   characteristics determined by the Infectious Diseases Diagnostic Laboratory at   the [facility] in [place], [place].  The   primers and probes are Analyte Specific Reagents (ASRs) manufactured  by   [employer].   ASRs are used in many laboratory tests necessary for standard medical care and   generally do not require U.S. Food and Drug Administration approval.  The FDA   has determined that such clearance or approval is not necessary.  This test is   used for clinical purposes.  It should not be regarded as investigational or    research.   This laboratory is certified under the Clinical Laboratory Improvement   Amendments of 1988 (CLIA-88) as qualified to perform high complexity clinical   laboratory testing.   The quantitative range of this assay is 500-22,500,00 copies/mL (2.7-7.4 log   copies/mL).  A negative result does not rule out the presence of PCR inhibitors     in the patient specimen or EBV DNA nucleic acid in concentrations below the   level of detection of the assay.  Inhibition may also lead to underestimation   of viral quantitation.   - -   Hep B Core NEG - - -   Hep B Surf 0.0 - 4.9 mIU/mL - - -   HIV 1&2 NEG - - -     Jonathan Ordonez MD

## 2020-08-20 NOTE — TELEPHONE ENCOUNTER
"Spoke with Carol regarding positive urinalysis and urine culture as well as prescribed Augmentin to treat it. She states she can not take Augmentin because \"it tears up her stomach and causes diarrhea for weeks.\" She is asymptomatic and wonders if it is possible for the results to be mis-interpreted. She has a virtual appointment this afternoon with the transplant nephrologist and will discuss with then. Message back to Dr. Joy asking if there was an alternative antibiotic she could take for treatment instead of Augmentin.   "

## 2020-08-20 NOTE — TELEPHONE ENCOUNTER
Alternative antibiotic orders received from Dr. Joy:    RE: Antibiotic orders needed   Due: Yesterday   Received: Today   Message Contents   Mykel Joy MD Blaisdell, Linda Vora, RN               Keflex 500 mg po tid for 10 days      OUTCOME: Left VM message for Carol that Dr. Joy had prescribed alternative antibiotic, Keflex, (with med instructions) which was sent to the [facility] pharmacy. She may still discuss findings at her appointment.

## 2020-08-24 ENCOUNTER — TELEPHONE (OUTPATIENT)
Dept: MATERNAL FETAL MEDICINE | Facility: CLINIC | Age: 30
End: 2020-08-24

## 2020-08-24 NOTE — TELEPHONE ENCOUNTER
Carol completed a genetic counseling and MFM consultation with Dr. Janice Benítez on 8/20/2020. See consult note for complete discussion.     Carol called today stating that her OBGYN, Yudy Shrestha, completed an ultrasound recently in preconception and noted two uterine fibroids. [facility] asked Carol to discuss these findings with Dr. Benítez at her consultation to determine if these fibroids should be removed prior to attempting conception. Carol had forgotten to bring up this question at her consult and is inquiring if she could obtain this information now. This message has been forwarded to Suri Dennison [facility]. She will contact patient.    Janet Colon MS, [facility]  Maternal Fetal Medicine  [facility]  Ph: [phone]  [email or web address]

## 2020-08-24 NOTE — TELEPHONE ENCOUNTER
Pt calling with question about having fibroids removed. Consult with SUN on 8/20 only addressed transplant and known problems. No information about fibroids was disclosed on 8/20. Writer will f/u with GERARDO CARPIO.    Suri Dennison RN

## 2020-08-25 PROBLEM — Z48.298 AFTERCARE FOLLOWING ORGAN TRANSPLANT: Status: ACTIVE | Noted: 2020-08-25

## 2020-08-25 PROBLEM — Z30.017 ENCOUNTER FOR INITIAL PRESCRIPTION OF IMPLANTABLE SUBDERMAL CONTRACEPTIVE: Status: RESOLVED | Noted: 2018-10-29 | Resolved: 2020-08-25

## 2020-08-25 PROBLEM — Z94.0 HTN, KIDNEY TRANSPLANT RELATED: Status: ACTIVE | Noted: 2020-01-17

## 2020-08-25 PROBLEM — D84.9 IMMUNOSUPPRESSION (H): Status: ACTIVE | Noted: 2020-08-25

## 2020-08-25 PROBLEM — R07.9 CHEST PAIN: Status: RESOLVED | Noted: 2018-03-20 | Resolved: 2020-08-25

## 2020-08-26 ENCOUNTER — TELEPHONE (OUTPATIENT)
Dept: TRANSPLANT | Facility: CLINIC | Age: 30
End: 2020-08-26

## 2020-08-26 DIAGNOSIS — Z94.0 KIDNEY REPLACED BY TRANSPLANT: Primary | ICD-10-CM

## 2020-08-26 DIAGNOSIS — R39.89 SUSPECTED UTI: ICD-10-CM

## 2020-08-26 DIAGNOSIS — Z48.298 AFTERCARE FOLLOWING ORGAN TRANSPLANT: ICD-10-CM

## 2020-08-26 NOTE — TELEPHONE ENCOUNTER
Mery, MD Tahira Lennon Christin Rebecca, RN               See clinic note.  Patient needs repeat labs.      repeat labs, along with UA and UPC and follow.  Would consider kidney transplant biopsy for creatinine 1.6 or higher.    PLAN:  Repeat BMP, CBC, cyclosporine, UA/UC, UPC  Review plan and necessary changes to IS if pt wishes to try get pregnant    OUTCOME:   Call placed to pt. LVM requesting pt get repeat labs and return call to transplant office.     LPN TASK:  Enter lab orders

## 2020-08-26 NOTE — LETTER
PHYSICIAN ORDERS      DATE & TIME ISSUED: 2020 7:42 AM  PATIENT NAME: Carol Guillaume   : 1990     [facility] MR# [if applicable]: [id number]     DIAGNOSIS:  Kidney Transplant  ICD-10 CODE: Z94.0     Please repeat the following labs:  BMP  CBC  Cyclosporine drug level  UA/UC  Random urine protein/creatinine ratio     Any questions please call: [phone]  Please fax lab results to [phone].    .

## 2020-08-26 NOTE — TELEPHONE ENCOUNTER
Patient Call: Voicemail  Date/Time: 8/26/20 2:27PM  Reason for call: Pt was speaking with coordinator, Tawanna, and the phone was accidentally disconnected. Please call back.

## 2020-08-27 NOTE — TELEPHONE ENCOUNTER
Left VM message requesting repeat post-transplant labs listed in Tawanna's encounter 8/26/20. Also, reiterated importance of IS med changes that would be needed should Carol be family planning. Lab orders sent to [facility] lab on file and placed in [facility].

## 2020-10-05 DIAGNOSIS — Z11.59 ENCOUNTER FOR SCREENING FOR OTHER VIRAL DISEASES: Primary | ICD-10-CM

## 2020-10-20 ENCOUNTER — APPOINTMENT (OUTPATIENT)
Dept: LAB | Facility: CLINIC | Age: 30
End: 2020-10-20
Payer: COMMERCIAL

## 2020-10-20 ENCOUNTER — TELEPHONE (OUTPATIENT)
Dept: TRANSPLANT | Facility: CLINIC | Age: 30
End: 2020-10-20

## 2020-10-20 DIAGNOSIS — Z48.298 AFTERCARE FOLLOWING ORGAN TRANSPLANT: ICD-10-CM

## 2020-10-20 DIAGNOSIS — Z79.899 ENCOUNTER FOR LONG-TERM CURRENT USE OF MEDICATION: ICD-10-CM

## 2020-10-20 DIAGNOSIS — Z11.59 ENCOUNTER FOR SCREENING FOR OTHER VIRAL DISEASES: ICD-10-CM

## 2020-10-20 DIAGNOSIS — Z94.0 KIDNEY REPLACED BY TRANSPLANT: ICD-10-CM

## 2020-10-20 LAB
ANION GAP SERPL CALCULATED.3IONS-SCNC: 4 MMOL/L (ref 3–14)
BUN SERPL-MCNC: 20 MG/DL (ref 7–30)
CALCIUM SERPL-MCNC: 8.9 MG/DL (ref 8.5–10.1)
CHLORIDE SERPL-SCNC: 107 MMOL/L (ref 94–109)
CO2 SERPL-SCNC: 29 MMOL/L (ref 20–32)
CREAT SERPL-MCNC: 1.52 MG/DL (ref 0.52–1.04)
CYCLOSPORINE BLD LC/MS/MS-MCNC: 42 UG/L (ref 50–400)
ERYTHROCYTE [DISTWIDTH] IN BLOOD BY AUTOMATED COUNT: 12.4 % (ref 10–15)
GFR SERPL CREATININE-BSD FRML MDRD: 45 ML/MIN/{1.73_M2}
GLUCOSE SERPL-MCNC: 110 MG/DL (ref 70–99)
HCT VFR BLD AUTO: 41.3 % (ref 35–47)
HGB BLD-MCNC: 13 G/DL (ref 11.7–15.7)
MCH RBC QN AUTO: 26.3 PG (ref 26.5–33)
MCHC RBC AUTO-ENTMCNC: 31.5 G/DL (ref 31.5–36.5)
MCV RBC AUTO: 83 FL (ref 78–100)
PLATELET # BLD AUTO: 242 10E9/L (ref 150–450)
POTASSIUM SERPL-SCNC: 4.2 MMOL/L (ref 3.4–5.3)
RBC # BLD AUTO: 4.95 10E12/L (ref 3.8–5.2)
SARS-COV-2 RNA SPEC QL NAA+PROBE: NOT DETECTED
SODIUM SERPL-SCNC: 139 MMOL/L (ref 133–144)
SPECIMEN SOURCE: NORMAL
TME LAST DOSE: ABNORMAL H
WBC # BLD AUTO: 7.9 10E9/L (ref 4–11)

## 2020-10-20 PROCEDURE — 80048 BASIC METABOLIC PNL TOTAL CA: CPT | Performed by: PATHOLOGY

## 2020-10-20 PROCEDURE — U0003 INFECTIOUS AGENT DETECTION BY NUCLEIC ACID (DNA OR RNA); SEVERE ACUTE RESPIRATORY SYNDROME CORONAVIRUS 2 (SARS-COV-2) (CORONAVIRUS DISEASE [COVID-19]), AMPLIFIED PROBE TECHNIQUE, MAKING USE OF HIGH THROUGHPUT TECHNOLOGIES AS DESCRIBED BY CMS-2020-01-R: HCPCS | Mod: 90 | Performed by: PATHOLOGY

## 2020-10-20 PROCEDURE — 99000 SPECIMEN HANDLING OFFICE-LAB: CPT | Performed by: PATHOLOGY

## 2020-10-20 PROCEDURE — 80158 DRUG ASSAY CYCLOSPORINE: CPT | Mod: 90 | Performed by: PATHOLOGY

## 2020-10-20 PROCEDURE — 36415 COLL VENOUS BLD VENIPUNCTURE: CPT | Performed by: PATHOLOGY

## 2020-10-20 PROCEDURE — 85027 COMPLETE CBC AUTOMATED: CPT | Performed by: PATHOLOGY

## 2020-10-20 NOTE — TELEPHONE ENCOUNTER
Cyclosporine = 42  (10/19/20)  Goal   Current CSA dose 75 mg BID    Dose decreased last August to 75 bid from 100 bid d/t level of 208    PLAN:   Call Carol Guillaume and confirm this was a good 12-hour trough. Verify dose 75 mg BID.   Confirm no new medications or illness (virgil. Diarrhea).  If good trough, increase dose to 100 mg in AM and 75 mg in PM.   Recheck level in 1-2 weeks and make sure it is a good trough. If it still remains below goal then we can go back to prev dose of 100 mg BID.

## 2020-10-20 NOTE — TELEPHONE ENCOUNTER
ISSUE:  Baseline creatinine, per most recent MD visit = 1.1 - 1.3.  Current value 1.52, plan for biopsy if >1.6 without explanation.    PLAN:  Encourage good oral hydration.  Repeat labs in 1-2 weeks.

## 2020-10-20 NOTE — TELEPHONE ENCOUNTER
Call placed to patient. Patient state that she is very dehydrated. Patient v\u to improve hydration and repeat labs in 1-2 weeks. Order sent

## 2020-10-20 NOTE — LETTER
PHYSICIAN ORDERS      DATE & TIME ISSUED: 2020 12:40 PM  PATIENT NAME: Carol Guillaume   : 1990     [facility] MR# [if applicable]: [id number]     DIAGNOSIS:  Kidney transplant   ICD-10 CODE: Z94.0      Please complete the following lab in 1-2 weeks  BMP    Any questions please call: [phone] option 5    Please fax results to [phone].    .

## 2020-10-20 NOTE — LETTER
PHYSICIAN ORDERS      DATE & TIME ISSUED: 2020 11:29 AM  PATIENT NAME: Carol Guillaume   : 1990     [facility] MR# [if applicable]: [id number]     DIAGNOSIS:  Kidney transplant   ICD-10 CODE: Z94.0      Please complete the following lab in 1-2 weeks  Tacrolimus level ( ensure 12 hours between last dose and blood draw)    Any questions please call: [phone] option 5    Please fax results to [phone].    .

## 2020-10-22 NOTE — TELEPHONE ENCOUNTER
Call placed to patient. Patient confirms current dose however inaccurate trough level. Patient note that she will repeat level with bmp in 1-2 weeks. Order sent

## 2020-10-23 ENCOUNTER — HOSPITAL ENCOUNTER (OUTPATIENT)
Facility: CLINIC | Age: 30
Discharge: HOME | End: 2020-10-23
Attending: OBSTETRICS & GYNECOLOGY | Admitting: OBSTETRICS & GYNECOLOGY
Payer: COMMERCIAL

## 2020-10-23 ENCOUNTER — ANESTHESIA EVENT (OUTPATIENT)
Dept: SURGERY | Facility: CLINIC | Age: 30
End: 2020-10-23
Payer: COMMERCIAL

## 2020-10-23 ENCOUNTER — ANESTHESIA (OUTPATIENT)
Dept: SURGERY | Facility: CLINIC | Age: 30
End: 2020-10-23
Payer: COMMERCIAL

## 2020-10-23 VITALS
SYSTOLIC BLOOD PRESSURE: 140 MMHG | OXYGEN SATURATION: 100 % | WEIGHT: 275.3 LBS | HEIGHT: 68 IN | HEART RATE: 72 BPM | DIASTOLIC BLOOD PRESSURE: 96 MMHG | RESPIRATION RATE: 16 BRPM | TEMPERATURE: 97.2 F | BODY MASS INDEX: 41.72 KG/M2

## 2020-10-23 DIAGNOSIS — Z98.890 STATUS POST HYSTEROSCOPY: Primary | ICD-10-CM

## 2020-10-23 LAB — B-HCG SERPL-ACNC: <1 IU/L (ref 0–5)

## 2020-10-23 PROCEDURE — 250N000003 HC SEVOFLURANE, EA 15 MIN: Performed by: OBSTETRICS & GYNECOLOGY

## 2020-10-23 PROCEDURE — 370N000001 HC ANESTHESIA TECHNICAL FEE, 1ST 30 MIN: Performed by: OBSTETRICS & GYNECOLOGY

## 2020-10-23 PROCEDURE — 360N000020 HC SURGERY LEVEL 3 1ST 30 MIN: Performed by: OBSTETRICS & GYNECOLOGY

## 2020-10-23 PROCEDURE — 250N000009 HC RX 250: Performed by: NURSE ANESTHETIST, CERTIFIED REGISTERED

## 2020-10-23 PROCEDURE — 250N000011 HC RX IP 250 OP 636: Performed by: NURSE ANESTHETIST, CERTIFIED REGISTERED

## 2020-10-23 PROCEDURE — 761N000001 HC RECOVERY PHASE 1 LEVEL 1 FIRST HR: Performed by: OBSTETRICS & GYNECOLOGY

## 2020-10-23 PROCEDURE — 258N000001 HC RX 258: Performed by: OBSTETRICS & GYNECOLOGY

## 2020-10-23 PROCEDURE — 250N000011 HC RX IP 250 OP 636: Performed by: ANESTHESIOLOGY

## 2020-10-23 PROCEDURE — 258N000003 HC RX IP 258 OP 636: Performed by: NURSE ANESTHETIST, CERTIFIED REGISTERED

## 2020-10-23 PROCEDURE — 370N000002 HC ANESTHESIA TECHNICAL FEE, EACH ADDTL 15 MIN: Performed by: OBSTETRICS & GYNECOLOGY

## 2020-10-23 PROCEDURE — 36415 COLL VENOUS BLD VENIPUNCTURE: CPT | Performed by: OBSTETRICS & GYNECOLOGY

## 2020-10-23 PROCEDURE — 88305 TISSUE EXAM BY PATHOLOGIST: CPT | Mod: TC | Performed by: OBSTETRICS & GYNECOLOGY

## 2020-10-23 PROCEDURE — 360N000021 HC SURGERY LEVEL 3 EA 15 ADDTL MIN: Performed by: OBSTETRICS & GYNECOLOGY

## 2020-10-23 PROCEDURE — 88305 TISSUE EXAM BY PATHOLOGIST: CPT | Mod: 26 | Performed by: PATHOLOGY

## 2020-10-23 PROCEDURE — 761N000007 HC RECOVERY PHASE 2 EACH 15 MINS: Performed by: OBSTETRICS & GYNECOLOGY

## 2020-10-23 PROCEDURE — 999N000139 HC STATISTIC PRE-PROCEDURE ASSESSMENT II: Performed by: OBSTETRICS & GYNECOLOGY

## 2020-10-23 PROCEDURE — 272N000001 HC OR GENERAL SUPPLY STERILE: Performed by: OBSTETRICS & GYNECOLOGY

## 2020-10-23 PROCEDURE — 250N000009 HC RX 250: Performed by: OBSTETRICS & GYNECOLOGY

## 2020-10-23 PROCEDURE — 84702 CHORIONIC GONADOTROPIN TEST: CPT | Performed by: OBSTETRICS & GYNECOLOGY

## 2020-10-23 PROCEDURE — 250N000013 HC RX MED GY IP 250 OP 250 PS 637: Performed by: OBSTETRICS & GYNECOLOGY

## 2020-10-23 RX ORDER — FENTANYL CITRATE 50 UG/ML
25-50 INJECTION, SOLUTION INTRAMUSCULAR; INTRAVENOUS
Status: DISCONTINUED | OUTPATIENT
Start: 2020-10-23 | End: 2020-10-23 | Stop reason: HOSPADM

## 2020-10-23 RX ORDER — ONDANSETRON 4 MG/1
4 TABLET, ORALLY DISINTEGRATING ORAL EVERY 30 MIN PRN
Status: DISCONTINUED | OUTPATIENT
Start: 2020-10-23 | End: 2020-10-23 | Stop reason: HOSPADM

## 2020-10-23 RX ORDER — NALOXONE HYDROCHLORIDE 0.4 MG/ML
.1-.4 INJECTION, SOLUTION INTRAMUSCULAR; INTRAVENOUS; SUBCUTANEOUS
Status: DISCONTINUED | OUTPATIENT
Start: 2020-10-23 | End: 2020-10-23 | Stop reason: HOSPADM

## 2020-10-23 RX ORDER — LABETALOL HYDROCHLORIDE 5 MG/ML
10 INJECTION, SOLUTION INTRAVENOUS
Status: DISCONTINUED | OUTPATIENT
Start: 2020-10-23 | End: 2020-10-23 | Stop reason: HOSPADM

## 2020-10-23 RX ORDER — FENTANYL CITRATE 50 UG/ML
INJECTION, SOLUTION INTRAMUSCULAR; INTRAVENOUS PRN
Status: DISCONTINUED | OUTPATIENT
Start: 2020-10-23 | End: 2020-10-23

## 2020-10-23 RX ORDER — ONDANSETRON 2 MG/ML
INJECTION INTRAMUSCULAR; INTRAVENOUS PRN
Status: DISCONTINUED | OUTPATIENT
Start: 2020-10-23 | End: 2020-10-23

## 2020-10-23 RX ORDER — SODIUM CHLORIDE, SODIUM LACTATE, POTASSIUM CHLORIDE, CALCIUM CHLORIDE 600; 310; 30; 20 MG/100ML; MG/100ML; MG/100ML; MG/100ML
INJECTION, SOLUTION INTRAVENOUS CONTINUOUS
Status: DISCONTINUED | OUTPATIENT
Start: 2020-10-23 | End: 2020-10-23 | Stop reason: HOSPADM

## 2020-10-23 RX ORDER — SODIUM CHLORIDE, SODIUM LACTATE, POTASSIUM CHLORIDE, CALCIUM CHLORIDE 600; 310; 30; 20 MG/100ML; MG/100ML; MG/100ML; MG/100ML
INJECTION, SOLUTION INTRAVENOUS CONTINUOUS PRN
Status: DISCONTINUED | OUTPATIENT
Start: 2020-10-23 | End: 2020-10-23

## 2020-10-23 RX ORDER — MEPERIDINE HYDROCHLORIDE 25 MG/ML
12.5 INJECTION INTRAMUSCULAR; INTRAVENOUS; SUBCUTANEOUS
Status: DISCONTINUED | OUTPATIENT
Start: 2020-10-23 | End: 2020-10-23 | Stop reason: HOSPADM

## 2020-10-23 RX ORDER — ACETAMINOPHEN 325 MG/1
650 TABLET ORAL EVERY 4 HOURS PRN
Qty: 50 TABLET | Refills: 0 | Status: SHIPPED | OUTPATIENT
Start: 2020-10-23

## 2020-10-23 RX ORDER — FENTANYL CITRATE 50 UG/ML
25-50 INJECTION, SOLUTION INTRAMUSCULAR; INTRAVENOUS EVERY 5 MIN PRN
Status: DISCONTINUED | OUTPATIENT
Start: 2020-10-23 | End: 2020-10-23 | Stop reason: HOSPADM

## 2020-10-23 RX ORDER — HYDROMORPHONE HYDROCHLORIDE 1 MG/ML
.3-.5 INJECTION, SOLUTION INTRAMUSCULAR; INTRAVENOUS; SUBCUTANEOUS EVERY 10 MIN PRN
Status: DISCONTINUED | OUTPATIENT
Start: 2020-10-23 | End: 2020-10-23 | Stop reason: HOSPADM

## 2020-10-23 RX ORDER — PROPOFOL 10 MG/ML
INJECTION, EMULSION INTRAVENOUS PRN
Status: DISCONTINUED | OUTPATIENT
Start: 2020-10-23 | End: 2020-10-23

## 2020-10-23 RX ORDER — OXYCODONE HYDROCHLORIDE 5 MG/1
10 TABLET ORAL
Status: DISCONTINUED | OUTPATIENT
Start: 2020-10-23 | End: 2020-10-23 | Stop reason: HOSPADM

## 2020-10-23 RX ORDER — MAGNESIUM HYDROXIDE 1200 MG/15ML
LIQUID ORAL PRN
Status: DISCONTINUED | OUTPATIENT
Start: 2020-10-23 | End: 2020-10-23 | Stop reason: HOSPADM

## 2020-10-23 RX ORDER — ONDANSETRON 2 MG/ML
4 INJECTION INTRAMUSCULAR; INTRAVENOUS EVERY 30 MIN PRN
Status: DISCONTINUED | OUTPATIENT
Start: 2020-10-23 | End: 2020-10-23 | Stop reason: HOSPADM

## 2020-10-23 RX ORDER — LIDOCAINE HYDROCHLORIDE 20 MG/ML
INJECTION, SOLUTION INFILTRATION; PERINEURAL PRN
Status: DISCONTINUED | OUTPATIENT
Start: 2020-10-23 | End: 2020-10-23

## 2020-10-23 RX ORDER — ACETAMINOPHEN 325 MG/1
975 TABLET ORAL ONCE
Status: COMPLETED | OUTPATIENT
Start: 2020-10-23 | End: 2020-10-23

## 2020-10-23 RX ADMIN — SODIUM CHLORIDE, POTASSIUM CHLORIDE, SODIUM LACTATE AND CALCIUM CHLORIDE: 600; 310; 30; 20 INJECTION, SOLUTION INTRAVENOUS at 07:28

## 2020-10-23 RX ADMIN — FENTANYL CITRATE 50 MCG: 0.05 INJECTION, SOLUTION INTRAMUSCULAR; INTRAVENOUS at 08:39

## 2020-10-23 RX ADMIN — MIDAZOLAM 2 MG: 1 INJECTION INTRAMUSCULAR; INTRAVENOUS at 07:29

## 2020-10-23 RX ADMIN — FENTANYL CITRATE 50 MCG: 50 INJECTION, SOLUTION INTRAMUSCULAR; INTRAVENOUS at 07:46

## 2020-10-23 RX ADMIN — PROPOFOL 200 MG: 10 INJECTION, EMULSION INTRAVENOUS at 07:35

## 2020-10-23 RX ADMIN — FENTANYL CITRATE 50 MCG: 50 INJECTION, SOLUTION INTRAMUSCULAR; INTRAVENOUS at 07:34

## 2020-10-23 RX ADMIN — ONDANSETRON 4 MG: 2 INJECTION INTRAMUSCULAR; INTRAVENOUS at 07:41

## 2020-10-23 RX ADMIN — ACETAMINOPHEN 975 MG: 325 TABLET, FILM COATED ORAL at 06:52

## 2020-10-23 RX ADMIN — LIDOCAINE HYDROCHLORIDE 100 MG: 20 INJECTION, SOLUTION INFILTRATION; PERINEURAL at 07:34

## 2020-10-23 ASSESSMENT — MIFFLIN-ST. JEOR: SCORE: 2007.25

## 2020-10-23 NOTE — OP NOTE
[facility]  Gynecology Surgery    Carol Guillaume  [id number]  October 23, 2020      Preoperative diagnosis: abnormal uterine bleeding, suspected endometrial polyps    Postoperative diagnosis: as above    Procedure: hysteroscopy, D&C, myosure polypectomy    Surgeon: Kierra Tracy MD    Anesthesia: general    Findings: normal-sized anteverted uterus deviated to left side, bilateral tubal ostia visualized, endometrial cavity lined with numerous polyps all appearing to measure <1 cm    Specimens: endometrial currettings    Complications: none    EBL: 2 mL    IVF: 500 mL    Fluid deficit: 140 mL    Indication and consent: This is a 31 yo who had been experiencing abnormal uterine bleeding. This first began while she was using a subdermal contraceptive implant, so this was removed per her request to see if it was the source. She continued to have bothersome bleeding. An ultrasound and then a sonohysterogram were suggestive of endometrial polyps, and resection was recommended to improve bleeding. The risks of the procedure including bleeding, infection, uterine perforation, injury to surrounding structures, need for reoperation were discussed with the patient. She expressed understanding and consented to proceed.    Procedure: The patient was brought to the operating room with IV fluids running. She was prepped and draped in the dorsal lithotomy position in [facility]. Her bladder was straight catheterized for urine. A timeout was performed to identify the patient and procedure. A speculum was placed in the vagina. The anterior lip of the cervix was grasped with a tenaculum. The cervix was dilated to 8 mm with Hegar dilators. The hysteroscope was inserted via the cervix into the uterus with the findings noted above. The outflow port was removed from the hysteroscope and replaced with the Myosure device. The Myosure was used to resect the polyp in its entirety. A smooth curette was used to curettage  throughout the entirety of the uterus and good cry was noted throughout. The tenaculum was removed from the anterior lip of the cervix. Tenaculum sites were hemostatic.  The patient tolerated the procedure well. All counts were correct x2. The patient was brought to the recovery room in stable condition.       Kierra Tracy MD  10/23/2020 0805

## 2020-10-23 NOTE — ANESTHESIA CARE TRANSFER NOTE
Patient: Carol Guillaume    Procedure(s):  HYSTEROSCOPY, DILATION AND CURRETAGE, MYOSURE    Diagnosis: Uterine leiomyoma, unspecified location [D25.9]  Uterine bleeding [N93.9]  Diagnosis Additional Information: No value filed.    Anesthesia Type:   General     Note:  Airway :Face Mask  Patient transferred to:PACU  Comments: Spontaneously breathing.  Adequate tidal volumes.  VSS. LMA removed without complications.  O2 via FM. To PACU. Handoff Report: Identifed the Patient, Identified the Reponsible Provider, Reviewed the pertinent medical history, Discussed the surgical course, Reviewed Intra-OP anesthesia mangement and issues during anesthesia, Set expectations for post-procedure period and Allowed opportunity for questions and acknowledgement of understanding      Vitals: (Last set prior to Anesthesia Care Transfer)    CRNA VITALS  10/23/2020 0739 - 10/23/2020 0815      10/23/2020             Resp Rate (set):  10                Electronically Signed By: MATTHEW Tiwari CRNA  October 23, 2020  8:15 AM

## 2020-10-23 NOTE — ANESTHESIA PREPROCEDURE EVALUATION
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             ROS/MED HX    ENT/Pulmonary:     (+)JAYDEN risk factors hypertension, obese, , . .   (-) sleep apnea   Neurologic:       Cardiovascular:     (+) Dyslipidemia, hypertension----. : . . . :. .       METS/Exercise Tolerance:     Hematologic:         Musculoskeletal:         GI/Hepatic:        (-) GERD   Renal/Genitourinary:     (+) chronic renal disease, Pt has history of transplant,       Endo:     (+) Obesity, .      Psychiatric:     (+) psychiatric history anxiety and depression      Infectious Disease:         Malignancy:         Other: Comment: PCOS; chronic fatigue; legally blind;                         Physical Exam  Normal systems: cardiovascular, pulmonary and dental    Airway   Mallampati: III  TM distance: >3 FB  Neck ROM: full    Dental     Cardiovascular       Pulmonary             Lab Results   Component Value Date    WBC 7.9 10/20/2020    HGB 13.0 10/20/2020    HCT 41.3 10/20/2020     10/20/2020    CRP <3.0 2008     10/20/2020    POTASSIUM 4.2 10/20/2020    CHLORIDE 107 10/20/2020    CO2 29  "10/20/2020    BUN 20 10/20/2020    CR 1.52 (H) 10/20/2020     (H) 10/20/2020    LACHO 8.9 10/20/2020    PHOS 3.3 01/07/2009    MAG 1.8 01/07/2009    ALBUMIN 3.5 12/13/2019    PROTTOTAL 7.4 12/13/2019    ALT 51 (H) 12/13/2019    AST 21 12/13/2019    ALKPHOS 92 12/13/2019    BILITOTAL 0.6 12/13/2019    LIPASE 236 07/17/2007    AMYLASE 58 06/03/2008    PTT 32 07/16/2007    INR 1.07 01/07/2009    TSH 2.91 12/13/2019    T4 1.16 07/26/2013    HCG Negative 08/17/2020    HCGS Negative 07/17/2007       Preop Vitals  BP Readings from Last 3 Encounters:   10/23/20 (!) 152/105   03/09/20 (!) 138/99   02/20/20 (!) 147/98    Pulse Readings from Last 3 Encounters:   03/09/20 81   02/20/20 79   01/17/20 96      Resp Readings from Last 3 Encounters:   10/23/20 16   12/11/19 18   10/29/18 12    SpO2 Readings from Last 3 Encounters:   10/23/20 96%   03/09/20 99%   02/20/20 97%      Temp Readings from Last 1 Encounters:   10/23/20 36.6  C (97.8  F) (Oral)    Ht Readings from Last 1 Encounters:   10/23/20 1.727 m (5' 8\")      Wt Readings from Last 1 Encounters:   10/23/20 124.9 kg (275 lb 4.8 oz)    Estimated body mass index is 41.86 kg/m  as calculated from the following:    Height as of this encounter: 1.727 m (5' 8\").    Weight as of this encounter: 124.9 kg (275 lb 4.8 oz).       Anesthesia Plan      History & Physical Review  History and physical reviewed and following examination; no interval change.    ASA Status:  3 .    NPO Status:  > 8 hours    Plan for General with Intravenous induction. Maintenance will be Balanced.    PONV prophylaxis:  Ondansetron (or other 5HT-3)         Postoperative Care  Postoperative pain management:  IV analgesics.      Consents  Anesthetic plan, risks, benefits and alternatives discussed with:  Patient..                 MONICA GREEN MD  "

## 2020-10-23 NOTE — ANESTHESIA POSTPROCEDURE EVALUATION
Patient: Carol Guillaume    Procedure(s):  HYSTEROSCOPY, DILATION AND CURRETAGE, MYOSURE    Diagnosis:Uterine leiomyoma, unspecified location [D25.9]  Uterine bleeding [N93.9]  Diagnosis Additional Information: No value filed.    Anesthesia Type:  General    Note:  Anesthesia Post Evaluation    Patient location during evaluation: PACU  Patient participation: Able to fully participate in evaluation  Level of consciousness: awake  Pain management: adequate  Airway patency: patent  Cardiovascular status: acceptable  Respiratory status: acceptable  Hydration status: acceptable  PONV: none     Anesthetic complications: None          Last vitals:  Vitals:    10/23/20 0845 10/23/20 0900 10/23/20 0950   BP: (!) 143/94 (!) 153/96 (!) 140/96   Pulse: 75 72    Resp: 13 12 16   Temp: 35.2  C (95.4  F) 36.2  C (97.2  F)    SpO2: 96% 98% 100%         Electronically Signed By: MONICA GREEN MD  October 23, 2020  11:44 AM   normal...

## 2020-10-23 NOTE — DISCHARGE INSTRUCTIONS
Please call the office if you experience  - bleeding through more than one pad per hour persistently  - passage of blood clots greater than the size of pollo  - temperature greater than 100.4  - abnormal vaginal discharge  - inability to tolerate food or fluids  - pain uncontrolled with oral medications.    Please make an appointment to follow-up in the office in 4 weeks.      Today you were given 975 mg of Tylenol at 7 am. The recommended daily maximum dose is 4000 mg.     Same Day Surgery Discharge Instructions for  Sedation and General Anesthesia       It's not unusual to feel dizzy, light-headed or faint for up to 24 hours after surgery or while taking pain medication.  If you have these symptoms: sit for a few minutes before standing and have someone assist you when you get up to walk or use the bathroom.      You should rest and relax for the next 24 hours. We recommend you make arrangements to have an adult stay with you for at least 24 hours after your discharge.  Avoid hazardous and strenuous activity.      DO NOT DRIVE any vehicle or operate mechanical equipment for 24 hours following the end of your surgery.  Even though you may feel normal, your reactions may be affected by the medication you have received.      Do not drink alcoholic beverages for 24 hours following surgery.       Slowly progress to your regular diet as you feel able. It's not unusual to feel nauseated and/or vomit after receiving anesthesia.  If you develop these symptoms, drink clear liquids (apple juice, ginger ale, broth, 7-up, etc. ) until you feel better.  If your nausea and vomiting persists for 24 hours, please notify your surgeon.        All narcotic pain medications, along with inactivity and anesthesia, can cause constipation. Drinking plenty of liquids and increasing fiber intake will help.      For any questions of a medical nature, call your surgeon.      Do not make important decisions for 24 hours.      If you had  general anesthesia, you may have a sore throat for a couple of days related to the breathing tube used during surgery.  You may use Cepacol lozenges to help with this discomfort.  If it worsens or if you develop a fever, contact your surgeon.       If you feel your pain is not well managed with the pain medications prescribed by your surgeon, please contact your surgeon's office to let them know so they can address your concerns.       CoVid 19 Information    We want to give you information regarding Covid. Please consult your primary care provider with any questions you might have.     Patient who have symptoms (cough, fever, or shortness of breath), need to isolate for 7 days from when symptoms started OR 72 hours after fever resolves (without fever reducing medications) AND improvement of respiratory symptoms (whichever is longer).      Isolate yourself at home (in own room/own bathroom if possible)    Do Not allow any visitors    Do Not go to work or school    Do Not go to [facility],  centers, shopping, or other public places.    Do Not shake hands.    Avoid close and intimate contact with others (hugging, kissing).    Follow CDC recommendations for household cleaning of frequently touched services.     After the initial 7 days, continue to isolate yourself from household members as much as possible. To continue decrease the risk of community spread and exposure, you and any members of your household should limit activities in public for 14 days after starting home isolation.     You can reference the following CDC link for helpful home isolation/care tips:  https://www.cdc.gov/coronavirus/2019-ncov/downloads/10Things.pdf    Protect Others:    Cover Your Mouth and Nose with a mask, disposable tissue or wash cloth to avoid spreading germs to others.    Wash your hands and face frequently with soap and water    Call Your Primary Doctor If: Breathing difficulty develops or you become worse.    For more  information about COVID19 and options for caring for yourself at home, please visit the CDC website at https://www.cdc.gov/coronavirus/2019-ncov/about/steps-when-sick.html  For more options for care at [facility], please visit our website at [email or web address]      [facility]  D & C Surgery  Discharge Instructions  ACTIVITY:   You may resume normal activities including lifting as needed. It is permissible to drive a car and to climb stairs. Baths or showers are perfectly acceptable.   CHECK-UP:   You should be seen 1 month after discharge unless home instruction sheet states otherwise. Please phone the office the day after procedure and schedule an appointment with your physician.  VAGINAL DISCHARGE:   You may have some vaginal bleeding or discharge for about a week after discharge. You should avoid douches, tampons, and intercourse for the first week.  TEMPERATURE:   If you develop temperature levels to over 100.4 , your physician should be called immediately.  STITCHES:   There is usually a stitch under the skin incisions which will dissolve and does not need to be removed. The bandaids may be removed at any time.  DIET:  Treasure or light diet is advisable the day of surgery. If nausea persists, continue this diet. If the nausea is severe, call the physician.  [facility]  [address], [address]  [place], [place] [address]  [phone]    .              [facility]

## 2020-10-23 NOTE — ANESTHESIA PROCEDURE NOTES
Airway   Date/Time: 10/23/2020 7:37 AM   Patient location during procedure: OR    Staff -   Performed By: CRNA    Indications and Patient Condition  Indications for airway management: consuelo-procedural  Induction type:intravenousMask difficulty assessment: 0 - not attempted    Final Airway Details  Final airway type: supraglottic airway    Endotracheal Airway Details   Secured with: pink tape    Post intubation assessment   Placement verified by: capnometry, equal breath sounds and chest rise   Number of attempts at approach: 1  Secured with:pink tape  Ease of procedure: easy  Dentition: Intact and Unchanged

## 2020-10-29 LAB — COPATH REPORT: NORMAL

## 2020-11-06 ENCOUNTER — TRANSFERRED RECORDS (OUTPATIENT)
Dept: HEALTH INFORMATION MANAGEMENT | Facility: CLINIC | Age: 30
End: 2020-11-06

## 2020-11-13 ENCOUNTER — TELEPHONE (OUTPATIENT)
Dept: TRANSPLANT | Facility: CLINIC | Age: 30
End: 2020-11-13

## 2020-11-13 DIAGNOSIS — Z94.0 KIDNEY TRANSPLANTED: Primary | ICD-10-CM

## 2020-11-13 NOTE — TELEPHONE ENCOUNTER
ISSUE:   Last CSA level below goal, unable to reach pt regarding dose change    From: Topher Chapman [facility]   Sent: 11/12/2020   3:35 PM CST   To: Linda Hoffmann, RN     Carol is about 8 days late to fill neoral.  We have filled Myfortic for a whole year, 11/14/2019 was the last time.     PLAN:  Call pt and check if she is taking cyclosporine as prescribed? Any missed doses?    OUTCOME:   Spoke with pt. Reports she is taking csa 75 mg BID, denies any missed doses. Will connect with pharmacy when she needs refill.   Is planning to repeat level.

## 2020-11-16 ENCOUNTER — HEALTH MAINTENANCE LETTER (OUTPATIENT)
Age: 30
End: 2020-11-16

## 2020-11-19 ENCOUNTER — TELEPHONE (OUTPATIENT)
Dept: TRANSPLANT | Facility: CLINIC | Age: 30
End: 2020-11-19

## 2020-11-19 DIAGNOSIS — I12.9 HYPERTENSIVE RENAL DISEASE: Primary | ICD-10-CM

## 2020-11-19 RX ORDER — CARVEDILOL 25 MG/1
25 TABLET ORAL 2 TIMES DAILY WITH MEALS
Qty: 60 TABLET | Refills: 11 | Status: SHIPPED | OUTPATIENT
Start: 2020-11-19 | End: 2022-01-21

## 2020-11-19 NOTE — TELEPHONE ENCOUNTER
"Carol takes labetalol 200 mg PO BID. Her BPs are averaging 150/100 sometimes diastolic 180. Remains high both before and after taking her labetalol. Pulse averages 80. She was switched to labetalol as part of family planning in April from carvedilol. She states she took an old prescription for carvedilol yesterday and it helped. Informed Carol that carvedilol and labetalol are in the same drug family (beta blocker) and that she should not take extra medication without guidance of Provider. She verbalized understanding. She started Carvedilol in January after having chest pain in December 2019 on Atenolol and BP was up then as well per patient report. She was recently told she needs to lose weight, eliminate processed foods and sugars from her diet. She states she was coping with anxiety by eating. Doesn't add salt to food but will make dietary changes as recommended by her doctors. She cut out tea from her diet 2 weeks ago because the caffeine was making her heart race and it is not anymore.     Atenolol till Dec 2019 worked well, then BP was up and having Chest pain. January started Carvedilol. Switched to labetalol in April due to pregnancy. Heart not racing anymore. Normally drink tea in morning but stopped due to cafeteria. Carol states family planning is on hold due to low grade uterine cancer. She underwent a hysteroscopy D&C on 10/23/20. Did MRI last week and awaiting results. States \"the hope is to place Mirena IUD-progesterone only IUD for 3 months then another D& C hysteroscopy surgery. If her body responds to IUD the she will try for pregnancy mid 2021. If no response, then may have to wait till Jan 2022\"    Will discuss BP med and high BP with Dr. Joy.    Addendum: Reviewed BP with Dr. Joy 11/19/20: Dr. Joy states we may increase Carol's labetalol to 300 mg PO BID however if Carol feels strongly about returning to carvedilol (Coreg) she can return to the dose she was taking before. This writer " checked with [facility] on last dispensed carvedilol prescription. Last fill was on Ilene 15 for carvedilol 25mg BID. Writer attempted to call back Carol to review med change, left VM message asking for return call.

## 2020-11-19 NOTE — TELEPHONE ENCOUNTER
Returned call to Carol. She prefers to go back to carvedilol 25 mg PO BID because she feels she responds better. Will send script for this to  Specialty pharmacy. Verbalized understanding of plan and will continue to check her blood pressures.

## 2020-11-19 NOTE — TELEPHONE ENCOUNTER
Patient called regarding elevated BP and wondered if her medications can be adjusted? Patient stated she has been under a lot of stress and has found out there is cancer on her uterus.

## 2020-11-23 ENCOUNTER — TELEPHONE (OUTPATIENT)
Dept: CARDIOLOGY | Facility: CLINIC | Age: 30
End: 2020-11-23

## 2020-11-25 ENCOUNTER — TELEPHONE (OUTPATIENT)
Dept: NEPHROLOGY | Facility: CLINIC | Age: 30
End: 2020-11-25

## 2020-11-25 NOTE — TELEPHONE ENCOUNTER
Prior Authorization Approval    Authorization Effective Date: 10/24/2020  Authorization Expiration Date: 11/23/2021  Medication: Neoral  Approved Dose/Quantity: 540  Reference #: [id number]    Insurance Company: [employer] - Phone [phone] Fax [phone]  Expected CoPay:       CoPay Card Available:      Foundation Assistance Needed:    Which Pharmacy is filling the prescription (Not needed for infusion/clinic administered): [facility] - [place], [place] - [address]  Pharmacy Notified: Yes  Patient Notified: Yes

## 2020-12-28 ENCOUNTER — TRANSFERRED RECORDS (OUTPATIENT)
Dept: HEALTH INFORMATION MANAGEMENT | Facility: CLINIC | Age: 30
End: 2020-12-28

## 2021-01-01 NOTE — TELEPHONE ENCOUNTER
ISSUE:  CsA level 32, s/b , dose 100 mg BID    PLAN:   Please call pt and confirm 12-hour trough, dose, no new medications. If good trough, increase dose to 150 mg BID - recheck level 7 days           This note was copied from the mother's chart.         Mom/Baby home today. Mom states BF is going better. Baby has lost 10% of birth weight. We discussed C/S and infant weight loss. They will be seeing the Pediatrician on Wednesday for 48 hr F/U. The Peds has also ordered baby to be supplemented after BF. Can be Formula/EBM. As of today Mom is expresses small amounts of Breast milk. As Mom's volume of Breast milk increases her formula amounts should decrease. It is OK to use your BM as the supplement. Written and Verbal Discharge Instructions were given along with our magnet.      Breastfeeding Discharge Instructions:  Routine rounding for follow-up and discharge education. Discussed having importance of well-balanced diet and additional 500 calories per day, hydrate to thirst, limit caffeine intake, and continue PNV. Alcohol or illicit drug use while breastfeeding is not advised and any new prescription medication or over-the-counter medications should be verified for safety while breastfeeding. Discussed process of milk production, milk \"coming in\", as well as engorgement and treatment recommendations. Emphasized that mother should be BF at least 8 times per 24hours to establish and maintain adequate milk supply. If baby is not latching, then recommend that mom uses breast pump and give baby any EBM produced. Encouraged to not store/save milk until infant past birth weight and BF is well established or per the recommendation of the Pediatrician. Referred to breast milk storage handout in postpartum booklet. AAP guidelines recommend f/u with Pediatrician within 24-48 hours for breastfeeding dyads. Should monitor infant’s I/Os and report to Pediatrician. Once mature milk supply is established, baby should be voiding at least 6 times per day and stooling 3-5 times per day. Infants are generally back to birth weight within 10-14 days. Given Lactation Department magnet with hotline number and to call for any questions  or concerns.     Mom  • Diet, Nutrition, Hydration, Limit Caffeine  • Continue PNV, medications, no alcohol/tobacco/drugs  • Milk coming in, engorgement, flu-like symptoms  • BF/pumping at least 8 times per 24 hours, storage guidelines in PP book  Baby  • 8 feedings, 3-5 stools, >6 wet diapers per 24 hours  • Back to birth weight 10-14 days  • F/U Peds in 24-48 hours  • Call for questions or concerns, Magnet/Business card

## 2021-01-15 ENCOUNTER — HEALTH MAINTENANCE LETTER (OUTPATIENT)
Age: 31
End: 2021-01-15

## 2021-01-18 ENCOUNTER — TELEPHONE (OUTPATIENT)
Dept: TRANSPLANT | Facility: CLINIC | Age: 31
End: 2021-01-18

## 2021-01-18 NOTE — TELEPHONE ENCOUNTER
Message  Received: 3 days ago  Message Contents   Topher Chapman, [facility]  [place], Linda Vora, RN             Carol last filled Neoral with us on 12/02/20.  All of last year she only filled 4 months of neoral with us and no mycophenolic acid.     Topher Chapman [facility]   Specialty Pharmacist [phone]      OUTCOME: Chart reviewed-  Per last visit note: August 2020:  # Immunosuppression: Cyclosporine (goal ) and Mycophenolic acid (dose 540 mg every 12 hours)              - Changes: Not at this time due to higher creatinine and reassessment, but with patient wanting to become pregnant, discussed the need to change mycophenolate mofetil to azathioprine prior attempting to become pregnant.  When patient is ready for the change, would start azathioprine at 250 mg daily (~ 2 mg/kg).    VM message left for Carol to inquire about refills/any missed doses. Asked for call back.

## 2021-01-21 NOTE — TELEPHONE ENCOUNTER
Second VM message left for Carol asking for call back to discuss her IS medication-due for refill. Last fill on 12/2/20.

## 2021-01-27 ENCOUNTER — TELEPHONE (OUTPATIENT)
Dept: TRANSPLANT | Facility: CLINIC | Age: 31
End: 2021-01-27

## 2021-02-01 DIAGNOSIS — Z79.899 ENCOUNTER FOR LONG-TERM CURRENT USE OF MEDICATION: ICD-10-CM

## 2021-02-01 DIAGNOSIS — Z94.0 KIDNEY REPLACED BY TRANSPLANT: ICD-10-CM

## 2021-02-01 DIAGNOSIS — Z48.298 AFTERCARE FOLLOWING ORGAN TRANSPLANT: ICD-10-CM

## 2021-02-01 LAB
ANION GAP SERPL CALCULATED.3IONS-SCNC: 6 MMOL/L (ref 3–14)
BUN SERPL-MCNC: 25 MG/DL (ref 7–30)
CALCIUM SERPL-MCNC: 8.9 MG/DL (ref 8.5–10.1)
CHLORIDE SERPL-SCNC: 104 MMOL/L (ref 94–109)
CO2 SERPL-SCNC: 29 MMOL/L (ref 20–32)
CREAT SERPL-MCNC: 1.72 MG/DL (ref 0.52–1.04)
CYCLOSPORINE BLD LC/MS/MS-MCNC: 149 UG/L (ref 50–400)
ERYTHROCYTE [DISTWIDTH] IN BLOOD BY AUTOMATED COUNT: 13.3 % (ref 10–15)
GFR SERPL CREATININE-BSD FRML MDRD: 39 ML/MIN/{1.73_M2}
GLUCOSE SERPL-MCNC: 106 MG/DL (ref 70–99)
HCT VFR BLD AUTO: 41.3 % (ref 35–47)
HGB BLD-MCNC: 13 G/DL (ref 11.7–15.7)
MCH RBC QN AUTO: 27 PG (ref 26.5–33)
MCHC RBC AUTO-ENTMCNC: 31.5 G/DL (ref 31.5–36.5)
MCV RBC AUTO: 86 FL (ref 78–100)
PLATELET # BLD AUTO: 220 10E9/L (ref 150–450)
POTASSIUM SERPL-SCNC: 4.5 MMOL/L (ref 3.4–5.3)
RBC # BLD AUTO: 4.82 10E12/L (ref 3.8–5.2)
SODIUM SERPL-SCNC: 139 MMOL/L (ref 133–144)
TME LAST DOSE: 2000 H
WBC # BLD AUTO: 7.6 10E9/L (ref 4–11)

## 2021-02-01 PROCEDURE — 80158 DRUG ASSAY CYCLOSPORINE: CPT | Mod: 90 | Performed by: PATHOLOGY

## 2021-02-01 PROCEDURE — 80048 BASIC METABOLIC PNL TOTAL CA: CPT | Performed by: PATHOLOGY

## 2021-02-01 PROCEDURE — 85027 COMPLETE CBC AUTOMATED: CPT | Performed by: PATHOLOGY

## 2021-02-01 PROCEDURE — 36415 COLL VENOUS BLD VENIPUNCTURE: CPT | Performed by: PATHOLOGY

## 2021-02-02 ENCOUNTER — TELEPHONE (OUTPATIENT)
Dept: TRANSPLANT | Facility: CLINIC | Age: 31
End: 2021-02-02

## 2021-02-02 DIAGNOSIS — Z94.0 KIDNEY REPLACED BY TRANSPLANT: Primary | ICD-10-CM

## 2021-02-02 DIAGNOSIS — R79.89 ELEVATED SERUM CREATININE: ICD-10-CM

## 2021-02-02 DIAGNOSIS — Z48.298 AFTERCARE FOLLOWING ORGAN TRANSPLANT: ICD-10-CM

## 2021-02-02 DIAGNOSIS — Z79.899 ENCOUNTER FOR LONG-TERM CURRENT USE OF MEDICATION: ICD-10-CM

## 2021-02-02 DIAGNOSIS — R39.89 SUSPECTED UTI: ICD-10-CM

## 2021-02-02 NOTE — TELEPHONE ENCOUNTER
ISSUE: Creatinine 1.72 on 2/1/21 0653.     # LDKT: Increased creatinine at ~ 1.6 with last labs due to unclear etiology.  Patient's CSA level was increased from unclear reason.  Will repeat labs, along with UA and UPC and follow.  Would consider kidney transplant biopsy for creatinine 1.6 or higher.              - Baseline Cr ~ 1.1-1.3    Message  Received: Yesterday  Message Contents   Jonathan Ordonez MD Blaisdell, Christin Rebecca, ALEX             Elevated serum creatinine and recommend good hydration and repeat labs.     PLAN:   Any recent illness/fever? Denies  Any new or missed medications? No missed meds. Started Meerion IUD.   Volume status/weight/edema? No swelling.  Changes in UOP? Color? August was treated for UTI. Lack of drinking water.  Are you drinking 2-3L water/day? Dehydrated   Pain over graft sites? Have pain but has uterine cancer right now. Pain feels more like ovarian.    OUTCOME: Verbalized understanding to hydrate and repeat creatinine. Message sent to Dr. Ordonez.    Addendum:  Message  Received: Today  Message Contents   Jonathan Ordonez MD Blaisdell, Christin Rebecca, ALEX             agree    Previous Messages    ----- Message -----   From: Linda Hoffmann RN   Sent: 2/2/2021  11:50 AM CST   To: Jonathan Ordonez MD     Other than dehydration she doesn't have reason for elevated creatinine. She has uterine cancer and we haven't checked for UTI since August if you want repeat UA/UC, (but has no symptoms)? Last visit note gave threshold of 1.6 for kidney txp biopsy. She will hydrate and repeat, if still elevated above 1.6 will revisit with you about biopsy.         UA/UC ordered.

## 2021-02-02 NOTE — TELEPHONE ENCOUNTER
ISSUE:   Cyclosporine level 149 on 2/1/21 0654, goal , dose Neoral 75 mg BID. Last dose time recorded as 2000. (11 hour trough?)    PLAN:   Please call patient and confirm this was an accurate 12-hour trough. Verify Cyclosporine dose 75 mg BID. Confirm no new medications or illness. Confirm no missed doses. If accurate trough and accurate dose, stay on same dose Cyclosporine and repeat labs in 2 weeks.    OUTCOME:   Spoke with patient, they confirm accurate trough level and current dose 75 mg BID. Patient confirmed to stay on same dose and to repeat labs in 1-2 weeks. Orders sent to preferred lab for repeat labs ([facility]). Patient voiced understanding of plan.

## 2021-02-18 ENCOUNTER — TELEPHONE (OUTPATIENT)
Dept: TRANSPLANT | Facility: CLINIC | Age: 31
End: 2021-02-18

## 2021-02-18 NOTE — TELEPHONE ENCOUNTER
RNCC received direct voicemail message from Carol inquiring about transplant's recommendation for COVID vaccine. She may have opportunity to get thru work. Carol had not read the [employer] message on 1/12/21 regarding the vaccine. Okay to get. Read the message to Carol. She has no further questions at this time.

## 2021-02-19 DIAGNOSIS — Z11.59 ENCOUNTER FOR SCREENING FOR OTHER VIRAL DISEASES: ICD-10-CM

## 2021-03-02 DIAGNOSIS — Z11.59 ENCOUNTER FOR SCREENING FOR OTHER VIRAL DISEASES: ICD-10-CM

## 2021-03-02 LAB
LABORATORY COMMENT REPORT: NORMAL
SARS-COV-2 RNA RESP QL NAA+PROBE: NEGATIVE
SARS-COV-2 RNA RESP QL NAA+PROBE: NORMAL
SPECIMEN SOURCE: NORMAL
SPECIMEN SOURCE: NORMAL

## 2021-03-02 PROCEDURE — U0005 INFEC AGEN DETEC AMPLI PROBE: HCPCS | Mod: 90 | Performed by: PATHOLOGY

## 2021-03-02 PROCEDURE — U0003 INFECTIOUS AGENT DETECTION BY NUCLEIC ACID (DNA OR RNA); SEVERE ACUTE RESPIRATORY SYNDROME CORONAVIRUS 2 (SARS-COV-2) (CORONAVIRUS DISEASE [COVID-19]), AMPLIFIED PROBE TECHNIQUE, MAKING USE OF HIGH THROUGHPUT TECHNOLOGIES AS DESCRIBED BY CMS-2020-01-R: HCPCS | Mod: 90 | Performed by: PATHOLOGY

## 2021-03-04 ENCOUNTER — ANESTHESIA EVENT (OUTPATIENT)
Dept: SURGERY | Facility: CLINIC | Age: 31
End: 2021-03-04
Payer: COMMERCIAL

## 2021-03-05 ENCOUNTER — ANESTHESIA (OUTPATIENT)
Dept: SURGERY | Facility: CLINIC | Age: 31
End: 2021-03-05
Payer: COMMERCIAL

## 2021-03-05 ENCOUNTER — HOSPITAL ENCOUNTER (OUTPATIENT)
Facility: CLINIC | Age: 31
Discharge: HOME | End: 2021-03-05
Attending: OBSTETRICS & GYNECOLOGY | Admitting: OBSTETRICS & GYNECOLOGY
Payer: COMMERCIAL

## 2021-03-05 VITALS
DIASTOLIC BLOOD PRESSURE: 107 MMHG | HEIGHT: 66 IN | BODY MASS INDEX: 43.82 KG/M2 | TEMPERATURE: 97.1 F | RESPIRATION RATE: 12 BRPM | SYSTOLIC BLOOD PRESSURE: 157 MMHG | HEART RATE: 89 BPM | OXYGEN SATURATION: 99 % | WEIGHT: 272.7 LBS

## 2021-03-05 DIAGNOSIS — Z98.890 S/P LAPAROSCOPIC PROCEDURE: Primary | ICD-10-CM

## 2021-03-05 LAB
B-HCG SERPL-ACNC: <1 IU/L (ref 0–5)
CREAT SERPL-MCNC: 1.7 MG/DL (ref 0.52–1.04)
GFR SERPL CREATININE-BSD FRML MDRD: 39 ML/MIN/{1.73_M2}
POTASSIUM SERPL-SCNC: 4.5 MMOL/L (ref 3.4–5.3)

## 2021-03-05 PROCEDURE — 258N000003 HC RX IP 258 OP 636: Performed by: NURSE ANESTHETIST, CERTIFIED REGISTERED

## 2021-03-05 PROCEDURE — 258N000001 HC RX 258: Performed by: OBSTETRICS & GYNECOLOGY

## 2021-03-05 PROCEDURE — 250N000009 HC RX 250: Performed by: NURSE ANESTHETIST, CERTIFIED REGISTERED

## 2021-03-05 PROCEDURE — 84132 ASSAY OF SERUM POTASSIUM: CPT | Performed by: OBSTETRICS & GYNECOLOGY

## 2021-03-05 PROCEDURE — 88305 TISSUE EXAM BY PATHOLOGIST: CPT | Mod: TC | Performed by: OBSTETRICS & GYNECOLOGY

## 2021-03-05 PROCEDURE — 82565 ASSAY OF CREATININE: CPT | Performed by: OBSTETRICS & GYNECOLOGY

## 2021-03-05 PROCEDURE — 84702 CHORIONIC GONADOTROPIN TEST: CPT | Mod: GZ | Performed by: OBSTETRICS & GYNECOLOGY

## 2021-03-05 PROCEDURE — 36415 COLL VENOUS BLD VENIPUNCTURE: CPT | Performed by: OBSTETRICS & GYNECOLOGY

## 2021-03-05 PROCEDURE — 710N000009 HC RECOVERY PHASE 1, LEVEL 1, PER MIN: Performed by: OBSTETRICS & GYNECOLOGY

## 2021-03-05 PROCEDURE — 272N000001 HC OR GENERAL SUPPLY STERILE: Performed by: OBSTETRICS & GYNECOLOGY

## 2021-03-05 PROCEDURE — 370N000017 HC ANESTHESIA TECHNICAL FEE, PER MIN: Performed by: OBSTETRICS & GYNECOLOGY

## 2021-03-05 PROCEDURE — 250N000025 HC SEVOFLURANE, PER MIN: Performed by: OBSTETRICS & GYNECOLOGY

## 2021-03-05 PROCEDURE — 710N000012 HC RECOVERY PHASE 2, PER MINUTE: Performed by: OBSTETRICS & GYNECOLOGY

## 2021-03-05 PROCEDURE — 250N000009 HC RX 250: Performed by: OBSTETRICS & GYNECOLOGY

## 2021-03-05 PROCEDURE — 250N000013 HC RX MED GY IP 250 OP 250 PS 637: Performed by: OBSTETRICS & GYNECOLOGY

## 2021-03-05 PROCEDURE — 999N000141 HC STATISTIC PRE-PROCEDURE NURSING ASSESSMENT: Performed by: OBSTETRICS & GYNECOLOGY

## 2021-03-05 PROCEDURE — 360N000076 HC SURGERY LEVEL 3, PER MIN: Performed by: OBSTETRICS & GYNECOLOGY

## 2021-03-05 PROCEDURE — 250N000011 HC RX IP 250 OP 636: Performed by: ANESTHESIOLOGY

## 2021-03-05 PROCEDURE — 250N000011 HC RX IP 250 OP 636: Performed by: NURSE ANESTHETIST, CERTIFIED REGISTERED

## 2021-03-05 PROCEDURE — 88305 TISSUE EXAM BY PATHOLOGIST: CPT | Mod: 26 | Performed by: PATHOLOGY

## 2021-03-05 PROCEDURE — 82565 ASSAY OF CREATININE: CPT | Performed by: ANESTHESIOLOGY

## 2021-03-05 DEVICE — IMPLANTABLE DEVICE
Type: IMPLANTABLE DEVICE | Site: UTERUS | Status: NON-FUNCTIONAL
Removed: 2021-09-13

## 2021-03-05 RX ORDER — PROPOFOL 10 MG/ML
INJECTION, EMULSION INTRAVENOUS PRN
Status: DISCONTINUED | OUTPATIENT
Start: 2021-03-05 | End: 2021-03-05

## 2021-03-05 RX ORDER — SODIUM CHLORIDE, SODIUM LACTATE, POTASSIUM CHLORIDE, CALCIUM CHLORIDE 600; 310; 30; 20 MG/100ML; MG/100ML; MG/100ML; MG/100ML
INJECTION, SOLUTION INTRAVENOUS CONTINUOUS
Status: DISCONTINUED | OUTPATIENT
Start: 2021-03-05 | End: 2021-03-05 | Stop reason: HOSPADM

## 2021-03-05 RX ORDER — LIDOCAINE HYDROCHLORIDE 10 MG/ML
INJECTION, SOLUTION INFILTRATION; PERINEURAL
Status: DISCONTINUED
Start: 2021-03-05 | End: 2021-03-05 | Stop reason: HOSPADM

## 2021-03-05 RX ORDER — HYDROMORPHONE HYDROCHLORIDE 1 MG/ML
.3-.5 INJECTION, SOLUTION INTRAMUSCULAR; INTRAVENOUS; SUBCUTANEOUS EVERY 10 MIN PRN
Status: DISCONTINUED | OUTPATIENT
Start: 2021-03-05 | End: 2021-03-05 | Stop reason: HOSPADM

## 2021-03-05 RX ORDER — BUPIVACAINE HYDROCHLORIDE AND EPINEPHRINE 2.5; 5 MG/ML; UG/ML
INJECTION, SOLUTION INFILTRATION; PERINEURAL PRN
Status: DISCONTINUED | OUTPATIENT
Start: 2021-03-05 | End: 2021-03-05 | Stop reason: HOSPADM

## 2021-03-05 RX ORDER — MEPERIDINE HYDROCHLORIDE 25 MG/ML
12.5 INJECTION INTRAMUSCULAR; INTRAVENOUS; SUBCUTANEOUS
Status: DISCONTINUED | OUTPATIENT
Start: 2021-03-05 | End: 2021-03-05 | Stop reason: HOSPADM

## 2021-03-05 RX ORDER — LABETALOL HYDROCHLORIDE 5 MG/ML
10 INJECTION, SOLUTION INTRAVENOUS
Status: DISCONTINUED | OUTPATIENT
Start: 2021-03-05 | End: 2021-03-05 | Stop reason: HOSPADM

## 2021-03-05 RX ORDER — ONDANSETRON 2 MG/ML
INJECTION INTRAMUSCULAR; INTRAVENOUS PRN
Status: DISCONTINUED | OUTPATIENT
Start: 2021-03-05 | End: 2021-03-05

## 2021-03-05 RX ORDER — DEXAMETHASONE SODIUM PHOSPHATE 4 MG/ML
INJECTION, SOLUTION INTRA-ARTICULAR; INTRALESIONAL; INTRAMUSCULAR; INTRAVENOUS; SOFT TISSUE PRN
Status: DISCONTINUED | OUTPATIENT
Start: 2021-03-05 | End: 2021-03-05

## 2021-03-05 RX ORDER — FENTANYL CITRATE 50 UG/ML
25-50 INJECTION, SOLUTION INTRAMUSCULAR; INTRAVENOUS
Status: CANCELLED | OUTPATIENT
Start: 2021-03-05

## 2021-03-05 RX ORDER — FENTANYL CITRATE 50 UG/ML
INJECTION, SOLUTION INTRAMUSCULAR; INTRAVENOUS PRN
Status: DISCONTINUED | OUTPATIENT
Start: 2021-03-05 | End: 2021-03-05

## 2021-03-05 RX ORDER — BUPIVACAINE HYDROCHLORIDE AND EPINEPHRINE 2.5; 5 MG/ML; UG/ML
INJECTION, SOLUTION EPIDURAL; INFILTRATION; INTRACAUDAL; PERINEURAL
Status: DISCONTINUED
Start: 2021-03-05 | End: 2021-03-05 | Stop reason: HOSPADM

## 2021-03-05 RX ORDER — ACETAMINOPHEN 325 MG/1
975 TABLET ORAL ONCE
Status: DISCONTINUED | OUTPATIENT
Start: 2021-03-05 | End: 2021-03-05 | Stop reason: HOSPADM

## 2021-03-05 RX ORDER — FENTANYL CITRATE 0.05 MG/ML
25-50 INJECTION, SOLUTION INTRAMUSCULAR; INTRAVENOUS
Status: DISCONTINUED | OUTPATIENT
Start: 2021-03-05 | End: 2021-03-05 | Stop reason: HOSPADM

## 2021-03-05 RX ORDER — OXYCODONE HYDROCHLORIDE 5 MG/1
5-10 TABLET ORAL EVERY 4 HOURS PRN
Qty: 10 TABLET | Refills: 0 | Status: SHIPPED | OUTPATIENT
Start: 2021-03-05 | End: 2021-04-15

## 2021-03-05 RX ORDER — NALOXONE HYDROCHLORIDE 0.4 MG/ML
0.2 INJECTION, SOLUTION INTRAMUSCULAR; INTRAVENOUS; SUBCUTANEOUS
Status: DISCONTINUED | OUTPATIENT
Start: 2021-03-05 | End: 2021-03-05 | Stop reason: HOSPADM

## 2021-03-05 RX ORDER — LIDOCAINE HYDROCHLORIDE 20 MG/ML
INJECTION, SOLUTION INFILTRATION; PERINEURAL PRN
Status: DISCONTINUED | OUTPATIENT
Start: 2021-03-05 | End: 2021-03-05

## 2021-03-05 RX ORDER — HYDRALAZINE HYDROCHLORIDE 20 MG/ML
2.5-5 INJECTION INTRAMUSCULAR; INTRAVENOUS EVERY 10 MIN PRN
Status: DISCONTINUED | OUTPATIENT
Start: 2021-03-05 | End: 2021-03-05 | Stop reason: HOSPADM

## 2021-03-05 RX ORDER — ONDANSETRON 2 MG/ML
4 INJECTION INTRAMUSCULAR; INTRAVENOUS EVERY 30 MIN PRN
Status: DISCONTINUED | OUTPATIENT
Start: 2021-03-05 | End: 2021-03-05 | Stop reason: HOSPADM

## 2021-03-05 RX ORDER — ACETAMINOPHEN 325 MG/1
975 TABLET ORAL ONCE
Status: COMPLETED | OUTPATIENT
Start: 2021-03-05 | End: 2021-03-05

## 2021-03-05 RX ORDER — MAGNESIUM HYDROXIDE 1200 MG/15ML
LIQUID ORAL PRN
Status: DISCONTINUED | OUTPATIENT
Start: 2021-03-05 | End: 2021-03-05 | Stop reason: HOSPADM

## 2021-03-05 RX ORDER — NALOXONE HYDROCHLORIDE 0.4 MG/ML
0.4 INJECTION, SOLUTION INTRAMUSCULAR; INTRAVENOUS; SUBCUTANEOUS
Status: DISCONTINUED | OUTPATIENT
Start: 2021-03-05 | End: 2021-03-05 | Stop reason: HOSPADM

## 2021-03-05 RX ORDER — ACETAMINOPHEN 325 MG/1
975 TABLET ORAL EVERY 6 HOURS PRN
Qty: 50 TABLET | Refills: 0 | Status: SHIPPED | OUTPATIENT
Start: 2021-03-05 | End: 2021-04-15

## 2021-03-05 RX ORDER — HYDROXYZINE HYDROCHLORIDE 50 MG/ML
50 INJECTION, SOLUTION INTRAMUSCULAR ONCE
Status: DISCONTINUED | OUTPATIENT
Start: 2021-03-05 | End: 2021-03-05 | Stop reason: HOSPADM

## 2021-03-05 RX ORDER — DEXAMETHASONE SODIUM PHOSPHATE 4 MG/ML
4 INJECTION, SOLUTION INTRA-ARTICULAR; INTRALESIONAL; INTRAMUSCULAR; INTRAVENOUS; SOFT TISSUE
Status: DISCONTINUED | OUTPATIENT
Start: 2021-03-05 | End: 2021-03-05 | Stop reason: HOSPADM

## 2021-03-05 RX ORDER — PROPOFOL 10 MG/ML
INJECTION, EMULSION INTRAVENOUS CONTINUOUS PRN
Status: DISCONTINUED | OUTPATIENT
Start: 2021-03-05 | End: 2021-03-05

## 2021-03-05 RX ORDER — OXYCODONE HYDROCHLORIDE 5 MG/1
5 TABLET ORAL
Status: DISCONTINUED | OUTPATIENT
Start: 2021-03-05 | End: 2021-03-05 | Stop reason: HOSPADM

## 2021-03-05 RX ORDER — ONDANSETRON 4 MG/1
4 TABLET, ORALLY DISINTEGRATING ORAL EVERY 30 MIN PRN
Status: DISCONTINUED | OUTPATIENT
Start: 2021-03-05 | End: 2021-03-05 | Stop reason: HOSPADM

## 2021-03-05 RX ORDER — SODIUM CHLORIDE, SODIUM LACTATE, POTASSIUM CHLORIDE, CALCIUM CHLORIDE 600; 310; 30; 20 MG/100ML; MG/100ML; MG/100ML; MG/100ML
INJECTION, SOLUTION INTRAVENOUS CONTINUOUS PRN
Status: DISCONTINUED | OUTPATIENT
Start: 2021-03-05 | End: 2021-03-05

## 2021-03-05 RX ORDER — ALBUTEROL SULFATE 0.83 MG/ML
2.5 SOLUTION RESPIRATORY (INHALATION) EVERY 4 HOURS PRN
Status: DISCONTINUED | OUTPATIENT
Start: 2021-03-05 | End: 2021-03-05 | Stop reason: HOSPADM

## 2021-03-05 RX ADMIN — ACETAMINOPHEN 975 MG: 325 TABLET, FILM COATED ORAL at 06:06

## 2021-03-05 RX ADMIN — FENTANYL CITRATE 25 MCG: 50 INJECTION, SOLUTION INTRAMUSCULAR; INTRAVENOUS at 07:35

## 2021-03-05 RX ADMIN — LIDOCAINE HYDROCHLORIDE 100 MG: 20 INJECTION, SOLUTION INFILTRATION; PERINEURAL at 07:35

## 2021-03-05 RX ADMIN — HYDROMORPHONE HYDROCHLORIDE 0.5 MG: 1 INJECTION, SOLUTION INTRAMUSCULAR; INTRAVENOUS; SUBCUTANEOUS at 10:15

## 2021-03-05 RX ADMIN — PHENYLEPHRINE HYDROCHLORIDE 100 MCG: 10 INJECTION INTRAVENOUS at 07:52

## 2021-03-05 RX ADMIN — PHENYLEPHRINE HYDROCHLORIDE 100 MCG: 10 INJECTION INTRAVENOUS at 08:19

## 2021-03-05 RX ADMIN — PHENYLEPHRINE HYDROCHLORIDE 50 MCG: 10 INJECTION INTRAVENOUS at 07:49

## 2021-03-05 RX ADMIN — FENTANYL CITRATE 50 MCG: 50 INJECTION, SOLUTION INTRAMUSCULAR; INTRAVENOUS at 07:51

## 2021-03-05 RX ADMIN — PHENYLEPHRINE HYDROCHLORIDE 0.2 MCG/KG/MIN: 10 INJECTION INTRAVENOUS at 08:30

## 2021-03-05 RX ADMIN — PHENYLEPHRINE HYDROCHLORIDE 100 MCG: 10 INJECTION INTRAVENOUS at 07:59

## 2021-03-05 RX ADMIN — PHENYLEPHRINE HYDROCHLORIDE 50 MCG: 10 INJECTION INTRAVENOUS at 07:47

## 2021-03-05 RX ADMIN — FENTANYL CITRATE 50 MCG: 50 INJECTION, SOLUTION INTRAMUSCULAR; INTRAVENOUS at 08:35

## 2021-03-05 RX ADMIN — DEXAMETHASONE SODIUM PHOSPHATE 4 MG: 4 INJECTION, SOLUTION INTRA-ARTICULAR; INTRALESIONAL; INTRAMUSCULAR; INTRAVENOUS; SOFT TISSUE at 07:39

## 2021-03-05 RX ADMIN — HYDROMORPHONE HYDROCHLORIDE 0.5 MG: 1 INJECTION, SOLUTION INTRAMUSCULAR; INTRAVENOUS; SUBCUTANEOUS at 09:53

## 2021-03-05 RX ADMIN — FENTANYL CITRATE 50 MCG: 0.05 INJECTION, SOLUTION INTRAMUSCULAR; INTRAVENOUS at 10:07

## 2021-03-05 RX ADMIN — PROPOFOL 200 MG: 10 INJECTION, EMULSION INTRAVENOUS at 07:35

## 2021-03-05 RX ADMIN — SODIUM CHLORIDE, POTASSIUM CHLORIDE, SODIUM LACTATE AND CALCIUM CHLORIDE: 600; 310; 30; 20 INJECTION, SOLUTION INTRAVENOUS at 08:24

## 2021-03-05 RX ADMIN — ONDANSETRON 4 MG: 2 INJECTION INTRAMUSCULAR; INTRAVENOUS at 07:44

## 2021-03-05 RX ADMIN — SUGAMMADEX 250 MG: 100 INJECTION, SOLUTION INTRAVENOUS at 09:05

## 2021-03-05 RX ADMIN — SODIUM CHLORIDE, POTASSIUM CHLORIDE, SODIUM LACTATE AND CALCIUM CHLORIDE: 600; 310; 30; 20 INJECTION, SOLUTION INTRAVENOUS at 07:31

## 2021-03-05 RX ADMIN — FENTANYL CITRATE 25 MCG: 50 INJECTION, SOLUTION INTRAMUSCULAR; INTRAVENOUS at 08:15

## 2021-03-05 RX ADMIN — PHENYLEPHRINE HYDROCHLORIDE 50 MCG: 10 INJECTION INTRAVENOUS at 07:43

## 2021-03-05 RX ADMIN — PHENYLEPHRINE HYDROCHLORIDE 150 MCG: 10 INJECTION INTRAVENOUS at 07:55

## 2021-03-05 RX ADMIN — PHENYLEPHRINE HYDROCHLORIDE 100 MCG: 10 INJECTION INTRAVENOUS at 07:58

## 2021-03-05 RX ADMIN — MIDAZOLAM 2 MG: 1 INJECTION INTRAMUSCULAR; INTRAVENOUS at 07:33

## 2021-03-05 RX ADMIN — PROPOFOL 75 MCG/KG/MIN: 10 INJECTION, EMULSION INTRAVENOUS at 07:35

## 2021-03-05 RX ADMIN — ROCURONIUM BROMIDE 50 MG: 10 INJECTION INTRAVENOUS at 08:06

## 2021-03-05 ASSESSMENT — MIFFLIN-ST. JEOR: SCORE: 1963.71

## 2021-03-05 NOTE — DISCHARGE INSTRUCTIONS
Today you were given 975 mg of Tylenol at 6:06am on 3/5/21. The recommended daily maximum dose is 4000 mg.       Same Day Surgery Discharge Instructions for  Sedation and General Anesthesia       It's not unusual to feel dizzy, light-headed or faint for up to 24 hours after surgery or while taking pain medication.  If you have these symptoms: sit for a few minutes before standing and have someone assist you when you get up to walk or use the bathroom.      You should rest and relax for the next 24 hours. We recommend you make arrangements to have an adult stay with you for at least 24 hours after your discharge.  Avoid hazardous and strenuous activity.      DO NOT DRIVE any vehicle or operate mechanical equipment for 24 hours following the end of your surgery.  Even though you may feel normal, your reactions may be affected by the medication you have received.      Do not drink alcoholic beverages for 24 hours following surgery.       Slowly progress to your regular diet as you feel able. It's not unusual to feel nauseated and/or vomit after receiving anesthesia.  If you develop these symptoms, drink clear liquids (apple juice, ginger ale, broth, 7-up, etc. ) until you feel better.  If your nausea and vomiting persists for 24 hours, please notify your surgeon.        All narcotic pain medications, along with inactivity and anesthesia, can cause constipation. Drinking plenty of liquids and increasing fiber intake will help.      For any questions of a medical nature, call your surgeon.      Do not make important decisions for 24 hours.      If you had general anesthesia, you may have a sore throat for a couple of days related to the breathing tube used during surgery.  You may use Cepacol lozenges to help with this discomfort.  If it worsens or if you develop a fever, contact your surgeon.       If you feel your pain is not well managed with the pain medications prescribed by your surgeon, please contact your  surgeon's office to let them know so they can address your concerns.       CoVid 19 Information    We want to give you information regarding Covid. Please consult your primary care provider with any questions you might have.     Patient who have symptoms (cough, fever, or shortness of breath), need to isolate for 7 days from when symptoms started OR 72 hours after fever resolves (without fever reducing medications) AND improvement of respiratory symptoms (whichever is longer).      Isolate yourself at home (in own room/own bathroom if possible)    Do Not allow any visitors    Do Not go to work or school    Do Not go to Rastafari,  centers, shopping, or other public places.    Do Not shake hands.    Avoid close and intimate contact with others (hugging, kissing).    Follow CDC recommendations for household cleaning of frequently touched services.     After the initial 7 days, continue to isolate yourself from household members as much as possible. To continue decrease the risk of community spread and exposure, you and any members of your household should limit activities in public for 14 days after starting home isolation.     You can reference the following CDC link for helpful home isolation/care tips:  https://www.cdc.gov/coronavirus/2019-ncov/downloads/10Things.pdf    Protect Others:    Cover Your Mouth and Nose with a mask, disposable tissue or wash cloth to avoid spreading germs to others.    Wash your hands and face frequently with soap and water    Call Your Primary Doctor If: Breathing difficulty develops or you become worse.    For more information about COVID19 and options for caring for yourself at home, please visit the CDC website at https://www.cdc.gov/coronavirus/2019-ncov/about/steps-when-sick.html  For more options for care at [facility], please visit our website at [email or web address]

## 2021-03-05 NOTE — ANESTHESIA POSTPROCEDURE EVALUATION
Patient: Carol Guillaume    Procedure(s):  HYSTEROSCOPY, WITH DILATION AND CURETTAGE OF UTERUS USING  MORCELLATOR  REMOVE MIRENA INTRAUTERINE DEVICE  INSERTION MIRENA INTRAUTERINE DEVICE  Laparoscopy diagnostic (gyn)    Diagnosis:Endometrial hyperplasia, unspecified [N85.00]  Diagnosis Additional Information: No value filed.    Anesthesia Type:  General    Note:  Disposition: Outpatient   Postop Pain Control: Uneventful            Sign Out: Well controlled pain   PONV: No   Neuro/Psych: Uneventful            Sign Out: Acceptable/Baseline neuro status   Airway/Respiratory: Uneventful            Sign Out: Acceptable/Baseline resp. status   CV/Hemodynamics: Uneventful            Sign Out: Acceptable CV status   Other NRE: NONE   DID A NON-ROUTINE EVENT OCCUR? No         Last vitals:  Vitals:    03/05/21 0604 03/05/21 0930 03/05/21 0945   BP: (!) 137/90 127/88 135/89   Pulse: 82 90 84   Resp: 16 21 12   Temp: 36.2  C (97.2  F) 36.2  C (97.1  F)    SpO2: 97% 96% 97%       Last vitals prior to Anesthesia Care Transfer:  CRNA VITALS  3/5/2021 0858 - 3/5/2021 0958      3/5/2021             NIBP:  127/87    Ht Rate:  80    SpO2:  99 %    EKG:  Sinus rhythm          Electronically Signed By: Darlene Ramos MD  March 5, 2021  10:04 AM

## 2021-03-05 NOTE — ANESTHESIA CARE TRANSFER NOTE
Patient: Carol Guillaume    Procedure(s):  HYSTEROSCOPY, WITH DILATION AND CURETTAGE OF UTERUS USING  MORCELLATOR  REMOVE MIRENA INTRAUTERINE DEVICE  INSERTION MIRENA INTRAUTERINE DEVICE  Laparoscopy diagnostic (gyn)    Diagnosis: Endometrial hyperplasia, unspecified [N85.00]  Diagnosis Additional Information: No value filed.    Anesthesia Type:   General     Note:    Oropharynx: oropharynx clear of all foreign objects  Level of Consciousness: drowsy  Oxygen Supplementation: face mask  Level of Supplemental Oxygen (L/min / FiO2): 8  Independent Airway: airway patency satisfactory and stable  Dentition: dentition unchanged  Vital Signs Stable: post-procedure vital signs reviewed and stable  Report to RN Given: handoff report given  Patient transferred to: PACU    Handoff Report: Identifed the Patient, Identified the Reponsible Provider, Reviewed the pertinent medical history, Discussed the surgical course, Reviewed Intra-OP anesthesia mangement and issues during anesthesia, Set expectations for post-procedure period and Allowed opportunity for questions and acknowledgement of understanding      Vitals: (Last set prior to Anesthesia Care Transfer)  CRNA VITALS  3/5/2021 0858 - 3/5/2021 0933      3/5/2021             Pulse:  86    SpO2:  99 %    Resp Rate (observed):  15    Resp Rate (set):  10        Electronically Signed By: MATTHEW Goodrich CRNA  March 5, 2021  9:33 AM

## 2021-03-05 NOTE — OP NOTE
Hysteroscopy Procedure Note    Date of Service: 3/5/2021    Surgeon(s):Surgeon(s) and Role:     * Fiorella Cunningham MD - Primary     Anesthesia Staff: Anesthesiologist: Darlene Ramos MD  CRNA: Halie Hylton APRN CRNA; Vielka Cisneros APRN CRNA  OR Staff: Circulator: Marina Sweeney RN  Relief Scrub: Ilene Johnson  Scrub Person: Shelby Aponte; Ana Maria Felton                              Intraop consult: Dr Garth Liz                                        Pre-Operative Diagnoses:   1. 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 Desires conservative management to retain fertility    Post-Operative Diagnoses: same  3. Uterine perforation  4. Infarcted epiploica     Procedure(s) performed: Exam under anesthesia, hysteroscopy, endometrial curettings with myosure device, removal of IUD, replacement of Mirena intrauterine device (from [facility] stock Lot [id number], Exp Jun 2023), diagnostic laparoscopy.     Type of Anesthesia used: general, with LMA    Complications:  uterine perforation    Estimated Blood Loss:  15mL    Antibiotics: not indicated    UOP:  200 mL clear yellow    IVF: 1200 mL    Findings: normal sized uterus without adnexal fullness. Moderate proliferation within endometrial cavity. Perforation noted at posterior internal cervical os. Blood noted within abdominal cavity. Upon laparoscopy - normal appearing uterus with posterior perforation, hemostatic. Normal bilateral tubes and ovaries. Dark purple hemorrhagic mass from colon - identified by Dr Garth Liz as infarcted epiploica and no further management indicated.   Fluid Deficit 1400  mL      Specimens: endometrial curettings    Indications: Carol Guillaume is a 30 year old female with AUB initially taken to hysteroscopy 10/2020 and noted to have Complex atypical hyperplasia with foci bordering on well differentiated adenocarcinoma. She had a  consult with Dr. Gallardo of [facility] and, in light of Carol's desire to maintain fertility, was recommended to proceed with Mirena IUD with hysteroscopy, D&C, and replacement of IUD every 3-4 months. This is her first exchange.  She has had very light periods with the Mirena and overall tolerating it well.     Procedure:  The patient was taken to the OR. General anesthesia obtained and LMA placed. SCDs placed bilaterally for DVT ppx. Time out was performed. EUA was performed and uterus was found to be normal size and mid position. Patient was placed in dorsal lithotomy position, prepped and draped in a normal sterile fashion. The bladder was drained with a straight catheter.   A sterile speculum was placed in the patients vagina. A single tooth tenaculum was then applied to the anterior cervix. Mirena strings grasped with ring forceps and Mirena removed intact. Lidocaine was infiltrated into the cervix for a cervical block. The uterus was sounded to 8 cm. Fernández dilators were used to dilate the cervix to 23 mm. Hysteroscope was introduced. Bowel was initially noted and a uterine perforation was identified. Hysteroscope was slowly removed then reinserted carefully to follow endocervical canal into the endometrial cavity. Proliferative endometrium was noted. Myosure was introduced and global curettings under direct visualization were obtained. Total cutting time with Myosure was 1 -2 minutes.  Contents were sent to pathology. Cavity was found to be clear and hemostatic. No additional masses identified. Hysteroscope and myosure were removed. Fluid deficit was 1400 ml.  There was good hemostasis. Speculum was removed and a sponge stick inserted into the vagina.    Gown and gloves were changed and attention was turned to the abdomen.  The skin just inferior to the umbilicus was infiltrated with local and an incision was made. An 5 mm Visiport was then placed, under direct visualization with each layer being identified until  intraperitoneal. Intraperitoneal placement was confirmed. The abdomen was infiltrated with CO2 gas to 15 mm Hg. At this time, we did a survey of the pelvis and abdomen which revealed 5x3 cm dark purple mass off of colon. Mass was not identifiable and intraop consut obtained for identification and recommendations. Further evaluation revealed normal pelvic anatomy, smooth peritoneal surfaces, smooth liver edge. At this time, we placed additional 5mm port in LLQ under direct visualization. Uterus inspected and site of perforation noted at posterior uterine wall and junction of TORIE/cervix just right of midline. Hemostasis was noted and no intervention needed. No apparent injuries to surrounding organs or vessels.     Speculum was replaced into vagina. With direct visualization laparosopically, Mirena (from [employer] stock) placed per manufacturers guidelines into endometrial cavity and strings trimmed to 3 cm. Speculum removed.     Dr. Garth Liz entered OR and noted colonic mass consistent with infarcted epiploica and did not recommend further evaluation or management. (Typically managed outpatient to control symptoms).     Gas allowed to escape from abdomen. Port in LLQ removed under direct visualization. Umbilical port removed.      The patient was taken out of Trendelenburg.  All incisions were closed with 4-0 Monocryl as well as skin glue.     Tenaculum was removed from vagina and hemostasis was noted.     Sponge, instrument, and needle counts were correct x2 as reported to the surgeon.     The patient was extubated, and awoken from anesthesia without difficulty. She was taken to the PACU in stable condition.      Fiorella Cunningham MD  [phone]  03/05/21 9:23 AM

## 2021-03-05 NOTE — ANESTHESIA PREPROCEDURE EVALUATION
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     Depression      Fatigue      High cholesterol      Hypertension      Insomnia      Insulin resistance      Legally blind      Obesity      PCOS (polycystic ovarian syndrome)      PPD positive, treated     9 months of INH     Pseudotumor cerebri     on Diamox     Retinitis pigmentosa      S/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      Uncomplicated asthma     as a child     Viremia       Past Surgical History:   Procedure Laterality Date     cataract bilateral  2017     DILATION AND CURETTAGE, OPERATIVE HYSTEROSCOPY WITH MORCELLATOR, COMBINED N/A 10/23/2020    Procedure: HYSTEROSCOPY, DILATION AND CURRETAGE, MYOSURE;  Surgeon: Kierra Tracy MD;  Location: [facility] OR     LITHOTRIPSY       LITHOTRIPSY       nexplanon Left 10/29/2018    Lot Number: [id number] Nexplanon removal date 10/29/2021     SINUS SURGERY  2001     TONSILLECTOMY       TRANSPLANT KIDNEY RECIPIENT LIVING RELATED Right 2006     wisdom teeth        No Known Allergies   Social History     Tobacco Use     Smoking status: Never Smoker     Smokeless tobacco: Never Used   Substance Use Topics     Alcohol use: No      Wt Readings from Last 1 Encounters:   10/23/20 124.9 kg (275 lb 4.8 oz)        Anesthesia Evaluation   Pt has had prior anesthetic.     No history of anesthetic complications       ROS/MED HX  ENT/Pulmonary: Comment: Legally blind    (+) Intermittent, asthma Treatment: Inhaler prn,   (-) sleep apnea    Neurologic: Comment: Pseudotumor cerebri      Cardiovascular:     (+) Dyslipidemia hypertension-----    METS/Exercise Tolerance:     Hematologic:       Musculoskeletal:       GI/Hepatic:    (-) GERD   Renal/Genitourinary: Comment: PCOS    (+) renal disease, type: CRI, Pt has history of transplant, Nephrolithiasis ,     Endo:     (+) Obesity,     Psychiatric/Substance Use:     (+) psychiatric history anxiety and depression     Infectious Disease: Comment: CMV      Malignancy:       Other:            Physical Exam    Airway        Mallampati: II   TM distance: > 3 FB   Neck ROM: full   Mouth opening: > 3 cm    Respiratory Devices and Support         Dental  no notable dental history         Cardiovascular   cardiovascular exam normal          Pulmonary   pulmonary exam normal                OUTSIDE LABS:  CBC:   Lab Results   Component Value Date    WBC 7.6 02/01/2021    WBC 7.9 10/20/2020    HGB 13.0 02/01/2021    HGB 13.0 10/20/2020    HCT 41.3 02/01/2021    HCT 41.3 10/20/2020     02/01/2021     10/20/2020     BMP:   Lab Results   Component Value Date     02/01/2021     10/20/2020    POTASSIUM 4.5 02/01/2021    POTASSIUM 4.2 10/20/2020    CHLORIDE 104 02/01/2021    CHLORIDE 107 10/20/2020    CO2 29 02/01/2021    CO2 29 10/20/2020    BUN 25 02/01/2021    BUN 20 10/20/2020    CR 1.72 (H) 02/01/2021    CR 1.52 (H) 10/20/2020     (H) 02/01/2021     (H) 10/20/2020     COAGS:   Lab Results   Component Value Date    PTT 32 07/16/2007    INR 1.07 01/07/2009     POC:   Lab Results   Component Value Date     (H) 07/17/2007    HCG Negative 08/17/2020    HCGS Negative 07/17/2007     HEPATIC:   Lab Results   Component Value Date    ALBUMIN 3.5 12/13/2019    PROTTOTAL 7.4 12/13/2019    ALT 51 (H) 12/13/2019    AST 21 12/13/2019    ALKPHOS 92 12/13/2019    BILITOTAL 0.6 12/13/2019     OTHER:   Lab Results   Component Value Date    A1C 5.0 07/26/2013    LACHO 8.9 02/01/2021    PHOS  3.3 01/07/2009    MAG 1.8 01/07/2009    LIPASE 236 07/17/2007    AMYLASE 58 06/03/2008    TSH 2.91 12/13/2019    T4 1.16 07/26/2013    CRP <3.0 06/03/2008       Anesthesia Plan    ASA Status:  3   NPO Status:  NPO Appropriate    Anesthesia Type: General.     - Airway: LMA   Induction: Intravenous, Propofol.   Maintenance: Balanced.        Consents    Anesthesia Plan(s) and associated risks, benefits, and realistic alternatives discussed. Questions answered and patient/representative(s) expressed understanding.     - Discussed with:  Patient         Postoperative Care    Pain management: Multi-modal analgesia.   PONV prophylaxis: Ondansetron (or other 5HT-3), Background Propofol Infusion     Comments:         H&P reviewed: Unable to attach H&P to encounter due to EHR limitations. H&P Update: appropriate H&P reviewed, patient examined. No interval changes since H&P (within 30 days).         Darlene Ramos MD

## 2021-03-05 NOTE — OR NURSING
Patient BP's elevated at discharged. Patient stated that she will take her second doses of BP's medication. No chest pain or SOB. Patient discharge instable condition by wheelchair.

## 2021-03-09 LAB — COPATH REPORT: NORMAL

## 2021-03-10 ENCOUNTER — TELEPHONE (OUTPATIENT)
Dept: TRANSPLANT | Facility: CLINIC | Age: 31
End: 2021-03-10

## 2021-03-10 NOTE — TELEPHONE ENCOUNTER
Patient had surgery last week not pertaining the kidney. Patient has some burning sensation and and stated there was some complication and would like a return call

## 2021-03-11 NOTE — TELEPHONE ENCOUNTER
Left Carol message to please follow up with surgeon team for any complications not related to her transplanted kidney. If needed, may update RNCC with return call.

## 2021-03-12 NOTE — TELEPHONE ENCOUNTER
"Second return call to Carol. She spoke with surgeon team on Monday but has had a number of symptoms she is concerned about.  1) Pressure on right side of body. Mon, Tues, Wed had burning sensation that is now gone.   2) Having chest pain, when sitting on couch; described as Intermittently Sharp pain or squeezing.   3) Shortness of breath is not unusual for her but coupled with chest pain is concerning. Reports she had hard time talking on Monday and Tuesday raspy voice which has improved but she felt was related to intubation. Throat irritation was causing a cough.  4) Racing heart rate when laying down which she also states she has had for years.     With recent surgery 3/5/21, concern for clot that could be causing her symptoms. Carol says her surgical team \"made an oops with giving her a medication meant to be given by muscular route thru her IV, but she not sure what medication it was and they would not tell her.\"     Recommended Carol go to ED to be evaluated by Provider. Her creatinine was also elevated on 3/5/21 to 1.7 and may need IV fluids.   "

## 2021-04-05 ENCOUNTER — TELEPHONE (OUTPATIENT)
Dept: TRANSPLANT | Facility: CLINIC | Age: 31
End: 2021-04-05

## 2021-04-05 NOTE — TELEPHONE ENCOUNTER
ISSUE:  Received: 3 days ago  Message Contents   Topher Chapman, [facility]  Linda Hoffmann, RN             Carol is over 2 weeks late to fill Neoral and has not filled Myfortic with us for over a year.  She has not returned our calls.     Topher Chapman [facility]   Specialty Pharmacist [phone]      RNCC spoke to patient 1/21/21 and was told she had not missed Myfortic doses and had plenty supply.    PLAN:   Please call Carol and ensure she is taking her medication as prescribed, Myfortic (540 mg) BID and Neoral (75 mg) BID. Any missed doses? Where has she been refilling her meds if not at  Specialty Pharmacy?    LPN: RNBRADLEY routed for call.

## 2021-04-06 NOTE — TELEPHONE ENCOUNTER
Call placed to patient. No answer. Voice message left requesting a return call to discuss medications and the pharmacy that she is using.

## 2021-04-13 ENCOUNTER — OFFICE VISIT (OUTPATIENT)
Dept: DERMATOLOGY | Facility: CLINIC | Age: 31
End: 2021-04-13
Payer: COMMERCIAL

## 2021-04-13 DIAGNOSIS — Z80.8 FAMILY HISTORY OF MELANOMA: ICD-10-CM

## 2021-04-13 DIAGNOSIS — Z94.0 HISTORY OF RENAL TRANSPLANTATION: ICD-10-CM

## 2021-04-13 DIAGNOSIS — L82.1 SEBORRHEIC KERATOSIS: ICD-10-CM

## 2021-04-13 DIAGNOSIS — D22.9 MULTIPLE BENIGN NEVI: Primary | ICD-10-CM

## 2021-04-13 PROCEDURE — 99213 OFFICE O/P EST LOW 20 MIN: CPT | Performed by: DERMATOLOGY

## 2021-04-13 ASSESSMENT — PAIN SCALES - GENERAL: PAINLEVEL: NO PAIN (0)

## 2021-04-13 NOTE — LETTER
4/13/2021       RE: Carol Guillaume  [address]  [facility] [address]     Dear Colleague,    Thank you for referring your patient, Carol Guillaume, to the [facility] DERMATOLOG[facility]INIC [facility]. Please see a copy of my visit note below.    [facility] Dermatology Note  Encounter Date: Apr 13, 2021  Office Visit     Dermatology Problem List:  # Pertinent PMH: h/o kidney transplant in 2006.  1. Family h/o melanoma in grandfather  2. Benign nevus biopsied years ago     ____________________________________________    Assessment & Plan:     # Multiple benign nevi.   # Fhx melanoma.  Counseled on ABCDEs of melanoma and sun protection. Asked patient to return sooner if noticing changing or symptomatic lesions.  - Monitor nevi bilateral ears, photos in chart (ddx lentigo)    # Hypopigmented patch, L back, measuring 5.5 cm with few uniform brown macules within. Favor pigmentary mosaicism but she isn't sure how long it's been there and doesn't think she was born with it. She is going to check with her mom and let me know.     # Seborrheic keratosis. Discussed the natural history and benign nature of this lesion. Reassurance provided that no additional treatment is necessary. Interestingly the one on her right inferior breast does have a two-tone appearance but still favor SK and benign.     # History of organ transplant. Patient aware that she is at higher risk for cutaneous malignancy given history of transplant. Monitor for changing or symptomatic lesions. Continue frequent skin checks and photoprotection.    Procedures Performed:   None    Follow-up: 3 months for recheck back, breast, ears - sooner if concerns.     Staff:     Ethel Oquendo MD    Department of Dermatology  [facility]: Phone: [phone], Fax:  [phone]  4/17/2021    ____________________________________________    CC: Skin Check (moustapha is coming in today for a skin check)    HPI:  Ms. Moustapha Guillaume is a(n) 30 year old female who presents today as a return patient for skin check.  No major concerns.  No personal hx of skin cancer.  No painful, bleeding, non-healing, or otherwise symptomatic lesions.     Unfortunately she was recently diagnosed with uterine cancer.    Patient is otherwise feeling well, without additional skin concerns.     Labs Reviewed:  N/A    Physical Exam:  Vitals: There were no vitals taken for this visit.  SKIN: Total skin excluding the undergarment areas was performed. The exam included the head/face, neck, both arms, chest, back, abdomen, both legs, digits and/or nails.   - 5.5 cm hypopigmented patch left back with few uniform brown macules within  - waxy stuck on papule under left breast with medial light brown and lateral dark brown.  - few medium brown macules on trunk and extremities, one on left ear measures 2 mm and one on R ear measures 1 mm.  - No other lesions of concern on areas examined.     Medications:  Current Outpatient Medications   Medication     acetaminophen (TYLENOL) 325 MG tablet     carvedilol (COREG) 25 MG tablet     ELDERBERRY PO     levonorgestrel (MIRENA) 20 MCG/24HR IUD     MYFORTIC (BRAND) 180 MG EC tablet     NEORAL (BRAND) 25 MG capsule     Prenatal Vit-Fe Fumarate-FA (PRENATAL PO)     VITAMIN D, CHOLECALCIFEROL, PO     amLODIPine (NORVASC) 5 MG tablet     No current facility-administered medications for this visit.       Past Medical History:   Patient Active Problem List   Diagnosis     Kidney replaced by transplant     Senior-Loken syndrome     Pseudotumor cerebri     Legally blind     Retinitis pigmentosa     Vitamin D deficiency     Obesity     Insulin resistance     PCOS (polycystic ovarian syndrome)     Cataract     Palpitations     Dyslipidemia     HTN, kidney transplant related      Aftercare following organ transplant     Immunosuppression (H)     Past Medical History:   Diagnosis Date     Anemia      Anxiety      Cataract 2015     CMV (cytomegalovirus infection) (H)      CMV disease (H) 2006    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     Depression      Fatigue      High cholesterol      Hypertension      Insomnia      Insulin resistance      Legally blind      Obesity      PCOS (polycystic ovarian syndrome)      PPD positive, treated 2006    9 months of INH     Pseudotumor cerebri     on Diamox     Retinitis pigmentosa      S/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      Uncomplicated asthma     as a child     Viremia        CC Referred Self, MD  No address on file on close of this encounter.

## 2021-04-13 NOTE — NURSING NOTE
Dermatology Rooming Note    Moustapha Guillaume's goals for this visit include:   Chief Complaint   Patient presents with     Skin Check     moustapha is coming in today for a skin check     Delfina Robertson CMA on 4/13/2021 at 3:11 PM

## 2021-04-15 ENCOUNTER — VIRTUAL VISIT (OUTPATIENT)
Dept: NEPHROLOGY | Facility: CLINIC | Age: 31
End: 2021-04-15
Attending: INTERNAL MEDICINE
Payer: COMMERCIAL

## 2021-04-15 VITALS — DIASTOLIC BLOOD PRESSURE: 110 MMHG | SYSTOLIC BLOOD PRESSURE: 150 MMHG

## 2021-04-15 DIAGNOSIS — Z48.298 AFTERCARE FOLLOWING ORGAN TRANSPLANT: ICD-10-CM

## 2021-04-15 DIAGNOSIS — I12.9 HYPERTENSION, RENAL: Primary | ICD-10-CM

## 2021-04-15 PROCEDURE — 99214 OFFICE O/P EST MOD 30 MIN: CPT | Mod: 95 | Performed by: INTERNAL MEDICINE

## 2021-04-15 RX ORDER — AMLODIPINE BESYLATE 5 MG/1
5 TABLET ORAL DAILY
Qty: 90 TABLET | Refills: 3 | Status: SHIPPED | OUTPATIENT
Start: 2021-04-15 | End: 2022-08-31

## 2021-04-15 ASSESSMENT — PAIN SCALES - GENERAL: PAINLEVEL: NO PAIN (0)

## 2021-04-15 NOTE — LETTER
4/15/2021      RE: Carol Guillaume  [address]  [facility] [address]       Attempted to reach pt to check in for video visit she did not answer. I will try again.  Zahra Vásquez CMA    Carol is a 30 year old who is being evaluated via a billable video visit.      How would you like to obtain your AVS? Mail a copy  If the video visit is dropped, the invitation should be resent by: Send to e-mail at: [email or web address]  Will anyone else be joining your video visit? No      Video-Visit Details    Type of service:  Video Visit    Originating Location (pt. Location): Home    Distant Location (provider location):  [facility]PHROLOGY [facility] [place]     Platform used for Video Visit: Payoff        CHRONIC TRANSPLANT NEPHROLOGY VISIT    Assessment & Plan   # Uterine cancer stage one: currently getting uterus salvage treatment, will assess the current immunosuppression and see if we can reduce it.     # LDKT:    - Baseline Cr ~ 1.1-1.3   - Proteinuria: recent proteinuria on a UA 2020 will need repeated    - Date DSA Last Checked: Aug/2006      Latest DSA: Not checked recently due to time from transplant   - BK Viremia: Not checked recently due to time from transplant   - Kidney Tx Biopsy: Jan 08, 2009; Result: No diagnostic evidence of acute rejection.  Unremarkable.    # Immunosuppression: Cyclosporine (goal ) and Mycophenolic acid (dose 540 mg every 12 hours)   - Changes: Not at this time    # Infection Prophylaxis:   - PJP: None    # Hypertension: Borderline control;  Goal BP: < 130/80   - Changes: Yes - Will change carvedilol to labetalol 200 mg bid.    # Mineral Bone Disorder:   - Vitamin D; level: Low        On supplement: Yes  - Calcium; level: Normal        On supplement: No    # Obesity: Stable weight.   - Recommend weight loss for overall health by increasing exercise and watching caloric intake.    # Skin Cancer Risk:    - Discussed sun protection and recommend regular follow up  with Dermatology.    # Medical Compliance: Yes     # COVID-19 Virus Review: Discussed COVID-19 virus and the potential medical risks.  Reviewed preventative health recommendations, which includes washing hands for 20 seconds, avoid touching your face, and social distancing.  Asked patient to inform the transplant center if they are exposed or diagnosed with this virus.    # Transplant History:  Etiology of Kidney Failure: Senior-Loken Syndrome  Tx: LDKT  Transplant: 7/18/2006 (Kidney)  Donor Type: Living Donor Class: Standard Criteria Donor  Significant changes in immunosuppression: Generic mycophenolate mofetil caused chest pain and heart palpitations  Significant transplant-related complications: None    Transplant Office Phone Number: [phone]    Assessment and plan was discussed with the patient and she voiced her understanding and agreement.    Return visit: No follow-ups on file.    Mykel Joy MD    Chief Complaint   Ms. Guillaume is a 30 year old here for kidney transplant and immunosuppression management.    History of Present Illness   Ms. 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  She is maintained on cyclosporine and Myfortic.  Her baseline creatinine used to be in the 1.3 range.  Recently her creatinine has been elevated and we have not had repeat labs in a while.  In August 2020 she was found to have proteinuria on a UA without quantification.  She has not had repeated labs since because of Covid and trying to stay at home.  She was recently diagnosed with uterine cancer and they have been trying different therapies with the idea to salvage the uterus for future pregnancy.  She is taking her medications regularly.  She notes that her blood pressure has been elevated at times 150/110.  She is on a full dose carvedilol and we discussed adding a small dose Norvasc 5 mg daily.  We discussed the need to update her labs including the urine  protein and drug levels to see if we can help reduce her immune suppression in the settings of uterine cancer stage I.    Home BP: 130/90s    Review of Systems   A comprehensive review of systems was obtained and negative, except as noted in the HPI or PMH.    Problem List   Patient Active Problem List   Diagnosis     Kidney replaced by transplant     Senior-Loken syndrome     Pseudotumor cerebri     Legally blind     Retinitis pigmentosa     Vitamin D deficiency     Obesity     Insulin resistance     PCOS (polycystic ovarian syndrome)     Cataract     Palpitations     Dyslipidemia     HTN, kidney transplant related     Aftercare following organ transplant     Immunosuppression (H)       Social History   Social History     Tobacco Use     Smoking status: Never Smoker     Smokeless tobacco: Never Used   Substance Use Topics     Alcohol use: No     Drug use: No       Allergies   No Known Allergies    Medications   Current Outpatient Medications   Medication Sig     acetaminophen (TYLENOL) 325 MG tablet Take 3 tablets (975 mg) by mouth every 6 hours as needed for mild pain     acetaminophen (TYLENOL) 325 MG tablet Take 2 tablets (650 mg) by mouth every 4 hours as needed for mild pain     carvedilol (COREG) 25 MG tablet Take 1 tablet (25 mg) by mouth 2 times daily (with meals)     ELDERBERRY PO      levonorgestrel (MIRENA) 20 MCG/24HR IUD 1 each by Intrauterine route once     MYFORTIC (BRAND) 180 MG EC tablet Take 3 tablets (540 mg) by mouth 2 times daily     NEORAL (BRAND) 25 MG capsule Take 3 capsules (75 mg) by mouth 2 times daily     oxyCODONE (ROXICODONE) 5 MG tablet Take 1-2 tablets (5-10 mg) by mouth every 4 hours as needed for moderate to severe pain     Prenatal Vit-Fe Fumarate-FA (PRENATAL PO) Take by mouth daily     VITAMIN D, CHOLECALCIFEROL, PO Take 1,000 Units by mouth daily      No current facility-administered medications for this visit.      There are no discontinued medications.    Physical Exam    Vital Signs: BP (!) 150/110   Within normal limits for video visit no acute distress no visible facial rashes normal speech and mentation.    Data     Renal Latest Ref Rng & Units 3/5/2021 2/1/2021 10/20/2020   Na 133 - 144 mmol/L - 139 139   Na (external) 135 - 145 mmol/L - - -   K 3.4 - 5.3 mmol/L 4.5 4.5 4.2   K (external) 3.5 - 5.0 mmol/L - - -   Cl 94 - 109 mmol/L - 104 107   Cl (external) 98 - 110 mmol/L - - -   CO2 20 - 32 mmol/L - 29 29   CO2 (external) 21 - 31 mmol/L - - -   BUN 7 - 30 mg/dL - 25 20   BUN (external) 8 - 25 mg/dL - - -   Cr 0.52 - 1.04 mg/dL 1.70(H) 1.72(H) 1.52(H)   Cr (external) 0.57 - 1.11 mg/dL - - -   Glucose 70 - 99 mg/dL - 106(H) 110(H)   Glucose (external) 65 - 100 mg/dL - - -   Ca  8.5 - 10.1 mg/dL - 8.9 8.9   Ca (external) 8.5 - 10.5 mg/dL - - -   Mg 1.6 - 2.3 mg/dL - - -     Bone Health Latest Ref Rng & Units 12/13/2019 1/30/2018 6/1/2009   Phos 2.5 - 4.5 mg/dL - - -   PTHi 18 - 80 pg/mL 36 30 65   Vit D Def 20 - 75 ug/L 26 32 -     Heme Latest Ref Rng & Units 2/1/2021 10/20/2020 8/17/2020   WBC 4.0 - 11.0 10e9/L 7.6 7.9 7.8   WBC (external) 4.5 - 11.0 thou/cu mm - - -   Hgb 11.7 - 15.7 g/dL 13.0 13.0 12.3   Hgb (external) 12.0 - 16.0 g/dL - - -   Plt 150 - 450 10e9/L 220 242 213   Plt (external) 140 - 440 thou/cu mm - - -   ABSOLUTE NEUTROPHIL 1.6 - 8.3 10e9/L - - -   ABSOLUTE LYMPHOCYTES 0.8 - 5.3 10e9/L - - -   ABSOLUTE MONOCYTES 0.0 - 1.3 10e9/L - - -   ABSOLUTE EOSINOPHILS 0.0 - 0.7 10e9/L - - -   ABSOLUTE BASOPHILS 0.0 - 0.2 10e9/L - - -   ABS IMMATURE GRANULOCYTES 0 - 0.4 10e9/L - - -   ABSOLUTE NUCLEATED RBC - - - -     Liver Latest Ref Rng & Units 12/13/2019 10/22/2018 1/31/2017   AP 40 - 150 U/L 92 72 80   TBili 0.2 - 1.3 mg/dL 0.6 0.6 0.5   ALT 0 - 50 U/L 51(H) 34 50   AST 0 - 45 U/L 21 26 38   Tot Protein 6.8 - 8.8 g/dL 7.4 7.3 8.1   Albumin 3.4 - 5.0 g/dL 3.5 3.6 4.1     Pancreas Latest Ref Rng & Units 7/26/2013 7/27/2012 6/3/2008   A1C 4.3 - 6.0 % 5.0 5.3 -    Amylase 30 - 110 U/L - - 58   Lipase 20 - 250 U/L - - -     Iron studies Latest Ref Rng & Units 1/30/2018 7/11/2006 6/26/2006   Iron 35 - 180 ug/dL 55 56 75   Iron sat 15 - 46 % 19 - -   Ferritin 12 - 150 ng/mL 19 473(H) 914(H)     [facility] Txp Virology Latest Ref Rng & Units 1/31/2017 8/29/2011 7/21/2011   CMV IgG EU/mL - - -   CMV IgM <0.90 - - -   CMV IgM Interp <0.90 - - -   CVM DNA Quant - Plasma, EDTA anticoagulant Whole blood, EDTA anticoagulant Whole blood, EDTA anticoagulant   CMV Quant <100 Copies/mL - <100  No CMV DNA detected. <100  No CMV DNA detected.   CMV QT Log <2.0 Log copies/mL - <2.0  The Cytomegalovirus DNA Quantitation assay is a real-time polymerase chain   reaction (PCR) utilizing analyte specific reagents manufactured by [employer]   [facility]. Analyte Specific Reagents (ASRs) are used in many laboratory   tests necessary for standard medical care and generally do not require FDA   approval.   This test was developed and its performance characteristics determined by   [facility].  It has not been   cleared or approved by the US Food and Drug Administration. <2.0  The Cytomegalovirus DNA Quantitation assay is a real-time polymerase chain   reaction (PCR) utilizing analyte specific reagents manufactured by [employer]   [facility]. Analyte Specific Reagents (ASRs) are used in many laboratory   tests necessary for standard medical care and generally do not require FDA   approval.   This test was developed and its performance characteristics determined by   [facility].  It has not been   cleared or approved by the US Food and Drug Administration.   CMV QUANT IU/ML CMVND [IU]/mL CMV DNA Not Detected   The BARBI AmpliPrep/BARBI TaqMan CMV Test is an FDA-approved in vitro nucleic   acid amplification test for the quantitation of cytomegalovirus DNA in human   plasma (EDTA plasma) using the [employer] AmpliPrep  Instrument for automated viral   nucleic acid extraction and the BARBI TaqMan Analyzer or [employer] for   automated Real Time amplification and detection of the viral nucleic acid   target.   Titer results are reported in International Units/mL (IU/mL using 1st WHO   International standard for Human Cytomegalovirus for Nucleic Acid Amplification   based assays. The conversion factor between CMV DNA copis/mL (as defined by the   Roche BARBI TaqMan CMV test) and International Units is the CMV DNA   concentration in IU/mL x 1.1 copies/IU = CMV DNA in copies/mL.   This assay has received FDA approval for the testing of human plasma only. The   Infectious Disease Diagnostic Laboratory at the [facility], [place], has validated the pe  rformance characteristics of the Roche   CMV assay for plasma, bronchial alveolar lavage/wash and urine.   - -   LOG IU/ML OF CMVQNT <2.1 [Log:IU]/mL Not Calculated - -   BK Spec - - - -   BK Res <1000 copies/mL - - -   BK Log <3.0 Log copies/mL - - -   EBV IgG - - - -   EBV DNA COPIES/ML EBVNEG [Copies]/mL EBV DNA Not Detected - -   EBV DNA LOG OF COPIES <2.7 [Log:copies]/mL Not Calculated   The Real-Time quantitative EBV assay was developed and its performance   characteristics determined by the Infectious Diseases Diagnostic Laboratory at   the [facility] in [place], [place].  The   primers and probes are Analyte Specific Reagents (ASRs) manufactured  by   [employer].   ASRs are used in many laboratory tests necessary for standard medical care and   generally do not require U.S. Food and Drug Administration approval.  The FDA   has determined that such clearance or approval is not necessary.  This test is   used for clinical purposes.  It should not be regarded as investigational or   research.   This laboratory is certified under the Clinical Laboratory Improvement   Amendments of 1988 (CLIA-88) as qualified to perform high  complexity clinical   laboratory testing.   The quantitative range of this assay is 500-22,500,00 copies/mL (2.7-7.4 log   copies/mL).  A negative result does not rule out the presence of PCR inhibitors     in the patient specimen or EBV DNA nucleic acid in concentrations below the   level of detection of the assay.  Inhibition may also lead to underestimation   of viral quantitation.   - -   Hep B Core NEG - - -   Hep B Surf 0.0 - 4.9 mIU/mL - - -   HIV 1&2 NEG - - -       Mykel Joy MD

## 2021-04-15 NOTE — PROGRESS NOTES
CHRONIC TRANSPLANT NEPHROLOGY VISIT    Assessment & Plan   # Uterine cancer stage one: currently getting uterus salvage treatment, will assess the current immunosuppression and see if we can reduce it.     # LDKT:    - Baseline Cr ~ 1.1-1.3   - Proteinuria: recent proteinuria on a UA 2020 will need repeated    - Date DSA Last Checked: Aug/2006      Latest DSA: Not checked recently due to time from transplant   - BK Viremia: Not checked recently due to time from transplant   - Kidney Tx Biopsy: Jan 08, 2009; Result: No diagnostic evidence of acute rejection.  Unremarkable.    # Immunosuppression: Cyclosporine (goal ) and Mycophenolic acid (dose 540 mg every 12 hours)   - Changes: Not at this time    # Infection Prophylaxis:   - PJP: None    # Hypertension: Borderline control;  Goal BP: < 130/80   - Changes: Yes - Will change carvedilol to labetalol 200 mg bid.    # Mineral Bone Disorder:   - Vitamin D; level: Low        On supplement: Yes  - Calcium; level: Normal        On supplement: No    # Obesity: Stable weight.   - Recommend weight loss for overall health by increasing exercise and watching caloric intake.    # Skin Cancer Risk:    - Discussed sun protection and recommend regular follow up with Dermatology.    # Medical Compliance: Yes     # COVID-19 Virus Review: Discussed COVID-19 virus and the potential medical risks.  Reviewed preventative health recommendations, which includes washing hands for 20 seconds, avoid touching your face, and social distancing.  Asked patient to inform the transplant center if they are exposed or diagnosed with this virus.    # Transplant History:  Etiology of Kidney Failure: Senior-Loken Syndrome  Tx: LDKT  Transplant: 7/18/2006 (Kidney)  Donor Type: Living Donor Class: Standard Criteria Donor  Significant changes in immunosuppression: Generic mycophenolate mofetil caused chest pain and heart palpitations  Significant transplant-related complications: None    Transplant  Office Phone Number: [phone]    Assessment and plan was discussed with the patient and she voiced her understanding and agreement.    Return visit: No follow-ups on file.    Mykel Joy MD    Chief Complaint   Ms. Guillaume is a 30 year old here for kidney transplant and immunosuppression management.    History of Present Illness   Ms. 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  She is maintained on cyclosporine and Myfortic.  Her baseline creatinine used to be in the 1.3 range.  Recently her creatinine has been elevated and we have not had repeat labs in a while.  In August 2020 she was found to have proteinuria on a UA without quantification.  She has not had repeated labs since because of Covid and trying to stay at home.  She was recently diagnosed with uterine cancer and they have been trying different therapies with the idea to salvage the uterus for future pregnancy.  She is taking her medications regularly.  She notes that her blood pressure has been elevated at times 150/110.  She is on a full dose carvedilol and we discussed adding a small dose Norvasc 5 mg daily.  We discussed the need to update her labs including the urine protein and drug levels to see if we can help reduce her immune suppression in the settings of uterine cancer stage I.    Home BP: 130/90s    Review of Systems   A comprehensive review of systems was obtained and negative, except as noted in the HPI or PMH.    Problem List   Patient Active Problem List   Diagnosis     Kidney replaced by transplant     Senior-Loken syndrome     Pseudotumor cerebri     Legally blind     Retinitis pigmentosa     Vitamin D deficiency     Obesity     Insulin resistance     PCOS (polycystic ovarian syndrome)     Cataract     Palpitations     Dyslipidemia     HTN, kidney transplant related     Aftercare following organ transplant     Immunosuppression (H)       Social History   Social History      Tobacco Use     Smoking status: Never Smoker     Smokeless tobacco: Never Used   Substance Use Topics     Alcohol use: No     Drug use: No       Allergies   No Known Allergies    Medications   Current Outpatient Medications   Medication Sig     acetaminophen (TYLENOL) 325 MG tablet Take 3 tablets (975 mg) by mouth every 6 hours as needed for mild pain     acetaminophen (TYLENOL) 325 MG tablet Take 2 tablets (650 mg) by mouth every 4 hours as needed for mild pain     carvedilol (COREG) 25 MG tablet Take 1 tablet (25 mg) by mouth 2 times daily (with meals)     ELDERBERRY PO      levonorgestrel (MIRENA) 20 MCG/24HR IUD 1 each by Intrauterine route once     MYFORTIC (BRAND) 180 MG EC tablet Take 3 tablets (540 mg) by mouth 2 times daily     NEORAL (BRAND) 25 MG capsule Take 3 capsules (75 mg) by mouth 2 times daily     oxyCODONE (ROXICODONE) 5 MG tablet Take 1-2 tablets (5-10 mg) by mouth every 4 hours as needed for moderate to severe pain     Prenatal Vit-Fe Fumarate-FA (PRENATAL PO) Take by mouth daily     VITAMIN D, CHOLECALCIFEROL, PO Take 1,000 Units by mouth daily      No current facility-administered medications for this visit.      There are no discontinued medications.    Physical Exam   Vital Signs: BP (!) 150/110   Within normal limits for video visit no acute distress no visible facial rashes normal speech and mentation.    Data     Renal Latest Ref Rng & Units 3/5/2021 2/1/2021 10/20/2020   Na 133 - 144 mmol/L - 139 139   Na (external) 135 - 145 mmol/L - - -   K 3.4 - 5.3 mmol/L 4.5 4.5 4.2   K (external) 3.5 - 5.0 mmol/L - - -   Cl 94 - 109 mmol/L - 104 107   Cl (external) 98 - 110 mmol/L - - -   CO2 20 - 32 mmol/L - 29 29   CO2 (external) 21 - 31 mmol/L - - -   BUN 7 - 30 mg/dL - 25 20   BUN (external) 8 - 25 mg/dL - - -   Cr 0.52 - 1.04 mg/dL 1.70(H) 1.72(H) 1.52(H)   Cr (external) 0.57 - 1.11 mg/dL - - -   Glucose 70 - 99 mg/dL - 106(H) 110(H)   Glucose (external) 65 - 100 mg/dL - - -   Ca  8.5 -  10.1 mg/dL - 8.9 8.9   Ca (external) 8.5 - 10.5 mg/dL - - -   Mg 1.6 - 2.3 mg/dL - - -     Bone Health Latest Ref Rng & Units 12/13/2019 1/30/2018 6/1/2009   Phos 2.5 - 4.5 mg/dL - - -   PTHi 18 - 80 pg/mL 36 30 65   Vit D Def 20 - 75 ug/L 26 32 -     Heme Latest Ref Rng & Units 2/1/2021 10/20/2020 8/17/2020   WBC 4.0 - 11.0 10e9/L 7.6 7.9 7.8   WBC (external) 4.5 - 11.0 thou/cu mm - - -   Hgb 11.7 - 15.7 g/dL 13.0 13.0 12.3   Hgb (external) 12.0 - 16.0 g/dL - - -   Plt 150 - 450 10e9/L 220 242 213   Plt (external) 140 - 440 thou/cu mm - - -   ABSOLUTE NEUTROPHIL 1.6 - 8.3 10e9/L - - -   ABSOLUTE LYMPHOCYTES 0.8 - 5.3 10e9/L - - -   ABSOLUTE MONOCYTES 0.0 - 1.3 10e9/L - - -   ABSOLUTE EOSINOPHILS 0.0 - 0.7 10e9/L - - -   ABSOLUTE BASOPHILS 0.0 - 0.2 10e9/L - - -   ABS IMMATURE GRANULOCYTES 0 - 0.4 10e9/L - - -   ABSOLUTE NUCLEATED RBC - - - -     Liver Latest Ref Rng & Units 12/13/2019 10/22/2018 1/31/2017   AP 40 - 150 U/L 92 72 80   TBili 0.2 - 1.3 mg/dL 0.6 0.6 0.5   ALT 0 - 50 U/L 51(H) 34 50   AST 0 - 45 U/L 21 26 38   Tot Protein 6.8 - 8.8 g/dL 7.4 7.3 8.1   Albumin 3.4 - 5.0 g/dL 3.5 3.6 4.1     Pancreas Latest Ref Rng & Units 7/26/2013 7/27/2012 6/3/2008   A1C 4.3 - 6.0 % 5.0 5.3 -   Amylase 30 - 110 U/L - - 58   Lipase 20 - 250 U/L - - -     Iron studies Latest Ref Rng & Units 1/30/2018 7/11/2006 6/26/2006   Iron 35 - 180 ug/dL 55 56 75   Iron sat 15 - 46 % 19 - -   Ferritin 12 - 150 ng/mL 19 473(H) 914(H)     [facility] Txp Virology Latest Ref Rng & Units 1/31/2017 8/29/2011 7/21/2011   CMV IgG EU/mL - - -   CMV IgM <0.90 - - -   CMV IgM Interp <0.90 - - -   CVM DNA Quant - Plasma, EDTA anticoagulant Whole blood, EDTA anticoagulant Whole blood, EDTA anticoagulant   CMV Quant <100 Copies/mL - <100  No CMV DNA detected. <100  No CMV DNA detected.   CMV QT Log <2.0 Log copies/mL - <2.0  The Cytomegalovirus DNA Quantitation assay is a real-time polymerase chain   reaction (PCR) utilizing analyte specific reagents  manufactured by [facility]   [facility]. Analyte Specific Reagents (ASRs) are used in many laboratory   tests necessary for standard medical care and generally do not require FDA   approval.   This test was developed and its performance characteristics determined by   [facility].  It has not been   cleared or approved by the US Food and Drug Administration. <2.0  The Cytomegalovirus DNA Quantitation assay is a real-time polymerase chain   reaction (PCR) utilizing analyte specific reagents manufactured by [facility]. Analyte Specific Reagents (ASRs) are used in many laboratory   tests necessary for standard medical care and generally do not require FDA   approval.   This test was developed and its performance characteristics determined by   [facility].  It has not been   cleared or approved by the US Food and Drug Administration.   CMV QUANT IU/ML CMVND [IU]/mL CMV DNA Not Detected   The BARBI AmpliPrep/BARBI TaqMan CMV Test is an FDA-approved in vitro nucleic   acid amplification test for the quantitation of cytomegalovirus DNA in human   plasma (EDTA plasma) using the [employer] AmpliPrep Instrument for automated viral   nucleic acid extraction and the [employer] TaqMan Analyzer or BARBI TaqMan for   automated Real Time amplification and detection of the viral nucleic acid   target.   Titer results are reported in International Units/mL (IU/mL using 1st WHO   International standard for Human Cytomegalovirus for Nucleic Acid Amplification   based assays. The conversion factor between CMV DNA copis/mL (as defined by the   Roche BARBI TaqMan CMV test) and International Units is the CMV DNA   concentration in IU/mL x 1.1 copies/IU = CMV DNA in copies/mL.   This assay has received FDA approval for the testing of human plasma only. The   Infect[facility] Diagnostic Laboratory at the [facility],  [place] has validated the pe  rformance characteristics of the Roche   CMV assay for plasma, bronchial alveolar lavage/wash and urine.   - -   LOG IU/ML OF CMVQNT <2.1 [Log:IU]/mL Not Calculated - -   BK Spec - - - -   BK Res <1000 copies/mL - - -   BK Log <3.0 Log copies/mL - - -   EBV IgG - - - -   EBV DNA COPIES/ML EBVNEG [Copies]/mL EBV DNA Not Detected - -   EBV DNA LOG OF COPIES <2.7 [Log:copies]/mL Not Calculated   The Real-Time quantitative EBV assay was developed and its performance   characteristics determined by the Infectious Diseases Diagnostic Laboratory at   the [facility] in [place], [place].  The   primers and probes are Analyte Specific Reagents (ASRs) manufactured  by   [employer].   ASRs are used in many laboratory tests necessary for standard medical care and   generally do not require U.S. Food and Drug Administration approval.  The FDA   has determined that such clearance or approval is not necessary.  This test is   used for clinical purposes.  It should not be regarded as investigational or   research.   This laboratory is certified under the Clinical Laboratory Improvement   Amendments of 1988 (CLIA-88) as qualified to perform high complexity clinical   laboratory testing.   The quantitative range of this assay is 500-22,500,00 copies/mL (2.7-7.4 log   copies/mL).  A negative result does not rule out the presence of PCR inhibitors     in the patient specimen or EBV DNA nucleic acid in concentrations below the   level of detection of the assay.  Inhibition may also lead to underestimation   of viral quantitation.   - -   Hep B Core NEG - - -   Hep B Surf 0.0 - 4.9 mIU/mL - - -   HIV 1&2 NEG - - -

## 2021-04-15 NOTE — LETTER
4/15/2021       RE: Carol Guillaume  [address]  [facility] [address]     Dear Colleague,    Thank you for referring your patient, Carol Guillaume, to the [facility] [place] at [facility]. Please see a copy of my visit note below.    Attempted to reach pt to check in for video visit she did not answer. I will try again.  Zahra Vásquez, VIDYA    Carol is a 30 year old who is being evaluated via a billable video visit.      How would you like to obtain your AVS? Mail a copy  If the video visit is dropped, the invitation should be resent by: Send to e-mail at: [email or web address]  Will anyone else be joining your video visit? No      Video-Visit Details    Type of service:  Video Visit    Originating Location (pt. Location): Home    Distant Location (provider location):  [facility] [place]     Platform used for Video Visit: [employer]        CHRONIC TRANSPLANT NEPHROLOGY VISIT    Assessment & Plan   # Uterine cancer stage one: currently getting uterus salvage treatment, will assess the current immunosuppression and see if we can reduce it.     # LDKT:    - Baseline Cr ~ 1.1-1.3   - Proteinuria: recent proteinuria on a UA 2020 will need repeated    - Date DSA Last Checked: Aug/2006      Latest DSA: Not checked recently due to time from transplant   - BK Viremia: Not checked recently due to time from transplant   - Kidney Tx Biopsy: Jan 08, 2009; Result: No diagnostic evidence of acute rejection.  Unremarkable.    # Immunosuppression: Cyclosporine (goal ) and Mycophenolic acid (dose 540 mg every 12 hours)   - Changes: Not at this time    # Infection Prophylaxis:   - PJP: None    # Hypertension: Borderline control;  Goal BP: < 130/80   - Changes: Yes - Will change carvedilol to labetalol 200 mg bid.    # Mineral Bone Disorder:   - Vitamin D; level: Low        On supplement: Yes  - Calcium; level: Normal         On supplement: No    # Obesity: Stable weight.   - Recommend weight loss for overall health by increasing exercise and watching caloric intake.    # Skin Cancer Risk:    - Discussed sun protection and recommend regular follow up with Dermatology.    # Medical Compliance: Yes     # COVID-19 Virus Review: Discussed COVID-19 virus and the potential medical risks.  Reviewed preventative health recommendations, which includes washing hands for 20 seconds, avoid touching your face, and social distancing.  Asked patient to inform the transplant center if they are exposed or diagnosed with this virus.    # Transplant History:  Etiology of Kidney Failure: Senior-Loken Syndrome  Tx: LDKT  Transplant: 7/18/2006 (Kidney)  Donor Type: Living Donor Class: Standard Criteria Donor  Significant changes in immunosuppression: Generic mycophenolate mofetil caused chest pain and heart palpitations  Significant transplant-related complications: None    Transplant Office Phone Number: [phone]    Assessment and plan was discussed with the patient and she voiced her understanding and agreement.    Return visit: No follow-ups on file.    Mykel Joy MD    Chief Complaint   Ms. Guillaume is a 30 year old here for kidney transplant and immunosuppression management.    History of Present Illness   Ms. 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  She is maintained on cyclosporine and Myfortic.  Her baseline creatinine used to be in the 1.3 range.  Recently her creatinine has been elevated and we have not had repeat labs in a while.  In August 2020 she was found to have proteinuria on a UA without quantification.  She has not had repeated labs since because of Covid and trying to stay at home.  She was recently diagnosed with uterine cancer and they have been trying different therapies with the idea to salvage the uterus for future pregnancy.  She is taking her medications  regularly.  She notes that her blood pressure has been elevated at times 150/110.  She is on a full dose carvedilol and we discussed adding a small dose Norvasc 5 mg daily.  We discussed the need to update her labs including the urine protein and drug levels to see if we can help reduce her immune suppression in the settings of uterine cancer stage I.    Home BP: 130/90s    Review of Systems   A comprehensive review of systems was obtained and negative, except as noted in the HPI or PMH.    Problem List   Patient Active Problem List   Diagnosis     Kidney replaced by transplant     Senior-Loken syndrome     Pseudotumor cerebri     Legally blind     Retinitis pigmentosa     Vitamin D deficiency     Obesity     Insulin resistance     PCOS (polycystic ovarian syndrome)     Cataract     Palpitations     Dyslipidemia     HTN, kidney transplant related     Aftercare following organ transplant     Immunosuppression (H)       Social History   Social History     Tobacco Use     Smoking status: Never Smoker     Smokeless tobacco: Never Used   Substance Use Topics     Alcohol use: No     Drug use: No       Allergies   No Known Allergies    Medications   Current Outpatient Medications   Medication Sig     acetaminophen (TYLENOL) 325 MG tablet Take 3 tablets (975 mg) by mouth every 6 hours as needed for mild pain     acetaminophen (TYLENOL) 325 MG tablet Take 2 tablets (650 mg) by mouth every 4 hours as needed for mild pain     carvedilol (COREG) 25 MG tablet Take 1 tablet (25 mg) by mouth 2 times daily (with meals)     ELDERBERRY PO      levonorgestrel (MIRENA) 20 MCG/24HR IUD 1 each by Intrauterine route once     MYFORTIC (BRAND) 180 MG EC tablet Take 3 tablets (540 mg) by mouth 2 times daily     NEORAL (BRAND) 25 MG capsule Take 3 capsules (75 mg) by mouth 2 times daily     oxyCODONE (ROXICODONE) 5 MG tablet Take 1-2 tablets (5-10 mg) by mouth every 4 hours as needed for moderate to severe pain     Prenatal Vit-Fe Fumarate-FA  (PRENATAL PO) Take by mouth daily     VITAMIN D, CHOLECALCIFEROL, PO Take 1,000 Units by mouth daily      No current facility-administered medications for this visit.      There are no discontinued medications.    Physical Exam   Vital Signs: BP (!) 150/110   Within normal limits for video visit no acute distress no visible facial rashes normal speech and mentation.    Data     Renal Latest Ref Rng & Units 3/5/2021 2/1/2021 10/20/2020   Na 133 - 144 mmol/L - 139 139   Na (external) 135 - 145 mmol/L - - -   K 3.4 - 5.3 mmol/L 4.5 4.5 4.2   K (external) 3.5 - 5.0 mmol/L - - -   Cl 94 - 109 mmol/L - 104 107   Cl (external) 98 - 110 mmol/L - - -   CO2 20 - 32 mmol/L - 29 29   CO2 (external) 21 - 31 mmol/L - - -   BUN 7 - 30 mg/dL - 25 20   BUN (external) 8 - 25 mg/dL - - -   Cr 0.52 - 1.04 mg/dL 1.70(H) 1.72(H) 1.52(H)   Cr (external) 0.57 - 1.11 mg/dL - - -   Glucose 70 - 99 mg/dL - 106(H) 110(H)   Glucose (external) 65 - 100 mg/dL - - -   Ca  8.5 - 10.1 mg/dL - 8.9 8.9   Ca (external) 8.5 - 10.5 mg/dL - - -   Mg 1.6 - 2.3 mg/dL - - -     Bone Health Latest Ref Rng & Units 12/13/2019 1/30/2018 6/1/2009   Phos 2.5 - 4.5 mg/dL - - -   PTHi 18 - 80 pg/mL 36 30 65   Vit D Def 20 - 75 ug/L 26 32 -     Heme Latest Ref Rng & Units 2/1/2021 10/20/2020 8/17/2020   WBC 4.0 - 11.0 10e9/L 7.6 7.9 7.8   WBC (external) 4.5 - 11.0 thou/cu mm - - -   Hgb 11.7 - 15.7 g/dL 13.0 13.0 12.3   Hgb (external) 12.0 - 16.0 g/dL - - -   Plt 150 - 450 10e9/L 220 242 213   Plt (external) 140 - 440 thou/cu mm - - -   ABSOLUTE NEUTROPHIL 1.6 - 8.3 10e9/L - - -   ABSOLUTE LYMPHOCYTES 0.8 - 5.3 10e9/L - - -   ABSOLUTE MONOCYTES 0.0 - 1.3 10e9/L - - -   ABSOLUTE EOSINOPHILS 0.0 - 0.7 10e9/L - - -   ABSOLUTE BASOPHILS 0.0 - 0.2 10e9/L - - -   ABS IMMATURE GRANULOCYTES 0 - 0.4 10e9/L - - -   ABSOLUTE NUCLEATED RBC - - - -     Liver Latest Ref Rng & Units 12/13/2019 10/22/2018 1/31/2017   AP 40 - 150 U/L 92 72 80   TBili 0.2 - 1.3 mg/dL 0.6 0.6 0.5   ALT  0 - 50 U/L 51(H) 34 50   AST 0 - 45 U/L 21 26 38   Tot Protein 6.8 - 8.8 g/dL 7.4 7.3 8.1   Albumin 3.4 - 5.0 g/dL 3.5 3.6 4.1     Pancreas Latest Ref Rng & Units 7/26/2013 7/27/2012 6/3/2008   A1C 4.3 - 6.0 % 5.0 5.3 -   Amylase 30 - 110 U/L - - 58   Lipase 20 - 250 U/L - - -     Iron studies Latest Ref Rng & Units 1/30/2018 7/11/2006 6/26/2006   Iron 35 - 180 ug/dL 55 56 75   Iron sat 15 - 46 % 19 - -   Ferritin 12 - 150 ng/mL 19 473(H) 914(H)     [facility] Txp Virology Latest Ref Rng & Units 1/31/2017 8/29/2011 7/21/2011   CMV IgG EU/mL - - -   CMV IgM <0.90 - - -   CMV IgM Interp <0.90 - - -   CVM DNA Quant - Plasma, EDTA anticoagulant Whole blood, EDTA anticoagulant Whole blood, EDTA anticoagulant   CMV Quant <100 Copies/mL - <100  No CMV DNA detected. <100  No CMV DNA detected.   CMV QT Log <2.0 Log copies/mL - <2.0  The Cytomegalovirus DNA Quantitation assay is a real-time polymerase chain   reaction (PCR) utilizing analyte specific reagents manufactured by [employer]   [facility]. Analyte Specific Reagents (ASRs) are used in many laboratory   tests necessary for standard medical care and generally do not require FDA   approval.   This test was developed and its performance characteristics determined by   [facility].  It has not been   cleared or approved by the US Food and Drug Administration. <2.0  The Cytomegalovirus DNA Quantitation assay is a real-time polymerase chain   reaction (PCR) utilizing analyte specific reagents manufactured by [employer]   [facility]. Analyte Specific Reagents (ASRs) are used in many laboratory   tests necessary for standard medical care and generally do not require FDA   approval.   This test was developed and its performance characteristics determined by   [facility].  It has not been   cleared or approved by the US Food and Drug Administration.   CMV QUANT IU/ML CMVND [IU]/mL CMV DNA Not  Detected   The BARBI AmpliPrep/BARBI TaqMan CMV Test is an FDA-approved in vitro nucleic   acid amplification test for the quantitation of cytomegalovirus DNA in human   plasma (EDTA plasma) using the [employer] AmpliPrep Instrument for automated viral   nucleic acid extraction and the BARBI TaqMan Analyzer or BARBI TaqMan for   automated Real Time amplification and detection of the viral nucleic acid   target.   Titer results are reported in International Units/mL (IU/mL using 1st WHO   International standard for Human Cytomegalovirus for Nucleic Acid Amplification   based assays. The conversion factor between CMV DNA copis/mL (as defined by the   Roche BARBI TaqMan CMV test) and International Units is the CMV DNA   concentration in IU/mL x 1.1 copies/IU = CMV DNA in copies/mL.   This assay has received FDA approval for the testing of human plasma only. The   Infectious Disease Diagnostic Laboratory at the [facility], [place], has validated the pe  rformance characteristics of the Roche   CMV assay for plasma, bronchial alveolar lavage/wash and urine.   - -   LOG IU/ML OF CMVQNT <2.1 [Log:IU]/mL Not Calculated - -   BK Spec - - - -   BK Res <1000 copies/mL - - -   BK Log <3.0 Log copies/mL - - -   EBV IgG - - - -   EBV DNA COPIES/ML EBVNEG [Copies]/mL EBV DNA Not Detected - -   EBV DNA LOG OF COPIES <2.7 [Log:copies]/mL Not Calculated   The Real-Time quantitative EBV assay was developed and its performance   characteristics determined by the Infectious Diseases Diagnostic Laboratory at   the [facility] in [place], [place].  The   primers and probes are Analyte Specific Reagents (ASRs) manufactured  by   [employer].   ASRs are used in many laboratory tests necessary for standard medical care and   generally do not require U.S. Food and Drug Administration approval.  The FDA   has determined that such clearance or approval is not necessary.  This test is   used  for clinical purposes.  It should not be regarded as investigational or   research.   This laboratory is certified under the Clinical Laboratory Improvement   Amendments of 1988 (CLIA-88) as qualified to perform high complexity clinical   laboratory testing.   The quantitative range of this assay is 500-22,500,00 copies/mL (2.7-7.4 log   copies/mL).  A negative result does not rule out the presence of PCR inhibitors     in the patient specimen or EBV DNA nucleic acid in concentrations below the   level of detection of the assay.  Inhibition may also lead to underestimation   of viral quantitation.   - -   Hep B Core NEG - - -   Hep B Surf 0.0 - 4.9 mIU/mL - - -   HIV 1&2 NEG - - -       Again, thank you for allowing me to participate in the care of your patient.      Sincerely,    Mykel Joy MD

## 2021-04-15 NOTE — PROGRESS NOTES
"Carol is a 30 year old who is being evaluated via a billable video visit.      How would you like to obtain your AVS? Mail a copy  If the video visit is dropped, the invitation should be resent by: Send to e-mail at: [email or web address]  Will anyone else be joining your video visit? No  {If patient encounters technical issues they should call [phone] :[id number]}    Video Start Time: {video visit start/end time for provider to select:152948}  Video-Visit Details    Type of service:  Video Visit    Video End Time:{video visit start/end time for provider to select:152948}    Originating Location (pt. Location): {video visit patient location:219168::\"Home\"}    Distant Location (provider location):  [facility] [place]     Platform used for Video Visit: {Virtual Visit Platforms:305747::\"[employer]\"}    "

## 2021-04-15 NOTE — PROGRESS NOTES
Attempted to reach pt to check in for video visit she did not answer. I will try again.  Zahra Vásquez CMA    Carol is a 30 year old who is being evaluated via a billable video visit.      How would you like to obtain your AVS? Mail a copy  If the video visit is dropped, the invitation should be resent by: Send to e-mail at: [email or web address]  Will anyone else be joining your video visit? No      Video-Visit Details    Type of service:  Video Visit    Originating Location (pt. Location): Home    Distant Location (provider location):  [facility] [place]     Platform used for Video Visit: [employer]

## 2021-04-16 ENCOUNTER — TELEPHONE (OUTPATIENT)
Dept: NEPHROLOGY | Facility: CLINIC | Age: 31
End: 2021-04-16

## 2021-04-18 NOTE — PROGRESS NOTES
[facility] Dermatology Note  Encounter Date: Apr 13, 2021  Office Visit     Dermatology Problem List:  # Pertinent PMH: h/o kidney transplant in 2006.  1. Family h/o melanoma in grandfather  2. Benign nevus biopsied years ago     ____________________________________________    Assessment & Plan:     # Multiple benign nevi.   # Fhx melanoma.  Counseled on ABCDEs of melanoma and sun protection. Asked patient to return sooner if noticing changing or symptomatic lesions.  - Monitor nevi bilateral ears, photos in chart (ddx lentigo)    # Hypopigmented patch, L back, measuring 5.5 cm with few uniform brown macules within. Favor pigmentary mosaicism but she isn't sure how long it's been there and doesn't think she was born with it. She is going to check with her mom and let me know.     # Seborrheic keratosis. Discussed the natural history and benign nature of this lesion. Reassurance provided that no additional treatment is necessary. Interestingly the one on her right inferior breast does have a two-tone appearance but still favor SK and benign.     # History of organ transplant. Patient aware that she is at higher risk for cutaneous malignancy given history of transplant. Monitor for changing or symptomatic lesions. Continue frequent skin checks and photoprotection.    Procedures Performed:   None    Follow-up: 3 months for recheck back, breast, ears - sooner if concerns.     Staff:     Ethel Oquendo MD    Department of Dermatology  [facility]: Phone: [phone], Fax: [phone]  4/17/2021    ____________________________________________    CC: Skin Check (moustapha is coming in today for a skin check)    HPI:  Ms. Moustapha Guillaume is a(n) 30 year old female who presents today as a return patient for skin check.  No major concerns.  No personal hx of skin cancer.  No painful, bleeding, non-healing, or  otherwise symptomatic lesions.     Unfortunately she was recently diagnosed with uterine cancer.    Patient is otherwise feeling well, without additional skin concerns.     Labs Reviewed:  N/A    Physical Exam:  Vitals: There were no vitals taken for this visit.  SKIN: Total skin excluding the undergarment areas was performed. The exam included the head/face, neck, both arms, chest, back, abdomen, both legs, digits and/or nails.   - 5.5 cm hypopigmented patch left back with few uniform brown macules within  - waxy stuck on papule under left breast with medial light brown and lateral dark brown.  - few medium brown macules on trunk and extremities, one on left ear measures 2 mm and one on R ear measures 1 mm.  - No other lesions of concern on areas examined.     Medications:  Current Outpatient Medications   Medication     acetaminophen (TYLENOL) 325 MG tablet     carvedilol (COREG) 25 MG tablet     ELDERBERRY PO     levonorgestrel (MIRENA) 20 MCG/24HR IUD     MYFORTIC (BRAND) 180 MG EC tablet     NEORAL (BRAND) 25 MG capsule     Prenatal Vit-Fe Fumarate-FA (PRENATAL PO)     VITAMIN D, CHOLECALCIFEROL, PO     amLODIPine (NORVASC) 5 MG tablet     No current facility-administered medications for this visit.       Past Medical History:   Patient Active Problem List   Diagnosis     Kidney replaced by transplant     Senior-Loken syndrome     Pseudotumor cerebri     Legally blind     Retinitis pigmentosa     Vitamin D deficiency     Obesity     Insulin resistance     PCOS (polycystic ovarian syndrome)     Cataract     Palpitations     Dyslipidemia     HTN, kidney transplant related     Aftercare following organ transplant     Immunosuppression (H)     Past Medical History:   Diagnosis Date     Anemia      Anxiety      Cataract 2015     CMV (cytomegalovirus infection) (H)      CMV disease (H) 2006    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     Depression      Fatigue      High cholesterol      Hypertension       Insomnia      Insulin resistance      Legally blind      Obesity      PCOS (polycystic ovarian syndrome)      PPD positive, treated 2006    9 months of INH     Pseudotumor cerebri     on Diamox     Retinitis pigmentosa      S/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      Uncomplicated asthma     as a child     Viremia        CC Referred Self, MD  No address on file on close of this encounter.

## 2021-05-24 ENCOUNTER — TELEPHONE (OUTPATIENT)
Dept: TRANSPLANT | Facility: CLINIC | Age: 31
End: 2021-05-24

## 2021-05-31 ENCOUNTER — RECORDS - HEALTHEAST (OUTPATIENT)
Dept: ADMINISTRATIVE | Facility: CLINIC | Age: 31
End: 2021-05-31

## 2021-07-30 ENCOUNTER — TRANSFERRED RECORDS (OUTPATIENT)
Dept: HEALTH INFORMATION MANAGEMENT | Facility: CLINIC | Age: 31
End: 2021-07-30

## 2021-08-16 ENCOUNTER — LAB (OUTPATIENT)
Dept: LAB | Facility: CLINIC | Age: 31
End: 2021-08-16

## 2021-08-16 ENCOUNTER — OFFICE VISIT (OUTPATIENT)
Dept: FAMILY MEDICINE | Facility: CLINIC | Age: 31
End: 2021-08-16
Payer: COMMERCIAL

## 2021-08-16 VITALS
HEART RATE: 77 BPM | HEIGHT: 66 IN | WEIGHT: 276 LBS | SYSTOLIC BLOOD PRESSURE: 138 MMHG | BODY MASS INDEX: 44.36 KG/M2 | DIASTOLIC BLOOD PRESSURE: 92 MMHG | OXYGEN SATURATION: 100 %

## 2021-08-16 DIAGNOSIS — R63.5 ABNORMAL WEIGHT GAIN: ICD-10-CM

## 2021-08-16 DIAGNOSIS — I15.1 HTN, KIDNEY TRANSPLANT RELATED: ICD-10-CM

## 2021-08-16 DIAGNOSIS — E28.2 PCOS (POLYCYSTIC OVARIAN SYNDROME): ICD-10-CM

## 2021-08-16 DIAGNOSIS — N18.32 STAGE 3B CHRONIC KIDNEY DISEASE (H): ICD-10-CM

## 2021-08-16 DIAGNOSIS — Z01.818 PREOP GENERAL PHYSICAL EXAM: ICD-10-CM

## 2021-08-16 DIAGNOSIS — Z94.0 HTN, KIDNEY TRANSPLANT RELATED: ICD-10-CM

## 2021-08-16 DIAGNOSIS — H54.8 LEGALLY BLIND: ICD-10-CM

## 2021-08-16 DIAGNOSIS — Z01.818 PREOP GENERAL PHYSICAL EXAM: Primary | ICD-10-CM

## 2021-08-16 DIAGNOSIS — Z94.0 KIDNEY REPLACED BY TRANSPLANT: ICD-10-CM

## 2021-08-16 PROBLEM — N18.30 CHRONIC KIDNEY DISEASE, STAGE 3 (H): Status: ACTIVE | Noted: 2021-08-16

## 2021-08-16 LAB
ANION GAP SERPL CALCULATED.3IONS-SCNC: 7 MMOL/L (ref 3–14)
BASOPHILS # BLD AUTO: 0.1 10E3/UL (ref 0–0.2)
BASOPHILS NFR BLD AUTO: 1 %
BUN SERPL-MCNC: 28 MG/DL (ref 7–30)
CALCIUM SERPL-MCNC: 9.1 MG/DL (ref 8.5–10.1)
CHLORIDE BLD-SCNC: 108 MMOL/L (ref 94–109)
CO2 SERPL-SCNC: 26 MMOL/L (ref 20–32)
CREAT SERPL-MCNC: 2.23 MG/DL (ref 0.52–1.04)
EOSINOPHIL # BLD AUTO: 0.2 10E3/UL (ref 0–0.7)
EOSINOPHIL NFR BLD AUTO: 2 %
ERYTHROCYTE [DISTWIDTH] IN BLOOD BY AUTOMATED COUNT: 12.6 % (ref 10–15)
GFR SERPL CREATININE-BSD FRML MDRD: 28 ML/MIN/1.73M2
GLUCOSE BLD-MCNC: 102 MG/DL (ref 70–99)
HCT VFR BLD AUTO: 40.5 % (ref 35–47)
HGB BLD-MCNC: 13 G/DL (ref 11.7–15.7)
IMM GRANULOCYTES # BLD: 0 10E3/UL
IMM GRANULOCYTES NFR BLD: 0 %
LYMPHOCYTES # BLD AUTO: 2.9 10E3/UL (ref 0.8–5.3)
LYMPHOCYTES NFR BLD AUTO: 34 %
MCH RBC QN AUTO: 27.3 PG (ref 26.5–33)
MCHC RBC AUTO-ENTMCNC: 32.1 G/DL (ref 31.5–36.5)
MCV RBC AUTO: 85 FL (ref 78–100)
MONOCYTES # BLD AUTO: 0.6 10E3/UL (ref 0–1.3)
MONOCYTES NFR BLD AUTO: 7 %
NEUTROPHILS # BLD AUTO: 4.7 10E3/UL (ref 1.6–8.3)
NEUTROPHILS NFR BLD AUTO: 56 %
NRBC # BLD AUTO: 0 10E3/UL
NRBC BLD AUTO-RTO: 0 /100
PLATELET # BLD AUTO: 228 10E3/UL (ref 150–450)
POTASSIUM BLD-SCNC: 4.7 MMOL/L (ref 3.4–5.3)
RBC # BLD AUTO: 4.76 10E6/UL (ref 3.8–5.2)
SODIUM SERPL-SCNC: 141 MMOL/L (ref 133–144)
TSH SERPL DL<=0.005 MIU/L-ACNC: 1.85 MU/L (ref 0.4–4)
WBC # BLD AUTO: 8.5 10E3/UL (ref 4–11)

## 2021-08-16 PROCEDURE — 85025 COMPLETE CBC W/AUTO DIFF WBC: CPT | Performed by: PATHOLOGY

## 2021-08-16 PROCEDURE — 80048 BASIC METABOLIC PNL TOTAL CA: CPT | Performed by: PATHOLOGY

## 2021-08-16 PROCEDURE — 36415 COLL VENOUS BLD VENIPUNCTURE: CPT | Performed by: PATHOLOGY

## 2021-08-16 PROCEDURE — 84443 ASSAY THYROID STIM HORMONE: CPT | Performed by: PATHOLOGY

## 2021-08-16 PROCEDURE — 99204 OFFICE O/P NEW MOD 45 MIN: CPT | Performed by: FAMILY MEDICINE

## 2021-08-16 SDOH — SOCIAL STABILITY: SOCIAL INSECURITY: WITHIN THE LAST YEAR, HAVE YOU BEEN HUMILIATED OR EMOTIONALLY ABUSED IN OTHER WAYS BY YOUR PARTNER OR EX-PARTNER?: NO

## 2021-08-16 SDOH — HEALTH STABILITY: MENTAL HEALTH
STRESS IS WHEN SOMEONE FEELS TENSE, NERVOUS, ANXIOUS, OR CAN'T SLEEP AT NIGHT BECAUSE THEIR MIND IS TROUBLED. HOW STRESSED ARE YOU?: RATHER MUCH

## 2021-08-16 SDOH — SOCIAL STABILITY: SOCIAL INSECURITY
WITHIN THE LAST YEAR, HAVE YOU BEEN KICKED, HIT, SLAPPED, OR OTHERWISE PHYSICALLY HURT BY YOUR PARTNER OR EX-PARTNER?: NO

## 2021-08-16 SDOH — SOCIAL STABILITY: SOCIAL INSECURITY: WITHIN THE LAST YEAR, HAVE YOU BEEN AFRAID OF YOUR PARTNER OR EX-PARTNER?: NO

## 2021-08-16 SDOH — ECONOMIC STABILITY: INCOME INSECURITY: IN THE LAST 12 MONTHS, WAS THERE A TIME WHEN YOU WERE NOT ABLE TO PAY THE MORTGAGE OR RENT ON TIME?: NO

## 2021-08-16 SDOH — ECONOMIC STABILITY: HOUSING INSECURITY
IN THE LAST 12 MONTHS, WAS THERE A TIME WHEN YOU DID NOT HAVE A STEADY PLACE TO SLEEP OR SLEPT IN A SHELTER (INCLUDING NOW)?: NO

## 2021-08-16 SDOH — HEALTH STABILITY: PHYSICAL HEALTH: ON AVERAGE, HOW MANY DAYS PER WEEK DO YOU ENGAGE IN MODERATE TO STRENUOUS EXERCISE (LIKE A BRISK WALK)?: 5 DAYS

## 2021-08-16 SDOH — HEALTH STABILITY: MENTAL HEALTH: HOW OFTEN DO YOU HAVE A DRINK CONTAINING ALCOHOL?: NEVER

## 2021-08-16 SDOH — HEALTH STABILITY: MENTAL HEALTH: HOW OFTEN DO YOU HAVE 6 OR MORE DRINKS ON ONE OCCASION?: NEVER

## 2021-08-16 SDOH — ECONOMIC STABILITY: INCOME INSECURITY: HOW HARD IS IT FOR YOU TO PAY FOR THE VERY BASICS LIKE FOOD, HOUSING, MEDICAL CARE, AND HEATING?: NOT HARD AT ALL

## 2021-08-16 SDOH — ECONOMIC STABILITY: FOOD INSECURITY: WITHIN THE PAST 12 MONTHS, THE FOOD YOU BOUGHT JUST DIDN'T LAST AND YOU DIDN'T HAVE MONEY TO GET MORE.: NEVER TRUE

## 2021-08-16 SDOH — SOCIAL STABILITY: SOCIAL NETWORK
IN A TYPICAL WEEK, HOW MANY TIMES DO YOU TALK ON THE PHONE WITH FAMILY, FRIENDS, OR NEIGHBORS?: MORE THAN THREE TIMES A WEEK

## 2021-08-16 SDOH — SOCIAL STABILITY: SOCIAL NETWORK: ARE YOU MARRIED, WIDOWED, DIVORCED, SEPARATED, NEVER MARRIED, OR LIVING WITH A PARTNER?: MARRIED

## 2021-08-16 SDOH — SOCIAL STABILITY: SOCIAL INSECURITY
WITHIN THE LAST YEAR, HAVE TO BEEN RAPED OR FORCED TO HAVE ANY KIND OF SEXUAL ACTIVITY BY YOUR PARTNER OR EX-PARTNER?: NO

## 2021-08-16 SDOH — SOCIAL STABILITY: SOCIAL NETWORK
DO YOU BELONG TO ANY CLUBS OR ORGANIZATIONS SUCH AS CHURCH GROUPS UNIONS, FRATERNAL OR ATHLETIC GROUPS, OR SCHOOL GROUPS?: YES

## 2021-08-16 SDOH — ECONOMIC STABILITY: TRANSPORTATION INSECURITY
IN THE PAST 12 MONTHS, HAS THE LACK OF TRANSPORTATION KEPT YOU FROM MEDICAL APPOINTMENTS OR FROM GETTING MEDICATIONS?: YES

## 2021-08-16 SDOH — SOCIAL STABILITY: SOCIAL NETWORK: HOW OFTEN DO YOU GET TOGETHER WITH FRIENDS OR RELATIVES?: ONCE A WEEK

## 2021-08-16 SDOH — ECONOMIC STABILITY: FOOD INSECURITY: WITHIN THE PAST 12 MONTHS, YOU WORRIED THAT YOUR FOOD WOULD RUN OUT BEFORE YOU GOT MONEY TO BUY MORE.: NEVER TRUE

## 2021-08-16 SDOH — SOCIAL STABILITY: SOCIAL NETWORK: HOW OFTEN DO YOU ATTEND CHURCH OR RELIGIOUS SERVICES?: MORE THAN 4 TIMES PER YEAR

## 2021-08-16 SDOH — HEALTH STABILITY: PHYSICAL HEALTH: ON AVERAGE, HOW MANY MINUTES DO YOU ENGAGE IN EXERCISE AT THIS LEVEL?: 20 MIN

## 2021-08-16 SDOH — ECONOMIC STABILITY: TRANSPORTATION INSECURITY
IN THE PAST 12 MONTHS, HAS LACK OF TRANSPORTATION KEPT YOU FROM MEETINGS, WORK, OR FROM GETTING THINGS NEEDED FOR DAILY LIVING?: YES

## 2021-08-16 ASSESSMENT — MIFFLIN-ST. JEOR: SCORE: 1978.68

## 2021-08-16 ASSESSMENT — PAIN SCALES - GENERAL: PAINLEVEL: MODERATE PAIN (5)

## 2021-08-16 NOTE — NURSING NOTE
Chief Complaint   Patient presents with     Pre-Op Exam     pt here for preop       Luiza Marx CMA, EMT at 2:51 PM on 8/16/2021.

## 2021-08-16 NOTE — PATIENT INSTRUCTIONS

## 2021-08-16 NOTE — PROGRESS NOTES
[facility] [address]  [address] [facility] [address]  Phone: [phone]  Fax: [phone]  Primary Provider: Danny Shi  Pre-op Performing Provider: DANNY SHI      PREOPERATIVE EVALUATION:  Today's date: 8/16/2021      Carol Guillaume is a 30 year old female with a history of Senior-Loken syndrome s/p kidney transplant, pseudotumor cerebri, retinitis pigmentosa, and polycystic ovarian syndrome (PCOS), stage 1 endometrial cancer who presents for preoperative evaluation.  Surgical Information:  Surgery/Procedure: Dilation and Curettage ( D&C) Removal Mirena and Replacement  Surgery Location: [facility]  Surgeon: Dr Lisbeth Gallardo  Surgery Date: 9/13/2021  Time of Surgery: 11:30 am arrive 9:30 am  Where patient plans to recover: At home with family  Fax number for surgical facility: Note does not need to be faxed, will be available electronically in Epic.    Type of Anesthesia Anticipated: to be determined Please talk with Anesthesia prior to surgery as she has high blood pressure related to anesthesia medication    Assessment & Plan   Carol Guillaume is a 30 year old female with a history of Senior-Loken syndrome s/p kidney transplant on immunosuppression , pseudotumor cerebri, retinitis pigmentosa, and polycystic ovarian syndrome (PCOS), stage 1 endometrial cancer diagnosed 10/2020 desires to conceive therefore conservation therapy with D& C every 3 months Mirena to decrease endometrial hyperplasia but if any worsening pathology would need to consider hysterectomy. She is due for D& C. Last surgery was March 2021 had a bladder perforation. She feels her bladder is acceptable at this time. She notes very high blood pressure with last anesthesia.    The proposed surgical procedure is considered INTERMEDIATE risk.    Preop general physical exam  Please talk with Anesthesia prior to surgery as she has high blood pressure related to anesthesia  medication    - Basic metabolic panel  - CBC with platelets differential    PCOS (polycystic ovarian syndrome)    - TSH with free T4 reflex    Legally blind    Kidney replaced by transplant  HTN, kidney transplant related  Stage 3b chronic kidney disease  Creatinine came back after visit significant change from previous. Need to contact her renal team regarding level.    Abnormal weight gain     - TSH with free T4 reflex           Risks and Recommendations:  The patient has the following additional risks and recommendations for perioperative complications:   - Consult Hospitalist / IM to assist with post-op medical management   - Morbid obesity (BMI >40)   - History of urinary retention   - HX of renal transplant on immunosuppression recent increase in creatinine    Medication Instructions:  Patient is to take all scheduled medications on the day of surgery   - Beta Blockers: Continue taking on the day of surgery.    RECOMMENDATION:  APPROVAL GIVEN to proceed with proposed procedure, without further diagnostic evaluation.  45 minutes spent on the date of the encounter doing chart review, history, exam, diagnostics review, documentation, counseling and coordination of cares as noted.            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 She is due for D& C. Last surgery was March 2021 had a bladder perforation. She feels her bladder is acceptable at this time. She notes very high blood pressure with last anesthesia.      Preop Questions 8/16/2021   1. Have you ever had a heart  attack or stroke? No   2. Have you ever had surgery on your heart or blood vessels, such as a stent placement, a coronary artery bypass, or surgery on an artery in your head, neck, heart, or legs? No   3. Do you have chest pain with activity? No   4. Do you have a history of  heart failure? No   5. Do you currently have a cold, bronchitis or symptoms of other infection? No   6. Do you have a cough, shortness of breath, or wheezing? No   7. Do you or anyone in your family have previous history of blood clots? No   8. Do you or does anyone in your family have a serious bleeding problem such as prolonged bleeding following surgeries or cuts? No   9. Have you ever had problems with anemia or been told to take iron pills? No   10. Have you had any abnormal blood loss such as black, tarry or bloody stools, or abnormal vaginal bleeding? No   11. Have you ever had a blood transfusion? No   12. Are you willing to have a blood transfusion if it is medically needed before, during, or after your surgery? Yes   13. Have you or any of your relatives ever had problems with anesthesia? Yes high blood pressure   14. Do you have sleep apnea, excessive snoring or daytime drowsiness? No   15. Do you have any artifical heart valves or other implanted medical devices like a pacemaker, defibrillator, or continuous glucose monitor? No   16. Do you have artificial joints? No   17. Are you allergic to latex? No   18. Is there any chance that you may be pregnant? No     Health Care Directive:  Patient does not have a Health Care Directive or Living Will: Discussed advance care planning with patient; information given to patient to review.   She indicates she will review her wishes with her  David who would be her health care agent.  Preoperative Review of :  03/05/2021  1   03/05/2021  Oxycodone Hcl 5 MG Tablet  10.00  2 Pa Eyad   [id number]   Milind (4549)   0/0         reviewed - controlled substances prescribed by other outside  provider(s). Remote HX after last surgery      Status of Chronic Conditions:  HYPERLIPIDEMIA - Patient has a long history of significant Hyperlipidemia requiring medication for treatment with recent good control. Patient reports no problems or side effects with the medication.     HYPERTENSION - Patient has longstanding history of HTN , currently denies any symptoms referable to elevated blood pressure. Specifically denies chest pain, palpitations, dyspnea, orthopnea, PND or peripheral edema. Blood pressure readings have not been in normal range slightly high. Current medication regimen is as listed below. Patient denies any side effects of medication.  She has HX of high blood pressure with some anesthesia medications and a side effect of a muscle relaxant during last surgical procedure.    RENAL INSUFFICIENCY - Patient has a longstanding history of moderate-severe chronic renal insufficiency. Last Cr see results She has a HX of renal transplant on  Immunosuppression      Review of Systems  Constitutional, neuro, ENT, endocrine, pulmonary, cardiac, gastrointestinal, genitourinary, musculoskeletal, integument and psychiatric systems are negative, except as otherwise noted.    Patient Active Problem List    Diagnosis Date Noted     Chronic kidney disease, stage 3 08/16/2021     Priority: Medium     EIN (endometrial intraepithelial neoplasia) 08/09/2021     Priority: Medium     Aftercare following organ transplant 08/25/2020     Priority: Medium     Immunosuppression (H) 08/25/2020     Priority: Medium     Dyslipidemia 01/17/2020     Priority: Medium     HTN, kidney transplant related 01/17/2020     Priority: Medium     Palpitations 03/20/2018     Priority: Medium     Cataract      Priority: Medium     Vitamin D deficiency 07/26/2013     Priority: Medium     Problem list name updated by automated process. Provider to review       Morbid obesity (H) 07/26/2013     Priority: Medium     Insulin resistance 07/26/2013      Priority: Medium     PCOS (polycystic ovarian syndrome) 07/26/2013     Priority: Medium     Kidney replaced by transplant 05/16/2012     Priority: Medium     Senior-Loken syndrome      Priority: Medium     Pseudotumor cerebri      Priority: Medium     Legally blind      Priority: Medium     Retinitis pigmentosa      Priority: Medium      Past Medical History:   Diagnosis Date     Anemia      Anxiety      Cataract 2015     CMV (cytomegalovirus infection) (H)      CMV disease (H) 2006    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     Depression      Fatigue      High cholesterol      Hypertension      Insomnia      Insulin resistance      Legally blind      Obesity      PCOS (polycystic ovarian syndrome)      PPD positive, treated 2006    9 months of INH     Pseudotumor cerebri     on Diamox     Retinitis pigmentosa      S/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      Uncomplicated asthma     as a child     Viremia      Past Surgical History:   Procedure Laterality Date     cataract bilateral  06/2017     DILATION AND CURETTAGE, OPERATIVE HYSTEROSCOPY WITH MORCELLATOR, COMBINED N/A 10/23/2020    Procedure: HYSTEROSCOPY, DILATION AND CURRETAGE, MYOSURE;  Surgeon: Kierra Tracy MD;  Location:  OR     DILATION AND CURETTAGE, OPERATIVE HYSTEROSCOPY WITH MORCELLATOR, COMBINED N/A 03/05/2021    Procedure: HYSTEROSCOPY, WITH DILATION AND CURETTAGE OF UTERUS USING  MORCELLATOR;  Surgeon: Fiorella Cunningham MD;  Location:  OR     INSERT INTRAUTERINE DEVICE N/A 03/05/2021    Procedure: INSERTION MIRENA INTRAUTERINE DEVICE;  Surgeon: Fiorella Cunningham MD;  Location:  OR     LAPAROSCOPY DIAGNOSTIC (GYN)  03/05/2021    Procedure: Laparoscopy diagnostic (gyn);  Surgeon: Fiorella Cunningham MD;  Location:  OR     LITHOTRIPSY       LITHOTRIPSY       REMOVE INTRAUTERINE DEVICE N/A 03/05/2021    Procedure: REMOVE MIRENA INTRAUTERINE DEVICE;  Surgeon: Fiorella Cunningham MD;  Location:  OR     SINUS  "SURGERY  2001     TONSILLECTOMY       TRANSPLANT KIDNEY RECIPIENT LIVING RELATED Right 07/2006     wisdom teeth       Current Outpatient Medications   Medication Sig Dispense Refill     acetaminophen (TYLENOL) 325 MG tablet Take 2 tablets (650 mg) by mouth every 4 hours as needed for mild pain 50 tablet 0     amLODIPine (NORVASC) 5 MG tablet Take 1 tablet (5 mg) by mouth daily 90 tablet 3     carvedilol (COREG) 25 MG tablet Take 1 tablet (25 mg) by mouth 2 times daily (with meals) 60 tablet 11     ELDERBERRY PO        levonorgestrel (MIRENA) 20 MCG/24HR IUD 1 each by Intrauterine route once       MYFORTIC (BRAND) 180 MG EC tablet Take 3 tablets (540 mg) by mouth 2 times daily 540 tablet 3     NEORAL (BRAND) 25 MG capsule Take 3 capsules (75 mg) by mouth 2 times daily 540 capsule 3     Prenatal Vit-Fe Fumarate-FA (PRENATAL PO) Take by mouth daily       VITAMIN D, CHOLECALCIFEROL, PO Take 1,000 Units by mouth daily          No Known Allergies     Social History     Tobacco Use     Smoking status: Never Smoker     Smokeless tobacco: Never Used   Substance Use Topics     Alcohol use: No     Family History   Problem Relation Age of Onset     Hyperlipidemia Mother      Sjogren's Father      Hashimoto's thyroiditis Father      Other - See Comments Maternal Grandfather         surgical complication     Atrial fibrillation Paternal Grandmother      Arthritis Paternal Grandmother      Skin Cancer Paternal Grandfather         melanoma     Parkinsonism Other         paternal uncle     History   Drug Use No         Objective     BP (!) 138/92 (BP Location: Right arm, Patient Position: Sitting, Cuff Size: Adult Large)   Pulse 77   Ht 1.676 m (5' 6\")   Wt 125.2 kg (276 lb)   SpO2 100%   BMI 44.55 kg/m      Physical Exam     GENERAL APPEARANCE: healthy, alert and no distress. Obese.  EYES: conjunctivae and sclerae normal, pupils are sluggish to light. Exotropia on left eye, right eye follows appropriately looking straight " forward.   HENT: Slight scarring in right ear, left ear canal normal and TM normal, mask removed briefly mouth without ulcers or lesions, oropharynx clear and oral mucous membranes moist  NECK: no adenopathy, no asymmetry, masses, or scars and thyroid normal to palpation  RESP: lungs clear to auscultation - no rales, rhonchi or wheezes  CV: regular rate and rhythm, normal S1 S2, no S3 or S4, no murmur, click or rub, no peripheral edema and peripheral pulses strong  ABDOMEN: Truncal obesity with a few stria, Mild hirsutism of abdominal and chin. Well healed scar on right side from transplant with fullness in area of transplant otherwise soft, nontender, no hepatosplenomegaly, no masses and bowel sounds normal   MS: no musculoskeletal defects are noted and gait is age appropriate without ataxia  SKIN: no suspicious lesions or rashes, depigmented patch on back. Hirsutism of chin.  NEURO: Normal strength and tone, mentation intact and speech normal, negative pronator drift  PSYCH: mentation appears normal and affect normal/bright. She feels anxious related to the medications for anesthesia              Recent Labs   Lab Test 03/05/21  0559 02/01/21  0653 10/20/20  0640   HGB  --  13.0 13.0   PLT  --  220 242   NA  --  139 139   POTASSIUM 4.5 4.5 4.2   CR 1.70* 1.72* 1.52*        Diagnostics:  Results for orders placed or performed in visit on 08/16/21   CBC with platelets differential     Status: None    Narrative    The following orders were created for panel order CBC with platelets differential.  Procedure                               Abnormality         Status                     ---------                               -----------         ------                     CBC with platelets and d...[[id number]]                      Final result                 Please view results for these tests on the individual orders.   TSH with free T4 reflex     Status: Normal   Result Value Ref Range    TSH 1.85 0.40 - 4.00 mU/L    Basic metabolic panel     Status: Abnormal   Result Value Ref Range    Sodium 141 133 - 144 mmol/L    Potassium 4.7 3.4 - 5.3 mmol/L    Chloride 108 94 - 109 mmol/L    Carbon Dioxide (CO2) 26 20 - 32 mmol/L    Anion Gap 7 3 - 14 mmol/L    Urea Nitrogen 28 7 - 30 mg/dL    Creatinine 2.23 (H) 0.52 - 1.04 mg/dL    Calcium 9.1 8.5 - 10.1 mg/dL    Glucose 102 (H) 70 - 99 mg/dL    GFR Estimate 28 (L) >60 mL/min/1.73m2   CBC with platelets and differential     Status: None   Result Value Ref Range    WBC Count 8.5 4.0 - 11.0 10e3/uL    RBC Count 4.76 3.80 - 5.20 10e6/uL    Hemoglobin 13.0 11.7 - 15.7 g/dL    Hematocrit 40.5 35.0 - 47.0 %    MCV 85 78 - 100 fL    MCH 27.3 26.5 - 33.0 pg    MCHC 32.1 31.5 - 36.5 g/dL    RDW 12.6 10.0 - 15.0 %    Platelet Count 228 150 - 450 10e3/uL    % Neutrophils 56 %    % Lymphocytes 34 %    % Monocytes 7 %    % Eosinophils 2 %    % Basophils 1 %    % Immature Granulocytes 0 %    NRBCs per 100 WBC 0 <1 /100    Absolute Neutrophils 4.7 1.6 - 8.3 10e3/uL    Absolute Lymphocytes 2.9 0.8 - 5.3 10e3/uL    Absolute Monocytes 0.6 0.0 - 1.3 10e3/uL    Absolute Eosinophils 0.2 0.0 - 0.7 10e3/uL    Absolute Basophils 0.1 0.0 - 0.2 10e3/uL    Absolute Immature Granulocytes 0.0 <=0.0 10e3/uL    Absolute NRBCs 0.0 10e3/uL   Above results came back after visit. Creatinine elevated.      Revised Cardiac Risk Index (RCRI):  The patient has the following serious cardiovascular risks for perioperative complications:   - Serum Creatinine >2.0 mg/dl = 1 point     RCRI Interpretation: 1 point: Class II (low risk - 0.9% complication rate)           Signed Electronically by: Danny Shi MD  Copy of this evaluation report is provided to requesting physician.

## 2021-08-16 NOTE — Clinical Note
Need preop to be provided to Dr Gallardo, not able to find her in the system.  Best wishes,  Danny Shi MD

## 2021-08-17 ENCOUNTER — TELEPHONE (OUTPATIENT)
Dept: TRANSPLANT | Facility: CLINIC | Age: 31
End: 2021-08-17

## 2021-08-17 DIAGNOSIS — Z48.298 AFTERCARE FOLLOWING ORGAN TRANSPLANT: Primary | ICD-10-CM

## 2021-08-17 DIAGNOSIS — Z79.899 ENCOUNTER FOR LONG-TERM CURRENT USE OF MEDICATION: ICD-10-CM

## 2021-08-17 DIAGNOSIS — Z94.0 KIDNEY TRANSPLANTED: ICD-10-CM

## 2021-08-17 DIAGNOSIS — Z20.822 COVID-19 RULED OUT: ICD-10-CM

## 2021-08-17 DIAGNOSIS — Z94.0 KIDNEY REPLACED BY TRANSPLANT: ICD-10-CM

## 2021-08-17 DIAGNOSIS — D84.9 IMMUNOSUPPRESSION (H): ICD-10-CM

## 2021-08-17 DIAGNOSIS — R79.89 ELEVATED SERUM CREATININE: ICD-10-CM

## 2021-08-17 NOTE — RESULT ENCOUNTER NOTE
Dear Carol Guillaume   Your kidney function has increased creatinine 2.23 GFR 28 and the lab did not draw Dr Joy's labs. Please follow-up with your kidney team about next steps and make sure you are hydrating.   The remainder of  the results are acceptable.  Best wishes,  Danny Shi MD

## 2021-08-17 NOTE — TELEPHONE ENCOUNTER
Message  Received: Today  Danny Shi MD  P [facility] Rooming Staff-  Cc: Mykel Joy MD; Linda Hoffmann, ALEX    Dear Carol Guillaume   Your kidney function has increased creatinine 2.23 GFR 28 and the lab did not draw Dr Joy's labs. Please follow-up with your kidney team about next steps and make sure you are hydrating.    The remainder of  the results are acceptable.   Best wishes,   Danny Shi MD       See previous encounter on this matter.   Thank you,  Linda Hoffmann, RN, BSN  Solid Organ Transplant, Post Kidney and Pancreas  Transplant Care Coordinator  [phone]

## 2021-08-17 NOTE — TELEPHONE ENCOUNTER
ISSUE: Creatinine 2.23 on 8/16/21 1618. Up from 1.7 on 3/5/31. BMP was ordered by other provider. However, she has not gotten her post-transplant labs at frequency recommended; minimum every 3 months.    Message  Received: Yesterday  Mykel Joy MD Blaisdell, Linda Vora, RN  Please find out why is her cr elevated and needs drug level     PLAN:    How are you feeling? Still dealing with uterine cancer. Tired but since 2015. 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 Now has pain from belly button to pelvic region and left to right hip. States she has felt dehydrated since 12/2019; BP not under control 139/92 yesterday; generally above goal 130/80. HR 70-80. Taking Carvedilol 25 mg BID, Amlodipine 5 mg prescribed by Dr. Joy.  Any recent illness/fever? No fever  Any new or missed medications? Not recently missed dose Neoral. How about Myfortic? States not taking since Feb due to cancer, was told by oncologist sometime before March 5th not to take. Are you receiving chemo? [place] local chemo implanted.  Are you drinking 2-3L water/day? Trying to drink this much.  Caffeine? Avoiding caffeine but have held of  Volume status/weight/edema? No swelling. SHe thought she has last what 272 lb in Nov. 276 lb now.   Changes in UOP? Color? Some times feels like stream is slow but empties bladder. No changes with color. No recent UTIs.   Pain over graft sites? R side pain, yes pain from left to right hip. If press on scar, that does not hurt. Majority of pain is a handswith away from where kidney is.     Asked to get cyclosporine level. She states she will be out of town Friday to Monday then has to work Tues-Thur so she is planning on getting CSA level next Friday. Asked to get tomorrow Thur 8/19; she states she has transportation issues. Explained concern for rejection given not taking Myfortic. Generally, if told to stop  immunosuppression should clear with transplant team first. Will inform Dr. Ordonez.        Linda Hoffmann, RN, BSN  Solid Organ Transplant, Post Kidney and Pancreas  Transplant Care Coordinator  [phone]

## 2021-08-17 NOTE — TELEPHONE ENCOUNTER
Call placed to patients. No answer. Voice message left with instructions listed below. Will try back   Medical Necessity Clause: This procedure was medically necessary because the lesions that were treated were: Render Note In Bullet Format When Appropriate: No Treatment Number (Will Not Render If 0): 0 Consent: The patient's consent was obtained including but not limited to risks of crusting, scabbing, blistering, scarring, darker or lighter pigmentary change, recurrence, incomplete removal and infection. Detail Level: Detailed Anesthesia Volume In Cc: 0.5 Post-Care Instructions: I reviewed with the patient in detail post-care instructions. Patient is to wear sunprotection, and avoid picking at any of the treated lesions. Pt may apply Vaseline to crusted or scabbing areas. Medical Necessity Information: It is in your best interest to select a reason for this procedure from the list below. All of these items fulfill various CMS LCD requirements except the new and changing color options.

## 2021-08-18 DIAGNOSIS — Z11.59 ENCOUNTER FOR SCREENING FOR OTHER VIRAL DISEASES: ICD-10-CM

## 2021-08-18 NOTE — TELEPHONE ENCOUNTER
OUTCOME: Orders for IR R renal biopsy placed per Dr. Ordonez; seconded by Dr. Joy (see below messages). Re-ordered mycophenolic acid 180 mg tablets and notified Carol of plans via phone call but had to leave VM message.   __________________________________________________________________  Message  Received: Today  Jonathan Ordonez MD Blaisdell, Christin Rebecca, RN  Caller: Unspecified (Yesterday,  4:50 PM)  Not sure what is going on with her care here.  I have included Dr. Joy since he saw her for follow up in April.  I would recommend restarting mycophenolic acid 540 mg bid and obtaining a kidney transplant biopsy as soon as possible.  I cannot see who her oncologist is and if they reached out to Dr. Joy or someone else about her immunosuppression.         Message  Received: Today  Linda Hoffmann RN Riad, Mykel Ann MD; Jonathan Ordonez MD  Cc: Linda Hoffmann RN  Caller: Unspecified (2 days ago,  4:50 PM)  She also has been telling me she was taking her meds regularly and that she had plenty stock when I would call about late to fill reminders from pharmacy. I had to specifically ask if she was taking each med to get this information from her yesterday. Thank you will set up Linda aldana           Previous Messages       ----- Message -----   From: Mykel Joy MD   Sent: 8/18/2021   5:46 PM CDT   To: Jonathan Ordonez MD, *     Agree with bx   I am not aware of the MPA changes   ----- Message -----   From: Linda Hoffmann RN   Sent: 8/18/2021   4:46 PM CDT   To: Mykel Joy MD     Please second biopsy plan and comment. Thanks Dr. Joy

## 2021-08-19 RX ORDER — MYCOPHENOLIC ACID 180 MG/1
540 TABLET, DELAYED RELEASE ORAL 2 TIMES DAILY
Qty: 540 TABLET | Refills: 3 | Status: SHIPPED | OUTPATIENT
Start: 2021-08-19 | End: 2022-08-29

## 2021-08-19 NOTE — TELEPHONE ENCOUNTER
Transplant Coordinator Renal Biopsy Communication    Call placed to Carol Guillaume to discuss indication for kidney transplant biopsy per Dr. Ordonez/Dr. Joy.     Indication for transplant renal biopsy: Elevated Creatinine   Laterality: right (verified by renal txp ultrasound on 12/23/10)  Date and time of biopsy: 8/27/21 11AM   Date and time of covid swab: 8/24/21 (provided central scheduling number)    Patient location within 70 miles of [facility]: Yes.      Carol Guillaume's medication list was reviewed.   Anticoagulant: No.  Ibuprofen: No.  Fish Oil:  No.  Medications held: Elderberry PO (hasn't taken for 2 weeks)    Recent blood pressure readings are 139/92. Instructed to take medication, especially blood pressure medications, before arriving.    Procedure expectations and duration of stay discussed. Expressed pt can expect a phone call from hospital IR team to confirm biopsy date/time/location/directions/review of medications. No solid food 6 hours prior; clear liquids okay until 2 hours prior to procedure, then NPO at 9 AM.  Patient verbalized understanding they will need to arrive by 9:30 AM on 8/27/21 and plan to stay 4-5 hours post biopsy for monitoring. Labs at arrival including CSA level. Discussed need for  if patient chooses to receive conscious sedation. Pt has no additional questions at this time. Transplant Office phone number given to pt for future questions.      Linda Hoffmann RN  Kidney/Pancreas Transplant Coordinator  [phone] option 5

## 2021-08-20 ENCOUNTER — TELEPHONE (OUTPATIENT)
Dept: TRANSPLANT | Facility: CLINIC | Age: 31
End: 2021-08-20

## 2021-08-20 DIAGNOSIS — Z94.0 KIDNEY REPLACED BY TRANSPLANT: ICD-10-CM

## 2021-08-20 RX ORDER — CYCLOSPORINE 25 MG/1
75 CAPSULE, LIQUID FILLED ORAL 2 TIMES DAILY
Qty: 540 CAPSULE | Refills: 3 | Status: SHIPPED | OUTPATIENT
Start: 2021-08-20 | End: 2021-09-07

## 2021-08-20 NOTE — TELEPHONE ENCOUNTER
Carol returned call and spoke with txp admin. States states the [facility] did not get our refill request.     [facility] reviewed scripts. Both Neoral and MPA confirmed received. Call placed to [facility] pharmacist. She states they are backed up but scripts received. She will expedite. Informed that Carol would be calling back regarding delivery.     Linda Hoffmann RN, BSN  Solid Organ Transplant, Post Kidney and Pancreas  Transplant Care Coordinator  [phone]

## 2021-08-20 NOTE — TELEPHONE ENCOUNTER
RN[facility] reordered Neoral 25 mg capsules and mycophenolic acid was ordered yesterday 8/19/21. Reached out to  Specialty pharmacy to call patient regarding delivery.     Returned call to Carol. Left VM Message with pharmacy phone number so that she may call herself to set up delivery. Advised to call prior to 2:30-3 pm due to last shipment of the day if needed urgently.    Linda Hoffmann, RN, BSN  Solid Organ Transplant, Post Kidney and Pancreas  Transplant Care Coordinator  [phone]

## 2021-08-20 NOTE — TELEPHONE ENCOUNTER
Patient Call: Medication Refill  Route to LPN  Instruct the patient to first contact their pharmacy. If they have called their pharmacy and require further assistance, route to LPN.    Pharmacy Name: [facility]  Pharmacy Location: [place]  Name of Medication: Mycophenolic Acid 180 mg and Neoral 25 mg  When will the patient be out of this medication?: Less than 24 hours (Medardo SUE, then page if no answer)  Patient is out of her MPA medication

## 2021-08-24 ENCOUNTER — LAB (OUTPATIENT)
Dept: LAB | Facility: CLINIC | Age: 31
End: 2021-08-24

## 2021-08-24 DIAGNOSIS — Z94.0 KIDNEY TRANSPLANTED: ICD-10-CM

## 2021-08-24 DIAGNOSIS — Z79.899 ENCOUNTER FOR LONG-TERM CURRENT USE OF MEDICATION: ICD-10-CM

## 2021-08-24 DIAGNOSIS — R79.89 ELEVATED SERUM CREATININE: ICD-10-CM

## 2021-08-24 DIAGNOSIS — Z48.298 AFTERCARE FOLLOWING ORGAN TRANSPLANT: ICD-10-CM

## 2021-08-24 DIAGNOSIS — Z20.822 COVID-19 RULED OUT: ICD-10-CM

## 2021-08-24 LAB — SARS-COV-2 RNA RESP QL NAA+PROBE: NEGATIVE

## 2021-08-24 PROCEDURE — U0003 INFECTIOUS AGENT DETECTION BY NUCLEIC ACID (DNA OR RNA); SEVERE ACUTE RESPIRATORY SYNDROME CORONAVIRUS 2 (SARS-COV-2) (CORONAVIRUS DISEASE [COVID-19]), AMPLIFIED PROBE TECHNIQUE, MAKING USE OF HIGH THROUGHPUT TECHNOLOGIES AS DESCRIBED BY CMS-2020-01-R: HCPCS | Performed by: INTERNAL MEDICINE

## 2021-08-27 ENCOUNTER — HOSPITAL ENCOUNTER (OUTPATIENT)
Facility: CLINIC | Age: 31
Discharge: HOME | End: 2021-08-27
Attending: INTERNAL MEDICINE | Admitting: RADIOLOGY
Payer: COMMERCIAL

## 2021-08-27 ENCOUNTER — APPOINTMENT (OUTPATIENT)
Dept: MEDSURG UNIT | Facility: CLINIC | Age: 31
End: 2021-08-27
Attending: INTERNAL MEDICINE
Payer: COMMERCIAL

## 2021-08-27 ENCOUNTER — APPOINTMENT (OUTPATIENT)
Dept: INTERVENTIONAL RADIOLOGY/VASCULAR | Facility: CLINIC | Age: 31
End: 2021-08-27
Attending: INTERNAL MEDICINE
Payer: COMMERCIAL

## 2021-08-27 ENCOUNTER — TELEPHONE (OUTPATIENT)
Dept: TRANSPLANT | Facility: CLINIC | Age: 31
End: 2021-08-27

## 2021-08-27 VITALS
DIASTOLIC BLOOD PRESSURE: 112 MMHG | RESPIRATION RATE: 16 BRPM | TEMPERATURE: 98.1 F | OXYGEN SATURATION: 98 % | SYSTOLIC BLOOD PRESSURE: 162 MMHG | WEIGHT: 276 LBS | BODY MASS INDEX: 44.36 KG/M2 | HEART RATE: 87 BPM | HEIGHT: 66 IN

## 2021-08-27 DIAGNOSIS — Z94.0 KIDNEY TRANSPLANTED: ICD-10-CM

## 2021-08-27 DIAGNOSIS — R79.89 ELEVATED SERUM CREATININE: ICD-10-CM

## 2021-08-27 DIAGNOSIS — Z48.298 AFTERCARE FOLLOWING ORGAN TRANSPLANT: ICD-10-CM

## 2021-08-27 DIAGNOSIS — Z48.298 AFTERCARE FOLLOWING ORGAN TRANSPLANT: Primary | ICD-10-CM

## 2021-08-27 LAB
ABO/RH(D): NORMAL
ALBUMIN UR-MCNC: 300 MG/DL
ANION GAP SERPL CALCULATED.3IONS-SCNC: 5 MMOL/L (ref 3–14)
ANTIBODY SCREEN: NEGATIVE
APPEARANCE UR: ABNORMAL
APTT PPP: 33 SECONDS (ref 22–38)
B-HCG SERPL-ACNC: <1 IU/L (ref 0–5)
BACTERIA #/AREA URNS HPF: ABNORMAL /HPF
BILIRUB UR QL STRIP: NEGATIVE
BUN SERPL-MCNC: 25 MG/DL (ref 7–30)
CALCIUM SERPL-MCNC: 9.3 MG/DL (ref 8.5–10.1)
CHLORIDE BLD-SCNC: 108 MMOL/L (ref 94–109)
CO2 SERPL-SCNC: 26 MMOL/L (ref 20–32)
COLOR UR AUTO: ABNORMAL
CREAT SERPL-MCNC: 2.19 MG/DL (ref 0.52–1.04)
CREAT UR-MCNC: 131 MG/DL
CREAT UR-MCNC: 131 MG/DL
CYCLOSPORINE BLD LC/MS/MS-MCNC: 524 UG/L (ref 50–400)
ERYTHROCYTE [DISTWIDTH] IN BLOOD BY AUTOMATED COUNT: 12.6 % (ref 10–15)
GFR SERPL CREATININE-BSD FRML MDRD: 29 ML/MIN/1.73M2
GLUCOSE BLD-MCNC: 105 MG/DL (ref 70–99)
GLUCOSE UR STRIP-MCNC: NEGATIVE MG/DL
HCT VFR BLD AUTO: 41 % (ref 35–47)
HGB BLD-MCNC: 13.1 G/DL (ref 11.7–15.7)
HGB UR QL STRIP: ABNORMAL
HYALINE CASTS: 7 /LPF
INR PPP: 1.03 (ref 0.85–1.15)
KETONES UR STRIP-MCNC: NEGATIVE MG/DL
LEUKOCYTE ESTERASE UR QL STRIP: ABNORMAL
MCH RBC QN AUTO: 27.4 PG (ref 26.5–33)
MCHC RBC AUTO-ENTMCNC: 32 G/DL (ref 31.5–36.5)
MCV RBC AUTO: 86 FL (ref 78–100)
MICROALBUMIN UR-MCNC: 3390 MG/L
MICROALBUMIN/CREAT UR: 2587.79 MG/G CR (ref 0–25)
NITRATE UR QL: NEGATIVE
PH UR STRIP: 6 [PH] (ref 5–7)
PLATELET # BLD AUTO: 243 10E3/UL (ref 150–450)
POTASSIUM BLD-SCNC: 4.1 MMOL/L (ref 3.4–5.3)
PROT UR-MCNC: 3.62 G/L
PROT/CREAT 24H UR: 2.76 G/G CR (ref 0–0.2)
RBC # BLD AUTO: 4.78 10E6/UL (ref 3.8–5.2)
RBC URINE: 12 /HPF
SODIUM SERPL-SCNC: 139 MMOL/L (ref 133–144)
SP GR UR STRIP: 1.01 (ref 1–1.03)
SPECIMEN EXPIRATION DATE: NORMAL
SQUAMOUS EPITHELIAL: 4 /HPF
TME LAST DOSE: ABNORMAL H
TME LAST DOSE: ABNORMAL H
TRANSITIONAL EPI: <1 /HPF
UROBILINOGEN UR STRIP-MCNC: NORMAL MG/DL
WBC # BLD AUTO: 7 10E3/UL (ref 4–11)
WBC URINE: 5 /HPF

## 2021-08-27 PROCEDURE — 250N000009 HC RX 250: Performed by: RADIOLOGY

## 2021-08-27 PROCEDURE — 81001 URINALYSIS AUTO W/SCOPE: CPT | Performed by: INTERNAL MEDICINE

## 2021-08-27 PROCEDURE — 88348 ELECTRON MICROSCOPY DX: CPT | Mod: 26 | Performed by: PATHOLOGY

## 2021-08-27 PROCEDURE — 85730 THROMBOPLASTIN TIME PARTIAL: CPT | Mod: GZ | Performed by: NURSE PRACTITIONER

## 2021-08-27 PROCEDURE — 84702 CHORIONIC GONADOTROPIN TEST: CPT | Mod: GZ | Performed by: NURSE PRACTITIONER

## 2021-08-27 PROCEDURE — 250N000011 HC RX IP 250 OP 636: Performed by: RADIOLOGY

## 2021-08-27 PROCEDURE — 36415 COLL VENOUS BLD VENIPUNCTURE: CPT | Performed by: INTERNAL MEDICINE

## 2021-08-27 PROCEDURE — 50200 RENAL BIOPSY PERQ: CPT | Performed by: RADIOLOGY

## 2021-08-27 PROCEDURE — 999N000134 HC STATISTIC PP CARE STAGE 3

## 2021-08-27 PROCEDURE — 88313 SPECIAL STAINS GROUP 2: CPT | Mod: 26 | Performed by: PATHOLOGY

## 2021-08-27 PROCEDURE — 88348 ELECTRON MICROSCOPY DX: CPT | Mod: TC | Performed by: INTERNAL MEDICINE

## 2021-08-27 PROCEDURE — 82043 UR ALBUMIN QUANTITATIVE: CPT | Performed by: INTERNAL MEDICINE

## 2021-08-27 PROCEDURE — 81378 HLA I & II TYPING HR: CPT

## 2021-08-27 PROCEDURE — 85610 PROTHROMBIN TIME: CPT | Performed by: NURSE PRACTITIONER

## 2021-08-27 PROCEDURE — 80048 BASIC METABOLIC PNL TOTAL CA: CPT | Performed by: INTERNAL MEDICINE

## 2021-08-27 PROCEDURE — 88350 IMFLUOR EA ADDL 1ANTB STN PX: CPT | Mod: 26 | Performed by: PATHOLOGY

## 2021-08-27 PROCEDURE — 86832 HLA CLASS I HIGH DEFIN QUAL: CPT | Performed by: INTERNAL MEDICINE

## 2021-08-27 PROCEDURE — 36415 COLL VENOUS BLD VENIPUNCTURE: CPT | Performed by: NURSE PRACTITIONER

## 2021-08-27 PROCEDURE — 86900 BLOOD TYPING SEROLOGIC ABO: CPT | Performed by: INTERNAL MEDICINE

## 2021-08-27 PROCEDURE — 87799 DETECT AGENT NOS DNA QUANT: CPT | Performed by: INTERNAL MEDICINE

## 2021-08-27 PROCEDURE — 88346 IMFLUOR 1ST 1ANTB STAIN PX: CPT | Mod: 26 | Performed by: PATHOLOGY

## 2021-08-27 PROCEDURE — 77002 NEEDLE LOCALIZATION BY XRAY: CPT | Mod: 26 | Performed by: RADIOLOGY

## 2021-08-27 PROCEDURE — 86833 HLA CLASS II HIGH DEFIN QUAL: CPT | Performed by: INTERNAL MEDICINE

## 2021-08-27 PROCEDURE — 36415 COLL VENOUS BLD VENIPUNCTURE: CPT

## 2021-08-27 PROCEDURE — 88305 TISSUE EXAM BY PATHOLOGIST: CPT | Mod: 26 | Performed by: PATHOLOGY

## 2021-08-27 PROCEDURE — 85027 COMPLETE CBC AUTOMATED: CPT | Performed by: NURSE PRACTITIONER

## 2021-08-27 PROCEDURE — 84156 ASSAY OF PROTEIN URINE: CPT | Performed by: INTERNAL MEDICINE

## 2021-08-27 PROCEDURE — 272N000653 IR RENAL BIOPSY RIGHT

## 2021-08-27 PROCEDURE — 80158 DRUG ASSAY CYCLOSPORINE: CPT | Performed by: INTERNAL MEDICINE

## 2021-08-27 RX ORDER — HYDRALAZINE HYDROCHLORIDE 20 MG/ML
10 INJECTION INTRAMUSCULAR; INTRAVENOUS ONCE
Status: COMPLETED | OUTPATIENT
Start: 2021-08-27 | End: 2021-08-27

## 2021-08-27 RX ORDER — SODIUM CHLORIDE 9 MG/ML
INJECTION, SOLUTION INTRAVENOUS CONTINUOUS
Status: DISCONTINUED | OUTPATIENT
Start: 2021-08-27 | End: 2021-08-27 | Stop reason: HOSPADM

## 2021-08-27 RX ORDER — LIDOCAINE HYDROCHLORIDE 10 MG/ML
1-30 INJECTION, SOLUTION EPIDURAL; INFILTRATION; INTRACAUDAL; PERINEURAL
Status: COMPLETED | OUTPATIENT
Start: 2021-08-27 | End: 2021-08-27

## 2021-08-27 RX ORDER — LIDOCAINE 40 MG/G
CREAM TOPICAL
Status: DISCONTINUED | OUTPATIENT
Start: 2021-08-27 | End: 2021-08-27 | Stop reason: HOSPADM

## 2021-08-27 RX ADMIN — LIDOCAINE HYDROCHLORIDE 10 ML: 10 INJECTION, SOLUTION EPIDURAL; INFILTRATION; INTRACAUDAL; PERINEURAL at 11:55

## 2021-08-27 RX ADMIN — HYDRALAZINE HYDROCHLORIDE 10 MG: 20 INJECTION INTRAMUSCULAR; INTRAVENOUS at 12:03

## 2021-08-27 ASSESSMENT — MIFFLIN-ST. JEOR: SCORE: 1978.68

## 2021-08-27 NOTE — PROGRESS NOTES
Patient Name: Carol Guillaume  Medical Record Number: [id number]  Today's Date: 8/27/2021    Procedure: Right side transplanted kidney biopsy  Proceduralist: Dr. Yarbrough    Procedure Start: 1135  Procedure end: 1155  Sedation medications administered: n/a     Report given to: 2A RN  : n/a    Other Notes: Pt arrived to IR room 6 from . Consent reviewed. Pt denies any questions or concerns regarding procedure. Pt positioned supine and monitored per protocol. Pt tolerated procedure without any noted complications. Renal pathology unable to attend procedure. 4 hours bedrest, starting at 1200. Pt transferred back to .

## 2021-08-27 NOTE — PROGRESS NOTES
Voided a second time.  This writer visualized second urine, which was clear, light yellow; no blood or clots noted.  Denies pain.

## 2021-08-27 NOTE — PROGRESS NOTES
Tolerated bedrest without issues.  Tolerated food, fluids, ambulation and urination without problems.  RLQ biopsy site CDI.  Denies pain.  PIV removed.  Discharge instructions reviewed.  Will discharge home with  when transportation arrives.

## 2021-08-27 NOTE — PROGRESS NOTES
Prepped for renal biopsy.  Denies pain.   waiting in room with her.  H & P current.  Labs pending.  Consent signed.

## 2021-08-27 NOTE — DISCHARGE INSTRUCTIONS
[facility]    Interventional Radiology  Patient Instructions Following Renal Biopsy    AFTER YOU GO HOME  ? If you were given sedation DO NOT drive or operate machinery at home or at work for at least 24 hours  ? DO relax and take it easy for 48 hours, no strenuous activity for 24 hours  ? DO drink plenty of fluids  ? DO resume your regular diet, unless otherwise instructed by your Primary Physician  ? Keep the dressing dry and in place for 24 hours.  ? DO NOT SMOKE FOR AT LEAST 24 HOURS, if you have been given any medications that were to help you relax or sedate you during your procedure  ? DO NOT drink alcoholic beverages the day of your procedure  ? DO NOT do any strenuous exercise or lifting (> 10 lbs) for at least 7 days following your procedure  ? DO NOT take a bath or shower for at least 12 hours following your procedure  ? Remove dressing after shower the next day. Replace with Band aid for 2 days.  Never leave a wet dressing in place.  ? DO NOT make any important or legal decisions for 24 hours following your procedure  ? There should be minimum drainage from the biopsy site    CALL THE PHYSICIAN IF:  ? You start bleeding from the procedure site.  If you do start to bleed from that site, lie down flat and hold pressure on the site for a minimum of 10 minutes.  Your physician will tell you if you need to return to the hospital  ? You develop nausea or vomiting  ? You have excessive swelling, redness, or tenderness at the site  ? You have drainage that looks like it is infected.  ? You experience severe pain  ? You develop hives or a rash or unexplained itching  ? You develop shortness of breath  ? You develop a temperature of 101 degrees F or greater  ? You develop bloody clots or red urine after you are discharged        [facility] INTERVENTIONAL RADIOLOGY DEPARTMENT  Procedure Physician:          Dr. Yarbrough                           Date of procedure: August 27, 2021  Telephone  Numbers: [phone] Monday-Friday 8:00 am to 4:30 pm  [phone] After 4:30 pm Monday-Friday, Weekends & Holidays.   Ask for the Interventional Radiologist on call.  Someone is on call 24 hrs/day  [facility] toll free number: [phone] Monday-Friday 8:00 am to 4:30 pm  [facility] Emergency Dept: [phone]

## 2021-08-27 NOTE — PROGRESS NOTES
Returned from IR to 2A post procedure.  Denies pain.  RLQ puncture site C/D/I.   in room with her.  Provided with po food & fluids.

## 2021-08-27 NOTE — PROCEDURES
[facility]    Procedure: IR Procedure Note    Date/Time: 8/27/2021 12:54 PM  Performed by: Sho Yarbrough MD  Authorized by: Sho Yarbrough MD     UNIVERSAL PROTOCOL   Site Marked: NA  Prior Images Obtained and Reviewed:  Yes  Required items: Required blood products, implants, devices and special equipment available    Patient identity confirmed:  Verbally with patient, arm band, provided demographic data and hospital-assigned identification number  Patient was reevaluated immediately before administering moderate or deep sedation or anesthesia  Confirmation Checklist:  Patient's identity using two indicators, relevant allergies, procedure was appropriate and matched the consent or emergent situation and correct equipment/implants were available  Time out: Immediately prior to the procedure a time out was called    Universal Protocol: the Joint Commission Universal Protocol was followed    Preparation: Patient was prepped and draped in usual sterile fashion           ANESTHESIA    Anesthesia: Local infiltration  Local Anesthetic:  Lidocaine 1% without epinephrine  Anesthetic Total (mL):  10      SEDATION    Patient Sedated: No    See dictated procedure note for full details.  Findings: No sedation    Specimens: none    Complications: None    Condition: Stable    Plan: Bedrest x 4 hours  Path results pending. Nephrology service did not attend to assess samples    PROCEDURE   Patient Tolerance:  Patient tolerated the procedure well with no immediate complications  Describe Procedure: 4 x 18 G core lower pole  Path results pending. Nephrology service did not attend to assess samples  Length of time physician/provider present for 1:1 monitoring during sedation: 0

## 2021-08-28 NOTE — TELEPHONE ENCOUNTER
Received critical CSA level of 524.  Attempted to reach Carol, but no answer. Left message for her to return call to  to have on call transplant coord paged.

## 2021-08-29 ENCOUNTER — RESULT FOLLOW UP (OUTPATIENT)
Dept: NEPHROLOGY | Facility: CLINIC | Age: 31
End: 2021-08-29

## 2021-08-29 DIAGNOSIS — D84.9 IMMUNOSUPPRESSION (H): Primary | ICD-10-CM

## 2021-08-29 NOTE — H&P (VIEW-ONLY)
Called patient to update about kidney bx results:    Preliminary kidney biopsy results:  Ordered for worsening Cr (note uptrend in Cr 1.1-1.3 to 1.7 in Aug 2020 now up to 2.2 and worsening proteinuria 2.7 g/g this year)  Chronic active ABMR t3,pvc2,cg3,c4d3+ 20% IFTA; no DSA sent  Current IS CSA 75/75 /540  Hx noted: early stage uterine Ca per last note  with plan for salvage, at the time some consideration for reducing IS in view of cancer      Of note, patient has been off MPA since Feb-->Aug just started last Mon which likely contributed to cABMR. limited options to up-titrate IS further or switch to tacrolimus given ongoing uterine cancer Tx and the plan for salvage Tx. Could also add ARB to control BP/proteinuria     Plan  - just resumed  mg po bid last week, cont CSA 75 mg po bid (obtain an accurate 12 hr trough)  -repeat labs 1 week after starting losartan 25 mg po every day   (hold the day of surgery; She is scheduled for 1 more surgery D & C in 1 week-atypical cells, D&C:  [facility])  -monitor BP daily over the next 2 weeks, goal <130/80  - f/up clinic 1 month /

## 2021-08-30 ENCOUNTER — TELEPHONE (OUTPATIENT)
Dept: TRANSPLANT | Facility: CLINIC | Age: 31
End: 2021-08-30

## 2021-08-30 DIAGNOSIS — R80.9 PROTEINURIA: ICD-10-CM

## 2021-08-30 DIAGNOSIS — Z94.0 KIDNEY REPLACED BY TRANSPLANT: Primary | ICD-10-CM

## 2021-08-30 DIAGNOSIS — Z48.298 AFTERCARE FOLLOWING ORGAN TRANSPLANT: ICD-10-CM

## 2021-08-30 DIAGNOSIS — Z52.008 UNSPECIFIED DONOR, OTHER BLOOD: ICD-10-CM

## 2021-08-30 DIAGNOSIS — Z94.0 KIDNEY REPLACED BY TRANSPLANT: ICD-10-CM

## 2021-08-30 LAB — BKV DNA # SPEC NAA+PROBE: NOT DETECTED COPIES/ML

## 2021-08-30 RX ORDER — LOSARTAN POTASSIUM 25 MG/1
25 TABLET ORAL DAILY
Qty: 30 TABLET | Refills: 11 | Status: SHIPPED | OUTPATIENT
Start: 2021-08-30 | End: 2021-12-27

## 2021-08-30 NOTE — TELEPHONE ENCOUNTER
OUTCOME (to below staff message Plan):   -Losartan 25 mg PO daily ordered to  mailorder specialty pharmacy. Patient verbalized understanding to hold on day of future surgery with Gynecology 9/13.  - Routine post-transplant labs ordered including CSA level repeat. Not 12 hr trough on 8/27/21 result; continue CSA 75 mg BID. Carol states [facility] RN told her to take her meds prior to procedure so she was confused.   - Monitor BP and keep log over next 2 weeks; verbalized understanding to call in with log readings in 2 weeks.  - Scheduling message sent to book appt in 1 month.     Patient Questions:  -What can she do for pain/tenderness at biopsy site? RNCC instructed to use warm pack and Tylenol to help with this.   - Change diet for worsening GFR? Referral to dietician to discuss. (msg sent to schedulers). Recommended activity for elevated BG.     Linda Hoffmann RN, BSN  Solid Organ Transplant, Post Kidney and Pancreas  Transplant Care Coordinator  [phone]      _____________________________________________________________  Message  Received: Yesterday  Feliberto Littlejohn MD Blaisdell, Linda Vora, ALEX; Mykel Joy MD  Called patient to update about kidney bx results:       Preliminary kidney biopsy results:   Ordered for worsening Cr (note uptrend in Cr 1.1-1.3 to 1.7 in Aug 2020 now up to 2.2 and worsening proteinuria 2.7 g/g this year)   Chronic active ABMR t3,pvc2,cg3,c4d3+ 20% IFTA; no DSA sent   Current IS CSA 75/75 /540   Hx noted: early stage uterine Ca per last note  with plan for salvage, at the time some consideration for reducing IS in view of cancer           Of note, patient has been off MPA since Feb-->Aug just started last Mon which likely contributed to cABMR. limited options to up-titrate IS further or switch to tacrolimus given ongoing uterine cancer Tx and the plan for salvage Tx. Could also add ARB to control BP/proteinuria       Plan   - start losartan 25 mg po every  day (hold on the day of future surgery with GYN)   -repeat labs in 1 week, needs accurate 12 hr CSA trough   - she just resumed  mg po bid last week, cont CSA 75 mg po bid (obtain an accurate 12 hr trough)   -monitor BP daily over the next 2 weeks, goal <130/80   - f/up clinic 1 month /        thanks

## 2021-08-30 NOTE — PROGRESS NOTES
Pt evaluated via billable telephone visit d/t COVID-19 restrictions. Time spent: 15 min    [facility]  Outpatient MNT     Time Spent: 15 minutes  Visit Type: Initial  Referring Physician: Benita  Reason for RD Visit: Chronic kidney disease; worsening GFR post kidney transplant. Hx of PCOS, insulin resistance and elevated triglycerides   Pt accompanied by: self    Nutrition Assessment  Pt would like to review renal diet. She reports prev following a special diet prior to txp. Labs done Friday and nurse told her foods to limit. Nurse told her to not eat boiled eggs, avocado, whole grains, salads d/t phosphorus.   K has been wnl, eGFR 20s   Only 1 phos on file in 10 years- 5.3 last week   Was drinking coffee/tea- stopped in July d/t acid reflux     Prior to Friday would have salad for meals, some pasta (spaghetti, casserole), homemade waffles/pancakes with sausage and crews; hot dogs w/ veggie. Ate out 1x/week.   Protein- chicken, eggs    Appetite: ?nerves since labs last week labs, so some nausea  Food allergies/intolerances: ?seafood intolerance   Meal prep & grocery shopping: pt   Issues chewing or swallowing: no  N/V/D/C: no  Food access concerns: on/off, but reports no current concerns    Vitamins, Supplements, Pertinent Meds: stopped- was taking vit D, vit C (daily- 500-1000 mg/day), magnesium (stopped), zinc, prenatal   Herbal Medicines/Supplements: elderberry    Weight hx: no edema; weight overall stable    Diet Recall (as of Friday)  Breakfast Tortilla w/ jelly    Lunch Noodles w/ OO and a few slices of deli turkey   Dinner    Snacks Air popped popcorn w/ OO, pineapple   Beverages Apple juice, water   Alcohol None    Dining out 1x/week      Physical Activity  Sedentary job  Tries to move around as much as she can     Nutrition Diagnosis  Food and nutrition-related knowledge deficit related to length of time since MNT for CKD as evidenced by pt report, request for RD visit.     Nutrition Intervention  1.  Reviewed labs. K has been wnl, not indicating further dietary modification at this time. Pt reports starting a new med and Dr said K could increase, but pt unsure about follow up lab testing to see if hyperkalemia will be an issue.   2. Reviewed 1 slightly elevated phos lab. Will be helpful to monitor trends over time, as 1 lab is hard to interpret (could have been impacted partially by what she ate leading up to lab draw). Reviewed naturally occurring phos foods vs synthetic phos foods and how they impact labs. Will send list of foods via AVS.     Patient Understanding: Pt verbalized understanding of education provided.  Expected Engagement: Good  Follow-Up Plans: PRN     Nutrition Goals  Pt to verbalize understanding of nutrition education provided     Janet Dey, RD, LD, CCTD

## 2021-08-30 NOTE — PROGRESS NOTES
Orders placed for HLA recipient typing    ----- Message -----   From: Jaclyn Montana   Sent: 8/27/2021   1:32 PM CDT   To: Lani Nj, Len Gooden, *   Subject: Updated typing orders                             Luis Mckeon,     Immunology Lab received a sample for DSA today.  We will need to update her HLA typing along with the donor's to complete the testing.  Please place the following orders:     -[id number] under the recipient's chart   -[id number] under the donor's chart     We have samples that can be used for both typings, so no additional tubes will need to be collected.     Let us know if you have any questions. [phone]     Thank you,   Jaclyn SALAZAR

## 2021-08-30 NOTE — TELEPHONE ENCOUNTER
Spoke with the patient and confirmed [facility] appointments on 8/31/21.  Informed patient an itinerary can be accessed on velingot.

## 2021-08-31 LAB
PATH REPORT.COMMENTS IMP SPEC: NORMAL
PATH REPORT.FINAL DX SPEC: NORMAL
PATH REPORT.GROSS SPEC: NORMAL
PATH REPORT.MICROSCOPIC SPEC OTHER STN: NORMAL
PATH REPORT.RELEVANT HX SPEC: NORMAL
PHOTO IMAGE: NORMAL

## 2021-09-01 ENCOUNTER — TELEPHONE (OUTPATIENT)
Dept: TRANSPLANT | Facility: CLINIC | Age: 31
End: 2021-09-01

## 2021-09-01 NOTE — TELEPHONE ENCOUNTER
Post Kidney and Pancreas Transplant Team Conference  Date: 9/1/2021  Transplant Coordinator: Pauly Elias     Attendees:  [x]  Dr. Ordonez  [x] Lupe Hopkins LPN     []  Dr. Joy [x] Pauly Elias RN [] Ellie Muniz LPN   [x]  Dr. Littlejohn [x] Ankita Orozco, ALEX    [x]  Dr. Owens [] Mary Anne Zaragoza, ALEX [x] Charly Morrison, PharmD   [x] Dr. Montelongo [] Shelby Hector, ALEX    [x] Dr. Ching [] Juan Carlos Portillo RN    [x] Dr. Rodriguez [] Linda Hoffmann RN    [] Dr. Blanchard [] Tawanna Ornelas RN    []  Dr. Rowe [] Yareli Luna, ALEX    [] Surgery Fellow [x] Melony Montana RN    [x] Tereza Harris, KALLIE [] Janet Mcneil RN        Verbal Plan Read Back:   Pt needs follow up with Dr. Joy after upcoming surgery    Routed to RN Coordinator   Lupe Hopkins LPN

## 2021-09-01 NOTE — TELEPHONE ENCOUNTER
Second scheduling request sent to transplant schedulers. [employer] message sent to Carol.       Carol also needs 1 month follow up in acute clinic with Dr. Joy if nothing available in chronic.     Appointment Request: transplant nephrology   New or Return Visit: Return   In Person Visit?: No   Reason for Visit/Diagnosis: Chronic active ABMR (renal biopsy 8/27/21); proteinura   Orders Placed: N/A   Appointment Timeframe Requested: 1 month   Patient Aware? Yes.   Patient Needs Call to Confirm? Yes.   Physician Override Approved? Yes, Per Dr. Littlejohn staff msg book with Dr. Joy       Thank you,   Linda Hoffmann RN

## 2021-09-02 LAB
A*: NORMAL
A*LOCUS SEROLOGIC EQUIVALENT: 3
A*LOCUS: NORMAL
A*SEROLOGIC EQUIVALENT: 68
A2: 2982
ABTEST METHOD: NORMAL
B*: NORMAL
B*LOCUS SEROLOGIC EQUIVALENT: 61
B*LOCUS: NORMAL
B*SEROLOGIC EQUIVALENT: 56
B44: 880
BW-1: NORMAL
C*: NORMAL
C*LOCUS SEROLOGIC EQUIVALENT: 1
C*LOCUS: NORMAL
C*SEROLOGIC EQUIVALENT: 15
DONOR IDENTIFICATION: NORMAL
DPA1*: NORMAL
DPB1*: NORMAL
DPB1*LOCUS NMDP: NORMAL
DPB1*LOCUS: NORMAL
DPB1*NMDP: NORMAL
DQA1*02: 5390
DQA1*: NORMAL
DQA1*LOCUS: NORMAL
DQB1*: NORMAL
DQB1*LOCUS SEROLOGIC EQUIVALENT: 7
DQB1*LOCUS: NORMAL
DQB1*SEROLOGIC EQUIVALENT: 5
DQB2: NORMAL
DR53: NORMAL
DR7: 789
DRB1*: NORMAL
DRB1*LOCUS SEROLOGIC EQUIVALENT: 1
DRB1*LOCUS: NORMAL
DRB1*SEROLOGIC EQUIVALENT: 8
DRSSO TEST METHOD: NORMAL
DSA COMMENTS: NORMAL
DSA PRESENT: YES
DSA TEST METHOD: NORMAL
ORGAN: NORMAL
SA 1 CELL: NORMAL
SA 1 TEST METHOD: NORMAL
SA 2 CELL: NORMAL
SA 2 TEST METHOD: NORMAL
SA1 HI RISK ABY: NORMAL
SA1 MOD RISK ABY: NORMAL
SA2 HI RISK ABY: NORMAL
SA2 MOD RISK ABY: NORMAL
UNACCEPTABLE ANTIGENS: NORMAL
UNOS CPRA: 97
ZZZSA 1  COMMENTS: NORMAL
ZZZSA 2 COMMENTS: NORMAL

## 2021-09-03 ENCOUNTER — TELEPHONE (OUTPATIENT)
Dept: TRANSPLANT | Facility: CLINIC | Age: 31
End: 2021-09-03

## 2021-09-03 ENCOUNTER — LAB (OUTPATIENT)
Dept: LAB | Facility: CLINIC | Age: 31
End: 2021-09-03
Attending: INTERNAL MEDICINE
Payer: COMMERCIAL

## 2021-09-03 DIAGNOSIS — E83.39 HYPERPHOSPHATEMIA: ICD-10-CM

## 2021-09-03 DIAGNOSIS — Z48.298 AFTERCARE FOLLOWING ORGAN TRANSPLANT: ICD-10-CM

## 2021-09-03 DIAGNOSIS — Z94.0 KIDNEY REPLACED BY TRANSPLANT: ICD-10-CM

## 2021-09-03 DIAGNOSIS — Z94.0 KIDNEY REPLACED BY TRANSPLANT: Primary | ICD-10-CM

## 2021-09-03 LAB
ALBUMIN SERPL-MCNC: 3.4 G/DL (ref 3.4–5)
ALBUMIN UR-MCNC: 100 MG/DL
ANION GAP SERPL CALCULATED.3IONS-SCNC: 8 MMOL/L (ref 3–14)
APPEARANCE UR: ABNORMAL
BILIRUB UR QL STRIP: NEGATIVE
BUN SERPL-MCNC: 34 MG/DL (ref 7–30)
CALCIUM SERPL-MCNC: 9.3 MG/DL (ref 8.5–10.1)
CHLORIDE BLD-SCNC: 106 MMOL/L (ref 94–109)
CO2 SERPL-SCNC: 25 MMOL/L (ref 20–32)
COLOR UR AUTO: YELLOW
CREAT SERPL-MCNC: 2.18 MG/DL (ref 0.52–1.04)
CYCLOSPORINE BLD LC/MS/MS-MCNC: 152 UG/L (ref 50–400)
ERYTHROCYTE [DISTWIDTH] IN BLOOD BY AUTOMATED COUNT: 12.4 % (ref 10–15)
GFR SERPL CREATININE-BSD FRML MDRD: 29 ML/MIN/1.73M2
GLUCOSE BLD-MCNC: 109 MG/DL (ref 70–99)
GLUCOSE UR STRIP-MCNC: NEGATIVE MG/DL
HCT VFR BLD AUTO: 39.7 % (ref 35–47)
HGB BLD-MCNC: 13.2 G/DL (ref 11.7–15.7)
HGB UR QL STRIP: ABNORMAL
KETONES UR STRIP-MCNC: NEGATIVE MG/DL
LEUKOCYTE ESTERASE UR QL STRIP: NEGATIVE
MCH RBC QN AUTO: 27.6 PG (ref 26.5–33)
MCHC RBC AUTO-ENTMCNC: 33.2 G/DL (ref 31.5–36.5)
MCV RBC AUTO: 83 FL (ref 78–100)
MUCOUS THREADS #/AREA URNS LPF: PRESENT /LPF
MYCOPHENOLATE SERPL LC/MS/MS-MCNC: 10.89 MG/L (ref 1–3.5)
MYCOPHENOLATE-G SERPL LC/MS/MS-MCNC: 157.9 MG/L (ref 30–95)
NITRATE UR QL: NEGATIVE
PH UR STRIP: 5 [PH] (ref 5–7)
PHOSPHATE SERPL-MCNC: 5.3 MG/DL (ref 2.5–4.5)
PLATELET # BLD AUTO: 225 10E3/UL (ref 150–450)
POTASSIUM BLD-SCNC: 4.2 MMOL/L (ref 3.4–5.3)
RBC # BLD AUTO: 4.79 10E6/UL (ref 3.8–5.2)
RBC URINE: 1 /HPF
SODIUM SERPL-SCNC: 139 MMOL/L (ref 133–144)
SP GR UR STRIP: 1.01 (ref 1–1.03)
SQUAMOUS EPITHELIAL: 2 /HPF
TME LAST DOSE: ABNORMAL H
TME LAST DOSE: ABNORMAL H
TME LAST DOSE: NORMAL H
TME LAST DOSE: NORMAL H
UROBILINOGEN UR STRIP-MCNC: NORMAL MG/DL
WBC # BLD AUTO: 7.7 10E3/UL (ref 4–11)
WBC URINE: 3 /HPF

## 2021-09-03 PROCEDURE — 86832 HLA CLASS I HIGH DEFIN QUAL: CPT | Performed by: PATHOLOGY

## 2021-09-03 PROCEDURE — 80069 RENAL FUNCTION PANEL: CPT | Performed by: PATHOLOGY

## 2021-09-03 PROCEDURE — 86833 HLA CLASS II HIGH DEFIN QUAL: CPT | Performed by: PATHOLOGY

## 2021-09-03 PROCEDURE — 80158 DRUG ASSAY CYCLOSPORINE: CPT | Mod: 90 | Performed by: PATHOLOGY

## 2021-09-03 PROCEDURE — 85027 COMPLETE CBC AUTOMATED: CPT | Performed by: PATHOLOGY

## 2021-09-03 PROCEDURE — 36415 COLL VENOUS BLD VENIPUNCTURE: CPT | Performed by: PATHOLOGY

## 2021-09-03 PROCEDURE — 80180 DRUG SCRN QUAN MYCOPHENOLATE: CPT | Mod: 90 | Performed by: PATHOLOGY

## 2021-09-03 PROCEDURE — 81001 URINALYSIS AUTO W/SCOPE: CPT | Performed by: PATHOLOGY

## 2021-09-03 NOTE — TELEPHONE ENCOUNTER
ISSUE:  Elevated phosphorous level.    PLAN:  DECREASE dietary intake of phosphorous. AVOID: dairy, avocado, nuts, beans, brown or wild rice, and whole grains, and baked goods.    Repeat labs with phosphorous level in 1 week.

## 2021-09-03 NOTE — LETTER
PHYSICIAN ORDERS      DATE & TIME ISSUED: September 3, 2021 3:13 PM  PATIENT NAME: Carol Guillaume   : 1990     [facility] MR# [if applicable]: [id number]     DIAGNOSIS:  Kidney transplant   ICD-10 CODE: Z94.0      Please repeat the following labs in one week  Cyclosporine level (ensure 12 hours between last dose and blood draw)  BMP  CBC with platelets and differential  Phosphorus level    Any questions please call: [phone] option 5    Please fax results to [phone].      Feliberto Littlejohn MD

## 2021-09-03 NOTE — TELEPHONE ENCOUNTER
Patient Call: General  Route to LPN    Reason for call: connect with pt regarding new medication and elevated abs     Call back needed? Yes    Return Call Needed  Same as documented in contacts section  When to return call?: Greater than one day: Route standard priority

## 2021-09-07 ENCOUNTER — ALLIED HEALTH/NURSE VISIT (OUTPATIENT)
Dept: TRANSPLANT | Facility: CLINIC | Age: 31
End: 2021-09-07
Payer: COMMERCIAL

## 2021-09-07 DIAGNOSIS — N18.4 CHRONIC KIDNEY DISEASE, STAGE 4, SEVERELY DECREASED GFR (H): Primary | ICD-10-CM

## 2021-09-07 DIAGNOSIS — Z48.298 AFTERCARE FOLLOWING ORGAN TRANSPLANT: ICD-10-CM

## 2021-09-07 DIAGNOSIS — Z94.0 KIDNEY REPLACED BY TRANSPLANT: ICD-10-CM

## 2021-09-07 PROCEDURE — 97802 MEDICAL NUTRITION INDIV IN: CPT | Mod: TEL | Performed by: DIETITIAN, REGISTERED

## 2021-09-07 RX ORDER — CYCLOSPORINE 25 MG/1
CAPSULE, LIQUID FILLED ORAL
Qty: 450 CAPSULE | Refills: 3 | Status: SHIPPED | OUTPATIENT
Start: 2021-09-07 | End: 2022-11-17

## 2021-09-07 NOTE — PATIENT INSTRUCTIONS
"Hi Carol,  Your potassium level has been within normal for a long time. No need to limit the below foods, but I will try to figure out a plan to watch your labs with the new med you mentioned. Even further below, phosphorus foods. The biggest impact foods will be processed foods/packaged foods, etc. Please let me know if you have any questions.    Janet       To manage your potassium, choose more \"low\" potassium foods and avoid/limit \"high\" potassium foods.   - Low potassium foods (1/2 cup serving): asparagus, beets (canned or pickled), broccoli (raw), cabbage, carrots (cooked), cauliflower, celery, corn, cucumber, eggplant, green or wax beans, lettuce (iceberg), mixed vegetables, mushrooms (fresh), onion, peas, peppers, spinach (raw), zucchini, apricot (canned), apples and apple juice, blueberries, cherries, cranberries, fruit cocktail, grapefruit, grapes and grape juice, mandarin oranges, peaches, pears, plums, pineapple, raspberries, rhubarb, strawberries, tangerines, watermelon  - High potassium foods: artichoke, Bamboo shoots, beets (cooked), bok dwaine, broccoli (cooked), [place] sprouts, carrots (raw), mushrooms (canned), potato (sweet, white, yams), potato chips or French fries, squash or pumpkin, spinach and other greens (cooked), tomatoes and tomato products, vegetable juice, apricots (fresh or dried), avocado, banana, cantaloupe or honeydew, dates and figs, dried fruit, grapefruit juice, kiwi, mason, nectarine, orange or orange juice, papaya, pomegranate juice, prunes or prune juice, raisins     To manage your phosphorus intake, limit/avoid the following:  Milk and milk products (including cottage cheese, ice cream, yogurt, cheese, custard, and pudding), whole-grain breads, bran, whole-grain cereal, wheat germ, dahl's yeast, legumes (dried or canned beans, baked beans, split peas, lentils), nuts, nut butters (peanut or almond butter), seeds, chocolate and cocoa, cola (Coke, Pepsi, Dr Pepper), beer, iván, " soy products, and processed/packaged/convenience foods with added phosphorus

## 2021-09-07 NOTE — TELEPHONE ENCOUNTER
ISSUE 1):  Received: Today  Kristie Dey, ISABEL Hoffmann, Linda Vora, ALEX Mckeon,   I just talked with Carol.   She mentioned she started on a new BP med that may cause hyperkalemia. She is unsure if there is a plan for repeat labs to see if that becomes an issue? I told her I would [place] back. Thanks!     Janet     PLAN:  RNCC called to review labs with Carol.   K 4.2 on 9/3/21. Stable BMP with exception of Phos level elevation.    Gia Hector RN   9/3/2021 12:00 PM CDT Back to Top      Stable transplant labs after starting losartan.  Elevated phos level.  Reduce phos intake and repeat in 1-2 weeks.      OUTCOME: VM message left for Carol.     Sent resources on Phosphorus via [employer].  _____________________________________________________________  ISSUE 2:   Cyclosporine level 152 on 9/3/2021 0655, goal , Neoral dose 75 mg BID.    Mycophenolic acid level 10.89 on 9/3/21, goal 1.0-3.5, Mycophenolic acid dose 540 mg BID.    Carol confirmed last dose of both 9/2/21 8PM. Did not take prior to lab draw; 11 hour trough.     PLAN:   Please call patient and confirm this was an accurate 12-hour trough. Verify Cyclosporine dose 75 mg BID. Confirm no new medications or illness. Confirm no missed doses. If accurate trough and accurate dose, decrease Cyclosporine dose to 75 mg AM/50 mg PM and repeat labs in 1-2 weeks.    Please call patient and confirm this was an accurate 12-hour trough. Verify Mycophenolic acid dose 540 mg BID. Confirm no new medications or illness. Confirm no missed doses. If accurate trough and accurate dose, stay on same dose and will check with MD on plan.     GI side effects on MPA? Carol reports she hasn't noticed any differences with starting mycophenolic acid. She has mild nausea and diarrhea from chemo treatment and sometimes has stomach discomfort she relates to nerves. Has had acid reflux on and off for 2 years. Expands on mild diarrhea; over past 3 days has  gone two lose stools. States today has been a good day. She hasn't eaten anything heavy, only some pineapple this morning.     Discussed Losartan, BP and labs. Carol had just started losartan 25 mg daily, 4 days before having labs checked on 9/3/21. Would like to repeat BMP in 1-2 weeks. Carol states her BP still runs high around 140/90; sBP 150 at times. She gets mild chest discomfort when elevated. Takes her medication then BP decreases a little but still above 130/80 goal. She thinks some of her discomfort is anxiety coupled with increase HR. Confirms she is also taking amlodipine 5 mg daily and carvedilol 25 mg BID. Will ask provider if he would like to make medication change for HTN.    OUTCOME:   Left VM message asking Carol to return call to discuss labs.   Carol returned call and we reviewed above plan.    Lab orders placed for repeat BMP, CSA, and Phos.    Linda Hoffmann RN, BSN  Solid Organ Transplant, Post Kidney and Pancreas  Transplant Care Coordinator  [phone]     Addendum: Next txp neph appt 9/29/21. [employer] message sent to Carol regarding below MD comment.    Message  Received: Yesterday  Mykel Joy MD Blaisdell, Christin Rebecca, RN  Caller: Unspecified (5 days ago,  2:14 PM)  Would consider tacrolimus switch if she can tolerate it   Would wait on BP adjustment until she is seen           Previous Messages       ----- Message -----   From: Linda Hoffmann, RN   Sent: 9/7/2021   2:00 PM CDT   To: Mykel Joy MD, *     ----- Message from Linda Hoffmann RN sent at 9/7/2021  2:00 PM CDT -----   Dr. Joy, Do you want to repeat MPA level on same dose or reduce dose? 11 hr trough. She restarted about 2 weeks ago 540 mg BID. I did reduce her CSA dose slightly. Are we still targeting CSA goal  or should goal be higher given recent biopsy/rejection. She was started on losartan 25 mg daily. BP still elevated 140-150/90. Any med changes for HTN?

## 2021-09-07 NOTE — TELEPHONE ENCOUNTER
Critical lab value sent to ALEX Orozco covering patients RNCC.   Please see message below    Thanks   ALIN Berger, LPN   Solid Organ Transplant

## 2021-09-08 LAB
A2: 2612
B44: 646
DONOR IDENTIFICATION: NORMAL
DQA1*02: 9173
DQB2: NORMAL
DR53: NORMAL
DR7: 605
DSA COMMENTS: NORMAL
DSA PRESENT: YES
DSA TEST METHOD: NORMAL
ORGAN: NORMAL
SA 1 CELL: NORMAL
SA 1 TEST METHOD: NORMAL
SA 2 CELL: NORMAL
SA 2 TEST METHOD: NORMAL
SA1 HI RISK ABY: NORMAL
SA1 MOD RISK ABY: NORMAL
SA2 HI RISK ABY: NORMAL
SA2 MOD RISK ABY: NORMAL
UNACCEPTABLE ANTIGENS: NORMAL
UNOS CPRA: 98
ZZZSA 1  COMMENTS: NORMAL
ZZZSA 2 COMMENTS: NORMAL

## 2021-09-09 ENCOUNTER — LAB (OUTPATIENT)
Dept: LAB | Facility: CLINIC | Age: 31
End: 2021-09-09
Attending: OBSTETRICS & GYNECOLOGY
Payer: COMMERCIAL

## 2021-09-09 ENCOUNTER — DOCUMENTATION ONLY (OUTPATIENT)
Dept: TRANSPLANT | Facility: CLINIC | Age: 31
End: 2021-09-09

## 2021-09-09 DIAGNOSIS — E83.39 HYPERPHOSPHATEMIA: ICD-10-CM

## 2021-09-09 DIAGNOSIS — Z94.0 KIDNEY REPLACED BY TRANSPLANT: ICD-10-CM

## 2021-09-09 DIAGNOSIS — Z48.298 AFTERCARE FOLLOWING ORGAN TRANSPLANT: ICD-10-CM

## 2021-09-09 DIAGNOSIS — Z11.59 ENCOUNTER FOR SCREENING FOR OTHER VIRAL DISEASES: ICD-10-CM

## 2021-09-09 LAB
ANION GAP SERPL CALCULATED.3IONS-SCNC: 9 MMOL/L (ref 3–14)
BUN SERPL-MCNC: 32 MG/DL (ref 7–30)
CALCIUM SERPL-MCNC: 9.2 MG/DL (ref 8.5–10.1)
CHLORIDE BLD-SCNC: 104 MMOL/L (ref 94–109)
CO2 SERPL-SCNC: 24 MMOL/L (ref 20–32)
CREAT SERPL-MCNC: 2.2 MG/DL (ref 0.52–1.04)
CYCLOSPORINE BLD LC/MS/MS-MCNC: 128 UG/L (ref 50–400)
GFR SERPL CREATININE-BSD FRML MDRD: 29 ML/MIN/1.73M2
GLUCOSE BLD-MCNC: 110 MG/DL (ref 70–99)
PHOSPHATE SERPL-MCNC: 4.8 MG/DL (ref 2.5–4.5)
POTASSIUM BLD-SCNC: 4.3 MMOL/L (ref 3.4–5.3)
SARS-COV-2 RNA RESP QL NAA+PROBE: NEGATIVE
SODIUM SERPL-SCNC: 137 MMOL/L (ref 133–144)
TME LAST DOSE: NORMAL H
TME LAST DOSE: NORMAL H

## 2021-09-09 PROCEDURE — 80158 DRUG ASSAY CYCLOSPORINE: CPT | Mod: 90 | Performed by: PATHOLOGY

## 2021-09-09 PROCEDURE — 36415 COLL VENOUS BLD VENIPUNCTURE: CPT | Performed by: PATHOLOGY

## 2021-09-09 PROCEDURE — U0003 INFECTIOUS AGENT DETECTION BY NUCLEIC ACID (DNA OR RNA); SEVERE ACUTE RESPIRATORY SYNDROME CORONAVIRUS 2 (SARS-COV-2) (CORONAVIRUS DISEASE [COVID-19]), AMPLIFIED PROBE TECHNIQUE, MAKING USE OF HIGH THROUGHPUT TECHNOLOGIES AS DESCRIBED BY CMS-2020-01-R: HCPCS | Mod: 90 | Performed by: PATHOLOGY

## 2021-09-09 PROCEDURE — 80048 BASIC METABOLIC PNL TOTAL CA: CPT | Mod: 90 | Performed by: PATHOLOGY

## 2021-09-09 PROCEDURE — 84100 ASSAY OF PHOSPHORUS: CPT | Mod: 90 | Performed by: PATHOLOGY

## 2021-09-09 PROCEDURE — U0005 INFEC AGEN DETEC AMPLI PROBE: HCPCS | Mod: 90 | Performed by: PATHOLOGY

## 2021-09-09 NOTE — PROGRESS NOTES
Received call from Carol. She had repeated labs and wanted some direction. K remains wnl. Phos has come down but still slightly high. We reviewed foods she normally consumes to limit (sausage, hot dogs, dairy). We reviewed ok to sub almond milk or almond milk ice cream (avoid chocolate), as she reports consuming dairy most days.

## 2021-09-10 NOTE — RESULT ENCOUNTER NOTE
Cyclosporine level 128 after recent dose reduction. Likely at goal of . This appears to be short trough (~9.75 hrs); drawn at 0616 AM. Last dose date and time recorded as 9/8/21 2030. No dose change at this time.

## 2021-09-13 ENCOUNTER — HOSPITAL ENCOUNTER (OUTPATIENT)
Facility: CLINIC | Age: 31
Discharge: HOME | End: 2021-09-13
Attending: OBSTETRICS & GYNECOLOGY | Admitting: OBSTETRICS & GYNECOLOGY
Payer: COMMERCIAL

## 2021-09-13 ENCOUNTER — ANESTHESIA (OUTPATIENT)
Dept: SURGERY | Facility: CLINIC | Age: 31
End: 2021-09-13
Payer: COMMERCIAL

## 2021-09-13 ENCOUNTER — ANESTHESIA EVENT (OUTPATIENT)
Dept: SURGERY | Facility: CLINIC | Age: 31
End: 2021-09-13
Payer: COMMERCIAL

## 2021-09-13 VITALS
RESPIRATION RATE: 14 BRPM | BODY MASS INDEX: 43.84 KG/M2 | DIASTOLIC BLOOD PRESSURE: 92 MMHG | WEIGHT: 272.8 LBS | TEMPERATURE: 96.9 F | HEART RATE: 64 BPM | OXYGEN SATURATION: 99 % | HEIGHT: 66 IN | SYSTOLIC BLOOD PRESSURE: 126 MMHG

## 2021-09-13 DIAGNOSIS — E83.39 HYPERPHOSPHATEMIA: Primary | ICD-10-CM

## 2021-09-13 DIAGNOSIS — N85.02 EIN (ENDOMETRIAL INTRAEPITHELIAL NEOPLASIA): Primary | ICD-10-CM

## 2021-09-13 LAB
B-HCG SERPL-ACNC: <1 IU/L (ref 0–5)
POTASSIUM BLD-SCNC: 4.8 MMOL/L (ref 3.4–5.3)

## 2021-09-13 PROCEDURE — 258N000003 HC RX IP 258 OP 636: Performed by: NURSE ANESTHETIST, CERTIFIED REGISTERED

## 2021-09-13 PROCEDURE — 84132 ASSAY OF SERUM POTASSIUM: CPT | Performed by: ANESTHESIOLOGY

## 2021-09-13 PROCEDURE — 360N000075 HC SURGERY LEVEL 2, PER MIN: Performed by: OBSTETRICS & GYNECOLOGY

## 2021-09-13 PROCEDURE — 370N000017 HC ANESTHESIA TECHNICAL FEE, PER MIN: Performed by: OBSTETRICS & GYNECOLOGY

## 2021-09-13 PROCEDURE — 272N000001 HC OR GENERAL SUPPLY STERILE: Performed by: OBSTETRICS & GYNECOLOGY

## 2021-09-13 PROCEDURE — 84702 CHORIONIC GONADOTROPIN TEST: CPT | Performed by: OBSTETRICS & GYNECOLOGY

## 2021-09-13 PROCEDURE — 36415 COLL VENOUS BLD VENIPUNCTURE: CPT | Performed by: OBSTETRICS & GYNECOLOGY

## 2021-09-13 PROCEDURE — 250N000011 HC RX IP 250 OP 636: Performed by: OBSTETRICS & GYNECOLOGY

## 2021-09-13 PROCEDURE — 88342 IMHCHEM/IMCYTCHM 1ST ANTB: CPT | Mod: TC | Performed by: OBSTETRICS & GYNECOLOGY

## 2021-09-13 PROCEDURE — 250N000011 HC RX IP 250 OP 636: Performed by: NURSE ANESTHETIST, CERTIFIED REGISTERED

## 2021-09-13 PROCEDURE — 710N000012 HC RECOVERY PHASE 2, PER MINUTE: Performed by: OBSTETRICS & GYNECOLOGY

## 2021-09-13 PROCEDURE — 999N000141 HC STATISTIC PRE-PROCEDURE NURSING ASSESSMENT: Performed by: OBSTETRICS & GYNECOLOGY

## 2021-09-13 PROCEDURE — 250N000009 HC RX 250: Performed by: OBSTETRICS & GYNECOLOGY

## 2021-09-13 PROCEDURE — 710N000009 HC RECOVERY PHASE 1, LEVEL 1, PER MIN: Performed by: OBSTETRICS & GYNECOLOGY

## 2021-09-13 DEVICE — IMPLANTABLE DEVICE
Type: IMPLANTABLE DEVICE | Site: UTERUS | Status: NON-FUNCTIONAL
Removed: 2022-05-09

## 2021-09-13 RX ORDER — SODIUM CHLORIDE 9 MG/ML
INJECTION, SOLUTION INTRAVENOUS CONTINUOUS PRN
Status: DISCONTINUED | OUTPATIENT
Start: 2021-09-13 | End: 2021-09-13

## 2021-09-13 RX ORDER — PROPOFOL 10 MG/ML
INJECTION, EMULSION INTRAVENOUS CONTINUOUS PRN
Status: DISCONTINUED | OUTPATIENT
Start: 2021-09-13 | End: 2021-09-13

## 2021-09-13 RX ORDER — HYDROMORPHONE HCL IN WATER/PF 6 MG/30 ML
0.2 PATIENT CONTROLLED ANALGESIA SYRINGE INTRAVENOUS EVERY 5 MIN PRN
Status: DISCONTINUED | OUTPATIENT
Start: 2021-09-13 | End: 2021-09-13 | Stop reason: HOSPADM

## 2021-09-13 RX ORDER — CEFAZOLIN SODIUM IN 0.9 % NACL 3 G/100 ML
3 INTRAVENOUS SOLUTION, PIGGYBACK (ML) INTRAVENOUS SEE ADMIN INSTRUCTIONS
Status: DISCONTINUED | OUTPATIENT
Start: 2021-09-13 | End: 2021-09-13 | Stop reason: HOSPADM

## 2021-09-13 RX ORDER — OXYCODONE HYDROCHLORIDE 5 MG/1
5 TABLET ORAL EVERY 6 HOURS PRN
Qty: 4 TABLET | Refills: 0 | Status: SHIPPED | OUTPATIENT
Start: 2021-09-13 | End: 2021-09-16

## 2021-09-13 RX ORDER — SODIUM CHLORIDE 9 MG/ML
INJECTION, SOLUTION INTRAVENOUS CONTINUOUS
Status: DISCONTINUED | OUTPATIENT
Start: 2021-09-13 | End: 2021-09-13 | Stop reason: HOSPADM

## 2021-09-13 RX ORDER — FENTANYL CITRATE 0.05 MG/ML
25-50 INJECTION, SOLUTION INTRAMUSCULAR; INTRAVENOUS
Status: DISCONTINUED | OUTPATIENT
Start: 2021-09-13 | End: 2021-09-13 | Stop reason: HOSPADM

## 2021-09-13 RX ORDER — FENTANYL CITRATE 50 UG/ML
INJECTION, SOLUTION INTRAMUSCULAR; INTRAVENOUS PRN
Status: DISCONTINUED | OUTPATIENT
Start: 2021-09-13 | End: 2021-09-13

## 2021-09-13 RX ORDER — ALBUTEROL SULFATE 0.83 MG/ML
2.5 SOLUTION RESPIRATORY (INHALATION) EVERY 4 HOURS PRN
Status: DISCONTINUED | OUTPATIENT
Start: 2021-09-13 | End: 2021-09-13 | Stop reason: HOSPADM

## 2021-09-13 RX ORDER — FENTANYL CITRATE 0.05 MG/ML
25 INJECTION, SOLUTION INTRAMUSCULAR; INTRAVENOUS EVERY 5 MIN PRN
Status: DISCONTINUED | OUTPATIENT
Start: 2021-09-13 | End: 2021-09-13 | Stop reason: HOSPADM

## 2021-09-13 RX ORDER — MEPERIDINE HYDROCHLORIDE 25 MG/ML
12.5 INJECTION INTRAMUSCULAR; INTRAVENOUS; SUBCUTANEOUS
Status: DISCONTINUED | OUTPATIENT
Start: 2021-09-13 | End: 2021-09-13 | Stop reason: HOSPADM

## 2021-09-13 RX ORDER — HYDRALAZINE HYDROCHLORIDE 20 MG/ML
2.5-5 INJECTION INTRAMUSCULAR; INTRAVENOUS EVERY 10 MIN PRN
Status: DISCONTINUED | OUTPATIENT
Start: 2021-09-13 | End: 2021-09-13 | Stop reason: HOSPADM

## 2021-09-13 RX ORDER — ACETAMINOPHEN 325 MG/1
975 TABLET ORAL ONCE
Status: DISCONTINUED | OUTPATIENT
Start: 2021-09-13 | End: 2021-09-13 | Stop reason: HOSPADM

## 2021-09-13 RX ORDER — ONDANSETRON 4 MG/1
4 TABLET, ORALLY DISINTEGRATING ORAL EVERY 30 MIN PRN
Status: DISCONTINUED | OUTPATIENT
Start: 2021-09-13 | End: 2021-09-13 | Stop reason: HOSPADM

## 2021-09-13 RX ORDER — SENNOSIDES A AND B 8.6 MG/1
1-3 TABLET, FILM COATED ORAL 2 TIMES DAILY PRN
Qty: 30 TABLET | Refills: 0 | Status: SHIPPED | OUTPATIENT
Start: 2021-09-13 | End: 2021-09-30

## 2021-09-13 RX ORDER — LABETALOL HYDROCHLORIDE 5 MG/ML
10 INJECTION, SOLUTION INTRAVENOUS
Status: DISCONTINUED | OUTPATIENT
Start: 2021-09-13 | End: 2021-09-13 | Stop reason: HOSPADM

## 2021-09-13 RX ORDER — EPINEPHRINE 1 MG/ML
INJECTION, SOLUTION INTRAMUSCULAR; SUBCUTANEOUS
Status: DISCONTINUED
Start: 2021-09-13 | End: 2021-09-13 | Stop reason: HOSPADM

## 2021-09-13 RX ORDER — ONDANSETRON 2 MG/ML
4 INJECTION INTRAMUSCULAR; INTRAVENOUS EVERY 30 MIN PRN
Status: DISCONTINUED | OUTPATIENT
Start: 2021-09-13 | End: 2021-09-13 | Stop reason: HOSPADM

## 2021-09-13 RX ORDER — ONDANSETRON 2 MG/ML
INJECTION INTRAMUSCULAR; INTRAVENOUS PRN
Status: DISCONTINUED | OUTPATIENT
Start: 2021-09-13 | End: 2021-09-13

## 2021-09-13 RX ORDER — OXYCODONE HYDROCHLORIDE 5 MG/1
5 TABLET ORAL
Status: DISCONTINUED | OUTPATIENT
Start: 2021-09-13 | End: 2021-09-13 | Stop reason: HOSPADM

## 2021-09-13 RX ORDER — IBUPROFEN 600 MG/1
600 TABLET, FILM COATED ORAL EVERY 6 HOURS PRN
Qty: 30 TABLET | Refills: 1 | Status: SHIPPED | OUTPATIENT
Start: 2021-09-13 | End: 2021-09-30

## 2021-09-13 RX ORDER — OXYCODONE HYDROCHLORIDE 5 MG/1
5 TABLET ORAL EVERY 4 HOURS PRN
Status: DISCONTINUED | OUTPATIENT
Start: 2021-09-13 | End: 2021-09-13 | Stop reason: HOSPADM

## 2021-09-13 RX ORDER — LIDOCAINE 40 MG/G
CREAM TOPICAL
Status: DISCONTINUED | OUTPATIENT
Start: 2021-09-13 | End: 2021-09-13 | Stop reason: HOSPADM

## 2021-09-13 RX ORDER — CEFAZOLIN SODIUM IN 0.9 % NACL 3 G/100 ML
3 INTRAVENOUS SOLUTION, PIGGYBACK (ML) INTRAVENOUS
Status: COMPLETED | OUTPATIENT
Start: 2021-09-13 | End: 2021-09-13

## 2021-09-13 RX ORDER — ACETAMINOPHEN 325 MG/1
975 TABLET ORAL ONCE
Status: DISCONTINUED | OUTPATIENT
Start: 2021-09-13 | End: 2021-09-13 | Stop reason: CLARIF

## 2021-09-13 RX ORDER — BUPIVACAINE HYDROCHLORIDE AND EPINEPHRINE 5; 5 MG/ML; UG/ML
INJECTION, SOLUTION PERINEURAL PRN
Status: DISCONTINUED | OUTPATIENT
Start: 2021-09-13 | End: 2021-09-13 | Stop reason: HOSPADM

## 2021-09-13 RX ORDER — BUPIVACAINE HYDROCHLORIDE 5 MG/ML
INJECTION, SOLUTION EPIDURAL; INTRACAUDAL
Status: DISCONTINUED
Start: 2021-09-13 | End: 2021-09-13 | Stop reason: HOSPADM

## 2021-09-13 RX ADMIN — FENTANYL CITRATE 50 MCG: 50 INJECTION, SOLUTION INTRAMUSCULAR; INTRAVENOUS at 12:02

## 2021-09-13 RX ADMIN — ONDANSETRON 4 MG: 2 INJECTION INTRAMUSCULAR; INTRAVENOUS at 12:07

## 2021-09-13 RX ADMIN — Medication 3 G: at 11:59

## 2021-09-13 RX ADMIN — SODIUM CHLORIDE: 9 INJECTION, SOLUTION INTRAVENOUS at 11:59

## 2021-09-13 RX ADMIN — MIDAZOLAM 2 MG: 1 INJECTION INTRAMUSCULAR; INTRAVENOUS at 12:02

## 2021-09-13 RX ADMIN — PROPOFOL 150 MCG/KG/MIN: 10 INJECTION, EMULSION INTRAVENOUS at 12:05

## 2021-09-13 ASSESSMENT — COPD QUESTIONNAIRES: COPD: 0

## 2021-09-13 ASSESSMENT — MIFFLIN-ST. JEOR: SCORE: 1964.16

## 2021-09-13 ASSESSMENT — LIFESTYLE VARIABLES: TOBACCO_USE: 0

## 2021-09-13 ASSESSMENT — ENCOUNTER SYMPTOMS: DYSRHYTHMIAS: 0

## 2021-09-13 NOTE — BRIEF OP NOTE
[facility]    Brief Operative Note    Pre-operative diagnosis: Complex atypical endometrial hyperplasia [N85.02]  Post-operative diagnosis Same as pre-operative diagnosis    Procedure: Procedure(s):  DILATION AND CURETTAGE,  MIRENA INTRAUTERINE DEVICE EXCHANGE  Surgeon: Surgeon(s) and Role:     * Lisbeth Gallardo MD - Primary  Anesthesia: General   Estimated blood loss: Minimal  Drains: None  Specimens:   ID Type Source Tests Collected by Time Destination   1 : ENDOMETRIAL CURETTINGS Curettings Endometrium SURGICAL PATHOLOGY EXAM Lisbeth Gallardo MD 9/13/2021 12:16 PM      Findings:   None.  Complications: None.  Implants:   Implant Name Type Inv. Item Serial No.  Lot No. LRB No. Used Action   MIRENA  INTRAUTERINE SYSTEM   [id number] [facility] [id number] N/A 1 Explanted

## 2021-09-13 NOTE — ANESTHESIA CARE TRANSFER NOTE
Patient: Carol Guillaume    Procedure(s):  DILATION AND CURETTAGE,  MIRENA INTRAUTERINE DEVICE EXCHANGE    Diagnosis: Complex atypical endometrial hyperplasia [N85.02]  Diagnosis Additional Information: No value filed.    Anesthesia Type:   MAC     Note:    Oropharynx: oropharynx clear of all foreign objects  Level of Consciousness: awake  Oxygen Supplementation: face mask  Level of Supplemental Oxygen (L/min / FiO2): 6  Independent Airway: airway patency satisfactory and stable  Dentition: dentition unchanged      Patient transferred to: PACU  Comments: At end of procedure, spontaneous respirations, patient alert to voice, able to follow commands. Oxygen via facemask at 6 liters per minute to PACU. Oxygen tubing connected to wall O2 in PACU, SpO2, NiBP, and EKG monitors and alarms on and functioning, Pranav Hugger warmer connected to patient gown, report on patient's clinical status given to PACU RN, RN questions answered.  Handoff Report: Identifed the Patient, Identified the Reponsible Provider, Reviewed the pertinent medical history, Discussed the surgical course, Reviewed Intra-OP anesthesia mangement and issues during anesthesia, Set expectations for post-procedure period and Allowed opportunity for questions and acknowledgement of understanding      Vitals:  Vitals Value Taken Time   /74 09/13/21 1312   Temp 36.6  C (97.9  F) 09/13/21 1300   Pulse 70 09/13/21 1312   Resp 8 09/13/21 1312   SpO2 100 % 09/13/21 1312   Vitals shown include unvalidated device data.    Electronically Signed By: MATTHEW Quijano CRNA  September 13, 2021  1:47 PM

## 2021-09-13 NOTE — ANESTHESIA POSTPROCEDURE EVALUATION
Patient: Carol Guillaume    Procedure(s):  DILATION AND CURETTAGE,  MIRENA INTRAUTERINE DEVICE EXCHANGE    Diagnosis:Complex atypical endometrial hyperplasia [N85.02]  Diagnosis Additional Information: No value filed.    Anesthesia Type:  MAC    Note:     Postop Pain Control: Uneventful            Sign Out: Well controlled pain   PONV: No   Neuro/Psych: Uneventful            Sign Out: Acceptable/Baseline neuro status   Airway/Respiratory: Uneventful            Sign Out: Acceptable/Baseline resp. status   CV/Hemodynamics: Uneventful            Sign Out: Acceptable CV status; No obvious hypovolemia; No obvious fluid overload   Other NRE: NONE   DID A NON-ROUTINE EVENT OCCUR? No           Last vitals:  Vitals Value Taken Time   /74 09/13/21 1312   Temp 36.6  C (97.9  F) 09/13/21 1300   Pulse 70 09/13/21 1312   Resp 8 09/13/21 1312   SpO2 100 % 09/13/21 1312   Vitals shown include unvalidated device data.    Electronically Signed By: Kelvin Brantley MD  September 13, 2021  6:44 PM

## 2021-09-13 NOTE — DISCHARGE INSTRUCTIONS
Same Day Surgery Discharge Instructions for  Sedation and General Anesthesia       It's not unusual to feel dizzy, light-headed or faint for up to 24 hours after surgery or while taking pain medication.  If you have these symptoms: sit for a few minutes before standing and have someone assist you when you get up to walk or use the bathroom.      You should rest and relax for the next 24 hours. We recommend you make arrangements to have an adult stay with you for at least 24 hours after your discharge.  Avoid hazardous and strenuous activity.      DO NOT DRIVE any vehicle or operate mechanical equipment for 24 hours following the end of your surgery.  Even though you may feel normal, your reactions may be affected by the medication you have received.      Do not drink alcoholic beverages for 24 hours following surgery.       Slowly progress to your regular diet as you feel able. It's not unusual to feel nauseated and/or vomit after receiving anesthesia.  If you develop these symptoms, drink clear liquids (apple juice, ginger ale, broth, 7-up, etc. ) until you feel better.  If your nausea and vomiting persists for 24 hours, please notify your surgeon.        All narcotic pain medications, along with inactivity and anesthesia, can cause constipation. Drinking plenty of liquids and increasing fiber intake will help.      For any questions of a medical nature, call your surgeon.      Do not make important decisions for 24 hours.      If you had general anesthesia, you may have a sore throat for a couple of days related to the breathing tube used during surgery.  You may use Cepacol lozenges to help with this discomfort.  If it worsens or if you develop a fever, contact your surgeon.       If you feel your pain is not well managed with the pain medications prescribed by your surgeon, please contact your surgeon's office to let them know so they can address your concerns.       CoVid 19 Information    We want to give you  information regarding Covid. Please consult your primary care provider with any questions you might have.     Patient who have symptoms (cough, fever, or shortness of breath), need to isolate for 7 days from when symptoms started OR 72 hours after fever resolves (without fever reducing medications) AND improvement of respiratory symptoms (whichever is longer).      Isolate yourself at home (in own room/own bathroom if possible)    Do Not allow any visitors    Do Not go to work or school    Do Not go to Rastafari,  centers, shopping, or other public places.    Do Not shake hands.    Avoid close and intimate contact with others (hugging, kissing).    Follow CDC recommendations for household cleaning of frequently touched services.     After the initial 7 days, continue to isolate yourself from household members as much as possible. To continue decrease the risk of community spread and exposure, you and any members of your household should limit activities in public for 14 days after starting home isolation.     You can reference the following CDC link for helpful home isolation/care tips:  https://www.cdc.gov/coronavirus/2019-ncov/downloads/10Things.pdf    Protect Others:    Cover Your Mouth and Nose with a mask, disposable tissue or wash cloth to avoid spreading germs to others.    Wash your hands and face frequently with soap and water    Call Your Primary Doctor If: Breathing difficulty develops or you become worse.    For more information about COVID19 and options for caring for yourself at home, please visit the CDC website at https://www.cdc.gov/coronavirus/2019-ncov/about/steps-when-sick.html  For more options for care at [facility], please visit our website at [email or web address]    **If you have questions or concerns about your procedure,   call Dr. Gallardo [phone]**

## 2021-09-13 NOTE — OP NOTE
Procedure Date: 09/13/2021    PREOPERATIVE DIAGNOSIS:  Complex atypical endometrial hyperplasia.    POSTOPERATIVE DIAGNOSIS:  Complex atypical endometrial hyperplasia.    SURGEON:  Lisbeth Gallardo MD    PROCEDURE:  Removal of a Mirena, dilatation and curettage of the endometrium, and replacement of Mirena IUD.    ANESTHESIA:  MAC with paracervical block.     INDICATIONS FOR PROCEDURE:  The patient is a 30-year-old who 06/19/2020 presented to the Emergency Room with complaints of heavy vaginal bleeding.  She was subsequently evaluated by Dr. Fiorella Cunningham.  Sonogram revealed multiple polyps less than 1 cm in size.  She underwent a hysteroscopy, D and C, MyoSure polypectomy by Dr. Kierra Tracy on 10/23/2020. Pathology revealed complex atypical hyperplasia with foci bordering on well-differentiated adenocarcinoma.  She was seen in consultation in my office on 11/06/2020.  I recommended conservative management with MRI to rule out myometrial invasion and it was negative, then to proceed with D and C and Mirena every 3 to 4 months to evaluate response.  MRI of the pelvis was unremarkable and showed an intact junctional zone.  On 11/30/2020, she underwent an EUA, hysteroscopy and Mirena placement.  On 03/05/2021, she underwent an EUA, hysteroscopy, ECC and Mirena exchange.  This resulted in uterine perforation and an infarcted epiploica, but ultimately successful Mirena placement.  Final pathology revealed small areas of residual complex endometrial hyperplasia with atypia, but was negative for malignancy.  The patient was subsequently monitored.  She underwent a pelvic ultrasound recently, which showed the IUD to be in good place, the uterus was 5.7 x 4.8 x 2.7 and the endometrial thickness was not well visualized, but appeared to be 6 mm where visualized.  She was brought to the operating room for surveillance D and C.    FINDINGS:  The patient had very little tissue in her endometrium.  The IUD was then placed  appropriately.    DESCRIPTION OF PROCEDURE:  The patient was taken to the operating room.  She was placed in a supine position.  MAC anesthesia was utilized.  Once she was sedated, she was repositioned in high lithotomy position.  She was prepped and draped in the usual sterile fashion.  A timeout was conducted and everyone agreed upon the procedure.      I inserted a Graves speculum and easily visualized the cervix.  The cervix was grasped at 12 o'clock with a single tooth tenaculum.  I injected 10 mL of 0.5% Marcaine with epinephrine at 3 and 9 o'clock, performing a paracervical block.  I then sounded the uterus to approximately 9 cm.  I easily dilated the endocervix.  I placed Telfa along the posterior blade of the speculum and then did a serial curetting with a small curette in all areas of the endometrium.  There was very little tissue obtained.  The Telfa was removed and sent for pathologic evaluation, being endometrial curettings.  I then inserted a Mirena IUD to approximately 9 cm and discharged into the uterine cavity.  The string was cut approximately 3 cm below the external left os.  These instruments were removed from her vagina.  Her anesthesia was reversed and she was taken to recovery room in stable condition.    ESTIMATED BLOOD LOSS:  5 mL    Lisbeth Gallardo MD        D: 2021   T: 2021   MT: MARCY    Name:     KATHRYN PEÑALOZA  MRN:      [id number]        Account:        [id number]   :      1990           Procedure Date: 2021     Document: [id number]    cc:  MD Danny Moralez MD

## 2021-09-13 NOTE — ANESTHESIA PREPROCEDURE EVALUATION
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     Depression      Fatigue      High cholesterol      Hypertension      Insomnia      Insulin resistance      Legally blind      Obesity      PCOS (polycystic ovarian syndrome)      PPD positive, treated     9 months of INH     Pseudotumor cerebri     on Diamox     Retinitis pigmentosa      S/P kidney transplant      Senior-Loken syndrome      Uncomplicated asthma     as a child     Viremia       Past Surgical History:   Procedure Laterality Date     cataract bilateral  2017     DILATION AND CURETTAGE, OPERATIVE HYSTEROSCOPY WITH MORCELLATOR, COMBINED N/A 10/23/2020    Procedure: HYSTEROSCOPY, DILATION AND CURRETAGE, MYOSURE;  Surgeon: Kierra Tracy MD;  Location:  OR     DILATION AND CURETTAGE, OPERATIVE HYSTEROSCOPY WITH MORCELLATOR, COMBINED N/A 2021    Procedure: HYSTEROSCOPY, WITH DILATION AND CURETTAGE OF UTERUS USING  MORCELLATOR;  Surgeon: Fiorella Cunningham MD;  Location:  OR     INSERT INTRAUTERINE DEVICE N/A 2021    Procedure: INSERTION MIRENA INTRAUTERINE DEVICE;  Surgeon: Fiorella Cunningham MD;  Location:  OR     IR RENAL BIOPSY RIGHT  2021     LAPAROSCOPY DIAGNOSTIC (GYN)  2021    Procedure: Laparoscopy diagnostic (gyn);  Surgeon: Fiorella Cunnignham MD;  Location:  OR     LITHOTRIPSY       LITHOTRIPSY       REMOVE INTRAUTERINE DEVICE N/A 2021    Procedure: REMOVE MIRENA INTRAUTERINE DEVICE;  Surgeon: Fiorella Cunningham MD;  Location:  OR     SINUS SURGERY       TONSILLECTOMY        TRANSPLANT KIDNEY RECIPIENT LIVING RELATED Right 07/2006     wisdom teeth        No Known Allergies   Social History     Tobacco Use     Smoking status: Never Smoker     Smokeless tobacco: Never Used   Substance Use Topics     Alcohol use: No      Wt Readings from Last 1 Encounters:   08/27/21 125.2 kg (276 lb)        Anesthesia Evaluation   Pt has had prior anesthetic.     History of anesthetic complications   medication concerns related to prior procedures.    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  Right ventricular function, chamber size, wall motion, and thickness are  normal.  No significant valvular pathology.  Stress Test: Date: Results:    ECG Reviewed: Date: Results:    Cath: Date: Results:   (-) CAD, CHF and arrhythmias   METS/Exercise Tolerance:     Hematologic:       Musculoskeletal:       GI/Hepatic:    (-) GERD and liver disease   Renal/Genitourinary: Comment: Senior-Loken syndrome    (+) renal disease, type: CRI, Pt has history of transplant,     Endo: Comment: BMI 45  On immunosuppression, no chronic steroids    (+) Obesity,  (-) Type I DM and Type II DM   Psychiatric/Substance Use:     (+) psychiatric history anxiety and depression     Infectious Disease:       Malignancy:   (+) Malignancy, History of Other.Other CA Endometrial intraepithelial neoplasia status post.    Other: Comment: PCOS           Physical Exam    Airway        Mallampati: II   TM distance: > 3 FB   Neck ROM: full   Mouth opening: > 3 cm    Respiratory Devices and Support         Dental  no notable dental history          Cardiovascular          Rhythm and rate: regular     Pulmonary           breath sounds clear to auscultation           OUTSIDE LABS:  CBC:   Lab Results   Component Value Date    WBC 7.7 09/03/2021    WBC 7.0 08/27/2021    HGB 13.2 09/03/2021    HGB 13.1 08/27/2021    HCT 39.7 09/03/2021    HCT 41.0 08/27/2021     09/03/2021     08/27/2021     BMP:   Lab Results   Component Value Date     09/09/2021     09/03/2021    POTASSIUM 4.3 09/09/2021    POTASSIUM 4.2 09/03/2021    CHLORIDE 104 09/09/2021    CHLORIDE 106 09/03/2021    CO2 24 09/09/2021    CO2 25 09/03/2021    BUN 32 (H) 09/09/2021    BUN 34 (H) 09/03/2021    CR 2.20 (H) 09/09/2021    CR 2.18 (H) 09/03/2021     (H) 09/09/2021     (H) 09/03/2021     COAGS:   Lab Results   Component Value Date    PTT 33 08/27/2021    INR 1.03 08/27/2021     POC:   Lab Results   Component Value Date     (H) 07/17/2007    HCG Negative 08/17/2020    HCGS Negative 07/17/2007     HEPATIC:   Lab Results   Component Value Date    ALBUMIN 3.4 09/03/2021    PROTTOTAL 7.4 12/13/2019    ALT 51 (H) 12/13/2019    AST 21 12/13/2019    ALKPHOS 92 12/13/2019    BILITOTAL 0.6 12/13/2019     OTHER:   Lab Results   Component Value Date    A1C 5.0 07/26/2013    LACHO 9.2 09/09/2021    PHOS 4.8 (H) 09/09/2021    MAG 1.8 01/07/2009    LIPASE 236 07/17/2007    AMYLASE 58 06/03/2008    TSH 1.85 08/16/2021    T4 1.16 07/26/2013    CRP <3.0 06/03/2008       Anesthesia Plan    ASA Status:  3      Anesthesia Type: MAC.              Consents    Anesthesia Plan(s) and associated risks, benefits, and realistic alternatives discussed. Questions answered and patient/representative(s) expressed understanding.     - Discussed with:  Patient         Postoperative Care    Pain management: Multi-modal analgesia.   PONV prophylaxis: Ondansetron (or other 5HT-3)     Comments:    Normal saline for IV fluid            Kelvin Brantley MD

## 2021-09-13 NOTE — PROGRESS NOTES
"[facility] received MyChart message from Carol regarding \"muscle shakes,\" presumed to be due to elevated phosphorus levels trending down since starting on Phoslo (calcium acetate). Her calcium and potassium levels are within normal limits. Will continue to monitor phosphorus levels with repeat level in 1-2 weeks for inquire with transplant nephrologist on need to increase Phoslo dose.     Linda Hoffmann RN, BSN  Solid Organ Transplant, [facility]  Transplant Care Coordinator  [phone]       "
Mag repleted   Recheck with AM labs

## 2021-09-17 PROCEDURE — 88305 TISSUE EXAM BY PATHOLOGIST: CPT | Mod: 26 | Performed by: PATHOLOGY

## 2021-09-17 PROCEDURE — 88342 IMHCHEM/IMCYTCHM 1ST ANTB: CPT | Mod: 26 | Performed by: PATHOLOGY

## 2021-09-18 ENCOUNTER — HEALTH MAINTENANCE LETTER (OUTPATIENT)
Age: 31
End: 2021-09-18

## 2021-09-27 ENCOUNTER — TELEPHONE (OUTPATIENT)
Dept: TRANSPLANT | Facility: CLINIC | Age: 31
End: 2021-09-27

## 2021-09-30 ENCOUNTER — VIRTUAL VISIT (OUTPATIENT)
Dept: NEPHROLOGY | Facility: CLINIC | Age: 31
End: 2021-09-30
Attending: INTERNAL MEDICINE
Payer: COMMERCIAL

## 2021-09-30 VITALS — WEIGHT: 172 LBS | BODY MASS INDEX: 27.76 KG/M2

## 2021-09-30 DIAGNOSIS — I15.1 HTN, KIDNEY TRANSPLANT RELATED: ICD-10-CM

## 2021-09-30 DIAGNOSIS — D84.9 IMMUNOSUPPRESSION (H): ICD-10-CM

## 2021-09-30 DIAGNOSIS — R80.1 PERSISTENT PROTEINURIA: Primary | ICD-10-CM

## 2021-09-30 DIAGNOSIS — Z48.298 AFTERCARE FOLLOWING ORGAN TRANSPLANT: ICD-10-CM

## 2021-09-30 DIAGNOSIS — Z94.0 HTN, KIDNEY TRANSPLANT RELATED: ICD-10-CM

## 2021-09-30 PROCEDURE — 99214 OFFICE O/P EST MOD 30 MIN: CPT | Mod: 95 | Performed by: INTERNAL MEDICINE

## 2021-09-30 ASSESSMENT — PAIN SCALES - GENERAL: PAINLEVEL: MODERATE PAIN (4)

## 2021-09-30 NOTE — LETTER
9/30/2021       RE: Carol Guillaume  [address]  [facility] [address]     Dear Colleague,    Thank you for referring your patient, Carol Guillaume, to the [facility] [place] at [facility]. Please see a copy of my visit note below.    Carol is a 31 year old who is being evaluated via a billable video visit.       How would you like to obtain your AVS? MyChart  If the video visit is dropped, the invitation should be resent by: Text to cell phone: [phone]  Will anyone else be joining your video visit? No      Video Start Time:4:37  Video-Visit Details    Type of service:  Video Visit    Video End Time:4:56    Originating Location (pt. Location): Home    Distant Location (provider location):  [facility] [place]     Platform used for Video Visit: Vijay    Called patient to update about kidney bx results:    Preliminary kidney biopsy results:  Ordered for worsening Cr (note uptrend in Cr 1.1-1.3 to 1.7 in Aug 2020 now up to 2.2 and worsening proteinuria 2.7 g/g this year)  Chronic active ABMR t3,pvc2,cg3,c4d3+ 20% IFTA; no DSA sent  Current IS CSA 75/75 /540        Of note, patient has been off MPA since Feb-->Aug just started last Mon which likely contributed to cABMR. limited options to up-titrate IS further or switch to tacrolimus given ongoing uterine cancer Tx and the plan for salvage Tx. 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  Unremarkable.   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  Clinical correlation is recommended.    # Immunosuppression: Cyclosporine (goal ) and Mycophenolic acid (dose 540 mg every 12 hours)   - Changes: Not at this time of note she has been off MPA since Feb and was resumed in Aug after kidney Tx bx showed chronic active BMR & DSA with high titer class II DSA    # Infection Prophylaxis:   - PJP: None    # Hypertension: Borderline control;  Goal BP: < 130/80   - Changes: no, monitor home BP with orthostatic given symptoms, losartan just added to control proteinuria & BP, could uptitrate /stop amlodipine if persistent proteinuria on next check, Cr/K stable    # Mineral Bone Disorder:   - Vitamin D; level: Low        On supplement: Yes  - Calcium; level: Normal        On supplement: No    # Obesity: Stable weight.   - Recommend weight loss for overall health by increasing exercise and watching caloric intake.    # Skin Cancer Risk:    - Discussed sun protection and recommend regular follow up with Dermatology.    # Medical Compliance: Yes     # COVID-19 Virus Review: Discussed COVID-19 virus and the potential medical risks.  Reviewed preventative health recommendations, which includes washing hands for 20 seconds, avoid touching your face, and social distancing.  Asked patient to inform the transplant center if they are exposed or diagnosed with this  virus.    ## COVID-19 Virus Review: Discussed COVID-19 virus and the potential medical risks.  Reviewed preventative health recommendations, including wearing a mask where appropriate.  Recommended COVID vaccination should be up to date with either an initial vaccination or booster shot when appropriate.  Asked the patient to inform the transplant center if they are exposed or diagnosed with this virus.    # COVID Vaccination Up To Date: no counseled but reluctant        # Transplant History:  Etiology of Kidney Failure: Senior-Loken Syndrome  Tx: LDKT  Transplant: 7/18/2006 (Kidney)  Donor Type: Living Donor Class: Standard Criteria Donor  Significant changes in immunosuppression: Generic mycophenolate mofetil caused chest pain and heart palpitations  Significant transplant-related complications: None    Transplant Office Phone Number: [phone]    Assessment and plan was discussed with the patient and she voiced her understanding and agreement.    Return visit: Return in about 3 months (around 12/30/2021).    Feliberto Littlejohn MD    Chief Complaint   Ms. Guillaume is a 31 year old here for kidney transplant and immunosuppression management.    History of Present Illness   Ms. 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 She was restarted on  mg po bid & losartan was added to control BP & proteinuria.  She notes chronic fatigue but no fevers, chills, night sweats no weight changes. She also notes chronic sob on exertion, occasional palpitations, no chest pain. She  has been evaluated by cards in the past,echo in 2020 ws unremarkable. She has some acid reflux at night but prefers to stay off meds for this and works on dietary modification. BP better controlled, no leg swelling. She has been reluctant to get the covid vaccine. 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    Current IS CSA 75/75 /540    Home BP: 130/90s    Review of Systems   A comprehensive review of systems was obtained and negative, except as noted in the HPI or PMH.    Problem List   Patient Active Problem List   Diagnosis     Kidney replaced by transplant     Senior-Loken syndrome     Pseudotumor cerebri     Legally blind     Retinitis pigmentosa     Vitamin D deficiency     Morbid obesity (H)     Insulin resistance     PCOS (polycystic ovarian syndrome)     Cataract     Palpitations     Dyslipidemia     HTN, kidney transplant related     Aftercare following organ transplant     Immunosuppression (H)     EIN (endometrial intraepithelial neoplasia)     Chronic kidney disease, stage 3 (H)       Social History   Social History     Tobacco Use     Smoking status: Never Smoker     Smokeless tobacco: Never Used   Vaping Use     Vaping Use: Never used   Substance Use Topics     Alcohol use: No     Drug use: No       Allergies   No Known Allergies    Medications   Current Outpatient Medications   Medication Sig     acetaminophen (TYLENOL) 325 MG tablet Take 2 tablets (650 mg) by mouth every 4 hours as needed for mild pain     amLODIPine (NORVASC) 5 MG tablet Take 1 tablet (5 mg) by mouth daily     carvedilol (COREG) 25 MG tablet Take 1 tablet (25 mg) by mouth 2 times daily (with meals)     levonorgestrel (MIRENA) 20 MCG/24HR IUD 1 each by Intrauterine route once     losartan (COZAAR) 25 MG tablet Take 1 tablet (25 mg) by mouth daily     mycophenolic acid (GENERIC EQUIVALENT) 180 MG EC tablet Take 3 tablets (540 mg) by mouth 2 times daily     NEORAL (BRAND) 25 MG capsule Take 3 capsules (75 mg) by mouth  every morning AND 2 capsules (50 mg) every evening.     Prenatal Vit-Fe Fumarate-FA (PRENATAL PO) Take by mouth daily     VITAMIN D, CHOLECALCIFEROL, PO Take 1,000 Units by mouth daily      No current facility-administered medications for this visit.     Medications Discontinued During This Encounter   Medication Reason     ibuprofen (ADVIL/MOTRIN) 600 MG tablet      senna (SENOKOT) 8.6 MG tablet      ELDERBERRY PO        Physical Exam   Vital Signs: Wt 78 kg (172 lb)   BMI 27.76 kg/m    Within normal limits for video visit no acute distress no visible facial rashes normal speech and mentation.    Data     Renal Latest Ref Rng & Units 9/13/2021 9/9/2021 9/3/2021   Na 133 - 144 mmol/L - 137 139   Na (external) 135 - 145 mmol/L - - -   K 3.4 - 5.3 mmol/L 4.8 4.3 4.2   K (external) 3.5 - 5.0 mmol/L - - -   Cl 94 - 109 mmol/L - 104 106   Cl (external) 98 - 110 mmol/L - - -   CO2 20 - 32 mmol/L - 24 25   CO2 (external) 21 - 31 mmol/L - - -   BUN 7 - 30 mg/dL - 32(H) 34(H)   BUN (external) 8 - 25 mg/dL - - -   Cr 0.52 - 1.04 mg/dL - 2.20(H) 2.18(H)   Cr (external) 0.57 - 1.11 mg/dL - - -   Glucose 70 - 99 mg/dL - 110(H) 109(H)   Glucose (external) 65 - 100 mg/dL - - -   Ca  8.5 - 10.1 mg/dL - 9.2 9.3   Ca (external) 8.5 - 10.5 mg/dL - - -   Mg 1.6 - 2.3 mg/dL - - -     Bone Health Latest Ref Rng & Units 9/9/2021 9/3/2021 12/13/2019   Phos 2.5 - 4.5 mg/dL 4.8(H) 5.3(H) -   PTHi 18 - 80 pg/mL - - 36   Vit D Def 20 - 75 ug/L - - 26     Heme Latest Ref Rng & Units 9/3/2021 8/27/2021 8/16/2021   WBC 4.0 - 11.0 10e3/uL 7.7 7.0 8.5   WBC (external) 4.5 - 11.0 thou/cu mm - - -   Hgb 11.7 - 15.7 g/dL 13.2 13.1 13.0   Hgb (external) 12.0 - 16.0 g/dL - - -   Plt 150 - 450 10e3/uL 225 243 228   Plt (external) 140 - 440 thou/cu mm - - -   ABSOLUTE NEUTROPHIL 1.6 - 8.3 10e9/L - - -   ABSOLUTE LYMPHOCYTES 0.8 - 5.3 10e9/L - - -   ABSOLUTE MONOCYTES 0.0 - 1.3 10e9/L - - -   ABSOLUTE EOSINOPHILS 0.0 - 0.7 10e9/L - - -   ABSOLUTE  BASOPHILS 0.0 - 0.2 10e9/L - - -   ABS IMMATURE GRANULOCYTES 0 - 0.4 10e9/L - - -   ABSOLUTE NUCLEATED RBC - - - -     Liver Latest Ref Rng & Units 9/3/2021 12/13/2019 10/22/2018   AP 40 - 150 U/L - 92 72   TBili 0.2 - 1.3 mg/dL - 0.6 0.6   ALT 0 - 50 U/L - 51(H) 34   AST 0 - 45 U/L - 21 26   Tot Protein 6.8 - 8.8 g/dL - 7.4 7.3   Albumin 3.4 - 5.0 g/dL 3.4 3.5 3.6     Pancreas Latest Ref Rng & Units 7/26/2013 7/27/2012 6/3/2008   A1C 4.3 - 6.0 % 5.0 5.3 -   Amylase 30 - 110 U/L - - 58   Lipase 20 - 250 U/L - - -     Iron studies Latest Ref Rng & Units 1/30/2018 7/11/2006 6/26/2006   Iron 35 - 180 ug/dL 55 56 75   Iron sat 15 - 46 % 19 - -   Ferritin 12 - 150 ng/mL 19 473(H) 914(H)     [facility] Txp Virology Latest Ref Rng & Units 1/31/2017 8/29/2011 7/21/2011   CMV IgG EU/mL - - -   CMV IgM <0.90 - - -   CMV IgM Interp <0.90 - - -   CVM DNA Quant - Plasma, EDTA anticoagulant Whole blood, EDTA anticoagulant Whole blood, EDTA anticoagulant   CMV Quant <100 Copies/mL - <100  No CMV DNA detected. <100  No CMV DNA detected.   CMV QT Log <2.0 Log copies/mL - <2.0  The Cytomegalovirus DNA Quantitation assay is a real-time polymerase chain   reaction (PCR) utilizing analyte specific reagents manufactured by [employer]   [facility]. Analyte Specific Reagents (ASRs) are used in many laboratory   tests necessary for standard medical care and generally do not require FDA   approval.   This test was developed and its performance characteristics determined by   [facility].  It has not been   cleared or approved by the US Food and Drug Administration. <2.0  The Cytomegalovirus DNA Quantitation assay is a real-time polymerase chain   reaction (PCR) utilizing analyte specific reagents manufactured by [facility]   [facility]. Analyte Specific Reagents (ASRs) are used in many laboratory   tests necessary for standard medical care and generally do not require FDA   approval.   This test was  developed and its performance characteristics determined by   [facility].  It has not been   cleared or approved by the US Food and Drug Administration.   CMV QUANT IU/ML CMVND [IU]/mL CMV DNA Not Detected   The BARBI AmpliPrep/BARBI TaqMan CMV Test is an FDA-approved in vitro nucleic   acid amplification test for the quantitation of cytomegalovirus DNA in human   plasma (EDTA plasma) using the [employer]BR[employer] AmpliPrep Instrument for automated viral   nucleic acid extraction and the [employer]BI TaqMan Analyzer or BARBI TaqMan for   automated Real Time amplification and detection of the viral nucleic acid   target.   Titer results are reported in International Units/mL (IU/mL using 1st WHO   International standard for Human Cytomegalovirus for Nucleic Acid Amplification   based assays. The conversion factor between CMV DNA copis/mL (as defined by the   Roche BARBI TaqMan CMV test) and International Units is the CMV DNA   concentration in IU/mL x 1.1 copies/IU = CMV DNA in copies/mL.   This assay has received FDA approval for the testing of human plasma only. The   [facility] Laboratory at the [facility], [place], has validated the pe  rformance characteristics of the Roche   CMV assay for plasma, bronchial alveolar lavage/wash and urine.   - -   LOG IU/ML OF CMVQNT <2.1 [Log:IU]/mL Not Calculated - -   BK Spec - - - -   BK Res <1000 copies/mL - - -   BK Log <3.0 Log copies/mL - - -   EBV IgG - - - -   EBV DNA COPIES/ML EBVNEG [Copies]/mL EBV DNA Not Detected - -   EBV DNA LOG OF COPIES <2.7 [Log:copies]/mL Not Calculated   The Real-Time quantitative EBV assay was developed and its performance   characteristics determined by the [facility] Laboratory at   the [facility] in [place], [place].  The   primers and probes are Analyte Specific Reagents (ASRs) manufactured  by   [employer].   ASRs  are used in many laboratory tests necessary for standard medical care and   generally do not require U.S. Food and Drug Administration approval.  The FDA   has determined that such clearance or approval is not necessary.  This test is   used for clinical purposes.  It should not be regarded as investigational or   research.   This laboratory is certified under the Clinical Laboratory Improvement   Amendments of 1988 (CLIA-88) as qualified to perform high complexity clinical   laboratory testing.   The quantitative range of this assay is 500-22,500,00 copies/mL (2.7-7.4 log   copies/mL).  A negative result does not rule out the presence of PCR inhibitors     in the patient specimen or EBV DNA nucleic acid in concentrations below the   level of detection of the assay.  Inhibition may also lead to underestimation   of viral quantitation.   - -   Hep B Core NEG - - -   Hep B Surf 0.0 - 4.9 mIU/mL - - -   HIV 1&2 NEG - - -               Again, thank you for allowing me to participate in the care of your patient.      Sincerely,    Feliberto Littlejohn MD

## 2021-09-30 NOTE — PROGRESS NOTES
Carol is a 31 year old who is being evaluated via a billable video visit.       How would you like to obtain your AVS? [employer]  If the video visit is dropped, the invitation should be resent by: Text to cell phone: [phone]  Will anyone else be joining your video visit? No      Video Start Time:4:37  Video-Visit Details    Type of service:  Video Visit    Video End Time:4:56    Originating Location (pt. Location): Home    Distant Location (provider location):  [facility] [place]     Platform used for Video Visit: Vijay    Called patient to update about kidney bx results:    Preliminary kidney biopsy results:  Ordered for worsening Cr (note uptrend in Cr 1.1-1.3 to 1.7 in Aug 2020 now up to 2.2 and worsening proteinuria 2.7 g/g this year)  Chronic active ABMR t3,pvc2,cg3,c4d3+ 20% IFTA; no DSA sent  Current IS CSA 75/75 /540        Of note, patient has been off MPA since Feb-->Aug just started last Mon which likely contributed to cABMR. limited options to up-titrate IS further or switch to tacrolimus given ongoing uterine cancer Tx and the plan for salvage Tx. 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  Unremarkable.   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  Clinical correlation is recommended.    # Immunosuppression: Cyclosporine (goal ) and Mycophenolic acid (dose 540 mg every 12 hours)   - Changes: Not at this time of note she has been off MPA since Feb and was resumed in Aug after kidney Tx bx showed chronic active BMR & DSA with high titer class II DSA    # Infection Prophylaxis:   - PJP: None    # Hypertension: Borderline control;  Goal BP: < 130/80   - Changes: no, monitor home BP with orthostatic given symptoms, losartan just added to control proteinuria & BP, could uptitrate /stop amlodipine if persistent proteinuria on next check, Cr/K stable    # Mineral Bone Disorder:   - Vitamin D; level: Low        On supplement: Yes  - Calcium; level: Normal        On supplement: No    # Obesity: Stable weight.   - Recommend weight loss for overall health by increasing exercise and watching caloric intake.    # Skin Cancer Risk:    - Discussed sun protection and recommend regular follow up with Dermatology.    # Medical Compliance: Yes     # COVID-19 Virus Review: Discussed COVID-19 virus and the potential medical risks.  Reviewed preventative health recommendations, which includes washing hands for 20 seconds, avoid touching your face, and social distancing.  Asked patient to inform the transplant center if they are exposed or diagnosed with this virus.    ## COVID-19 Virus Review: Discussed COVID-19 virus and the potential medical risks.  Reviewed preventative health recommendations, including wearing a mask where appropriate.  Recommended COVID vaccination should be up to date with either an initial vaccination or booster shot when appropriate.  Asked the  patient to inform the transplant center if they are exposed or diagnosed with this virus.    # COVID Vaccination Up To Date: no counseled but reluctant        # Transplant History:  Etiology of Kidney Failure: Senior-Loken Syndrome  Tx: LDKT  Transplant: 7/18/2006 (Kidney)  Donor Type: Living Donor Class: Standard Criteria Donor  Significant changes in immunosuppression: Generic mycophenolate mofetil caused chest pain and heart palpitations  Significant transplant-related complications: None    Transplant Office Phone Number: [phone]    Assessment and plan was discussed with the patient and she voiced her understanding and agreement.    Return visit: Return in about 3 months (around 12/30/2021).    Feliberto Littlejohn MD    Chief Complaint   Ms. Guillaume is a 31 year old here for kidney transplant and immunosuppression management.    History of Present Illness   Ms. 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 She was restarted on  mg po bid & losartan was added to control BP & proteinuria.  She notes chronic fatigue but no fevers, chills, night sweats no weight changes. She also notes chronic sob on exertion, occasional palpitations, no chest pain. She has been evaluated by cards in the past,echo in 2020 ws unremarkable. She has some acid reflux at night but prefers to stay off meds for this and works on dietary modification. BP better controlled, no leg swelling. She has been reluctant to get the covid vaccine. 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    Current IS CSA 75/75 /540    Home BP: 130/90s    Review of Systems   A comprehensive review of systems was obtained and negative, except as noted in the HPI or PMH.    Problem List   Patient Active Problem List   Diagnosis     Kidney replaced by transplant     Senior-Loken syndrome     Pseudotumor cerebri     Legally blind     Retinitis pigmentosa     Vitamin D deficiency     Morbid obesity (H)     Insulin resistance     PCOS (polycystic ovarian syndrome)     Cataract     Palpitations     Dyslipidemia     HTN, kidney transplant related     Aftercare following organ transplant     Immunosuppression (H)     EIN (endometrial intraepithelial neoplasia)     Chronic kidney disease, stage 3 (H)       Social History   Social History     Tobacco Use     Smoking status: Never Smoker     Smokeless tobacco: Never Used   Vaping Use     Vaping Use: Never used   Substance Use Topics     Alcohol use: No     Drug use: No       Allergies   No Known Allergies    Medications   Current Outpatient Medications   Medication Sig     acetaminophen (TYLENOL) 325 MG tablet Take 2 tablets (650 mg) by mouth every 4 hours as needed for mild pain     amLODIPine (NORVASC) 5 MG tablet Take 1 tablet (5 mg) by mouth daily     carvedilol (COREG) 25 MG tablet Take 1 tablet (25 mg) by mouth 2 times daily (with meals)     levonorgestrel (MIRENA) 20 MCG/24HR IUD 1 each by Intrauterine route once     losartan (COZAAR) 25 MG tablet Take 1 tablet (25 mg) by mouth daily     mycophenolic acid (GENERIC EQUIVALENT) 180 MG EC tablet Take 3 tablets (540 mg) by mouth 2 times daily     NEORAL (BRAND) 25 MG capsule Take 3 capsules (75 mg) by mouth every morning AND 2 capsules (50 mg) every evening.     Prenatal Vit-Fe Fumarate-FA (PRENATAL PO) Take by mouth daily     VITAMIN D, CHOLECALCIFEROL, PO Take 1,000 Units by mouth daily      No current facility-administered medications for this visit.     Medications Discontinued During This Encounter    Medication Reason     ibuprofen (ADVIL/MOTRIN) 600 MG tablet      senna (SENOKOT) 8.6 MG tablet      ELDERBERRY PO        Physical Exam   Vital Signs: Wt 78 kg (172 lb)   BMI 27.76 kg/m    Within normal limits for video visit no acute distress no visible facial rashes normal speech and mentation.    Data     Renal Latest Ref Rng & Units 9/13/2021 9/9/2021 9/3/2021   Na 133 - 144 mmol/L - 137 139   Na (external) 135 - 145 mmol/L - - -   K 3.4 - 5.3 mmol/L 4.8 4.3 4.2   K (external) 3.5 - 5.0 mmol/L - - -   Cl 94 - 109 mmol/L - 104 106   Cl (external) 98 - 110 mmol/L - - -   CO2 20 - 32 mmol/L - 24 25   CO2 (external) 21 - 31 mmol/L - - -   BUN 7 - 30 mg/dL - 32(H) 34(H)   BUN (external) 8 - 25 mg/dL - - -   Cr 0.52 - 1.04 mg/dL - 2.20(H) 2.18(H)   Cr (external) 0.57 - 1.11 mg/dL - - -   Glucose 70 - 99 mg/dL - 110(H) 109(H)   Glucose (external) 65 - 100 mg/dL - - -   Ca  8.5 - 10.1 mg/dL - 9.2 9.3   Ca (external) 8.5 - 10.5 mg/dL - - -   Mg 1.6 - 2.3 mg/dL - - -     Bone Health Latest Ref Rng & Units 9/9/2021 9/3/2021 12/13/2019   Phos 2.5 - 4.5 mg/dL 4.8(H) 5.3(H) -   PTHi 18 - 80 pg/mL - - 36   Vit D Def 20 - 75 ug/L - - 26     Heme Latest Ref Rng & Units 9/3/2021 8/27/2021 8/16/2021   WBC 4.0 - 11.0 10e3/uL 7.7 7.0 8.5   WBC (external) 4.5 - 11.0 thou/cu mm - - -   Hgb 11.7 - 15.7 g/dL 13.2 13.1 13.0   Hgb (external) 12.0 - 16.0 g/dL - - -   Plt 150 - 450 10e3/uL 225 243 228   Plt (external) 140 - 440 thou/cu mm - - -   ABSOLUTE NEUTROPHIL 1.6 - 8.3 10e9/L - - -   ABSOLUTE LYMPHOCYTES 0.8 - 5.3 10e9/L - - -   ABSOLUTE MONOCYTES 0.0 - 1.3 10e9/L - - -   ABSOLUTE EOSINOPHILS 0.0 - 0.7 10e9/L - - -   ABSOLUTE BASOPHILS 0.0 - 0.2 10e9/L - - -   ABS IMMATURE GRANULOCYTES 0 - 0.4 10e9/L - - -   ABSOLUTE NUCLEATED RBC - - - -     Liver Latest Ref Rng & Units 9/3/2021 12/13/2019 10/22/2018   AP 40 - 150 U/L - 92 72   TBili 0.2 - 1.3 mg/dL - 0.6 0.6   ALT 0 - 50 U/L - 51(H) 34   AST 0 - 45 U/L - 21 26   Tot Protein 6.8  - 8.8 g/dL - 7.4 7.3   Albumin 3.4 - 5.0 g/dL 3.4 3.5 3.6     Pancreas Latest Ref Rng & Units 7/26/2013 7/27/2012 6/3/2008   A1C 4.3 - 6.0 % 5.0 5.3 -   Amylase 30 - 110 U/L - - 58   Lipase 20 - 250 U/L - - -     Iron studies Latest Ref Rng & Units 1/30/2018 7/11/2006 6/26/2006   Iron 35 - 180 ug/dL 55 56 75   Iron sat 15 - 46 % 19 - -   Ferritin 12 - 150 ng/mL 19 473(H) 914(H)     [facility] Txp Virology Latest Ref Rng & Units 1/31/2017 8/29/2011 7/21/2011   CMV IgG EU/mL - - -   CMV IgM <0.90 - - -   CMV IgM Interp <0.90 - - -   CVM DNA Quant - Plasma, EDTA anticoagulant Whole blood, EDTA anticoagulant Whole blood, EDTA anticoagulant   CMV Quant <100 Copies/mL - <100  No CMV DNA detected. <100  No CMV DNA detected.   CMV QT Log <2.0 Log copies/mL - <2.0  The Cytomegalovirus DNA Quantitation assay is a real-time polymerase chain   reaction (PCR) utilizing analyte specific reagents manufactured by [employer]   [facility]. Analyte Specific Reagents (ASRs) are used in many laboratory   tests necessary for standard medical care and generally do not require FDA   approval.   This test was developed and its performance characteristics determined by   [facility].  It has not been   cleared or approved by the US Food and Drug Administration. <2.0  The Cytomegalovirus DNA Quantitation assay is a real-time polymerase chain   reaction (PCR) utilizing analyte specific reagents manufactured by [employer]   [facility]. Analyte Specific Reagents (ASRs) are used in many laboratory   tests necessary for standard medical care and generally do not require FDA   approval.   This test was developed and its performance characteristics determined by   [facility].  It has not been   cleared or approved by the US Food and Drug Administration.   CMV QUANT IU/ML CMVND [IU]/mL CMV DNA Not Detected   The BRABI AmpliPrep/BARBI TaqMan CMV Test is an FDA-approved in  vitro nucleic   acid amplification test for the quantitation of cytomegalovirus DNA in human   plasma (EDTA plasma) using the BARBI AmpliPrep Instrument for automated viral   nucleic acid extraction and the BARBI TaqMan Analyzer or BARBI TaqMan for   automated Real Time amplification and detection of the viral nucleic acid   target.   Titer results are reported in International Units/mL (IU/mL using 1st WHO   International standard for Human Cytomegalovirus for Nucleic Acid Amplification   based assays. The conversion factor between CMV DNA copis/mL (as defined by the   Roche BARBI TaqMan CMV test) and International Units is the CMV DNA   concentration in IU/mL x 1.1 copies/IU = CMV DNA in copies/mL.   This assay has received FDA approval for the testing of human plasma only. The   Infectious Disease Diagnostic Laboratory at the [facility], [place], has validated the pe  rformance characteristics of the Roche   CMV assay for plasma, bronchial alveolar lavage/wash and urine.   - -   LOG IU/ML OF CMVQNT <2.1 [Log:IU]/mL Not Calculated - -   BK Spec - - - -   BK Res <1000 copies/mL - - -   BK Log <3.0 Log copies/mL - - -   EBV IgG - - - -   EBV DNA COPIES/ML EBVNEG [Copies]/mL EBV DNA Not Detected - -   EBV DNA LOG OF COPIES <2.7 [Log:copies]/mL Not Calculated   The Real-Time quantitative EBV assay was developed and its performance   characteristics determined by the Infect[facility] Diseases Diagnostic Laboratory at   the [facility] in [place], [place].  The   primers and probes are Analyte Specific Reagents (ASRs) manufactured  by   [employer].   ASRs are used in many laboratory tests necessary for standard medical care and   generally do not require U.S. Food and Drug Administration approval.  The FDA   has determined that such clearance or approval is not necessary.  This test is   used for clinical purposes.  It should not be regarded as investigational or    research.   This laboratory is certified under the Clinical Laboratory Improvement   Amendments of 1988 (CLIA-88) as qualified to perform high complexity clinical   laboratory testing.   The quantitative range of this assay is 500-22,500,00 copies/mL (2.7-7.4 log   copies/mL).  A negative result does not rule out the presence of PCR inhibitors     in the patient specimen or EBV DNA nucleic acid in concentrations below the   level of detection of the assay.  Inhibition may also lead to underestimation   of viral quantitation.   - -   Hep B Core NEG - - -   Hep B Surf 0.0 - 4.9 mIU/mL - - -   HIV 1&2 NEG - - -

## 2021-10-01 NOTE — PATIENT INSTRUCTIONS
Monitor home BP closely with orthostatics over the next 2 weeks    If symptomatic hypotension, would reduce amlodipine dose

## 2021-10-19 ENCOUNTER — LAB (OUTPATIENT)
Dept: LAB | Facility: CLINIC | Age: 31
End: 2021-10-19
Payer: COMMERCIAL

## 2021-10-19 ENCOUNTER — TELEPHONE (OUTPATIENT)
Dept: TRANSPLANT | Facility: CLINIC | Age: 31
End: 2021-10-19

## 2021-10-19 DIAGNOSIS — Z79.899 ENCOUNTER FOR LONG-TERM CURRENT USE OF MEDICATION: ICD-10-CM

## 2021-10-19 DIAGNOSIS — Z48.298 AFTERCARE FOLLOWING ORGAN TRANSPLANT: ICD-10-CM

## 2021-10-19 DIAGNOSIS — Z94.0 KIDNEY REPLACED BY TRANSPLANT: ICD-10-CM

## 2021-10-19 DIAGNOSIS — Z48.298 AFTERCARE FOLLOWING ORGAN TRANSPLANT: Primary | ICD-10-CM

## 2021-10-19 LAB
ANION GAP SERPL CALCULATED.3IONS-SCNC: 5 MMOL/L (ref 3–14)
BUN SERPL-MCNC: 40 MG/DL (ref 7–30)
CALCIUM SERPL-MCNC: 9.1 MG/DL (ref 8.5–10.1)
CHLORIDE BLD-SCNC: 109 MMOL/L (ref 94–109)
CO2 SERPL-SCNC: 26 MMOL/L (ref 20–32)
CREAT SERPL-MCNC: 2.39 MG/DL (ref 0.52–1.04)
ERYTHROCYTE [DISTWIDTH] IN BLOOD BY AUTOMATED COUNT: 13.1 % (ref 10–15)
GFR SERPL CREATININE-BSD FRML MDRD: 26 ML/MIN/1.73M2
GLUCOSE BLD-MCNC: 105 MG/DL (ref 70–99)
HCT VFR BLD AUTO: 35.8 % (ref 35–47)
HGB BLD-MCNC: 11.8 G/DL (ref 11.7–15.7)
HOLD SPECIMEN: NORMAL
MCH RBC QN AUTO: 28.1 PG (ref 26.5–33)
MCHC RBC AUTO-ENTMCNC: 33 G/DL (ref 31.5–36.5)
MCV RBC AUTO: 85 FL (ref 78–100)
PLATELET # BLD AUTO: 232 10E3/UL (ref 150–450)
POTASSIUM BLD-SCNC: 4.3 MMOL/L (ref 3.4–5.3)
RBC # BLD AUTO: 4.2 10E6/UL (ref 3.8–5.2)
SODIUM SERPL-SCNC: 140 MMOL/L (ref 133–144)
WBC # BLD AUTO: 7.1 10E3/UL (ref 4–11)

## 2021-10-19 PROCEDURE — 80158 DRUG ASSAY CYCLOSPORINE: CPT | Mod: 90 | Performed by: PATHOLOGY

## 2021-10-19 PROCEDURE — 80048 BASIC METABOLIC PNL TOTAL CA: CPT | Performed by: PATHOLOGY

## 2021-10-19 PROCEDURE — 36415 COLL VENOUS BLD VENIPUNCTURE: CPT | Performed by: PATHOLOGY

## 2021-10-19 PROCEDURE — 85027 COMPLETE CBC AUTOMATED: CPT | Performed by: PATHOLOGY

## 2021-10-19 PROCEDURE — 80197 ASSAY OF TACROLIMUS: CPT | Mod: 90 | Performed by: PATHOLOGY

## 2021-10-19 NOTE — TELEPHONE ENCOUNTER
[facility] lab called needs updated lab orders in [facility] patient had labs done this morning.

## 2021-10-20 ENCOUNTER — TELEPHONE (OUTPATIENT)
Dept: TRANSPLANT | Facility: CLINIC | Age: 31
End: 2021-10-20

## 2021-10-20 DIAGNOSIS — Z48.298 AFTERCARE FOLLOWING ORGAN TRANSPLANT: ICD-10-CM

## 2021-10-20 DIAGNOSIS — Z94.0 KIDNEY REPLACED BY TRANSPLANT: Primary | ICD-10-CM

## 2021-10-20 DIAGNOSIS — Z79.899 ENCOUNTER FOR LONG-TERM CURRENT USE OF MEDICATION: ICD-10-CM

## 2021-10-20 LAB
CYCLOSPORINE BLD LC/MS/MS-MCNC: 68 UG/L (ref 50–400)
TACROLIMUS BLD-MCNC: <3 UG/L (ref 5–15)
TME LAST DOSE: ABNORMAL H
TME LAST DOSE: ABNORMAL H
TME LAST DOSE: NORMAL H
TME LAST DOSE: NORMAL H

## 2021-10-20 NOTE — TELEPHONE ENCOUNTER
ISSUE:  Undetectable Tac level, pt currently on CSA and MPA.    PLAN:   Contact drug analysis lab and request add on of CSA level, if not possible enter new order and contact patient to redraw CSA level in 1-2 weeks.  Ask lab if credit can be issued for Tac level done yesterday.  Update standing orders - change tac to CSA.    LPN TASK:  See above plan.

## 2021-10-21 ENCOUNTER — TELEPHONE (OUTPATIENT)
Dept: TRANSPLANT | Facility: CLINIC | Age: 31
End: 2021-10-21

## 2021-10-21 DIAGNOSIS — Z94.0 KIDNEY TRANSPLANTED: Primary | ICD-10-CM

## 2021-10-21 NOTE — TELEPHONE ENCOUNTER
This does not appear to be a good 12 hour trough.    LPN task:  Please call and repeat CSA level in 2 weeks. Make sure it is a good trough to avoid additional lab draws.

## 2021-10-21 NOTE — TELEPHONE ENCOUNTER
ISSUE:  Creatinine slightly elevated from baseline. Notified Dr. Littlejohn, received the following response:    Feliberto Littlejohn MD Duncanson, Sarah, RN  Ok chronic AMR and on losartan monitor in 2weeks     PLAN:   Order placed for creatinine recheck, [employer] message sent to patient informing to have lab done in two weeks to recheck creatinine, encouraged to message or call with questions.

## 2021-10-21 NOTE — LETTER
PHYSICIAN ORDERS      DATE & TIME ISSUED: 2021 12:51 PM  PATIENT NAME: Carol Guillaume   : 1990     [facility] MR# [if applicable]: [id number]     DIAGNOSIS:  Kidney transplant   ICD-10 CODE: Z94.0      Please repeat the following level in 1-2 weeks  Cyclosporine level (ensure 12 hours between last dose and blood draw)    Any questions please call: [phone] option [phone]    Please fax results to [phone].      Feliberto Littlejohn MD

## 2021-11-23 ENCOUNTER — TELEPHONE (OUTPATIENT)
Dept: TRANSPLANT | Facility: CLINIC | Age: 31
End: 2021-11-23
Payer: COMMERCIAL

## 2021-11-23 ENCOUNTER — TELEPHONE (OUTPATIENT)
Dept: FAMILY MEDICINE | Facility: CLINIC | Age: 31
End: 2021-11-23
Payer: COMMERCIAL

## 2021-11-23 DIAGNOSIS — Q87.89 SENIOR-LOKEN SYNDROME: Primary | ICD-10-CM

## 2021-11-23 DIAGNOSIS — H35.50 SENIOR-LOKEN SYNDROME: Primary | ICD-10-CM

## 2021-11-23 DIAGNOSIS — Q61.5 SENIOR-LOKEN SYNDROME: Primary | ICD-10-CM

## 2021-11-23 NOTE — LETTER
Carol Guillaume  [address] [place] [address] [facility] [address]  : 1990  MRN:  [id number]      2021          To whom it may concern:    Carol Guillaume decline to get the covid-19 vaccine due to her complex medical conditions, such as Senior-Loken syndrome.  Senior Loken syndrome (SLS) is a rare syndrome that mainly affects the kidneys and eyes.  Although it is recommended for patient to get the covid-19 vaccine, please excuse Carol for not getting the covid vaccine.  Allow Carol to wear a mask where it is appropriate.     Contact us if you have any questions or concerns.        Sincerely,      Danny Shi MD  Family Medicine

## 2021-11-23 NOTE — TELEPHONE ENCOUNTER
[facility] Call Center    Phone Message    May a detailed message be left on voicemail: yes     Reason for Call: Patient calling about a fax number for the paperwork to be sent to. Please fax to [phone] sean Catalan    Action Taken: Message routed to:  [facility] P[facility]    Travel Screening: Not Applicable

## 2021-11-23 NOTE — TELEPHONE ENCOUNTER
Called patient and left VM.  Dr. Shi did not approve patient letter request stating patient declined to get covid vaccine due to her medical condition such as her syndrome.  Suggest patient to obtain letter request from another provider.  Advise to request letter from patient nephrologist.        Will Dahl CMA ([facility]) at 5:08 PM on 11/23/2021

## 2021-11-23 NOTE — TELEPHONE ENCOUNTER
Patient Call: Needs letter for work Unable to receive the covid vaccination due to her cancer  Please call Carol       Call back needed? Yes    Return Call Needed  Same as documented in contacts section  When to return call?: Same day: Route [facility] [employer]

## 2021-11-23 NOTE — TELEPHONE ENCOUNTER
Spoke to patient.  There are no know medical exemption for the covid vaccine unless patient has adverse reaction to one of the covid vaccine components.  Both Dr. Shi and nephrologist recommend patient to get the covid vaccine.  Patient gave verbal understanding.      She would like to request letter of patient declining to get the covid vaccine because of her medical condition such as Senior-Loken syndrome that can cause medical issues.    Patient gave verbal orders to mention Senior-Loken syndrome on letter.       Fax number confirmed.     Patient would like a call when letter approved by Dr. Shi and faxed.    Will Dahl CMA ([facility]) at 4:10 PM on 11/23/2021

## 2021-11-23 NOTE — TELEPHONE ENCOUNTER
9/30/2021   COVID-19 Virus Review: Discussed COVID-19 virus and the potential medical risks.  Reviewed preventative health recommendations, including wearing a mask where appropriate.  Recommended COVID vaccination should be up to date with either an initial vaccination or booster shot when appropriate.  From her nephrologist.  She should have covid vaccine.  No known medical exemption.  Danny Shi MD

## 2021-11-23 NOTE — TELEPHONE ENCOUNTER
[facility] Call Center    Phone Message    May a detailed message be left on voicemail: yes     Reason for Call: Form or Letter   Type or form/letter needing completion: covid vaccine exemption letter to employer    The patient was calling to get a letter to her employer to be exempt from getting the Covid Vaccine due to her chronic kidney failure and complicated Medical history, please review and follow up with the patient to address any questions or concerns thank you.    Provider: Danny Shi MD  Date form needed: asap  Once completed: Fax form to: Will Call Back       Action Taken: Message routed to:  [facility] pcc    Travel Screening: Not Applicable

## 2021-11-23 NOTE — TELEPHONE ENCOUNTER
November 23, 2021  I will not approve of the letter as she should have the vaccine. Senior-Loken Syndrome does not qualify her to not have the vaccine.   She will have to obtain the letter from another provider.   She is at high risk for Covid and should have the vaccine  Danny Shi MD

## 2021-12-14 NOTE — TELEPHONE ENCOUNTER
Carol returned call. She has 9 boxes of unopened Neoral with quantity of 30 tablets each. Also, states she has Myfortic. No missed doses. Aware she needs labs; will update orders. Carol was encouraged to call [facility] with update.           Melolabial Transposition Flap Text: The defect edges were debeveled with a #15 scalpel blade.  Given the location of the defect and the proximity to free margins a melolabial flap was deemed most appropriate.  Using a sterile surgical marker, an appropriate melolabial transposition flap was drawn incorporating the defect.    The area thus outlined was incised deep to adipose tissue with a #15 scalpel blade.  The skin margins were undermined to an appropriate distance in all directions utilizing iris scissors.

## 2021-12-27 ENCOUNTER — LAB (OUTPATIENT)
Dept: LAB | Facility: CLINIC | Age: 31
End: 2021-12-27
Payer: COMMERCIAL

## 2021-12-27 ENCOUNTER — TELEPHONE (OUTPATIENT)
Dept: TRANSPLANT | Facility: CLINIC | Age: 31
End: 2021-12-27

## 2021-12-27 ENCOUNTER — VIRTUAL VISIT (OUTPATIENT)
Dept: NEPHROLOGY | Facility: CLINIC | Age: 31
End: 2021-12-27
Attending: INTERNAL MEDICINE
Payer: COMMERCIAL

## 2021-12-27 DIAGNOSIS — Z94.0 KIDNEY REPLACED BY TRANSPLANT: ICD-10-CM

## 2021-12-27 DIAGNOSIS — Z94.0 KIDNEY TRANSPLANTED: ICD-10-CM

## 2021-12-27 DIAGNOSIS — Z48.298 AFTERCARE FOLLOWING ORGAN TRANSPLANT: Primary | ICD-10-CM

## 2021-12-27 DIAGNOSIS — D84.9 IMMUNOSUPPRESSION (H): ICD-10-CM

## 2021-12-27 DIAGNOSIS — Z79.899 ENCOUNTER FOR LONG-TERM CURRENT USE OF MEDICATION: ICD-10-CM

## 2021-12-27 DIAGNOSIS — D64.9 ANEMIA: Primary | ICD-10-CM

## 2021-12-27 DIAGNOSIS — Z48.298 AFTERCARE FOLLOWING ORGAN TRANSPLANT: ICD-10-CM

## 2021-12-27 DIAGNOSIS — Z94.0 HTN, KIDNEY TRANSPLANT RELATED: ICD-10-CM

## 2021-12-27 DIAGNOSIS — I15.1 HTN, KIDNEY TRANSPLANT RELATED: ICD-10-CM

## 2021-12-27 LAB
ANION GAP SERPL CALCULATED.3IONS-SCNC: 9 MMOL/L (ref 3–14)
BASOPHILS # BLD AUTO: 0.1 10E3/UL (ref 0–0.2)
BASOPHILS NFR BLD AUTO: 1 %
BUN SERPL-MCNC: 28 MG/DL (ref 7–30)
CALCIUM SERPL-MCNC: 9.1 MG/DL (ref 8.5–10.1)
CHLORIDE BLD-SCNC: 110 MMOL/L (ref 94–109)
CO2 SERPL-SCNC: 22 MMOL/L (ref 20–32)
CREAT SERPL-MCNC: 1.98 MG/DL (ref 0.52–1.04)
CREAT UR-MCNC: 111 MG/DL
CYCLOSPORINE BLD LC/MS/MS-MCNC: 66 UG/L (ref 50–400)
EOSINOPHIL # BLD AUTO: 0.2 10E3/UL (ref 0–0.7)
EOSINOPHIL NFR BLD AUTO: 3 %
ERYTHROCYTE [DISTWIDTH] IN BLOOD BY AUTOMATED COUNT: 12.6 % (ref 10–15)
GFR SERPL CREATININE-BSD FRML MDRD: 33 ML/MIN/1.73M2
GLUCOSE BLD-MCNC: 107 MG/DL (ref 70–99)
HCT VFR BLD AUTO: 33.4 % (ref 35–47)
HGB BLD-MCNC: 11.2 G/DL (ref 11.7–15.7)
IMM GRANULOCYTES # BLD: 0 10E3/UL
IMM GRANULOCYTES NFR BLD: 0 %
LYMPHOCYTES # BLD AUTO: 1.9 10E3/UL (ref 0.8–5.3)
LYMPHOCYTES NFR BLD AUTO: 32 %
MCH RBC QN AUTO: 27.9 PG (ref 26.5–33)
MCHC RBC AUTO-ENTMCNC: 33.5 G/DL (ref 31.5–36.5)
MCV RBC AUTO: 83 FL (ref 78–100)
MONOCYTES # BLD AUTO: 0.3 10E3/UL (ref 0–1.3)
MONOCYTES NFR BLD AUTO: 5 %
NEUTROPHILS # BLD AUTO: 3.5 10E3/UL (ref 1.6–8.3)
NEUTROPHILS NFR BLD AUTO: 59 %
NRBC # BLD AUTO: 0 10E3/UL
NRBC BLD AUTO-RTO: 0 /100
PLATELET # BLD AUTO: 215 10E3/UL (ref 150–450)
POTASSIUM BLD-SCNC: 4.1 MMOL/L (ref 3.4–5.3)
PROT UR-MCNC: 1.56 G/L
PROT/CREAT 24H UR: 1.41 G/G CR (ref 0–0.2)
RBC # BLD AUTO: 4.01 10E6/UL (ref 3.8–5.2)
SODIUM SERPL-SCNC: 141 MMOL/L (ref 133–144)
TME LAST DOSE: NORMAL H
TME LAST DOSE: NORMAL H
WBC # BLD AUTO: 5.8 10E3/UL (ref 4–11)

## 2021-12-27 PROCEDURE — G0463 HOSPITAL OUTPT CLINIC VISIT: HCPCS | Mod: PN,RTG | Performed by: INTERNAL MEDICINE

## 2021-12-27 PROCEDURE — 99214 OFFICE O/P EST MOD 30 MIN: CPT | Mod: 95 | Performed by: INTERNAL MEDICINE

## 2021-12-27 PROCEDURE — 36415 COLL VENOUS BLD VENIPUNCTURE: CPT | Performed by: PATHOLOGY

## 2021-12-27 RX ORDER — LOSARTAN POTASSIUM 25 MG/1
50 TABLET ORAL DAILY
Qty: 180 TABLET | Refills: 3 | Status: SHIPPED | OUTPATIENT
Start: 2021-12-27 | End: 2023-04-13

## 2021-12-27 ASSESSMENT — PAIN SCALES - GENERAL: PAINLEVEL: NO PAIN (0)

## 2021-12-27 NOTE — LETTER
12/27/2021     RE: Carol Guillaume  [address] [facility], [place] [address]     Dear Colleague,    Thank you for referring your patient, Carol Guillaume, to the [facility] [place] at [facility]. Please see a copy of my visit note below.    Carol is a 31 year old who is being evaluated via a billable video visit.      How would you like to obtain your AVS? MyChart  If the video visit is dropped, the invitation should be resent by: Send to e-mail at: [email or web address]  Will anyone else be joining your video visit? No      Video Start Time: 4:07  Video-Visit Details    Type of service:  Video Visit    Video End Time:4:22    Originating Location (pt. Location): Home    Distant Location (provider location):  [facility] [place]     Platform used for Video Visit: Vijay Littlejohn MD on 12/27/2021 at 4:38 PM      Carol is a 31 year old who is being evaluated via a billable video visit.       How would you like to obtain your AVS? MyChart  If the video visit is dropped, the invitation should be resent by: Text to cell phone: [phone]  Will anyone else be joining your video visit? 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  Unremarkable.   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  Clinical correlation is recommended.    # Immunosuppression: Cyclosporine (goal ) and Mycophenolic acid (dose 540 mg every 12 hours)   - Changes: Not at this time of note she has been off MPA since Feb and was resumed in Aug after kidney Tx bx showed chronic active BMR & DSA with high titer class II DSA    # Infection Prophylaxis:   - PJP: None    # Hypertension: Borderline control;  Goal BP: < 130/80   - Changes: no, monitor home BP with orthostatic given symptoms, losartan just added to control proteinuria & BP, could uptitrate /stop amlodipine if persistent proteinuria on next check, Cr/K stable    # Mineral Bone Disorder:   - Vitamin D; level: Low        On supplement: Yes  - Calcium; level: Normal        On supplement: No    # Obesity: Stable weight.   - Recommend weight loss for overall health by increasing exercise and watching caloric intake.    # Skin Cancer Risk:    - Discussed sun protection and recommend regular follow up with Dermatology.    # Medical Compliance: Yes     # COVID-19 Virus Review: Discussed COVID-19 virus and the potential medical risks.  Reviewed preventative health recommendations, which includes washing hands for 20 seconds, avoid touching your face, and social distancing.  Asked patient to inform the transplant center if they are exposed or diagnosed with this virus.    ## COVID-19 Virus Review: Discussed COVID-19 virus and the potential medical risks.  Reviewed preventative health recommendations, including wearing a mask where appropriate.  Recommended COVID vaccination should be up to date with either an initial  vaccination or booster shot when appropriate.  Asked the patient to inform the transplant center if they are exposed or diagnosed with this virus.    # COVID Vaccination Up To Date: no counseled but reluctant        # Transplant History:  Etiology of Kidney Failure: Senior-Loken Syndrome  Tx: LDKT  Transplant: 7/18/2006 (Kidney)  Donor Type: Living Donor Class: Standard Criteria Donor  Significant changes in immunosuppression: Generic mycophenolate mofetil caused chest pain and heart palpitations  Significant transplant-related complications: None    Transplant Office Phone Number: [phone]    Assessment and plan was discussed with the patient and she voiced her understanding and agreement.    Return visit:     Feliberto Littlejohn MD    Chief Complaint   Ms. Guillaume is a 31 year old here for kidney transplant and immunosuppression management.    History of Present Illness   Ms. 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 She was restarted on  mg po bid & losartan was added to control BP & proteinuria.    BP is better controlled, though still running 135-148/90s, no leg swelling. No chest pain or shortness of breath. No nausea, vomiting; she has intermittent mild diarrhea. No abdominal pain. 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    Labs show improving renal function and down-trending proteinuria      Current IS CSA 75/50 /540  COVID vaccine: hesitant to receive the vaccine    Home BP: 130/90s    Review of Systems   A comprehensive review of  systems was obtained and negative, except as noted in the HPI or PMH.    Problem List   Patient Active Problem List   Diagnosis     Kidney replaced by transplant     Senior-Loken syndrome     Pseudotumor cerebri     Legally blind     Retinitis pigmentosa     Vitamin D deficiency     Morbid obesity (H)     Insulin resistance     PCOS (polycystic ovarian syndrome)     Cataract     Palpitations     Dyslipidemia     HTN, kidney transplant related     Aftercare following organ transplant     Immunosuppression (H)     EIN (endometrial intraepithelial neoplasia)     Chronic kidney disease, stage 3 (H)       Social History   Social History     Tobacco Use     Smoking status: Never Smoker     Smokeless tobacco: Never Used   Vaping Use     Vaping Use: Never used   Substance Use Topics     Alcohol use: No     Drug use: No       Allergies   No Known Allergies    Medications   Current Outpatient Medications   Medication Sig     acetaminophen (TYLENOL) 325 MG tablet Take 2 tablets (650 mg) by mouth every 4 hours as needed for mild pain     amLODIPine (NORVASC) 5 MG tablet Take 1 tablet (5 mg) by mouth daily     carvedilol (COREG) 25 MG tablet Take 1 tablet (25 mg) by mouth 2 times daily (with meals)     levonorgestrel (MIRENA) 20 MCG/24HR IUD 1 each by Intrauterine route once     losartan (COZAAR) 25 MG tablet Take 1 tablet (25 mg) by mouth daily     mycophenolic acid (GENERIC EQUIVALENT) 180 MG EC tablet Take 3 tablets (540 mg) by mouth 2 times daily     NEORAL (BRAND) 25 MG capsule Take 3 capsules (75 mg) by mouth every morning AND 2 capsules (50 mg) every evening.     Prenatal Vit-Fe Fumarate-FA (PRENATAL PO) Take by mouth daily     VITAMIN D, CHOLECALCIFEROL, PO Take 1,000 Units by mouth daily      No current facility-administered medications for this visit.     There are no discontinued medications.    Physical Exam   Vital Signs: There were no vitals taken for this visit.  Within normal limits for video visit no acute  distress no visible facial rashes normal speech and mentation.    Data     Renal Latest Ref Rng & Units 12/27/2021 10/19/2021 9/13/2021   Na 133 - 144 mmol/L 141 140 -   Na (external) 135 - 145 mmol/L - - -   K 3.4 - 5.3 mmol/L 4.1 4.3 4.8   K (external) 3.5 - 5.0 mmol/L - - -   Cl 94 - 109 mmol/L 110(H) 109 -   Cl (external) 98 - 110 mmol/L - - -   CO2 20 - 32 mmol/L 22 26 -   CO2 (external) 21 - 31 mmol/L - - -   BUN 7 - 30 mg/dL 28 40(H) -   BUN (external) 8 - 25 mg/dL - - -   Cr 0.52 - 1.04 mg/dL 1.98(H) 2.39(H) -   Cr (external) 0.57 - 1.11 mg/dL - - -   Glucose 70 - 99 mg/dL 107(H) 105(H) -   Glucose (external) 65 - 100 mg/dL - - -   Ca  8.5 - 10.1 mg/dL 9.1 9.1 -   Ca (external) 8.5 - 10.5 mg/dL - - -   Mg 1.6 - 2.3 mg/dL - - -     Bone Health Latest Ref Rng & Units 9/9/2021 9/3/2021 12/13/2019   Phos 2.5 - 4.5 mg/dL 4.8(H) 5.3(H) -   PTHi 18 - 80 pg/mL - - 36   Vit D Def 20 - 75 ug/L - - 26     Heme Latest Ref Rng & Units 12/27/2021 10/19/2021 9/3/2021   WBC 4.0 - 11.0 10e3/uL 5.8 7.1 7.7   WBC (external) 4.5 - 11.0 thou/cu mm - - -   Hgb 11.7 - 15.7 g/dL 11.2(L) 11.8 13.2   Hgb (external) 12.0 - 16.0 g/dL - - -   Plt 150 - 450 10e3/uL 215 232 225   Plt (external) 140 - 440 thou/cu mm - - -   ABSOLUTE NEUTROPHIL 1.6 - 8.3 10e9/L - - -   ABSOLUTE LYMPHOCYTES 0.8 - 5.3 10e9/L - - -   ABSOLUTE MONOCYTES 0.0 - 1.3 10e9/L - - -   ABSOLUTE EOSINOPHILS 0.0 - 0.7 10e9/L - - -   ABSOLUTE BASOPHILS 0.0 - 0.2 10e9/L - - -   ABS IMMATURE GRANULOCYTES 0 - 0.4 10e9/L - - -   ABSOLUTE NUCLEATED RBC - - - -     Liver Latest Ref Rng & Units 9/3/2021 12/13/2019 10/22/2018   AP 40 - 150 U/L - 92 72   TBili 0.2 - 1.3 mg/dL - 0.6 0.6   ALT 0 - 50 U/L - 51(H) 34   AST 0 - 45 U/L - 21 26   Tot Protein 6.8 - 8.8 g/dL - 7.4 7.3   Albumin 3.4 - 5.0 g/dL 3.4 3.5 3.6     Pancreas Latest Ref Rng & Units 7/26/2013 7/27/2012 6/3/2008   A1C 4.3 - 6.0 % 5.0 5.3 -   Amylase 30 - 110 U/L - - 58   Lipase 20 - 250 U/L - - -     Iron studies  Latest Ref Rng & Units 1/30/2018 7/11/2006 6/26/2006   Iron 35 - 180 ug/dL 55 56 75   Iron sat 15 - 46 % 19 - -   Ferritin 12 - 150 ng/mL 19 473(H) 914(H)     [facility] Txp Virology Latest Ref Rng & Units 1/31/2017 8/29/2011 7/21/2011   CMV IgG EU/mL - - -   CMV IgM <0.90 - - -   CMV IgM Interp <0.90 - - -   CVM DNA Quant - Plasma, EDTA anticoagulant Whole blood, EDTA anticoagulant Whole blood, EDTA anticoagulant   CMV Quant <100 Copies/mL - <100  No CMV DNA detected. <100  No CMV DNA detected.   CMV QT Log <2.0 Log copies/mL - <2.0  The Cytomegalovirus DNA Quantitation assay is a real-time polymerase chain   reaction (PCR) utilizing analyte specific reagents manufactured by [employer]   [facility]. Analyte Specific Reagents (ASRs) are used in many laboratory   tests necessary for standard medical care and generally do not require FDA   approval.   This test was developed and its performance characteristics determined by   [facility].  It has not been   cleared or approved by the US Food and Drug Administration. <2.0  The Cytomegalovirus DNA Quantitation assay is a real-time polymerase chain   reaction (PCR) utilizing analyte specific reagents manufactured by [employer]   [facility]. Analyte Specific Reagents (ASRs) are used in many laboratory   tests necessary for standard medical care and generally do not require FDA   approval.   This test was developed and its performance characteristics determined by   [facility].  It has not been   cleared or approved by the US Food and Drug Administration.   CMV QUANT IU/ML CMVND [IU]/mL CMV DNA Not Detected   The BARBI AmpliPrep/BARBI TaqMan CMV Test is an FDA-approved in vitro nucleic   acid amplification test for the quantitation of cytomegalovirus DNA in human   plasma (EDTA plasma) using the BARBI AmpliPrep Instrument for automated viral   nucleic acid extraction and the BARBI TaqMan  Analyzer or BARBI TaqMan for   automated Real Time amplification and detection of the viral nucleic acid   target.   Titer results are reported in International Units/mL (IU/mL using 1st WHO   International standard for Human Cytomegalovirus for Nucleic Acid Amplification   based assays. The conversion factor between CMV DNA copis/mL (as defined by the   Roche BARBI TaqMan CMV test) and International Units is the CMV DNA   concentration in IU/mL x 1.1 copies/IU = CMV DNA in copies/mL.   This assay has received FDA approval for the testing of human plasma only. The   Infectious Disease Diagnostic Laboratory at the [facility], [place], has validated the pe  rformance characteristics of the Roche   CMV assay for plasma, bronchial alveolar lavage/wash and urine.   - -   LOG IU/ML OF CMVQNT <2.1 [Log:IU]/mL Not Calculated - -   BK Spec - - - -   BK Res <1000 copies/mL - - -   BK Log <3.0 Log copies/mL - - -   EBV IgG - - - -   EBV DNA COPIES/ML EBVNEG [Copies]/mL EBV DNA Not Detected - -   EBV DNA LOG OF COPIES <2.7 [Log:copies]/mL Not Calculated   The Real-Time quantitative EBV assay was developed and its performance   characteristics determined by the Infectious Diseases Diagnostic Laboratory at   the [facility] in [place], [place].  The   primers and probes are Analyte Specific Reagents (ASRs) manufactured  by   [employer].   ASRs are used in many laboratory tests necessary for standard medical care and   generally do not require U.S. Food and Drug Administration approval.  The FDA   has determined that such clearance or approval is not necessary.  This test is   used for clinical purposes.  It should not be regarded as investigational or   research.   This laboratory is certified under the Clinical Laboratory Improvement   Amendments of 1988 (CLIA-88) as qualified to perform high complexity clinical   laboratory testing.   The quantitative range of this  assay is 500-22,500,00 copies/mL (2.7-7.4 log   copies/mL).  A negative result does not rule out the presence of PCR inhibitors     in the patient specimen or EBV DNA nucleic acid in concentrations below the   level of detection of the assay.  Inhibition may also lead to underestimation   of viral quantitation.   - -   Hep B Core NEG - - -   Hep B Surf 0.0 - 4.9 mIU/mL - - -   HIV 1&2 NEG - - -     Again, thank you for allowing me to participate in the care of your patient.      Sincerely,    Feliberto Littlejohn MD

## 2021-12-27 NOTE — PROGRESS NOTES
Carol is a 31 year old who is being evaluated via a billable video visit.      How would you like to obtain your AVS? MyChart  If the video visit is dropped, the invitation should be resent by: Send to e-mail at: [email or web address]  Will anyone else be joining your video visit? No      Video Start Time: 4:07  Video-Visit Details    Type of service:  Video Visit    Video End Time:4:22    Originating Location (pt. Location): Home    Distant Location (provider location):  [facility] [place]     Platform used for Video Visit: Vijay Littlejohn MD on 12/27/2021 at 4:38 PM

## 2021-12-27 NOTE — TELEPHONE ENCOUNTER
ISSUE:   Cyclosporine level 66 on 12/27/21 0829, goal , dose 75 mg every AM/50 mg every PM.    Creatinine and UPCR have improved. New Cr baseline?     PLAN:   Please call patient and confirm this was an accurate 12-hour trough. Verify Cyclosporine dose 75 mg every AM/50 mg every PM. Confirm no new medications or illness. Confirm no missed doses. If accurate trough and accurate dose, increase Cyclosporine dose to 75 mg BID and repeat labs in 2 weeks. Transplant nephrology appt this afternoon.    OUTCOME:   Left VM for patient, if they can confirm accurate trough level, current dose, no missed doses or new medications could increase dose to 75 mg BID and to repeat labs in 1-2 weeks. Order placed in Epic. To review with transplant nephrologist at today's visit.    Will need to connect after appointment or review provider notes if new prescription is needed for dose change.    Addendum:  Question regarding CBC WITH PLATELETS AND DIFFERENTIAL  Received: Today  Carol Guillaume, Linda Vora, RN  At the appointment she said to continue keeping it at 75 mg in the morning and 50 at night   Thank you I will redraw my labs in a week or so Carol Hoffmann, RN, BSN  Solid Organ Transplant, Post Kidney and Pancreas  Transplant Care Coordinator  [phone]

## 2021-12-27 NOTE — PATIENT INSTRUCTIONS
Increase losartan to 50 mg po every day    Stay hydrated     Monitor BP daily goal <130/80    Schedule COVID vaccine and take all precautions (masks, washing, avoiding crowds)

## 2021-12-27 NOTE — PROGRESS NOTES
Carol is a 31 year old who is being evaluated via a billable video visit.       How would you like to obtain your AVS? MyChart  If the video visit is dropped, the invitation should be resent by: Text to cell phone: [phone]  Will anyone else be joining your video visit? 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  Unremarkable.   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  Clinical correlation is recommended.    # Immunosuppression: Cyclosporine (goal ) and Mycophenolic acid (dose 540 mg every 12 hours)   - Changes: Not at this time of note she has been off MPA since Feb and was resumed in Aug after kidney Tx bx showed chronic active BMR & DSA with high titer class II DSA    # Infection Prophylaxis:   - PJP: None    # Hypertension: Borderline control;  Goal BP: < 130/80   - Changes: no, monitor home BP with orthostatic given symptoms, losartan just added to control proteinuria & BP, could uptitrate /stop amlodipine if persistent  proteinuria on next check, Cr/K stable    # Mineral Bone Disorder:   - Vitamin D; level: Low        On supplement: Yes  - Calcium; level: Normal        On supplement: No    # Obesity: Stable weight.   - Recommend weight loss for overall health by increasing exercise and watching caloric intake.    # Skin Cancer Risk:    - Discussed sun protection and recommend regular follow up with Dermatology.    # Medical Compliance: Yes     # COVID-19 Virus Review: Discussed COVID-19 virus and the potential medical risks.  Reviewed preventative health recommendations, which includes washing hands for 20 seconds, avoid touching your face, and social distancing.  Asked patient to inform the transplant center if they are exposed or diagnosed with this virus.    ## COVID-19 Virus Review: Discussed COVID-19 virus and the potential medical risks.  Reviewed preventative health recommendations, including wearing a mask where appropriate.  Recommended COVID vaccination should be up to date with either an initial vaccination or booster shot when appropriate.  Asked the patient to inform the transplant center if they are exposed or diagnosed with this virus.    # COVID Vaccination Up To Date: no counseled but reluctant        # Transplant History:  Etiology of Kidney Failure: Senior-Loken Syndrome  Tx: LDKT  Transplant: 7/18/2006 (Kidney)  Donor Type: Living Donor Class: Standard Criteria Donor  Significant changes in immunosuppression: Generic mycophenolate mofetil caused chest pain and heart palpitations  Significant transplant-related complications: None    Transplant Office Phone Number: [phone]    Assessment and plan was discussed with the patient and she voiced her understanding and agreement.    Return visit:     Feliberto Littlejohn MD    Chief Complaint   Ms. Guillaume is a 31 year old here for kidney transplant and immunosuppression management.    History of Present Illness   Ms. 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 She was restarted on  mg po bid & losartan was added to control BP & proteinuria.    BP is better controlled, though still running 135-148/90s, no leg swelling. No chest pain or shortness of breath. No nausea, vomiting; she has intermittent mild diarrhea. No abdominal pain. 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    Labs show improving renal function and down-trending proteinuria      Current IS CSA 75/50 /540  COVID vaccine: hesitant to receive the vaccine    Home BP: 130/90s    Review of Systems   A comprehensive review of systems was obtained and negative, except as noted in the HPI or PMH.    Problem List   Patient Active Problem List   Diagnosis     Kidney replaced by transplant     Senior-Loken syndrome     Pseudotumor cerebri     Legally blind     Retinitis pigmentosa     Vitamin D deficiency     Morbid obesity (H)     Insulin resistance     PCOS (polycystic ovarian syndrome)     Cataract     Palpitations     Dyslipidemia     HTN, kidney transplant related     Aftercare following organ transplant     Immunosuppression (H)     EIN (endometrial intraepithelial neoplasia)     Chronic kidney disease, stage 3 (H)       Social History   Social History     Tobacco Use     Smoking status: Never Smoker     Smokeless tobacco: Never Used   Vaping Use     Vaping Use: Never used   Substance Use Topics     Alcohol use: No     Drug use: No       Allergies   No Known Allergies    Medications   Current Outpatient Medications   Medication Sig     acetaminophen  (TYLENOL) 325 MG tablet Take 2 tablets (650 mg) by mouth every 4 hours as needed for mild pain     amLODIPine (NORVASC) 5 MG tablet Take 1 tablet (5 mg) by mouth daily     carvedilol (COREG) 25 MG tablet Take 1 tablet (25 mg) by mouth 2 times daily (with meals)     levonorgestrel (MIRENA) 20 MCG/24HR IUD 1 each by Intrauterine route once     losartan (COZAAR) 25 MG tablet Take 1 tablet (25 mg) by mouth daily     mycophenolic acid (GENERIC EQUIVALENT) 180 MG EC tablet Take 3 tablets (540 mg) by mouth 2 times daily     NEORAL (BRAND) 25 MG capsule Take 3 capsules (75 mg) by mouth every morning AND 2 capsules (50 mg) every evening.     Prenatal Vit-Fe Fumarate-FA (PRENATAL PO) Take by mouth daily     VITAMIN D, CHOLECALCIFEROL, PO Take 1,000 Units by mouth daily      No current facility-administered medications for this visit.     There are no discontinued medications.    Physical Exam   Vital Signs: There were no vitals taken for this visit.  Within normal limits for video visit no acute distress no visible facial rashes normal speech and mentation.    Data     Renal Latest Ref Rng & Units 12/27/2021 10/19/2021 9/13/2021   Na 133 - 144 mmol/L 141 140 -   Na (external) 135 - 145 mmol/L - - -   K 3.4 - 5.3 mmol/L 4.1 4.3 4.8   K (external) 3.5 - 5.0 mmol/L - - -   Cl 94 - 109 mmol/L 110(H) 109 -   Cl (external) 98 - 110 mmol/L - - -   CO2 20 - 32 mmol/L 22 26 -   CO2 (external) 21 - 31 mmol/L - - -   BUN 7 - 30 mg/dL 28 40(H) -   BUN (external) 8 - 25 mg/dL - - -   Cr 0.52 - 1.04 mg/dL 1.98(H) 2.39(H) -   Cr (external) 0.57 - 1.11 mg/dL - - -   Glucose 70 - 99 mg/dL 107(H) 105(H) -   Glucose (external) 65 - 100 mg/dL - - -   Ca  8.5 - 10.1 mg/dL 9.1 9.1 -   Ca (external) 8.5 - 10.5 mg/dL - - -   Mg 1.6 - 2.3 mg/dL - - -     Bone Health Latest Ref Rng & Units 9/9/2021 9/3/2021 12/13/2019   Phos 2.5 - 4.5 mg/dL 4.8(H) 5.3(H) -   PTHi 18 - 80 pg/mL - - 36   Vit D Def 20 - 75 ug/L - - 26     Heme Latest Ref Rng & Units  12/27/2021 10/19/2021 9/3/2021   WBC 4.0 - 11.0 10e3/uL 5.8 7.1 7.7   WBC (external) 4.5 - 11.0 thou/cu mm - - -   Hgb 11.7 - 15.7 g/dL 11.2(L) 11.8 13.2   Hgb (external) 12.0 - 16.0 g/dL - - -   Plt 150 - 450 10e3/uL 215 232 225   Plt (external) 140 - 440 thou/cu mm - - -   ABSOLUTE NEUTROPHIL 1.6 - 8.3 10e9/L - - -   ABSOLUTE LYMPHOCYTES 0.8 - 5.3 10e9/L - - -   ABSOLUTE MONOCYTES 0.0 - 1.3 10e9/L - - -   ABSOLUTE EOSINOPHILS 0.0 - 0.7 10e9/L - - -   ABSOLUTE BASOPHILS 0.0 - 0.2 10e9/L - - -   ABS IMMATURE GRANULOCYTES 0 - 0.4 10e9/L - - -   ABSOLUTE NUCLEATED RBC - - - -     Liver Latest Ref Rng & Units 9/3/2021 12/13/2019 10/22/2018   AP 40 - 150 U/L - 92 72   TBili 0.2 - 1.3 mg/dL - 0.6 0.6   ALT 0 - 50 U/L - 51(H) 34   AST 0 - 45 U/L - 21 26   Tot Protein 6.8 - 8.8 g/dL - 7.4 7.3   Albumin 3.4 - 5.0 g/dL 3.4 3.5 3.6     Pancreas Latest Ref Rng & Units 7/26/2013 7/27/2012 6/3/2008   A1C 4.3 - 6.0 % 5.0 5.3 -   Amylase 30 - 110 U/L - - 58   Lipase 20 - 250 U/L - - -     Iron studies Latest Ref Rng & Units 1/30/2018 7/11/2006 6/26/2006   Iron 35 - 180 ug/dL 55 56 75   Iron sat 15 - 46 % 19 - -   Ferritin 12 - 150 ng/mL 19 473(H) 914(H)     [facility] Txp Virology Latest Ref Rng & Units 1/31/2017 8/29/2011 7/21/2011   CMV IgG EU/mL - - -   CMV IgM <0.90 - - -   CMV IgM Interp <0.90 - - -   CVM DNA Quant - Plasma, EDTA anticoagulant Whole blood, EDTA anticoagulant Whole blood, EDTA anticoagulant   CMV Quant <100 Copies/mL - <100  No CMV DNA detected. <100  No CMV DNA detected.   CMV QT Log <2.0 Log copies/mL - <2.0  The Cytomegalovirus DNA Quantitation assay is a real-time polymerase chain   reaction (PCR) utilizing analyte specific reagents manufactured by [employer]   [facility]. Analyte Specific Reagents (ASRs) are used in many laboratory   tests necessary for standard medical care and generally do not require FDA   approval.   This test was developed and its performance characteristics determined by   [facility].  It has not been   cleared or approved by the US Food and Drug Administration. <2.0  The Cytomegalovirus DNA Quantitation assay is a real-time polymerase chain   reaction (PCR) utilizing analyte specific reagents manufactured by [employer]   [facility]. Analyte Specific Reagents (ASRs) are used in many laboratory   tests necessary for standard medical care and generally do not require FDA   approval.   This test was developed and its performance characteristics determined by   [facility].  It has not been   cleared or approved by the US Food and Drug Administration.   CMV QUANT IU/ML CMVND [IU]/mL CMV DNA Not Detected   The BARBI AmpliPrep/BARBI TaqMan CMV Test is an FDA-approved in vitro nucleic   acid amplification test for the quantitation of cytomegalovirus DNA in human   plasma (EDTA plasma) using the [employer] AmpliPrep Instrument for automated viral   nucleic acid extraction and the [employer] TaqMan Analyzer or [employer] TaqMan for   automated Real Time amplification and detection of the viral nucleic acid   target.   Titer results are reported in International Units/mL (IU/mL using 1st WHO   International standard for Human Cytomegalovirus for Nucleic Acid Amplification   based assays. The conversion factor between CMV DNA copis/mL (as defined by the   Roche BARBI TaqMan CMV test) and International Units is the CMV DNA   concentration in IU/mL x 1.1 copies/IU = CMV DNA in copies/mL.   This assay has received FDA approval for the testing of human plasma only. The   Infectious Disease Diagnostic Laboratory at the [facility], [place], has validated the pe  rformance characteristics of the Roche   CMV assay for plasma, bronchial alveolar lavage/wash and urine.   - -   LOG IU/ML OF CMVQNT <2.1 [Log:IU]/mL Not Calculated - -   BK Spec - - - -   BK Res <1000 copies/mL - - -   BK Log <3.0 Log copies/mL - -  -   EBV IgG - - - -   EBV DNA COPIES/ML EBVNEG [Copies]/mL EBV DNA Not Detected - -   EBV DNA LOG OF COPIES <2.7 [Log:copies]/mL Not Calculated   The Real-Time quantitative EBV assay was developed and its performance   characteristics determined by the Infectious Diseases Diagnostic Laboratory at   the [facility] in [place], [place].  The   primers and probes are Analyte Specific Reagents (ASRs) manufactured  by   [employer].   ASRs are used in many laboratory tests necessary for standard medical care and   generally do not require U.S. Food and Drug Administration approval.  The FDA   has determined that such clearance or approval is not necessary.  This test is   used for clinical purposes.  It should not be regarded as investigational or   research.   This laboratory is certified under the Clinical Laboratory Improvement   Amendments of 1988 (CLIA-88) as qualified to perform high complexity clinical   laboratory testing.   The quantitative range of this assay is 500-22,500,00 copies/mL (2.7-7.4 log   copies/mL).  A negative result does not rule out the presence of PCR inhibitors     in the patient specimen or EBV DNA nucleic acid in concentrations below the   level of detection of the assay.  Inhibition may also lead to underestimation   of viral quantitation.   - -   Hep B Core NEG - - -   Hep B Surf 0.0 - 4.9 mIU/mL - - -   HIV 1&2 NEG - - -

## 2021-12-28 ENCOUNTER — MYC MEDICAL ADVICE (OUTPATIENT)
Dept: NEPHROLOGY | Facility: CLINIC | Age: 31
End: 2021-12-28
Payer: COMMERCIAL

## 2021-12-28 ENCOUNTER — TELEPHONE (OUTPATIENT)
Dept: TRANSPLANT | Facility: CLINIC | Age: 31
End: 2021-12-28
Payer: COMMERCIAL

## 2021-12-28 NOTE — LETTER
Carol Guillaume  [address] [address]                2021      To Whom it Concerns:    Carol Guillaume ( 1990) is status post kidney transplant 2006 and followed by transplant nephrology for chronic kidney disease stage 3. She is taking immunosuppressant medication for the transplanted kidney to prevent organ rejection. Of note she is also followed by GYN ONC for cervical cancer. If accommodations can be made to continue to work remotely, please allow.     Thank you,      Feliberto Littlejohn MD

## 2021-12-28 NOTE — TELEPHONE ENCOUNTER
letter for work  Received: Today  Feliberto Littlejohn MD Blaisdell, Linda Vora, ALEX  Hi Linda Medina needs a letter for work stating that she has kidney transplant, on immunosuppression, chronic kidney disease stage 3 and cervical cancer. She is not vaccinated yet and currently working remotely as a therapist.   thanks     OUTCOME: Letter sent to Carol via [facility].    Linda Hoffmann RN, BSN  Solid Organ Transplant, [facility]  Transplant Care Coordinator  [phone]

## 2021-12-29 NOTE — TELEPHONE ENCOUNTER
ISSUE:   ----- Message -----   From: Carol Guillaume   Sent: 12/28/2021   1:19 PM CST   To: [facility]   Subject: Question regarding CBC WITH PLATELETS AND DI*     My hemoglobin was slightly lower than normal and my HEMATOCRIT was low anything I need to do to improve this?      PLAN: Sent to Provider for review.  ----- Message -----   From: Linda Hoffmnan RN   Sent: 12/28/2021   2:20 PM CST   To: Feliberto Littlejohn MD   Subject: FW: Question regarding CBC WITH PLATELETS AN*     Do you recommend checking iron panel? Hgb likely down with CKD3 but could start with lab and if needed refer to anemia services.     Linda     OUTCOME:  FW: Question regarding CBC WITH PLATELETS AND DIFFERENTIAL  Received: Today  Feliberto Littlejohn MD Blaisdell, Christin Rebecca, RN  Yes add iron studies and haptoglobin LDH      [facility] added above orders in Epic and sent Carol riannay to [employer] message. She replied that she will get labs in a week.    Linda Hoffmann, RN, BSN  Solid Organ Transplant, [facility] Kidney [facility] Pancreas  Transplant Care Coordinator  [phone]

## 2021-12-31 ENCOUNTER — TELEPHONE (OUTPATIENT)
Dept: NEPHROLOGY | Facility: CLINIC | Age: 31
End: 2021-12-31
Payer: COMMERCIAL

## 2022-01-01 ENCOUNTER — TRANSFERRED RECORDS (OUTPATIENT)
Dept: HEALTH INFORMATION MANAGEMENT | Facility: CLINIC | Age: 32
End: 2022-01-01
Payer: COMMERCIAL

## 2022-01-08 ENCOUNTER — HEALTH MAINTENANCE LETTER (OUTPATIENT)
Age: 32
End: 2022-01-08

## 2022-01-21 DIAGNOSIS — I12.9 HYPERTENSIVE RENAL DISEASE: ICD-10-CM

## 2022-01-21 RX ORDER — CARVEDILOL 25 MG/1
25 TABLET ORAL 2 TIMES DAILY WITH MEALS
Qty: 60 TABLET | Refills: 11 | Status: SHIPPED | OUTPATIENT
Start: 2022-01-21 | End: 2022-05-27

## 2022-03-26 DIAGNOSIS — Z11.59 ENCOUNTER FOR SCREENING FOR OTHER VIRAL DISEASES: Primary | ICD-10-CM

## 2022-04-18 ENCOUNTER — LAB (OUTPATIENT)
Dept: LAB | Facility: CLINIC | Age: 32
End: 2022-04-18
Payer: COMMERCIAL

## 2022-04-18 ENCOUNTER — TELEPHONE (OUTPATIENT)
Dept: TRANSPLANT | Facility: CLINIC | Age: 32
End: 2022-04-18

## 2022-04-18 DIAGNOSIS — Z48.298 AFTERCARE FOLLOWING ORGAN TRANSPLANT: ICD-10-CM

## 2022-04-18 DIAGNOSIS — Z94.0 KIDNEY REPLACED BY TRANSPLANT: ICD-10-CM

## 2022-04-18 DIAGNOSIS — Z79.899 ENCOUNTER FOR LONG-TERM CURRENT USE OF MEDICATION: ICD-10-CM

## 2022-04-18 LAB
ANION GAP SERPL CALCULATED.3IONS-SCNC: 10 MMOL/L (ref 3–14)
BASOPHILS # BLD AUTO: 0.1 10E3/UL (ref 0–0.2)
BASOPHILS NFR BLD AUTO: 1 %
BUN SERPL-MCNC: 33 MG/DL (ref 7–30)
CALCIUM SERPL-MCNC: 8.8 MG/DL (ref 8.5–10.1)
CHLORIDE BLD-SCNC: 104 MMOL/L (ref 94–109)
CO2 SERPL-SCNC: 28 MMOL/L (ref 20–32)
CREAT SERPL-MCNC: 2.16 MG/DL (ref 0.52–1.04)
CYCLOSPORINE BLD LC/MS/MS-MCNC: 77 UG/L (ref 50–400)
EOSINOPHIL # BLD AUTO: 0.2 10E3/UL (ref 0–0.7)
EOSINOPHIL NFR BLD AUTO: 3 %
ERYTHROCYTE [DISTWIDTH] IN BLOOD BY AUTOMATED COUNT: 12.3 % (ref 10–15)
GFR SERPL CREATININE-BSD FRML MDRD: 30 ML/MIN/1.73M2
GLUCOSE BLD-MCNC: 119 MG/DL (ref 70–99)
HCT VFR BLD AUTO: 38.3 % (ref 35–47)
HGB BLD-MCNC: 12.2 G/DL (ref 11.7–15.7)
IMM GRANULOCYTES # BLD: 0 10E3/UL
IMM GRANULOCYTES NFR BLD: 1 %
LYMPHOCYTES # BLD AUTO: 3.1 10E3/UL (ref 0.8–5.3)
LYMPHOCYTES NFR BLD AUTO: 36 %
MCH RBC QN AUTO: 27.6 PG (ref 26.5–33)
MCHC RBC AUTO-ENTMCNC: 31.9 G/DL (ref 31.5–36.5)
MCV RBC AUTO: 87 FL (ref 78–100)
MONOCYTES # BLD AUTO: 0.5 10E3/UL (ref 0–1.3)
MONOCYTES NFR BLD AUTO: 6 %
NEUTROPHILS # BLD AUTO: 4.5 10E3/UL (ref 1.6–8.3)
NEUTROPHILS NFR BLD AUTO: 53 %
NRBC # BLD AUTO: 0 10E3/UL
NRBC BLD AUTO-RTO: 0 /100
PLATELET # BLD AUTO: 254 10E3/UL (ref 150–450)
POTASSIUM BLD-SCNC: 4.1 MMOL/L (ref 3.4–5.3)
RBC # BLD AUTO: 4.42 10E6/UL (ref 3.8–5.2)
SODIUM SERPL-SCNC: 142 MMOL/L (ref 133–144)
TME LAST DOSE: NORMAL H
TME LAST DOSE: NORMAL H
WBC # BLD AUTO: 8.4 10E3/UL (ref 4–11)

## 2022-04-18 PROCEDURE — 99000 SPECIMEN HANDLING OFFICE-LAB: CPT | Performed by: PATHOLOGY

## 2022-04-18 PROCEDURE — 80048 BASIC METABOLIC PNL TOTAL CA: CPT | Performed by: PATHOLOGY

## 2022-04-18 PROCEDURE — 85025 COMPLETE CBC W/AUTO DIFF WBC: CPT | Performed by: PATHOLOGY

## 2022-04-18 PROCEDURE — 36415 COLL VENOUS BLD VENIPUNCTURE: CPT | Performed by: PATHOLOGY

## 2022-04-18 PROCEDURE — 80158 DRUG ASSAY CYCLOSPORINE: CPT | Mod: 90 | Performed by: PATHOLOGY

## 2022-04-18 NOTE — TELEPHONE ENCOUNTER
ISSUE: continue elevated Creatinine: 2.16on 4/18/2022    PLAN:  Call and assess hydration status.  How much water is he drinking per day?  Any recent illness, diarrhea, s/s of a UTI, or medication changes?  Any increased intake of alcohol or caffeine?    OUTCOME:

## 2022-05-04 ENCOUNTER — OFFICE VISIT (OUTPATIENT)
Dept: FAMILY MEDICINE | Facility: CLINIC | Age: 32
End: 2022-05-04
Payer: COMMERCIAL

## 2022-05-04 VITALS
OXYGEN SATURATION: 93 % | DIASTOLIC BLOOD PRESSURE: 88 MMHG | BODY MASS INDEX: 27.64 KG/M2 | SYSTOLIC BLOOD PRESSURE: 122 MMHG | HEART RATE: 78 BPM | HEIGHT: 66 IN | WEIGHT: 172 LBS | RESPIRATION RATE: 16 BRPM

## 2022-05-04 DIAGNOSIS — Z94.0 KIDNEY REPLACED BY TRANSPLANT: ICD-10-CM

## 2022-05-04 DIAGNOSIS — Z01.818 PREOP GENERAL PHYSICAL EXAM: Primary | ICD-10-CM

## 2022-05-04 DIAGNOSIS — H35.52 RETINITIS PIGMENTOSA: ICD-10-CM

## 2022-05-04 DIAGNOSIS — H35.50 SENIOR-LOKEN SYNDROME: ICD-10-CM

## 2022-05-04 DIAGNOSIS — Q61.5 SENIOR-LOKEN SYNDROME: ICD-10-CM

## 2022-05-04 DIAGNOSIS — N18.32 STAGE 3B CHRONIC KIDNEY DISEASE (H): ICD-10-CM

## 2022-05-04 DIAGNOSIS — N85.02 EIN (ENDOMETRIAL INTRAEPITHELIAL NEOPLASIA): ICD-10-CM

## 2022-05-04 DIAGNOSIS — Z13.6 CARDIOVASCULAR SCREENING; LDL GOAL LESS THAN 100: ICD-10-CM

## 2022-05-04 DIAGNOSIS — Q87.89 SENIOR-LOKEN SYNDROME: ICD-10-CM

## 2022-05-04 PROCEDURE — 99214 OFFICE O/P EST MOD 30 MIN: CPT | Performed by: FAMILY MEDICINE

## 2022-05-04 SDOH — ECONOMIC STABILITY: INCOME INSECURITY: IN THE LAST 12 MONTHS, WAS THERE A TIME WHEN YOU WERE NOT ABLE TO PAY THE MORTGAGE OR RENT ON TIME?: NO

## 2022-05-04 ASSESSMENT — PAIN SCALES - GENERAL: PAINLEVEL: NO PAIN (0)

## 2022-05-04 NOTE — PROGRESS NOTES
[facility]IN[facility] [address]  [address] [facility] [address]  Phone: [phone]  Fax: [phone]  Primary Provider: Danny Shi        PREOPERATIVE EVALUATION:  Today's date: 5/4/2022    Carol Guillaume is a 31 year old female who presents for a preoperative evaluation.    Surgical Information:  Surgery/Procedure:   DILATION AND CURETTAGE N/A General   EXCHANGE OF MIRENA INTRAUTERINE DEVICE         Surgery Location: [facility]  Surgeon: Dr Lisbeth Gallardo  Surgery Date: 5/9/22  Time of Surgery: 7:30 am  Where patient plans to recover: At home with family  Fax number for surgical facility: Note does not need to be faxed, will be available electronically in Epic.    Type of Anesthesia Anticipated: General    Assessment & Plan   Carol Guillaume is a 31 year old female with a history of Senior-Loken syndrome s/p kidney transplant, pseudotumor cerebri, retinitis pigmentosa, and polycystic ovarian syndrome (PCOS), stage 1 endometrial cancer who presents for preoperative evaluation. She desires to conceive therefore conservation therapy with D& C every 6 months Mirena to decrease endometrial hyperplasia  The proposed surgical procedure is considered LOW risk.    Preop general physical exam  Optimized for procedure    EIN (endometrial intraepithelial neoplasia)  Per GYN    Kidney replaced by transplant  Stage 3b chronic kidney disease (H)  Per nephrology on chronic immunosuppression    CARDIOVASCULAR SCREENING; LDL GOAL LESS THAN 100  Due for fating lipids ( With next labs or prior to preventive visit in August)  - Lipid panel reflex to direct LDL Fasting    Senior-Loken syndrome  Retinitis pigmentosa  HX, legally blind           Risks and Recommendations:  The patient has the following additional risks and recommendations for perioperative complications:   - Immunosuppression status/ renal transplant and chronic renal impairment          RECOMMENDATION:  APPROVAL GIVEN to  proceed with proposed procedure, without further diagnostic evaluation.              30 minutes spent on the date of the encounter doing chart review, history, exam, diagnostics review, documentation, counseling and coordination of cares as noted.  Danny Shi MD      Subjective     HPI related to upcoming procedure: Carol Guillaume is a 31 year old female with a history of Senior-Loken syndrome s/p kidney transplant, pseudotumor cerebri, retinitis pigmentosa, and polycystic ovarian syndrome (PCOS), stage 1 endometrial cancer who presents for preoperative evaluation. She desires to conceive therefore conservation therapy with D& C every 6 months Mirena to decrease endometrial hyperplasia but if any worsening pathology would need to consider hysterectomy. She is due for D& C. Last surgery was September 2021. In March 2021 she had a bladder perforation. She feels her bladder is acceptable at this time. She notes very high blood pressure with anesthesia one time. She has occasional lower abdominal pain, no current dysuria.      Preop Questions 5/4/2022   1. Have you ever had a heart attack or stroke? No   2. Have you ever had surgery on your heart or blood vessels, such as a stent placement, a coronary artery bypass, or surgery on an artery in your head, neck, heart, or legs? No   3. Do you have chest pain with activity? No   4. Do you have a history of  heart failure? No   5. Do you currently have a cold, bronchitis or symptoms of other infection? No   6. Do you have a cough, shortness of breath, or wheezing? No   7. Do you or anyone in your family have previous history of blood clots? No   8. Do you or does anyone in your family have a serious bleeding problem such as prolonged bleeding following surgeries or cuts? No   9. Have you ever had problems with anemia or been told to take iron pills? YES - Heavy menstrual bleeding now improved   10. Have you had any abnormal blood loss such as black, tarry or bloody  stools, or abnormal vaginal bleeding? YES -  Heavy menstrual bleeding now improved   11. Have you ever had a blood transfusion? No   12. Are you willing to have a blood transfusion if it is medically needed before, during, or after your surgery? Yes   13. Have you or any of your relatives ever had problems with anesthesia? No   14. Do you have sleep apnea, excessive snoring or daytime drowsiness? No   15. Do you have any artifical heart valves or other implanted medical devices like a pacemaker, defibrillator, or continuous glucose monitor? No   16. Do you have artificial joints? No   17. Are you allergic to latex? No   18. Is there any chance that you may be pregnant? No     Health Care Directive:  Patient does not have a Health Care Directive or Living Will: Discussed advance care planning with patient; information given to patient to review.    Preoperative Review of :   reviewed - 4 tabs oxycodone prescribed but not taken 9/13/2022 prefers non narcotic as she feels constipated with narcotics      Status of Chronic Conditions:     HYPERTENSION - Patient has longstanding history of HTN , currently denies any symptoms referable to elevated blood pressure. Specifically denies chest pain, palpitations, dyspnea, orthopnea, PND or peripheral edema. Blood pressure readings have  been in normal range.  Current medication regimen is as listed below. Patient denies any side effects of medication.  She has HX of high blood pressure with some anesthesia medications and a side effect of a muscle relaxant during a surgical procedure March 2021.     RENAL INSUFFICIENCY - Patient has a longstanding history of moderate-severe chronic renal insufficiency. Last Cr see results She has a HX of renal transplant on  Immunosuppression Creatinine 2.16 on 4/18        Review of Systems  Constitutional, neuro, ENT, endocrine, pulmonary, cardiac, gastrointestinal, genitourinary, musculoskeletal, integument and psychiatric systems are  negative, except as otherwise noted.     \plain    Review of Systems   Problem list, PMH, Surgical HX, FH, SH, allergies, medications,immunizations reviewed and updated in Epic. 10 point ROS negative other than noted in HPI and ROS.  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   end of November 2018 to David has one adopted daughter David's  niece Daryn 2012 Blind.     Social Determinants of Health     Financial Resource Strain: Low Risk      Difficulty of Paying Living Expenses: Not hard at all   Food Insecurity: No Food Insecurity     Worried About Running Out of Food in the Last Year: Never true     Ran Out of Food in the Last Year: Never true   Transportation Needs: Unmet Transportation Needs     Lack of Transportation (Medical): Yes     Lack of Transportation (Non-Medical): Yes   Physical Activity: Insufficiently Active     Days of Exercise per Week: 5 days     Minutes of Exercise per Session: 20 min   Stress: Stress Concern Present     Feeling of Stress : Rather much   Social Connections: Socially Integrated     Frequency of Communication with Friends and Family: More than three times a week     Frequency of Social Gatherings with Friends and Family: Once a week      Attends Gnosticist Services: More than 4 times per year     Active Member of Clubs or Organizations: Yes     Attends Club or Organization Meetings: Not on file     Marital Status:    Intimate Partner Violence: Not At Risk     Fear of Current or Ex-Partner: No     Emotionally Abused: No     Physically Abused: No     Sexually Abused: No   Housing Stability: Low Risk      Unable to Pay for Housing in the Last Year: No     Number of Places Lived in the Last Year: 1     Unstable Housing in the Last Year: No     Immunization History   Administered Date(s) Administered     HPV Quadrivalent 06/15/2007, 08/15/2007, 01/02/2008     Hep B, Peds or Adolescent 12/19/2000, 01/16/2001, 07/10/2001     HepA, Unspecified 01/02/1998, 07/24/1998     HepB, Unspecified 12/19/2000, 01/16/2001, 07/01/2001, 07/10/2001     Historical DTP/aP 1988, 01/03/1989, 03/07/1989, 03/20/1990     Influenza (H1N1) 06/15/2007, 08/15/2007, 01/02/2008     Influenza (IIV3) PF 10/22/2008, 01/04/2013     MMR 02/23/1990, 06/25/1998, 12/19/2000     Meningococcal,unspecified 01/09/1998     OPV, trivalent, live 1988, 01/03/1989, 03/20/1990, 08/02/1993     Pneumo Conj 13-V (2010&after) 04/12/2017     Pneumococcal 23 valent 12/11/2019     TD (ADULT, 7+) 01/16/2001, 05/20/2003     TDAP Vaccine (Boostrix) 08/20/2012     Tdap (Adult) Unspecified Formulation 08/20/2012       Patient Active Problem List    Diagnosis Date Noted     Chronic kidney disease, stage 3 (H) 08/16/2021     Priority: Medium     EIN (endometrial intraepithelial neoplasia) 08/09/2021     Priority: Medium     Aftercare following organ transplant 08/25/2020     Priority: Medium     Immunosuppression (H) 08/25/2020     Priority: Medium     Dyslipidemia 01/17/2020     Priority: Medium     HTN, kidney transplant related 01/17/2020     Priority: Medium     Palpitations 03/20/2018     Priority: Medium     Cataract      Priority: Medium     Vitamin D deficiency 07/26/2013     Priority:  Medium     Problem list name updated by automated process. Provider to review       Morbid obesity (H) 07/26/2013     Priority: Medium     Insulin resistance 07/26/2013     Priority: Medium     PCOS (polycystic ovarian syndrome) 07/26/2013     Priority: Medium     Kidney replaced by transplant 05/16/2012     Priority: Medium     Senior-Loken syndrome      Priority: Medium     Pseudotumor cerebri      Priority: Medium     Legally blind      Priority: Medium     Retinitis pigmentosa      Priority: Medium      Past Medical History:   Diagnosis Date     Anemia      Anxiety      Cataract 2015     CMV (cytomegalovirus infection) (H)      CMV disease (H) 2006    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     Depression      Fatigue      High cholesterol      Hypertension      Insomnia      Insulin resistance      Legally blind      Obesity      PCOS (polycystic ovarian syndrome)      PPD positive, treated 2006    9 months of INH     Pseudotumor cerebri     on Diamox     Retinitis pigmentosa      S/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      Uncomplicated asthma     as a child     Viremia      Past Surgical History:   Procedure Laterality Date     cataract bilateral  06/2017     DILATION AND CURETTAGE N/A 9/13/2021    Procedure: DILATION AND CURETTAGE,;  Surgeon: Lisbeth Gallardo MD;  Location:  OR     DILATION AND CURETTAGE, OPERATIVE HYSTEROSCOPY WITH MORCELLATOR, COMBINED N/A 10/23/2020    Procedure: HYSTEROSCOPY, DILATION AND CURRETAGE, MYOSURE;  Surgeon: Kierra Tracy MD;  Location:  OR     DILATION AND CURETTAGE, OPERATIVE HYSTEROSCOPY WITH MORCELLATOR, COMBINED N/A 03/05/2021    Procedure: HYSTEROSCOPY, WITH DILATION AND CURETTAGE OF UTERUS USING  MORCELLATOR;  Surgeon: Fiorella Cunningham MD;  Location:  OR     INSERT INTRAUTERINE DEVICE N/A 03/05/2021    Procedure: INSERTION MIRENA INTRAUTERINE DEVICE;  Surgeon: Fiorella Cunningham MD;  Location:  OR     IR RENAL BIOPSY RIGHT   "8/27/2021     LAPAROSCOPY DIAGNOSTIC (GYN)  03/05/2021    Procedure: Laparoscopy diagnostic (gyn);  Surgeon: Fiorella Cunningham MD;  Location:  OR     LITHOTRIPSY       LITHOTRIPSY       REMOVE INTRAUTERINE DEVICE N/A 03/05/2021    Procedure: REMOVE MIRENA INTRAUTERINE DEVICE;  Surgeon: Fiorella Cunningham MD;  Location:  OR     REPLACE INTRAUTERINE DEVICE N/A 9/13/2021    Procedure: MIRENA INTRAUTERINE DEVICE EXCHANGE;  Surgeon: Lisbeth Gallardo MD;  Location:  OR     SINUS SURGERY  2001     TONSILLECTOMY       TRANSPLANT KIDNEY RECIPIENT LIVING RELATED Right 07/2006     wisdom teeth       Current Outpatient Medications   Medication Sig Dispense Refill     amLODIPine (NORVASC) 5 MG tablet Take 1 tablet (5 mg) by mouth daily 90 tablet 3     carvedilol (COREG) 25 MG tablet Take 1 tablet (25 mg) by mouth 2 times daily (with meals) 60 tablet 11     levonorgestrel (MIRENA) 20 MCG/24HR IUD 1 each by Intrauterine route once       losartan (COZAAR) 25 MG tablet Take 2 tablets (50 mg) by mouth daily 180 tablet 3     mycophenolic acid (GENERIC EQUIVALENT) 180 MG EC tablet Take 3 tablets (540 mg) by mouth 2 times daily 540 tablet 3     NEORAL (BRAND) 25 MG capsule Take 3 capsules (75 mg) by mouth every morning AND 2 capsules (50 mg) every evening. 450 capsule 3     acetaminophen (TYLENOL) 325 MG tablet Take 2 tablets (650 mg) by mouth every 4 hours as needed for mild pain (Patient not taking: Reported on 5/4/2022) 50 tablet 0       No Known Allergies       Objective     /88 (BP Location: Right arm, Patient Position: Sitting, Cuff Size: Adult Large)   Pulse 78   Resp 16   Ht 1.676 m (5' 6\")   Wt 78 kg (172 lb)   SpO2 93%   BMI 27.76 kg/m      Physical Exam   EYES: conjunctivae and sclerae normal, pupils are sluggish to light. Exotropia on left eye, right eye follows appropriately looking straight forward.   HENT: Slight scarring in right ear, left ear canal normal and TM normal, mask removed briefly " mouth without ulcers or lesions, oropharynx clear and oral mucous membranes moist  NECK: no adenopathy, no asymmetry, masses, or scars and thyroid normal to palpation  RESP: lungs clear to auscultation - no rales, rhonchi or wheezes  CV: regular rate and rhythm, normal S1 S2, no S3 or S4, no murmur, click or rub, no peripheral edema and peripheral pulses strong  ABDOMEN: Truncal obesity with a few stria, Mild hirsutism of abdominal and chin. Well healed scar on right side from transplant with fullness in area of transplant otherwise soft, nontender, no hepatosplenomegaly, no masses and bowel sounds normal   MS: no musculoskeletal defects are noted and gait is age appropriate without ataxia uses a cane due to blindness  SKIN: no suspicious lesions or rashes, depigmented patch on back. Hirsutism of chin.  NEURO: Normal strength and tone, mentation intact and speech normal, negative pronator drift  PSYCH: mentation appears normal and affect normal/bright very upbeat      Recent Labs   Lab Test 04/18/22  0632 12/27/21  0829 09/03/21  0655 08/27/21  1002   HGB 12.2 11.2*   < > 13.1    215   < > 243   INR  --   --   --  1.03    141   < > 139   POTASSIUM 4.1 4.1   < > 4.1   CR 2.16* 1.98*   < > 2.19*    < > = values in this interval not displayed.        Revised Cardiac Risk Index (RCRI):  The patient has the following serious cardiovascular risks for perioperative complications:   - No serious cardiac risks = 0 points     RCRI Interpretation: 0 points: Class I (very low risk - 0.4% complication rate)           Signed Electronically by: Danny Shi MD  Copy of this evaluation report is provided to requesting physician.

## 2022-05-04 NOTE — PROGRESS NOTES
Carol Guillaume presents on 5/4/2022 for a pre-operative exam.    Patient Name: Carol Guillaume  Location of Surgery: [facility]  Surgeon: Lisbeth Gallardo MD  Type of Surgery or Procedure: Dilation and Curettage, Exchange of Mirena IUD  Date and Time of Surgery or Procedure: 5/9/22  Where are you planning to recover after surgery:     (X) at home with family   ( ) at home alone   ( ) at home with home care   ( ) at a nursing home   ( ) at a TCU (Transitional Care Unit)   ( ) at a rehab center   ( ) other:     Jonathan Stahl, EMT at 6:48 AM on 5/4/2022

## 2022-05-04 NOTE — NURSING NOTE
Carol Guillaume is a 31 year old female patient that presents today in clinic for the following:    No chief complaint on file.    The patient's allergies and medications were reviewed as noted. A set of vitals were recorded as noted without incident. The patient does not have any other questions for the provider.    Jonathan Stahl, EMT at 6:54 AM on 5/4/2022

## 2022-05-04 NOTE — PATIENT INSTRUCTIONS
Preparing for Your Surgery  Getting started  A nurse will call you to review your health history and instructions. They will give you an arrival time based on your scheduled surgery time. Please be ready to share:  Your doctor's clinic name and phone number  Your medical, surgical and anesthesia history  A list of allergies and sensitivities  A list of medicines, including herbal treatments and over-the-counter drugs  Whether the patient has a legal guardian (ask how to send us the papers in advance)  Please tell us if you're pregnant--or if there's any chance you might be pregnant. Some surgeries may injure a fetus (unborn baby), so they require a pregnancy test. Surgeries that are safe for a fetus don't always need a test, and you can choose whether to have one.   If you have a child who's having surgery, please ask for a copy of Preparing for Your Child's Surgery.    Preparing for surgery  Within 30 days of surgery: Have a pre-op exam (sometimes called an H&P, or History and Physical). This can be done at a clinic or pre-operative center.  If you're having a , you may not need this exam. Talk to your care team.  At your pre-op exam, talk to your care team about all medicines you take. If you need to stop any medicines before surgery, ask when to start taking them again.  We do this for your safety. Many medicines can make you bleed too much during surgery. Some change how well surgery (anesthesia) drugs work.  Call your insurance company to let them know you're having surgery. (If you don't have insurance, call [phone].)  Call your clinic if there's any change in your health. This includes signs of a cold or flu (sore throat, runny nose, cough, rash, fever). It also includes a scrape or scratch near the surgery site.  If you have questions on the day of surgery, call your hospital or surgery center.  COVID testing  You may need to be tested for COVID-19 before having surgery. If so, we will give  you instructions.  Eating and drinking guidelines  For your safety: Unless your surgeon tells you otherwise, follow the guidelines below.  Eat and drink as usual until 8 hours before surgery. After that, no food or milk.  Drink clear liquids until 8 hours before surgery. These are liquids you can see through, like water, Gatorade and Propel Water. You may also have black coffee and tea (no cream or milk).  Nothing by mouth within 2 hours of surgery. This includes gum, candy and breath mints.  If you drink alcohol: Stop drinking it the night before surgery.  If your care team tells you to take medicine on the morning of surgery, it's okay to take it with a sip of water.  Preventing infection  Shower or bathe the night before and morning of your surgery. Follow the instructions your clinic gave you. (If no instructions, use regular soap.)  Don't shave or clip hair near your surgery site. We'll remove the hair if needed.  Don't smoke or vape the morning of surgery. You may chew nicotine gum up to 2 hours before surgery. A nicotine patch is okay.  Note: Some surgeries require you to completely quit smoking and nicotine. Check with your surgeon.  Your care team will make every effort to keep you safe from infection. We will:  Clean our hands often with soap and water (or an alcohol-based hand rub).  Clean the skin at your surgery site with a special soap that kills germs.  Give you a special gown to keep you warm. (Cold raises the risk of infection.)  Wear special hair covers, masks, gowns and gloves during surgery.  Give antibiotic medicine, if prescribed. Not all surgeries need antibiotics.  What to bring on the day of surgery  Photo ID and insurance card  Copy of your health care directive, if you have one  Glasses and hearing aides (bring cases)  You can't wear contacts during surgery  Inhaler and eye drops, if you use them (tell us about these when you arrive)  CPAP machine or breathing device, if you use them  A  few personal items, if spending the night  If you have . . .  A pacemaker, ICD (cardiac defibrillator) or other implant: Bring the ID card.  An implanted stimulator: Bring the remote control.  A legal guardian: Bring a copy of the certified (court-stamped) guardianship papers.  Please remove any jewelry, including body piercings. Leave jewelry and other valuables at home.  If you're going home the day of surgery  You must have a responsible adult drive you home. They should stay with you overnight as well.  If you don't have someone to stay with you, and you aren't safe to go home alone, we may keep you overnight. Insurance often won't pay for this.  After surgery  If it's hard to control your pain or you need more pain medicine, please call your surgeon's office.  Questions?   If you have any questions for your care team, list them here: _________________________________________________________________________________________________________________________________________________________________________ ____________________________________ ____________________________________ ____________________________________  For informational purposes only. Not to replace the advice of your health care provider. Copyright   2003, 2019 [employer]. All rights reserved. Clinically reviewed by Debbie Barrios MD. [employer] [id number] - REV 07/21.      Immunization History   Administered Date(s) Administered    HPV Quadrivalent 06/15/2007, 08/15/2007, 01/02/2008    Hep B, Peds or Adolescent 12/19/2000, 01/16/2001, 07/10/2001    HepA, Unspecified 01/02/1998, 07/24/1998    HepB, Unspecified 12/19/2000, 01/16/2001, 07/01/2001, 07/10/2001    Historical DTP/aP 1988, 01/03/1989, 03/07/1989, 03/20/1990    Influenza (H1N1) 06/15/2007, 08/15/2007, 01/02/2008    Influenza (IIV3) PF 10/22/2008, 01/04/2013    MMR 02/23/1990, 06/25/1998, 12/19/2000    Meningococcal,unspecified 01/09/1998    OPV, trivalent, live 1988,  01/03/1989, 03/20/1990, 08/02/1993    Pneumo Conj 13-V (2010&after) 04/12/2017    Pneumococcal 23 valent 12/11/2019    TD (ADULT, 7+) 01/16/2001, 05/20/2003    TDAP Vaccine (Boostrix) 08/20/2012    Tdap (Adult) Unspecified Formulation 08/20/2012     Consider covid vaccine series  Due for Tdap in 8/2022

## 2022-05-06 ENCOUNTER — LAB (OUTPATIENT)
Dept: LAB | Facility: CLINIC | Age: 32
End: 2022-05-06
Attending: OBSTETRICS & GYNECOLOGY
Payer: COMMERCIAL

## 2022-05-06 DIAGNOSIS — Z11.59 ENCOUNTER FOR SCREENING FOR OTHER VIRAL DISEASES: ICD-10-CM

## 2022-05-06 LAB — SARS-COV-2 RNA RESP QL NAA+PROBE: NEGATIVE

## 2022-05-06 PROCEDURE — U0003 INFECTIOUS AGENT DETECTION BY NUCLEIC ACID (DNA OR RNA); SEVERE ACUTE RESPIRATORY SYNDROME CORONAVIRUS 2 (SARS-COV-2) (CORONAVIRUS DISEASE [COVID-19]), AMPLIFIED PROBE TECHNIQUE, MAKING USE OF HIGH THROUGHPUT TECHNOLOGIES AS DESCRIBED BY CMS-2020-01-R: HCPCS | Mod: 90 | Performed by: PATHOLOGY

## 2022-05-06 PROCEDURE — U0005 INFEC AGEN DETEC AMPLI PROBE: HCPCS | Mod: 90 | Performed by: PATHOLOGY

## 2022-05-06 PROCEDURE — 99000 SPECIMEN HANDLING OFFICE-LAB: CPT | Performed by: PATHOLOGY

## 2022-05-08 ENCOUNTER — ANESTHESIA EVENT (OUTPATIENT)
Dept: SURGERY | Facility: CLINIC | Age: 32
End: 2022-05-08
Payer: COMMERCIAL

## 2022-05-09 ENCOUNTER — HOSPITAL ENCOUNTER (OUTPATIENT)
Facility: CLINIC | Age: 32
Discharge: HOME | End: 2022-05-09
Attending: OBSTETRICS & GYNECOLOGY | Admitting: OBSTETRICS & GYNECOLOGY
Payer: COMMERCIAL

## 2022-05-09 ENCOUNTER — ANESTHESIA (OUTPATIENT)
Dept: SURGERY | Facility: CLINIC | Age: 32
End: 2022-05-09
Payer: COMMERCIAL

## 2022-05-09 VITALS
TEMPERATURE: 97.3 F | SYSTOLIC BLOOD PRESSURE: 140 MMHG | HEIGHT: 66 IN | OXYGEN SATURATION: 95 % | RESPIRATION RATE: 14 BRPM | HEART RATE: 75 BPM | DIASTOLIC BLOOD PRESSURE: 91 MMHG | WEIGHT: 274 LBS | BODY MASS INDEX: 44.03 KG/M2

## 2022-05-09 DIAGNOSIS — N85.02 EIN (ENDOMETRIAL INTRAEPITHELIAL NEOPLASIA): Primary | ICD-10-CM

## 2022-05-09 DIAGNOSIS — Z94.0 KIDNEY TRANSPLANTED: Primary | ICD-10-CM

## 2022-05-09 LAB — B-HCG SERPL-ACNC: <1 IU/L (ref 0–5)

## 2022-05-09 PROCEDURE — 250N000009 HC RX 250

## 2022-05-09 PROCEDURE — 84702 CHORIONIC GONADOTROPIN TEST: CPT | Performed by: OBSTETRICS & GYNECOLOGY

## 2022-05-09 PROCEDURE — 258N000003 HC RX IP 258 OP 636

## 2022-05-09 PROCEDURE — 250N000011 HC RX IP 250 OP 636

## 2022-05-09 PROCEDURE — 710N000012 HC RECOVERY PHASE 2, PER MINUTE: Performed by: OBSTETRICS & GYNECOLOGY

## 2022-05-09 PROCEDURE — 360N000075 HC SURGERY LEVEL 2, PER MIN: Performed by: OBSTETRICS & GYNECOLOGY

## 2022-05-09 PROCEDURE — 36415 COLL VENOUS BLD VENIPUNCTURE: CPT | Performed by: OBSTETRICS & GYNECOLOGY

## 2022-05-09 PROCEDURE — 250N000009 HC RX 250: Performed by: OBSTETRICS & GYNECOLOGY

## 2022-05-09 PROCEDURE — 250N000011 HC RX IP 250 OP 636: Performed by: ANESTHESIOLOGY

## 2022-05-09 PROCEDURE — 272N000001 HC OR GENERAL SUPPLY STERILE: Performed by: OBSTETRICS & GYNECOLOGY

## 2022-05-09 PROCEDURE — 88305 TISSUE EXAM BY PATHOLOGIST: CPT | Mod: TC | Performed by: OBSTETRICS & GYNECOLOGY

## 2022-05-09 PROCEDURE — 999N000141 HC STATISTIC PRE-PROCEDURE NURSING ASSESSMENT: Performed by: OBSTETRICS & GYNECOLOGY

## 2022-05-09 PROCEDURE — 250N000013 HC RX MED GY IP 250 OP 250 PS 637: Performed by: OBSTETRICS & GYNECOLOGY

## 2022-05-09 PROCEDURE — 710N000009 HC RECOVERY PHASE 1, LEVEL 1, PER MIN: Performed by: OBSTETRICS & GYNECOLOGY

## 2022-05-09 PROCEDURE — 370N000017 HC ANESTHESIA TECHNICAL FEE, PER MIN: Performed by: OBSTETRICS & GYNECOLOGY

## 2022-05-09 DEVICE — IMPLANTABLE DEVICE: Type: IMPLANTABLE DEVICE | Site: UTERUS | Status: FUNCTIONAL

## 2022-05-09 RX ORDER — ONDANSETRON 2 MG/ML
4 INJECTION INTRAMUSCULAR; INTRAVENOUS EVERY 30 MIN PRN
Status: DISCONTINUED | OUTPATIENT
Start: 2022-05-09 | End: 2022-05-09 | Stop reason: HOSPADM

## 2022-05-09 RX ORDER — LIDOCAINE HYDROCHLORIDE 20 MG/ML
INJECTION, SOLUTION INFILTRATION; PERINEURAL PRN
Status: DISCONTINUED | OUTPATIENT
Start: 2022-05-09 | End: 2022-05-09

## 2022-05-09 RX ORDER — OXYCODONE HYDROCHLORIDE 5 MG/1
5 TABLET ORAL EVERY 6 HOURS PRN
Qty: 3 TABLET | Refills: 0 | Status: SHIPPED | OUTPATIENT
Start: 2022-05-09 | End: 2022-05-17

## 2022-05-09 RX ORDER — OXYCODONE HYDROCHLORIDE 5 MG/1
5 TABLET ORAL EVERY 4 HOURS PRN
Status: DISCONTINUED | OUTPATIENT
Start: 2022-05-09 | End: 2022-05-09 | Stop reason: HOSPADM

## 2022-05-09 RX ORDER — ACETAMINOPHEN 325 MG/1
975 TABLET ORAL ONCE
Status: COMPLETED | OUTPATIENT
Start: 2022-05-09 | End: 2022-05-09

## 2022-05-09 RX ORDER — PROPOFOL 10 MG/ML
INJECTION, EMULSION INTRAVENOUS CONTINUOUS PRN
Status: DISCONTINUED | OUTPATIENT
Start: 2022-05-09 | End: 2022-05-09

## 2022-05-09 RX ORDER — FENTANYL CITRATE 0.05 MG/ML
25 INJECTION, SOLUTION INTRAMUSCULAR; INTRAVENOUS EVERY 5 MIN PRN
Status: DISCONTINUED | OUTPATIENT
Start: 2022-05-09 | End: 2022-05-09 | Stop reason: HOSPADM

## 2022-05-09 RX ORDER — ONDANSETRON 4 MG/1
4 TABLET, ORALLY DISINTEGRATING ORAL EVERY 30 MIN PRN
Status: DISCONTINUED | OUTPATIENT
Start: 2022-05-09 | End: 2022-05-09 | Stop reason: HOSPADM

## 2022-05-09 RX ORDER — SODIUM CHLORIDE, SODIUM LACTATE, POTASSIUM CHLORIDE, CALCIUM CHLORIDE 600; 310; 30; 20 MG/100ML; MG/100ML; MG/100ML; MG/100ML
INJECTION, SOLUTION INTRAVENOUS CONTINUOUS PRN
Status: DISCONTINUED | OUTPATIENT
Start: 2022-05-09 | End: 2022-05-09

## 2022-05-09 RX ORDER — SODIUM CHLORIDE, SODIUM LACTATE, POTASSIUM CHLORIDE, CALCIUM CHLORIDE 600; 310; 30; 20 MG/100ML; MG/100ML; MG/100ML; MG/100ML
INJECTION, SOLUTION INTRAVENOUS CONTINUOUS
Status: DISCONTINUED | OUTPATIENT
Start: 2022-05-09 | End: 2022-05-09 | Stop reason: HOSPADM

## 2022-05-09 RX ORDER — FENTANYL CITRATE 0.05 MG/ML
50 INJECTION, SOLUTION INTRAMUSCULAR; INTRAVENOUS
Status: DISCONTINUED | OUTPATIENT
Start: 2022-05-09 | End: 2022-05-09 | Stop reason: HOSPADM

## 2022-05-09 RX ORDER — IBUPROFEN 600 MG/1
600 TABLET, FILM COATED ORAL EVERY 6 HOURS PRN
Qty: 30 TABLET | Refills: 1 | Status: SHIPPED | OUTPATIENT
Start: 2022-05-09 | End: 2022-05-17

## 2022-05-09 RX ORDER — MAGNESIUM HYDROXIDE 1200 MG/15ML
LIQUID ORAL PRN
Status: DISCONTINUED | OUTPATIENT
Start: 2022-05-09 | End: 2022-05-09 | Stop reason: HOSPADM

## 2022-05-09 RX ORDER — PROPOFOL 10 MG/ML
INJECTION, EMULSION INTRAVENOUS PRN
Status: DISCONTINUED | OUTPATIENT
Start: 2022-05-09 | End: 2022-05-09

## 2022-05-09 RX ORDER — HYDROMORPHONE HCL IN WATER/PF 6 MG/30 ML
0.4 PATIENT CONTROLLED ANALGESIA SYRINGE INTRAVENOUS EVERY 5 MIN PRN
Status: DISCONTINUED | OUTPATIENT
Start: 2022-05-09 | End: 2022-05-09 | Stop reason: HOSPADM

## 2022-05-09 RX ORDER — ONDANSETRON 2 MG/ML
INJECTION INTRAMUSCULAR; INTRAVENOUS PRN
Status: DISCONTINUED | OUTPATIENT
Start: 2022-05-09 | End: 2022-05-09

## 2022-05-09 RX ORDER — FENTANYL CITRATE 50 UG/ML
INJECTION, SOLUTION INTRAMUSCULAR; INTRAVENOUS PRN
Status: DISCONTINUED | OUTPATIENT
Start: 2022-05-09 | End: 2022-05-09

## 2022-05-09 RX ADMIN — FENTANYL CITRATE 50 MCG: 50 INJECTION, SOLUTION INTRAMUSCULAR; INTRAVENOUS at 08:18

## 2022-05-09 RX ADMIN — OXYCODONE HYDROCHLORIDE 5 MG: 5 TABLET ORAL at 09:55

## 2022-05-09 RX ADMIN — PROPOFOL 250 MG: 10 INJECTION, EMULSION INTRAVENOUS at 08:10

## 2022-05-09 RX ADMIN — MIDAZOLAM 2 MG: 1 INJECTION INTRAMUSCULAR; INTRAVENOUS at 08:04

## 2022-05-09 RX ADMIN — ACETAMINOPHEN 975 MG: 325 TABLET ORAL at 07:11

## 2022-05-09 RX ADMIN — SODIUM CHLORIDE, POTASSIUM CHLORIDE, SODIUM LACTATE AND CALCIUM CHLORIDE: 600; 310; 30; 20 INJECTION, SOLUTION INTRAVENOUS at 08:03

## 2022-05-09 RX ADMIN — HYDROMORPHONE HYDROCHLORIDE 0.4 MG: 0.2 INJECTION, SOLUTION INTRAMUSCULAR; INTRAVENOUS; SUBCUTANEOUS at 09:06

## 2022-05-09 RX ADMIN — PROPOFOL 200 MCG/KG/MIN: 10 INJECTION, EMULSION INTRAVENOUS at 08:13

## 2022-05-09 RX ADMIN — ONDANSETRON 4 MG: 2 INJECTION INTRAMUSCULAR; INTRAVENOUS at 08:18

## 2022-05-09 RX ADMIN — LIDOCAINE HYDROCHLORIDE 80 MG: 20 INJECTION, SOLUTION INFILTRATION; PERINEURAL at 08:10

## 2022-05-09 NOTE — ANESTHESIA PROCEDURE NOTES
Airway       Patient location during procedure: OR  Staff -        Anesthesiologist:  Daniel Burkett MD       CRNA: Jean-Claude De La Rosa APRN CRNA       Performed By: CRNAIndications and Patient Condition       Indications for airway management: consuelo-procedural       Induction type:intravenous       Mask difficulty assessment: 0 - not attempted    Final Airway Details       Final airway type: supraglottic airway    Supraglottic Airway Details        Type: LMA       LMA size: 4    Post intubation assessment        Placement verified by: capnometry and equal breath sounds        Number of attempts at approach: 1       Number of other approaches attempted: 0       Ease of procedure: easy       Dentition: Intact and Unchanged

## 2022-05-09 NOTE — PROCEDURES
Brief Op Note:    Pre-op diagnosis:  EIN/CAH    Post-op diagnosis:  EIN/CAH    Procedure:  D & C, exchange of Mirena IUD    Surgeon:  MARIE Gallardo MD    Anesthesia:  General with LMA    Findings:  Minimal endometrial tissue on curettings    EBL:   5 mL

## 2022-05-09 NOTE — PROGRESS NOTES
Patient is adequate for discharge to home from Phase 2. Ox4 and AVSS. Tolerating PO, denies nausea. Pain well controlled. Discharge instructions completed with spouse and pt via phone per COVID guidelines. Discharge medications and all belongings returned to patient and sent home.

## 2022-05-09 NOTE — ANESTHESIA PREPROCEDURE EVALUATION
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     Depression      Fatigue      High cholesterol      Hypertension      Insomnia      Insulin resistance      Legally blind      Obesity      PCOS (polycystic ovarian syndrome)      PPD positive, treated     9 months of INH     Pseudotumor cerebri     on Diamox     Retinitis pigmentosa      S/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      Uncomplicated asthma     as a child     Viremia       Past Surgical History:   Procedure Laterality Date     cataract bilateral  2017     DILATION AND CURETTAGE N/A 2021    Procedure: DILATION AND CURETTAGE,;  Surgeon: Lisbeth Gallardo MD;  Location:  OR     DILATION AND CURETTAGE, OPERATIVE HYSTEROSCOPY WITH MORCELLATOR, COMBINED N/A 10/23/2020    Procedure: HYSTEROSCOPY, DILATION AND CURRETAGE, MYOSURE;  Surgeon: Kierra Tracy MD;  Location:  OR     DILATION AND CURETTAGE, OPERATIVE HYSTEROSCOPY WITH MORCELLATOR, COMBINED N/A 2021    Procedure: HYSTEROSCOPY, WITH DILATION AND CURETTAGE OF UTERUS USING  MORCELLATOR;  Surgeon: Fiorella Cunningham MD;  Location:  OR     INSERT INTRAUTERINE DEVICE N/A 2021    Procedure: INSERTION MIRENA INTRAUTERINE DEVICE;  Surgeon: Fiorella Cunningham MD;  Location:  OR     IR RENAL BIOPSY RIGHT  2021     LAPAROSCOPY DIAGNOSTIC (GYN)  2021    Procedure: Laparoscopy diagnostic (gyn);  Surgeon: Fiorella Cunningham MD;  Location:  OR     LITHOTRIPSY       LITHOTRIPSY       REMOVE INTRAUTERINE DEVICE N/A 2021    Procedure: REMOVE MIRENA INTRAUTERINE DEVICE;  Surgeon: Fiorella Cunningham  MD;  Location:  OR     REPLACE INTRAUTERINE DEVICE N/A 9/13/2021    Procedure: MIRENA INTRAUTERINE DEVICE EXCHANGE;  Surgeon: Lisbeth Gallardo MD;  Location:  OR     SINUS SURGERY  2001     TONSILLECTOMY       TRANSPLANT KIDNEY RECIPIENT LIVING RELATED Right 07/2006     wisdom teeth        No Known Allergies   Social History     Tobacco Use     Smoking status: Never Smoker     Smokeless tobacco: Never Used   Substance Use Topics     Alcohol use: No      Wt Readings from Last 1 Encounters:   05/09/22 124.3 kg (274 lb)        Anesthesia Evaluation   Pt has had prior anesthetic.     History of anesthetic complications   Patient concerned with 3/2021 she received a medication IV that she should have received IM, and it somehow resulted in a long time of abdominal burning.  Also severe HTN with a surgery related to a medication used in the OR.    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  Right ventricular function, chamber size, wall motion, and thickness are  normal.  No significant valvular pathology.  Stress Test: Date: Results:    ECG Reviewed: Date: Results:    Cath: Date: Results:      METS/Exercise Tolerance:     Hematologic:       Musculoskeletal:       GI/Hepatic:    (-) GERD   Renal/Genitourinary: Comment: Senior-Loken syndrome    (+) renal disease, type: CRI, Pt has history of transplant,     Endo: Comment: BMI 45  On immunosuppression, no chronic steroids    (+) Obesity (BMI 44),     Psychiatric/Substance Use:     (+) psychiatric history anxiety and depression     Infectious Disease:       Malignancy:   (+) Malignancy,  History of Other.Other CA Endometrial intraepithelial neoplasia status post.    Other: Comment: PCOS           Physical Exam    Airway        Mallampati: II   TM distance: > 3 FB   Neck ROM: full   Mouth opening: > 3 cm    Respiratory Devices and Support         Dental  no notable dental history         Cardiovascular   cardiovascular exam normal       Rhythm and rate: regular and normal     Pulmonary   pulmonary exam normal        breath sounds clear to auscultation           OUTSIDE LABS:  CBC:   Lab Results   Component Value Date    WBC 8.4 04/18/2022    WBC 5.8 12/27/2021    HGB 12.2 04/18/2022    HGB 11.2 (L) 12/27/2021    HCT 38.3 04/18/2022    HCT 33.4 (L) 12/27/2021     04/18/2022     12/27/2021     BMP:   Lab Results   Component Value Date     04/18/2022     12/27/2021    POTASSIUM 4.1 04/18/2022    POTASSIUM 4.1 12/27/2021    CHLORIDE 104 04/18/2022    CHLORIDE 110 (H) 12/27/2021    CO2 28 04/18/2022    CO2 22 12/27/2021    BUN 33 (H) 04/18/2022    BUN 28 12/27/2021    CR 2.16 (H) 04/18/2022    CR 1.98 (H) 12/27/2021     (H) 04/18/2022     (H) 12/27/2021     COAGS:   Lab Results   Component Value Date    PTT 33 08/27/2021    INR 1.03 08/27/2021     POC:   Lab Results   Component Value Date     (H) 07/17/2007    HCG Negative 08/17/2020    HCGS Negative 07/17/2007     HEPATIC:   Lab Results   Component Value Date    ALBUMIN 3.4 09/03/2021    PROTTOTAL 7.4 12/13/2019    ALT 51 (H) 12/13/2019    AST 21 12/13/2019    ALKPHOS 92 12/13/2019    BILITOTAL 0.6 12/13/2019     OTHER:   Lab Results   Component Value Date    A1C 5.0 07/26/2013    LACHO 8.8 04/18/2022    PHOS 4.8 (H) 09/09/2021    MAG 1.8 01/07/2009    LIPASE 236 07/17/2007    AMYLASE 58 06/03/2008    TSH 1.85 08/16/2021    T4 1.16 07/26/2013    CRP <3.0 06/03/2008       Anesthesia Plan    ASA Status:  3   NPO Status:  NPO Appropriate    Anesthesia Type: General.     - Airway: LMA   Induction: Intravenous.    Maintenance: Balanced.        Consents    Anesthesia Plan(s) and associated risks, benefits, and realistic alternatives discussed. Questions answered and patient/representative(s) expressed understanding.    - Discussed:     - Discussed with:  Patient         Postoperative Care    Pain management: IV analgesics, Multi-modal analgesia.   PONV prophylaxis: Ondansetron (or other 5HT-3)     Comments:    Other Comments: H&P reviewed    I reviewed her list of medication used in the OR from 3/2021 and told her that none of the medications used were supposed to be used IM, nor would any cause a burning in her abdomen lasting for many days.  I told her that must have been procedure related.  She became argumentative.  Regardless I told her that with the surgery planned as is, it would be very unlikely to have to use most of the medications used during that surgical encounter.    9/2021 she had a good experience with the procedure under [facility].  The surgeon wants a GA however this time.            Daniel Burkett MD

## 2022-05-09 NOTE — ANESTHESIA POSTPROCEDURE EVALUATION
Patient: Carol Guillaume    Procedure: Procedure(s):  DILATION AND CURETTAGE  EXCHANGE OF MIRENA INTRAUTERINE DEVICE       Anesthesia Type:  General    Note:  Disposition: Outpatient   Postop Pain Control: Uneventful            Sign Out: Well controlled pain   PONV: No   Neuro/Psych: Uneventful            Sign Out: Acceptable/Baseline neuro status   Airway/Respiratory: Uneventful            Sign Out: Acceptable/Baseline resp. status   CV/Hemodynamics: Uneventful            Sign Out: Acceptable CV status; No obvious hypovolemia; No obvious fluid overload   Other NRE: NONE   DID A NON-ROUTINE EVENT OCCUR? No           Last vitals:  Vitals Value Taken Time   /105 05/09/22 0915   Temp 36.1  C (97  F) 05/09/22 0915   Pulse 73 05/09/22 0925   Resp 22 05/09/22 0925   SpO2 97 % 05/09/22 0925   Vitals shown include unvalidated device data.    Electronically Signed By: Daniel Burkett MD  May 9, 2022  12:49 PM

## 2022-05-09 NOTE — OP NOTE
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    SURGEON:  MARIE Gallardo MD    ANESTHESIA:  General anesthesia with LMA.    PROCEDURES:  Dilatation and curettage of the endometrium and exchange of Mirena intrauterine device.    INDICATIONS FOR PROCEDURE:  The patient is a 31-year-old female who presented in 06/2020 with heavy vaginal bleeding.  She eventually underwent a hysteroscopy, D and C, and MyoSure polypectomy by Dr. Kierra Tracy.  Pathology revealed complex atypical hyperplasia with foci bordering on well-differentiated adenocarcinoma.  She had a consultation with myself on 11/06/2020.  We decided to pursue conservative management.  She had an MRI of the pelvis that showed no definite myometrial invasion.  On 11/30/2021, she underwent EUA, hysteroscopy, and Mirena placement.  On 03/05/2021, she underwent an EUA, hysteroscopy, ECC and Mirena exchange that resulted in uterine perforation.  Final pathology showed a small area of residual complex atypical endometrial hyperplasia and on 09/13/2021, she underwent removal of the Mirena, D and C of the endometrium and replacement of Mirena.  Endometrium showed a small foci of residual atypical hyperplasia in the background of inactive endometrium.  On 03/11/2022, a pelvic ultrasound was obtained and revealed an endometrial stripe of 5 mm.  Ovaries were consistent with PCOS and the IUD was visualized and was in good position.  She was brought to the operating room for interval evaluation.    FINDINGS:  The patient had an ENT flexed uterus.  The uterus sounded to 8-9 cm.  The IUD was easily removed.  She had very little tissue on endometrial curettage.    DESCRIPTION OF PROCEDURE:  The patient was taken to the operating room.  She was placed in a supine position on the operating room table.   Pneumatic compression stockings had been placed on her lower extremities.  General anesthesia with an LMA was utilized.  Once her airway was protected with LMA, she was placed in high lithotomy position using well-padded Yellofin stirrups.  Her arms were left on arm boards.  She was prepped and draped in the usual sterile fashion.  A timeout was conducted and everyone agreed upon the procedure.  I started by inserting a Graves' speculum into the vagina and easily visualized the cervix.  I grasped it at 12 o'clock with a single-tooth tenaculum. With the uterine sound, I was able to get past the internal os and found that her uterus sounded to about 9 cm.  This was more to the left side of the cervix and the sound needed to be an angled upward to make that curve.  I subsequently dilated her endocervix and was able to introduce a small curette. With Telfa placed in the posterior vagina, I did curettings serially around the endometrium and got very little tissue at that point.  The curettings were passed off the table.  I had removed the IUD at the very beginning with a ring forceps, and I now placed the new Mirena IUD at a 9 cm distance and inserted it, again making that somewhat difficult curve in the uterus, but I was able to place the Mirena IUD and cut the IUD string approximately 3 cm away from the cervix.  The instruments were removed from her vagina.  Her anesthesia was reversed.  She was extubated and taken to recovery room in stable condition.    ESTIMATED BLOOD LOSS FOR THE PROCEDURE:  5 mL.    Lisbeth Gallardo MD        D: 2022   T: 2022   MT: [id number]    Name:     KATHRYN PEÑALOZA  MRN:      [id number]        Account:        [id number]   :      1990           Procedure Date: 2022     Document: [id number]    cc:  MD Danny Moralez MD

## 2022-05-09 NOTE — DISCHARGE INSTRUCTIONS
Same Day Surgery Discharge Instructions for  Sedation and General Anesthesia     It's not unusual to feel dizzy, light-headed or faint for up to 24 hours after surgery or while taking pain medication.  If you have these symptoms: sit for a few minutes before standing and have someone assist you when you get up to walk or use the bathroom.    You should rest and relax for the next 24 hours. We recommend you make arrangements to have an adult stay with you for at least 24 hours after your discharge.  Avoid hazardous and strenuous activity.    DO NOT DRIVE any vehicle or operate mechanical equipment for 24 hours following the end of your surgery.  Even though you may feel normal, your reactions may be affected by the medication you have received.    Do not drink alcoholic beverages for 24 hours following surgery.     Slowly progress to your regular diet as you feel able. It's not unusual to feel nauseated and/or vomit after receiving anesthesia.  If you develop these symptoms, drink clear liquids (apple juice, ginger ale, broth, 7-up, etc. ) until you feel better.  If your nausea and vomiting persists for 24 hours, please notify your surgeon.      All narcotic pain medications, along with inactivity and anesthesia, can cause constipation. Drinking plenty of liquids and increasing fiber intake will help.    For any questions of a medical nature, call your surgeon.    Do not make important decisions for 24 hours.    If you had general anesthesia, you may have a sore throat for a couple of days related to the breathing tube used during surgery.  You may use Cepacol lozenges to help with this discomfort.  If it worsens or if you develop a fever, contact your surgeon.     If you feel your pain is not well managed with the pain medications prescribed by your surgeon, please contact your surgeon's office to let them know so they can address your concerns.       CoVid 19 Information    We want to give you information  regarding Covid. Please consult your primary care provider with any questions you might have.     Patient who have symptoms (cough, fever, or shortness of breath), need to isolate for 7 days from when symptoms started OR 72 hours after fever resolves (without fever reducing medications) AND improvement of respiratory symptoms (whichever is longer).    Isolate yourself at home (in own room/own bathroom if possible)  Do Not allow any visitors  Do Not go to work or school  Do Not go to [employer]az[employer],  centers, shopping, or other public places.  Do Not shake hands.  Avoid close and intimate contact with others (hugging, kissing).  Follow CDC recommendations for household cleaning of frequently touched services.     After the initial 7 days, continue to isolate yourself from household members as much as possible. To continue decrease the risk of community spread and exposure, you and any members of your household should limit activities in public for 14 days after starting home isolation.     You can reference the following CDC link for helpful home isolation/care tips:  https://www.cdc.gov/coronavirus/2019-ncov/downloads/10Things.pdf    Protect Others:  Cover Your Mouth and Nose with a mask, disposable tissue or wash cloth to avoid spreading germs to others.  Wash your hands and face frequently with soap and water    Call Your Primary Doctor If: Breathing difficulty develops or you become worse.    For more information about COVID19 and options for caring for yourself at home, please visit the CDC website at https://www.cdc.gov/coronavirus/2019-ncov/about/steps-when-sick.html  For more options for care at [facility], please visit our website at [email or web address]           ** If you have questions or concerns about your procedure,   call Dr. Gallardo [phone] **

## 2022-05-10 LAB
PATH REPORT.COMMENTS IMP SPEC: NORMAL
PATH REPORT.COMMENTS IMP SPEC: NORMAL
PATH REPORT.FINAL DX SPEC: NORMAL
PATH REPORT.GROSS SPEC: NORMAL
PATH REPORT.MICROSCOPIC SPEC OTHER STN: NORMAL
PATH REPORT.RELEVANT HX SPEC: NORMAL
PHOTO IMAGE: NORMAL

## 2022-05-10 PROCEDURE — 88305 TISSUE EXAM BY PATHOLOGIST: CPT | Mod: 26 | Performed by: PATHOLOGY

## 2022-05-17 ENCOUNTER — VIRTUAL VISIT (OUTPATIENT)
Dept: NEPHROLOGY | Facility: CLINIC | Age: 32
End: 2022-05-17
Attending: INTERNAL MEDICINE
Payer: COMMERCIAL

## 2022-05-17 VITALS — WEIGHT: 270 LBS | BODY MASS INDEX: 43.58 KG/M2

## 2022-05-17 DIAGNOSIS — D84.9 IMMUNOSUPPRESSION (H): ICD-10-CM

## 2022-05-17 DIAGNOSIS — R80.1 PERSISTENT PROTEINURIA: ICD-10-CM

## 2022-05-17 DIAGNOSIS — Z48.298 AFTERCARE FOLLOWING ORGAN TRANSPLANT: Primary | ICD-10-CM

## 2022-05-17 DIAGNOSIS — Z94.0 KIDNEY REPLACED BY TRANSPLANT: ICD-10-CM

## 2022-05-17 DIAGNOSIS — Z94.0 HTN, KIDNEY TRANSPLANT RELATED: ICD-10-CM

## 2022-05-17 DIAGNOSIS — I15.1 HTN, KIDNEY TRANSPLANT RELATED: ICD-10-CM

## 2022-05-17 PROCEDURE — G0463 HOSPITAL OUTPT CLINIC VISIT: HCPCS | Mod: PN,RTG | Performed by: INTERNAL MEDICINE

## 2022-05-17 PROCEDURE — 99214 OFFICE O/P EST MOD 30 MIN: CPT | Mod: 95 | Performed by: INTERNAL MEDICINE

## 2022-05-17 ASSESSMENT — PAIN SCALES - GENERAL: PAINLEVEL: MODERATE PAIN (5)

## 2022-05-17 NOTE — PROGRESS NOTES
Carol is a 31 year old who is being evaluated via a billable video visit.      How would you like to obtain your AVS? MyChart  If the video visit is dropped, the invitation should be resent by: Text to cell phone: [phone]  Will anyone else be joining your video visit? No      Video Start Time: 4:38  Video-Visit Details    Type of service:  Video Visit    Video End Time:4:54    Originating Location (pt. 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  Unremarkable.   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  Clinical correlation is recommended.    # Immunosuppression: Cyclosporine (goal ) and Mycophenolic acid (dose 540 mg every 12 hours)   - Changes: Not at this time of note she has been off MPA since Feb and was resumed in Aug after kidney Tx bx showed chronic active BMR & DSA with high titer class II DSA    # Uterine cancer stage one: currently getting uterus salvage treatment, s/p recent D & C with plan to f/up with GYN in 6 months, will try to adjust IS but limited options to reduce further given recent  chronic active ABMR      # Infection Prophylaxis:   - PJP: None    # Hypertension: Borderline control;  Goal BP: < 130/80   - Changes: no, monitor home BP with orthostatic given symptoms, losartan just added to control proteinuria & BP, could uptitrate /stop amlodipine if persistent proteinuria on next check, Cr/K stable    # Mineral Bone Disorder:   - Vitamin D; level: Low        On supplement: Yes  - Calcium; level: Normal        On supplement: No    # Obesity: Stable weight.   - Recommend weight loss for overall health by increasing exercise and watching caloric intake.    # Skin Cancer Risk:    - Discussed sun protection and recommend regular follow up with Dermatology.    # Medical Compliance: Yes     # COVID-19 Virus Review: Discussed COVID-19 virus and the potential medical risks.  Reviewed preventative health recommendations, which includes washing hands for 20 seconds, avoid touching your face, and social distancing.  Asked patient to inform the transplant center if they are exposed or diagnosed with this virus.    ## COVID-19 Virus Review: Discussed COVID-19 virus and the potential medical risks.  Reviewed preventative health recommendations, including wearing a mask where appropriate.  Recommended COVID vaccination should be up to date with either an initial vaccination or booster shot when appropriate.  Asked the patient to inform the transplant center if they are exposed or diagnosed with this virus.    # COVID Vaccination Up To Date: no counseled but reluctant        # Transplant History:  Etiology of Kidney Failure: Senior-Loken Syndrome  Tx: LDKT  Transplant: 7/18/2006 (Kidney)  Donor Type: Living Donor Class: Standard Criteria Donor  Significant changes in immunosuppression: Generic mycophenolate mofetil caused chest pain and heart palpitations  Significant transplant-related complications: None    Transplant Office Phone Number: [phone]    Assessment and plan was discussed with the patient and  she voiced her understanding and agreement.    Return visit: 4 months    Feliberto Littlejohn MD    Chief Complaint   Ms. Guillaume is a 31 year old here for kidney transplant and immunosuppression management.    History of Present Illness   Ms. 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 She was restarted on  mg po bid & losartan was added to control BP & proteinuria.      Interval Hx    She recently underwent D & C 5/9 with plans for US every 6 months & Gyn f/up in June.   She still feels tired overall. No fevers, chills, or night sweats. She remains very busy , working remotely as a therapist. She is trying to work on weight loss. She denies any nausea, vomiting, has mild intermittent diarrhea. No chest pain, shortness of breath, no leg swelling.  discussed risks of pregnancy, fetal/materna;/allograft complications taht I would recommend postponing plans at this stage. She has Mirena IUD that was recently changed during D& C      Current IS CSA 75/50 /540  COVID vaccine: hesitant to receive the vaccine    Home BP: 122/80    Review of Systems   A comprehensive review of systems was obtained and negative, except as noted in the HPI or PMH.    Problem List   Patient Active Problem List   Diagnosis     Kidney replaced by transplant     Senior-Loken syndrome     Pseudotumor cerebri     Legally blind     Retinitis pigmentosa     Vitamin D deficiency     Morbid obesity (H)     Insulin resistance     PCOS (polycystic ovarian syndrome)     Cataract     Palpitations      Dyslipidemia     HTN, kidney transplant related     Aftercare following organ transplant     Immunosuppression (H)     EIN (endometrial intraepithelial neoplasia)     Chronic kidney disease, stage 3 (H)       Social History   Social History     Tobacco Use     Smoking status: Never Smoker     Smokeless tobacco: Never Used   Vaping Use     Vaping Use: Never used   Substance Use Topics     Alcohol use: No     Drug use: No       Allergies   No Known Allergies    Medications   Current Outpatient Medications   Medication Sig     amLODIPine (NORVASC) 5 MG tablet Take 1 tablet (5 mg) by mouth daily     carvedilol (COREG) 25 MG tablet Take 1 tablet (25 mg) by mouth 2 times daily (with meals)     levonorgestrel (MIRENA) 20 MCG/24HR IUD 1 each by Intrauterine route once     losartan (COZAAR) 25 MG tablet Take 2 tablets (50 mg) by mouth daily     mycophenolic acid (GENERIC EQUIVALENT) 180 MG EC tablet Take 3 tablets (540 mg) by mouth 2 times daily     NEORAL (BRAND) 25 MG capsule Take 3 capsules (75 mg) by mouth every morning AND 2 capsules (50 mg) every evening.     acetaminophen (TYLENOL) 325 MG tablet Take 2 tablets (650 mg) by mouth every 4 hours as needed for mild pain (Patient not taking: No sig reported)     ibuprofen (ADVIL/MOTRIN) 600 MG tablet Take 1 tablet (600 mg) by mouth every 6 hours as needed for moderate pain     oxyCODONE (ROXICODONE) 5 MG tablet Take 1 tablet (5 mg) by mouth every 6 hours as needed for pain     No current facility-administered medications for this visit.     There are no discontinued medications.    Physical Exam   Vital Signs: Wt 122.5 kg (270 lb)   BMI 43.58 kg/m    Within normal limits for video visit no acute distress no visible facial rashes normal speech and mentation.    Data     Renal Latest Ref Rng & Units 4/18/2022 12/27/2021 10/19/2021   Na 133 - 144 mmol/L 142 141 140   Na (external) 135 - 145 mmol/L - - -   K 3.4 - 5.3 mmol/L 4.1 4.1 4.3   K (external) 3.5 - 5.0 mmol/L - - -    Cl 94 - 109 mmol/L 104 110(H) 109   CO2 20 - 32 mmol/L 28 22 26   CO2 (external) 21 - 31 mmol/L - - -   BUN 7 - 30 mg/dL 33(H) 28 40(H)   BUN (external) 8 - 25 mg/dL - - -   Cr 0.52 - 1.04 mg/dL 2.16(H) 1.98(H) 2.39(H)   Cr (external) 0.57 - 1.11 mg/dL - - -   Glucose 70 - 99 mg/dL 119(H) 107(H) 105(H)   Glucose (external) 65 - 100 mg/dL - - -   Ca  8.5 - 10.1 mg/dL 8.8 9.1 9.1   Ca (external) 8.5 - 10.5 mg/dL - - -   Mg 1.6 - 2.3 mg/dL - - -     Bone Health Latest Ref Rng & Units 9/9/2021 9/3/2021 12/13/2019   Phos 2.5 - 4.5 mg/dL 4.8(H) 5.3(H) -   PTHi 18 - 80 pg/mL - - 36   Vit D Def 20 - 75 ug/L - - 26     Heme Latest Ref Rng & Units 4/18/2022 12/27/2021 10/19/2021   WBC 4.0 - 11.0 10e3/uL 8.4 5.8 7.1   WBC (external) 4.5 - 11.0 thou/cu mm - - -   Hgb 11.7 - 15.7 g/dL 12.2 11.2(L) 11.8   Hgb (external) 12.0 - 16.0 g/dL - - -   Plt 150 - 450 10e3/uL 254 215 232   Plt (external) 140 - 440 thou/cu mm - - -   ABSOLUTE NEUTROPHIL 1.6 - 8.3 10e9/L - - -   ABSOLUTE LYMPHOCYTES 0.8 - 5.3 10e9/L - - -   ABSOLUTE MONOCYTES 0.0 - 1.3 10e9/L - - -   ABSOLUTE EOSINOPHILS 0.0 - 0.7 10e9/L - - -   ABSOLUTE BASOPHILS 0.0 - 0.2 10e9/L - - -   ABS IMMATURE GRANULOCYTES 0 - 0.4 10e9/L - - -   ABSOLUTE NUCLEATED RBC - - - -     Liver Latest Ref Rng & Units 9/3/2021 12/13/2019 10/22/2018   AP 40 - 150 U/L - 92 72   TBili 0.2 - 1.3 mg/dL - 0.6 0.6   ALT 0 - 50 U/L - 51(H) 34   AST 0 - 45 U/L - 21 26   Tot Protein 6.8 - 8.8 g/dL - 7.4 7.3   Albumin 3.4 - 5.0 g/dL 3.4 3.5 3.6     Pancreas Latest Ref Rng & Units 7/26/2013 7/27/2012 6/3/2008   A1C 4.3 - 6.0 % 5.0 5.3 -   Amylase 30 - 110 U/L - - 58   Lipase 20 - 250 U/L - - -     Iron studies Latest Ref Rng & Units 1/30/2018 7/11/2006 6/26/2006   Iron 35 - 180 ug/dL 55 56 75   Iron sat 15 - 46 % 19 - -   Ferritin 12 - 150 ng/mL 19 473(H) 914(H)     [facility] Txp Virology Latest Ref Rng & Units 1/31/2017 8/29/2011 7/21/2011   CMV IgG EU/mL - - -   CMV IgM <0.90 - - -   CMV IgM Interp <0.90 -  - -   CVM DNA Quant - Plasma, EDTA anticoagulant Whole blood, EDTA anticoagulant Whole blood, EDTA anticoagulant   CMV Quant <100 Copies/mL - <100  No CMV DNA detected. <100  No CMV DNA detected.   CMV QT Log <2.0 Log copies/mL - <2.0  The Cytomegalovirus DNA Quantitation assay is a real-time polymerase chain   reaction (PCR) utilizing analyte specific reagents manufactured by [employer]   [facility]. Analyte Specific Reagents (ASRs) are used in many laboratory   tests necessary for standard medical care and generally do not require FDA   approval.   This test was developed and its performance characteristics determined by   [facility].  It has not been   cleared or approved by the US Food and Drug Administration. <2.0  The Cytomegalovirus DNA Quantitation assay is a real-time polymerase chain   reaction (PCR) utilizing analyte specific reagents manufactured by [facility]   [facility]. Analyte Specific Reagents (ASRs) are used in many laboratory   tests necessary for standard medical care and generally do not require FDA   approval.   This test was developed and its performance characteristics determined by   [facility].  It has not been   cleared or approved by the US Food and Drug Administration.   CMV QUANT IU/ML CMVND [IU]/mL CMV DNA Not Detected   The BARBI AmpliPrep/BARBI TaqMan CMV Test is an FDA-approved in vitro nucleic   acid amplification test for the quantitation of cytomegalovirus DNA in human   plasma (EDTA plasma) using the [employer] AmpliPrep Instrument for automated viral   nucleic acid extraction and the [employer] TaqMan Analyzer or BARBI TaqMan for   automated Real Time amplification and detection of the viral nucleic acid   target.   Titer results are reported in International Units/mL (IU/mL using 1st WHO   International standard for Human Cytomegalovirus for Nucleic Acid Amplification   based assays. The conversion  factor between CMV DNA copis/mL (as defined by the   Roche BARBI TaqMan CMV test) and International Units is the CMV DNA   concentration in IU/mL x 1.1 copies/IU = CMV DNA in copies/mL.   This assay has received FDA approval for the testing of human plasma only. The   [facility] Laboratory at the [facility], [place], has validated the pe  rformance characteristics of the Roche   CMV assay for plasma, bronchial alveolar lavage/wash and urine.   - -   LOG IU/ML OF CMVQNT <2.1 [Log:IU]/mL Not Calculated - -   BK Spec - - - -   BK Res <1000 copies/mL - - -   BK Log <3.0 Log copies/mL - - -   EBV IgG - - - -   EBV DNA COPIES/ML EBVNEG [Copies]/mL EBV DNA Not Detected - -   EBV DNA LOG OF COPIES <2.7 [Log:copies]/mL Not Calculated   The Real-Time quantitative EBV assay was developed and its performance   characteristics determined by the [facility] Diagnostic Laboratory at   the [facility] in [place], [place].  The   primers and probes are Analyte Specific Reagents (ASRs) manufactured  by   [employer].   ASRs are used in many laboratory tests necessary for standard medical care and   generally do not require U.S. Food and Drug Administration approval.  The FDA   has determined that such clearance or approval is not necessary.  This test is   used for clinical purposes.  It should not be regarded as investigational or   research.   This laboratory is certified under the Clinical Laboratory Improvement   Amendments of 1988 (CLIA-88) as qualified to perform high complexity clinical   laboratory testing.   The quantitative range of this assay is 500-22,500,00 copies/mL (2.7-7.4 log   copies/mL).  A negative result does not rule out the presence of PCR inhibitors     in the patient specimen or EBV DNA nucleic acid in concentrations below the   level of detection of the assay.  Inhibition may also lead to underestimation   of viral  quantitation.   - -   Hep B Core NEG - - -   Hep B Surf 0.0 - 4.9 mIU/mL - - -   HIV 1&2 NEG - - -

## 2022-05-17 NOTE — LETTER
5/17/2022       RE: Carol Guillaume  [address]  [place] [address]     Dear Colleague,    Thank you for referring your patient, Carol Guillaume, to the [facility] [place] at [facility]. Please see a copy of my visit note below.        Carol is a 31 year old who is being evaluated via a billable video visit.      How would you like to obtain your AVS? MyChart  If the video visit is dropped, the invitation should be resent by: Text to cell phone: [phone]  Will anyone else be joining your video visit? No      Video Start Time: 4:38  Video-Visit Details    Type of service:  Video Visit    Video End Time:4:54    Originating Location (pt. 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  Unremarkable.   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  Clinical correlation is recommended.    # Immunosuppression: Cyclosporine (goal ) and Mycophenolic acid (dose 540 mg every 12 hours)   - Changes: Not at this time of note she has been off MPA  since Feb and was resumed in Aug after kidney Tx bx showed chronic active BMR & DSA with high titer class II DSA    # Uterine cancer stage one: currently getting uterus salvage treatment, s/p recent D & C with plan to f/up with GYN in 6 months, will try to adjust IS but limited options to reduce further given recent chronic active ABMR      # Infection Prophylaxis:   - PJP: None    # Hypertension: Borderline control;  Goal BP: < 130/80   - Changes: no, monitor home BP with orthostatic given symptoms, losartan just added to control proteinuria & BP, could uptitrate /stop amlodipine if persistent proteinuria on next check, Cr/K stable    # Mineral Bone Disorder:   - Vitamin D; level: Low        On supplement: Yes  - Calcium; level: Normal        On supplement: No    # Obesity: Stable weight.   - Recommend weight loss for overall health by increasing exercise and watching caloric intake.    # Skin Cancer Risk:    - Discussed sun protection and recommend regular follow up with Dermatology.    # Medical Compliance: Yes     # COVID-19 Virus Review: Discussed COVID-19 virus and the potential medical risks.  Reviewed preventative health recommendations, which includes washing hands for 20 seconds, avoid touching your face, and social distancing.  Asked patient to inform the transplant center if they are exposed or diagnosed with this virus.    ## COVID-19 Virus Review: Discussed COVID-19 virus and the potential medical risks.  Reviewed preventative health recommendations, including wearing a mask where appropriate.  Recommended COVID vaccination should be up to date with either an initial vaccination or booster shot when appropriate.  Asked the patient to inform the transplant center if they are exposed or diagnosed with this virus.    # COVID Vaccination Up To Date: no counseled but reluctant        # Transplant History:  Etiology of Kidney Failure: Senior-Loken Syndrome  Tx: LDKT  Transplant: 7/18/2006 (Kidney)  Donor  Type: Living Donor Class: Standard Criteria Donor  Significant changes in immunosuppression: Generic mycophenolate mofetil caused chest pain and heart palpitations  Significant transplant-related complications: None    Transplant Office Phone Number: [phone]    Assessment and plan was discussed with the patient and she voiced her understanding and agreement.    Return visit: 4 months    Feliberto Littlejohn MD    Chief Complaint   Ms. Guillaume is a 31 year old here for kidney transplant and immunosuppression management.    History of Present Illness   Ms. 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 She was restarted on  mg po bid & losartan was added to control BP & proteinuria.      Interval Hx    She recently underwent D & C 5/9 with plans for US every 6 months & Gyn f/up in June.   She still feels tired overall. No fevers, chills, or night sweats. She remains very busy , working remotely as a therapist. She is trying to work on weight loss. She denies any nausea, vomiting, has mild intermittent diarrhea. No chest pain, shortness of breath, no leg swelling.  discussed risks of pregnancy, fetal/materna;/allograft complications taht I would recommend postponing plans at this stage. She has Mirena IUD that was recently changed during D& C      Current IS CSA 75/50 /540  COVID vaccine: hesitant to receive the vaccine    Home BP: 122/80    Review of Systems   A comprehensive review of systems was obtained and negative, except as noted in the HPI or  PMH.    Problem List   Patient Active Problem List   Diagnosis     Kidney replaced by transplant     Senior-Loken syndrome     Pseudotumor cerebri     Legally blind     Retinitis pigmentosa     Vitamin D deficiency     Morbid obesity (H)     Insulin resistance     PCOS (polycystic ovarian syndrome)     Cataract     Palpitations     Dyslipidemia     HTN, kidney transplant related     Aftercare following organ transplant     Immunosuppression (H)     EIN (endometrial intraepithelial neoplasia)     Chronic kidney disease, stage 3 (H)       Social History   Social History     Tobacco Use     Smoking status: Never Smoker     Smokeless tobacco: Never Used   Vaping Use     Vaping Use: Never used   Substance Use Topics     Alcohol use: No     Drug use: No       Allergies   No Known Allergies    Medications   Current Outpatient Medications   Medication Sig     amLODIPine (NORVASC) 5 MG tablet Take 1 tablet (5 mg) by mouth daily     carvedilol (COREG) 25 MG tablet Take 1 tablet (25 mg) by mouth 2 times daily (with meals)     levonorgestrel (MIRENA) 20 MCG/24HR IUD 1 each by Intrauterine route once     losartan (COZAAR) 25 MG tablet Take 2 tablets (50 mg) by mouth daily     mycophenolic acid (GENERIC EQUIVALENT) 180 MG EC tablet Take 3 tablets (540 mg) by mouth 2 times daily     NEORAL (BRAND) 25 MG capsule Take 3 capsules (75 mg) by mouth every morning AND 2 capsules (50 mg) every evening.     acetaminophen (TYLENOL) 325 MG tablet Take 2 tablets (650 mg) by mouth every 4 hours as needed for mild pain (Patient not taking: No sig reported)     ibuprofen (ADVIL/MOTRIN) 600 MG tablet Take 1 tablet (600 mg) by mouth every 6 hours as needed for moderate pain     oxyCODONE (ROXICODONE) 5 MG tablet Take 1 tablet (5 mg) by mouth every 6 hours as needed for pain     No current facility-administered medications for this visit.     There are no discontinued medications.    Physical Exam   Vital Signs: Wt 122.5 kg (270 lb)   BMI 43.58  kg/m    Within normal limits for video visit no acute distress no visible facial rashes normal speech and mentation.    Data     Renal Latest Ref Rng & Units 4/18/2022 12/27/2021 10/19/2021   Na 133 - 144 mmol/L 142 141 140   Na (external) 135 - 145 mmol/L - - -   K 3.4 - 5.3 mmol/L 4.1 4.1 4.3   K (external) 3.5 - 5.0 mmol/L - - -   Cl 94 - 109 mmol/L 104 110(H) 109   CO2 20 - 32 mmol/L 28 22 26   CO2 (external) 21 - 31 mmol/L - - -   BUN 7 - 30 mg/dL 33(H) 28 40(H)   BUN (external) 8 - 25 mg/dL - - -   Cr 0.52 - 1.04 mg/dL 2.16(H) 1.98(H) 2.39(H)   Cr (external) 0.57 - 1.11 mg/dL - - -   Glucose 70 - 99 mg/dL 119(H) 107(H) 105(H)   Glucose (external) 65 - 100 mg/dL - - -   Ca  8.5 - 10.1 mg/dL 8.8 9.1 9.1   Ca (external) 8.5 - 10.5 mg/dL - - -   Mg 1.6 - 2.3 mg/dL - - -     Bone Health Latest Ref Rng & Units 9/9/2021 9/3/2021 12/13/2019   Phos 2.5 - 4.5 mg/dL 4.8(H) 5.3(H) -   PTHi 18 - 80 pg/mL - - 36   Vit D Def 20 - 75 ug/L - - 26     Heme Latest Ref Rng & Units 4/18/2022 12/27/2021 10/19/2021   WBC 4.0 - 11.0 10e3/uL 8.4 5.8 7.1   WBC (external) 4.5 - 11.0 thou/cu mm - - -   Hgb 11.7 - 15.7 g/dL 12.2 11.2(L) 11.8   Hgb (external) 12.0 - 16.0 g/dL - - -   Plt 150 - 450 10e3/uL 254 215 232   Plt (external) 140 - 440 thou/cu mm - - -   ABSOLUTE NEUTROPHIL 1.6 - 8.3 10e9/L - - -   ABSOLUTE LYMPHOCYTES 0.8 - 5.3 10e9/L - - -   ABSOLUTE MONOCYTES 0.0 - 1.3 10e9/L - - -   ABSOLUTE EOSINOPHILS 0.0 - 0.7 10e9/L - - -   ABSOLUTE BASOPHILS 0.0 - 0.2 10e9/L - - -   ABS IMMATURE GRANULOCYTES 0 - 0.4 10e9/L - - -   ABSOLUTE NUCLEATED RBC - - - -     Liver Latest Ref Rng & Units 9/3/2021 12/13/2019 10/22/2018   AP 40 - 150 U/L - 92 72   TBili 0.2 - 1.3 mg/dL - 0.6 0.6   ALT 0 - 50 U/L - 51(H) 34   AST 0 - 45 U/L - 21 26   Tot Protein 6.8 - 8.8 g/dL - 7.4 7.3   Albumin 3.4 - 5.0 g/dL 3.4 3.5 3.6     Pancreas Latest Ref Rng & Units 7/26/2013 7/27/2012 6/3/2008   A1C 4.3 - 6.0 % 5.0 5.3 -   Amylase 30 - 110 U/L - - 58   Lipase  20 - 250 U/L - - -     Iron studies Latest Ref Rng & Units 1/30/2018 7/11/2006 6/26/2006   Iron 35 - 180 ug/dL 55 56 75   Iron sat 15 - 46 % 19 - -   Ferritin 12 - 150 ng/mL 19 473(H) 914(H)     [facility] Txp Virology Latest Ref Rng & Units 1/31/2017 8/29/2011 7/21/2011   CMV IgG EU/mL - - -   CMV IgM <0.90 - - -   CMV IgM Interp <0.90 - - -   CVM DNA Quant - Plasma, EDTA anticoagulant Whole blood, EDTA anticoagulant Whole blood, EDTA anticoagulant   CMV Quant <100 Copies/mL - <100  No CMV DNA detected. <100  No CMV DNA detected.   CMV QT Log <2.0 Log copies/mL - <2.0  The Cytomegalovirus DNA Quantitation assay is a real-time polymerase chain   reaction (PCR) utilizing analyte specific reagents manufactured by [employer]   [facility]. Analyte Specific Reagents (ASRs) are used in many laboratory   tests necessary for standard medical care and generally do not require FDA   approval.   This test was developed and its performance characteristics determined by   [facility].  It has not been   cleared or approved by the US Food and Drug Administration. <2.0  The Cytomegalovirus DNA Quantitation assay is a real-time polymerase chain   reaction (PCR) utilizing analyte specific reagents manufactured by [employer]   [facility]. Analyte Specific Reagents (ASRs) are used in many laboratory   tests necessary for standard medical care and generally do not require FDA   approval.   This test was developed and its performance characteristics determined by   [facility].  It has not been   cleared or approved by the US Food and Drug Administration.   CMV QUANT IU/ML CMVND [IU]/mL CMV DNA Not Detected   The BARBI AmpliPrep/BARBI TaqMan CMV Test is an FDA-approved in vitro nucleic   acid amplification test for the quantitation of cytomegalovirus DNA in human   plasma (EDTA plasma) using the BARBI AmpliPrep Instrument for automated viral   nucleic acid  extraction and the [employer] TaqMan Analyzer or FedBid for   automated Real Time amplification and detection of the viral nucleic acid   target.   Titer results are reported in International Units/mL (IU/mL using 1st WHO   International standard for Human Cytomegalovirus for Nucleic Acid Amplification   based assays. The conversion factor between CMV DNA copis/mL (as defined by the   Roche BARBI TaqMan CMV test) and International Units is the CMV DNA   concentration in IU/mL x 1.1 copies/IU = CMV DNA in copies/mL.   This assay has received FDA approval for the testing of human plasma only. The   Infect[facility] Disease Diagnostic Laboratory at the [facility], [place], has validated the pe  rformance characteristics of the Roche   CMV assay for plasma, bronchial alveolar lavage/wash and urine.   - -   LOG IU/ML OF CMVQNT <2.1 [Log:IU]/mL Not Calculated - -   BK Spec - - - -   BK Res <1000 copies/mL - - -   BK Log <3.0 Log copies/mL - - -   EBV IgG - - - -   EBV DNA COPIES/ML EBVNEG [Copies]/mL EBV DNA Not Detected - -   EBV DNA LOG OF COPIES <2.7 [Log:copies]/mL Not Calculated   The Real-Time quantitative EBV assay was developed and its performance   characteristics determined by the [facility] Diagnostic Laboratory at   the [facility] in [place], [place].  The   primers and probes are Analyte Specific Reagents (ASRs) manufactured  by   [employer].   ASRs are used in many laboratory tests necessary for standard medical care and   generally do not require U.S. Food and Drug Administration approval.  The FDA   has determined that such clearance or approval is not necessary.  This test is   used for clinical purposes.  It should not be regarded as investigational or   research.   This laboratory is certified under the Clinical Laboratory Improvement   Amendments of 1988 (CLIA-88) as qualified to perform high complexity clinical   laboratory testing.    The quantitative range of this assay is 500-22,500,00 copies/mL (2.7-7.4 log   copies/mL).  A negative result does not rule out the presence of PCR inhibitors     in the patient specimen or EBV DNA nucleic acid in concentrations below the   level of detection of the assay.  Inhibition may also lead to underestimation   of viral quantitation.   - -   Hep B Core NEG - - -   Hep B Surf 0.0 - 4.9 mIU/mL - - -   HIV 1&2 NEG - - -       Again, thank you for allowing me to participate in the care of your patient.      Sincerely,    Feliberto Littlejohn MD

## 2022-05-19 ENCOUNTER — TELEPHONE (OUTPATIENT)
Dept: OBGYN | Facility: CLINIC | Age: 32
End: 2022-05-19
Payer: COMMERCIAL

## 2022-05-20 ENCOUNTER — TELEPHONE (OUTPATIENT)
Dept: OBGYN | Facility: CLINIC | Age: 32
End: 2022-05-20
Payer: COMMERCIAL

## 2022-05-27 DIAGNOSIS — I12.9 HYPERTENSIVE RENAL DISEASE: ICD-10-CM

## 2022-05-27 RX ORDER — CARVEDILOL 25 MG/1
25 TABLET ORAL 2 TIMES DAILY WITH MEALS
Qty: 60 TABLET | Refills: 11 | Status: SHIPPED | OUTPATIENT
Start: 2022-05-27 | End: 2023-06-12

## 2022-06-20 ENCOUNTER — OFFICE VISIT (OUTPATIENT)
Dept: OBGYN | Facility: CLINIC | Age: 32
End: 2022-06-20
Attending: MIDWIFE
Payer: COMMERCIAL

## 2022-06-20 VITALS
SYSTOLIC BLOOD PRESSURE: 127 MMHG | DIASTOLIC BLOOD PRESSURE: 85 MMHG | WEIGHT: 275 LBS | HEART RATE: 68 BPM | BODY MASS INDEX: 44.39 KG/M2

## 2022-06-20 DIAGNOSIS — Z30.432 ENCOUNTER FOR IUD REMOVAL: Primary | ICD-10-CM

## 2022-06-20 PROCEDURE — 58301 REMOVE INTRAUTERINE DEVICE: CPT | Performed by: MIDWIFE

## 2022-06-20 PROCEDURE — 250N000013 HC RX MED GY IP 250 OP 250 PS 637: Performed by: MIDWIFE

## 2022-06-20 PROCEDURE — G0463 HOSPITAL OUTPT CLINIC VISIT: HCPCS

## 2022-06-20 RX ORDER — ACETAMINOPHEN 325 MG/1
650 TABLET ORAL ONCE
Status: COMPLETED | OUTPATIENT
Start: 2022-06-20 | End: 2022-06-20

## 2022-06-20 RX ADMIN — ACETAMINOPHEN 650 MG: 325 TABLET ORAL at 08:20

## 2022-06-20 ASSESSMENT — PAIN SCALES - GENERAL: PAINLEVEL: NO PAIN (0)

## 2022-06-20 NOTE — TELEPHONE ENCOUNTER
Order sent   Information: Selecting Yes will display possible errors in your note based on the variables you have selected. This validation is only offered as a suggestion for you. PLEASE NOTE THAT THE VALIDATION TEXT WILL BE REMOVED WHEN YOU FINALIZE YOUR NOTE. IF YOU WANT TO FAX A PRELIMINARY NOTE YOU WILL NEED TO TOGGLE THIS TO 'NO' IF YOU DO NOT WANT IT IN YOUR FAXED NOTE.

## 2022-06-20 NOTE — PROGRESS NOTES
IUD Removal:  SUBJECTIVE:   Moustapha is here for removal of her IUD  Currently post care for Endometrial intraepithelial neoplasia   Per pt her surveillance continues but she is clear at this time. The most recent D & C with IUD replacement until post pathology resulted  Her Gyn Onc providers have ok' d IUD removal and she scheduled today   She is hoping that removal of hormonal IUD will help with her mood.  She is currently undercare of nephrology due to hx of kidney transplant and is on the verge of needing another kidney  Pt is not planning or interested in getting pregnant.  She is clearly aware of increased  Significant risks to her health is she got pregnant now and will continue to prevent .  She states her spouse of 4 years  Is unable to achieve an erection but is able to ejaculate  She feels she has total control and he is unable to penetrate effectively  They are mainly mutually masterbating .    He has not seen a urologist  For this issue  They have a 10 yo adopted daughter,  His niece has been with him since age 2 and for Moustapha  4 years  She has been getting more reactive toward moustapha   This is difficult emotionally  She is trying to get her tested and assessed.    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  Was a consent obtained?  Yes    Moustapha Guillaume is a 31 year old female,, No LMP recorded. Patient has had an implant. who presents today for IUD removal. Her current IUD was placed 4 months ago. She has not had problems with the IUD. She requests removal of the IUD because of problems stated above    Today's PHQ-2 Score:   PHQ-2 (  [employer]) 2022   Q1: Little interest or pleasure in doing things 0   Q2: Feeling down, depressed or hopeless 0   PHQ-2 Score 0   PHQ-2 Total Score  (12-17 Years)- Positive if 3 or more points; Administer PHQ-A if positive 0       PROCEDURE:    A speculum exam was performed and the cervix was visualized. The IUD string was visualized. Using ring forceps, the string  was grasped and the IUD removed intact.    POST PROCEDURE:    The patient tolerated the procedure well. Patient was discharged in stable condition.    Call if bleeding, pain or fever occur. and Birth control counseling given.    MATTHEW Barry CNM

## 2022-06-20 NOTE — LETTER
2022       RE: Moustapha Guillaume  [address] [facility] [address] [facility] [address]     Dear Colleague,    Thank you for referring your patient, oMustapha Guillaume, to the [facility] [place] [facility]. Please see a copy of my visit note below.    IUD Removal:  SUBJECTIVE:   Moustapha is here for removal of her IUD  Currently post care for Endometrial intraepithelial neoplasia   Per pt her surveillance continues but she is clear at this time. The most recent D & C with IUD replacement until post pathology resulted  Her Gyn Onc providers have ok' d IUD removal and she scheduled today   She is hoping that removal of hormonal IUD will help with her mood.  She is currently undercare of nephrology due to hx of kidney transplant and is on the verge of needing another kidney  Pt is not planning or interested in getting pregnant.  She is clearly aware of increased  Significant risks to her health is she got pregnant now and will continue to prevent .  She states her spouse of 4 years  Is unable to achieve an erection but is able to ejaculate  She feels she has total control and he is unable to penetrate effectively  They are mainly mutually masterbating .    He has not seen a urologist  For this issue  They have a 10 yo adopted daughter,  His niece has been with him since age 2 and for Moustapha  4 years  She has been getting more reactive toward moustapha   This is difficult emotionally  She is trying to get her tested and assessed.    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  Was a consent obtained?  Yes    Moustapha Guillaume is a 31 year old female,, No LMP recorded. Patient has had an implant. who presents today for IUD removal. Her current IUD was  placed 4 months ago. She has not had problems with the IUD. She requests removal of the IUD because of problems stated above    Today's PHQ-2 Score:   PHQ-2 ( 1999 [employer]) 6/20/2022   Q1: Little interest or pleasure in doing things 0   Q2: Feeling down, depressed or hopeless 0   PHQ-2 Score 0   PHQ-2 Total Score (12-17 Years)- Positive if 3 or more points; Administer PHQ-A if positive 0       PROCEDURE:    A speculum exam was performed and the cervix was visualized. The IUD string was visualized. Using ring forceps, the string  was grasped and the IUD removed intact.    POST PROCEDURE:    The patient tolerated the procedure well. Patient was discharged in stable condition.    Call if bleeding, pain or fever occur. and Birth control counseling given.    MATTHEW Barry CNM

## 2022-08-29 DIAGNOSIS — Z48.298 AFTERCARE FOLLOWING ORGAN TRANSPLANT: ICD-10-CM

## 2022-08-29 DIAGNOSIS — I12.9 HYPERTENSION, RENAL: ICD-10-CM

## 2022-08-29 DIAGNOSIS — Z79.899 ENCOUNTER FOR LONG-TERM CURRENT USE OF MEDICATION: ICD-10-CM

## 2022-08-29 DIAGNOSIS — Z94.0 KIDNEY TRANSPLANTED: Primary | ICD-10-CM

## 2022-08-29 DIAGNOSIS — D84.9 IMMUNOSUPPRESSION (H): ICD-10-CM

## 2022-08-29 RX ORDER — MYCOPHENOLIC ACID 180 MG/1
540 TABLET, DELAYED RELEASE ORAL 2 TIMES DAILY
Qty: 540 TABLET | Refills: 0 | Status: SHIPPED | OUTPATIENT
Start: 2022-08-29 | End: 2022-11-16

## 2022-08-31 RX ORDER — AMLODIPINE BESYLATE 5 MG/1
5 TABLET ORAL DAILY
Qty: 90 TABLET | Refills: 3 | Status: SHIPPED | OUTPATIENT
Start: 2022-08-31 | End: 2023-07-27

## 2022-09-16 ENCOUNTER — LAB (OUTPATIENT)
Dept: LAB | Facility: CLINIC | Age: 32
End: 2022-09-16
Payer: COMMERCIAL

## 2022-09-16 DIAGNOSIS — Z13.6 CARDIOVASCULAR SCREENING; LDL GOAL LESS THAN 100: ICD-10-CM

## 2022-09-16 DIAGNOSIS — Z79.899 ENCOUNTER FOR LONG-TERM CURRENT USE OF MEDICATION: ICD-10-CM

## 2022-09-16 DIAGNOSIS — Z94.0 KIDNEY REPLACED BY TRANSPLANT: ICD-10-CM

## 2022-09-16 DIAGNOSIS — Z48.298 AFTERCARE FOLLOWING ORGAN TRANSPLANT: ICD-10-CM

## 2022-09-16 DIAGNOSIS — Z94.0 KIDNEY TRANSPLANTED: ICD-10-CM

## 2022-09-16 LAB
ANION GAP SERPL CALCULATED.3IONS-SCNC: 12 MMOL/L (ref 7–15)
BASOPHILS # BLD AUTO: 0.1 10E3/UL (ref 0–0.2)
BASOPHILS NFR BLD AUTO: 1 %
BUN SERPL-MCNC: 38.7 MG/DL (ref 6–20)
CALCIUM SERPL-MCNC: 9.6 MG/DL (ref 8.6–10)
CHLORIDE SERPL-SCNC: 101 MMOL/L (ref 98–107)
CHOLEST SERPL-MCNC: 240 MG/DL
CREAT SERPL-MCNC: 2.25 MG/DL (ref 0.51–0.95)
CYCLOSPORINE BLD LC/MS/MS-MCNC: 70 UG/L (ref 50–400)
DEPRECATED HCO3 PLAS-SCNC: 24 MMOL/L (ref 22–29)
EOSINOPHIL # BLD AUTO: 0.2 10E3/UL (ref 0–0.7)
EOSINOPHIL NFR BLD AUTO: 3 %
ERYTHROCYTE [DISTWIDTH] IN BLOOD BY AUTOMATED COUNT: 12.8 % (ref 10–15)
GFR SERPL CREATININE-BSD FRML MDRD: 29 ML/MIN/1.73M2
GLUCOSE SERPL-MCNC: 110 MG/DL (ref 70–99)
HCT VFR BLD AUTO: 37.3 % (ref 35–47)
HDLC SERPL-MCNC: 38 MG/DL
HGB BLD-MCNC: 11.9 G/DL (ref 11.7–15.7)
IMM GRANULOCYTES # BLD: 0.1 10E3/UL
IMM GRANULOCYTES NFR BLD: 1 %
LDLC SERPL CALC-MCNC: 154 MG/DL
LYMPHOCYTES # BLD AUTO: 2.8 10E3/UL (ref 0.8–5.3)
LYMPHOCYTES NFR BLD AUTO: 32 %
MCH RBC QN AUTO: 27.5 PG (ref 26.5–33)
MCHC RBC AUTO-ENTMCNC: 31.9 G/DL (ref 31.5–36.5)
MCV RBC AUTO: 86 FL (ref 78–100)
MONOCYTES # BLD AUTO: 0.5 10E3/UL (ref 0–1.3)
MONOCYTES NFR BLD AUTO: 6 %
NEUTROPHILS # BLD AUTO: 5 10E3/UL (ref 1.6–8.3)
NEUTROPHILS NFR BLD AUTO: 57 %
NONHDLC SERPL-MCNC: 202 MG/DL
NRBC # BLD AUTO: 0 10E3/UL
NRBC BLD AUTO-RTO: 0 /100
PLATELET # BLD AUTO: 230 10E3/UL (ref 150–450)
POTASSIUM SERPL-SCNC: 4.7 MMOL/L (ref 3.4–5.3)
RBC # BLD AUTO: 4.33 10E6/UL (ref 3.8–5.2)
SODIUM SERPL-SCNC: 137 MMOL/L (ref 136–145)
TME LAST DOSE: NORMAL H
TME LAST DOSE: NORMAL H
TRIGL SERPL-MCNC: 242 MG/DL
WBC # BLD AUTO: 8.6 10E3/UL (ref 4–11)

## 2022-09-16 PROCEDURE — 80061 LIPID PANEL: CPT | Mod: 90 | Performed by: PATHOLOGY

## 2022-09-16 PROCEDURE — 80048 BASIC METABOLIC PNL TOTAL CA: CPT | Performed by: PATHOLOGY

## 2022-09-16 PROCEDURE — 85025 COMPLETE CBC W/AUTO DIFF WBC: CPT | Performed by: PATHOLOGY

## 2022-09-16 PROCEDURE — 36415 COLL VENOUS BLD VENIPUNCTURE: CPT | Performed by: PATHOLOGY

## 2022-09-16 PROCEDURE — 99000 SPECIMEN HANDLING OFFICE-LAB: CPT | Performed by: PATHOLOGY

## 2022-09-16 PROCEDURE — 80158 DRUG ASSAY CYCLOSPORINE: CPT | Mod: 90 | Performed by: PATHOLOGY

## 2022-09-19 NOTE — RESULT ENCOUNTER NOTE
Dear Carol Guillaume    Your cholesterol has increased. Please lower cholesterol in your diet. Recheck in 6-12 months  Best wishes,  Danny Shi MD

## 2022-09-28 ENCOUNTER — VIRTUAL VISIT (OUTPATIENT)
Dept: NEPHROLOGY | Facility: CLINIC | Age: 32
End: 2022-09-28
Attending: INTERNAL MEDICINE
Payer: COMMERCIAL

## 2022-09-28 DIAGNOSIS — Z94.0 HTN, KIDNEY TRANSPLANT RELATED: ICD-10-CM

## 2022-09-28 DIAGNOSIS — N85.02 EIN (ENDOMETRIAL INTRAEPITHELIAL NEOPLASIA): ICD-10-CM

## 2022-09-28 DIAGNOSIS — I15.1 HTN, KIDNEY TRANSPLANT RELATED: ICD-10-CM

## 2022-09-28 DIAGNOSIS — Z48.298 AFTERCARE FOLLOWING ORGAN TRANSPLANT: Primary | ICD-10-CM

## 2022-09-28 DIAGNOSIS — D84.9 IMMUNOSUPPRESSION (H): ICD-10-CM

## 2022-09-28 DIAGNOSIS — T86.11 CHRONIC REJECTION OF KIDNEY TRANSPLANT: ICD-10-CM

## 2022-09-28 DIAGNOSIS — R80.1 PERSISTENT PROTEINURIA: ICD-10-CM

## 2022-09-28 PROCEDURE — G0463 HOSPITAL OUTPT CLINIC VISIT: HCPCS | Mod: PN,RTG | Performed by: INTERNAL MEDICINE

## 2022-09-28 PROCEDURE — 99215 OFFICE O/P EST HI 40 MIN: CPT | Mod: 95 | Performed by: INTERNAL MEDICINE

## 2022-09-28 NOTE — PROGRESS NOTES
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  Unremarkable.   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  Clinical correlation is recommended.    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 If any evidence for recurrence or progression, would consider mTOR switch  She had recent IUD removal and was counseled by OB/GYN about risks of pregnancy given kidney transplant, CKD ,.. and she doesn't plan to get pregnant    # Infection Prophylaxis:   - PJP: None    # Hypertension: good control Goal BP: < 130/80   - Changes: no, monitor home BP with orthostatics given prior symptoms, losartan just added to control proteinuria & BP, could uptitrate /stop  amlodipine if  persistent proteinuria on next check, Cr/K stable    # Hyperlipidemia:    - discussed dietary modifications and weight loss as well as statin, she will discuss with PCP next visit. She has myalgias but denies hx of statin  intolerance    # Mineral Bone Disorder:   - Vitamin D; level: Low        On supplement: Yes  - Calcium; level: Normal        On supplement: No    # Obesity: Stable weight.   - Recommend weight loss for overall health by increasing exercise and watching caloric intake.    # Skin Cancer Risk:    - Discussed sun protection and recommend regular follow up with Dermatology.    # Medical Compliance: Yes     # COVID-19 Virus Review: Discussed COVID-19 virus and the potential medical risks.  Reviewed preventative health recommendations, which includes washing hands for 20 seconds, avoid touching your face, and social distancing.  Asked patient to inform the transplant center if they are exposed or diagnosed with this virus.    ## COVID-19 Virus Review: Discussed COVID-19 virus and the potential medical risks.  Reviewed preventative health recommendations, including wearing a mask where appropriate.  Recommended COVID vaccination should be up to date with either an initial vaccination or booster shot when appropriate.  Asked the patient to inform the transplant center if they are exposed or diagnosed with this virus.    # COVID Vaccination Up To Date: no counseled but reluctant    # Transplant History:  Etiology of Kidney Failure: Senior-Loken Syndrome  Tx: LDKT  Transplant: 7/18/2006 (Kidney)  Donor Type: Living Donor Class: Standard Criteria Donor  Significant changes in immunosuppression: Generic mycophenolate mofetil caused chest pain and heart palpitations  Significant transplant-related complications: None    Transplant Office Phone Number: [phone]    Assessment and plan was discussed with the patient and she voiced her understanding and agreement.    Return visit: 4 months    Feliberto Littlejohn  "MD    Chief Complaint   Ms. Guillaume is a 32 year old here for kidney transplant and immunosuppression management.    History of Present Illness   Ms. 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 She was restarted on  mg po bid & losartan was added to control BP & proteinuria.      Interval Hx  Mrs Guillaume feels ok overall.   \"weird symptoms\" last week, feels a lot better this week  Still struggles with fatigue, anxiety, insomnia - she has been seeing a therapist  Occasional muscle cramps  Alternating diarrhea and constipation, some abdominal discomfort  Current IS CSA 75/50 /540  COVID vaccine: hesitant to receive the vaccine (Dec. 6 months Ca free)  Flu vaccine: declines  Home BP: ~130s/80s    Review of Systems   A comprehensive review of systems was obtained and negative, except as noted in the HPI or PMH.    Problem List   Patient Active Problem List   Diagnosis     Kidney replaced by transplant     Senior-Loken syndrome     Pseudotumor cerebri     Legally blind     Retinitis pigmentosa     Vitamin D deficiency     Morbid obesity (H)     Insulin resistance     PCOS (polycystic ovarian syndrome)     Cataract     Palpitations     Dyslipidemia     HTN, kidney transplant related     Aftercare following organ transplant     Immunosuppression (H)     EIN (endometrial intraepithelial neoplasia)     Chronic kidney disease, stage 3 (H)       Social History   Social History     Tobacco Use     Smoking status: Never Smoker     Smokeless tobacco: Never Used   Vaping Use "     Vaping Use: Never used   Substance Use Topics     Alcohol use: No     Drug use: No       Allergies   No Known Allergies    Medications   Current Outpatient Medications   Medication Sig     acetaminophen (TYLENOL) 325 MG tablet Take 2 tablets (650 mg) by mouth every 4 hours as needed for mild pain (Patient not taking: No sig reported)     amLODIPine (NORVASC) 5 MG tablet Take 1 tablet (5 mg) by mouth daily     carvedilol (COREG) 25 MG tablet Take 1 tablet (25 mg) by mouth 2 times daily (with meals)     levonorgestrel (MIRENA) 20 MCG/24HR IUD 1 each by Intrauterine route once     losartan (COZAAR) 25 MG tablet Take 2 tablets (50 mg) by mouth daily     mycophenolic acid (GENERIC EQUIVALENT) 180 MG EC tablet Take 3 tablets (540 mg) by mouth 2 times daily     NEORAL (BRAND) 25 MG capsule Take 3 capsules (75 mg) by mouth every morning AND 2 capsules (50 mg) every evening.     No current facility-administered medications for this visit.     There are no discontinued medications.    Physical Exam   Vital Signs: There were no vitals taken for this visit.  Within normal limits for video visit no acute distress no visible facial rashes normal speech and mentation.    Data     Renal Latest Ref Rng & Units 9/16/2022 4/18/2022 12/27/2021   Na 136 - 145 mmol/L 137 142 141   Na (external) 135 - 145 mmol/L - - -   K 3.4 - 5.3 mmol/L 4.7 4.1 4.1   K (external) 3.5 - 5.0 mmol/L - - -   Cl 98 - 107 mmol/L 101 104 110(H)   CO2 22 - 29 mmol/L 24 28 22   CO2 (external) 21 - 31 mmol/L - - -   BUN 6.0 - 20.0 mg/dL 38.7(H) 33(H) 28   BUN (external) 8 - 25 mg/dL - - -   Cr 0.51 - 0.95 mg/dL 2.25(H) 2.16(H) 1.98(H)   Cr (external) 0.57 - 1.11 mg/dL - - -   Glucose 70 - 99 mg/dL 110(H) 119(H) 107(H)   Glucose (external) 65 - 100 mg/dL - - -   Ca  8.6 - 10.0 mg/dL 9.6 8.8 9.1   Ca (external) 8.5 - 10.5 mg/dL - - -   Mg 1.6 - 2.3 mg/dL - - -     Bone Health Latest Ref Rng & Units 9/9/2021 9/3/2021 12/13/2019   Phos 2.5 - 4.5 mg/dL 4.8(H)  5.3(H) -   PTHi 18 - 80 pg/mL - - 36   Vit D Def 20 - 75 ug/L - - 26     Heme Latest Ref Rng & Units 9/16/2022 4/18/2022 12/27/2021   WBC 4.0 - 11.0 10e3/uL 8.6 8.4 5.8   WBC (external) 4.5 - 11.0 thou/cu mm - - -   Hgb 11.7 - 15.7 g/dL 11.9 12.2 11.2(L)   Hgb (external) 12.0 - 16.0 g/dL - - -   Plt 150 - 450 10e3/uL 230 254 215   Plt (external) 140 - 440 thou/cu mm - - -   ABSOLUTE NEUTROPHIL 1.6 - 8.3 10e9/L - - -   ABSOLUTE LYMPHOCYTES 0.8 - 5.3 10e9/L - - -   ABSOLUTE MONOCYTES 0.0 - 1.3 10e9/L - - -   ABSOLUTE EOSINOPHILS 0.0 - 0.7 10e9/L - - -   ABSOLUTE BASOPHILS 0.0 - 0.2 10e9/L - - -   ABS IMMATURE GRANULOCYTES 0 - 0.4 10e9/L - - -   ABSOLUTE NUCLEATED RBC - - - -     Liver Latest Ref Rng & Units 9/3/2021 12/13/2019 10/22/2018   AP 40 - 150 U/L - 92 72   TBili 0.2 - 1.3 mg/dL - 0.6 0.6   ALT 0 - 50 U/L - 51(H) 34   AST 0 - 45 U/L - 21 26   Tot Protein 6.8 - 8.8 g/dL - 7.4 7.3   Albumin 3.4 - 5.0 g/dL 3.4 3.5 3.6     Pancreas Latest Ref Rng & Units 7/26/2013 7/27/2012 6/3/2008   A1C 4.3 - 6.0 % 5.0 5.3 -   Amylase 30 - 110 U/L - - 58   Lipase 20 - 250 U/L - - -     Iron studies Latest Ref Rng & Units 1/30/2018 7/11/2006 6/26/2006   Iron 35 - 180 ug/dL 55 56 75   Iron sat 15 - 46 % 19 - -   Ferritin 12 - 150 ng/mL 19 473(H) 914(H)     [facility] Txp Virology Latest Ref Rng & Units 1/31/2017 8/29/2011 7/21/2011   CMV IgG EU/mL - - -   CMV IgM <0.90 - - -   CMV IgM Interp <0.90 - - -   CVM DNA Quant - Plasma, EDTA anticoagulant Whole blood, EDTA anticoagulant Whole blood, EDTA anticoagulant   CMV Quant <100 Copies/mL - <100  No CMV DNA detected. <100  No CMV DNA detected.   CMV QT Log <2.0 Log copies/mL - <2.0  The Cytomegalovirus DNA Quantitation assay is a real-time polymerase chain   reaction (PCR) utilizing analyte specific reagents manufactured by [facility]   [facility]. Analyte Specific Reagents (ASRs) are used in many laboratory   tests necessary for standard medical care and generally do not require FDA    approval.   This test was developed and its performance characteristics determined by   [facility].  It has not been   cleared or approved by the US Food and Drug Administration. <2.0  The Cytomegalovirus DNA Quantitation assay is a real-time polymerase chain   reaction (PCR) utilizing analyte specific reagents manufactured by [facility]. Analyte Specific Reagents (ASRs) are used in many laboratory   tests necessary for standard medical care and generally do not require FDA   approval.   This test was developed and its performance characteristics determined by   [facility].  It has not been   cleared or approved by the US Food and Drug Administration.   CMV QUANT IU/ML CMVND [IU]/mL CMV DNA Not Detected   The BARBI AmpliPrep/BARBI TaqMan CMV Test is an FDA-approved in vitro nucleic   acid amplification test for the quantitation of cytomegalovirus DNA in human   plasma (EDTA plasma) using the BARBI AmpliPrep Instrument for automated viral   nucleic acid extraction and the dondeEstaâ„¢ TaqMan Analyzer or dondeEstaâ„¢ TaqMan for   automated Real Time amplification and detection of the viral nucleic acid   target.   Titer results are reported in International Units/mL (IU/mL using 1st WHO   International standard for Human Cytomegalovirus for Nucleic Acid Amplification   based assays. The conversion factor between CMV DNA copis/mL (as defined by the   Roche BARBI TaqMan CMV test) and International Units is the CMV DNA   concentration in IU/mL x 1.1 copies/IU = CMV DNA in copies/mL.   This assay has received FDA approval for the testing of human plasma only. The   [facility] Laboratory at the [facility], [place], has validated the pe  rformance characteristics of the Roche   CMV assay for plasma, bronchial alveolar lavage/wash and urine.   - -   LOG IU/ML OF CMVQNT <2.1 [Log:IU]/mL  Not Calculated - -   BK Spec - - - -   BK Res <1000 copies/mL - - -   BK Log <3.0 Log copies/mL - - -   EBV IgG - - - -   EBV DNA COPIES/ML EBVNEG [Copies]/mL EBV DNA Not Detected - -   EBV DNA LOG OF COPIES <2.7 [Log:copies]/mL Not Calculated   The Real-Time quantitative EBV assay was developed and its performance   characteristics determined by the [facility] Laboratory at   the [facility] in [place], [place].  The   primers and probes are Analyte Specific Reagents (ASRs) manufactured  by   [employer].   ASRs are used in many laboratory tests necessary for standard medical care and   generally do not require U.S. Food and Drug Administration approval.  The FDA   has determined that such clearance or approval is not necessary.  This test is   used for clinical purposes.  It should not be regarded as investigational or   research.   This laboratory is certified under the Clinical Laboratory Improvement   Amendments of 1988 (CLIA-88) as qualified to perform high complexity clinical   laboratory testing.   The quantitative range of this assay is 500-22,500,00 copies/mL (2.7-7.4 log   copies/mL).  A negative result does not rule out the presence of PCR inhibitors     in the patient specimen or EBV DNA nucleic acid in concentrations below the   level of detection of the assay.  Inhibition may also lead to underestimation   of viral quantitation.   - -   Hep B Core NEG - - -   Hep B Surf 0.0 - 4.9 mIU/mL - - -   HIV 1&2 NEG - - -     I spent a total of 43 minutes on the date of the encounter doing chart review, performing a history and physical exam, completing documentation and any further activities as noted above.

## 2022-09-28 NOTE — LETTER
9/28/2022       RE: Carol Guillaume  [address] [place] [address]     Dear Colleague,    Thank you for referring your patient, Carol Guillaume, to the [facility]PH[facility] [place] at [facility]. Please see a copy of my visit note below.    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  Unremarkable.   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  Clinical correlation is recommended.    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 If any evidence for recurrence or progression, would consider mTOR switch  She had recent IUD removal and was counseled by OB/GYN about risks of pregnancy given kidney  transplant, CKD ,.. and she doesn't plan to get pregnant    # Infection Prophylaxis:   - PJP: None    # Hypertension: good control Goal BP: < 130/80   - Changes: no, monitor home BP with orthostatics given prior symptoms, losartan just added to control proteinuria & BP, could uptitrate /stop  amlodipine if persistent proteinuria on next check, Cr/K stable    # Hyperlipidemia:    - discussed dietary modifications and weight loss as well as statin, she will discuss with PCP next visit. She has myalgias but denies hx of statin  intolerance    # Mineral Bone Disorder:   - Vitamin D; level: Low        On supplement: Yes  - Calcium; level: Normal        On supplement: No    # Obesity: Stable weight.   - Recommend weight loss for overall health by increasing exercise and watching caloric intake.    # Skin Cancer Risk:    - Discussed sun protection and recommend regular follow up with Dermatology.    # Medical Compliance: Yes     # COVID-19 Virus Review: Discussed COVID-19 virus and the potential medical risks.  Reviewed preventative health recommendations, which includes washing hands for 20 seconds, avoid touching your face, and social distancing.  Asked patient to inform the transplant center if they are exposed or diagnosed with this virus.    ## COVID-19 Virus Review: Discussed COVID-19 virus and the potential medical risks.  Reviewed preventative health recommendations, including wearing a mask where appropriate.  Recommended COVID vaccination should be up to date with either an initial vaccination or booster shot when appropriate.  Asked the patient to inform the transplant center if they are exposed or diagnosed with this virus.    # COVID Vaccination Up To Date: no counseled but reluctant    # Transplant History:  Etiology of Kidney Failure: Senior-Loken Syndrome  Tx: LDKT  Transplant: 7/18/2006 (Kidney)  Donor Type: Living Donor Class: Standard Criteria Donor  Significant changes in immunosuppression: Generic  "mycophenolate mofetil caused chest pain and heart palpitations  Significant transplant-related complications: None    Transplant Office Phone Number: [phone]    Assessment and plan was discussed with the patient and she voiced her understanding and agreement.    Return visit: 4 months    Feliberto Littlejohn MD    Chief Complaint   Ms. Guillaume is a 32 year old here for kidney transplant and immunosuppression management.    History of Present Illness   Ms. 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 She was restarted on  mg po bid & losartan was added to control BP & proteinuria.      Interval Hx  Mrs Guillaume feels ok overall.   \"weird symptoms\" last week, feels a lot better this week  Still struggles with fatigue, anxiety, insomnia - she has been seeing a therapist  Occasional muscle cramps  Alternating diarrhea and constipation, some abdominal discomfort  Current IS CSA 75/50 /540  COVID vaccine: hesitant to receive the vaccine (Dec. 6 months Ca free)  Flu vaccine: declines  Home BP: ~130s/80s    Review of Systems   A comprehensive review of systems was obtained and negative, except as noted in the HPI or PMH.    Problem List   Patient Active Problem List   Diagnosis     Kidney replaced by transplant     Senior-Loken syndrome     Pseudotumor cerebri     Legally blind     Retinitis pigmentosa     Vitamin D deficiency     Morbid obesity (H)     Insulin resistance     PCOS (polycystic ovarian syndrome)     Cataract     Palpitations     Dyslipidemia     HTN, " kidney transplant related     Aftercare following organ transplant     Immunosuppression (H)     EIN (endometrial intraepithelial neoplasia)     Chronic kidney disease, stage 3 (H)       Social History   Social History     Tobacco Use     Smoking status: Never Smoker     Smokeless tobacco: Never Used   Vaping Use     Vaping Use: Never used   Substance Use Topics     Alcohol use: No     Drug use: No       Allergies   No Known Allergies    Medications   Current Outpatient Medications   Medication Sig     acetaminophen (TYLENOL) 325 MG tablet Take 2 tablets (650 mg) by mouth every 4 hours as needed for mild pain (Patient not taking: No sig reported)     amLODIPine (NORVASC) 5 MG tablet Take 1 tablet (5 mg) by mouth daily     carvedilol (COREG) 25 MG tablet Take 1 tablet (25 mg) by mouth 2 times daily (with meals)     levonorgestrel (MIRENA) 20 MCG/24HR IUD 1 each by Intrauterine route once     losartan (COZAAR) 25 MG tablet Take 2 tablets (50 mg) by mouth daily     mycophenolic acid (GENERIC EQUIVALENT) 180 MG EC tablet Take 3 tablets (540 mg) by mouth 2 times daily     NEORAL (BRAND) 25 MG capsule Take 3 capsules (75 mg) by mouth every morning AND 2 capsules (50 mg) every evening.     No current facility-administered medications for this visit.     There are no discontinued medications.    Physical Exam   Vital Signs: There were no vitals taken for this visit.  Within normal limits for video visit no acute distress no visible facial rashes normal speech and mentation.    Data     Renal Latest Ref Rng & Units 9/16/2022 4/18/2022 12/27/2021   Na 136 - 145 mmol/L 137 142 141   Na (external) 135 - 145 mmol/L - - -   K 3.4 - 5.3 mmol/L 4.7 4.1 4.1   K (external) 3.5 - 5.0 mmol/L - - -   Cl 98 - 107 mmol/L 101 104 110(H)   CO2 22 - 29 mmol/L 24 28 22   CO2 (external) 21 - 31 mmol/L - - -   BUN 6.0 - 20.0 mg/dL 38.7(H) 33(H) 28   BUN (external) 8 - 25 mg/dL - - -   Cr 0.51 - 0.95 mg/dL 2.25(H) 2.16(H) 1.98(H)   Cr (external)  0.57 - 1.11 mg/dL - - -   Glucose 70 - 99 mg/dL 110(H) 119(H) 107(H)   Glucose (external) 65 - 100 mg/dL - - -   Ca  8.6 - 10.0 mg/dL 9.6 8.8 9.1   Ca (external) 8.5 - 10.5 mg/dL - - -   Mg 1.6 - 2.3 mg/dL - - -     Bone Health Latest Ref Rng & Units 9/9/2021 9/3/2021 12/13/2019   Phos 2.5 - 4.5 mg/dL 4.8(H) 5.3(H) -   PTHi 18 - 80 pg/mL - - 36   Vit D Def 20 - 75 ug/L - - 26     Heme Latest Ref Rng & Units 9/16/2022 4/18/2022 12/27/2021   WBC 4.0 - 11.0 10e3/uL 8.6 8.4 5.8   WBC (external) 4.5 - 11.0 thou/cu mm - - -   Hgb 11.7 - 15.7 g/dL 11.9 12.2 11.2(L)   Hgb (external) 12.0 - 16.0 g/dL - - -   Plt 150 - 450 10e3/uL 230 254 215   Plt (external) 140 - 440 thou/cu mm - - -   ABSOLUTE NEUTROPHIL 1.6 - 8.3 10e9/L - - -   ABSOLUTE LYMPHOCYTES 0.8 - 5.3 10e9/L - - -   ABSOLUTE MONOCYTES 0.0 - 1.3 10e9/L - - -   ABSOLUTE EOSINOPHILS 0.0 - 0.7 10e9/L - - -   ABSOLUTE BASOPHILS 0.0 - 0.2 10e9/L - - -   ABS IMMATURE GRANULOCYTES 0 - 0.4 10e9/L - - -   ABSOLUTE NUCLEATED RBC - - - -     Liver Latest Ref Rng & Units 9/3/2021 12/13/2019 10/22/2018   AP 40 - 150 U/L - 92 72   TBili 0.2 - 1.3 mg/dL - 0.6 0.6   ALT 0 - 50 U/L - 51(H) 34   AST 0 - 45 U/L - 21 26   Tot Protein 6.8 - 8.8 g/dL - 7.4 7.3   Albumin 3.4 - 5.0 g/dL 3.4 3.5 3.6     Pancreas Latest Ref Rng & Units 7/26/2013 7/27/2012 6/3/2008   A1C 4.3 - 6.0 % 5.0 5.3 -   Amylase 30 - 110 U/L - - 58   Lipase 20 - 250 U/L - - -     Iron studies Latest Ref Rng & Units 1/30/2018 7/11/2006 6/26/2006   Iron 35 - 180 ug/dL 55 56 75   Iron sat 15 - 46 % 19 - -   Ferritin 12 - 150 ng/mL 19 473(H) 914(H)     [facility] Txp Virology Latest Ref Rng & Units 1/31/2017 8/29/2011 7/21/2011   CMV IgG EU/mL - - -   CMV IgM <0.90 - - -   CMV IgM Interp <0.90 - - -   CVM DNA Quant - Plasma, EDTA anticoagulant Whole blood, EDTA anticoagulant Whole blood, EDTA anticoagulant   CMV Quant <100 Copies/mL - <100  No CMV DNA detected. <100  No CMV DNA detected.   CMV QT Log <2.0 Log copies/mL -  <2.0  The Cytomegalovirus DNA Quantitation assay is a real-time polymerase chain   reaction (PCR) utilizing analyte specific reagents manufactured by [employer]   [facility]. Analyte Specific Reagents (ASRs) are used in many laboratory   tests necessary for standard medical care and generally do not require FDA   approval.   This test was developed and its performance characteristics determined by   [facility].  It has not been   cleared or approved by the US Food and Drug Administration. <2.0  The Cytomegalovirus DNA Quantitation assay is a real-time polymerase chain   reaction (PCR) utilizing analyte specific reagents manufactured by [employer]   [facility]. Analyte Specific Reagents (ASRs) are used in many laboratory   tests necessary for standard medical care and generally do not require FDA   approval.   This test was developed and its performance characteristics determined by   [facility].  It has not been   cleared or approved by the US Food and Drug Administration.   CMV QUANT IU/ML CMVND [IU]/mL CMV DNA Not Detected   The BARBI AmpliPrep/BARBI TaqMan CMV Test is an FDA-approved in vitro nucleic   acid amplification test for the quantitation of cytomegalovirus DNA in human   plasma (EDTA plasma) using the [employer] AmpliPrep Instrument for automated viral   nucleic acid extraction and the [employer] TaqMan Analyzer or BARBI TaqMan for   automated Real Time amplification and detection of the viral nucleic acid   target.   Titer results are reported in International Units/mL (IU/mL using 1st WHO   International standard for Human Cytomegalovirus for Nucleic Acid Amplification   based assays. The conversion factor between CMV DNA copis/mL (as defined by the   Roche BARBI TaqMan CMV test) and International Units is the CMV DNA   concentration in IU/mL x 1.1 copies/IU = CMV DNA in copies/mL.   This assay has received FDA approval for  the testing of human plasma only. The   Infect[facility] Disease Diagnostic Laboratory at the [facility], [place], has validated the pe  rformance characteristics of the Roche   CMV assay for plasma, bronchial alveolar lavage/wash and urine.   - -   LOG IU/ML OF CMVQNT <2.1 [Log:IU]/mL Not Calculated - -   BK Spec - - - -   BK Res <1000 copies/mL - - -   BK Log <3.0 Log copies/mL - - -   EBV IgG - - - -   EBV DNA COPIES/ML EBVNEG [Copies]/mL EBV DNA Not Detected - -   EBV DNA LOG OF COPIES <2.7 [Log:copies]/mL Not Calculated   The Real-Time quantitative EBV assay was developed and its performance   characteristics determined by the Infect[facility] Diseases Diagnostic Laboratory at   the [facility] in [place], [place].  The   primers and probes are Analyte Specific Reagents (ASRs) manufactured  by   [employer].   ASRs are used in many laboratory tests necessary for standard medical care and   generally do not require U.S. Food and Drug Administration approval.  The FDA   has determined that such clearance or approval is not necessary.  This test is   used for clinical purposes.  It should not be regarded as investigational or   research.   This laboratory is certified under the Clinical Laboratory Improvement   Amendments of 1988 (CLIA-88) as qualified to perform high complexity clinical   laboratory testing.   The quantitative range of this assay is 500-22,500,00 copies/mL (2.7-7.4 log   copies/mL).  A negative result does not rule out the presence of PCR inhibitors     in the patient specimen or EBV DNA nucleic acid in concentrations below the   level of detection of the assay.  Inhibition may also lead to underestimation   of viral quantitation.   - -   Hep B Core NEG - - -   Hep B Surf 0.0 - 4.9 mIU/mL - - -   HIV 1&2 NEG - - -     I spent a total of 43 minutes on the date of the encounter doing chart review, performing a history and physical exam, completing  documentation and any further activities as noted above.      Again, thank you for allowing me to participate in the care of your patient.      Sincerely,    Feliberto Littlejohn MD

## 2022-09-28 NOTE — PATIENT INSTRUCTIONS
Patient Recommendations:  Try to take pepcid 20 mg po every day in the morning    If symptoms persist, recommend scheduling an upper endoscopy to determine if symptoms are due to mycophenolate toxicity. Please acll the transplant office if concerns with this    Transplant Patient Information  Your Post Transplant Coordinator is: Ronna Hughes  You and your care team can contact your transplant coordinator Monday - Friday, 8am - 5pm at [phone] (Option 2 to reach the coordinator or Option 4 to schedule an appointment).  You can also reach your care team online via [employer]  After hours for urgent matters, please call [facility] at [phone].

## 2022-09-28 NOTE — PROGRESS NOTES
Carol is a 32 year old who is being evaluated via a billable video visit.      How would you like to obtain your AVS? MyChart  If the video visit is dropped, the invitation should be resent by: Text to cell phone: [phone]  Will anyone else be joining your video visit? No        Video-Visit Details    Video Start Time: 8:01    Type of service:  Video Visit    Video End Time:8:34    Originating Location (pt. Location): Home    Distant Location (provider location):  [facility]OLOG[facility] [place]     Platform used for Video Visit: [employer]ac[employer]

## 2022-11-16 DIAGNOSIS — D84.9 IMMUNOSUPPRESSION (H): ICD-10-CM

## 2022-11-16 DIAGNOSIS — Z48.298 AFTERCARE FOLLOWING ORGAN TRANSPLANT: ICD-10-CM

## 2022-11-16 DIAGNOSIS — Z94.0 KIDNEY TRANSPLANTED: Primary | ICD-10-CM

## 2022-11-16 DIAGNOSIS — Z79.899 ENCOUNTER FOR LONG-TERM CURRENT USE OF MEDICATION: ICD-10-CM

## 2022-11-16 RX ORDER — MYCOPHENOLIC ACID 180 MG/1
540 TABLET, DELAYED RELEASE ORAL 2 TIMES DAILY
Qty: 540 TABLET | Refills: 3 | Status: SHIPPED | OUTPATIENT
Start: 2022-11-16 | End: 2023-04-18

## 2022-11-17 DIAGNOSIS — Z94.0 KIDNEY REPLACED BY TRANSPLANT: Primary | ICD-10-CM

## 2022-11-17 DIAGNOSIS — Z48.298 AFTERCARE FOLLOWING ORGAN TRANSPLANT: ICD-10-CM

## 2022-11-17 RX ORDER — CYCLOSPORINE 25 MG/1
CAPSULE, LIQUID FILLED ORAL
Qty: 450 CAPSULE | Refills: 3 | Status: SHIPPED | OUTPATIENT
Start: 2022-11-17 | End: 2024-01-26

## 2022-11-19 ENCOUNTER — HEALTH MAINTENANCE LETTER (OUTPATIENT)
Age: 32
End: 2022-11-19

## 2022-11-21 ENCOUNTER — TELEPHONE (OUTPATIENT)
Dept: TRANSPLANT | Facility: CLINIC | Age: 32
End: 2022-11-21

## 2022-11-21 NOTE — TELEPHONE ENCOUNTER
Patient Call: General  Route to LPN    Reason for call: called in regards of not feeling well and looking for over the counter alternatives. Would like to following up and see what can take.     Call back needed? Yes    Return Call Needed  Same as documented in contacts section  When to return call?: Same day: Route High Priority

## 2022-11-22 NOTE — TELEPHONE ENCOUNTER
ISSUE: patient called yesterday inquiring on what medications she can take as she is not feeling well.    OUTCOME: letf detailed message with instruction to see PCP or present to urgent care if patient is still not feeling well,  Experiencing a fever of more thatn 48 hours, or inability to keep food, fluids, or IS down.     Asked for CB to discuss    Ronna Hughes RN   Transplant Coordinator  [phone]

## 2022-11-28 ENCOUNTER — VIRTUAL VISIT (OUTPATIENT)
Dept: INTERNAL MEDICINE | Facility: CLINIC | Age: 32
End: 2022-11-28
Payer: COMMERCIAL

## 2022-11-28 DIAGNOSIS — J06.9 VIRAL URI: Primary | ICD-10-CM

## 2022-11-28 PROCEDURE — 99213 OFFICE O/P EST LOW 20 MIN: CPT | Mod: 95 | Performed by: INTERNAL MEDICINE

## 2022-11-28 NOTE — NURSING NOTE
Patient declined individual allergy and medication review by support staff because patient denies any changes since echeck-in completion and states all information entered during echeck-in remains accurate.    Maribel Gibson VF

## 2022-11-28 NOTE — PROGRESS NOTES
"Carol is a 32 year old who is being evaluated via a billable video visit for follow up URI and letter request.      HPI     Last week, felt ill, Mon through Friday; she had a fever and also laryngitis, headache, cough.  Took time off from work and needs a doctor's note that she was unable to work due to a viral illness.  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  Over the weekend this finally began to improve. She went back to work today.  Currently, feeling better, was taking mucinex and tylenol with some relief, also a home saline nebulizer.      Review of Systems    ROS: 10 point ROS neg other than the symptoms noted above in the HPI.      Objective    Vitals - Patient Reported  Weight (Patient Reported): 122.5 kg (270 lb)  Height (Patient Reported): 167.6 cm (5' 6\")  BMI (Based on Pt Reported Ht/Wt): 43.58  Pain Score: Moderate Pain (5)  Pain Loc: Other - see comment (sore throat & headache)      Physical Exam   healthy, alert and no distress  PSYCH: Alert and oriented times 3; coherent speech, normal   rate and volume, able to articulate logical thoughts, able   to abstract reason, no tangential thoughts, no hallucinations   or delusions  Her affect is normal  RESP: No cough, no audible wheezing, able to talk in full sentences  Remainder of exam unable to be completed due to telephone visits    A/P:  Viral URI, resolving.  Agree that she would be unable to work Mon through Fri last week due to cough and laryngitis given the nature of her work speaking with clients.  She is now improving, so no further medication or intervention was offered.  Keep follow up as previously scheduled with Nephrology and PCP.    Ashley Kc MD          Video visit started at 11:25 ended 12:25 with another 10 minutes chart review and documentation same day  Patient was at home and provider on site; doximity was used as " [place] not working

## 2023-01-02 ENCOUNTER — VIRTUAL VISIT (OUTPATIENT)
Dept: NEPHROLOGY | Facility: CLINIC | Age: 33
End: 2023-01-02
Attending: INTERNAL MEDICINE
Payer: COMMERCIAL

## 2023-01-02 VITALS — HEIGHT: 66 IN | WEIGHT: 270 LBS | BODY MASS INDEX: 43.39 KG/M2

## 2023-01-02 DIAGNOSIS — Z48.298 AFTERCARE FOLLOWING ORGAN TRANSPLANT: Primary | ICD-10-CM

## 2023-01-02 DIAGNOSIS — Z94.0 HTN, KIDNEY TRANSPLANT RELATED: ICD-10-CM

## 2023-01-02 DIAGNOSIS — I15.1 HTN, KIDNEY TRANSPLANT RELATED: ICD-10-CM

## 2023-01-02 DIAGNOSIS — Q61.5 SENIOR-LOKEN SYNDROME: ICD-10-CM

## 2023-01-02 DIAGNOSIS — N85.02 EIN (ENDOMETRIAL INTRAEPITHELIAL NEOPLASIA): ICD-10-CM

## 2023-01-02 DIAGNOSIS — R80.1 PERSISTENT PROTEINURIA: ICD-10-CM

## 2023-01-02 DIAGNOSIS — Q87.89 SENIOR-LOKEN SYNDROME: ICD-10-CM

## 2023-01-02 DIAGNOSIS — Z94.0 KIDNEY REPLACED BY TRANSPLANT: ICD-10-CM

## 2023-01-02 DIAGNOSIS — D84.9 IMMUNOSUPPRESSION (H): ICD-10-CM

## 2023-01-02 DIAGNOSIS — H35.50 SENIOR-LOKEN SYNDROME: ICD-10-CM

## 2023-01-02 PROCEDURE — 99214 OFFICE O/P EST MOD 30 MIN: CPT | Mod: 95 | Performed by: INTERNAL MEDICINE

## 2023-01-02 PROCEDURE — G0463 HOSPITAL OUTPT CLINIC VISIT: HCPCS | Mod: PN,GT | Performed by: INTERNAL MEDICINE

## 2023-01-02 ASSESSMENT — PAIN SCALES - GENERAL: PAINLEVEL: NO PAIN (0)

## 2023-01-02 NOTE — LETTER
1/2/2023       RE: Carol Guillaume  [address] [address]     Dear Colleague,    Thank you for referring your patient, Carol Guillaume, to the [facility] [place] [facility]. Please see a copy of my visit note below.      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  Unremarkable.   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  Clinical correlation is recommended.    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 If any evidence for recurrence or progression, would consider mTOR switch  She had recent IUD removal and was counseled by OB/GYN about risks of pregnancy given kidney transplant, CKD ,.. and she doesn't plan to get pregnant    # Infection Prophylaxis:   - PJP: None    #  GERD:   - moderate to severe symptoms unresolved with ppi trial, H2 blockers, dietary modifications   - refer to GI for EGD +biopsies   - add CMV pcr to labs    # Hypertension: good control Goal BP: < 130/80   - Changes: no    # Hyperlipidemia:    - discussed dietary modifications and weight loss as well as statin, she will discuss with PCP next visit. She has myalgias but denies hx of statin intolerance   -  Recommend weight loss for overall health by increasing exercise and watching caloric intake.    # Skin Cancer Risk:    - Discussed sun protection and recommend regular follow up with Dermatology.    # Vaccines: hesitant    # Transplant History:  Etiology of Kidney Failure: Senior-Loken Syndrome  Tx: LDKT  Transplant: 7/18/2006 (Kidney)  Donor Type: Living Donor Class: Standard Criteria Donor  Significant changes in immunosuppression: Generic mycophenolate mofetil caused chest pain and heart palpitations  Significant transplant-related complications: None    Transplant Office Phone Number: [phone]    Assessment and plan was discussed with the patient and she voiced her understanding and agreement.    Return visit: 4 months    Feliberto Littlejohn MD    Chief Complaint   Ms. Guillaume is a 32 year old here for kidney transplant and immunosuppression management.    History of Present Illness     C/o ongoing heartburn x months , varies depending on diet, tried TUMS/pepcid/omeprazole  Felt sick in November, URI symptoms; unsure if she had COVID/flu, didn't test and have not been vaccinated  Currently denies any fevers, chills, shortness of breath, cough  Occasional dizziness but denies any hypotensive episodes   Scheduled for f/up GYN in 2 weeks and will have endometrial US   Unfortunately lost her job last week     Current IS CSA 75/50 /540  Vaccines: none (hesitant)  Home BP: ~130/80s    Problem List   Patient Active Problem List   Diagnosis     Kidney replaced by transplant     Senior-Loken syndrome      "Pseudotumor cerebri     Legally blind     Retinitis pigmentosa     Vitamin D deficiency     Morbid obesity (H)     Insulin resistance     PCOS (polycystic ovarian syndrome)     Cataract     Palpitations     Dyslipidemia     HTN, kidney transplant related     Aftercare following organ transplant     Immunosuppression (H)     EIN (endometrial intraepithelial neoplasia)     Chronic kidney disease, stage 3 (H)       Allergies   No Known Allergies    Medications   Current Outpatient Medications   Medication Sig     acetaminophen (TYLENOL) 325 MG tablet Take 2 tablets (650 mg) by mouth every 4 hours as needed for mild pain     amLODIPine (NORVASC) 5 MG tablet Take 1 tablet (5 mg) by mouth daily     carvedilol (COREG) 25 MG tablet Take 1 tablet (25 mg) by mouth 2 times daily (with meals)     losartan (COZAAR) 25 MG tablet Take 2 tablets (50 mg) by mouth daily     mycophenolic acid (GENERIC EQUIVALENT) 180 MG EC tablet Take 3 tablets (540 mg) by mouth 2 times daily     NEORAL (BRAND) 25 MG capsule Take 3 capsules (75 mg) by mouth every morning AND 2 capsules (50 mg) every evening.     levonorgestrel (MIRENA) 20 MCG/24HR IUD 1 each by Intrauterine route once (Patient not taking: Reported on 1/2/2023)     No current facility-administered medications for this visit.     There are no discontinued medications.    Physical Exam   Vital Signs: Ht 1.676 m (5' 6\")   Wt 122.5 kg (270 lb)   BMI 43.58 kg/m    GENERAL: Healthy, alert and no distress  EYES: Eyes grossly normal to inspection.  No discharge or erythema, or obvious scleral/conjunctival abnormalities.  RESP: No audible wheeze, cough, or visible cyanosis.  No visible retractions or increased work of breathing.    SKIN: Visible skin clear. No significant rash, abnormal pigmentation or lesions.  NEURO: Cranial nerves grossly intact.  Mentation and speech appropriate for age.  PSYCH: Mentation appears normal, affect normal/bright, judgement and insight intact, normal speech " and appearance well-groomed.        Data     Renal Latest Ref Rng & Units 9/16/2022 4/18/2022 12/27/2021   Na 136 - 145 mmol/L 137 142 141   Na (external) 135 - 145 mmol/L - - -   K 3.4 - 5.3 mmol/L 4.7 4.1 4.1   K (external) 3.5 - 5.0 mmol/L - - -   Cl 98 - 107 mmol/L 101 104 110(H)   CO2 22 - 29 mmol/L 24 28 22   CO2 (external) 21 - 31 mmol/L - - -   BUN 6.0 - 20.0 mg/dL 38.7(H) 33(H) 28   BUN (external) 8 - 25 mg/dL - - -   Cr 0.51 - 0.95 mg/dL 2.25(H) 2.16(H) 1.98(H)   Cr (external) 0.57 - 1.11 mg/dL - - -   Glucose 70 - 99 mg/dL 110(H) 119(H) 107(H)   Glucose (external) 65 - 100 mg/dL - - -   Ca  8.6 - 10.0 mg/dL 9.6 8.8 9.1   Ca (external) 8.5 - 10.5 mg/dL - - -   Mg 1.6 - 2.3 mg/dL - - -     Bone Health Latest Ref Rng & Units 9/9/2021 9/3/2021 12/13/2019   Phos 2.5 - 4.5 mg/dL 4.8(H) 5.3(H) -   PTHi 18 - 80 pg/mL - - 36   Vit D Def 20 - 75 ug/L - - 26     Heme Latest Ref Rng & Units 9/16/2022 4/18/2022 12/27/2021   WBC 4.0 - 11.0 10e3/uL 8.6 8.4 5.8   WBC (external) 4.5 - 11.0 thou/cu mm - - -   Hgb 11.7 - 15.7 g/dL 11.9 12.2 11.2(L)   Hgb (external) 12.0 - 16.0 g/dL - - -   Plt 150 - 450 10e3/uL 230 254 215   Plt (external) 140 - 440 thou/cu mm - - -   ABSOLUTE NEUTROPHIL 1.6 - 8.3 10e9/L - - -   ABSOLUTE LYMPHOCYTES 0.8 - 5.3 10e9/L - - -   ABSOLUTE MONOCYTES 0.0 - 1.3 10e9/L - - -   ABSOLUTE EOSINOPHILS 0.0 - 0.7 10e9/L - - -   ABSOLUTE BASOPHILS 0.0 - 0.2 10e9/L - - -   ABS IMMATURE GRANULOCYTES 0 - 0.4 10e9/L - - -   ABSOLUTE NUCLEATED RBC - - - -     Liver Latest Ref Rng & Units 9/3/2021 12/13/2019 10/22/2018   AP 40 - 150 U/L - 92 72   TBili 0.2 - 1.3 mg/dL - 0.6 0.6   ALT 0 - 50 U/L - 51(H) 34   AST 0 - 45 U/L - 21 26   Tot Protein 6.8 - 8.8 g/dL - 7.4 7.3   Albumin 3.4 - 5.0 g/dL 3.4 3.5 3.6     Pancreas Latest Ref Rng & Units 7/26/2013 7/27/2012 6/3/2008   A1C 4.3 - 6.0 % 5.0 5.3 -   Amylase 30 - 110 U/L - - 58   Lipase 20 - 250 U/L - - -     Iron studies Latest Ref Rng & Units 1/30/2018 7/11/2006  6/26/2006   Iron 35 - 180 ug/dL 55 56 75   Iron sat 15 - 46 % 19 - -   Ferritin 12 - 150 ng/mL 19 473(H) 914(H)     [facility] Txp Virology Latest Ref Rng & Units 1/31/2017 8/29/2011 7/21/2011   CMV IgG EU/mL - - -   CMV IgM <0.90 - - -   CMV IgM Interp <0.90 - - -   CVM DNA Quant - Plasma, EDTA anticoagulant Whole blood, EDTA anticoagulant Whole blood, EDTA anticoagulant   CMV Quant <100 Copies/mL - <100  No CMV DNA detected. <100  No CMV DNA detected.   CMV QT Log <2.0 Log copies/mL - <2.0  The Cytomegalovirus DNA Quantitation assay is a real-time polymerase chain   reaction (PCR) utilizing analyte specific reagents manufactured by [employer]   [facility]. Analyte Specific Reagents (ASRs) are used in many laboratory   tests necessary for standard medical care and generally do not require FDA   approval.   This test was developed and its performance characteristics determined by   [facility].  It has not been   cleared or approved by the US Food and Drug Administration. <2.0  The Cytomegalovirus DNA Quantitation assay is a real-time polymerase chain   reaction (PCR) utilizing analyte specific reagents manufactured by [employer]   [facility]. Analyte Specific Reagents (ASRs) are used in many laboratory   tests necessary for standard medical care and generally do not require FDA   approval.   This test was developed and its performance characteristics determined by   [facility].  It has not been   cleared or approved by the US Food and Drug Administration.   CMV QUANT IU/ML CMVND [IU]/mL CMV DNA Not Detected   The BARBI AmpliPrep/BARBI TaqMan CMV Test is an FDA-approved in vitro nucleic   acid amplification test for the quantitation of cytomegalovirus DNA in human   plasma (EDTA plasma) using the [employer] AmpliPrep Instrument for automated viral   nucleic acid extraction and the [employer] TaqMan Analyzer or [employer]WORKS TaqMan for   automated Real  Time amplification and detection of the viral nucleic acid   target.   Titer results are reported in International Units/mL (IU/mL using 1st WHO   International standard for Human Cytomegalovirus for Nucleic Acid Amplification   based assays. The conversion factor between CMV DNA copis/mL (as defined by the   Roche BARBI TaqMan CMV test) and International Units is the CMV DNA   concentration in IU/mL x 1.1 copies/IU = CMV DNA in copies/mL.   This assay has received FDA approval for the testing of human plasma only. The   [facility] Diagnostic Laboratory at the [facility], [place], has validated the pe  rformance characteristics of the Roche   CMV assay for plasma, bronchial alveolar lavage/wash and urine.   - -   LOG IU/ML OF CMVQNT <2.1 [Log:IU]/mL Not Calculated - -   BK Spec - - - -   BK Res <1000 copies/mL - - -   BK Log <3.0 Log copies/mL - - -   EBV IgG - - - -   EBV DNA COPIES/ML EBVNEG [Copies]/mL EBV DNA Not Detected - -   EBV DNA LOG OF COPIES <2.7 [Log:copies]/mL Not Calculated   The Real-Time quantitative EBV assay was developed and its performance   characteristics determined by the [facility] Diagnostic Laboratory at   the [facility] in [place], [place].  The   primers and probes are Analyte Specific Reagents (ASRs) manufactured  by   [employer].   ASRs are used in many laboratory tests necessary for standard medical care and   generally do not require U.S. Food and Drug Administration approval.  The FDA   has determined that such clearance or approval is not necessary.  This test is   used for clinical purposes.  It should not be regarded as investigational or   research.   This laboratory is certified under the Clinical Laboratory Improvement   Amendments of 1988 (CLIA-88) as qualified to perform high complexity clinical   laboratory testing.   The quantitative range of this assay is 500-22,500,00 copies/mL (2.7-7.4 log    copies/mL).  A negative result does not rule out the presence of PCR inhibitors     in the patient specimen or EBV DNA nucleic acid in concentrations below the   level of detection of the assay.  Inhibition may also lead to underestimation   of viral quantitation.   - -   Hep B Core NEG - - -   Hep B Surf 0.0 - 4.9 mIU/mL - - -   HIV 1&2 NEG - - -        Recent Labs   Lab Test 10/19/21  1014   DOSTAC 10/18/2021   TACROL <3.0*     Recent Labs   Lab Test 09/03/21  0655   DOSMPA 9/2/2021   8:00 PM   MPACID 10.89*   MPAG 157.9*     Again, thank you for allowing me to participate in the care of your patient.      Sincerely,    Feliberto Littlejohn MD

## 2023-01-02 NOTE — PATIENT INSTRUCTIONS
Patient Recommendations:  Please go for labs this month (blood and urine)    You will be referred to GI     Transplant Patient Information  Your Post Transplant Coordinator is: Ronna Hughes  You and your care team can contact your transplant coordinator Monday - Friday, 8am - 5pm at [phone] (Option 2 to reach the coordinator or Option 4 to schedule an appointment).  You can also reach your care team online via [employer].  After hours for urgent matters, please call [facility] at [phone].

## 2023-01-02 NOTE — PROGRESS NOTES
Carol is a 32 year old who is being evaluated via a billable video visit.      How would you like to obtain your AVS? MyChart  If the video visit is dropped, the invitation should be resent by: Text to cell phone: [phone]  Will anyone else be joining your video visit?   Yes         Video-Visit Details    Type of service:  Video Visit   Video Start Time: 2:37  Video End Time: 2:47    Originating Location (pt. 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  Unremarkable.   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  Clinical correlation is recommended.    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 If any evidence for recurrence or progression, would  consider mTOR switch  She had recent IUD removal and was counseled by OB/GYN about risks of pregnancy given kidney transplant, CKD ,.. and she doesn't plan to get pregnant    # Infection Prophylaxis:   - PJP: None    # GERD:   - moderate to severe symptoms unresolved with ppi trial, H2 blockers, dietary modifications   - refer to GI for EGD +biopsies   - add CMV pcr to labs    # Hypertension: good control Goal BP: < 130/80   - Changes: no    # Hyperlipidemia:    - discussed dietary modifications and weight loss as well as statin, she will discuss with PCP next visit. She has myalgias but denies hx of statin intolerance   -  Recommend weight loss for overall health by increasing exercise and watching caloric intake.    # Skin Cancer Risk:    - Discussed sun protection and recommend regular follow up with Dermatology.    # Vaccines: hesitant    # Transplant History:  Etiology of Kidney Failure: Senior-Loken Syndrome  Tx: LDKT  Transplant: 7/18/2006 (Kidney)  Donor Type: Living Donor Class: Standard Criteria Donor  Significant changes in immunosuppression: Generic mycophenolate mofetil caused chest pain and heart palpitations  Significant transplant-related complications: None    Transplant Office Phone Number: [phone]    Assessment and plan was discussed with the patient and she voiced her understanding and agreement.    Return visit: 4 months    Feliberto Littlejohn MD    Chief Complaint   Ms. Guillaume is a 32 year old here for kidney transplant and immunosuppression management.    History of Present Illness     C/o ongoing heartburn x months , varies depending on diet, tried TUMS/pepcid/omeprazole  Felt sick in November, URI symptoms; unsure if she had COVID/flu, didn't test and have not been vaccinated  Currently denies any fevers, chills, shortness of breath, cough  Occasional dizziness but denies any hypotensive episodes   Scheduled for f/up GYN in 2 weeks and will have endometrial US   Unfortunately lost her job  "last week     Current IS CSA 75/50 /540  Vaccines: none (hesitant)  Home BP: ~130/80s    Problem List   Patient Active Problem List   Diagnosis     Kidney replaced by transplant     Senior-Loken syndrome     Pseudotumor cerebri     Legally blind     Retinitis pigmentosa     Vitamin D deficiency     Morbid obesity (H)     Insulin resistance     PCOS (polycystic ovarian syndrome)     Cataract     Palpitations     Dyslipidemia     HTN, kidney transplant related     Aftercare following organ transplant     Immunosuppression (H)     EIN (endometrial intraepithelial neoplasia)     Chronic kidney disease, stage 3 (H)       Allergies   No Known Allergies    Medications   Current Outpatient Medications   Medication Sig     acetaminophen (TYLENOL) 325 MG tablet Take 2 tablets (650 mg) by mouth every 4 hours as needed for mild pain     amLODIPine (NORVASC) 5 MG tablet Take 1 tablet (5 mg) by mouth daily     carvedilol (COREG) 25 MG tablet Take 1 tablet (25 mg) by mouth 2 times daily (with meals)     losartan (COZAAR) 25 MG tablet Take 2 tablets (50 mg) by mouth daily     mycophenolic acid (GENERIC EQUIVALENT) 180 MG EC tablet Take 3 tablets (540 mg) by mouth 2 times daily     NEORAL (BRAND) 25 MG capsule Take 3 capsules (75 mg) by mouth every morning AND 2 capsules (50 mg) every evening.     levonorgestrel (MIRENA) 20 MCG/24HR IUD 1 each by Intrauterine route once (Patient not taking: Reported on 1/2/2023)     No current facility-administered medications for this visit.     There are no discontinued medications.    Physical Exam   Vital Signs: Ht 1.676 m (5' 6\")   Wt 122.5 kg (270 lb)   BMI 43.58 kg/m    GENERAL: Healthy, alert and no distress  EYES: Eyes grossly normal to inspection.  No discharge or erythema, or obvious scleral/conjunctival abnormalities.  RESP: No audible wheeze, cough, or visible cyanosis.  No visible retractions or increased work of breathing.    SKIN: Visible skin clear. No significant rash, " abnormal pigmentation or lesions.  NEURO: Cranial nerves grossly intact.  Mentation and speech appropriate for age.  PSYCH: Mentation appears normal, affect normal/bright, judgement and insight intact, normal speech and appearance well-groomed.        Data     Renal Latest Ref Rng & Units 9/16/2022 4/18/2022 12/27/2021   Na 136 - 145 mmol/L 137 142 141   Na (external) 135 - 145 mmol/L - - -   K 3.4 - 5.3 mmol/L 4.7 4.1 4.1   K (external) 3.5 - 5.0 mmol/L - - -   Cl 98 - 107 mmol/L 101 104 110(H)   CO2 22 - 29 mmol/L 24 28 22   CO2 (external) 21 - 31 mmol/L - - -   BUN 6.0 - 20.0 mg/dL 38.7(H) 33(H) 28   BUN (external) 8 - 25 mg/dL - - -   Cr 0.51 - 0.95 mg/dL 2.25(H) 2.16(H) 1.98(H)   Cr (external) 0.57 - 1.11 mg/dL - - -   Glucose 70 - 99 mg/dL 110(H) 119(H) 107(H)   Glucose (external) 65 - 100 mg/dL - - -   Ca  8.6 - 10.0 mg/dL 9.6 8.8 9.1   Ca (external) 8.5 - 10.5 mg/dL - - -   Mg 1.6 - 2.3 mg/dL - - -     Bone Health Latest Ref Rng & Units 9/9/2021 9/3/2021 12/13/2019   Phos 2.5 - 4.5 mg/dL 4.8(H) 5.3(H) -   PTHi 18 - 80 pg/mL - - 36   Vit D Def 20 - 75 ug/L - - 26     Heme Latest Ref Rng & Units 9/16/2022 4/18/2022 12/27/2021   WBC 4.0 - 11.0 10e3/uL 8.6 8.4 5.8   WBC (external) 4.5 - 11.0 thou/cu mm - - -   Hgb 11.7 - 15.7 g/dL 11.9 12.2 11.2(L)   Hgb (external) 12.0 - 16.0 g/dL - - -   Plt 150 - 450 10e3/uL 230 254 215   Plt (external) 140 - 440 thou/cu mm - - -   ABSOLUTE NEUTROPHIL 1.6 - 8.3 10e9/L - - -   ABSOLUTE LYMPHOCYTES 0.8 - 5.3 10e9/L - - -   ABSOLUTE MONOCYTES 0.0 - 1.3 10e9/L - - -   ABSOLUTE EOSINOPHILS 0.0 - 0.7 10e9/L - - -   ABSOLUTE BASOPHILS 0.0 - 0.2 10e9/L - - -   ABS IMMATURE GRANULOCYTES 0 - 0.4 10e9/L - - -   ABSOLUTE NUCLEATED RBC - - - -     Liver Latest Ref Rng & Units 9/3/2021 12/13/2019 10/22/2018   AP 40 - 150 U/L - 92 72   TBili 0.2 - 1.3 mg/dL - 0.6 0.6   ALT 0 - 50 U/L - 51(H) 34   AST 0 - 45 U/L - 21 26   Tot Protein 6.8 - 8.8 g/dL - 7.4 7.3   Albumin 3.4 - 5.0 g/dL 3.4 3.5  3.6     Pancreas Latest Ref Rng & Units 7/26/2013 7/27/2012 6/3/2008   A1C 4.3 - 6.0 % 5.0 5.3 -   Amylase 30 - 110 U/L - - 58   Lipase 20 - 250 U/L - - -     Iron studies Latest Ref Rng & Units 1/30/2018 7/11/2006 6/26/2006   Iron 35 - 180 ug/dL 55 56 75   Iron sat 15 - 46 % 19 - -   Ferritin 12 - 150 ng/mL 19 473(H) 914(H)     [facility] Txp Virology Latest Ref Rng & Units 1/31/2017 8/29/2011 7/21/2011   CMV IgG EU/mL - - -   CMV IgM <0.90 - - -   CMV IgM Interp <0.90 - - -   CVM DNA Quant - Plasma, EDTA anticoagulant Whole blood, EDTA anticoagulant Whole blood, EDTA anticoagulant   CMV Quant <100 Copies/mL - <100  No CMV DNA detected. <100  No CMV DNA detected.   CMV QT Log <2.0 Log copies/mL - <2.0  The Cytomegalovirus DNA Quantitation assay is a real-time polymerase chain   reaction (PCR) utilizing analyte specific reagents manufactured by [employer]   [facility]. Analyte Specific Reagents (ASRs) are used in many laboratory   tests necessary for standard medical care and generally do not require FDA   approval.   This test was developed and its performance characteristics determined by   [facility].  It has not been   cleared or approved by the US Food and Drug Administration. <2.0  The Cytomegalovirus DNA Quantitation assay is a real-time polymerase chain   reaction (PCR) utilizing analyte specific reagents manufactured by [employer]   [facility]. Analyte Specific Reagents (ASRs) are used in many laboratory   tests necessary for standard medical care and generally do not require FDA   approval.   This test was developed and its performance characteristics determined by   [facility].  It has not been   cleared or approved by the US Food and Drug Administration.   CMV QUANT IU/ML CMVND [IU]/mL CMV DNA Not Detected   The BARBI AmpliPrep/BARBI TaqMan CMV Test is an FDA-approved in vitro nucleic   acid amplification test for the  quantitation of cytomegalovirus DNA in human   plasma (EDTA plasma) using the [employer] AmpliPrep Instrument for automated viral   nucleic acid extraction and the BARBI TaqMan Analyzer or BARBI TaqMan for   automated Real Time amplification and detection of the viral nucleic acid   target.   Titer results are reported in International Units/mL (IU/mL using 1st WHO   International standard for Human Cytomegalovirus for Nucleic Acid Amplification   based assays. The conversion factor between CMV DNA copis/mL (as defined by the   Roche BARBI TaqMan CMV test) and International Units is the CMV DNA   concentration in IU/mL x 1.1 copies/IU = CMV DNA in copies/mL.   This assay has received FDA approval for the testing of human plasma only. The   Infectious Disease Diagnostic Laboratory at the [facility], [place], has validated the pe  rformance characteristics of the Roche   CMV assay for plasma, bronchial alveolar lavage/wash and urine.   - -   LOG IU/ML OF CMVQNT <2.1 [Log:IU]/mL Not Calculated - -   BK Spec - - - -   BK Res <1000 copies/mL - - -   BK Log <3.0 Log copies/mL - - -   EBV IgG - - - -   EBV DNA COPIES/ML EBVNEG [Copies]/mL EBV DNA Not Detected - -   EBV DNA LOG OF COPIES <2.7 [Log:copies]/mL Not Calculated   The Real-Time quantitative EBV assay was developed and its performance   characteristics determined by the Infectious Diseases Diagnostic Laboratory at   the [facility] in [place], [place].  The   primers and probes are Analyte Specific Reagents (ASRs) manufactured  by   [employer].   ASRs are used in many laboratory tests necessary for standard medical care and   generally do not require U.S. Food and Drug Administration approval.  The FDA   has determined that such clearance or approval is not necessary.  This test is   used for clinical purposes.  It should not be regarded as investigational or   research.   This laboratory is certified under  the Clinical Laboratory Improvement   Amendments of 1988 (CLIA-88) as qualified to perform high complexity clinical   laboratory testing.   The quantitative range of this assay is 500-22,500,00 copies/mL (2.7-7.4 log   copies/mL).  A negative result does not rule out the presence of PCR inhibitors     in the patient specimen or EBV DNA nucleic acid in concentrations below the   level of detection of the assay.  Inhibition may also lead to underestimation   of viral quantitation.   - -   Hep B Core NEG - - -   Hep B Surf 0.0 - 4.9 mIU/mL - - -   HIV 1&2 NEG - - -        Recent Labs   Lab Test 10/19/21  1014   DOSTAC 10/18/2021   TACROL <3.0*     Recent Labs   Lab Test 09/03/21  0655   DOSMPA 9/2/2021   8:00 PM   MPACID 10.89*   MPAG 157.9*

## 2023-01-04 ENCOUNTER — MYC MEDICAL ADVICE (OUTPATIENT)
Dept: TRANSPLANT | Facility: CLINIC | Age: 33
End: 2023-01-04
Payer: COMMERCIAL

## 2023-01-04 DIAGNOSIS — Z94.0 KIDNEY TRANSPLANTED: Primary | ICD-10-CM

## 2023-01-04 NOTE — TELEPHONE ENCOUNTER
Feliberto Douglas MD Sveiven, Ronna, RN      Ronna please refer for EGD with biopsies (CMV/HSV/fungal stains)   labs with CMV pcr (she is overdue)     thx       Orders placed.    Ronna Hughes RN   Transplant Coordinator  [phone]

## 2023-01-05 ENCOUNTER — TELEPHONE (OUTPATIENT)
Dept: TRANSPLANT | Facility: CLINIC | Age: 33
End: 2023-01-05

## 2023-01-05 DIAGNOSIS — K21.9 GERD (GASTROESOPHAGEAL REFLUX DISEASE): Primary | ICD-10-CM

## 2023-01-05 NOTE — LETTER
OUTPATIENT LABORATORY TEST ORDER    Patient Name: Carol Guillaume  Transplant Date: 7/18/2006   YOB: 1990  Issue Date & Time: 1/6/2023 1125  [facility] MR: [id number] Exp. Date (1 year after date issued)      Diagnoses: Kidney Transplant (ICD-10  Z94.0)   Long term use of medications (ICD-10  Z79.899)     Lab results to be available on the same day drawn.   Patient [facility].  Please fax to the Transplant Center at [phone].    Every 3 months   ?Hemogram and Platelet  ?Basic Metabolic Panel (Sodium, Potassium, Chloride, CO2, Creatinine, Urea Nitrogen,     Glucose,   Calcium)         ?CSA/Cyclosporine drug level                   Every 6 Months                  ?Urine for protein/creatinine      If you have any questions, please call The Transplant Center at [phone] or [phone].    Please fax labs to [phone]          Feliberto Littlejohn MD

## 2023-01-05 NOTE — LETTER
PHYSICIAN ORDERS      DATE & TIME ISSUED:  1125  PATIENT NAME: Carol Guillaume   : 1990     [facility] MR# [if applicable]: [id number]     DIAGNOSIS:  Kidney transplant   ICD-10 CODE: Z94.0        Please obtain the following with next lab draw  CMV PCR    Any questions please call: [phone] option 5    Please fax results to [phone].      Feliberto Littlejohn MD

## 2023-01-06 NOTE — TELEPHONE ENCOUNTER
Feliberto Douglas MD Sveiven, Ronna, RN    Ronna please refer for EGD with biopsies (CMV/HSV/fungal stains)   labs with CMV pcr (she is overdue)     thx     OUTCOME: orders placed,  Patient updated via my chart    Ronna Hughes RN   Transplant Coordinator  [phone]

## 2023-01-11 ENCOUNTER — MYC MEDICAL ADVICE (OUTPATIENT)
Dept: TRANSPLANT | Facility: CLINIC | Age: 33
End: 2023-01-11

## 2023-01-11 DIAGNOSIS — Z94.0 KIDNEY TRANSPLANTED: Primary | ICD-10-CM

## 2023-01-13 ENCOUNTER — ANCILLARY PROCEDURE (OUTPATIENT)
Dept: ULTRASOUND IMAGING | Facility: CLINIC | Age: 33
End: 2023-01-13
Attending: NURSE PRACTITIONER
Payer: COMMERCIAL

## 2023-01-13 DIAGNOSIS — N85.02 COMPLEX ATYPICAL ENDOMETRIAL HYPERPLASIA: ICD-10-CM

## 2023-01-13 PROCEDURE — 76830 TRANSVAGINAL US NON-OB: CPT

## 2023-01-19 ENCOUNTER — TELEPHONE (OUTPATIENT)
Dept: GASTROENTEROLOGY | Facility: CLINIC | Age: 33
End: 2023-01-19
Payer: COMMERCIAL

## 2023-01-19 ENCOUNTER — TELEPHONE (OUTPATIENT)
Dept: GASTROENTEROLOGY | Facility: CLINIC | Age: 33
End: 2023-01-19

## 2023-01-19 NOTE — TELEPHONE ENCOUNTER
"    Screening Questions  BLUE  KIND OF PREP RED  LOCATION [review exclusion criteria] GREEN  SEDATION TYPE        Y Are you active on mychart?       Feliberto Douglas MD    Ordering/Referring Provider?        [employer] What type of coverage do you have?      N Have you had a positive covid test in the last 14 days?     43.6 1. BMI  [BMI 40+ - review exclusion criteria]    Y  2. Are you able to give consent for your medical care? [IF NO,RN REVIEW]          N  3. Are you taking any prescription pain medications on a routine schedule   (ex narcotics: tramadol, oxycodone, roxicodone, oxycontin,  and percocet)?          3a. EXTENDED PREP What kind of prescription?     N 4. Do you have any chemical dependencies such as alcohol, street drugs, or methadone?        **If yes 3- 5 , please schedule with MAC sedation.**          IF YES TO ANY 6 - 10 - HOSPITAL SETTING ONLY.     N 6.   Do you need assistance transferring?     N 7.   Have you had a heart or lung transplant?    N 8.   Are you currently on dialysis?   N 9.   Do you use daily home oxygen?   N 10. Do you take nitroglycerin?   10a.  If yes, how often?     11. [FEMALES]  N Are you currently pregnant?    11a.  If yes, how many weeks? [ Greater than 12 weeks, OR NEEDED]    N 12. Do you have Pulmonary Hypertension? *NEED PAC APPT AT [facility]*     N 13. [review exclusion criteria]  Do you have any implantable devices in your body (pacemaker, defib, LVAD)?    N 14. In the past 6 months, have you had any heart related issues including cardiomyopathy or heart attack?     14a.  If yes, did it require cardiac stenting if so when?     N 15. Have you had a stroke or Transient ischemic attack (TIA - aka  mini stroke ) within 6 months?      N 16. Do you have mod to severe Obstructive Sleep Apnea?  [Hospital only]    N 17. Do you have SEVERE AND UNCONTROLLED asthma? *NEED PAC APPT AT [facility]*     N 18. Are you currently taking any blood thinners?     18a. If yes, inform patient to \"follow up " "w/ ordering provider for bridging instructions.\"    N 19. Do you take the medication Phentermine?    19a. If yes, \"Hold for 7 days before procedure.  Please consult your prescribing provider if you have questions about holding this medication.\"     Y  20. Do you have chronic kidney disease?      N  21. Do you have a diagnosis of diabetes?     Y  22. On a regular basis do you go 3-5 days between bowel movements?      23. Preferred LOCAL Pharmacy for Pre Prescription    [ LIST ONLY ONE PHARMACY]       [facility] - [place], [place] - [address]      - CLOSING REMINDERS -    Informed patient they will need an adult    Cannot take any type of public or medical transportation alone    Conscious Sedation- Needs  for 6 hours after the procedure       MAC/General-Needs  for 24 hours after procedure    Pre-Procedure Covid test to be completed [[facility] PCR Testing Required]    Confirmed Nurse will call to complete assessment       - SCHEDULING DETAILS -  NO Hospital Setting Required? If yes, what is the exclusion?:      Surgeon    1/24  Date of Procedure  Upper Endoscopy [EGD]  Type of Procedure Scheduled  [facility]   Which Colonoscopy Prep was Sent?     MODERATE Sedation Type     N PAC / Pre-op Required                 "

## 2023-01-19 NOTE — TELEPHONE ENCOUNTER
Attempted to contact patient regarding upcoming Upper endoscopy (EGD) procedure on 1/24/23 for pre assessment questions. No answer.     Left message to return call to [phone] #[phone]    Discuss Covid policy.     Pre op exam scheduled: N/A    Arrival time: 1300    [facility]; [address] [place] [address], [address] Floor, [place] [address]    Sedation type: Conscious sedation     Anticoagulants: No    Electronic implanted devices? No    Diabetic? No    Indication for procedure: GERD: moderate to severe symptoms unresolved with ppi trial, H2 blockers, dietary modifications     Prep instructions sent via [employer].       Gay Mcdonald RN  Endoscopy Procedure Pre Assessment RN

## 2023-01-19 NOTE — TELEPHONE ENCOUNTER
Pt returned call.     Pre assessment questions completed for upcoming Upper endoscopy (EGD) procedure scheduled on 1/24/23    COVID policy reviewed.     Reviewed procedural arrival time 1300 and facility location [facility]; [address] [place] [address], [address] [facility], [place] [address]    Designated  policy reviewed. Instructed to have someone stay 6 hours post procedure.     Reviewed procedure prep instructions.     Patient verbalized understanding and had no questions or concerns at this time.    Kitty Hunt RN  Endoscopy Procedure Pre Assessment RN

## 2023-01-20 ENCOUNTER — TRANSFERRED RECORDS (OUTPATIENT)
Dept: HEALTH INFORMATION MANAGEMENT | Facility: CLINIC | Age: 33
End: 2023-01-20
Payer: COMMERCIAL

## 2023-01-20 PROBLEM — N85.02 EIN (ENDOMETRIAL INTRAEPITHELIAL NEOPLASIA): Status: ACTIVE | Noted: 2021-08-09

## 2023-01-24 ENCOUNTER — HOSPITAL ENCOUNTER (OUTPATIENT)
Facility: AMBULATORY SURGERY CENTER | Age: 33
Discharge: HOME | End: 2023-01-24
Attending: INTERNAL MEDICINE
Payer: COMMERCIAL

## 2023-01-24 ENCOUNTER — ANESTHESIA EVENT (OUTPATIENT)
Dept: SURGERY | Facility: AMBULATORY SURGERY CENTER | Age: 33
End: 2023-01-24
Payer: COMMERCIAL

## 2023-01-24 ENCOUNTER — ANESTHESIA (OUTPATIENT)
Dept: SURGERY | Facility: AMBULATORY SURGERY CENTER | Age: 33
End: 2023-01-24
Payer: COMMERCIAL

## 2023-01-24 VITALS
DIASTOLIC BLOOD PRESSURE: 71 MMHG | OXYGEN SATURATION: 100 % | RESPIRATION RATE: 14 BRPM | HEIGHT: 66 IN | SYSTOLIC BLOOD PRESSURE: 112 MMHG | BODY MASS INDEX: 43.58 KG/M2 | TEMPERATURE: 97.1 F | HEART RATE: 80 BPM

## 2023-01-24 VITALS — HEART RATE: 83 BPM

## 2023-01-24 LAB — UPPER GI ENDOSCOPY: NORMAL

## 2023-01-24 PROCEDURE — 88342 IMHCHEM/IMCYTCHM 1ST ANTB: CPT | Mod: TC | Performed by: INTERNAL MEDICINE

## 2023-01-24 PROCEDURE — 43239 EGD BIOPSY SINGLE/MULTIPLE: CPT

## 2023-01-24 PROCEDURE — 88342 IMHCHEM/IMCYTCHM 1ST ANTB: CPT | Mod: 26 | Performed by: PATHOLOGY

## 2023-01-24 PROCEDURE — 88305 TISSUE EXAM BY PATHOLOGIST: CPT | Mod: 26 | Performed by: PATHOLOGY

## 2023-01-24 RX ORDER — GLYCOPYRROLATE 0.2 MG/ML
INJECTION, SOLUTION INTRAMUSCULAR; INTRAVENOUS PRN
Status: DISCONTINUED | OUTPATIENT
Start: 2023-01-24 | End: 2023-01-24

## 2023-01-24 RX ORDER — ONDANSETRON 2 MG/ML
4 INJECTION INTRAMUSCULAR; INTRAVENOUS
Status: DISCONTINUED | OUTPATIENT
Start: 2023-01-24 | End: 2023-01-24 | Stop reason: HOSPADM

## 2023-01-24 RX ORDER — NALOXONE HYDROCHLORIDE 0.4 MG/ML
0.2 INJECTION, SOLUTION INTRAMUSCULAR; INTRAVENOUS; SUBCUTANEOUS
Status: DISCONTINUED | OUTPATIENT
Start: 2023-01-24 | End: 2023-01-25 | Stop reason: HOSPADM

## 2023-01-24 RX ORDER — SODIUM CHLORIDE, SODIUM LACTATE, POTASSIUM CHLORIDE, CALCIUM CHLORIDE 600; 310; 30; 20 MG/100ML; MG/100ML; MG/100ML; MG/100ML
INJECTION, SOLUTION INTRAVENOUS CONTINUOUS PRN
Status: DISCONTINUED | OUTPATIENT
Start: 2023-01-24 | End: 2023-01-24

## 2023-01-24 RX ORDER — NALOXONE HYDROCHLORIDE 0.4 MG/ML
0.4 INJECTION, SOLUTION INTRAMUSCULAR; INTRAVENOUS; SUBCUTANEOUS
Status: DISCONTINUED | OUTPATIENT
Start: 2023-01-24 | End: 2023-01-25 | Stop reason: HOSPADM

## 2023-01-24 RX ORDER — LIDOCAINE 40 MG/G
CREAM TOPICAL
Status: DISCONTINUED | OUTPATIENT
Start: 2023-01-24 | End: 2023-01-24 | Stop reason: HOSPADM

## 2023-01-24 RX ORDER — PROCHLORPERAZINE MALEATE 10 MG
10 TABLET ORAL EVERY 6 HOURS PRN
Status: DISCONTINUED | OUTPATIENT
Start: 2023-01-24 | End: 2023-01-25 | Stop reason: HOSPADM

## 2023-01-24 RX ORDER — PROPOFOL 10 MG/ML
INJECTION, EMULSION INTRAVENOUS CONTINUOUS PRN
Status: DISCONTINUED | OUTPATIENT
Start: 2023-01-24 | End: 2023-01-24

## 2023-01-24 RX ORDER — ONDANSETRON 4 MG/1
4 TABLET, ORALLY DISINTEGRATING ORAL EVERY 6 HOURS PRN
Status: DISCONTINUED | OUTPATIENT
Start: 2023-01-24 | End: 2023-01-25 | Stop reason: HOSPADM

## 2023-01-24 RX ORDER — LIDOCAINE HYDROCHLORIDE 20 MG/ML
INJECTION, SOLUTION INFILTRATION; PERINEURAL PRN
Status: DISCONTINUED | OUTPATIENT
Start: 2023-01-24 | End: 2023-01-24

## 2023-01-24 RX ORDER — PROPOFOL 10 MG/ML
INJECTION, EMULSION INTRAVENOUS PRN
Status: DISCONTINUED | OUTPATIENT
Start: 2023-01-24 | End: 2023-01-24

## 2023-01-24 RX ORDER — ONDANSETRON 2 MG/ML
4 INJECTION INTRAMUSCULAR; INTRAVENOUS EVERY 6 HOURS PRN
Status: DISCONTINUED | OUTPATIENT
Start: 2023-01-24 | End: 2023-01-25 | Stop reason: HOSPADM

## 2023-01-24 RX ORDER — FLUMAZENIL 0.1 MG/ML
0.2 INJECTION, SOLUTION INTRAVENOUS
Status: DISCONTINUED | OUTPATIENT
Start: 2023-01-24 | End: 2023-01-25 | Stop reason: HOSPADM

## 2023-01-24 RX ADMIN — PROPOFOL 150 MCG/KG/MIN: 10 INJECTION, EMULSION INTRAVENOUS at 13:59

## 2023-01-24 RX ADMIN — GLYCOPYRROLATE 0.2 MG: 0.2 INJECTION, SOLUTION INTRAMUSCULAR; INTRAVENOUS at 13:58

## 2023-01-24 RX ADMIN — LIDOCAINE HYDROCHLORIDE 60 MG: 20 INJECTION, SOLUTION INFILTRATION; PERINEURAL at 13:59

## 2023-01-24 RX ADMIN — SODIUM CHLORIDE, SODIUM LACTATE, POTASSIUM CHLORIDE, CALCIUM CHLORIDE: 600; 310; 30; 20 INJECTION, SOLUTION INTRAVENOUS at 13:56

## 2023-01-24 RX ADMIN — PROPOFOL 20 MG: 10 INJECTION, EMULSION INTRAVENOUS at 14:03

## 2023-01-24 RX ADMIN — PROPOFOL 50 MG: 10 INJECTION, EMULSION INTRAVENOUS at 13:59

## 2023-01-24 NOTE — H&P
Carol Guillaume  [id number]  female  32 year old      Reason for procedure/surgery: GERD uncontrolled, hx of kidney transplant     Patient Active Problem List   Diagnosis     Kidney replaced by transplant     Senior-Loken syndrome     Pseudotumor cerebri     Legally blind     Retinitis pigmentosa     Vitamin D deficiency     Morbid obesity (H)     Insulin resistance     PCOS (polycystic ovarian syndrome)     Cataract     Palpitations     Dyslipidemia     HTN, kidney transplant related     Aftercare following organ transplant     Immunosuppression (H)     EIN (endometrial intraepithelial neoplasia)     Chronic kidney disease, stage 3 (H)       Past Surgical History:    Past Surgical History:   Procedure Laterality Date     cataract bilateral  06/2017     DILATION AND CURETTAGE N/A 9/13/2021    Procedure: DILATION AND CURETTAGE,;  Surgeon: Lisbeth Gallardo MD;  Location: [facility] OR     DILATION AND CURETTAGE N/A 5/9/2022    Procedure: DILATION AND CURETTAGE;  Surgeon: Lisbeth Gallardo MD;  Location:  OR     DILATION AND CURETTAGE, OPERATIVE HYSTEROSCOPY WITH MORCELLATOR, COMBINED N/A 10/23/2020    Procedure: HYSTEROSCOPY, DILATION AND CURRETAGE, MYOSURE;  Surgeon: Kierra Tracy MD;  Location:  OR     DILATION AND CURETTAGE, OPERATIVE HYSTEROSCOPY WITH MORCELLATOR, COMBINED N/A 03/05/2021    Procedure: HYSTEROSCOPY, WITH DILATION AND CURETTAGE OF UTERUS USING  MORCELLATOR;  Surgeon: Fiorella Cunningham MD;  Location:  OR     INSERT INTRAUTERINE DEVICE N/A 03/05/2021    Procedure: INSERTION MIRENA INTRAUTERINE DEVICE;  Surgeon: Fiorella Cunningham MD;  Location:  OR     IR RENAL BIOPSY RIGHT  8/27/2021     LAPAROSCOPY DIAGNOSTIC (GYN)  03/05/2021    Procedure: Laparoscopy diagnostic (gyn);  Surgeon: Fiorella Cunningham MD;  Location:  OR     LITHOTRIPSY       LITHOTRIPSY       REMOVE INTRAUTERINE DEVICE N/A 03/05/2021    Procedure: REMOVE MIRENA INTRAUTERINE DEVICE;  Surgeon: Fiorella Cunningham MD;   Location: [facility]     REMOVE INTRAUTERINE DEVICE N/A 5/9/2022    Procedure: EXCHANGE OF MIRENA INTRAUTERINE DEVICE;  Surgeon: Lisbeth Gallardo MD;  Location: [facility]     REPLACE INTRAUTERINE DEVICE N/A 9/13/2021    Procedure: MIRENA INTRAUTERINE DEVICE EXCHANGE;  Surgeon: Lisbeth Gallardo MD;  Location: [facility]     SINUS SURGERY  2001     TONSILLECTOMY       TRANSPLANT KIDNEY RECIPIENT LIVING RELATED Right 07/2006     wisdom teeth         Past Medical History:   Past Medical History:   Diagnosis Date     Anemia      Anxiety      Cataract 2015     CMV (cytomegalovirus infection) (H)      CMV disease (H) 2006    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     Depression      EIN (endometrial intraepithelial neoplasia)      Fatigue      High cholesterol      Hypertension      Insomnia      Insulin resistance      Legally blind      Obesity      PCOS (polycystic ovarian syndrome)      PPD positive, treated 2006    9 months of INH     Pseudotumor cerebri     on Diamox     Retinitis pigmentosa      S/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      Uncomplicated asthma     as a child     Viremia        Social History:   Social History     Tobacco Use     Smoking status: Never     Smokeless tobacco: Never   Substance Use Topics     Alcohol use: No       Family History:   Family History   Problem Relation Age of Onset     Hyperlipidemia Mother      Sjogren's Father      Hashimoto's thyroiditis Father      Other - See Comments Maternal Grandfather         surgical complication     Atrial fibrillation Paternal Grandmother      Arthritis Paternal Grandmother      Skin Cancer Paternal Grandfather         melanoma     Parkinsonism Other         paternal uncle       Allergies: No Known Allergies    Active Medications:   Current Outpatient Medications   Medication Sig Dispense Refill     acetaminophen (TYLENOL) 325 MG tablet Take 2 tablets (650 mg) by mouth every 4 hours as needed for mild pain 50 tablet 0      amLODIPine (NORVASC) 5 MG tablet Take 1 tablet (5 mg) by mouth daily 90 tablet 3     carvedilol (COREG) 25 MG tablet Take 1 tablet (25 mg) by mouth 2 times daily (with meals) 60 tablet 11     levonorgestrel (MIRENA) 20 MCG/24HR IUD 1 each by Intrauterine route once (Patient not taking: Reported on 1/2/2023)       losartan (COZAAR) 25 MG tablet Take 2 tablets (50 mg) by mouth daily 180 tablet 3     mycophenolic acid (GENERIC EQUIVALENT) 180 MG EC tablet Take 3 tablets (540 mg) by mouth 2 times daily 540 tablet 3     NEORAL (BRAND) 25 MG capsule Take 3 capsules (75 mg) by mouth every morning AND 2 capsules (50 mg) every evening. 450 capsule 3       Systemic Review:   CONSTITUTIONAL: NEGATIVE for fever, chills, change in weight  ENT/MOUTH: NEGATIVE for ear, mouth and throat problems  RESP: NEGATIVE for significant cough or SOB  CV: NEGATIVE for chest pain, palpitations or peripheral edema    Physical Examination:   Vital Signs: There were no vitals taken for this visit.  GENERAL: healthy, alert and no distress  NECK: no adenopathy, no asymmetry, masses, or scars  RESP: lungs clear to auscultation - no rales, rhonchi or wheezes  CV: regular rate and rhythm, normal S1 S2, no S3 or S4, no murmur, click or rub, no peripheral edema and peripheral pulses strong  ABDOMEN: soft, nontender, no hepatosplenomegaly, no masses and bowel sounds normal  MS: no gross musculoskeletal defects noted, no edema    Plan: Appropriate to proceed as scheduled.      Talia Aguilar MD  1/24/2023    PCP:  Danny Shi

## 2023-01-24 NOTE — ANESTHESIA POSTPROCEDURE EVALUATION
Patient: Carol Guillaume    Procedure: Procedure(s):  ESOPHAGOGASTRODUODENOSCOPY, WITH BIOPSY       Anesthesia Type:  MAC    Note:  Disposition: Outpatient   Postop Pain Control: Uneventful            Sign Out: Well controlled pain   PONV: No   Neuro/Psych: Uneventful            Sign Out: Acceptable/Baseline neuro status   Airway/Respiratory: Uneventful            Sign Out: Acceptable/Baseline resp. status   CV/Hemodynamics: Uneventful            Sign Out: Acceptable CV status; No obvious hypovolemia; No obvious fluid overload   Other NRE: NONE   DID A NON-ROUTINE EVENT OCCUR? No           Last vitals:  Vitals Value Taken Time   /71 01/24/23 1432   Temp 36.2  C (97.1  F) 01/24/23 1432   Pulse 80 01/24/23 1432   Resp 14 01/24/23 1432   SpO2 100 % 01/24/23 1432       Electronically Signed By: Rob Uerna DO  January 24, 2023  2:38 PM

## 2023-01-24 NOTE — ANESTHESIA CARE TRANSFER NOTE
Patient: Carol Guillaume    Procedure: Procedure(s):  ESOPHAGOGASTRODUODENOSCOPY, WITH BIOPSY       Diagnosis: GERD (gastroesophageal reflux disease) [K21.9]  Diagnosis Additional Information: No value filed.    Anesthesia Type:   MAC     Note:    Oropharynx: oropharynx clear of all foreign objects and spontaneously breathing  Level of Consciousness: awake  Oxygen Supplementation: room air    Independent Airway: airway patency satisfactory and stable  Dentition: dentition unchanged  Vital Signs Stable: post-procedure vital signs reviewed and stable  Report to RN Given: handoff report given  Patient transferred to: Phase II    Handoff Report: Identifed the Patient, Identified the Reponsible Provider, Reviewed the pertinent medical history, Discussed the surgical course, Reviewed Intra-OP anesthesia mangement and issues during anesthesia, Set expectations for post-procedure period and Allowed opportunity for questions and acknowledgement of understanding      Vitals:  Vitals Value Taken Time   /71 01/24/23 1432   Temp 36.2  C (97.1  F) 01/24/23 1432   Pulse 80 01/24/23 1432   Resp 14 01/24/23 1432   SpO2 100 % 01/24/23 1432       Electronically Signed By: MATTHEW Garcia CRNA  January 24, 2023  2:36 PM

## 2023-01-24 NOTE — ANESTHESIA PREPROCEDURE EVALUATION
Anesthesia Pre-Procedure Evaluation    Patient: Carol Guillaume   MRN: [id number] : 1990        Procedure : Procedure(s):  ESOPHAGOGASTRODUODENOSCOPY (EGD)          Past Medical History:   Diagnosis Date     Anemia      Anxiety      Cataract      CMV (cytomegalovirus infection) (H)      CMV disease (H) 2006    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     Depression      EIN (endometrial intraepithelial neoplasia)      Fatigue      High cholesterol      Hypertension      Insomnia      Insulin resistance      Legally blind      Obesity      PCOS (polycystic ovarian syndrome)      PPD positive, treated     9 months of INH     Pseudotumor cerebri     on Diamox     Retinitis pigmentosa      S/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      Uncomplicated asthma     as a child     Viremia       Past Surgical History:   Procedure Laterality Date     cataract bilateral  2017     DILATION AND CURETTAGE N/A 2021    Procedure: DILATION AND CURETTAGE,;  Surgeon: Lisbeth Gallardo MD;  Location:  OR     DILATION AND CURETTAGE N/A 2022    Procedure: DILATION AND CURETTAGE;  Surgeon: Lisbeth Gallardo MD;  Location:  OR     DILATION AND CURETTAGE, OPERATIVE HYSTEROSCOPY WITH MORCELLATOR, COMBINED N/A 10/23/2020    Procedure: HYSTEROSCOPY, DILATION AND CURRETAGE, MYOSURE;  Surgeon: Kierra Tracy MD;  Location:  OR     DILATION AND CURETTAGE, OPERATIVE HYSTEROSCOPY WITH MORCELLATOR, COMBINED N/A 2021    Procedure: HYSTEROSCOPY, WITH DILATION AND CURETTAGE OF UTERUS USING  MORCELLATOR;  Surgeon: Fiorella Cunningham MD;  Location:  OR     INSERT INTRAUTERINE DEVICE N/A 2021    Procedure: INSERTION MIRENA INTRAUTERINE DEVICE;  Surgeon: Fiorella Cunningham MD;  Location:  OR     IR RENAL BIOPSY RIGHT  2021     LAPAROSCOPY DIAGNOSTIC (GYN)  2021    Procedure: Laparoscopy diagnostic (gyn);  Surgeon: Fiorella Cunningham MD;  Location:  OR      LITHOTRIPSY       LITHOTRIPSY       REMOVE INTRAUTERINE DEVICE N/A 03/05/2021    Procedure: REMOVE MIRENA INTRAUTERINE DEVICE;  Surgeon: Fiorella Cunningham MD;  Location: [facility]     REMOVE INTRAUTERINE DEVICE N/A 5/9/2022    Procedure: EXCHANGE OF MIRENA INTRAUTERINE DEVICE;  Surgeon: Lisbeth Gallardo MD;  Location:  OR     REPLACE INTRAUTERINE DEVICE N/A 9/13/2021    Procedure: MIRENA INTRAUTERINE DEVICE EXCHANGE;  Surgeon: Lisbeth Gallardo MD;  Location: [facility]     SINUS SURGERY  2001     TONSILLECTOMY       TRANSPLANT KIDNEY RECIPIENT LIVING RELATED Right 07/2006     wisdom teeth        No Known Allergies   Social History     Tobacco Use     Smoking status: Never     Smokeless tobacco: Never   Substance Use Topics     Alcohol use: No      Wt Readings from Last 1 Encounters:   01/02/23 122.5 kg (270 lb)        Anesthesia Evaluation   Pt has had prior anesthetic. Type: General.        ROS/MED HX  ENT/Pulmonary:     (+) asthma     Neurologic:  - neg neurologic ROS     Cardiovascular:     (+) hypertension-----    METS/Exercise Tolerance: >4 METS    Hematologic:  - neg hematologic  ROS     Musculoskeletal:  - neg musculoskeletal ROS     GI/Hepatic:  - neg GI/hepatic ROS     Renal/Genitourinary:     (+) renal disease,     Endo:     (+) Obesity,     Psychiatric/Substance Use:  - neg psychiatric ROS     Infectious Disease:  - neg infectious disease ROS     Malignancy:       Other:            Physical Exam    Airway        Mallampati: II   TM distance: > 3 FB   Neck ROM: full   Mouth opening: > 3 cm    Respiratory Devices and Support         Dental       (+) Minor Abnormalities - some fillings, tiny chips      Cardiovascular   cardiovascular exam normal          Pulmonary   pulmonary exam normal                OUTSIDE LABS:  CBC:   Lab Results   Component Value Date    WBC 8.6 09/16/2022    WBC 8.4 04/18/2022    HGB 11.9 09/16/2022    HGB 12.2 04/18/2022    HCT 37.3 09/16/2022    HCT 38.3 04/18/2022      09/16/2022     04/18/2022     BMP:   Lab Results   Component Value Date     09/16/2022     04/18/2022    POTASSIUM 4.7 09/16/2022    POTASSIUM 4.1 04/18/2022    CHLORIDE 101 09/16/2022    CHLORIDE 104 04/18/2022    CO2 24 09/16/2022    CO2 28 04/18/2022    BUN 38.7 (H) 09/16/2022    BUN 33 (H) 04/18/2022    CR 2.25 (H) 09/16/2022    CR 2.16 (H) 04/18/2022     (H) 09/16/2022     (H) 04/18/2022     COAGS:   Lab Results   Component Value Date    PTT 33 08/27/2021    INR 1.03 08/27/2021     POC:   Lab Results   Component Value Date     (H) 07/17/2007    HCG Negative 08/17/2020    HCGS Negative 07/17/2007     HEPATIC:   Lab Results   Component Value Date    ALBUMIN 3.4 09/03/2021    PROTTOTAL 7.4 12/13/2019    ALT 51 (H) 12/13/2019    AST 21 12/13/2019    ALKPHOS 92 12/13/2019    BILITOTAL 0.6 12/13/2019     OTHER:   Lab Results   Component Value Date    A1C 5.0 07/26/2013    LACHO 9.6 09/16/2022    PHOS 4.8 (H) 09/09/2021    MAG 1.8 01/07/2009    LIPASE 236 07/17/2007    AMYLASE 58 06/03/2008    TSH 1.85 08/16/2021    T4 1.16 07/26/2013    CRP <3.0 06/03/2008       Anesthesia Plan    ASA Status:  3   NPO Status:  NPO Appropriate    Anesthesia Type: MAC.   Induction: Propofol.   Maintenance: TIVA.        Consents            Postoperative Care    Pain management: IV analgesics, Multi-modal analgesia.   PONV prophylaxis: Background Propofol Infusion     Comments:                Rob Urena DO

## 2023-01-26 LAB
PATH REPORT.ADDENDUM SPEC: NORMAL
PATH REPORT.COMMENTS IMP SPEC: NORMAL
PATH REPORT.COMMENTS IMP SPEC: NORMAL
PATH REPORT.FINAL DX SPEC: NORMAL
PATH REPORT.GROSS SPEC: NORMAL
PATH REPORT.MICROSCOPIC SPEC OTHER STN: NORMAL
PATH REPORT.RELEVANT HX SPEC: NORMAL
PHOTO IMAGE: NORMAL

## 2023-03-20 ENCOUNTER — TELEPHONE (OUTPATIENT)
Dept: TRANSPLANT | Facility: CLINIC | Age: 33
End: 2023-03-20
Payer: COMMERCIAL

## 2023-03-20 DIAGNOSIS — Z48.298 AFTERCARE FOLLOWING ORGAN TRANSPLANT: Primary | ICD-10-CM

## 2023-03-20 NOTE — TELEPHONE ENCOUNTER
Patient Call: Voicemail  Date/Time: 3/17/23 337 pm  Reason for call: are they going to change my Mycophenolate acid I feel it's killing me and I still haven't heard back about my test from a month ago

## 2023-03-20 NOTE — TELEPHONE ENCOUNTER
Dr. Littlejohn's note from 1/2/23:  # Immunosuppression: Cyclosporine (goal ) and Mycophenolic acid (dose 540 mg every 12 hours)              - Changes: Not at this time of note she has been off MPA since Feb and was resumed in Aug after kidney Tx bx showed chronic active BMR & DSA     with high titer class II DSA      Called Carol Guillaume.  No answer. Left VM re needing more details.  What symptoms she is having...  Also that there are no current labs.  [employer] message sent as well.

## 2023-03-20 NOTE — TELEPHONE ENCOUNTER
Spoke to patient and she states she is having acid reflux, stomach pain, muscle weakness and joint pain. She was off MPA for a brief period of time in 2021 and felt her symptoms were gone during that time.     Reviewed that there did not seem to be signs of MPA toxicity on her pathology. She is overdue for labs and also Dr. Gerhard Maldonado wanted to check her for CMV. Scheduled her for lab appt this Friday 3/24/23 and she will get labs. Will add MPA level. We will re-evaluate situation following lab appointment and whether her IS needs to be reviewed again or not.     Routing to Dr. Gerhard Maldonado and primary RNCC as FYI that pt wants to review coming off MPA following lab results.

## 2023-03-24 ENCOUNTER — LAB (OUTPATIENT)
Dept: LAB | Facility: CLINIC | Age: 33
End: 2023-03-24
Payer: COMMERCIAL

## 2023-03-24 DIAGNOSIS — Z94.0 KIDNEY TRANSPLANTED: ICD-10-CM

## 2023-03-24 DIAGNOSIS — Z48.298 AFTERCARE FOLLOWING ORGAN TRANSPLANT: ICD-10-CM

## 2023-03-24 LAB
ALBUMIN MFR UR ELPH: 215 MG/DL (ref 1–14)
ANION GAP SERPL CALCULATED.3IONS-SCNC: 8 MMOL/L (ref 7–15)
BUN SERPL-MCNC: 51 MG/DL (ref 6–20)
CALCIUM SERPL-MCNC: 9.3 MG/DL (ref 8.6–10)
CHLORIDE SERPL-SCNC: 106 MMOL/L (ref 98–107)
CREAT SERPL-MCNC: 2.63 MG/DL (ref 0.51–0.95)
CREAT UR-MCNC: 76 MG/DL
CYCLOSPORINE BLD LC/MS/MS-MCNC: 119 UG/L (ref 50–400)
DEPRECATED HCO3 PLAS-SCNC: 22 MMOL/L (ref 22–29)
ERYTHROCYTE [DISTWIDTH] IN BLOOD BY AUTOMATED COUNT: 12.3 % (ref 10–15)
GFR SERPL CREATININE-BSD FRML MDRD: 24 ML/MIN/1.73M2
GLUCOSE SERPL-MCNC: 109 MG/DL (ref 70–99)
HCT VFR BLD AUTO: 32.1 % (ref 35–47)
HGB BLD-MCNC: 10.6 G/DL (ref 11.7–15.7)
MCH RBC QN AUTO: 28 PG (ref 26.5–33)
MCHC RBC AUTO-ENTMCNC: 33 G/DL (ref 31.5–36.5)
MCV RBC AUTO: 85 FL (ref 78–100)
MYCOPHENOLATE SERPL LC/MS/MS-MCNC: 2.88 MG/L (ref 1–3.5)
MYCOPHENOLATE-G SERPL LC/MS/MS-MCNC: >200 MG/L (ref 30–95)
PLATELET # BLD AUTO: 225 10E3/UL (ref 150–450)
POTASSIUM SERPL-SCNC: 5.1 MMOL/L (ref 3.4–5.3)
PROT/CREAT 24H UR: 2.83 MG/MG CR (ref 0–0.2)
RBC # BLD AUTO: 3.78 10E6/UL (ref 3.8–5.2)
SODIUM SERPL-SCNC: 136 MMOL/L (ref 136–145)
TME LAST DOSE: ABNORMAL H
TME LAST DOSE: ABNORMAL H
TME LAST DOSE: NORMAL H
TME LAST DOSE: NORMAL H
WBC # BLD AUTO: 9.6 10E3/UL (ref 4–11)

## 2023-03-24 PROCEDURE — 99000 SPECIMEN HANDLING OFFICE-LAB: CPT | Performed by: PATHOLOGY

## 2023-03-24 PROCEDURE — 80158 DRUG ASSAY CYCLOSPORINE: CPT | Mod: 90 | Performed by: PATHOLOGY

## 2023-03-24 PROCEDURE — 80048 BASIC METABOLIC PNL TOTAL CA: CPT | Performed by: PATHOLOGY

## 2023-03-24 PROCEDURE — 85027 COMPLETE CBC AUTOMATED: CPT | Performed by: PATHOLOGY

## 2023-03-24 PROCEDURE — 36415 COLL VENOUS BLD VENIPUNCTURE: CPT | Performed by: PATHOLOGY

## 2023-03-24 PROCEDURE — 80180 DRUG SCRN QUAN MYCOPHENOLATE: CPT | Mod: 90 | Performed by: PATHOLOGY

## 2023-03-24 PROCEDURE — 84156 ASSAY OF PROTEIN URINE: CPT | Performed by: PATHOLOGY

## 2023-03-25 LAB — CMV DNA SPEC NAA+PROBE-ACNC: NOT DETECTED IU/ML

## 2023-03-27 ENCOUNTER — TELEPHONE (OUTPATIENT)
Dept: TRANSPLANT | Facility: CLINIC | Age: 33
End: 2023-03-27
Payer: COMMERCIAL

## 2023-03-27 DIAGNOSIS — R10.9 ABDOMINAL PAIN: ICD-10-CM

## 2023-03-27 DIAGNOSIS — Z48.298 AFTERCARE FOLLOWING ORGAN TRANSPLANT: Primary | ICD-10-CM

## 2023-03-27 NOTE — TELEPHONE ENCOUNTER
"ISSUE: patient states she is has been experiencing pretty severe \"stomache aches\", back pain, joint pain, acid reflux.  States these symptoms start/ worsen ~30 min after taking MPA    12 hour MPA trough level: 2.88    Elevated UPC: 2.83 and creatinine: 2.63 baseline 2-2.2    Not eating much with stomach pain,    Drinking 2-3 L per day  BP while at recent appointments ~110/70s, HR 70s    Confirmed BP meds:  Amlodipine 5 mg daily  Carvedilol 25 mg daily   Losartan 50 mg daily    HGB:10.6 patient states she did have vaginal bleeding last week, has not had menses for 4 years, hx uterine cancer. Patient has apt to see oncology and is scheduled for UA 04/2023    Feliberto Douglas MD Sveiven, ALEX Friend  Continue to hydrate, if nausea/vomiting or decreased po then can hold losartan this week an continue to monitor BP   Repeat BMP with  CSA trough (was dose reduced after the last trough?)   UA Ucx and kidney ultrasound to ensure no hydronephrosis   Need to f/up if new concerns after oncology visit     Pathology   Hpylori negative  Order was placed for R/O H Pylori and MMF toxicity, however results only mention negative H Pylori.    If negative US for CA can reduce MPA to 360 bid per Dr Gerhard Maldonado- US with oncology scheduled 4/14    OUTCOME: Cellcept to MPA for ear pain and cold like symptoms, not GI symptoms.  GI symptoms sept 2019. Patient wondering if going back to cellcept is an option. Will discuss with Dr Gerhard Maldonado    Swelling B ankles and and feet this week.    Patient would like to continue taking losartan as she has not been experiencing as much nausea, but rather acid reflux    Orders placed per Dr Gerhard Maldonado.  Image scheduling phone number provided to patient.    Ronna Hughes RN   Transplant Coordinator  [phone]    "

## 2023-04-05 NOTE — TELEPHONE ENCOUNTER
Feliberto Douglas MD Sveiven, Ronna, RN  Agree MPA is usually better     Is she taking ppi or following with GI given severe symptoms?     OUTCOME:  Patient is not following with GI.  She has tried OTC meds for acid reflux.  GI referral.    Will update Dr Gerhard Rueda.    Ronna Hughes RN   Transplant Coordinator  [phone]

## 2023-04-10 ENCOUNTER — HOSPITAL ENCOUNTER (OUTPATIENT)
Dept: ULTRASOUND IMAGING | Facility: CLINIC | Age: 33
Discharge: HOME | End: 2023-04-10
Attending: INTERNAL MEDICINE | Admitting: INTERNAL MEDICINE
Payer: COMMERCIAL

## 2023-04-10 DIAGNOSIS — Z48.298 AFTERCARE FOLLOWING ORGAN TRANSPLANT: ICD-10-CM

## 2023-04-10 PROCEDURE — 76775 US EXAM ABDO BACK WALL LIM: CPT

## 2023-04-13 ENCOUNTER — TELEPHONE (OUTPATIENT)
Dept: TRANSPLANT | Facility: CLINIC | Age: 33
End: 2023-04-13
Payer: COMMERCIAL

## 2023-04-13 DIAGNOSIS — Z48.298 AFTERCARE FOLLOWING ORGAN TRANSPLANT: ICD-10-CM

## 2023-04-13 DIAGNOSIS — Z94.0 KIDNEY REPLACED BY TRANSPLANT: ICD-10-CM

## 2023-04-13 DIAGNOSIS — Z48.298 AFTERCARE FOLLOWING ORGAN TRANSPLANT: Primary | ICD-10-CM

## 2023-04-13 RX ORDER — LOSARTAN POTASSIUM 25 MG/1
25 TABLET ORAL DAILY
Qty: 90 TABLET | Refills: 0 | Status: SHIPPED | OUTPATIENT
Start: 2023-04-13 | End: 2023-04-14

## 2023-04-13 NOTE — TELEPHONE ENCOUNTER
Losartan filled for 90 days. PCP to fill letter sent to PCP and patient. Patient aware    Ronna Hughes RN   Transplant Coordinator  [phone]

## 2023-04-13 NOTE — LETTER
Carol Guillaume  [address] [address]                April 13, 2023    Hello,  We received your request to refill your losartan.  Please do be aware that solid organ transplant is looking to collaborate with your primary care provider for ordering and management of non-transplant specific medications.     I have sent a refill for a 90 day supply. Please do connect with your primary provider within that time to discuss ongoing management.    Other prescriptions on your medication list that are currently filled by our team, but can be transferred to your primary provider include:  Amlodipine  Carvedilol  Losartan      If you have any questions after talking with your primary provider about these medications, please do let us know.    Thank you,    Ronna Hughes RN

## 2023-04-13 NOTE — TELEPHONE ENCOUNTER
Carol wanting to talk with Coordinator regarding her BP medication Losartan 25 mg being denied, she's not sure if it her insurance company and would like to discuss it

## 2023-04-14 ENCOUNTER — ANCILLARY PROCEDURE (OUTPATIENT)
Dept: ULTRASOUND IMAGING | Facility: CLINIC | Age: 33
End: 2023-04-14
Attending: OBSTETRICS & GYNECOLOGY
Payer: COMMERCIAL

## 2023-04-14 DIAGNOSIS — N83.209 OVARIAN CYST: ICD-10-CM

## 2023-04-14 PROCEDURE — 76856 US EXAM PELVIC COMPLETE: CPT

## 2023-04-14 RX ORDER — LOSARTAN POTASSIUM 25 MG/1
25 TABLET ORAL 2 TIMES DAILY
Qty: 90 TABLET | Refills: 0 | Status: SHIPPED | OUTPATIENT
Start: 2023-04-14 | End: 2023-07-26

## 2023-04-14 RX ORDER — LOSARTAN POTASSIUM 25 MG/1
50 TABLET ORAL DAILY
Qty: 180 TABLET | Refills: 0 | OUTPATIENT
Start: 2023-04-14

## 2023-04-17 ENCOUNTER — TELEPHONE (OUTPATIENT)
Dept: TRANSPLANT | Facility: CLINIC | Age: 33
End: 2023-04-17
Payer: COMMERCIAL

## 2023-04-17 DIAGNOSIS — Z79.899 ENCOUNTER FOR LONG-TERM CURRENT USE OF MEDICATION: ICD-10-CM

## 2023-04-17 DIAGNOSIS — Z94.0 KIDNEY TRANSPLANTED: ICD-10-CM

## 2023-04-17 DIAGNOSIS — D84.9 IMMUNOSUPPRESSION (H): ICD-10-CM

## 2023-04-17 DIAGNOSIS — N18.4 CHRONIC KIDNEY DISEASE, STAGE 4, SEVERELY DECREASED GFR (H): Primary | ICD-10-CM

## 2023-04-17 DIAGNOSIS — Z48.298 AFTERCARE FOLLOWING ORGAN TRANSPLANT: ICD-10-CM

## 2023-04-17 NOTE — TELEPHONE ENCOUNTER
Feliberto Douglas MD Sveiven, Sara, RN  Ok to decrease and recheck MPA level/labs in 1 week   Weekly labs after the dose change then back to routine if stable   Ckd journey  and re-transplant referral (just to have waiting time since eGFR approaching 20 ml/min)           Previous Messages       ----- Message -----   From: Ronna Hughes, RN   Sent: 4/17/2023   9:52 AM CDT   To: MD Dr Gerhard Dasilva,   results ordered by oncologist     Okay to decrease MPA from 540 mg bid to 360 mg bid to see if GI symptoms improve?     MPA level 2.88 on 3/24 on 540 mg bid     Ronna Hughes RN   Transplant Coordinator   [phone]        OUTCOME: Detailed message left for Carol with instruction to decrease MPA from 540 mg bid to 360 mg bid, obtain labs with both CSA and MPA 12 hour trough levels one week after dose decrease.      Asked for CB to confirm message or reply to my chart message.    Ronna Hughes RN   Transplant Coordinator  [phone]

## 2023-04-18 ENCOUNTER — PATIENT OUTREACH (OUTPATIENT)
Dept: NEPHROLOGY | Facility: CLINIC | Age: 33
End: 2023-04-18
Payer: COMMERCIAL

## 2023-04-18 DIAGNOSIS — Z94.0 HTN, KIDNEY TRANSPLANT RELATED: Primary | ICD-10-CM

## 2023-04-18 DIAGNOSIS — I15.1 HTN, KIDNEY TRANSPLANT RELATED: Primary | ICD-10-CM

## 2023-04-18 RX ORDER — MYCOPHENOLIC ACID 180 MG/1
360 TABLET, DELAYED RELEASE ORAL 2 TIMES DAILY
Qty: 360 TABLET | Refills: 3 | Status: SHIPPED | OUTPATIENT
Start: 2023-04-18 | End: 2024-01-26

## 2023-04-18 NOTE — PROGRESS NOTES
4/18-Journey ordered and added self to care team. Called Carol to introduce self as RNCC. No answer. LVM to return my call.  Kenn ISRAEL RN  Nephrology care coordinator  [facility]    4/19-Pt called me back and I am returning her call. I spoke w/Carol and she stated she's doing well w/no uremic symptoms to report. She is fatigued at baseline but sounded really good on the phone. Provided my contact information and updated neph tracking. Her Meclofenolate dose has been lowered and hoping her numbers will improve w/dose adjustment.  Kenn ISRAEL RN  Nephrology care coordinator  [place]

## 2023-04-20 ENCOUNTER — TRANSFERRED RECORDS (OUTPATIENT)
Dept: HEALTH INFORMATION MANAGEMENT | Facility: CLINIC | Age: 33
End: 2023-04-20
Payer: COMMERCIAL

## 2023-04-20 LAB — PAP-ABSTRACT: NORMAL

## 2023-04-24 ENCOUNTER — MYC MEDICAL ADVICE (OUTPATIENT)
Dept: GASTROENTEROLOGY | Facility: CLINIC | Age: 33
End: 2023-04-24
Payer: COMMERCIAL

## 2023-05-01 NOTE — TELEPHONE ENCOUNTER
REFERRAL INFORMATION:    Referring Provider:  Dr. Feliberto Rueda    Referring Clinic:   [facility]    Reason for Visit/Diagnosis: abd pain     FUTURE VISIT INFORMATION:    Appointment Date: 05-10-23    Appointment Time: @ 10:45am      NOTES STATUS DETAILS   OFFICE NOTE from Referring Provider Internal 04-05-23 Dr. Feliberto Rueda   OFFICE NOTE from Other Specialist Internal 05-04-22 Dr. Danny Shi   [facility] [place] DISCHARGE SUMMARY/  ED VISITS N/A    OPERATIVE REPORT N/A    MEDICATION LIST Internal         PERTINENT LABS Internal C-diff: 09-16-22, 04-18-22, 12-27-21, 08-16-21, 12-13-19  *additional in epic*   PATHOLOGY REPORTS (RELATED) Internal 01-24-23   IMAGING (CT, MRI, EGD, MRCP, Small Bowel Follow Through/SBT, MR/CT Enterography) Internal EGD: 01-24-23

## 2023-05-02 ENCOUNTER — TELEPHONE (OUTPATIENT)
Dept: TRANSPLANT | Facility: CLINIC | Age: 33
End: 2023-05-02

## 2023-05-03 ENCOUNTER — TELEPHONE (OUTPATIENT)
Dept: TRANSPLANT | Facility: CLINIC | Age: 33
End: 2023-05-03
Payer: COMMERCIAL

## 2023-05-03 NOTE — TELEPHONE ENCOUNTER
Called to remind patient of their upcoming appointment with our GI clinic, on Wednesday, May 10th at 10:45pm with Sary Espinosa. This appointment is scheduled as a video visit. You will receive a call approximately 30 minutes prior to check you in, you must be in [place] for this visit., if your appointment is virtual (video or telephone) you need to be in [place] for the visit. To reschedule or cancel patient to call [phone].    Daija Felix

## 2023-05-03 NOTE — TELEPHONE ENCOUNTER
Returned patient call regarding the plan of care moving forward. Did leave a detailed message regarding the plan as far as bringing her in for mini evaluation for weight management.  Discussed that her GFR does not meet the requirements for inactive listing at this time to accrue wait time.  Told her Dr. Gerhard Trent and I are in close communication regarding this and will re discuss listing inactive status once she has reached a qualifying number. Let her know to call me back with any questions or concerns.

## 2023-05-04 ENCOUNTER — HOSPITAL ENCOUNTER (OUTPATIENT)
Facility: CLINIC | Age: 33
Discharge: HOME | End: 2023-05-04
Attending: OBSTETRICS & GYNECOLOGY | Admitting: OBSTETRICS & GYNECOLOGY
Payer: COMMERCIAL

## 2023-05-04 ENCOUNTER — ANESTHESIA EVENT (OUTPATIENT)
Dept: SURGERY | Facility: CLINIC | Age: 33
End: 2023-05-04
Payer: COMMERCIAL

## 2023-05-04 ENCOUNTER — ANESTHESIA (OUTPATIENT)
Dept: SURGERY | Facility: CLINIC | Age: 33
End: 2023-05-04
Payer: COMMERCIAL

## 2023-05-04 VITALS
SYSTOLIC BLOOD PRESSURE: 106 MMHG | TEMPERATURE: 97 F | HEART RATE: 72 BPM | OXYGEN SATURATION: 96 % | WEIGHT: 275 LBS | RESPIRATION RATE: 18 BRPM | BODY MASS INDEX: 44.2 KG/M2 | HEIGHT: 66 IN | DIASTOLIC BLOOD PRESSURE: 70 MMHG

## 2023-05-04 LAB
ANION GAP SERPL CALCULATED.3IONS-SCNC: 15 MMOL/L (ref 7–15)
BUN SERPL-MCNC: 51.8 MG/DL (ref 6–20)
CALCIUM SERPL-MCNC: 9.6 MG/DL (ref 8.6–10)
CHLORIDE SERPL-SCNC: 99 MMOL/L (ref 98–107)
CREAT SERPL-MCNC: 2.98 MG/DL (ref 0.51–0.95)
DEPRECATED HCO3 PLAS-SCNC: 22 MMOL/L (ref 22–29)
GFR SERPL CREATININE-BSD FRML MDRD: 20 ML/MIN/1.73M2
GLUCOSE SERPL-MCNC: 114 MG/DL (ref 70–99)
HCG INTACT+B SERPL-ACNC: <1 MIU/ML
POTASSIUM SERPL-SCNC: 5.2 MMOL/L (ref 3.4–5.3)
SODIUM SERPL-SCNC: 136 MMOL/L (ref 136–145)

## 2023-05-04 PROCEDURE — 250N000011 HC RX IP 250 OP 636: Performed by: OBSTETRICS & GYNECOLOGY

## 2023-05-04 PROCEDURE — 84702 CHORIONIC GONADOTROPIN TEST: CPT | Performed by: OBSTETRICS & GYNECOLOGY

## 2023-05-04 PROCEDURE — 88305 TISSUE EXAM BY PATHOLOGIST: CPT | Mod: 26 | Performed by: PATHOLOGY

## 2023-05-04 PROCEDURE — 272N000001 HC OR GENERAL SUPPLY STERILE: Performed by: OBSTETRICS & GYNECOLOGY

## 2023-05-04 PROCEDURE — 360N000075 HC SURGERY LEVEL 2, PER MIN: Performed by: OBSTETRICS & GYNECOLOGY

## 2023-05-04 PROCEDURE — 258N000003 HC RX IP 258 OP 636: Performed by: NURSE ANESTHETIST, CERTIFIED REGISTERED

## 2023-05-04 PROCEDURE — 710N000009 HC RECOVERY PHASE 1, LEVEL 1, PER MIN: Performed by: OBSTETRICS & GYNECOLOGY

## 2023-05-04 PROCEDURE — 250N000013 HC RX MED GY IP 250 OP 250 PS 637: Performed by: ANESTHESIOLOGY

## 2023-05-04 PROCEDURE — 250N000025 HC SEVOFLURANE, PER MIN: Performed by: OBSTETRICS & GYNECOLOGY

## 2023-05-04 PROCEDURE — 88305 TISSUE EXAM BY PATHOLOGIST: CPT | Mod: TC | Performed by: OBSTETRICS & GYNECOLOGY

## 2023-05-04 PROCEDURE — 250N000011 HC RX IP 250 OP 636: Performed by: NURSE ANESTHETIST, CERTIFIED REGISTERED

## 2023-05-04 PROCEDURE — 36415 COLL VENOUS BLD VENIPUNCTURE: CPT | Performed by: OBSTETRICS & GYNECOLOGY

## 2023-05-04 PROCEDURE — 250N000009 HC RX 250: Performed by: NURSE ANESTHETIST, CERTIFIED REGISTERED

## 2023-05-04 PROCEDURE — 80048 BASIC METABOLIC PNL TOTAL CA: CPT | Performed by: ANESTHESIOLOGY

## 2023-05-04 PROCEDURE — 250N000011 HC RX IP 250 OP 636: Performed by: ANESTHESIOLOGY

## 2023-05-04 PROCEDURE — 999N000141 HC STATISTIC PRE-PROCEDURE NURSING ASSESSMENT: Performed by: OBSTETRICS & GYNECOLOGY

## 2023-05-04 PROCEDURE — 370N000017 HC ANESTHESIA TECHNICAL FEE, PER MIN: Performed by: OBSTETRICS & GYNECOLOGY

## 2023-05-04 PROCEDURE — 710N000012 HC RECOVERY PHASE 2, PER MINUTE: Performed by: OBSTETRICS & GYNECOLOGY

## 2023-05-04 PROCEDURE — 250N000013 HC RX MED GY IP 250 OP 250 PS 637: Performed by: OBSTETRICS & GYNECOLOGY

## 2023-05-04 RX ORDER — ONDANSETRON 4 MG/1
4 TABLET, ORALLY DISINTEGRATING ORAL EVERY 30 MIN PRN
Status: DISCONTINUED | OUTPATIENT
Start: 2023-05-04 | End: 2023-05-04 | Stop reason: HOSPADM

## 2023-05-04 RX ORDER — FENTANYL CITRATE 0.05 MG/ML
25 INJECTION, SOLUTION INTRAMUSCULAR; INTRAVENOUS EVERY 5 MIN PRN
Status: DISCONTINUED | OUTPATIENT
Start: 2023-05-04 | End: 2023-05-04 | Stop reason: HOSPADM

## 2023-05-04 RX ORDER — OXYCODONE HYDROCHLORIDE 5 MG/1
5 TABLET ORAL
Status: DISCONTINUED | OUTPATIENT
Start: 2023-05-04 | End: 2023-05-04 | Stop reason: HOSPADM

## 2023-05-04 RX ORDER — CEFAZOLIN SODIUM/WATER 3 G/30 ML
3 SYRINGE (ML) INTRAVENOUS SEE ADMIN INSTRUCTIONS
Status: DISCONTINUED | OUTPATIENT
Start: 2023-05-04 | End: 2023-05-04 | Stop reason: HOSPADM

## 2023-05-04 RX ORDER — ACETAMINOPHEN 325 MG/1
975 TABLET ORAL ONCE
Status: DISCONTINUED | OUTPATIENT
Start: 2023-05-04 | End: 2023-05-04 | Stop reason: HOSPADM

## 2023-05-04 RX ORDER — PROPOFOL 10 MG/ML
INJECTION, EMULSION INTRAVENOUS PRN
Status: DISCONTINUED | OUTPATIENT
Start: 2023-05-04 | End: 2023-05-04

## 2023-05-04 RX ORDER — LIDOCAINE HYDROCHLORIDE 20 MG/ML
INJECTION, SOLUTION INFILTRATION; PERINEURAL PRN
Status: DISCONTINUED | OUTPATIENT
Start: 2023-05-04 | End: 2023-05-04

## 2023-05-04 RX ORDER — FENTANYL CITRATE 50 UG/ML
INJECTION, SOLUTION INTRAMUSCULAR; INTRAVENOUS PRN
Status: DISCONTINUED | OUTPATIENT
Start: 2023-05-04 | End: 2023-05-04

## 2023-05-04 RX ORDER — CEFAZOLIN SODIUM/WATER 3 G/30 ML
3 SYRINGE (ML) INTRAVENOUS
Status: COMPLETED | OUTPATIENT
Start: 2023-05-04 | End: 2023-05-04

## 2023-05-04 RX ORDER — ONDANSETRON 2 MG/ML
INJECTION INTRAMUSCULAR; INTRAVENOUS PRN
Status: DISCONTINUED | OUTPATIENT
Start: 2023-05-04 | End: 2023-05-04

## 2023-05-04 RX ORDER — METRONIDAZOLE 500 MG/100ML
500 INJECTION, SOLUTION INTRAVENOUS ONCE
Status: COMPLETED | OUTPATIENT
Start: 2023-05-04 | End: 2023-05-04

## 2023-05-04 RX ORDER — SODIUM CHLORIDE, SODIUM LACTATE, POTASSIUM CHLORIDE, CALCIUM CHLORIDE 600; 310; 30; 20 MG/100ML; MG/100ML; MG/100ML; MG/100ML
INJECTION, SOLUTION INTRAVENOUS CONTINUOUS
Status: DISCONTINUED | OUTPATIENT
Start: 2023-05-04 | End: 2023-05-04 | Stop reason: HOSPADM

## 2023-05-04 RX ORDER — HYDROMORPHONE HCL IN WATER/PF 6 MG/30 ML
0.4 PATIENT CONTROLLED ANALGESIA SYRINGE INTRAVENOUS EVERY 5 MIN PRN
Status: DISCONTINUED | OUTPATIENT
Start: 2023-05-04 | End: 2023-05-04 | Stop reason: HOSPADM

## 2023-05-04 RX ORDER — OXYCODONE HYDROCHLORIDE 5 MG/1
10 TABLET ORAL
Status: DISCONTINUED | OUTPATIENT
Start: 2023-05-04 | End: 2023-05-04 | Stop reason: HOSPADM

## 2023-05-04 RX ORDER — DEXAMETHASONE SODIUM PHOSPHATE 4 MG/ML
INJECTION, SOLUTION INTRA-ARTICULAR; INTRALESIONAL; INTRAMUSCULAR; INTRAVENOUS; SOFT TISSUE PRN
Status: DISCONTINUED | OUTPATIENT
Start: 2023-05-04 | End: 2023-05-04

## 2023-05-04 RX ORDER — BUPIVACAINE HYDROCHLORIDE AND EPINEPHRINE 5; 5 MG/ML; UG/ML
INJECTION, SOLUTION EPIDURAL; INTRACAUDAL; PERINEURAL
Status: DISCONTINUED
Start: 2023-05-04 | End: 2023-05-04 | Stop reason: HOSPADM

## 2023-05-04 RX ORDER — ONDANSETRON 2 MG/ML
4 INJECTION INTRAMUSCULAR; INTRAVENOUS EVERY 30 MIN PRN
Status: DISCONTINUED | OUTPATIENT
Start: 2023-05-04 | End: 2023-05-04 | Stop reason: HOSPADM

## 2023-05-04 RX ORDER — SODIUM CHLORIDE 9 MG/ML
INJECTION, SOLUTION INTRAVENOUS CONTINUOUS PRN
Status: DISCONTINUED | OUTPATIENT
Start: 2023-05-04 | End: 2023-05-04

## 2023-05-04 RX ORDER — FENTANYL CITRATE 0.05 MG/ML
50 INJECTION, SOLUTION INTRAMUSCULAR; INTRAVENOUS EVERY 5 MIN PRN
Status: DISCONTINUED | OUTPATIENT
Start: 2023-05-04 | End: 2023-05-04 | Stop reason: HOSPADM

## 2023-05-04 RX ORDER — HYDROMORPHONE HCL IN WATER/PF 6 MG/30 ML
0.2 PATIENT CONTROLLED ANALGESIA SYRINGE INTRAVENOUS EVERY 5 MIN PRN
Status: DISCONTINUED | OUTPATIENT
Start: 2023-05-04 | End: 2023-05-04 | Stop reason: HOSPADM

## 2023-05-04 RX ORDER — OXYCODONE HYDROCHLORIDE 5 MG/1
5 TABLET ORAL
Status: COMPLETED | OUTPATIENT
Start: 2023-05-04 | End: 2023-05-04

## 2023-05-04 RX ORDER — ACETAMINOPHEN 325 MG/1
975 TABLET ORAL ONCE
Status: COMPLETED | OUTPATIENT
Start: 2023-05-04 | End: 2023-05-04

## 2023-05-04 RX ADMIN — FENTANYL CITRATE 50 MCG: 50 INJECTION, SOLUTION INTRAMUSCULAR; INTRAVENOUS at 13:12

## 2023-05-04 RX ADMIN — MIDAZOLAM 2 MG: 1 INJECTION INTRAMUSCULAR; INTRAVENOUS at 12:32

## 2023-05-04 RX ADMIN — Medication 3 G: at 12:32

## 2023-05-04 RX ADMIN — PROPOFOL 200 MG: 10 INJECTION, EMULSION INTRAVENOUS at 12:39

## 2023-05-04 RX ADMIN — ACETAMINOPHEN 975 MG: 325 TABLET ORAL at 11:09

## 2023-05-04 RX ADMIN — OXYCODONE HYDROCHLORIDE 5 MG: 5 TABLET ORAL at 13:39

## 2023-05-04 RX ADMIN — DEXAMETHASONE SODIUM PHOSPHATE 4 MG: 4 INJECTION, SOLUTION INTRA-ARTICULAR; INTRALESIONAL; INTRAMUSCULAR; INTRAVENOUS; SOFT TISSUE at 12:44

## 2023-05-04 RX ADMIN — FENTANYL CITRATE 50 MCG: 50 INJECTION, SOLUTION INTRAMUSCULAR; INTRAVENOUS at 12:39

## 2023-05-04 RX ADMIN — ONDANSETRON 4 MG: 2 INJECTION INTRAMUSCULAR; INTRAVENOUS at 12:44

## 2023-05-04 RX ADMIN — SODIUM CHLORIDE: 9 INJECTION, SOLUTION INTRAVENOUS at 12:32

## 2023-05-04 RX ADMIN — METRONIDAZOLE 500 MG: 500 INJECTION, SOLUTION INTRAVENOUS at 11:23

## 2023-05-04 RX ADMIN — LIDOCAINE HYDROCHLORIDE 60 MG: 20 INJECTION, SOLUTION INFILTRATION; PERINEURAL at 12:39

## 2023-05-04 ASSESSMENT — LIFESTYLE VARIABLES: TOBACCO_USE: 0

## 2023-05-04 ASSESSMENT — ENCOUNTER SYMPTOMS: DYSRHYTHMIAS: 0

## 2023-05-04 ASSESSMENT — ACTIVITIES OF DAILY LIVING (ADL)
ADLS_ACUITY_SCORE: 35
ADLS_ACUITY_SCORE: 35

## 2023-05-04 ASSESSMENT — COPD QUESTIONNAIRES: COPD: 0

## 2023-05-04 NOTE — ANESTHESIA PREPROCEDURE EVALUATION
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     Depression      EIN (endometrial intraepithelial neoplasia)      Fatigue      Gastroesophageal reflux disease      High cholesterol      Hypertension      Insomnia      Insulin resistance      Legally blind      Obesity      PCOS (polycystic ovarian syndrome)      PPD positive, treated     9 months of INH     Pseudotumor cerebri     on Diamox     Retinitis pigmentosa      S/P kidney transplant      Senior-Loken syndrome      Uncomplicated asthma     as a child     Viremia       Past Surgical History:   Procedure Laterality Date     BIOPSY       cataract bilateral  2017     DILATION AND CURETTAGE N/A 2021    Procedure: DILATION AND CURETTAGE,;  Surgeon: Lisbeth Gallardo MD;  Location:  OR     DILATION AND CURETTAGE N/A 2022    Procedure: DILATION AND CURETTAGE;  Surgeon: Lisbeth Gallardo MD;  Location:  OR     DILATION AND CURETTAGE, OPERATIVE HYSTEROSCOPY WITH MORCELLATOR, COMBINED N/A 10/23/2020    Procedure: HYSTEROSCOPY, DILATION AND CURRETAGE, MYOSURE;  Surgeon: Kierra Tracy MD;  Location:  OR     DILATION AND CURETTAGE, OPERATIVE HYSTEROSCOPY WITH MORCELLATOR, COMBINED N/A 2021    Procedure: HYSTEROSCOPY, WITH DILATION AND CURETTAGE OF UTERUS USING  MORCELLATOR;  Surgeon: Fiorella Cunningham MD;  Location:  OR     ESOPHAGOSCOPY, GASTROSCOPY, DUODENOSCOPY (EGD), COMBINED N/A 2023    Procedure: ESOPHAGOGASTRODUODENOSCOPY, WITH BIOPSY;  Surgeon: Talia Aguilar MD;  [place] [facility] [place]     INSERT INTRAUTERINE  DEVICE N/A 03/05/2021    Procedure: INSERTION MIRENA INTRAUTERINE DEVICE;  Surgeon: Fiorella Cunningham MD;  Location: [facility] OR     IR RENAL BIOPSY RIGHT  08/27/2021     LAPAROSCOPY DIAGNOSTIC (GYN)  03/05/2021    Procedure: Laparoscopy diagnostic (gyn);  Surgeon: Fiorella Cunningham MD;  Location: [facility] OR     LITHOTRIPSY       LITHOTRIPSY       REMOVE INTRAUTERINE DEVICE N/A 03/05/2021    Procedure: REMOVE MIRENA INTRAUTERINE DEVICE;  Surgeon: Fiorella Cunningham MD;  Location: [facility] OR     REMOVE INTRAUTERINE DEVICE N/A 05/09/2022    Procedure: EXCHANGE OF MIRENA INTRAUTERINE DEVICE;  Surgeon: Lisbeth Gallardo MD;  Location: [facility] OR     REPLACE INTRAUTERINE DEVICE N/A 09/13/2021    Procedure: MIRENA INTRAUTERINE DEVICE EXCHANGE;  Surgeon: Lisbeth Gallardo MD;  Location: [facility] OR     SINUS SURGERY  2001     TONSILLECTOMY       TRANSPLANT KIDNEY RECIPIENT LIVING RELATED Right 07/2006     wisdom teeth        No Known Allergies   Social History     Tobacco Use     Smoking status: Never     Smokeless tobacco: Never   Vaping Use     Vaping status: Never Used     Passive vaping exposure: Yes   Substance Use Topics     Alcohol use: Never      Wt Readings from Last 1 Encounters:   05/04/23 124.7 kg (275 lb)        Anesthesia Evaluation   Pt has had prior anesthetic.     History of anesthetic complications   medication concerns related to prior procedures.    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  Right ventricular function, chamber size, wall motion, and thickness are  normal.  No significant valvular  pathology.  Stress Test: Date: Results:    ECG Reviewed: Date: Results:    Cath: Date: Results:   (-) CAD, CHF and arrhythmias   METS/Exercise Tolerance:     Hematologic:       Musculoskeletal:       GI/Hepatic:    (-) GERD and liver disease   Renal/Genitourinary: Comment: Senior-Loken syndrome      (+) renal disease, type: CRI, Pt has history of transplant,     Endo: Comment: BMI 45  On immunosuppression, no chronic steroids    (+) Obesity,  (-) Type I DM and Type II DM   Psychiatric/Substance Use:     (+) psychiatric history anxiety and depression     Infectious Disease:       Malignancy:   (+) Malignancy, History of Other.Other CA Endometrial intraepithelial neoplasia status post.    Other: Comment: PCOS           Physical Exam    Airway        Mallampati: II   TM distance: > 3 FB   Neck ROM: full   Mouth opening: > 3 cm    Respiratory Devices and Support         Dental       (+) Minor Abnormalities - some fillings, tiny chips      Cardiovascular          Rhythm and rate: regular     Pulmonary           breath sounds clear to auscultation           OUTSIDE LABS:  CBC:   Lab Results   Component Value Date    WBC 9.6 03/24/2023    WBC 8.6 09/16/2022    HGB 10.6 (L) 03/24/2023    HGB 11.9 09/16/2022    HCT 32.1 (L) 03/24/2023    HCT 37.3 09/16/2022     03/24/2023     09/16/2022     BMP:   Lab Results   Component Value Date     03/24/2023     09/16/2022    POTASSIUM 5.1 03/24/2023    POTASSIUM 4.7 09/16/2022    CHLORIDE 106 03/24/2023    CHLORIDE 101 09/16/2022    CO2 22 03/24/2023    CO2 24 09/16/2022    BUN 51.0 (H) 03/24/2023    BUN 38.7 (H) 09/16/2022    CR 2.63 (H) 03/24/2023    CR 2.25 (H) 09/16/2022     (H) 03/24/2023     (H) 09/16/2022     COAGS:   Lab Results   Component Value Date    PTT 33 08/27/2021    INR 1.03 08/27/2021     POC:   Lab Results   Component Value Date     (H) 07/17/2007    HCG Negative 08/17/2020    HCGS Negative 07/17/2007     HEPATIC:   Lab  Results   Component Value Date    ALBUMIN 3.4 09/03/2021    PROTTOTAL 7.4 12/13/2019    ALT 51 (H) 12/13/2019    AST 21 12/13/2019    ALKPHOS 92 12/13/2019    BILITOTAL 0.6 12/13/2019     OTHER:   Lab Results   Component Value Date    A1C 5.0 07/26/2013    LACHO 9.3 03/24/2023    PHOS 4.8 (H) 09/09/2021    MAG 1.8 01/07/2009    LIPASE 236 07/17/2007    AMYLASE 58 06/03/2008    TSH 1.85 08/16/2021    T4 1.16 07/26/2013    CRP <3.0 06/03/2008       Anesthesia Plan    ASA Status:  3   NPO Status:  NPO Appropriate    Anesthesia Type: General.     - Airway: LMA   Induction: Intravenous.   Maintenance: Balanced.        Consents    Anesthesia Plan(s) and associated risks, benefits, and realistic alternatives discussed. Questions answered and patient/representative(s) expressed understanding.    - Discussed:     - Discussed with:  Patient         Postoperative Care    Pain management: Multi-modal analgesia.   PONV prophylaxis: Ondansetron (or other 5HT-3), Dexamethasone or Solumedrol, Background Propofol Infusion     Comments:                Arnold Monroe MD

## 2023-05-04 NOTE — ANESTHESIA POSTPROCEDURE EVALUATION
Patient: Carol Guillaume    Procedure: Procedure(s):  Dilation and Curettage of Endometrium       Anesthesia Type:  General    Note:     Postop Pain Control: Uneventful            Sign Out: Well controlled pain   PONV: No   Neuro/Psych: Uneventful            Sign Out: Acceptable/Baseline neuro status   Airway/Respiratory: Uneventful            Sign Out: Acceptable/Baseline resp. status   CV/Hemodynamics: Uneventful            Sign Out: Acceptable CV status; No obvious hypovolemia; No obvious fluid overload   Other NRE: NONE   DID A NON-ROUTINE EVENT OCCUR? No           Last vitals:  Vitals Value Taken Time   /87 05/04/23 1345   Temp 36.1  C (97  F) 05/04/23 1345   Pulse 73 05/04/23 1345   Resp 9 05/04/23 1345   SpO2 98 % 05/04/23 1345   Vitals shown include unvalidated device data.    Electronically Signed By: Arnold Monroe MD  May 4, 2023  2:12 PM

## 2023-05-04 NOTE — OP NOTE
Procedure Date: 05/04/2023    PREOPERATIVE DIAGNOSIS:  History of EIN.    POSTOPERATIVE DIAGNOSIS:  History of EIN.    SURGEON:  Lisbeth Gallardo MD.    ANESTHESIA:  General anesthesia with LMA.    PROCEDURE:  Dilatation and curettage of the endometrium.    INDICATIONS FOR PROCEDURE:  The patient is a 32-year-old female with a prior history of menorrhagia.  She underwent a hysteroscopy, D and C and MyoSure polypectomy with Dr. Kierra Tracy 10/23/2020 and that revealed complex atypical hyperplasia with foci bordering on well-differentiated adenocarcinoma.  She was seen in consultation in my office.  We discussed various options including definitive surgery with hysterectomy versus conservative management because of her young age and desire for future fertility.  She elected conservative management.  Her MRI was negative for myometrial invasion.  She underwent an EUA, hysteroscopy and Mirena IUD placement on 11/20/2021.  This was repeated 03/05/2021 and at that point, she had a uterine perforation.  There was a small area of residual complex atypical hyperplasia, but was negative for malignancy.  She underwent D and C and replacement of Mirena again on 09/13/2021 and still had a small focus of residual atypical hyperplasia.  On 05/09/2022, she underwent a dilatation and curettage and exchange of Mirena IUD and there was no evidence of hyperplasia or malignancy.  She wished her IUD to be removed.  This was done.  She had a followup pelvic ultrasound 01/13/2023 which revealed a normal size uterus with a 9 mm endometrial stripe containing cystic areas within the endometrial complex.  The right ovary had a 2 cm hypoechoic area.  The left area also had a 2.1 cm hypoechoic area.  There was a small amount of free fluid in the pelvis.  She was brought to the operating room today for surveillance, D and C.    FINDINGS:  The patient had a uterus that sounded to 9 cm. Her cervix was easy to dilate.  She had minimal tissue on  serial curettings around the endometrium and no polypoid tissue was found.    PROCEDURE IN DETAIL:  The patient was taken to the operating room.  She received antibiotic prophylaxis.  Knee high sequential compression devices were placed on her lower extremities.  She was brought to the operating room and was placed in a supine position on the operating room table.  General anesthesia with an LMA was utilized.  Once anesthetized and her airway was protected, she was placed in high lithotomy position using well-padded Yellofin stirrups.  Her arms were left on arm boards.  She was prepped and draped in the usual sterile fashion.  A timeout was conducted and everyone agreed upon the procedure.  I placed a Graves speculum in the vagina and visualized the cervix.  It was grasped at 12 o'clock with a single tooth tenaculum.  I serially dilated the endocervix to a medium size curette.  I sounded the uterus to 9 cm.  I placed Telfa along the posterior blade of the speculum and then did serial curettings around the endometrium in a circumferential manner.  There was very minimal tissue obtained on any of these samplings.  There was no evidence of polypoid tissue.  The instruments were removed from her vagina.  Her anesthesia was reversed, the LMA was removed.  She was taken to recovery room in stable condition.    Lisbeth Gallardo MD        D: 2023   T: 2023   MT: mariaa    Name:     KATHRYN PEÑALOZA  MRN:      [id number]        Account:        [id number]   :      1990           Procedure Date: 2023     Document: [id number]    cc:  MD Danny Moralez MD

## 2023-05-04 NOTE — INTERVAL H&P NOTE
"I have reviewed the surgical (or preoperative) H&P that is linked to this encounter, and examined the patient. There are no significant changes    Clinical Conditions Present on Arrival:  Clinically Significant Risk Factors Present on Admission                  # Severe Obesity: Estimated body mass index is 44.39 kg/m  as calculated from the following:    Height as of this encounter: 1.676 m (5' 6\").    Weight as of this encounter: 124.7 kg (275 lb).       "

## 2023-05-04 NOTE — DISCHARGE INSTRUCTIONS
Today you were given 975 mg of Tylenol at 11:10AM. The recommended daily maximum dose is 4000 mg.      YOU WERE ALSO GIVEN OXYCODONE 5 MG FOR PAIN AT 1:40 pm.    Same Day Surgery Discharge Instructions for  Sedation and General Anesthesia     It's not unusual to feel dizzy, light-headed or faint for up to 24 hours after surgery or while taking pain medication.  If you have these symptoms: sit for a few minutes before standing and have someone assist you when you get up to walk or use the bathroom.    You should rest and relax for the next 24 hours. We recommend you make arrangements to have an adult stay with you for at least 24 hours after your discharge.  Avoid hazardous and strenuous activity.    DO NOT DRIVE any vehicle or operate mechanical equipment for 24 hours following the end of your surgery.  Even though you may feel normal, your reactions may be affected by the medication you have received.    Do not drink alcoholic beverages for 24 hours following surgery.     Slowly progress to your regular diet as you feel able. It's not unusual to feel nauseated and/or vomit after receiving anesthesia.  If you develop these symptoms, drink clear liquids (apple juice, ginger ale, broth, 7-up, etc. ) until you feel better.  If your nausea and vomiting persists for 24 hours, please notify your surgeon.      All narcotic pain medications, along with inactivity and anesthesia, can cause constipation. Drinking plenty of liquids and increasing fiber intake will help.    For any questions of a medical nature, call your surgeon.    Do not make important decisions for 24 hours.    If you had general anesthesia, you may have a sore throat for a couple of days related to the breathing tube used during surgery.  You may use Cepacol lozenges to help with this discomfort.  If it worsens or if you develop a fever, contact your surgeon.     If you feel your pain is not well managed with the pain medications prescribed by your  surgeon, please contact your surgeon's office to let them know so they can address your concerns.      **If you have questions or concerns about your procedure,   call Dr. Gallardo [phone]**

## 2023-05-04 NOTE — BRIEF OP NOTE
[facility]    Brief Operative Note    Pre-operative diagnosis: Endometrial hyperplasia [N85.00]  Post-operative diagnosis Same as pre-operative diagnosis    Procedure: Procedure(s):  Dilation and Curettage of Endometrium  Surgeon: Surgeon(s) and Role:     * Lisbeth Gallardo MD - Primary  Anesthesia: General   Estimated Blood Loss: Minimal    Drains: None  Specimens:   ID Type Source Tests Collected by Time Destination   1 : endometrial currettings Tissue Uterine Contents SURGICAL PATHOLOGY EXAM Lisbeth Gallardo MD 5/4/2023 12:48 PM      Findings:   unremarkable procedure, minimal tissue for endometrial biopsy.  Complications: None.  Implants: * No implants in log *

## 2023-05-04 NOTE — ANESTHESIA CARE TRANSFER NOTE
Patient: Carol Guillaume    Procedure: Procedure(s):  Dilation and Curettage of Endometrium       Diagnosis: Endometrial hyperplasia [N85.00]  Diagnosis Additional Information: No value filed.    Anesthesia Type:   General     Note:    Oropharynx: oropharynx clear of all foreign objects and spontaneously breathing  Level of Consciousness: drowsy  Oxygen Supplementation: face mask  Level of Supplemental Oxygen (L/min / FiO2): 6  Independent Airway: airway patency satisfactory and stable  Dentition: dentition unchanged  Vital Signs Stable: post-procedure vital signs reviewed and stable  Report to RN Given: handoff report given  Patient transferred to: PACU  Comments: At end of procedure, spontaneous respirations, adequate tidal volumes, followed commands to voice, LMA removed atraumatically, oropharynx suctioned, airway patent after LMA removal. Oxygen via facemask at 6 liters per minute to PACU. Oxygen tubing connected to wall O2 in PACU, SpO2, NiBP, and EKG monitors and alarms on and functioning, Pranav Hugger warmer connected to patient gown, report on patient's clinical status given to PACU RN, RN questions answered.  Handoff Report: Identifed the Patient, Identified the Reponsible Provider, Reviewed the pertinent medical history, Discussed the surgical course, Reviewed Intra-OP anesthesia mangement and issues during anesthesia, Set expectations for post-procedure period and Allowed opportunity for questions and acknowledgement of understanding      Vitals:  Vitals Value Taken Time   BP     Temp     Pulse     Resp     SpO2         Electronically Signed By: MATTHEW Burrows CRNA  May 4, 2023  12:58 PM

## 2023-05-10 ENCOUNTER — PRE VISIT (OUTPATIENT)
Dept: GASTROENTEROLOGY | Facility: CLINIC | Age: 33
End: 2023-05-10

## 2023-05-10 ENCOUNTER — TRANSFERRED RECORDS (OUTPATIENT)
Dept: HEALTH INFORMATION MANAGEMENT | Facility: CLINIC | Age: 33
End: 2023-05-10

## 2023-05-10 ENCOUNTER — VIRTUAL VISIT (OUTPATIENT)
Dept: GASTROENTEROLOGY | Facility: CLINIC | Age: 33
End: 2023-05-10
Attending: INTERNAL MEDICINE
Payer: COMMERCIAL

## 2023-05-10 DIAGNOSIS — R10.12 LEFT UPPER QUADRANT ABDOMINAL PAIN: ICD-10-CM

## 2023-05-10 PROCEDURE — 99204 OFFICE O/P NEW MOD 45 MIN: CPT | Mod: VID | Performed by: PHYSICIAN ASSISTANT

## 2023-05-10 NOTE — PATIENT INSTRUCTIONS
"It was a pleasure taking care of you today.  I've included a brief summary of our discussion and care plan from today's visit below.  Please review this information with your primary care provider.  _______________________________________________________________________    My recommendations are summarized as follows:  1) I will touch base with your transplant team regarding the MMF  2) -- Recommend starting supplementation with a powdered soluble fiber. When used on a daily basis, this can help regulate the consistency of your stools. Generally, the powdered variations often work best. There are many different varieties out there, with the following general recommendations:  1) Metamucil (psyllium) and Citrucel. These are \"gel-forming\" fibers, which make a gel-like substance when mixed water. Make sure to mix well and drink promptly. You can start with 1-2 teaspoons per day, with goal to gradually increase to 1 tablespoon daily. You can increase up to 1 tablespoon three times daily if needed. It is important to stay well-hydrated with use of fiber supplementation and to make sure that the fiber powder is well mixed with water as directed on the label. You may experience some bloating with initiation of fiber, which will improve over the first few weeks. A good trial to evaluate the effect is 3-6 months. Of note, many of the fiber products contain artificial sweeteners, which can cause bloating, gas, and diarrhea in those who may be sensitive to artificial sweeteners. If this is the case, recommend trying Metamucil Premium Blend (with Stevia), Metamucil 4-in-1 without Added Sweeteners, or Bellway (sweetened with Monk fruit extract).  2)  If you cannot tolerate the gel-forming fibers, or would prefer to use a product without added sweeteners, you can try Benefiber. The dosing is the same, with the recommendation to start with 1-2 teaspoons daily and gradually increase to 1 tablespoon daily, up to three times daily if " needed.  3)You can also use plain, pure psyllium husk powder. This has no additives. For this, start with 1/2 teaspoon daily at first, then gradually increase to 1-2 teaspoons daily.      Return to GI Clinic as needed   ______________________________________________________________________    How do I schedule labs, imaging studies, or procedures that were ordered in clinic today?     Labs: To schedule lab appointment at the [facility], use my chart or call [phone]. If you have a [facility] lab closer to home where you are regularly seen you can give them a call.     Procedures: If a colonoscopy, upper endoscopy, breath test, esophageal manometry, or pH impedence was ordered today, our endoscopy team will call you to schedule this. If you have not heard from our endoscopy team within a week, please call [phone] option 2 to schedule.     Imaging Studies: If you were scheduled for a CT scan, X-ray, MRI, ultrasound, HIDA scan or other imaging study, please call [phone] to have this scheduled.     Referral: If a referral to another specialty was ordered, expect a phone call or follow instructions above. If you have not heard from anyone regarding your referral in a week, please call our clinic to check the status.     Who do I call with any questions after my visit?  Please be in touch if there are any further questions that arise following today's visit.  There are multiple ways to contact your gastroenterology care team.      During business hours, you may reach a Gastroenterology nurse at [phone]    To schedule or reschedule an appointment, please call [phone].     You can always send a secure message through How do you roll?.  How do you roll? messages are answered by your nurse or doctor typically within 24 hours.  Please allow extra time on weekends and holidays.      For urgent/emergent questions after business hours, you may reach the on-call GI Fellow by contacting the [facility]   at [phone].     How will I get the results of any tests ordered?    You will receive all of your results.  If you have signed up for [employer], any tests ordered at your visit will be available to you after your physician reviews them.  Typically this takes 1-2 weeks.  If there are urgent results that require a change in your care plan, your physician or nurse will call you to discuss the next steps.      What is [employer]?  [employer] is a secure way for you to access all of your healthcare records from the [facility].  It is a web based computer program, so you can sign on to it from any location.  It also allows you to send secure messages to your care team.  I recommend signing up for [employer] access if you have not already done so and are comfortable with using a computer.      How to I schedule a follow-up visit?  If you did not schedule a follow-up visit today, please call [phone] to schedule a follow-up office visit.      Sincerely,    Sary Espinosa PA-C  Division of Gastroenterology, Hepatology & Nutrition  [facility]

## 2023-05-10 NOTE — LETTER
5/10/2023         RE: Carol Guillaume  [address] [facility] [address]        Dear Colleague,    Thank you for referring your patient, Carol Guillaume, to the [facility] [place]. Please see a copy of my visit note below.    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 She feels the MMF is tearing up her stomach. She describes onset of epigastric to left sided abdominal discomfort 10-30 minutes after taking MMF. She awakens feeling completely normal, but after taking MMF, she will feel like she swallowed glass. Initially, once the symptom started, it would last most of the day. The dose was recently decreased, which has limited the pain to several hours. She takes it twice daily, and the pain will start again in the evening after taking the PM dose. The pain is non-radiating and is otherwise not triggered by anything else, including eating. She previously had nausea with the pain, which has improved with the dose decrease as well. She has tried Pepcid, which did not help, and omeprazole, which also did not help. She occasionally notes early satiety. She has observed that when she skips the dose, she does not have the pain. Prior to dose decrease, she would have heartburn symptoms, which could awaken her from sleep. This has improved with dose decrease. EGD 1/24/2023 -erosive gastropathy with a few small erosions in the gastric cardia and anturm. Esophageal exam was normal, Hill grade I. Gastric biopsies with reactive antral gastropathy and a background of inactive chronic inflammation, without IM or dysplasia. H plyori negative.      GI ROS notable for irregular bowel habits, which have similarly improved with dose  decrease of MMF. She reports daily formed BM, however with straining and sense of incomplete evacuation, and occasional loose stools.    Immunosuppression - cyclosporine and MMF    No NSAID  No known FHx of GI malignancy or conditions    ROS: 10pt ROS performed and otherwise negative.    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    Depression     EIN (endometrial intraepithelial neoplasia)     Fatigue     Gastroesophageal reflux disease     High cholesterol     Hypertension     Insomnia     Insulin resistance     Legally blind     Obesity     PCOS (polycystic ovarian syndrome)     PPD positive, treated 2006    9 months of INH    Pseudotumor cerebri     on Diamox    Retinitis pigmentosa     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     Uncomplicated asthma     as a child    Viremia        PREVIOUS ABDOMINAL/GYNECOLOGIC SURGERIES:  Past Surgical History:   Procedure Laterality Date    BIOPSY      cataract bilateral  06/2017    DILATION AND CURETTAGE N/A 09/13/2021    Procedure: DILATION AND CURETTAGE,;  Surgeon: Lisbeth Gallardo MD;  Location:  OR    DILATION AND CURETTAGE N/A 05/09/2022    Procedure: DILATION AND CURETTAGE;  Surgeon: Lisbeth Gallardo MD;  Location:  OR    DILATION AND CURETTAGE N/A 5/4/2023    Procedure: Dilation and Curettage of Endometrium;  Surgeon: Lisbeth Gallardo MD;  [facility]: [facility]    DILATION AND CURETTAGE, OPERATIVE HYSTEROSCOPY WITH MORCELLATOR, COMBINED N/A 10/23/2020    Procedure: HYSTEROSCOPY, DILATION AND CURRETAGE, MYOSURE;  Surgeon: Kierra Tracy MD;  Location:  OR    DILATION AND CURETTAGE, OPERATIVE HYSTEROSCOPY WITH MORCELLATOR, COMBINED N/A 03/05/2021    Procedure: HYSTEROSCOPY, WITH DILATION AND  CURETTAGE OF UTERUS USING  MORCELLATOR;  Surgeon: Fiorella Cunningham MD;  [facility]    ESOPHAGOSCOPY, GASTROSCOPY, DUODENOSCOPY (EGD), COMBINED N/A 01/24/2023    Procedure: ESOPHAGOGASTRODUODENOSCOPY, WITH BIOPSY;  Surgeon: Talia Aguilar MD;  Location: [facility] OR    INSERT INTRAUTERINE DEVICE N/A 03/05/2021    Procedure: INSERTION MIRENA INTRAUTERINE DEVICE;  Surgeon: Fiorella Cunningham MD;  [facility]    IR RENAL BIOPSY RIGHT  08/27/2021    LAPAROSCOPY DIAGNOSTIC (GYN)  03/05/2021    Procedure: Laparoscopy diagnostic (gyn);  Surgeon: Fiorella Cunningham MD;  [facility]    LITHOTRIPSY      LITHOTRIPSY      REMOVE INTRAUTERINE DEVICE N/A 03/05/2021    Procedure: REMOVE MIRENA INTRAUTERINE DEVICE;  Surgeon: Fiorella Cunningham MD;  [facility]    REMOVE INTRAUTERINE DEVICE N/A 05/09/2022    Procedure: EXCHANGE OF MIRENA INTRAUTERINE DEVICE;  Surgeon: Lisbeth Gallardo MD;  [facility]    REPLACE INTRAUTERINE DEVICE N/A 09/13/2021    Procedure: MIRENA INTRAUTERINE DEVICE EXCHANGE;  Surgeon: Lisbeth Gallardo MD;  [facility]    SINUS SURGERY  2001    TONSILLECTOMY      TRANSPLANT KIDNEY RECIPIENT LIVING RELATED Right 07/2006    wisdom teeth           PERTINENT MEDICATIONS:  Current Outpatient Medications   Medication Sig Dispense Refill    acetaminophen (TYLENOL) 325 MG tablet Take 2 tablets (650 mg) by mouth every 4 hours as needed for mild pain 50 tablet 0    amLODIPine (NORVASC) 5 MG tablet Take 1 tablet (5 mg) by mouth daily 90 tablet 3    carvedilol (COREG) 25 MG tablet Take 1 tablet (25 mg) by mouth 2 times daily (with meals) 60 tablet 11    losartan (COZAAR) 25 MG tablet Take 1 tablet (25 mg) by mouth 2 times daily 90 tablet 0    mycophenolic acid (GENERIC EQUIVALENT) 180 MG EC tablet Take 2 tablets (360 mg) by mouth 2 times daily 360 tablet 3    NEORAL (BRAND) 25 MG capsule Take 3 capsules (75 mg) by mouth every morning AND 2 capsules (50 mg) every evening. 450 capsule 3      No other NSAIDs reported by patient.  No other OTC/herbal/supplements reported by patient.    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   end of November 2018 to David has one adopted daughter David's  niece Daryn 2012 Blind.     Social Determinants of Health     Financial Resource Strain: Low Risk  (8/16/2021)    Overall Financial Resource Strain (CARDIA)     Difficulty of Paying Living Expenses: Not hard at all   Food Insecurity: No Food Insecurity (8/16/2021)    Hunger Vital Sign     Worried About Running Out of Food in the Last Year: Never true     Ran Out of Food in the Last Year: Never true   Transportation Needs: Unmet Transportation Needs (8/16/2021)    PRAPARE - Transportation     Lack of Transportation (Medical): Yes     Lack of Transportation (Non-Medical): Yes   Physical Activity: Insufficiently Active (8/16/2021)    Exercise Vital Sign     Days of Exercise per Week: 5 days     Minutes of Exercise per Session: 20 min   Stress: Stress Concern Present (8/16/2021)    [employer] - Occupational Stress Questionnaire     Feeling of Stress : Rather much   Social Connections: Socially Integrated  (8/16/2021)    Social Connection and Isolation Panel [NHANES]     Frequency of Communication with Friends and Family: More than three times a week     Frequency of Social Gatherings with Friends and Family: Once a week     Attends Christianity Services: More than 4 times per year     Active Member of Clubs or Organizations: Yes     Attends Club or Organization Meetings: Not on file     Marital Status:    Intimate Partner Violence: Not At Risk (8/16/2021)    Humiliation, Afraid, Rape, and Kick questionnaire     Fear of Current or Ex-Partner: No     Emotionally Abused: No     Physically Abused: No     Sexually Abused: No   Housing Stability: Low Risk  (5/4/2022)    Housing Stability Vital Sign     Unable to Pay for Housing in the Last Year: No     Number of Places Lived in the Last Year: 1     Unstable Housing in the Last Year: No       FAMILY HISTORY:  .  Family History   Problem Relation Age of Onset    Hyperlipidemia Mother     Sjogren's Father     Hashimoto's thyroiditis Father     Other - See Comments Maternal Grandfather         surgical complication    Atrial fibrillation Paternal Grandmother     Arthritis Paternal Grandmother     Skin Cancer Paternal Grandfather         melanoma    Parkinsonism Other         paternal uncle       PHYSICAL EXAMINATION:  Vitals reviewed  There were no vitals taken for this visit.  Video physical exam  General: Patient appears well in no acute distress.   Skin: No visualized rash or lesions on visualized skin  Eyes: EOMI, no erythema, sclera icterus or discharge noted  Resp: Appears to be breathing comfortably without accessory muscle usage, speaking in full sentences, no cough  MSK: Appears to have normal range of motion based on visualized movements  Neurologic: No apparent tremors, facial movements symmetric  Psych: affect normal, alert and oriented    The rest of a comprehensive physical examination is deferred due to PHE (public health emergency) video  restrictions      PERTINENT STUDIES Reviewed in EMR    ASSESSMENT/PLAN:    # Epigastric pain  # Heartburn  Carol is presenting with epigastric pain and heartburn symptoms, which reliably occur following MMF dosing, without any other triggers. Symptoms have improved with dose decreases, and she has noted the absence of the symptom should she skip a dose of MMF. EGD notable for erosive gastropathy, with biopsies showing reactive gastropathy in the background of mild chronic inactive gastritis. She occasionally has early satiety. She has had no improvement on trials of H2 blockers or PPI.    Discussed with Carol that the temporal relationship between her MMF intake and symptoms does seem consistent with medication side effect. Based on EGD, there are no other clear etiologies for her symptoms. A gastric emptying study could be considered, though her symptoms are less consistent with this given the symptom does note relate to postprandial state. Discussed with Carol that  I am not able to make recommendations on her immunosuppression, as this is beyond my own expertise, but that I would be happy to relay this information to her managing providers.    # Irregular bowel habits  Noting daily, formed stools, associated with some degree of straining and incomplete evacuation, with occasional loose stools. This has improved with a reduction in MMF dose. Discussed considering a trial of soluble fiber supplementation.    RTC PRN    Thank you for this consultation. It was a pleasure to participate in the care of this patient; please contact us with any further questions.      45 minutes spent on the date of the encounter doing chart review, review of test results, patient visit, documentation and discussion with other provider(s)          Again, thank you for allowing me to participate in the care of your patient.      Sincerely,    Sary Espinosa PA-C

## 2023-05-10 NOTE — NURSING NOTE
Is the patient currently in the state of [place]? YES    Visit mode:VIDEO    If the visit is dropped, the patient can be reconnected by: VIDEO VISIT: Text to cell phone: [phone]    Will anyone else be joining the visit? NO      How would you like to obtain your AVS? MyChart    Are changes needed to the allergy or medication list? NO    Reason for visit: Video Visit

## 2023-05-10 NOTE — PROGRESS NOTES
GI CLINIC VISIT    Virtual Visit Details    Type of service:  Video Visit   Video Start Time: 1050  Video End Time:1113    Originating Location (pt. 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 She feels the MMF is tearing up her stomach. She describes onset of epigastric to left sided abdominal discomfort 10-30 minutes after taking MMF. She awakens feeling completely normal, but after taking MMF, she will feel like she swallowed glass. Initially, once the symptom started, it would last most of the day. The dose was recently decreased, which has limited the pain to several hours. She takes it twice daily, and the pain will start again in the evening after taking the PM dose. The pain is non-radiating and is otherwise not triggered by anything else, including eating. She previously had nausea with the pain, which has improved with the dose decrease as well. She has tried Pepcid, which did not help, and omeprazole, which also did not help. She occasionally notes early satiety. She has observed that when she skips the dose, she does not have the pain. Prior to dose decrease, she would have heartburn symptoms, which could awaken her from sleep. This has improved with dose decrease. EGD 1/24/2023 -erosive gastropathy with a few small erosions in the gastric cardia and anturm. Esophageal exam was normal, Hill grade I. Gastric biopsies with reactive antral gastropathy and a background of inactive chronic inflammation, without IM or dysplasia. H plyori negative.      GI ROS notable for irregular bowel habits, which have similarly improved with dose decrease of MMF. She reports daily  formed BM, however with straining and sense of incomplete evacuation, and occasional loose stools.    Immunosuppression - cyclosporine and MMF    No NSAID  No known FHx of GI malignancy or conditions    ROS: 10pt ROS performed and otherwise negative.    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     Depression      EIN (endometrial intraepithelial neoplasia)      Fatigue      Gastroesophageal reflux disease      High cholesterol      Hypertension      Insomnia      Insulin resistance      Legally blind      Obesity      PCOS (polycystic ovarian syndrome)      PPD positive, treated 2006    9 months of INH     Pseudotumor cerebri     on Diamox     Retinitis pigmentosa      S/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      Uncomplicated asthma     as a child     Viremia        PREVIOUS ABDOMINAL/GYNECOLOGIC SURGERIES:  Past Surgical History:   Procedure Laterality Date     BIOPSY       cataract bilateral  06/2017     DILATION AND CURETTAGE N/A 09/13/2021    Procedure: DILATION AND CURETTAGE,;  Surgeon: Lisbeth Gallardo MD;  Location:  OR     DILATION AND CURETTAGE N/A 05/09/2022    Procedure: DILATION AND CURETTAGE;  Surgeon: Lisbeth Gallardo MD;  Location:  OR     DILATION AND CURETTAGE N/A 5/4/2023    Procedure: Dilation and Curettage of Endometrium;  Surgeon: Lisbeth Gallardo MD;  [facility]     DILATION AND CURETTAGE, OPERATIVE HYSTEROSCOPY WITH MORCELLATOR, COMBINED N/A 10/23/2020    Procedure: HYSTEROSCOPY, DILATION AND CURRETAGE, MYOSURE;  Surgeon: Kierra Tracy MD;  Location:  OR     DILATION AND CURETTAGE, OPERATIVE HYSTEROSCOPY WITH MORCELLATOR, COMBINED N/A 03/05/2021    Procedure: HYSTEROSCOPY, WITH DILATION AND  CURETTAGE OF UTERUS USING  MORCELLATOR;  Surgeon: Fiorella Cunningham MD;  [facility]     ESOPHAGOSCOPY, GASTROSCOPY, DUODENOSCOPY (EGD), COMBINED N/A 01/24/2023    Procedure: ESOPHAGOGASTRODUODENOSCOPY, WITH BIOPSY;  Surgeon: Talia Aguilar MD;  Location: [facility] OR     INSERT INTRAUTERINE DEVICE N/A 03/05/2021    Procedure: INSERTION MIRENA INTRAUTERINE DEVICE;  Surgeon: Fiorella Cunningham MD;  [facility]     IR RENAL BIOPSY RIGHT  08/27/2021     LAPAROSCOPY DIAGNOSTIC (GYN)  03/05/2021    Procedure: Laparoscopy diagnostic (gyn);  Surgeon: Fiorella Cunningham MD;  [facility]     LITHOTRIPSY       LITHOTRIPSY       REMOVE INTRAUTERINE DEVICE N/A 03/05/2021    Procedure: REMOVE MIRENA INTRAUTERINE DEVICE;  Surgeon: Fiorella Cunningham MD;  [facility]     REMOVE INTRAUTERINE DEVICE N/A 05/09/2022    Procedure: EXCHANGE OF MIRENA INTRAUTERINE DEVICE;  Surgeon: Lisbeth Gallardo MD;  [facility]     REPLACE INTRAUTERINE DEVICE N/A 09/13/2021    Procedure: MIRENA INTRAUTERINE DEVICE EXCHANGE;  Surgeon: Lisbeth Gallardo MD;  [facility]     SINUS SURGERY  2001     TONSILLECTOMY       TRANSPLANT KIDNEY RECIPIENT LIVING RELATED Right 07/2006     wisdom teeth           PERTINENT MEDICATIONS:  Current Outpatient Medications   Medication Sig Dispense Refill     acetaminophen (TYLENOL) 325 MG tablet Take 2 tablets (650 mg) by mouth every 4 hours as needed for mild pain 50 tablet 0     amLODIPine (NORVASC) 5 MG tablet Take 1 tablet (5 mg) by mouth daily 90 tablet 3     carvedilol (COREG) 25 MG tablet Take 1 tablet (25 mg) by mouth 2 times daily (with meals) 60 tablet 11     losartan (COZAAR) 25 MG tablet Take 1 tablet (25 mg) by mouth 2 times daily 90 tablet 0     mycophenolic acid (GENERIC EQUIVALENT) 180 MG EC tablet Take 2 tablets (360 mg) by mouth 2 times daily 360 tablet 3     NEORAL (BRAND) 25 MG capsule Take 3 capsules (75 mg) by mouth every morning AND 2 capsules (50 mg) every  evening. 450 capsule 3     No other NSAIDs reported by patient.  No other OTC/herbal/supplements reported by patient.    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   end of November 2018 to David has one adopted daughter David's  niece Daryn 2012 Blind.     Social Determinants of Health     Financial Resource Strain: Low Risk  (8/16/2021)    Overall Financial Resource Strain (CARDIA)      Difficulty of Paying Living Expenses: Not hard at all   Food Insecurity: No Food Insecurity (8/16/2021)    Hunger Vital Sign      Worried About Running Out of Food in the Last Year: Never true      Ran Out of Food in the Last Year: Never true   Transportation Needs: Unmet Transportation Needs (8/16/2021)    PRAPARE - Transportation      Lack of Transportation (Medical): Yes      Lack of Transportation (Non-Medical): Yes   Physical Activity: Insufficiently Active (8/16/2021)    Exercise Vital Sign      Days of Exercise per Week: 5 days      Minutes of Exercise per Session: 20 min   Stress: Stress Concern Present (8/16/2021)    [employer] - Occupational Stress Questionnaire      Feeling of Stress : Rather much    Social Connections: Socially Integrated (8/16/2021)    Social Connection and Isolation Panel [NHANES]      Frequency of Communication with Friends and Family: More than three times a week      Frequency of Social Gatherings with Friends and Family: Once a week      Attends Jew Services: More than 4 times per year      Active Member of Clubs or Organizations: Yes      Attends Club or Organization Meetings: Not on file      Marital Status:    Intimate Partner Violence: Not At Risk (8/16/2021)    Humiliation, Afraid, Rape, and Kick questionnaire      Fear of Current or Ex-Partner: No      Emotionally Abused: No      Physically Abused: No      Sexually Abused: No   Housing Stability: Low Risk  (5/4/2022)    Housing Stability Vital Sign      Unable to Pay for Housing in the Last Year: No      Number of Places Lived in the Last Year: 1      Unstable Housing in the Last Year: No       FAMILY HISTORY:  .  Family History   Problem Relation Age of Onset     Hyperlipidemia Mother      Sjogren's Father      Hashimoto's thyroiditis Father      Other - See Comments Maternal Grandfather         surgical complication     Atrial fibrillation Paternal Grandmother      Arthritis Paternal Grandmother      Skin Cancer Paternal Grandfather         melanoma     Parkinsonism Other         paternal uncle       PHYSICAL EXAMINATION:  Vitals reviewed  There were no vitals taken for this visit.  Video physical exam  General: Patient appears well in no acute distress.   Skin: No visualized rash or lesions on visualized skin  Eyes: EOMI, no erythema, sclera icterus or discharge noted  Resp: Appears to be breathing comfortably without accessory muscle usage, speaking in full sentences, no cough  MSK: Appears to have normal range of motion based on visualized movements  Neurologic: No apparent tremors, facial movements symmetric  Psych: affect normal, alert and oriented    The rest of a comprehensive physical examination is deferred  due to PHE (public health emergency) video restrictions      PERTINENT STUDIES Reviewed in EMR    ASSESSMENT/PLAN:    # Epigastric pain  # Heartburn  Carol is presenting with epigastric pain and heartburn symptoms, which reliably occur following MMF dosing, without any other triggers. Symptoms have improved with dose decreases, and she has noted the absence of the symptom should she skip a dose of MMF. EGD notable for erosive gastropathy, with biopsies showing reactive gastropathy in the background of mild chronic inactive gastritis. She occasionally has early satiety. She has had no improvement on trials of H2 blockers or PPI.    Discussed with Carol that the temporal relationship between her MMF intake and symptoms does seem consistent with medication side effect. Based on EGD, there are no other clear etiologies for her symptoms. A gastric emptying study could be considered, though her symptoms are less consistent with this given the symptom does note relate to postprandial state. Discussed with Carol that  I am not able to make recommendations on her immunosuppression, as this is beyond my own expertise, but that I would be happy to relay this information to her managing providers.    # Irregular bowel habits  Noting daily, formed stools, associated with some degree of straining and incomplete evacuation, with occasional loose stools. This has improved with a reduction in MMF dose. Discussed considering a trial of soluble fiber supplementation.    RTC PRN    Thank you for this consultation. It was a pleasure to participate in the care of this patient; please contact us with any further questions.    Sary Espinosa PA-C    45 minutes spent on the date of the encounter doing chart review, review of test results, patient visit, documentation and discussion with other provider(s)

## 2023-05-12 ENCOUNTER — TELEPHONE (OUTPATIENT)
Dept: TRANSPLANT | Facility: CLINIC | Age: 33
End: 2023-05-12
Payer: COMMERCIAL

## 2023-05-12 ENCOUNTER — TELEPHONE (OUTPATIENT)
Dept: GASTROENTEROLOGY | Facility: CLINIC | Age: 33
End: 2023-05-12
Payer: COMMERCIAL

## 2023-05-12 DIAGNOSIS — T86.12 KIDNEY TRANSPLANT FAILURE: Primary | ICD-10-CM

## 2023-05-12 NOTE — TELEPHONE ENCOUNTER
Feliberto Douglas MD Turnquist, Emma Caroline, PA-C  Cc: Ronna Hughes, RN  Hi Sary     Thanks for reaching out. I will discuss IS changes with her. I presume the symptoms persist despite ppi bid dosing and dietary changes? Any plans for repeat EGD to see if those changes improve in 6-8 weeks?     Ronna can you please update her labs with UPC and CSA, add TPMT activity, LFT, lipid panel   She is scheduled for retransplant evaluation in June  and Ckd journey ? When is her CKD appointment? since GFR now down to 20ml/min. Depending on the results, switching to AZA is an option. mTOR not ideal given her proteinuria and low GFR     Thanks   Feliberto           Previous Messages       ----- Message -----   From: Sary Espinosa PA-C   Sent: 5/10/2023  11:29 AM CDT   To: Feliberto Rueda MD   Subject: MMF                                               Hi Dr. Littlejohn,     I met with Carol today in the GI clinic. She is reporting epigastric pain and significant heartburn that reliably occurs after taking MMF, both of which have improved with dose reduction, but are still quite bothersome to her. EGD was notable for erosive gastropathy, though the biopsies showed nonspecific reactive gastropathy. She states she has tried to skip the dose and the symptoms remain absent if she does not take it. She is hoping to get off of MMF.     From a GI standpoint, there are no other clear etiologies for her symptoms. She could have functional dyspepsia, however had no improvement with PPI, which would be the standard initial therapy for that. Her symptoms are less consistent with delayed gastric emptying, as she can eat without reproducing the pain.     She asked that I connect with you regarding our discussion today. I told her that I was not qualified to be making any decisions regarding her immunosuppression, but that I would be happy to reach out to you.     Sary Lake     OUTCOME:      Left detailed message with instruction to  obtain labs.  Dr Gerhard Rueda will review lab results, then will determine possibility of switching IS.    Asking for CB to confirm message    Ronna Hughes RN   Transplant Coordinator  [phone]

## 2023-05-24 ENCOUNTER — LAB (OUTPATIENT)
Dept: LAB | Facility: CLINIC | Age: 33
End: 2023-05-24
Payer: COMMERCIAL

## 2023-05-24 DIAGNOSIS — T86.12 KIDNEY TRANSPLANT FAILURE: ICD-10-CM

## 2023-05-24 LAB
ALBUMIN MFR UR ELPH: 24.8 MG/DL (ref 1–14)
ALBUMIN SERPL BCG-MCNC: 4 G/DL (ref 3.5–5.2)
ALP SERPL-CCNC: 98 U/L (ref 35–104)
ALT SERPL W P-5'-P-CCNC: 13 U/L (ref 10–35)
ANION GAP SERPL CALCULATED.3IONS-SCNC: 10 MMOL/L (ref 7–15)
AST SERPL W P-5'-P-CCNC: 15 U/L (ref 10–35)
BILIRUB DIRECT SERPL-MCNC: <0.2 MG/DL (ref 0–0.3)
BILIRUB SERPL-MCNC: 0.4 MG/DL
BUN SERPL-MCNC: 48.3 MG/DL (ref 6–20)
CALCIUM SERPL-MCNC: 9.1 MG/DL (ref 8.6–10)
CHLORIDE SERPL-SCNC: 103 MMOL/L (ref 98–107)
CHOLEST SERPL-MCNC: 203 MG/DL
CREAT SERPL-MCNC: 2.82 MG/DL (ref 0.51–0.95)
CREAT UR-MCNC: 115 MG/DL
CYCLOSPORINE BLD LC/MS/MS-MCNC: 172 UG/L (ref 50–400)
DEPRECATED HCO3 PLAS-SCNC: 23 MMOL/L (ref 22–29)
ERYTHROCYTE [DISTWIDTH] IN BLOOD BY AUTOMATED COUNT: 12.4 % (ref 10–15)
GFR SERPL CREATININE-BSD FRML MDRD: 22 ML/MIN/1.73M2
GLUCOSE SERPL-MCNC: 121 MG/DL (ref 70–99)
HCT VFR BLD AUTO: 32 % (ref 35–47)
HDLC SERPL-MCNC: 32 MG/DL
HGB BLD-MCNC: 10.2 G/DL (ref 11.7–15.7)
LDLC SERPL CALC-MCNC: 133 MG/DL
MCH RBC QN AUTO: 27.5 PG (ref 26.5–33)
MCHC RBC AUTO-ENTMCNC: 31.9 G/DL (ref 31.5–36.5)
MCV RBC AUTO: 86 FL (ref 78–100)
NONHDLC SERPL-MCNC: 171 MG/DL
PLATELET # BLD AUTO: 208 10E3/UL (ref 150–450)
POTASSIUM SERPL-SCNC: 4.4 MMOL/L (ref 3.4–5.3)
PROT SERPL-MCNC: 7.3 G/DL (ref 6.4–8.3)
PROT/CREAT 24H UR: 0.22 MG/MG CR (ref 0–0.2)
RBC # BLD AUTO: 3.71 10E6/UL (ref 3.8–5.2)
SODIUM SERPL-SCNC: 136 MMOL/L (ref 136–145)
TME LAST DOSE: NORMAL H
TME LAST DOSE: NORMAL H
TRIGL SERPL-MCNC: 190 MG/DL
WBC # BLD AUTO: 7.3 10E3/UL (ref 4–11)

## 2023-05-24 PROCEDURE — 82248 BILIRUBIN DIRECT: CPT | Performed by: PATHOLOGY

## 2023-05-24 PROCEDURE — 85027 COMPLETE CBC AUTOMATED: CPT | Performed by: PATHOLOGY

## 2023-05-24 PROCEDURE — 99000 SPECIMEN HANDLING OFFICE-LAB: CPT | Performed by: PATHOLOGY

## 2023-05-24 PROCEDURE — 36415 COLL VENOUS BLD VENIPUNCTURE: CPT | Performed by: PATHOLOGY

## 2023-05-24 PROCEDURE — 80061 LIPID PANEL: CPT | Performed by: PATHOLOGY

## 2023-05-24 PROCEDURE — 84156 ASSAY OF PROTEIN URINE: CPT | Performed by: PATHOLOGY

## 2023-05-24 PROCEDURE — 80158 DRUG ASSAY CYCLOSPORINE: CPT | Mod: 90 | Performed by: PATHOLOGY

## 2023-05-24 PROCEDURE — 84433 ASY THIOPURIN S-MTHYLTRNSFRS: CPT | Mod: 90 | Performed by: PATHOLOGY

## 2023-05-24 PROCEDURE — 80053 COMPREHEN METABOLIC PANEL: CPT | Performed by: PATHOLOGY

## 2023-05-25 DIAGNOSIS — Z48.298 AFTERCARE FOLLOWING ORGAN TRANSPLANT: Primary | ICD-10-CM

## 2023-05-27 LAB — TPMT BLD-CCNC: 29.7 U/ML

## 2023-05-30 ENCOUNTER — TELEPHONE (OUTPATIENT)
Dept: LAB | Facility: CLINIC | Age: 33
End: 2023-05-30
Payer: COMMERCIAL

## 2023-05-30 DIAGNOSIS — T86.12 KIDNEY TRANSPLANT FAILURE: Primary | ICD-10-CM

## 2023-05-30 DIAGNOSIS — Z48.298 AFTERCARE FOLLOWING ORGAN TRANSPLANT: ICD-10-CM

## 2023-05-30 NOTE — TELEPHONE ENCOUNTER
Feliberto Douglas MD Sveiven, Sara, RN  If covered and she doesn't have significant swelling and repeat UPC, Cr, CSA within goals, would try mTOR 1st will discuss details once we get f/up labs and report from her      ----- Message -----   From: Ronna Hughes, ALEX   Sent: 5/30/2023   1:43 PM CDT   To: MD Dr Gerhard Dasilva,     She has coverage for Everolimus.  I have not heard back from patient on whether or not she is experiencing swelling     Ronna Hughes RN   Transplant Coordinator   [phone]     ----- Message -----   From: Feliberto Douglas MD   Sent: 5/29/2023   2:45 PM CDT   To: Ronna Hughes RN     f/up repeat BMP and CSA level and ensure stable Cr & UPCx 2 prior to IS switch   Will consider switch to mTOR (awaiting insurance coverage info) vs AZA

## 2023-06-05 ENCOUNTER — TELEPHONE (OUTPATIENT)
Dept: TRANSPLANT | Facility: CLINIC | Age: 33
End: 2023-06-05
Payer: COMMERCIAL

## 2023-06-05 NOTE — TELEPHONE ENCOUNTER
Left message asking for CB with any continued questions or concerns    Ronna Hughes RN   Transplant Coordinator  [phone]

## 2023-06-06 ENCOUNTER — TELEPHONE (OUTPATIENT)
Dept: TRANSPLANT | Facility: CLINIC | Age: 33
End: 2023-06-06
Payer: COMMERCIAL

## 2023-06-06 NOTE — TELEPHONE ENCOUNTER
"ISSUE: patient calls to report intermittent tremor.  Patient is unsure if the tremors are from PTSD or if they possibly from CSA.    Patient does have anxiety and headaches on \" crown of head\"    Reports brain fog since may 2020 that has not improved.    Report \" hot flashes\" at night, denies night sweats    Current CSA dose is 75 mg in the AM and 50 mg in the PM    Patient states she is having labs drawn this coming Friday 6/9/2023      Ronna Hughes RN   Transplant Coordinator  [phone]        "

## 2023-06-09 NOTE — PROGRESS NOTES
[facility] Solid Organ Transplant  Outpatient MNT: Kidney Transplant Evaluation    Current BMI: 43.4 (HT 66 in,  lbs/122 kg)  BMI guideline for kidney transplant up to a BMI of 40 / per surgeon discretion   A BMI of 40 would = 248 lbs      Time Spent: 15 minutes  Visit Type: follow up (saw pt 8/2021 for CKD)  Referring Physician: Martir   Pt accompanied by: self     History of previous txp: kidney 2006   Dialysis: no     Nutrition Assessment  Pt reports trying to lose weight since May and has lost 8 lbs by limiting phosphorus-rich foods and drinking protein drinks. She has been doing 1 Slim Fast shake/day (550 mg phos), previously doing 2. She reports losing her job earlier this year and is also limiting meat due to cost, but also due to high phosphorus content.     This RD visit can be counted as 1st required RD visit for bariatric surgery/weight management if pt were to proceed with this program    Vitamins, Supplements, Pertinent Meds: none   Herbal Medicines/Supplements: none     Edema: mild L JORGE ?from meds vs ESRD    Weight hx: lost 8 lbs in the past month for transplant purposes     Diet Recall  Breakfast Slim Fast shake or bagel or granola with milk (1%)   Lunch Skips d/t stomach distress from meds- Nausea, stomach pain, acid reflux    Dinner Waffles with PB; green beans & peas; avoiding meats for phosphorus   Snacks Shredded cheese, 1/4 c cottage cheese   Beverages Water, apple juice (8 oz), lemonade (sips w/ meds), 1 soda/day (Sprite or Root Beer), tea (hot), coffee (2x/week- milk & sugar or maple syrup)    Alcohol None    Dining out Was doing weekly, but has reduced since May for weight loss purposes      Physical Activity  No routine  Tries to walk but gets too fatigued - walked ~1/3 mile this weekend and was fatigued     Labs  5/25 K 4.4   No Phos on file since 2021    Nutrition Diagnosis  Obesity r/t positive energy balance and inadequate physical activity AEB BMI 43.    Nutrition  Intervention  Nutrition education provided:  Discussed strategies for weight loss. Ok to do a protein shake, but would switch to a lower potassium/phosphorus option. Will provide some examples via [employer].   Reviewed how adequate protein intake + veggies often helps with weight loss. Discussed finding a balance b/t phosphorus foods & consuming protein. Will add future phosphorus lab to help determine need to limit these foods, but if lab is high, may be from Slim Fast shake (which is higher than other phos foods).   Reviewed that restricting dietary Phos is only beneficial if labs are abnormal. Not needed otherwise.     Patient Understanding: Pt verbalized understanding of education provided.  Expected Engagement: Good  Follow-Up Plans: PRN     Nutrition Goals  Weight loss per surgeon discretion     Janet Dey, RD, LD, CCTD

## 2023-06-10 DIAGNOSIS — I12.9 HYPERTENSIVE RENAL DISEASE: ICD-10-CM

## 2023-06-12 ENCOUNTER — OFFICE VISIT (OUTPATIENT)
Dept: TRANSPLANT | Facility: CLINIC | Age: 33
End: 2023-06-12
Attending: NURSE PRACTITIONER
Payer: COMMERCIAL

## 2023-06-12 ENCOUNTER — TELEPHONE (OUTPATIENT)
Dept: TRANSPLANT | Facility: CLINIC | Age: 33
End: 2023-06-12

## 2023-06-12 ENCOUNTER — MYC REFILL (OUTPATIENT)
Dept: TRANSPLANT | Facility: CLINIC | Age: 33
End: 2023-06-12
Payer: COMMERCIAL

## 2023-06-12 VITALS
HEART RATE: 78 BPM | OXYGEN SATURATION: 97 % | SYSTOLIC BLOOD PRESSURE: 115 MMHG | WEIGHT: 269 LBS | DIASTOLIC BLOOD PRESSURE: 74 MMHG | BODY MASS INDEX: 43.42 KG/M2

## 2023-06-12 DIAGNOSIS — E66.01 CLASS 3 SEVERE OBESITY WITH SERIOUS COMORBIDITY AND BODY MASS INDEX (BMI) OF 40.0 TO 44.9 IN ADULT, UNSPECIFIED OBESITY TYPE (H): Primary | ICD-10-CM

## 2023-06-12 DIAGNOSIS — Z01.818 PRE-TRANSPLANT EVALUATION FOR KIDNEY TRANSPLANT: ICD-10-CM

## 2023-06-12 DIAGNOSIS — E66.813 CLASS 3 SEVERE OBESITY WITH SERIOUS COMORBIDITY AND BODY MASS INDEX (BMI) OF 40.0 TO 44.9 IN ADULT, UNSPECIFIED OBESITY TYPE (H): Primary | ICD-10-CM

## 2023-06-12 DIAGNOSIS — Z76.82 ORGAN TRANSPLANT CANDIDATE: ICD-10-CM

## 2023-06-12 DIAGNOSIS — I10 ESSENTIAL HYPERTENSION: ICD-10-CM

## 2023-06-12 DIAGNOSIS — T86.91 TRANSPLANT FAILURE DUE TO REJECTION: ICD-10-CM

## 2023-06-12 DIAGNOSIS — T86.12 KIDNEY TRANSPLANT FAILURE: ICD-10-CM

## 2023-06-12 DIAGNOSIS — R73.03 PREDIABETES: ICD-10-CM

## 2023-06-12 DIAGNOSIS — N18.9 CHRONIC RENAL FAILURE: ICD-10-CM

## 2023-06-12 DIAGNOSIS — N18.4 CHRONIC KIDNEY DISEASE, STAGE IV (SEVERE) (H): ICD-10-CM

## 2023-06-12 PROCEDURE — 99203 OFFICE O/P NEW LOW 30 MIN: CPT | Performed by: TRANSPLANT SURGERY

## 2023-06-12 PROCEDURE — G0463 HOSPITAL OUTPT CLINIC VISIT: HCPCS | Mod: 27 | Performed by: TRANSPLANT SURGERY

## 2023-06-12 PROCEDURE — G0463 HOSPITAL OUTPT CLINIC VISIT: HCPCS | Performed by: PHYSICIAN ASSISTANT

## 2023-06-12 PROCEDURE — 99204 OFFICE O/P NEW MOD 45 MIN: CPT | Performed by: PHYSICIAN ASSISTANT

## 2023-06-12 RX ORDER — CARVEDILOL 25 MG/1
25 TABLET ORAL 2 TIMES DAILY WITH MEALS
Qty: 60 TABLET | Refills: 1 | Status: SHIPPED | OUTPATIENT
Start: 2023-06-12 | End: 2023-08-02

## 2023-06-12 NOTE — PATIENT INSTRUCTIONS
"-Labs on Friday as planned.  Will check A1C for preDM/DM  -Consider GLP1        OZEMPIC (semaglutide)    We are starting a GLP-1 (Glucagon-like Peptide-1) medication called semaglutide (aka Ozempic). One of the ways it works is by slowing down the rate that food leaves your stomach. You feel levin and will eat less. It also helps regulate hormones that can help improve your blood sugars and weight.     Ozempic has been studied for safety and effect on weight loss in adults without diabetes. It is currently FDA approved for diabetes and is considered \"off label\" use for weight loss.    Dosing for this medication:   Month 1- Inject 0.25 mg weekly  Month 2- Inject 0.5mg weekly    Side effects of GLP- Medications include: The most common side effects are all GI related and consist of: nausea, constipation, diarrhea, burping, or gassiness. Patients are advised to eat slowly and less, and nausea typically passes if people can stick it out.     The risk of pancreatitis (inflammation of the pancreas) has been associated with this type of medication, but is very rare.  If you have had pancreatitis in the past, this medication may not be for you. Please let us know about any past history of pancreas problems.    Symptoms of pancreatitis include: Pain in your upper stomach area which may travel to your back and be worse after eating. Your stomach area may be tender to the touch.  You may have vomiting or nausea and/or have a fever. If you should develop any of these symptoms, stop the medication and contact your primary care doctor. They will do a blood test to check for pancreatitis.         There is a small chance you may have some low blood sugar after taking the medication.   The signs of low blood sugar are:  Weakness  Shaky   Hungry  Sweating  Confusion      See below for ways to treat low blood sugar without adding in lots of extra calories.      Treating Low Blood Sugar    If you have symptoms of low blood sugar " (sweating, shaking, dizzy, confused) eat 15 grams of carbs and wait 15 minutes:    Glucose Tabs are best for sugars under 70 -  Dex4 or BD Glucose tablets are good, you will need to take 3-4 of these to equal 15 grams.     One small box of raisins  4 oz fruit juice box or   cup fruit juice  1 small apple  1 small banana    cup canned fruit in water    English muffin or a slice of bread with jelly   1 low fat frozen waffle with sugar-free syrup    cup cottage cheese with   cup frozen or fresh blueberries  1 cup skim or low-fat milk    cup whole grain cereal  4-6 crackers such as Triscuits      This medication is usually not covered by insurance and can be quite expensive. Sometimes a prior authorization is required, which may take up to 1-2 weeks for an insurance company to make a decision if they will cover the medication. Please be patient, you will be notified after a decision has been made.    For any questions or concerns please send a [employer] message to our team or call our weight management call center at [phone] during regular business hours. For questions during evenings or weekends your messages will be addressed during the next business day.  For emergencies please call 911 or seek immediate medical care.      (Do not stop taking it if you don't think it's working. For some people it works without them knowing it.)     In order to get refills of this or any medication we prescribe you must be seen in the medical weight mgmt clinic every 2-4 months. Please have your pharmacy fax a refill request to [phone].    SAXENDA (liralutide)    We are considering starting a GLP-1 (Glucagon-like Peptide-1) medication called Saxenda. One of the ways it works is by slowing down the rate that food leaves your stomach. You feel levin and will eat less. It also helps regulate hormones that can help improve your blood sugars.    If you are a patient that checks blood sugars, continue to check as instructed by your  doctor. Low blood sugars are rare but can happen if patients are on insulin or other oral agents. If you notice consistent low sugars or high sugars, your medication may need to be adjusted after your appointment. If this is the case, please call RN and provide her your blood sugar record from the last 3-4 days. The RN will get in touch with the doctor and call you back/[employer] message with recommendations. We tolerate high sugars for a bit, so if sugars are running 180-200, this is ok. As weight starts dropping the blood sugars should too. If readings are consistently over 200 for 1-2 weeks, then you should call the doctor/nurse.    Dosing for this medication:   Week 1- Inject 0.6 mg daily  Week 2- Inject 1.2 mg daily  Week 3- Inject 1.8 mg daily  Week 4- Inject 2.4 mg daily  Week 5 and thereafter- Inject 3.0 mg daily    Side effects of GLP- Medications include: The most common side effects are all GI related and consist of: nausea, constipation, diarrhea, burping, or gassiness. Patients are advised to eat slowly and less, and nausea typically passes if people can stick it out.     The risk of pancreatitis (inflammation of the pancreas) has been associated with this type of medication, but is very rare.  If you have had pancreatitis in the past, this medication may not be for you. Please let us know about any past history of pancreas problems.    Symptoms of pancreatitis include: Pain in your upper stomach area which may travel to your back and be worse after eating. Your stomach area may be tender to the touch.  You may have vomiting or nausea and/or have a fever. If you should develop any of these symptoms, stop the medication and contact your primary care doctor. They will do a blood test to check for pancreatitis.         There is a small chance you may have some low blood sugar after taking the medication.   The signs of low blood sugar are:  Weakness  Shaky   Hungry  Sweating  Confusion      See below for  ways to treat low blood sugar without adding in lots of extra calories.      Treating Low Blood Sugar    If you have symptoms of low blood sugar (sweating, shaking, dizzy, confused) eat 15 grams of carbs and wait 15 minutes:    Glucose Tabs are best for sugars under 70 -  Dex4 or BD Glucose tablets are good, you will need to take 3-4 of these to equal 15 grams.     One small box of raisins  4 oz fruit juice box or   cup fruit juice  1 small apple  1 small banana    cup canned fruit in water    English muffin or a slice of bread with jelly   1 low fat frozen waffle with sugar-free syrup    cup cottage cheese with   cup frozen or fresh blueberries  1 cup skim or low-fat milk    cup whole grain cereal  4-6 crackers such as Triscuits      This medication is usually not covered by insurance and can be quite expensive. Sometimes a prior authorization is required, which may take up to 1-2 weeks for an insurance company to make a decision if they will cover the medication. Please be patient, you will be notified after a decision has been made.    For any questions or concerns please send a [employer] message to our team or call our weight management call center at [phone] during regular business hours. For questions during evenings or weekends your messages will be addressed during the next business day.  For emergencies please call 911 or seek immediate medical care.      (Do not stop taking it if you don't think it's working. For some people it works without them knowing it.)     In order to get refills of this or any medication we prescribe you must be seen in the medical weight mgmt clinic every 2-4 months. Please have your pharmacy fax a refill request to [phone].    WEGOVY (semaglutide)    What is Wegovy?    Wegovy (semaglutide) injection 2.4 mg is an injectable prescription medicine used for adults with obesity (BMI ?30) or overweight (excess weight) (BMI ?27) who also have weight-related medical problems to  help them lose weight and keep the weight off.    1.  Start Wegovy (semaglutide) 0.25 mg once weekly for 4 weeks, then if tolerating increase to 0.5 mg weekly for 4 weeks, then if tolerating increase to 1 mg weekly for 4 weeks, then if tolerating increase to 1.7 mg weekly for 4 weeks, then if tolerating increase to 2.4 mg weekly thereafter.    -Each Wegovy pen is a once weekly single-dose prefilled pen with a pen injector already built within the pen. Discard the Wegovy pen after use in sharps container.     2. Storage: make sure that when you get the prescription that you store the prescription in the refrigerator until it is time to use the Wegovy pen.  Once it is time to use the Wegovy pen, you can keep the pen at room temperature and it is good for up to 28 days at room temperature.     3.  Potential common side effects: nausea, headache, diarrhea, stomach upset.  If these become unmanageable or concerning symptoms, please make sure to call or [employer].    Prior Authorization Process for Weight Management Medications: You are being prescribed a medication that will likely need to undergo a prior authorization.  A prior authorization is when the clinic needs to fill out a form that is sent to your insurance company to obtain coverage for that medication. The prescription will NOT automatically go to your pharmacy today if it needs a Prior Authorization. If the medication prior authorization is approved, the care team will contact you and the prescription will be released to your pharmacy. If denied, we will work to try and appeal the prior authorization if possible. The initial prior authorization process takes up to 5-10 business days and appeals can take up to 30 days. If you do not hear from us at the end of that time, you may contact the clinic.    Go to site: Wegovy video to learn more and watch instruction videos.    For any questions or concerns please send a [employer] message to our team or call our weight  management call center at [phone] during regular business hours. For questions during evenings or weekends your messages will be addressed during the next business day.  For emergencies please call 911 or seek immediate medical care.

## 2023-06-12 NOTE — LETTER
"    2023         RE: Carol Guillaume  [address] [address]        Dear Colleague,    Thank you for referring your patient, Carol Guillaume, to the [facility]. Please see a copy of my visit note below.    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 Now has a creatinine of 2.8 not on dialysis. Please see our nephrologist notes for full details  No heart or lung disease      Vital signs:      BP: 115/74 Pulse: 78     SpO2: 97 %       Weight: 122 kg (269 lb)  Estimated body mass index is 43.42 kg/m  as calculated from the following:    Height as of 23: 1.676 m (5' 6\").    Weight as of this encounter: 122 kg (269 lb).  Abdomen soft, large pannus present    Recommendations:  Need to meet BMI guidelines <40  Strongly recommend living donor    I had a long discussion with the patient regarding kidney transplantation in general and the following points in particular:    Survival statistics at one, five and ten years following kidney transplantation both for living-related and cadaveric allografts.  The kidney transplant selection committee process.  The complications following kidney transplant that included but were not limited to wound infection, vascular complications, ureter leak, ureteral strictures, and bowel obstruction  The need for lifelong immunosuppressive therapy and the side effects of these medications including specifically the risk of cancer and lymphoma.  The waiting list time of approximately a year or more for cadaveric transplants.  The statistical superiority of a living-related donor and the compelling reasons to encourage that therapy.    The patient understands these issues quite well and is eager to proceed with our recommendation and with transplantation.  Time spent direct face to face counsellin min            Again, thank you for allowing me to participate in the care of your patient.        Sincerely,        Michael" MD Martir
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
## 2023-06-12 NOTE — PROGRESS NOTES
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 Now has a creatinine of 2.8 not on dialysis. Please see our nephrologist notes for full details  No heart or lung disease      Vital signs:      BP: 115/74 Pulse: 78     SpO2: 97 %       Weight: 122 kg (269 lb)  Estimated body mass index is 43.42 kg/m  as calculated from the following:    Height as of 23: 1.676 m (5' 6\").    Weight as of this encounter: 122 kg (269 lb).  Abdomen soft, large pannus present    Recommendations:  Need to meet BMI guidelines <40  Strongly recommend living donor    I had a long discussion with the patient regarding kidney transplantation in general and the following points in particular:    1. Survival statistics at one, five and ten years following kidney transplantation both for living-related and cadaveric allografts.  2. The kidney transplant selection committee process.  3. The complications following kidney transplant that included but were not limited to wound infection, vascular complications, ureter leak, ureteral strictures, and bowel obstruction  4. The need for lifelong immunosuppressive therapy and the side effects of these medications including specifically the risk of cancer and lymphoma.  5. The waiting list time of approximately a year or more for cadaveric transplants.  6. The statistical superiority of a living-related donor and the compelling reasons to encourage that therapy.    The patient understands these issues quite well and is eager to proceed with our recommendation and with transplantation.  Time spent direct face to face counsellin min          "
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
## 2023-06-12 NOTE — PATIENT INSTRUCTIONS
Kidney Friendly Protein Supplements (lower in potassium and phosphorus)    Protein Bars  Atkins Bar, Atkins Lift Protein Bar, Balance Bar, Pradip Builder's Protein Bar, Detour Brand (Lean Muscle Bar, Lower Sugar Bar, Simple Bar, Smart Bar), EAS Advantedge and Myoplex 30 Bars, Fit Lee Ann, Annie Protein Bar, Marathon Energy and Protein Bars, Muscle Pharm Combat Crunch, Oh Yeah! One, Orgain Protein Bar, Power Bar Clean Whey Protein Bar, Power Crunch, Premier Protein Bar, Pure Protein Bar, Quest, Slim Fast Meal Replacement, Square Bar Organic Protein Bar, Total Lean Bar, Zone Perfect     Ready-to-Drink Products  Atkins Lift, Bariatric Advantage Meal Replacement Shake, Boost (Calorie Smart, Compact, Glucose Control, Simply Complete), Ensure High Protein, Ensure Light, Glucerna, LiquaCel (1 ounce), Muscle Milk Organic Protein Shake, Nature's Best Isopure Zero Carb Drink, Nepro, Novasource Renal, Orgain Vegan Nutritional Shake, Pro-Stat Sugar Free (1 ounce)  * Wellness Protein Smoothie (similar to applesauce pouch)    Protein Powders  Advantedge Lean 15, Boost Simply Complete, EAS Advantedge, Integrated Whey Isolate, Orgain, Pure Protein Natural Whey, Slim Fast High Protein Smoothie, Lance's Whey Grass Fed Organic, French One, BeneProtein/Constantino's [place]/Lance's Whey (have unflavored varieties)    Meals on a Budget    https://www.JiaThis.BioSET/diet-nutrition/articles/advice/swhlkr-dxdjn-lz-the-dialysis-diet    http://www.kidneycommunitykitchen.ca/wp-content/uploads/2011/09/Kidney-Friendly-Eating-on-a-Budget.pdf    https://www.dpcedcenter.org/news-events/news/dialysis-diet-on-a-budget/    - Eggs  - Purchase whole chicken, cook, and cut into pieces   - Canned or frozen fruit (berries, pineapple, peaches, pears)  - Frozen or canned veggies (look for low(er) sodium or no added salt varieties); rinsing regular varieties can remove ~30% of the sodium   - Pending potassium/phosphorus lab values, purchase larger tub of plain Greek  yogurt instead of individual cups; add own fresh or frozen berries   - Freeze bread to maintain freshness; take out a slice or two as needed (thaws within 5 minutes)  - Purchase large canister of dry oats instead of pre-portioned packets (you will save on cost and sodium); cook oatmeal with almond milk and top with fresh/frozen blueberries  - Inexpensive buys: plain pasta noodles, white or brown rice  - Peanut butter used in moderation (1-2 tablespoons/day) pending potassium/phosphorus lab values  - Dry beans or lentils; can also look for low sodium/no added salt canned beans and/or rinse   - Buy meat on sale and freeze it   - Eggs for dinner (scrambled, frittata with veggies) can cut down on meat cost   - Canned tuna fish or salmon (also comes in packets--check sodium)  - Look for generic protein powders (fewer the ingredients, the better!) that you could add to oatmeal, smoothies with almond milk and berries      Eating on a Budget    Step 1: Know your Budget  Add up the dollar amount you spent on foods over the past week or a typically week  Multiple this number by four ($ spent on food x 4) = estimated food cost for one month.   This give a good starting point for a budget; you may adjust based on your financial goals.    Adjust as needed  Step 2: Make a plan (This step may take time but will save you time and money in the long run)  Check your pantry, refrigerator and freezer for items you already have in your home.  Check for sales and coupons, only clip coupons for food you will use.   Create a list of meal options   Create a grocery list   Step 3: At the store   Look high and low on the shelves: more expensive items are at eye level  Be careful of the sale and displays at the end of the isle  Compare unit prices  Check expiration dates  At the check out   Avoiding adding in extras  Be ready to check out   Store discount cards/member card   Present coupons   Check your receipt  Step 4: At Home  Prevent Food  Waste  Freeze fresh fruits, vegetables or proteins that you are not going to use   Use Leftovers   Have a plan for leftovers: lunch, repurpose the meal, freezer meal   Evaluate      Is there a lot of food waste? Was there a meal that did not work well? Did you and your family like the meals?   Re-evaluate     Budget Friendly Foods    Choose foods that you and your family enjoys  Protein   Choose meatless sources of protein: tofu, tempeh, bean or lentil, low-fat cottage, low-fat yogurt, eggs, and nuts  Buy meat or fish in bulk and freeze what you do not use  Compare price per serving vs price per pound        Fruits and Vegetables   Buy a mixture of fresh, frozen and canned fruits and vegetables  Wash and cut your own vegetables   When able buy fruits and vegetables in season   EBT-Food Share: double your bucks program http://[employer]itation [employer]/ebt-snap-and-market-bucks.html     Grains  Purchase whole grains in bulk, they last a long time and you can get a better unit price   Skip the flavored rice, oatmeal, quinoa, and breads. Cook the plain grains and add in your own seasonings and flavors.   If your bakery or grocery store makes bread, ask if they sell day old bread.                 Resources:   USDA snap connection: Meal Planning, shopping and budgeting.  https://snaped.fns.usda.gov/nutrition-education/nutrition-education-materials/nmwj-hlqbqdot-wytwdmga-and-budgeting  Myplate.gov  https://www.myplate.gov/eat-healthy/healthy-eating-budget/make-plan  [facility] Extension  https://extension.[facility]/save-money-food/stretching-your-food-dollar#summary-729725  EBT-Food Share: double your bucks program   http://[employer]/ebt-snap-and-market-bucks.html  Recipe resources:  Budgetbytes: https://www.Envoy.com/category/recipes/  Recipe box [facility] Extension: https://extension.[facility]/building-better-food-shelves/find-healthy-recipes  MyPlate Recipes:  https://www.myplate.gov/myplate-kitchen/recipes       What Can I Eat (Renal, Dialysis Diet)?    Fruit: apples, grapes, berries (strawberries, blueberries, raspberries, blackberries, cherries), anabell (cuties), pineapple  Veggies: cucumber, bell peppers, onion, lettuce/greens (raw), green beans, broccoli, cooked carrots   Proteins: fresh chicken, fresh ground beef, fish, eggs  Starches:  Oils/butter: no restrictions  Beverages: water, coffee (if not instructed to avoid for caffeine), tea, juice (apple, cranberry, grape), soda (orange, lemon-lime, ginger ale, Root Beer), milk alternatives (rice, almond)    Breakfast  - Scrambled eggs with bell peppers and onions, small sprinkle of shredded cheese and 1 T of salsa   - 1-2 boiled eggs with berries, grapes, apple or 1 piece toast with butter  - Protein shake (protein powder) mixed with almond milk + fresh or frozen berries (1/4-1/2 cup)  - Cream of Wheat/Rice with berries  - Homemade breakfast sandwich: egg, low sodium ham/crews on sandwich thins or low calorie bread with slice of swiss (make ahead and freeze)  - Corn or rice chex with almond or rice milk     Lunch/Dinner  - Protein + veggie + starch/carbohydrate  - Protein choices: chicken, ground turkey/ground beef, lean steak, lean pork (look at label for sodium), shrimp, fish/other seafood  - Veggies: cucumbers, asparagus, broccoli, cauliflower, green beans, peppers/onions, salad  - Carbohydrate: rice, plain noodles/buttered noodles, bread    Examples:  - Chicken or steak stir balderrama with veggies, rice, and coconut aminos in place of soy sauce  - Hamburger with side salad  - Chicken or steak fajitas with bell peppers/onions over rice with sour cream, 1 T salsa  - Pasta with protein + veggies (use butter/olive oil and garlic as sauce)  - [place]orne with homemade chicken/egg salad/tuna salad, avoiding processed deli meats (low sodium OK)  - Salad with grilled chicken, cucumbers, red onion, bell pepper, olive  oil/basalmic vinegar with fresh herbs, garlic, lemon for dressing    Snacks  - Boiled egg  - Cheese stick  - Berries/grapes/apples  - Air popped popcorn   - Juan Daniel crackers  - Vanilla wafers  - Raw veggies with small amount of dip   - Kidney friendly protein drink or bar

## 2023-06-13 ENCOUNTER — TELEPHONE (OUTPATIENT)
Dept: SURGERY | Facility: CLINIC | Age: 33
End: 2023-06-13

## 2023-06-16 ENCOUNTER — TELEPHONE (OUTPATIENT)
Dept: TRANSPLANT | Facility: CLINIC | Age: 33
End: 2023-06-16

## 2023-06-16 ENCOUNTER — LAB (OUTPATIENT)
Dept: LAB | Facility: CLINIC | Age: 33
End: 2023-06-16
Payer: COMMERCIAL

## 2023-06-16 DIAGNOSIS — N18.4 CHRONIC KIDNEY DISEASE, STAGE IV (SEVERE) (H): ICD-10-CM

## 2023-06-16 DIAGNOSIS — E66.01 CLASS 3 SEVERE OBESITY WITH SERIOUS COMORBIDITY AND BODY MASS INDEX (BMI) OF 40.0 TO 44.9 IN ADULT, UNSPECIFIED OBESITY TYPE (H): ICD-10-CM

## 2023-06-16 DIAGNOSIS — Z48.298 AFTERCARE FOLLOWING ORGAN TRANSPLANT: ICD-10-CM

## 2023-06-16 DIAGNOSIS — E55.9 VITAMIN D DEFICIENCY: Primary | ICD-10-CM

## 2023-06-16 DIAGNOSIS — E66.813 CLASS 3 SEVERE OBESITY WITH SERIOUS COMORBIDITY AND BODY MASS INDEX (BMI) OF 40.0 TO 44.9 IN ADULT, UNSPECIFIED OBESITY TYPE (H): ICD-10-CM

## 2023-06-16 DIAGNOSIS — R73.03 PREDIABETES: ICD-10-CM

## 2023-06-16 LAB
ALBUMIN MFR UR ELPH: 22.1 MG/DL
ALBUMIN SERPL BCG-MCNC: 4.1 G/DL (ref 3.5–5.2)
ALP SERPL-CCNC: 103 U/L (ref 35–104)
ALT SERPL W P-5'-P-CCNC: 12 U/L (ref 0–50)
ANION GAP SERPL CALCULATED.3IONS-SCNC: 11 MMOL/L (ref 7–15)
AST SERPL W P-5'-P-CCNC: 15 U/L (ref 0–45)
BILIRUB DIRECT SERPL-MCNC: <0.2 MG/DL (ref 0–0.3)
BILIRUB SERPL-MCNC: 0.4 MG/DL
BUN SERPL-MCNC: 48.8 MG/DL (ref 6–20)
CALCIUM SERPL-MCNC: 9.3 MG/DL (ref 8.6–10)
CHLORIDE SERPL-SCNC: 104 MMOL/L (ref 98–107)
CREAT SERPL-MCNC: 2.66 MG/DL (ref 0.51–0.95)
CREAT UR-MCNC: 72.4 MG/DL
CYCLOSPORINE BLD LC/MS/MS-MCNC: 115 UG/L (ref 50–400)
DEPRECATED CALCIDIOL+CALCIFEROL SERPL-MC: 15 UG/L (ref 20–75)
DEPRECATED HCO3 PLAS-SCNC: 22 MMOL/L (ref 22–29)
ERYTHROCYTE [DISTWIDTH] IN BLOOD BY AUTOMATED COUNT: 12.8 % (ref 10–15)
GFR SERPL CREATININE-BSD FRML MDRD: 23 ML/MIN/1.73M2
GLUCOSE SERPL-MCNC: 115 MG/DL (ref 70–99)
HBA1C MFR BLD: 5.3 %
HCT VFR BLD AUTO: 31.9 % (ref 35–47)
HGB BLD-MCNC: 10.6 G/DL (ref 11.7–15.7)
MCH RBC QN AUTO: 28.1 PG (ref 26.5–33)
MCHC RBC AUTO-ENTMCNC: 33.2 G/DL (ref 31.5–36.5)
MCV RBC AUTO: 85 FL (ref 78–100)
PHOSPHATE SERPL-MCNC: 5 MG/DL (ref 2.5–4.5)
PLATELET # BLD AUTO: 239 10E3/UL (ref 150–450)
POTASSIUM SERPL-SCNC: 4.4 MMOL/L (ref 3.4–5.3)
PROT SERPL-MCNC: 7.5 G/DL (ref 6.4–8.3)
PROT/CREAT 24H UR: 0.31 MG/MG CR (ref 0–0.2)
PTH-INTACT SERPL-MCNC: 199 PG/ML (ref 15–65)
RBC # BLD AUTO: 3.77 10E6/UL (ref 3.8–5.2)
SODIUM SERPL-SCNC: 137 MMOL/L (ref 136–145)
TME LAST DOSE: NORMAL H
TME LAST DOSE: NORMAL H
WBC # BLD AUTO: 6.6 10E3/UL (ref 4–11)

## 2023-06-16 PROCEDURE — 99000 SPECIMEN HANDLING OFFICE-LAB: CPT | Performed by: PATHOLOGY

## 2023-06-16 PROCEDURE — 80053 COMPREHEN METABOLIC PANEL: CPT | Performed by: PATHOLOGY

## 2023-06-16 PROCEDURE — 83970 ASSAY OF PARATHORMONE: CPT | Performed by: PATHOLOGY

## 2023-06-16 PROCEDURE — 36415 COLL VENOUS BLD VENIPUNCTURE: CPT | Performed by: PATHOLOGY

## 2023-06-16 PROCEDURE — 82248 BILIRUBIN DIRECT: CPT | Performed by: PATHOLOGY

## 2023-06-16 PROCEDURE — 82306 VITAMIN D 25 HYDROXY: CPT | Mod: 90 | Performed by: PATHOLOGY

## 2023-06-16 PROCEDURE — 84100 ASSAY OF PHOSPHORUS: CPT | Performed by: PATHOLOGY

## 2023-06-16 PROCEDURE — 84156 ASSAY OF PROTEIN URINE: CPT | Performed by: PATHOLOGY

## 2023-06-16 PROCEDURE — 80158 DRUG ASSAY CYCLOSPORINE: CPT | Mod: 90 | Performed by: PATHOLOGY

## 2023-06-16 PROCEDURE — 85027 COMPLETE CBC AUTOMATED: CPT | Performed by: PATHOLOGY

## 2023-06-16 PROCEDURE — 83036 HEMOGLOBIN GLYCOSYLATED A1C: CPT | Mod: 90 | Performed by: PATHOLOGY

## 2023-06-16 NOTE — TELEPHONE ENCOUNTER
Returned patient's call.  She had questions regarding whether or not she could have live donors be worked up.  I told her that she would need to have a BMI within range and a qualifying GFR before we could have donors be worked up on her behalf.  She has quite a bit of weight loss to achieve prior to being a candidate for surgery per notes but she is seeing the weight management clinic.  Her GFR has been hanging around 22 with no qualifier yet but I will keep a close eye on this.

## 2023-06-16 NOTE — TELEPHONE ENCOUNTER
Cyclosporine level 119 on 6/16/2023.  Goal .   Current dose 75 mg in AM, 50 mg in PM    Dose changed to 50 mg in AM, 50 mg in PM   Recheck level in 2 weeks      Cloudacc message sent     Per my chart, patient v/u of dose decrease and lab repeat    Ronna Hughes RN   Transplant Coordinator  [phone]

## 2023-06-19 ENCOUNTER — TELEPHONE (OUTPATIENT)
Dept: TRANSPLANT | Facility: CLINIC | Age: 33
End: 2023-06-19
Payer: COMMERCIAL

## 2023-06-19 DIAGNOSIS — D64.9 ANEMIA: ICD-10-CM

## 2023-06-19 DIAGNOSIS — E55.9 VITAMIN D DEFICIENCY: Primary | ICD-10-CM

## 2023-06-19 DIAGNOSIS — Z94.0 KIDNEY TRANSPLANTED: ICD-10-CM

## 2023-06-19 NOTE — TELEPHONE ENCOUNTER
Patient Call: General  Route to LPN    Reason for call:Carol would like a call back to go over labs that were drawn on Friday 6/16/23.    Call back needed? Yes    Return Call Needed  Same as documented in contacts section  When to return call?: Greater than one day: Route standard priority

## 2023-06-21 RX ORDER — CHOLECALCIFEROL (VITAMIN D3) 50 MCG
1 TABLET ORAL DAILY
Qty: 90 TABLET | Refills: 3 | Status: SHIPPED | OUTPATIENT
Start: 2023-06-21 | End: 2024-08-27

## 2023-06-21 NOTE — TELEPHONE ENCOUNTER
Feliberto Douglas MD Traxler, Lindsey M RN  Cc: Ronna Hughes, RN  Thanks     We can add iron studies to next labs to determine if she is iron deficient and give recs about iron supplements     Regarding PTH, it is expected to be in this range given advanced kidney disease and she is vitamin D deficient too which can affect the levels and sometimes contribute to fatigue. Recommend starting vitamin D cholecalciferol 2000 international unit(s) every day. Recheck PTH and vitamin D, Ca/Phos with labs in 2 months     Ronna can you please add those labs and advise her about the vitamin D     thanks     OUTCOME:     Patient agreeable to starting Cholecalciferol 2000 units dialy- RX sent to  specialty pharmacy.    Last CSA level 115, above goal of , patient confirms accurate 12 hour trough level on CSA dose 50 mg bid.    V/U of decreasing CSA dose from 50 mg bid to 50 mg in the AM and 25 mg in the PM- will repeat labs along with iron studies in 1-2 weeks.    Will obtain PTH, VIt D, Ca, and phos in 2 months. Patient aware.    Patient states LE swelling has improved.  No concerns at this time      Ronna Hughes RN   Transplant Coordinator  [phone]

## 2023-07-10 ENCOUNTER — VIRTUAL VISIT (OUTPATIENT)
Dept: ENDOCRINOLOGY | Facility: CLINIC | Age: 33
End: 2023-07-10
Payer: COMMERCIAL

## 2023-07-10 VITALS — WEIGHT: 266 LBS | HEIGHT: 66 IN | BODY MASS INDEX: 42.75 KG/M2

## 2023-07-10 DIAGNOSIS — E66.01 MORBID OBESITY (H): ICD-10-CM

## 2023-07-10 DIAGNOSIS — N18.4 CHRONIC KIDNEY DISEASE, STAGE IV (SEVERE) (H): ICD-10-CM

## 2023-07-10 DIAGNOSIS — Z71.3 NUTRITIONAL COUNSELING: Primary | ICD-10-CM

## 2023-07-10 PROCEDURE — 97802 MEDICAL NUTRITION INDIV IN: CPT | Mod: 95

## 2023-07-10 PROCEDURE — 99207 PR NO CHARGE LOS: CPT | Mod: 95

## 2023-07-10 ASSESSMENT — PAIN SCALES - GENERAL: PAINLEVEL: SEVERE PAIN (6)

## 2023-07-10 NOTE — NURSING NOTE
Is the patient currently in the state of [place]? YES    Visit mode:VIDEO    If the visit is dropped, the patient can be reconnected by: VIDEO VISIT: Text to cell phone: [phone]    Will anyone else be joining the visit? NO      How would you like to obtain your AVS? MyChart    Are changes needed to the allergy or medication list? NO    Reason for visit: Follow Up

## 2023-07-10 NOTE — LETTER
"7/10/2023       RE: Carol Guillaume  [address] [facility] [address]     Dear Colleague,    Thank you for referring your patient, Carol Guillaume, to the [facility] [place] at [facility]. Please see a copy of my visit note below.    New Weight Management Nutrition Consultation    Carol Guillaume is a 32 year old female presents today for new weight management nutrition consultation.  Patient referred by ROBINSON Williamson on July 10, 2023. Trying to lose weight prior to 2nd kidney transplant. Needs weight loss from BMI 43 - <40 for transplant.     Patient with Co-morbidities of obesity including:      3/9/2020    12:22 PM   --   I have the following health issues associated with obesity Heart Disease    High Blood Pressure   I have the following symptoms associated with obesity Back Pain    Fatigue     Anthropometrics:  Estimated body mass index is 42.93 kg/m  as calculated from the following:    Height as of this encounter: 1.676 m (5' 6\").    Weight as of this encounter: 120.7 kg (266 lb).   Current Weight: 266 lb on Saturday.     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     Labs 6/16/23:  Hgb A1c - 5.3   Vitamin D 15 (L) - Amira recommended taking [id number] IUs daily    (H)  Phos 5.0 (H)  Creat 2.66 (H)  GFR (23)     NUTRITION HISTORY  Food allergies: NKFA  Food intolerances: None, tomatoes and peppers irritate gut.   Vitamin/Mineral Supplements: Vitamin D   RD before: Recently saw a dietitian in the kidney transplant clinic. Recommended low phosphorus diet.     Doesn't like seafood. Has been limiting high phosphorus foods. She voices some frustration/concfusion surrounding the diet and not really understanding exactly what she should be doing in relation to the phosphorus.     Diet recall:   Glass of tea or coffee in the morning. Stomach hurts for a few hrs after taking " meds  Breakfast - skips  Lunch - Slim fast shake (300 mg) or can of sugar root beer, peanut butter crackers, 10 Dorito chips   Dinner - cottage cheese, 1-2 waffles, gluten free pasta, bagel with cream cheese. Chicken with Rice, hot dog     Hydration: 40 oz water (tries to drink 3 per day), coffee or tea, sugary root beer on occasion.         3/9/2020    12:22 PM   Diet Recall Review with Patient   Do you typically eat breakfast? Yes   If you do eat breakfast, what types of food do you eat? cup of yogurt and a boiled egg   Do you typically eat lunch? Yes   If you do eat lunch, what types of food do you typically eat? leftovers or protein shake when I do eat lunch   Do you typically eat supper? Yes   If you do eat supper, what types of food do you typically eat? fruits, vegetabels, well balanced meal   Do you typically eat snacks? Yes   If you do snack, what types of food do you typically eat? yogurt squeeze, nuts, cereal, individual sized snacks (cheez its, sun chips) applesauce   Do you like vegetables?  Yes   Do you drink water? Yes   How many glasses of juice do you drink in a typical day? 0   How many of glasses of milk do you drink in a typical day? 1   If you do drink milk, what type? 1%   How many 8oz glasses of sugar containing drinks such as Miguel-Aid/sweet tea do you drink in a day? 0   How many cans/bottles of sugar pop/soda/tea/sports drinks do you drink in a day? 0   How many cans/bottles of diet pop/soda/tea or sports drink do you drink in a day? 0   How often do you have a drink of alcohol? Never         3/9/2020    12:22 PM   Eating Habits   Generally, my meals include foods like these bread, pasta, rice, potatoes, corn, crackers, sweet dessert, pop, or juice A Few Times a Week   Generally, my meals include foods like these fried meats, brats, burgers, french fries, pizza, cheese, chips, or ice cream Once a Week   Eat fast food (like [employer]s, BurP[employer] Mick, [employer]) Less Than Weekly   Eat at a buffet  or sit-down restaurant Less Than Weekly   Eat most of my meals in front of the TV or computer Never   Often skip meals, eat at random times, have no regular eating times A Few Times a Week   Rarely sit down for a meal but snack or graze throughout Never   Eat extra snacks between meals Almost Everyday   Eat most of my food at the end of the day A Few Times a Week   Eat in the middle of the night or wake up at night to eat Never   Eat extra snacks to prevent or correct low blood sugar Never   Eat to prevent acid reflux or stomach pain Once a Week   Worry about not having enough food to eat Never   Have you been to the food shelf at least a few times this year? No   I eat when I am depressed Once a Week   I eat when I am stressed A Few Times a Week   I eat when I am bored Never   I eat when I am anxious A Few Times a Week   I eat when I am happy or as a reward Once a Week   I feel hungry all the time even if I just have eaten Never   Feeling full is important to me Almost Everyday   I finish all the food on my plate even if I am already full Almost Everyday   I can't resist eating delicious food or walk past the good food/smell Never   I eat/snack without noticing that I am eating Never   I eat when I am preparing the meal Once a Week   I eat more than usual when I see others eating Once a Week   I have trouble not eating sweets, ice cream, cookies, or chips if they are around the house A Few Times a Week   I think about food all day Never   What foods, if any, do you crave? Sweets/Candy/Chocolate   Please list any other foods you crave? back and forth between sugar and salt           3/9/2020    12:22 PM   Amount of Food   I feel out of control when eating Never   I eat a large amount of food, like a loaf of bread, a box of cookies, a pint/quart of ice cream, all at once Never   I eat a large amount of food even when I am not hungry Never   I eat rapidly Almost Everyday   I eat alone because I feel embarrassed and do  not want others to see how much I have eaten Monthly   I eat until I am uncomfortably full Never   I feel bad, disgusted, or guilty after I overeat Never     Physical Activity:  Trying to walk 1 mile each day. Tired and muscle cramps.         3/9/2020    12:22 PM   Activity/Exercise History   How much of a typical 12 hour day do you spend sitting? Most of the Day   How much of a typical 12 hour day do you spend lying down? Less Than Half the Day   How much of a typical day do you spend walking/standing? Less Than Half the Day   How many hours (not including work) do you spend on the TV/Video Games/Computer/Tablet/Phone? 2-3 Hours   How many times a week are you active for the purpose of exercise? 2-3 Times a Week   What keeps you from being more active? Other   How many total minutes do you spend doing some activity for the purpose of exercising when you exercise? More Than 30 Minutes     Nutrition Prescription  Recommended energy/nutrient modification.    Nutrition Diagnosis  Obesity r/t long history of positive energy balance aeb BMI >30.    Nutrition Intervention  Materials/education provided on hypocaloric diet for weight loss, Volumetric eating to help satiety level on fewer calories; portion control and healthy food choices (Plate Method and Volumetrics handouts). Discussed talking with transplant team on getting more clarity around what her exact kidney diet restrictions are. Encouraged pt to get an updated phosphorus lab 2 months after last one obtained. Patient demonstrates understanding.  Co-developed goals to work towards. Provided pt with list of goals and resources below via [employer]win[employer].     Expected Engagement: good  Follow-Up Plans: Meal and Snack Planning     Nutrition Goals  1. Get clarification on diet recommendations for kidneys from transplant team. (ex. How much potassium and how much phosphorus you should be aiming for)  2. Incorporate a good source of protein at each meal, however be mindful about  overall protein intake. Recommend between 40-60 grams initially.   3. Increase activity as able.     The Plate Method  [email or web address]    Protein Sources   [email or web address]     Carbohydrates  [email or web address]     Mindful Eating  [email or web address]     Summary of Volumetrics Eating Plan  [email or web address]     Follow-Up:  PRN    Time spent with patient: 36 minutes.  Melony Gilbert RD, LD

## 2023-07-10 NOTE — PATIENT INSTRUCTIONS
Luis Mdeina,     Follow-up with ISABEL OSBORNE     Thank you,    Melony Gilbert, RD, LD  If you would like to schedule or reschedule an appointment with the RD, please call [phone]    Nutrition Goals  1. Get clarification on diet recommendations for kidneys from transplant team. (ex. How much potassium and how much phosphorus you should be aiming for)  2. Incorporate a good source of protein at each meal, however be mindful about overall protein intake. Recommend between 40-60 grams initially.   3. Increase activity as able.     The Plate Method  [email or web address]    Protein Sources   [email or web address]     Carbohydrates  [email or web address]     Mindful Eating  [email or web address]     Summary of Volumetrics Eating Plan  [email or web address]       COMPREHENSIVE WEIGHT MANAGEMENT PROGRAM  VIRTUAL SUPPORT GROUPS    For Support Group Information:      We offer support groups for patients who are working on weight loss and considering, preparing for or have had weight loss surgery.   There is no cost for this opportunity.  You are invited to attend the?Virtual Support Groups?provided by any of the following locations:    [facility] via [employer] Teams with Melita Alves RN  2.   [place] via [employer] with Shahzad Angel, PhD, LP  3.   [place] via [employer] with Ankita Cotto, ALEX  4.   [facility] via [employer] Teams with Ankita Michael, [facility]    The following Support Group information can also be found on our website:  [email or web address]    [email or web address]    [facility] Weight Loss Surgery Support Group    [facility] Weight Loss Surgery Support Group  The support group is a patient-lead forum that meets monthly to share experiences, encouragement and education. It is open to those who  "have had weight loss surgery, are scheduled for surgery, or are considering surgery.   WHEN: This group meets on the 3rd Wednesday of each month from 5:00PM - 6:00PM virtually using Microsoft Teams.   FACILITATOR: Led by Melita Bravo RD, LEONELA, RN, the program's Clinical Coordinator.   TO REGISTER: Please contact the clinic via [employer] or call the nurse line directly at [phone] to inform our staff that you would like an invite sent to you and to let us know the email you would like the invite sent to. Prior to the meeting, a link with directions on how to join the meeting will be sent to you.    2023 Meetings   April 19: Guest Speaker, Chris Taylor RD, LD, \"Maintaining Weight Loss after Bariatric Surgery\".  May 17: \"Let's Talk\" a time for the group to share.  June 21: \"Let's Talk\" a time for the group to share.  July 19  August 16  September 20  October 18  November 15  December 20    [facility] Support Groups    Connections Bariatric Care Support Group?  This is open to all pre- and post- operative bariatric surgery patients as well as their support system.   WHEN: This group meets the 2nd Tuesday of each month from 6:30 PM - 8:00 PM virtually using Microsoft Teams.   FACILITATOR: Led by Shahzad Angel, Ph.D who is a Licensed Psychologist with the [facility] Comprehensive Weight Management Program.   TO REGISTER: Please send an email to Shahzad Angel, Ph.D., LP at?[email or web address]?if you would like an invitation to the group and to learn about using Microsoft Teams.    2023 Meetings  April 11: Guest speaker, Lubna Norris MD, Bariatrician, [facility] Management [facility], \"Injectable Weight Loss Medications\".  May 9  Ilene 13  July 11  August 8  September 12  October 10  November 14  December 12    Connections Post-Operative Bariatric Surgery Support Group  This is a support group for [facility] bariatric patients (and " those external to [facility]) who have had bariatric surgery and are at least 3 months post-surgery.  WHEN: This support group meets the 4th Wednesday of the month from 11:00 AM - 12:00 PM virtually using Microsoft Teams.   FACILITATOR: Led by Certified Bariatric Nurse, Ankita Cotto RN.   TO REGISTER: Please send an email to Ankita at [email or web address] if you would like an invitation to the group and to learn about using Microsoft Teams.    2023 Meetings  April 26  May 24  Ilene 28  July 26  August 23  September 27  October 25  November 22  December 27    [facility]   Healthy Lifestyle Virtual Support Group    Healthy Lifestyle Virtual Support Group?  This is 60 minutes of small group guided discussion, support and resources. All are welcome who want a healthy lifestyle.  WHEN: This group meets monthly on a Friday from 12:30 PM - 1:30 PM virtually using Microsoft Teams.  This group will meet the first Friday of the month beginning In July  FACILITATOR: Led by National Board Certified Health and , Ankita Michael [facility].   TO REGISTER: Please send an email to Ankita [email or web address] to receive monthly invites to the group or if you have any questions about having a health .  Prior to the meeting, a link with directions on how to join the meeting will be sent to you.    2023 Meetings  May 19: Let's Talk  June 9: Create Your Coaching Toolkit: Learn How to  Yourself  July 7: Let's Talk  August 4: Benefits of Fiber with ISABEL Retana  September 1: Show and Tell (share your aps, podcasts, recipes, hacks, books)  October 6 :Let's Talk  November 3: Introduction to Mindfulness   December 1: Let's Talk

## 2023-07-10 NOTE — PROGRESS NOTES
"New Weight Management Nutrition Consultation    Carol Guillaume is a 32 year old female presents today for new weight management nutrition consultation.  Patient referred by ROBINSON Williamson on July 10, 2023. Trying to lose weight prior to 2nd kidney transplant. Needs weight loss from BMI 43 - <40 for transplant.     Patient with Co-morbidities of obesity including:      3/9/2020    12:22 PM   --   I have the following health issues associated with obesity Heart Disease    High Blood Pressure   I have the following symptoms associated with obesity Back Pain    Fatigue     Anthropometrics:  Estimated body mass index is 42.93 kg/m  as calculated from the following:    Height as of this encounter: 1.676 m (5' 6\").    Weight as of this encounter: 120.7 kg (266 lb).   Current Weight: 266 lb on Saturday.     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     Labs 6/16/23:  Hgb A1c - 5.3   Vitamin D 15 (L) - Amira recommended taking 0986-0015 IUs daily    (H)  Phos 5.0 (H)  Creat 2.66 (H)  GFR (23)     NUTRITION HISTORY  Food allergies: NKFA  Food intolerances: None, tomatoes and peppers irritate gut.   Vitamin/Mineral Supplements: Vitamin D   RD before: Recently saw a dietitian in the kidney transplant clinic. Recommended low phosphorus diet.     Doesn't like seafood. Has been limiting high phosphorus foods. She voices some frustration/concfusion surrounding the diet and not really understanding exactly what she should be doing in relation to the phosphorus.     Diet recall:   Glass of tea or coffee in the morning. Stomach hurts for a few hrs after taking meds  Breakfast - skips  Lunch - Slim fast shake (300 mg) or can of sugar root beer, peanut butter crackers, 10 Dorito chips   Dinner - cottage cheese, 1-2 waffles, gluten free pasta, bagel with cream cheese. Chicken with Rice, hot dog     Hydration: 40 oz water (tries to drink 3 per day), coffee or tea, sugary root " beer on occasion.         3/9/2020    12:22 PM   Diet Recall Review with Patient   Do you typically eat breakfast? Yes   If you do eat breakfast, what types of food do you eat? cup of yogurt and a boiled egg   Do you typically eat lunch? Yes   If you do eat lunch, what types of food do you typically eat? leftovers or protein shake when I do eat lunch   Do you typically eat supper? Yes   If you do eat supper, what types of food do you typically eat? fruits, vegetabels, well balanced meal   Do you typically eat snacks? Yes   If you do snack, what types of food do you typically eat? yogurt squeeze, nuts, cereal, individual sized snacks (cheez its, sun chips) applesauce   Do you like vegetables?  Yes   Do you drink water? Yes   How many glasses of juice do you drink in a typical day? 0   How many of glasses of milk do you drink in a typical day? 1   If you do drink milk, what type? 1%   How many 8oz glasses of sugar containing drinks such as Miguel-Aid/sweet tea do you drink in a day? 0   How many cans/bottles of sugar pop/soda/tea/sports drinks do you drink in a day? 0   How many cans/bottles of diet pop/soda/tea or sports drink do you drink in a day? 0   How often do you have a drink of alcohol? Never         3/9/2020    12:22 PM   Eating Habits   Generally, my meals include foods like these bread, pasta, rice, potatoes, corn, crackers, sweet dessert, pop, or juice A Few Times a Week   Generally, my meals include foods like these fried meats, brats, burgers, french fries, pizza, cheese, chips, or ice cream Once a Week   Eat fast food (like McDonalds, Burger Mick, Taco Bell) Less Than Weekly   Eat at a buffet or sit-down restaurant Less Than Weekly   Eat most of my meals in front of the TV or computer Never   Often skip meals, eat at random times, have no regular eating times A Few Times a Week   Rarely sit down for a meal but snack or graze throughout Never   Eat extra snacks between meals Almost Everyday   Eat most of  my food at the end of the day A Few Times a Week   Eat in the middle of the night or wake up at night to eat Never   Eat extra snacks to prevent or correct low blood sugar Never   Eat to prevent acid reflux or stomach pain Once a Week   Worry about not having enough food to eat Never   Have you been to the food shelf at least a few times this year? No   I eat when I am depressed Once a Week   I eat when I am stressed A Few Times a Week   I eat when I am bored Never   I eat when I am anxious A Few Times a Week   I eat when I am happy or as a reward Once a Week   I feel hungry all the time even if I just have eaten Never   Feeling full is important to me Almost Everyday   I finish all the food on my plate even if I am already full Almost Everyday   I can't resist eating delicious food or walk past the good food/smell Never   I eat/snack without noticing that I am eating Never   I eat when I am preparing the meal Once a Week   I eat more than usual when I see others eating Once a Week   I have trouble not eating sweets, ice cream, cookies, or chips if they are around the house A Few Times a Week   I think about food all day Never   What foods, if any, do you crave? Sweets/Candy/Chocolate   Please list any other foods you crave? back and forth between sugar and salt           3/9/2020    12:22 PM   Amount of Food   I feel out of control when eating Never   I eat a large amount of food, like a loaf of bread, a box of cookies, a pint/quart of ice cream, all at once Never   I eat a large amount of food even when I am not hungry Never   I eat rapidly Almost Everyday   I eat alone because I feel embarrassed and do not want others to see how much I have eaten Monthly   I eat until I am uncomfortably full Never   I feel bad, disgusted, or guilty after I overeat Never     Physical Activity:  Trying to walk 1 mile each day. Tired and muscle cramps.         3/9/2020    12:22 PM   Activity/Exercise History   How much of a typical  12 hour day do you spend sitting? Most of the Day   How much of a typical 12 hour day do you spend lying down? Less Than Half the Day   How much of a typical day do you spend walking/standing? Less Than Half the Day   How many hours (not including work) do you spend on the TV/Video Games/Computer/Tablet/Phone? 2-3 Hours   How many times a week are you active for the purpose of exercise? 2-3 Times a Week   What keeps you from being more active? Other   How many total minutes do you spend doing some activity for the purpose of exercising when you exercise? More Than 30 Minutes     Nutrition Prescription  Recommended energy/nutrient modification.    Nutrition Diagnosis  Obesity r/t long history of positive energy balance aeb BMI >30.    Nutrition Intervention  Materials/education provided on hypocaloric diet for weight loss, Volumetric eating to help satiety level on fewer calories; portion control and healthy food choices (Plate Method and Volumetrics handouts). Discussed talking with transplant team on getting more clarity around what her exact kidney diet restrictions are. Encouraged pt to get an updated phosphorus lab 2 months after last one obtained. Patient demonstrates understanding.  Co-developed goals to work towards. Provided pt with list of goals and resources below via [employer].     Expected Engagement: good  Follow-Up Plans: Meal and Snack Planning     Nutrition Goals  1. Get clarification on diet recommendations for kidneys from transplant team. (ex. How much potassium and how much phosphorus you should be aiming for)  2. Incorporate a good source of protein at each meal, however be mindful about overall protein intake. Recommend between 40-60 grams initially.   3. Increase activity as able.     The Plate Method  [email or web address]    Protein Sources   [email or web address]     Carbohydrates  [email or web address]     Mindful Eating  [email or web address]     Summary  of Volumetrics Eating Plan  [email or web address]     Follow-Up:  PRN    Time spent with patient: 36 minutes.  Melony Gilbert RD, LD

## 2023-07-26 DIAGNOSIS — Z48.298 AFTERCARE FOLLOWING ORGAN TRANSPLANT: ICD-10-CM

## 2023-07-26 DIAGNOSIS — Z94.0 KIDNEY REPLACED BY TRANSPLANT: Primary | ICD-10-CM

## 2023-07-26 RX ORDER — LOSARTAN POTASSIUM 25 MG/1
25 TABLET ORAL 2 TIMES DAILY
Qty: 90 TABLET | Refills: 0 | Status: SHIPPED | OUTPATIENT
Start: 2023-07-26 | End: 2023-08-02

## 2023-07-26 NOTE — TELEPHONE ENCOUNTER
[facility] Call Center    Phone Message    May a detailed message be left on voicemail: yes     Reason for Call: Medication Refill Request    Has the patient contacted the pharmacy for the refill? Yes   Name of medication being requested: losartan (COZAAR) 25 MG tablet   Provider who prescribed the medication: Dr Gerhard Rueda  Pharmacy: [facility] - [facility] [address]     Date medication is needed: ASAP  she has only 2 pills left           Action Taken: Message routed to:  [facility]    Travel Screening: Not Applicable

## 2023-07-27 DIAGNOSIS — I12.9 HYPERTENSION, RENAL: Primary | ICD-10-CM

## 2023-07-27 RX ORDER — AMLODIPINE BESYLATE 5 MG/1
5 TABLET ORAL DAILY
Qty: 90 TABLET | Refills: 0 | Status: SHIPPED | OUTPATIENT
Start: 2023-07-27 | End: 2023-08-02

## 2023-08-01 ENCOUNTER — LAB (OUTPATIENT)
Dept: LAB | Facility: CLINIC | Age: 33
End: 2023-08-01
Payer: COMMERCIAL

## 2023-08-01 DIAGNOSIS — R10.9 ABDOMINAL PAIN: ICD-10-CM

## 2023-08-01 DIAGNOSIS — Z48.298 AFTERCARE FOLLOWING ORGAN TRANSPLANT: ICD-10-CM

## 2023-08-01 DIAGNOSIS — D64.9 ANEMIA: ICD-10-CM

## 2023-08-01 DIAGNOSIS — R00.2 PALPITATIONS: ICD-10-CM

## 2023-08-01 DIAGNOSIS — Z94.0 KIDNEY TRANSPLANTED: ICD-10-CM

## 2023-08-01 LAB
ALBUMIN MFR UR ELPH: 17.1 MG/DL
ALBUMIN UR-MCNC: NEGATIVE MG/DL
ANION GAP SERPL CALCULATED.3IONS-SCNC: 11 MMOL/L (ref 7–15)
APPEARANCE UR: CLEAR
BILIRUB UR QL STRIP: NEGATIVE
BUN SERPL-MCNC: 46.9 MG/DL (ref 6–20)
CALCIUM SERPL-MCNC: 9.5 MG/DL (ref 8.6–10)
CHLORIDE SERPL-SCNC: 105 MMOL/L (ref 98–107)
COLOR UR AUTO: ABNORMAL
CREAT SERPL-MCNC: 2.68 MG/DL (ref 0.51–0.95)
CREAT UR-MCNC: 67.7 MG/DL
CYCLOSPORINE BLD LC/MS/MS-MCNC: 64 UG/L (ref 50–400)
DEPRECATED HCO3 PLAS-SCNC: 22 MMOL/L (ref 22–29)
ERYTHROCYTE [DISTWIDTH] IN BLOOD BY AUTOMATED COUNT: 12.4 % (ref 10–15)
FERRITIN SERPL-MCNC: 99 NG/ML (ref 6–175)
GFR SERPL CREATININE-BSD FRML MDRD: 23 ML/MIN/1.73M2
GLUCOSE SERPL-MCNC: 115 MG/DL (ref 70–99)
GLUCOSE UR STRIP-MCNC: NEGATIVE MG/DL
HCT VFR BLD AUTO: 32.6 % (ref 35–47)
HGB BLD-MCNC: 10.6 G/DL (ref 11.7–15.7)
HGB UR QL STRIP: ABNORMAL
HYALINE CASTS: 4 /LPF
IRON BINDING CAPACITY (ROCHE): 236 UG/DL (ref 240–430)
IRON SATN MFR SERPL: 21 % (ref 15–46)
IRON SERPL-MCNC: 49 UG/DL (ref 37–145)
KETONES UR STRIP-MCNC: NEGATIVE MG/DL
LEUKOCYTE ESTERASE UR QL STRIP: NEGATIVE
MCH RBC QN AUTO: 27.5 PG (ref 26.5–33)
MCHC RBC AUTO-ENTMCNC: 32.5 G/DL (ref 31.5–36.5)
MCV RBC AUTO: 85 FL (ref 78–100)
NITRATE UR QL: NEGATIVE
PH UR STRIP: 5 [PH] (ref 5–7)
PLATELET # BLD AUTO: 252 10E3/UL (ref 150–450)
POTASSIUM SERPL-SCNC: 4.8 MMOL/L (ref 3.4–5.3)
PROT/CREAT 24H UR: 0.25 MG/MG CR (ref 0–0.2)
RBC # BLD AUTO: 3.86 10E6/UL (ref 3.8–5.2)
RBC URINE: 2 /HPF
SODIUM SERPL-SCNC: 138 MMOL/L (ref 136–145)
SP GR UR STRIP: 1.01 (ref 1–1.03)
SQUAMOUS EPITHELIAL: 2 /HPF
TME LAST DOSE: NORMAL H
TME LAST DOSE: NORMAL H
UROBILINOGEN UR STRIP-MCNC: NORMAL MG/DL
WBC # BLD AUTO: 6.7 10E3/UL (ref 4–11)
WBC CLUMPS #/AREA URNS HPF: PRESENT /HPF
WBC URINE: 8 /HPF

## 2023-08-01 PROCEDURE — 85027 COMPLETE CBC AUTOMATED: CPT | Performed by: PATHOLOGY

## 2023-08-01 PROCEDURE — 87086 URINE CULTURE/COLONY COUNT: CPT | Performed by: INTERNAL MEDICINE

## 2023-08-01 PROCEDURE — 83550 IRON BINDING TEST: CPT | Performed by: PATHOLOGY

## 2023-08-01 PROCEDURE — 84443 ASSAY THYROID STIM HORMONE: CPT | Performed by: PATHOLOGY

## 2023-08-01 PROCEDURE — 83540 ASSAY OF IRON: CPT | Performed by: PATHOLOGY

## 2023-08-01 PROCEDURE — 80158 DRUG ASSAY CYCLOSPORINE: CPT | Performed by: INTERNAL MEDICINE

## 2023-08-01 PROCEDURE — 99000 SPECIMEN HANDLING OFFICE-LAB: CPT | Performed by: PATHOLOGY

## 2023-08-01 PROCEDURE — 84439 ASSAY OF FREE THYROXINE: CPT | Performed by: PATHOLOGY

## 2023-08-01 PROCEDURE — 81001 URINALYSIS AUTO W/SCOPE: CPT | Performed by: PATHOLOGY

## 2023-08-01 PROCEDURE — 80048 BASIC METABOLIC PNL TOTAL CA: CPT | Performed by: PATHOLOGY

## 2023-08-01 PROCEDURE — 82728 ASSAY OF FERRITIN: CPT | Performed by: PATHOLOGY

## 2023-08-01 PROCEDURE — 84156 ASSAY OF PROTEIN URINE: CPT | Performed by: PATHOLOGY

## 2023-08-01 PROCEDURE — 36415 COLL VENOUS BLD VENIPUNCTURE: CPT | Performed by: PATHOLOGY

## 2023-08-02 ENCOUNTER — OFFICE VISIT (OUTPATIENT)
Dept: FAMILY MEDICINE | Facility: CLINIC | Age: 33
End: 2023-08-02
Payer: COMMERCIAL

## 2023-08-02 VITALS
HEART RATE: 75 BPM | BODY MASS INDEX: 43.18 KG/M2 | SYSTOLIC BLOOD PRESSURE: 109 MMHG | OXYGEN SATURATION: 97 % | DIASTOLIC BLOOD PRESSURE: 76 MMHG | WEIGHT: 267.5 LBS

## 2023-08-02 DIAGNOSIS — I12.9 HYPERTENSION, RENAL: ICD-10-CM

## 2023-08-02 DIAGNOSIS — H35.50 SENIOR-LOKEN SYNDROME: Primary | ICD-10-CM

## 2023-08-02 DIAGNOSIS — R00.2 PALPITATIONS: ICD-10-CM

## 2023-08-02 DIAGNOSIS — Q87.89 SENIOR-LOKEN SYNDROME: Primary | ICD-10-CM

## 2023-08-02 DIAGNOSIS — Q61.5 SENIOR-LOKEN SYNDROME: Primary | ICD-10-CM

## 2023-08-02 DIAGNOSIS — I12.9 HYPERTENSIVE RENAL DISEASE: ICD-10-CM

## 2023-08-02 DIAGNOSIS — Z48.298 AFTERCARE FOLLOWING ORGAN TRANSPLANT: ICD-10-CM

## 2023-08-02 DIAGNOSIS — E03.9 ACQUIRED HYPOTHYROIDISM: ICD-10-CM

## 2023-08-02 DIAGNOSIS — Z94.0 KIDNEY REPLACED BY TRANSPLANT: ICD-10-CM

## 2023-08-02 LAB
BACTERIA UR CULT: ABNORMAL
BACTERIA UR CULT: ABNORMAL
T4 FREE SERPL-MCNC: 1.23 NG/DL (ref 0.9–1.7)
TSH SERPL DL<=0.005 MIU/L-ACNC: 5.47 UIU/ML (ref 0.3–4.2)

## 2023-08-02 PROCEDURE — 99215 OFFICE O/P EST HI 40 MIN: CPT | Performed by: FAMILY MEDICINE

## 2023-08-02 RX ORDER — CARVEDILOL 25 MG/1
25 TABLET ORAL 2 TIMES DAILY WITH MEALS
Qty: 180 TABLET | Refills: 3 | Status: SHIPPED | OUTPATIENT
Start: 2023-08-02 | End: 2024-07-16

## 2023-08-02 RX ORDER — AMLODIPINE BESYLATE 5 MG/1
5 TABLET ORAL DAILY
Qty: 90 TABLET | Refills: 3 | Status: SHIPPED | OUTPATIENT
Start: 2023-08-02 | End: 2024-09-20

## 2023-08-02 RX ORDER — LOSARTAN POTASSIUM 25 MG/1
25 TABLET ORAL 2 TIMES DAILY
Qty: 180 TABLET | Refills: 3 | Status: SHIPPED | OUTPATIENT
Start: 2023-08-02 | End: 2024-05-20

## 2023-08-02 RX ORDER — LEVOTHYROXINE SODIUM 25 UG/1
25 TABLET ORAL DAILY
Qty: 90 TABLET | Refills: 3 | Status: SHIPPED | OUTPATIENT
Start: 2023-08-02 | End: 2023-12-04 | Stop reason: DRUGHIGH

## 2023-08-02 NOTE — NURSING NOTE
Carol Guillaume is a 32 year old female patient that presents today in clinic for the following:    Chief Complaint   Patient presents with    Recheck Medication    Follow Up     Discuss surgery of March 2021 (March 5th)     The patient's allergies and medications were reviewed as noted. A set of vitals were recorded as noted without incident: /76 (BP Location: Right arm, Patient Position: Sitting, Cuff Size: Adult Large)   Pulse 75   Wt 121.3 kg (267 lb 8 oz)   LMP 04/20/2023 (Approximate)   SpO2 97%   BMI 43.18 kg/m  . The patient does not have any other questions for the provider.    Zenia Glover, EMT at 6:59 AM on 8/2/2023

## 2023-08-02 NOTE — PATIENT INSTRUCTIONS
Mycophenolate: Gerd, diarrhea, shaking, fatigue, skin irritation, insomnia.  Cyclosporin: Tremors  Please review with transplant team your side effects if concerns.    Tdap through local pharmacy    Schedule with dermatology    PAP GYN during pre-op 5/2023 please send copy

## 2023-08-02 NOTE — PROGRESS NOTES
"  Assessment & Plan     Senior-Loken syndrome  Hypertension, renal    - amLODIPine (NORVASC) 5 MG tablet once daily  Dispense: 90 tablet; Refill: 3  - carvedilol (COREG) 25 MG tablet continue to take twice daily Dispense: 180 tablet; Refill: 3  - losartan (COZAAR) 25 MG tablet continue to take twice daily  Dispense: 180 tablet; Refill: 3    Kidney replaced by transplant  Aftercare following organ transplant  Mycophenolate: Symptoms of Gerd, had an upper GI see below, diarrhea intermittently, shaking, fatigue, skin irritation, insomnia.   Cyclosporin: One of the side effects she found online more tremors.  We discussed please review with transplant team.   We reviewed nutritional management of lowering carbohydrates frequent small meals increasing physical activity to 30 minutes most days hydrating with water no calorie beverages avoiding artificial sugars.  Palpitations  Checking TSH added to previous labs  - TSH with free T4 reflex    Acquired hypothyroidism  Results were available after visit we will start levothyroxine 25 mcg daily, thyroid function in 1 month  - levothyroxine (SYNTHROID/LEVOTHROID) 25 MCG tablet  Dispense: 90 tablet; Refill: 3      I reviewed if questions related to surgical procedure she should contact surgical provider or medical records from [facility] for surgical procedure in March 2021  60 minutes spent on the date of the encounter doing chart review, history, exam, diagnostics review, documentation, counseling and coordination of cares as noted.            BMI:   Estimated body mass index is 43.18 kg/m  as calculated from the following:    Height as of 7/10/23: 1.676 m (5' 6\").    Weight as of this encounter: 121.3 kg (267 lb 8 oz).           Return in about 13 weeks (around 11/1/2023) for Physical Exam medicare wellness.    Danny Shi MD  [facility] ANIA Medina is a 32 year old, presenting for the following health issues:  Recheck " Medication and Follow Up (Discuss surgery of March 2021 (March 5th))    HPI   Carol Guillaume 32 history of Senior-Loken syndrome s/p kidney transplant, blind, pseudotumor cerebri, retinitis pigmentosa, and polycystic ovarian syndrome (PCOS), stage 1 endometrial cancer  presents for primary care visit for medication check.    She wanted to review Surgical procedure from 3/5/2021.    Transplant  She indicates several side effects she is wondering about related to immunosuppression for her transplant.  Mycophenolate: Symptoms of Gerd, had an upper GI see below, diarrhea intermittently, shaking, fatigue, skin irritation, insomnia.   Cyclosporin: One of the side effects she found online more tremors.  We discussed please review with transplant team.   She indicates she needs to lose weight prior to transplant.  She follows with nutrition.  Hypertension  Her transplant team would like me to take over prescribing of antihypertensives.  Her blood pressures well controlled on the current regimen.  Losartan 25 mg twice daily,  Amlodipine 5mg and coreg 25 mg twice daily.  GERD  Has tried OTC prn some of the treatment seems to make her gastroesophageal reflux worse.    Endometrial abnormality  Follows with GYN has D&C regularly last 5/4/23 with no hyperplasia.  Thinks she had PAP in office during pre-op will get copy  She is not currently on any contraceptive methods, not actively trying to conceive prior to next transplant.  Final Diagnosis   Endometrium, curettage:  --Proliferative phase endometrium with focal squamous morular metaplasia.  No residual atypical hyperplasia identified     HCM  Tdap  SH  Social History     Social History Narrative     Moved to [place] [place]  masters in counseling marriage and family therapy.   end of November 2018 to David has one adopted daughter David's  niece Carlosjaime 2012 Blind.      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    Senior-Loken syndrome    Pseudotumor cerebri    Legally blind    Retinitis pigmentosa    Vitamin D deficiency    Morbid obesity (H)    Insulin resistance    PCOS (polycystic ovarian syndrome)    Cataract    Palpitations    Dyslipidemia    HTN, kidney transplant related    Aftercare following organ transplant    Immunosuppression (H)    EIN (endometrial intraepithelial neoplasia)    Chronic kidney disease, stage 3 (H)     Past Surgical History:   Procedure Laterality Date    BIOPSY      cataract bilateral  06/2017    DILATION AND CURETTAGE N/A 09/13/2021    Procedure: DILATION AND CURETTAGE,;  Surgeon: Lisbeth Gallardo MD;  Location: [facility]    DILATION AND CURETTAGE N/A 05/09/2022    Procedure: DILATION AND CURETTAGE;  Surgeon: Lisbeth Gallardo MD;  Location: [facility]    DILATION AND CURETTAGE N/A 5/4/2023    Procedure: Dilation and Curettage of Endometrium;  Surgeon: Lisbeth Gallardo MD;  Location: [facility]    DILATION AND CURETTAGE, OPERATIVE HYSTEROSCOPY WITH MORCELLATOR, COMBINED N/A 10/23/2020    Procedure: HYSTEROSCOPY, DILATION AND CURRETAGE, MYOSURE;  Surgeon: Kierra Tracy MD;  Location: [facility]    DILATION AND CURETTAGE, OPERATIVE HYSTEROSCOPY WITH MORCELLATOR, COMBINED N/A 03/05/2021    Procedure: HYSTEROSCOPY, WITH DILATION AND CURETTAGE OF UTERUS USING  MORCELLATOR;  Surgeon: Fiorella Cunningham MD;  Location: [facility]    ESOPHAGOSCOPY, GASTROSCOPY, DUODENOSCOPY (EGD), COMBINED N/A 01/24/2023    Procedure: ESOPHAGOGASTRODUODENOSCOPY, WITH BIOPSY;  Surgeon: Talia Aguilar MD;  Location: [facility] OR    INSERT INTRAUTERINE DEVICE N/A 03/05/2021    Procedure: INSERTION MIRENA INTRAUTERINE DEVICE;  Surgeon: Fiorella Cunningham MD;   Location: [facility]    IR RENAL BIOPSY RIGHT  08/27/2021    LAPAROSCOPY DIAGNOSTIC (GYN)  03/05/2021    Procedure: Laparoscopy diagnostic (gyn);  Surgeon: Fiorella Cunningham MD;  Location: [facility]    LITHOTRIPSY      LITHOTRIPSY      REMOVE INTRAUTERINE DEVICE N/A 03/05/2021    Procedure: REMOVE MIRENA INTRAUTERINE DEVICE;  Surgeon: Foirella Cunningham MD;  [facility]    REMOVE INTRAUTERINE DEVICE N/A 05/09/2022    Procedure: EXCHANGE OF MIRENA INTRAUTERINE DEVICE;  Surgeon: Lisbeth Gallardo MD;  [facility]: [facility]    REPLACE INTRAUTERINE DEVICE N/A 09/13/2021    Procedure: MIRENA INTRAUTERINE DEVICE EXCHANGE;  Surgeon: Lisbeth Gallardo MD;  Location: [facility]    SINUS SURGERY  2001    TONSILLECTOMY      TRANSPLANT KIDNEY RECIPIENT LIVING RELATED Right 07/2006    wisdom teeth         Social History     Tobacco Use    Smoking status: Never    Smokeless tobacco: Never   Substance Use Topics    Alcohol use: Never     Family History   Problem Relation Age of Onset    Hyperlipidemia Mother     Sjogren's Father     Hashimoto's thyroiditis Father     Other - See Comments Maternal Grandfather         surgical complication    Atrial fibrillation Paternal Grandmother     Arthritis Paternal Grandmother     Skin Cancer Paternal Grandfather         melanoma    Parkinsonism Other         paternal uncle         Current Outpatient Medications   Medication Sig Dispense Refill    acetaminophen (TYLENOL) 325 MG tablet Take 2 tablets (650 mg) by mouth every 4 hours as needed for mild pain 50 tablet 0    amLODIPine (NORVASC) 5 MG tablet Take 1 tablet (5 mg) by mouth daily 90 tablet 0    carvedilol (COREG) 25 MG tablet Take 1 tablet (25 mg) by mouth 2 times daily (with meals) 60 tablet 1    losartan (COZAAR) 25 MG tablet Take 1 tablet (25 mg) by mouth 2 times daily 90 tablet 0    mycophenolic acid (GENERIC EQUIVALENT) 180 MG EC tablet Take 2 tablets (360 mg) by mouth 2 times daily 360 tablet 3    NEORAL (BRAND) 25 MG capsule Take 3  capsules (75 mg) by mouth every morning AND 2 capsules (50 mg) every evening. (Patient taking differently: Take 2 capsules (75 mg) by mouth every morning AND 2 capsules (50 mg) every evening.) 450 capsule 3    vitamin D3 (CHOLECALCIFEROL) 50 mcg (2000 units) tablet Take 1 tablet (50 mcg) by mouth daily 90 tablet 3     No Known Allergies  Recent Labs   Lab Test 08/01/23  0645 06/16/23  0725 05/24/23  0702 03/24/23  0629 09/16/22  0727 08/27/21  1002 08/16/21  1618 03/05/21  0559 02/01/21  0653 01/06/20  0746 12/13/19  0701   A1C  --  5.3  --   --   --   --   --   --   --   --   --    LDL  --   --  133*  --  154*  --   --   --   --   --  98   HDL  --   --  32*  --  38*  --   --   --   --   --  42*   TRIG  --   --  190*  --  242*  --   --   --   --   --  228*   ALT  --  12 13  --   --   --   --   --   --   --  51*   CR 2.68* 2.66* 2.82*   < > 2.25*   < > 2.23* 1.70* 1.72*   < > 1.31*   GFRESTIMATED 23* 23* 22*   < > 29*   < > 28* 39* 39*   < > 54*   GFRESTBLACK  --   --   --   --   --   --   --  45* 45*   < > 63   POTASSIUM 4.8 4.4 4.4   < > 4.7   < > 4.7 4.5 4.5   < > 4.4   TSH  --   --   --   --   --   --  1.85  --   --   --  2.91    < > = values in this interval not displayed.         Review of Systems   Problem list, PMH, Surgical HX, FH, SH, allergies, medications,immunizations reviewed and updated in Epic. ROS negative other than noted in HPI       Objective    /76 (BP Location: Right arm, Patient Position: Sitting, Cuff Size: Adult Large)   Pulse 75   Wt 121.3 kg (267 lb 8 oz)   LMP 04/20/2023 (Approximate)   SpO2 97%   BMI 43.18 kg/m    Body mass index is 43.18 kg/m .  Physical Exam   General BMI 43.18, well groomed interactive walks with the aid of cane to assist with blindness  EYES: conjunctivae and sclerae normal, pupils are sluggish to light. Exotropia on left eye, right eye follows appropriately looking straight forward.   HENT: Slight scarring in right ear, left ear canal normal and TM  normal,mouth without ulcers or lesions, oropharynx clear and oral mucous membranes moist  NECK: no adenopathy, no asymmetry, masses, or scars and thyroid normal to palpation  RESP: lungs clear to auscultation - no rales, rhonchi or wheezes  CV: regular rate and rhythm, normal S1 S2, no S3 or S4, no murmur, click or rub, no peripheral edema   ABDOMEN: Truncal obesity with a few stria, Mild hirsutism of abdominal and chin. Well healed scar on right side from transplant with fullness in area of transplant otherwise soft, nontender, no hepatosplenomegaly, no masses and bowel sounds normal   MS: no musculoskeletal defects are noted and gait is age appropriate without ataxia uses a cane due to blindness  SKIN: no suspicious lesions or rashes, depigmented patch on back. Hirsutism of chin.  NEURO: Normal strength and tone, mentation intact and speech normal  PSYCH: mentation appears normal and affect normal/bright, very pleasant.  Recent labs reviewed in July 2023  Mild TSH elevation                EGD 1/2023  Impression:            - Normal esophagus.                            - Erosive gastropathy with no stigmata of recent                          bleeding. Biopsies taken in stomach (cardia, antrum,                          greater curvature) to r/o H. pylori and MMF toxicity                          - Gastroesophageal flap valve classified as Hill Grade                          I (prominent fold, tight to endoscope).                          - Normal examined duodenum. Prominent ampulla.   Recommendation:        - Await pathology results.                          - No explanation for symptoms seen on endoscopy.                          - Return to referring physician as previously                          scheduled.      Addendum   Immunostain, with appropriate controls, on block A1 is negative for H. pylori.      Addendum electronically signed by Mal Levy MD on 1/26/2023 at  3:32 PM   Final Diagnosis   STOMACH,  BIOPSY:  Reactive antral gastropathy and a background of inactive chronic inflammation; no intestinal metaplasia or dysplasia; report of H. pylori immunohistochemistry to follow        Electronically signed by Mal Leyv MD on 1/25/2023 at  1:05 PM

## 2023-08-03 ENCOUNTER — MYC MEDICAL ADVICE (OUTPATIENT)
Dept: TRANSPLANT | Facility: CLINIC | Age: 33
End: 2023-08-03
Payer: COMMERCIAL

## 2023-08-03 DIAGNOSIS — N39.0 URINARY TRACT INFECTION: Primary | ICD-10-CM

## 2023-08-03 NOTE — RESULT ENCOUNTER NOTE
Dear Carol Guillaume   Thyroid function came back hypothyroid. I sent levothyroxine 25 mcg daily to your pharmacy take on empty stomach in am.  Recheck lab tsh in 4-8 weeks.  Best wishes,  Danny Shi MD

## 2023-08-04 ENCOUNTER — TELEPHONE (OUTPATIENT)
Dept: TRANSPLANT | Facility: CLINIC | Age: 33
End: 2023-08-04
Payer: COMMERCIAL

## 2023-08-04 RX ORDER — CEPHALEXIN 500 MG/1
500 CAPSULE ORAL 2 TIMES DAILY
Qty: 14 CAPSULE | Refills: 0 | Status: SHIPPED | OUTPATIENT
Start: 2023-08-04 | End: 2023-08-11

## 2023-08-04 NOTE — TELEPHONE ENCOUNTER
Patient Call: General  Route to LPN    Reason for call: returning missed call from coordinator. Patient provided no additional details about the call for writer.    Call back needed? Yes    Return Call Needed  Same as documented in contacts section  When to return call?: Same day: Route High Priority

## 2023-08-04 NOTE — TELEPHONE ENCOUNTER
Feliberto Douglas MD Sveiven, Sara, RN  Treat only if symptomatic  Keflex 500 mg po bid x 7 d          Previous Messages       ----- Message -----  From: Ronna Hughes RN  Sent: 8/3/2023  10:19 AM CDT  To: MD Dr Gerhard Dasilva, this UA and UC were ordered back in April however were just obtained 8/1    UTI,  I am waiting to hear back from the patient if she symptomatic or has fevers.      Would you like to treat?  This organism is susceptible to ampicillin, penicillin, vancomycin and the cephalosporins, patient has KIMMIE Hughes RN  Transplant Coordinator  [phone]

## 2023-08-04 NOTE — TELEPHONE ENCOUNTER
Patient confirms she received message re: abx for UTI.  Will obtain RX today and start it.    Ronna Hughes RN   Transplant Coordinator  [phone]

## 2023-08-07 ENCOUNTER — VIRTUAL VISIT (OUTPATIENT)
Dept: PHARMACY | Facility: CLINIC | Age: 33
End: 2023-08-07
Payer: COMMERCIAL

## 2023-08-07 VITALS — WEIGHT: 266 LBS | HEIGHT: 66 IN | BODY MASS INDEX: 42.75 KG/M2

## 2023-08-07 DIAGNOSIS — I15.1 HTN, KIDNEY TRANSPLANT RELATED: ICD-10-CM

## 2023-08-07 DIAGNOSIS — Z94.0 KIDNEY REPLACED BY TRANSPLANT: ICD-10-CM

## 2023-08-07 DIAGNOSIS — Z94.0 HTN, KIDNEY TRANSPLANT RELATED: ICD-10-CM

## 2023-08-07 DIAGNOSIS — E03.9 ACQUIRED HYPOTHYROIDISM: ICD-10-CM

## 2023-08-07 DIAGNOSIS — N39.0 URINARY TRACT INFECTION WITHOUT HEMATURIA, SITE UNSPECIFIED: ICD-10-CM

## 2023-08-07 DIAGNOSIS — E66.01 MORBID OBESITY (H): Primary | ICD-10-CM

## 2023-08-07 PROCEDURE — 99605 MTMS BY PHARM NP 15 MIN: CPT | Performed by: PHARMACIST

## 2023-08-07 ASSESSMENT — PAIN SCALES - GENERAL: PAINLEVEL: MODERATE PAIN (5)

## 2023-08-07 ASSESSMENT — PATIENT HEALTH QUESTIONNAIRE - PHQ9: SUM OF ALL RESPONSES TO PHQ QUESTIONS 1-9: 14

## 2023-08-07 NOTE — LETTER
August 15, 2023  Carol Guillaume  [address] [address]    Dear SUN Reyes [facility]     Thank you for talking with me on Aug 7, 2023 about your health and medications. As a follow-up to our conversation, I have included two documents:      Your Recommended To-Do List has steps you should take to get the best results from your medications.  Your Medication List will help you keep track of your medications and how to take them.    If you want to talk about these documents, please call Lauren T. Bloch, RPH at phone: [phone], Monday-Friday 8-4:30pm.    I look forward to working with you and your doctors to make sure your medications work well for you.    Sincerely,  Lauren T. Bloch, RPH  [place] [facility] Pharmacist, [facility]

## 2023-08-07 NOTE — LETTER
_  Medication List        Prepared on: 08/15/2023     Bring your Medication List when you go to the doctor, hospital, or   emergency room. And, share it with your family or caregivers.     Note any changes to how you take your medications.  Cross out medications when you no longer use them.    Medication How I take it Why I use it Prescriber   acetaminophen (TYLENOL) 325 MG tablet Take 2 tablets (650 mg) by mouth every 4 hours as needed for mild pain Status post hysteroscopy Kierra Tracy MD   amLODIPine (NORVASC) 5 MG tablet Take 1 tablet (5 mg) by mouth daily Hypertension, Renal Danny Shi MD   carvedilol (COREG) 25 MG tablet Take 1 tablet (25 mg) by mouth 2 times daily (with meals) Hypertensive Renal Disease Danny Shi MD   levothyroxine (SYNTHROID/LEVOTHROID) 25 MCG tablet Take 1 tablet (25 mcg) by mouth daily Take in am empty stomach Acquired Hypothyroidism Danny Shi MD   losartan (COZAAR) 25 MG tablet Take 1 tablet (25 mg) by mouth 2 times daily Kidney Replaced by Transplant; Aftercare Following Organ Transplant Danny Shi MD   mycophenolic acid (GENERIC EQUIVALENT) 180 MG EC tablet Take 2 tablets (360 mg) by mouth 2 times daily Immunosuppression (H); Aftercare Following Organ Transplant; Kidney Transplanted; Encounter for long-term current use of medication Feliberto Rueda MD   NEORAL (BRAND) 25 MG capsule Take 3 capsules (75 mg) by mouth every morning AND 2 capsules (50 mg) every evening. Kidney Replaced by Transplant; Aftercare Following Organ Transplant Mykel Joy MD   vitamin D3 (CHOLECALCIFEROL) 50 mcg (2000 units) tablet Take 1 tablet (50 mcg) by mouth daily Vitamin D Deficiency Feliberto Rueda MD         Add new medications, over-the-counter drugs, herbals, vitamins, or  minerals in the blank rows below.    Medication How I take it Why I use it Prescriber                                      Allergies:      No Known Allergies        Side effects I have  had:               Other Information:              My notes and questions:

## 2023-08-07 NOTE — LETTER
"Recommended To-Do List      Prepared on: 08/15/2023       You can get the best results from your medications by completing the items on this \"To-Do List.\"      Bring your To-Do List when you go to your doctor. And, share it with your family or caregivers.    My To-Do List:  What we talked about: What I should do:   What my medicines are for, how to know if my medicines are working, made sure my medicines are safe for me and reviewed how to take my medicines.      Take my medicines every day                "

## 2023-08-07 NOTE — PROGRESS NOTES
Medication Therapy Management (MTM) Encounter    ASSESSMENT:                            Medication Adherence/Access: No issues identified    Hypertension:   Blood pressure at goal.     Weight Management:   Would benefit from no weight loss medication(s) at this time. Connected patient dietitian to discuss questions regarding kidney safe diet to also assist with weight management.     Kidney Transplant:    Defer to SOT.     Hypothyroidism:   Repeat TSH in 6-8 weeks from starting levothyroxine.     UTI:   Improving. To complete antibiotic therapy.     PLAN:                            Patient to follow up with dietitian regarding dietary considerations for kidney safe diet and weight management.     2. Continue medication regimen at this time.     3. Thyroid lab repeat 6-8 weeks from levothyroxine start.     4. Follow up with SOT as planned regarding medication concerns.     Follow-up: Return in about 1 year (around 8/7/2024) for Medication Therapy Management Pharmacist Visit if needed, Call [phone] to schedule.    SUBJECTIVE/OBJECTIVE:                          Carol Guillaume is a 32 year old female called for an initial visit. She was referred to me from Amira Gomez PA-C.      Reason for visit: weight loss medication(s) options.    Allergies/ADRs: Reviewed in chart  Past Medical History: Reviewed in chart; Blind  Tobacco: She reports that she has never smoked. She has never used smokeless tobacco.  Alcohol: not currently using    Medication Adherence/Access:   Patient takes medications directly from bottles.  Patient takes medications 2 time(s) per day, typically 8 AM and 8 PM.     Hypertension:   Carvedilol 25 mg twice daily   Amlodipine 5 mg daily bedtime  Losartan 25 mg twice daily     Patient reports no current medication side effects. Reports random issues of dizziness or lightheadedness but not consistent. Wonders if hydration plays a role.   Patient does not self-monitor blood pressure.    BP Readings from  "Last 3 Encounters:   08/02/23 109/76   06/12/23 115/74   05/04/23 106/70     Pulse Readings from Last 3 Encounters:   08/02/23 75   06/12/23 78   05/04/23 72     Weight Management:   No medications     Followed by Amira Gomez PA-C, seen 6/12/2023 for [facility]. Working on lowering portions of her food and being consistent. She feels restricted with her diet due to low phosphorus diet. GLP1 agonists for weight loss are not covered by insurance due to Medicare. Food cravings more so when eating around others that have less restricted eating. She feels stuck. She is trying to move more and not seeing results.     Current weight today: 266 lbs 0 oz    Wt Readings from Last 4 Encounters:   08/07/23 266 lb (120.7 kg)   08/02/23 267 lb 8 oz (121.3 kg)   07/10/23 266 lb (120.7 kg)   06/12/23 269 lb (122 kg)     Estimated body mass index is 42.93 kg/m  as calculated from the following:    Height as of this encounter: 5' 6\" (1.676 m).    Weight as of this encounter: 266 lb (120.7 kg).    Kidney Transplant:    Mycophenolic acid 360mg twice daily   Neoral 50mg in AM and 25 mg PM   Vitamin D 2000 international unit(s) daily     Pt reports tremors and brain fog so has been slowly decreasing Neoral over time. Mycophenolic acid she feels is causing her joint pain and muscle fatigue as well as random heart palpitations. Also feels she will have random acid reflux from it as well. Struggling to find a medication that she can tolerate. Working with [facility]OT regarding this.   Transplant date: 2006  Estimated Creatinine Clearance: 39.2 mL/min (A) (based on SCr of 2.68 mg/dL (H)).    Hypothyroidism:   Levothyroxine 25 mcg daily in AM     Patient is having the following symptoms: feeling cold.    TSH   Date Value Ref Range Status   08/01/2023 5.47 (H) 0.30 - 4.20 uIU/mL Final   08/16/2021 1.85 0.40 - 4.00 mU/L Final   12/13/2019 2.91 0.40 - 4.00 mU/L Final     UTI:   Cephalexin 500 mg twice daily 7 days    Started 8/4 " "and completing 8/11. Urgency has increased. No burning or pain upon urination even prior to starting cephalexin. No fevers. Taking antibiotic with food.     Today's Vitals: Ht 5' 6\" (1.676 m)   Wt 266 lb (120.7 kg)   LMP 04/20/2023 (Approximate)   BMI 42.93 kg/m    ----------------    I spent 30 minutes with this patient today. All changes were made via collaborative practice agreement with Amira Gomez PA-C. A copy of the visit note was provided to the patient's provider(s).    A summary of these recommendations was sent via [employer].    Lauren Bloch, PharmD, BCACP   Medication Therapy Management Pharmacist   [facility]    Telemedicine Visit Details  Type of service:  Telephone visit  Start Time:  12:35 PM  End Time: 1:05 PM     Medication Therapy Recommendations  No medication therapy recommendations to display   "

## 2023-08-09 ENCOUNTER — TELEPHONE (OUTPATIENT)
Dept: FAMILY MEDICINE | Facility: CLINIC | Age: 33
End: 2023-08-09
Payer: COMMERCIAL

## 2023-08-09 NOTE — TELEPHONE ENCOUNTER
[facility] Call Center    Phone Message    May a detailed message be left on voicemail: yes     Reason for Call: You were requesting pap smear results for this patient and she was told to have you call them at  [phone].     Action Taken: Message routed to:  [facility]    Travel Screening: Not Applicable

## 2023-08-10 NOTE — TELEPHONE ENCOUNTER
Called [facility]:  they will be faxing 04/20/23 pap results.  Attn: Will at our secure fax number.      Will Dahl CMA ([facility]) at 10:07 AM on 8/10/2023 '

## 2023-08-11 ENCOUNTER — TELEPHONE (OUTPATIENT)
Dept: GASTROENTEROLOGY | Facility: CLINIC | Age: 33
End: 2023-08-11
Payer: COMMERCIAL

## 2023-08-11 NOTE — TELEPHONE ENCOUNTER
LVM, sent mychart to reschedule the following appointment:    Appt on 10/11/23 with Sary Espinosa due to the provider not being available. Reschedule with the same provider, next available appt. Left CC#

## 2023-08-15 ENCOUNTER — TELEPHONE (OUTPATIENT)
Dept: GASTROENTEROLOGY | Facility: CLINIC | Age: 33
End: 2023-08-15
Payer: COMMERCIAL

## 2023-08-15 NOTE — TELEPHONE ENCOUNTER
Hi Dr. Shi,     Patient pap results will be placed in your inbox folder, once arrived.       [place],     Please place patient pap smear result to Dr. Shi inbox folder once recieved.  Fax should be coming from [facility], Attn to: Will.        Thank you,     Will

## 2023-08-15 NOTE — TELEPHONE ENCOUNTER
Contacted pt to reschedule her appointment as provider is no longer gonna be available. LVMx2, sending letter

## 2023-08-16 NOTE — PATIENT INSTRUCTIONS
"Recommendations from today's MTM visit:                                                    MTM (medication therapy management) is a service provided by a clinical pharmacist designed to help you get the most of out of your medicines.      Patient to follow up with dietitian regarding dietary considerations for kidney safe diet and weight management.     2. Continue medication regimen at this time.     3. Thyroid lab repeat 6-8 weeks from levothyroxine start.     4. Follow up with SOT as planned regarding medication concerns.     Follow-up: Return in about 1 year (around 8/7/2024) for Medication Therapy Management Pharmacist Visit if needed, Call [phone] to schedule.    It was great speaking with you today.  I value your experience and would be very thankful for your time in providing feedback in our clinic survey. In the next few days, you may receive an email or text message from [employer] with a link to a survey related to your  clinical pharmacist.\"     To schedule another appointment with your MTM pharmacist, please call [facility] Scheduling at [phone].     My Clinical Pharmacist's contact information:                                                      Please feel free to contact me with any questions or concerns you have.      Lauren Bloch, PharmD  Medication Therapy Management Pharmacist   [facility]             "

## 2023-08-28 ENCOUNTER — TELEPHONE (OUTPATIENT)
Dept: ENDOCRINOLOGY | Facility: CLINIC | Age: 33
End: 2023-08-28
Payer: COMMERCIAL

## 2023-08-28 NOTE — TELEPHONE ENCOUNTER
DEVON and sent mychart regarding the following:    Reschedule 11/3/23 appointment with Sharon Olea

## 2023-09-05 ENCOUNTER — TELEPHONE (OUTPATIENT)
Dept: ENDOCRINOLOGY | Facility: CLINIC | Age: 33
End: 2023-09-05
Payer: COMMERCIAL

## 2023-09-05 NOTE — TELEPHONE ENCOUNTER
NADINEM, sent mychart and sent letter regarding:    Reschedule 11/3/323 appointment with Sharon Olea

## 2023-09-07 NOTE — TELEPHONE ENCOUNTER
Pap results finally received in clinic today on 09/07/23.  Results placed in provider inbox folder today.      Will Dahl CMA ([facility]) at 7:39 AM on 9/7/2023

## 2023-09-13 NOTE — TELEPHONE ENCOUNTER
September 12, 2023   Lupe  Please scan  results ( they are in my folder) but need to update chart with date specific result in care gap section. On document on this note.  Best wishes,  Danny Shi MD

## 2023-09-14 NOTE — TELEPHONE ENCOUNTER
Health Maintenance was updated with date of last PAP 4/24/23.    Lupe Livingston RN on 9/14/2023 at 1:56 PM

## 2023-10-26 ENCOUNTER — LAB (OUTPATIENT)
Dept: LAB | Facility: CLINIC | Age: 33
End: 2023-10-26
Attending: FAMILY MEDICINE
Payer: COMMERCIAL

## 2023-10-26 DIAGNOSIS — Z94.0 KIDNEY TRANSPLANTED: ICD-10-CM

## 2023-10-26 DIAGNOSIS — E55.9 VITAMIN D DEFICIENCY: ICD-10-CM

## 2023-10-26 DIAGNOSIS — E03.9 ACQUIRED HYPOTHYROIDISM: ICD-10-CM

## 2023-10-26 LAB
ANION GAP SERPL CALCULATED.3IONS-SCNC: 12 MMOL/L (ref 7–15)
BUN SERPL-MCNC: 47.9 MG/DL (ref 6–20)
CALCIUM SERPL-MCNC: 9.2 MG/DL (ref 8.6–10)
CHLORIDE SERPL-SCNC: 106 MMOL/L (ref 98–107)
CREAT SERPL-MCNC: 2.56 MG/DL (ref 0.51–0.95)
CYCLOSPORINE BLD LC/MS/MS-MCNC: 72 UG/L (ref 50–400)
DEPRECATED HCO3 PLAS-SCNC: 22 MMOL/L (ref 22–29)
EGFRCR SERPLBLD CKD-EPI 2021: 24 ML/MIN/1.73M2
ERYTHROCYTE [DISTWIDTH] IN BLOOD BY AUTOMATED COUNT: 12.5 % (ref 10–15)
GLUCOSE SERPL-MCNC: 116 MG/DL (ref 70–99)
HCT VFR BLD AUTO: 34.8 % (ref 35–47)
HGB BLD-MCNC: 11.2 G/DL (ref 11.7–15.7)
MCH RBC QN AUTO: 27.5 PG (ref 26.5–33)
MCHC RBC AUTO-ENTMCNC: 32.2 G/DL (ref 31.5–36.5)
MCV RBC AUTO: 85 FL (ref 78–100)
PLATELET # BLD AUTO: 256 10E3/UL (ref 150–450)
POTASSIUM SERPL-SCNC: 4.6 MMOL/L (ref 3.4–5.3)
PTH-INTACT SERPL-MCNC: 235 PG/ML (ref 15–65)
RBC # BLD AUTO: 4.08 10E6/UL (ref 3.8–5.2)
SODIUM SERPL-SCNC: 140 MMOL/L (ref 135–145)
T4 FREE SERPL-MCNC: 1.17 NG/DL (ref 0.9–1.7)
TME LAST DOSE: NORMAL H
TME LAST DOSE: NORMAL H
TSH SERPL DL<=0.005 MIU/L-ACNC: 6.56 UIU/ML (ref 0.3–4.2)
VIT D+METAB SERPL-MCNC: 24 NG/ML (ref 20–50)
WBC # BLD AUTO: 9.4 10E3/UL (ref 4–11)

## 2023-10-26 PROCEDURE — 36415 COLL VENOUS BLD VENIPUNCTURE: CPT | Performed by: PATHOLOGY

## 2023-10-26 PROCEDURE — 83970 ASSAY OF PARATHORMONE: CPT | Performed by: PATHOLOGY

## 2023-10-26 PROCEDURE — 80158 DRUG ASSAY CYCLOSPORINE: CPT | Performed by: INTERNAL MEDICINE

## 2023-10-26 PROCEDURE — 99000 SPECIMEN HANDLING OFFICE-LAB: CPT | Performed by: PATHOLOGY

## 2023-10-26 PROCEDURE — 84443 ASSAY THYROID STIM HORMONE: CPT | Performed by: PATHOLOGY

## 2023-10-26 PROCEDURE — 82306 VITAMIN D 25 HYDROXY: CPT | Performed by: PHYSICIAN ASSISTANT

## 2023-10-26 PROCEDURE — 84439 ASSAY OF FREE THYROXINE: CPT | Performed by: PATHOLOGY

## 2023-10-26 PROCEDURE — 85027 COMPLETE CBC AUTOMATED: CPT | Performed by: PATHOLOGY

## 2023-10-26 PROCEDURE — 80048 BASIC METABOLIC PNL TOTAL CA: CPT | Performed by: PATHOLOGY

## 2023-10-27 DIAGNOSIS — E03.9 HYPOTHYROIDISM, UNSPECIFIED TYPE: Primary | ICD-10-CM

## 2023-10-27 RX ORDER — LEVOTHYROXINE SODIUM 50 UG/1
50 TABLET ORAL DAILY
Qty: 90 TABLET | Refills: 1 | Status: SHIPPED | OUTPATIENT
Start: 2023-10-27 | End: 2024-05-08

## 2023-11-02 NOTE — TELEPHONE ENCOUNTER
DATE:  9/3/2021     TIME OF RECEIPT FROM LAB:  9408    ORDERING PROVIDER: Dr. Mykel Joy     LAB TEST:  Mycophenolic Acid     LAB VALUE:  10.89    RESULTS GIVEN WITH READ-BACK TO (PROVIDER):  Linda Hoffmann RN/Ankita Orozco RN     TIME LAB VALUE REPORTED TO PROVIDER:   4955    ALIN Berger, LPN   Solid Organ Transplant         Discharged

## 2023-11-04 NOTE — TELEPHONE ENCOUNTER
RNCC reviewed chart for oncology recommendations related to COVID-19; she does not appear to be following with oncology at the . Unable to locate records of uterine cancer in CareEverywhere. Where is she being treated? Advise that she request this exception letter from her oncologist if she is unable to get COVID-19 vaccine due to cancer diagnosis.     From transplant perspective, we have recommended our immunosuppressed patients get the COVID-19 vaccine and this letter can not be provided from transplant.   36.6

## 2023-11-20 ENCOUNTER — MYC MEDICAL ADVICE (OUTPATIENT)
Dept: GASTROENTEROLOGY | Facility: CLINIC | Age: 33
End: 2023-11-20
Payer: COMMERCIAL

## 2023-11-28 NOTE — TELEPHONE ENCOUNTER
Called to remind patient of their upcoming appointment with our GI clinic, on Mon Nov 4th at 10am with Shannan Romo. This appointment is scheduled as a video visit. You will receive a call approximately 30 minutes prior to check you in, you must be in [place] for this visit., if your appointment is virtual (video or telephone) you need to be in [place] for the visit. To reschedule or cancel patient to call [phone].    Daija Felix

## 2023-12-04 ENCOUNTER — VIRTUAL VISIT (OUTPATIENT)
Dept: GASTROENTEROLOGY | Facility: CLINIC | Age: 33
End: 2023-12-04
Attending: PHYSICIAN ASSISTANT
Payer: COMMERCIAL

## 2023-12-04 ENCOUNTER — OFFICE VISIT (OUTPATIENT)
Dept: INTERNAL MEDICINE | Facility: CLINIC | Age: 33
End: 2023-12-04
Payer: COMMERCIAL

## 2023-12-04 VITALS
BODY MASS INDEX: 43.53 KG/M2 | DIASTOLIC BLOOD PRESSURE: 84 MMHG | SYSTOLIC BLOOD PRESSURE: 135 MMHG | WEIGHT: 269.7 LBS | OXYGEN SATURATION: 98 % | HEART RATE: 80 BPM

## 2023-12-04 VITALS — HEIGHT: 66 IN | WEIGHT: 265 LBS | BODY MASS INDEX: 42.59 KG/M2

## 2023-12-04 DIAGNOSIS — R53.83 OTHER FATIGUE: ICD-10-CM

## 2023-12-04 DIAGNOSIS — R13.10 DYSPHAGIA, UNSPECIFIED TYPE: ICD-10-CM

## 2023-12-04 DIAGNOSIS — Z00.00 ROUTINE GENERAL MEDICAL EXAMINATION AT A HEALTH CARE FACILITY: Primary | ICD-10-CM

## 2023-12-04 DIAGNOSIS — M54.2 NECK PAIN: ICD-10-CM

## 2023-12-04 DIAGNOSIS — F51.01 PRIMARY INSOMNIA: ICD-10-CM

## 2023-12-04 DIAGNOSIS — R10.12 LEFT UPPER QUADRANT ABDOMINAL PAIN: Primary | ICD-10-CM

## 2023-12-04 PROCEDURE — 99395 PREV VISIT EST AGE 18-39: CPT | Performed by: INTERNAL MEDICINE

## 2023-12-04 PROCEDURE — 99213 OFFICE O/P EST LOW 20 MIN: CPT | Mod: 25 | Performed by: INTERNAL MEDICINE

## 2023-12-04 PROCEDURE — 99215 OFFICE O/P EST HI 40 MIN: CPT | Mod: 95 | Performed by: DIETITIAN, REGISTERED

## 2023-12-04 ASSESSMENT — PAIN SCALES - GENERAL: PAINLEVEL: MODERATE PAIN (4)

## 2023-12-04 NOTE — PATIENT INSTRUCTIONS
Preventive Health Recommendations  Female Ages 26 - 39  Yearly exam:   See your health care provider every year in order to  Review health changes.   Discuss preventive care.    Review your medicines if you your doctor has prescribed any.    Until age 30: Get a Pap test every three years (more often if you have had an abnormal result).    After age 30: Talk to your doctor about whether you should have a Pap test every 3 years or have a Pap test with HPV screening every 5 years.   You do not need a Pap test if your uterus was removed (hysterectomy) and you have not had cancer.  You should be tested each year for STDs (sexually transmitted diseases), if you're at risk.   Talk to your provider about how often to have your cholesterol checked.  If you are at risk for diabetes, you should have a diabetes test (fasting glucose).  Shots: Get a flu shot each year. Get a tetanus shot every 10 years.   Nutrition:   Eat at least 5 servings of fruits and vegetables each day.  Eat whole-grain bread, whole-wheat pasta and brown rice instead of white grains and rice.  Get adequate Calcium and Vitamin D.     Lifestyle  Exercise at least 150 minutes a week (30 minutes a day, 5 days of the week). This will help you control your weight and prevent disease.  Limit alcohol to one drink per day.  No smoking.   Wear sunscreen to prevent skin cancer.  See your dentist every six months for an exam and cleaning.    Patient Education   Personalized Prevention Plan  You are due for the preventive services outlined below.  Your care team is available to assist you in scheduling these services.  If you have already completed any of these items, please share that information with your care team to update in your medical record.  Health Maintenance Due   Topic Date Due     COVID-19 Vaccine (1) Never done     Annual Wellness Visit  08/16/2022     Diptheria Tetanus Pertussis (DTAP/TDAP/TD) Vaccine (8 - Td or Tdap) 08/20/2022     Flu Vaccine (1)  09/01/2023     PAP Smear  10/15/2023

## 2023-12-04 NOTE — Clinical Note
Please abstract Pap smear results from 4/20/23 scanned into media on 9/22/23 from [facility].  Thanks.

## 2023-12-04 NOTE — PROGRESS NOTES
"Virtual Visit Details    Type of service:  Video Visit   Video Start Time: 10:03 AM  Video End Time: 10:38 AM    Originating Location (pt. 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     At initial visit she described onset of nonradiating epigastric to left sided abdominal discomfort 10-30 minutes after taking MMF.  Initially this would last most of the day, however with dose decrease in April pain somewhat improved in the last several hours.  She does take MMF twice daily with onset of pain with both doses.  No other triggers including eating.  She also has history of reflux with burning regurgitation into her throat with MMF.  She has tried both famotidine and omeprazole without improvement in her symptoms.     EGD 1/24/2023 notable for erosive gastropathy with a few small erosions in the gastric cardia and anturm. Esophageal exam was normal, Hill grade I. Gastric biopsies with reactive antral gastropathy and a background of inactive chronic inflammation, without IM or dysplasia. H plyori negative.    Immunosuppression - cyclosporine and MMF  Synthroid for hypothyroidism  No NSAID  No known FHx of GI malignancy or conditions      Today 12/4/23:    Today patient reports feeling similarly to last visit.  Reports onset of left sided/epigastric pain 15 to 30 minutes after taking MMF in both the morning and evening.  Wakes up without symptoms.  Describes pain as generally \"sore\" feeling like she got \"punched in the gut \".  Occasional sharper stabbing pains, though this is less frequent.  Pain typically lasts " "a couple hours starts strong and then gradually subsides.  She also reports reflux in the evening after taking MMF, this is typically not present with the morning dose.  Describes burning traveling up her chest and to her throat.  Can sometimes taste acid.  Also reports newer/worsening dysphagia symptoms with feeling of food and sometimes water getting stuck retrosternally.  Currently this occurs about once per month.  Does not think it was happening at time of last upper endoscopy, reports this is increasing in frequency.  When she feels something gets stuck and is also painful.  Has to wait for it to pass.  Reports this will keep her from sleep sometimes, will set up in chair prior to going to bed to help symptoms.    Reports that she is tried days without taking MMF and she will have no symptoms.  She reports she has been in discussion with her transplant team regarding alternatives, though has not changed medication yet.  Of note she is also being worked up for kidney transplant again, working on losing weight first a BMI less than 40.  Not currently on dialysis.    In terms of her bowel pattern she continues to alternate between diarrhea and constipation.  Reports she will go without a bowel movement or have hard stools for 2 to 3 days, then have a hard stool followed by \"release \"of soft then sometimes more liquid stool for 2 to 3 days.  When having looser stool can have up to this 6 urgent bowel movements per day.  Reports lower abdominal cramping that is relieved after bowel movements.  No blood in stool or melena.  She has tried increasing fiber in her diet, though finds this somewhat difficult on low phosphorus diet.  Not taking any supplemental soluble fiber.  Not currently on any fluid restrictions.  Currently drinking about 3 L/day.      ROS: 10pt ROS performed and otherwise negative.    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    Depression     EIN (endometrial intraepithelial neoplasia)     Fatigue     Gastroesophageal reflux disease     High cholesterol     Hypertension     Insomnia     Insulin resistance     Legally blind     Obesity     PCOS (polycystic ovarian syndrome)     PPD positive, treated 2006    9 months of INH    Pseudotumor cerebri     on Diamox    Retinitis pigmentosa     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     Uncomplicated asthma     as a child    Viremia        PREVIOUS ABDOMINAL/GYNECOLOGIC SURGERIES:  Past Surgical History:   Procedure Laterality Date    BIOPSY      cataract bilateral  06/2017    DILATION AND CURETTAGE N/A 09/13/2021    Procedure: DILATION AND CURETTAGE,;  Surgeon: Lisbeth Gallardo MD;  Location:  OR    DILATION AND CURETTAGE N/A 05/09/2022    Procedure: DILATION AND CURETTAGE;  Surgeon: Lisbeth Gallardo MD;  Location:  OR    DILATION AND CURETTAGE N/A 5/4/2023    Procedure: Dilation and Curettage of Endometrium;  Surgeon: Lisbeth Gallardo MD;  Location:  OR    DILATION AND CURETTAGE, OPERATIVE HYSTEROSCOPY WITH MORCELLATOR, COMBINED N/A 10/23/2020    Procedure: HYSTEROSCOPY, DILATION AND CURRETAGE, MYOSURE;  Surgeon: Kierra Tracy MD;  Location:  OR    DILATION AND CURETTAGE, OPERATIVE HYSTEROSCOPY WITH MORCELLATOR, COMBINED N/A 03/05/2021    Procedure: HYSTEROSCOPY, WITH DILATION AND CURETTAGE OF UTERUS USING  MORCELLATOR;  Surgeon: Fiorella Cunningham MD;  Location:  OR    ESOPHAGOSCOPY, GASTROSCOPY, DUODENOSCOPY (EGD), COMBINED N/A 01/24/2023    Procedure: ESOPHAGOGASTRODUODENOSCOPY, WITH BIOPSY;  Surgeon: Talia Aguilar MD;  Location[facility] OR    INSERT INTRAUTERINE DEVICE N/A 03/05/2021    Procedure: INSERTION MIRENA INTRAUTERINE DEVICE;   Surgeon: Fiorella Cunningham MD;  Location: [facility] OR    IR RENAL BIOPSY RIGHT  08/27/2021    LAPAROSCOPY DIAGNOSTIC (GYN)  03/05/2021    Procedure: Laparoscopy diagnostic (gyn);  Surgeon: Fiorella Cunningham MD;  Location: [facility] OR    LITHOTRIPSY      LITHOTRIPSY      REMOVE INTRAUTERINE DEVICE N/A 03/05/2021    Procedure: REMOVE MIRENA INTRAUTERINE DEVICE;  Surgeon: Fiorella Cunningham MD;  Location: [facility] OR    REMOVE INTRAUTERINE DEVICE N/A 05/09/2022    Procedure: EXCHANGE OF MIRENA INTRAUTERINE DEVICE;  Surgeon: Lisbeth Gallardo MD;  Location: [facility] OR    REPLACE INTRAUTERINE DEVICE N/A 09/13/2021    Procedure: MIRENA INTRAUTERINE DEVICE EXCHANGE;  Surgeon: Lisbeth Gallardo MD;  Location: [facility] OR    SINUS SURGERY  2001    TONSILLECTOMY      TRANSPLANT KIDNEY RECIPIENT LIVING RELATED Right 07/2006    wisdom teeth           PERTINENT MEDICATIONS:  Current Outpatient Medications   Medication Sig Dispense Refill    acetaminophen (TYLENOL) 325 MG tablet Take 2 tablets (650 mg) by mouth every 4 hours as needed for mild pain 50 tablet 0    amLODIPine (NORVASC) 5 MG tablet Take 1 tablet (5 mg) by mouth daily 90 tablet 3    carvedilol (COREG) 25 MG tablet Take 1 tablet (25 mg) by mouth 2 times daily (with meals) 180 tablet 3    levothyroxine (SYNTHROID/LEVOTHROID) 25 MCG tablet Take 1 tablet (25 mcg) by mouth daily Take in am empty stomach 90 tablet 3    levothyroxine (SYNTHROID/LEVOTHROID) 50 MCG tablet Take 1 tablet (50 mcg) by mouth daily 90 tablet 1    losartan (COZAAR) 25 MG tablet Take 1 tablet (25 mg) by mouth 2 times daily 180 tablet 3    mycophenolic acid (GENERIC EQUIVALENT) 180 MG EC tablet Take 2 tablets (360 mg) by mouth 2 times daily 360 tablet 3    NEORAL (BRAND) 25 MG capsule Take 3 capsules (75 mg) by mouth every morning AND 2 capsules (50 mg) every evening. (Patient taking differently: Take 2 capsules (75 mg) by mouth every morning AND 2 capsules (50 mg) every evening.) 450 capsule 3    vitamin D3  (CHOLECALCIFEROL) 50 mcg (2000 units) tablet Take 1 tablet (50 mcg) by mouth daily 90 tablet 3     No other NSAIDs reported by patient.  No other OTC/herbal/supplements reported by patient.    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   end of November 2018 to David has one adopted daughter David's  niece Daryn 2012 Blind.     Social Determinants of Health     Financial Resource Strain: Low Risk  (8/16/2021)    Overall Financial Resource Strain (CARDIA)     Difficulty of Paying Living Expenses: Not hard at all   Food Insecurity: No Food Insecurity (8/16/2021)    Hunger Vital Sign     Worried About Running Out of Food in the Last Year: Never true     Ran Out of Food in the Last Year: Never true   Transportation Needs: Unmet Transportation Needs (8/16/2021)    PRAPARE - Transportation     Lack of Transportation (Medical): Yes     Lack of Transportation (Non-Medical): Yes   Physical Activity: Insufficiently Active (8/16/2021)    Exercise Vital Sign     Days of Exercise per Week: 5 days     Minutes of Exercise per Session: 20 min   Stress: Stress Concern Present (8/16/2021)    [employer] - Occupational Stress Questionnaire     Feeling of Stress :  Rather much   Social Connections: Socially Integrated (8/16/2021)    Social Connection and Isolation Panel [NHANES]     Frequency of Communication with Friends and Family: More than three times a week     Frequency of Social Gatherings with Friends and Family: Once a week     Attends Oriental orthodox Services: More than 4 times per year     Active Member of Clubs or Organizations: Yes     Attends Club or Organization Meetings: Not on file     Marital Status:    Interpersonal Safety: Not At Risk (8/16/2021)    Humiliation, Afraid, Rape, and Kick questionnaire     Fear of Current or Ex-Partner: No     Emotionally Abused: No     Physically Abused: No     Sexually Abused: No   Housing Stability: Low Risk  (5/4/2022)    Housing Stability Vital Sign     Unable to Pay for Housing in the Last Year: No     Number of Places Lived in the Last Year: 1     Unstable Housing in the Last Year: No       FAMILY HISTORY:  .  Family History   Problem Relation Age of Onset    Hyperlipidemia Mother     Sjogren's Father     Hashimoto's thyroiditis Father     Other - See Comments Maternal Grandfather         surgical complication    Atrial fibrillation Paternal Grandmother     Arthritis Paternal Grandmother     Skin Cancer Paternal Grandfather         melanoma    Parkinsonism Other         paternal uncle       PHYSICAL EXAMINATION:  Vitals reviewed  There were no vitals taken for this visit.  Video physical exam  General: Patient appears well in no acute distress.   Skin: No visualized rash or lesions on visualized skin  Eyes: EOMI, no erythema, sclera icterus or discharge noted  Resp: Appears to be breathing comfortably without accessory muscle usage, speaking in full sentences, no cough  MSK: Appears to have normal range of motion based on visualized movements  Neurologic: No apparent tremors, facial movements symmetric  Psych: affect normal, alert and oriented    The rest of a comprehensive physical examination is deferred due to PHE  (public health emergency) video restrictions      PERTINENT STUDIES Reviewed in EMR    ASSESSMENT/PLAN:    # Epigastric pain  # Heartburn  Patient with epigastric pain and heartburn which reliably occur following MMF dosing, without any other triggers. Symptoms have improved with dose decreases, and she has noted the absence of the symptom should she skip a dose of MMF. EGD notable for erosive gastropathy, with biopsies showing reactive gastropathy in the background of mild chronic inactive gastritis. She occasionally has early satiety. She has had no improvement on trials of H2 blockers or PPI.    We discussed symptoms does seem consistent with medication side effect of MMF. Based on EGD, there are no other clear etiologies for her symptoms. A gastric emptying study could be considered, though her symptoms are less consistent with gastroparesis.  She has been in discussion with her transplant team regarding her epigastric pain with MMF and any potential alternatives, will defer to transplant team for management of immunosuppression.  For her reflux discussed potential retrial of famotidine at higher dose of 40 mg at night versus potential alginate such as reflux Gourmet to assist with symptom management.  She is not interested in more medication at this point,.  We will  proceed with reassessment via upper endoscopy as well with newer onset of dysphagia per below.      #Dysphagia  Onset of dysphagia with feeling of foods and liquids getting caught retrosternally with associated pain, currently occurring about once per month, though increasing in frequency and severity.  EGD about a year ago did not show causes for dysphagia.  Given this is new from last endoscopic assessment we will repeat EGD.  Differential includes peptic stricture, web, ring, dysmotility, EOE, less likely mass given fairly recent EGD.  If normal consider esophagram versus manometry.    # Irregular bowel habits  Patient alternates between  diarrhea and constipation, having hard formed stools with straining and incomplete evacuation then loose liquid stools with urgency.  She also notes when more constipated she has abdominal cramping and bloating.  Her reflux also gets worse.  We will direct our efforts at improving her bowel pattern.  Discussed that fiber can be helpful to soften and bulk stools.  She is not currently on any fluid restrictions.  Recommend starting low-dose psyllium at 1 teaspoon daily, increase to desired stool consistency.  Can also use MiraLAX as needed.        RTC 4-5 months    Thank you for this consultation. It was a pleasure to participate in the care of this patient; please contact us with any further questions.    Shannan Romo PA-C  Division of Gastroenterology, Hepatology & Nutrition  [facility]      66Minutes was spent on the date of the encounter during chart review, history and exam, documentation, and further activities as noted

## 2023-12-04 NOTE — PROGRESS NOTES
SUBJECTIVE:   CC: Carol Guillaume is an 33 year old woman who presents for preventive health visit.     Patient has been advised of split billing requirements and indicates understanding: Yes  Healthy Habits:  Do you get at least three servings of calcium containing foods daily (dairy, green leafy vegetables, etc.)? yes  Amount of exercise or daily activities, outside of work: 1 day(s) per week, fatigued so not exercising much  Problems taking medications regularly No  Medication side effects: Yes. Mycophelonic acid. Stomach pain , acid reflux, trouble swallowing, sharp chest pain, bloating, constipation, abdominal pain, joint pain, brain fog, irregular heart beat, feet swelling, headaches, dizziness, insomnia.  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    Have you had an eye exam in the past two years? no  Do you see a dentist twice per year? yes  Do you have sleep apnea, excessive snoring or daytime drowsiness? Yes- drowsiness      Hypothyroidism Follow-up    Since last visit, patient describes the following symptoms: Weight stable, no hair loss, no skin changes, no constipation, no loose stools, constipation, and fatigue  Does was adjusted in October due to elevated TSH (normal T4).  Having more bowel irritability since then  Repeat TSH is ordered and she is due to have other labs checked end of the month  She is having bilateral anterior neck pain with heart pounding in the left ear on occasion.  This has been going on for about a month, pain is intermittent and lasts about 5 minutes.  Right side is more during swallowing, left pain occurs with the heart pounding.  She has some month problems with central chest pain that occurs after swallowing, ongoing since May.      Carol feels that she is sleeping okay.  Has some occasional insomnia with trouble falling asleep.  Typically sleeps between 2am-8am.   Trying to get to bed around 10pm, has a routine  listening to a pod cast and avoids screenings.  Has trouble relaxing to fall asleep, though not falling asleep.  Ongoing for the past three years.      Today's PHQ-2 Score:       8/7/2023    12:08 PM 7/10/2023     1:03 PM   PHQ-2 ( 1999 [employer])   Q1: Little interest or pleasure in doing things 1 1   Q2: Feeling down, depressed or hopeless 2 1   PHQ-2 Score 3 2       Abuse: Current or Past(Physical, Sexual or Emotional)- NO  Do you feel safe in your environment? YES        Social History     Tobacco Use    Smoking status: Never    Smokeless tobacco: Never   Substance Use Topics    Alcohol use: Never     If you drink alcohol do you typically have >3 drinks per day or >7 drinks per week? Not Applicable                     Reviewed orders with patient.  Reviewed health maintenance and updated orders accordingly - Yes      FHS-7:        No data to display                  Pertinent mammograms are reviewed under the imaging tab.    Pertinent mammograms are reviewed under the imaging tab.  History of abnormal Pap smear: NO - age 30-65 PAP every 5 years with negative HPV co-testing recommended but appears last Pap earlier this year was done without HPV test so three year follow-up        Latest Ref Rng & Units 10/15/2018     8:49 AM 10/15/2018     8:41 AM   PAP / HPV   PAP (Historical)  NIL     HPV 16 DNA NEG^Negative  Negative    HPV 18 DNA NEG^Negative  Negative    Other HR HPV NEG^Negative  Negative      Reviewed and updated as needed this visit by clinical staff   Tobacco  Allergies  Meds              Reviewed and updated as needed this visit by Provider                     ROS:  10 point ROS of systems including Constitutional, Eyes, Respiratory, Cardiovascular, Gastroenterology, Genitourinary, Integumentary, Muscularskeletal, Psychiatric were all negative except for pertinent positives noted in my HPI.     OBJECTIVE:   /84 (BP Location: Right arm, Patient Position: Sitting, Cuff Size: Adult Regular)   Pulse  80   Wt 122.3 kg (269 lb 11.2 oz)   SpO2 98%   BMI 43.53 kg/m    EXAM:  GENERAL: healthy, alert and no distress  EYES: lateral deviation of R eye  HENT: ear canals and TM's normal, nose and mouth without ulcers or lesions  NECK: no adenopathy, no asymmetry, masses, or scars and thyroid normal to palpation  RESP: lungs clear to auscultation - no rales, rhonchi or wheezes  CV: regular rate and rhythm, normal S1 S2, no S3 or S4, no murmur, click or rub, no peripheral edema and peripheral pulses strong  ABDOMEN: soft, nontender, no hepatosplenomegaly, no masses and bowel sounds normal  MS: no gross musculoskeletal defects noted, no edema  SKIN: no suspicious lesions or rashes  NEURO: Normal strength and tone, mentation intact and speech normal  PSYCH: mentation appears normal, affect normal/bright        ASSESSMENT/PLAN:   (Z00.00) Routine general medical examination at a health care facility  (primary encounter diagnosis)    Pap smear: Normal Pap done in April 2023 but looks like this was done without HPV testing, so follow-up due April 2026  Immunizations: will get tetanus booster at pharmacy.  Declined flu and COVID vax today  Lab Studies: plan to recheck TSH at the end of this month    (R53.83) Other fatigue  And  (F51.01) Primary insomnia    Comment: Carol presents with ongoing fatigue that may be multifactorial- her thyroid medication is being adjusted, her kidney transplant is failing, could be med side effects, and she is having some insomnia issues.  Plan to recheck TSH with labs later this month and suggested CBT-I for insomnia    Plan: Sleep Psychology  Referral            (M54.2) Neck pain  Comment: Carol is having intermittent anterior neck pain, the right side tends to occur with swallowing and left side when she is hearing palpitation is in the left ear.  Pain only lasts 5 minutes and resolves on its own.  Ear exam normal, no carotid bruits, no pain to palpation over the thyroid.  Pain is  "more lateral over the SCMs, so may be muscular though unclear why it would be associated with those other factors.    Plan: Since pain is brief, will plan to observe for now.  Follow-up if symptoms become more persistent       COUNSELING:   Reviewed preventive health counseling, as reflected in patient instructions    Estimated body mass index is 43.53 kg/m  as calculated from the following:    Height as of an earlier encounter on 12/4/23: 1.676 m (5' 6\").    Weight as of this encounter: 122.3 kg (269 lb 11.2 oz).    She reports that she has never smoked. She has never used smokeless tobacco.      Counseling Resources:  ATP IV Guidelines  Pooled Cohorts Equation Calculator  Breast Cancer Risk Calculator  BRCA-Related Cancer Risk Assessment: FHS-7 Tool  FRAX Risk Assessment  ICSI Preventive Guidelines  Dietary Guidelines for Americans, 2010  USDA's MyPlate  ASA Prophylaxis  Lung CA Screening    Ike Molina MD  [facility] INTERNAL MEDIC[facility] [place]   "

## 2023-12-04 NOTE — NURSING NOTE
Is the patient currently in the state of [place]? YES    Visit mode:VIDEO    If the visit is dropped, the patient can be reconnected by: VIDEO VISIT: Text to cell phone:   Telephone Information:   Mobile [phone]       Will anyone else be joining the visit? NO  (If patient encounters technical issues they should call [phone]    How would you like to obtain your AVS? MyChart    Are changes needed to the allergy or medication list? No    Reason for visit: RECHECK    Frances BELCHERF

## 2023-12-04 NOTE — PATIENT INSTRUCTIONS
It was a pleasure taking care of you today.  I've included a brief summary of our discussion and care plan from today's visit below.  Please review this information with your primary care provider.  ______________________________________________________________________    My recommendations are summarized as follows:  -- Schedule upper endoscopy to evaluate your difficulty with swallowing  -- Recommend starting supplementation with a powdered soluble fiber. When used on a daily basis, this can help regulate the consistency of your stools. Generally, the powdered variations often work best. It is important to stay well-hydrated with use of fiber supplementation and to make sure that the fiber powder is well mixed with water as directed on the label. You may experience some bloating with initiation of fiber, which will improve over the first few weeks. A good trial to evaluate the effect is 3-6 months.   Use pure psyllium husk powder (Konsyl is a brand name). This has no additives. For this, start with 1/2-1 teaspoon daily at first, then gradually increase to 1 Tablespoon daily.  -- please see scheduling information provided below     Return to GI Clinic in 4-5 months to review your progress.    ______________________________________________________________________    How do I schedule labs, imaging studies, or procedures that were ordered in clinic today?     Labs: To schedule lab appointment at the [facility], use my chart or call [phone]. If you have a [place] lab closer to home where you are regularly seen you can give them a call.     Procedures: If a colonoscopy, upper endoscopy, breath test, esophageal manometry, or pH impedence was ordered today, our endoscopy team will call you to schedule this. If you have not heard from our endoscopy team within a week, please call [phone] to schedule.     Imaging Studies: If you were scheduled for a CT scan, X-ray, MRI, ultrasound, HIDA scan or  other imaging study, please call [phone] to have this scheduled.     Referral: If a referral to another specialty was ordered, expect a phone call or follow instructions above. If you have not heard from anyone regarding your referral in a week, please call our clinic to check the status.     Who do I call with any questions after my visit?  Please be in touch if there are any further questions that arise following today's visit.  There are multiple ways to contact your gastroenterology care team.      During business hours, you may reach a Gastroenterology nurse at [phone]    To schedule or reschedule an appointment, please call [phone].     You can always send a secure message through DashBurst.  DashBurst messages are answered by your nurse or doctor typically within 24 hours.  Please allow extra time on weekends and holidays.      For urgent/emergent questions after business hours, you may reach the on-call GI Fellow by contacting the [facility]  at [phone].     How will I get the results of any tests ordered?    You will receive all of your results.  If you have signed up for [employer], any tests ordered at your visit will be available to you after your provider reviews them.  Typically this takes 1-2 weeks.  If there are urgent results that require a change in your care plan, your provider or nurse will call you to discuss the next steps.      What is DashBurst?  DashBurst is a secure way for you to access all of your healthcare records from the [facility].  It is a web based computer program, so you can sign on to it from any location.  It also allows you to send secure messages to your care team.  I recommend signing up for DashBurst access if you have not already done so and are comfortable with using a computer.      How to I schedule a follow-up visit?  If you did not schedule a follow-up visit today, please call [phone] to schedule a follow-up office visit.       Sincerely,    Shannan Romo PA-C  Division of Gastroenterology, Hepatology & Nutrition  [facility]

## 2023-12-04 NOTE — LETTER
"    12/4/2023         RE: Carol Guillaume  [address] [place] [facility] [address]        Dear Colleague,    Thank you for referring your patient, Carol Guillaume, to the [facility] [place]. Please see a copy of my visit note below.      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     At initial visit she described onset of nonradiating epigastric to left sided abdominal discomfort 10-30 minutes after taking MMF.  Initially this would last most of the day, however with dose decrease in April pain somewhat improved in the last several hours.  She does take MMF twice daily with onset of pain with both doses.  No other triggers including eating.  She also has history of reflux with burning regurgitation into her throat with MMF.  She has tried both famotidine and omeprazole without improvement in her symptoms.     EGD 1/24/2023 notable for erosive gastropathy with a few small erosions in the gastric cardia and anturm. Esophageal exam was normal, Hill grade I. Gastric biopsies with reactive antral gastropathy and a background of inactive chronic inflammation, without IM or dysplasia. H plyori negative.    Immunosuppression - cyclosporine and MMF  Synthroid for hypothyroidism  No NSAID  No known FHx of GI malignancy or conditions      Today 12/4/23:    Today patient reports feeling similarly to last visit.  Reports onset of left sided/epigastric pain 15 to 30 minutes after taking MMF in both the morning and evening.  Wakes up without symptoms.  Describes pain as generally \"sore\" feeling like she got \"punched in the gut \".  Occasional sharper stabbing pains, though this is less frequent.  " "Pain typically lasts a couple hours starts strong and then gradually subsides.  She also reports reflux in the evening after taking MMF, this is typically not present with the morning dose.  Describes burning traveling up her chest and to her throat.  Can sometimes taste acid.  Also reports newer/worsening dysphagia symptoms with feeling of food and sometimes water getting stuck retrosternally.  Currently this occurs about once per month.  Does not think it was happening at time of last upper endoscopy, reports this is increasing in frequency.  When she feels something gets stuck and is also painful.  Has to wait for it to pass.  Reports this will keep her from sleep sometimes, will set up in chair prior to going to bed to help symptoms.    Reports that she is tried days without taking MMF and she will have no symptoms.  She reports she has been in discussion with her transplant team regarding alternatives, though has not changed medication yet.  Of note she is also being worked up for kidney transplant again, working on losing weight first a BMI less than 40.  Not currently on dialysis.    In terms of her bowel pattern she continues to alternate between diarrhea and constipation.  Reports she will go without a bowel movement or have hard stools for 2 to 3 days, then have a hard stool followed by \"release \"of soft then sometimes more liquid stool for 2 to 3 days.  When having looser stool can have up to this 6 urgent bowel movements per day.  Reports lower abdominal cramping that is relieved after bowel movements.  No blood in stool or melena.  She has tried increasing fiber in her diet, though finds this somewhat difficult on low phosphorus diet.  Not taking any supplemental soluble fiber.  Not currently on any fluid restrictions.  Currently drinking about 3 L/day.      ROS: 10pt ROS performed and otherwise negative.    PAST MEDICAL HISTORY:  Past Medical History:   Diagnosis Date    Acquired hypothyroidism " 8/2/2023    Anemia     Anxiety     Cancer (H) 10/30/2020    Atypical Hyperplasia endometriosis carcinoma    Cataract 2015    CMV (cytomegalovirus infection) (H)     CMV disease (H) 2006    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    Depression     EIN (endometrial intraepithelial neoplasia)     Fatigue     Gastroesophageal reflux disease     High cholesterol     Hypertension     Insomnia     Insulin resistance     Legally blind     Obesity     PCOS (polycystic ovarian syndrome)     PPD positive, treated 2006    9 months of INH    Pseudotumor cerebri     on Diamox    Retinitis pigmentosa     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     Uncomplicated asthma     as a child    Viremia        PREVIOUS ABDOMINAL/GYNECOLOGIC SURGERIES:  Past Surgical History:   Procedure Laterality Date    BIOPSY      cataract bilateral  06/2017    DILATION AND CURETTAGE N/A 09/13/2021    Procedure: DILATION AND CURETTAGE,;  Surgeon: Lisbeth Gallardo MD;  Location:  OR    DILATION AND CURETTAGE N/A 05/09/2022    Procedure: DILATION AND CURETTAGE;  Surgeon: Lisbeth Gallardo MD;  Location:  OR    DILATION AND CURETTAGE N/A 5/4/2023    Procedure: Dilation and Curettage of Endometrium;  Surgeon: Lisbeth Gallardo MD;  Location:  OR    DILATION AND CURETTAGE, OPERATIVE HYSTEROSCOPY WITH MORCELLATOR, COMBINED N/A 10/23/2020    Procedure: HYSTEROSCOPY, DILATION AND CURRETAGE, MYOSURE;  Surgeon: Kierra Tracy MD;  Location:  OR    DILATION AND CURETTAGE, OPERATIVE HYSTEROSCOPY WITH MORCELLATOR, COMBINED N/A 03/05/2021    Procedure: HYSTEROSCOPY, WITH DILATION AND CURETTAGE OF UTERUS USING  MORCELLATOR;  Surgeon: Fiorella Cunningham MD;  Location:  OR    ESOPHAGOSCOPY, GASTROSCOPY, DUODENOSCOPY (EGD), COMBINED N/A 01/24/2023    Procedure: ESOPHAGOGASTRODUODENOSCOPY, WITH BIOPSY;  Surgeon: Talia Aguilar MD;  Location: [facility] OR    INSERT INTRAUTERINE DEVICE N/A 03/05/2021    Procedure: INSERTION  MIRENA INTRAUTERINE DEVICE;  Surgeon: Fiorella Cunningham MD;  Location: [facility] OR    IR RENAL BIOPSY RIGHT  08/27/2021    LAPAROSCOPY DIAGNOSTIC (GYN)  03/05/2021    Procedure: Laparoscopy diagnostic (gyn);  Surgeon: Fiorella Cunningham MD;  Location: [facility]    LITHOTRIPSY      LITHOTRIPSY      REMOVE INTRAUTERINE DEVICE N/A 03/05/2021    Procedure: REMOVE MIRENA INTRAUTERINE DEVICE;  Surgeon: Fiorella Cunningham MD;  Location: [facility]    REMOVE INTRAUTERINE DEVICE N/A 05/09/2022    Procedure: EXCHANGE OF MIRENA INTRAUTERINE DEVICE;  Surgeon: Lisbeth Gallardo MD;  [facility]: [facility]    REPLACE INTRAUTERINE DEVICE N/A 09/13/2021    Procedure: MIRENA INTRAUTERINE DEVICE EXCHANGE;  Surgeon: Lisbeth Gallardo MD;  Location: [facility]    SINUS SURGERY  2001    TONSILLECTOMY      TRANSPLANT KIDNEY RECIPIENT LIVING RELATED Right 07/2006    wisdom teeth           PERTINENT MEDICATIONS:  Current Outpatient Medications   Medication Sig Dispense Refill    acetaminophen (TYLENOL) 325 MG tablet Take 2 tablets (650 mg) by mouth every 4 hours as needed for mild pain 50 tablet 0    amLODIPine (NORVASC) 5 MG tablet Take 1 tablet (5 mg) by mouth daily 90 tablet 3    carvedilol (COREG) 25 MG tablet Take 1 tablet (25 mg) by mouth 2 times daily (with meals) 180 tablet 3    levothyroxine (SYNTHROID/LEVOTHROID) 25 MCG tablet Take 1 tablet (25 mcg) by mouth daily Take in am empty stomach 90 tablet 3    levothyroxine (SYNTHROID/LEVOTHROID) 50 MCG tablet Take 1 tablet (50 mcg) by mouth daily 90 tablet 1    losartan (COZAAR) 25 MG tablet Take 1 tablet (25 mg) by mouth 2 times daily 180 tablet 3    mycophenolic acid (GENERIC EQUIVALENT) 180 MG EC tablet Take 2 tablets (360 mg) by mouth 2 times daily 360 tablet 3    NEORAL (BRAND) 25 MG capsule Take 3 capsules (75 mg) by mouth every morning AND 2 capsules (50 mg) every evening. (Patient taking differently: Take 2 capsules (75 mg) by mouth every morning AND 2 capsules (50 mg) every evening.) 450  capsule 3    vitamin D3 (CHOLECALCIFEROL) 50 mcg (2000 units) tablet Take 1 tablet (50 mcg) by mouth daily 90 tablet 3     No other NSAIDs reported by patient.  No other OTC/herbal/supplements reported by patient.    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   end of November 2018 to David has one adopted daughter David's  niece Daryn 2012 Blind.     Social Determinants of Health     Financial Resource Strain: Low Risk  (8/16/2021)    Overall Financial Resource Strain (CARDIA)     Difficulty of Paying Living Expenses: Not hard at all   Food Insecurity: No Food Insecurity (8/16/2021)    Hunger Vital Sign     Worried About Running Out of Food in the Last Year: Never true     Ran Out of Food in the Last Year: Never true   Transportation Needs: Unmet Transportation Needs (8/16/2021)    PRAPARE - Transportation     Lack of Transportation (Medical): Yes     Lack of Transportation (Non-Medical): Yes   Physical Activity: Insufficiently Active (8/16/2021)    Exercise Vital Sign     Days of Exercise per Week: 5 days     Minutes of Exercise per Session: 20 min   Stress: Stress Concern Present (8/16/2021)    [place] [employer] - Occupational Stress Questionnaire      Feeling of Stress : Rather much   Social Connections: Socially Integrated (8/16/2021)    Social Connection and Isolation Panel [NHANES]     Frequency of Communication with Friends and Family: More than three times a week     Frequency of Social Gatherings with Friends and Family: Once a week     Attends Jain Services: More than 4 times per year     Active Member of Clubs or Organizations: Yes     Attends Club or Organization Meetings: Not on file     Marital Status:    Interpersonal Safety: Not At Risk (8/16/2021)    Humiliation, Afraid, Rape, and Kick questionnaire     Fear of Current or Ex-Partner: No     Emotionally Abused: No     Physically Abused: No     Sexually Abused: No   Housing Stability: Low Risk  (5/4/2022)    Housing Stability Vital Sign     Unable to Pay for Housing in the Last Year: No     Number of Places Lived in the Last Year: 1     Unstable Housing in the Last Year: No       FAMILY HISTORY:  .  Family History   Problem Relation Age of Onset    Hyperlipidemia Mother     Sjogren's Father     Hashimoto's thyroiditis Father     Other - See Comments Maternal Grandfather         surgical complication    Atrial fibrillation Paternal Grandmother     Arthritis Paternal Grandmother     Skin Cancer Paternal Grandfather         melanoma    Parkinsonism Other         paternal uncle       PHYSICAL EXAMINATION:  Vitals reviewed  There were no vitals taken for this visit.  Video physical exam  General: Patient appears well in no acute distress.   Skin: No visualized rash or lesions on visualized skin  Eyes: EOMI, no erythema, sclera icterus or discharge noted  Resp: Appears to be breathing comfortably without accessory muscle usage, speaking in full sentences, no cough  MSK: Appears to have normal range of motion based on visualized movements  Neurologic: No apparent tremors, facial movements symmetric  Psych: affect normal, alert and oriented    The rest of a comprehensive physical examination is  deferred due to PHE (public health emergency) video restrictions      PERTINENT STUDIES Reviewed in EMR    ASSESSMENT/PLAN:    # Epigastric pain  # Heartburn  Patient with epigastric pain and heartburn which reliably occur following MMF dosing, without any other triggers. Symptoms have improved with dose decreases, and she has noted the absence of the symptom should she skip a dose of MMF. EGD notable for erosive gastropathy, with biopsies showing reactive gastropathy in the background of mild chronic inactive gastritis. She occasionally has early satiety. She has had no improvement on trials of H2 blockers or PPI.    We discussed symptoms does seem consistent with medication side effect of MMF. Based on EGD, there are no other clear etiologies for her symptoms. A gastric emptying study could be considered, though her symptoms are less consistent with gastroparesis.  She has been in discussion with her transplant team regarding her epigastric pain with MMF and any potential alternatives, will defer to transplant team for management of immunosuppression.  For her reflux discussed potential retrial of famotidine at higher dose of 40 mg at night versus potential alginate such as reflux Gourmet to assist with symptom management.  She is not interested in more medication at this point,.  We will  proceed with reassessment via upper endoscopy as well with newer onset of dysphagia per below.      #Dysphagia  Onset of dysphagia with feeling of foods and liquids getting caught retrosternally with associated pain, currently occurring about once per month, though increasing in frequency and severity.  EGD about a year ago did not show causes for dysphagia.  Given this is new from last endoscopic assessment we will repeat EGD.  Differential includes peptic stricture, web, ring, dysmotility, EOE, less likely mass given fairly recent EGD.  If normal consider esophagram versus manometry.    # Irregular bowel habits  Patient  alternates between diarrhea and constipation, having hard formed stools with straining and incomplete evacuation then loose liquid stools with urgency.  She also notes when more constipated she has abdominal cramping and bloating.  Her reflux also gets worse.  We will direct our efforts at improving her bowel pattern.  Discussed that fiber can be helpful to soften and bulk stools.  She is not currently on any fluid restrictions.  Recommend starting low-dose psyllium at 1 teaspoon daily, increase to desired stool consistency.  Can also use MiraLAX as needed.    RTC 4-5 months    Thank you for this consultation. It was a pleasure to participate in the care of this patient; please contact us with any further questions.      66Minutes was spent on the date of the encounter during chart review, history and exam, documentation, and further activities as noted         Again, thank you for allowing me to participate in the care of your patient.      Sincerely,    Shannan Romo PA-C

## 2023-12-04 NOTE — PROGRESS NOTES
Carol is a 33 year old that presents in clinic today for the following:     Chief Complaint   Patient presents with    Physical     Discuss thyroid issues.           12/4/2023     4:48 PM   Additional Questions   Roomed by KTR       Screenings from encounters over the past 10 days    No data recorded       Lin Preston, EMT at 4:58 PM on 12/4/2023

## 2023-12-06 ENCOUNTER — TELEPHONE (OUTPATIENT)
Dept: GASTROENTEROLOGY | Facility: CLINIC | Age: 33
End: 2023-12-06
Payer: COMMERCIAL

## 2023-12-06 NOTE — TELEPHONE ENCOUNTER
1st attempt- LVM and sent mychart    Schedule:  4-5 mo follow-up appointment with Shannan Romo after any testing/procedures (around April/May 2024). RTN GI, in person or virtual

## 2023-12-15 ENCOUNTER — HOSPITAL ENCOUNTER (OUTPATIENT)
Facility: AMBULATORY SURGERY CENTER | Age: 33
End: 2023-12-15
Attending: INTERNAL MEDICINE
Payer: COMMERCIAL

## 2023-12-15 ENCOUNTER — TELEPHONE (OUTPATIENT)
Dept: GASTROENTEROLOGY | Facility: CLINIC | Age: 33
End: 2023-12-15
Payer: COMMERCIAL

## 2023-12-15 NOTE — TELEPHONE ENCOUNTER
"Endoscopy Scheduling Screen    Have you had a positive Covid test in the last 14 days?  No    Are you active on MyChart?   Yes    What insurance is in the chart?  Other:  [facility]     Ordering/Referring Provider:     Shannan Romo PA-C       (If ordering provider performs procedure, schedule with ordering provider unless otherwise instructed. )    BMI: Estimated body mass index is 43.53 kg/m  as calculated from the following:    Height as of 12/4/23: 1.676 m (5' 6\").    Weight as of 12/4/23: 122.3 kg (269 lb 11.2 oz).     Sedation Ordered  DEEP/MAC     Are you taking methadone or Suboxone?  No    Are you taking any prescription medications for pain 3 or more times per week?   NO - No RN review required.    Do you have a history of malignant hyperthermia or adverse reaction to anesthesia?  No    (Females) Are you currently pregnant?   No     Have you been diagnosed or told you have pulmonary hypertension?   No    Do you have an LVAD?  No    Have you been told you have moderate to severe sleep apnea?  No    Have you been told you have COPD, asthma, or any other lung disease?  No    Do you have any heart conditions?  No     Have you ever had an organ transplant?   Yes - No [facility] - KIDNEY     Have you ever had or are you awaiting a heart or lung transplant?   No    Have you had a stroke or transient ischemic attack (TIA aka \"mini stroke\" in the last 6 months?   No    Have you been diagnosed with or been told you have cirrhosis of the liver?   No    Are you currently on dialysis?   No    Do you need assistance transferring?   No    BMI: Estimated body mass index is 43.53 kg/m  as calculated from the following:    Height as of 12/4/23: 1.676 m (5' 6\").    Weight as of 12/4/23: 122.3 kg (269 lb 11.2 oz).     Is patients BMI > 40 and scheduling location [facility]?  No    Do you take an injectable medication for weight loss or diabetes (excluding insulin)?  No    Do you take the medication Naltrexone?  No    Do you take blood " thinners?  No       Prep   Are you currently on dialysis or do you have chronic kidney disease?  Yes ([facility] Prep)    Do you have a diagnosis of diabetes?  No    Do you have a diagnosis of cystic fibrosis (CF)?  No    On a regular basis do you go 3 -5 days between bowel movements?  Yes (Extended Prep)    BMI > 40?  No    Preferred Pharmacy:    [facility] - [place], [place] - [address]   [address] [facility] [address]  Phone: [phone] Fax: [phone]      Final Scheduling Details     Procedure scheduled  Upper endoscopy (EGD)    Surgeon:  AAYUSH     Date of procedure:  03/22/2024     Pre-OP / PAC:   No - Not required for this site.    Location  [facility] - ASC - Per order.    Sedation   MAC/Deep Sedation - Per order.      Patient Reminders:   You will receive a call from a Nurse to review instructions and health history.  This assessment must be completed prior to your procedure.  Failure to complete the Nurse assessment may result in the procedure being cancelled.      On the day of your procedure, please designate an adult(s) who can drive you home stay with you for the next 24 hours. The medicines used in the exam will make you sleepy. You will not be able to drive.      You cannot take public transportation, ride share services, or non-medical taxi service without a responsible caregiver.  Medical transport services are allowed with the requirement that a responsible caregiver will receive you at your destination.  We require that drivers and caregivers are confirmed prior to your procedure.

## 2024-01-24 ENCOUNTER — TELEPHONE (OUTPATIENT)
Dept: GASTROENTEROLOGY | Facility: CLINIC | Age: 34
End: 2024-01-24
Payer: COMMERCIAL

## 2024-01-24 NOTE — TELEPHONE ENCOUNTER
Reason for Reschedule/Cancellation (please be detailed, any staff messages or encounters to note?): AAYUSH DÍAZ      Prior to reschedule please review:  Ordering Provider:     ALEXIA JIMENES     Sedation Determined: MAC  Does patient have any ASC Exclusions, please identify?: N- KIDNEY TRANSPLANT (NO [facility])      Notes on Cancelled Procedure:  Procedure: Upper Endoscopy [EGD]   Date: 03/22/2024  Location: [facility]; [address] [place] [address], [address] Floor, [place] [id number]  Surgeon: AAYUSH      Rescheduled: No  1ST ATTEMPT TO RESCHEDULE. LVM TO CALL BACK. SENT [employer].    CASE IN DEPOT

## 2024-01-25 ENCOUNTER — TELEPHONE (OUTPATIENT)
Dept: TRANSPLANT | Facility: CLINIC | Age: 34
End: 2024-01-25
Payer: COMMERCIAL

## 2024-01-25 DIAGNOSIS — Z94.0 KIDNEY REPLACED BY TRANSPLANT: ICD-10-CM

## 2024-01-25 DIAGNOSIS — Z79.899 ENCOUNTER FOR LONG-TERM CURRENT USE OF MEDICATION: ICD-10-CM

## 2024-01-25 DIAGNOSIS — Z94.0 KIDNEY TRANSPLANTED: ICD-10-CM

## 2024-01-25 DIAGNOSIS — D84.9 IMMUNOSUPPRESSION (H): ICD-10-CM

## 2024-01-25 DIAGNOSIS — Z48.298 AFTERCARE FOLLOWING ORGAN TRANSPLANT: ICD-10-CM

## 2024-01-25 NOTE — TELEPHONE ENCOUNTER
[facility] Call Center    Phone Message    May a detailed message be left on voicemail: yes     Reason for Call: Other: Patient calling in to request refill in immunosuppressant medication and add phosphorus level to lab draw.  Patient requests mychart message sent once completed.  Thanks!     Action Taken: Message routed to:  [facility] WOOD HERNANDEZ    Travel Screening: Not Applicable

## 2024-01-26 RX ORDER — CYCLOSPORINE 25 MG/1
CAPSULE, LIQUID FILLED ORAL
Qty: 450 CAPSULE | Refills: 3 | Status: SHIPPED | OUTPATIENT
Start: 2024-01-26 | End: 2024-04-26

## 2024-01-26 RX ORDER — MYCOPHENOLIC ACID 180 MG/1
360 TABLET, DELAYED RELEASE ORAL 2 TIMES DAILY
Qty: 360 TABLET | Refills: 3 | Status: SHIPPED | OUTPATIENT
Start: 2024-01-26 | End: 2024-06-18 | Stop reason: ALTCHOICE

## 2024-01-26 NOTE — TELEPHONE ENCOUNTER
IS refill request sent.  See my chart sent 1/25      Ronna Hughes RN   Transplant Coordinator  [phone]

## 2024-01-29 ENCOUNTER — HOSPITAL ENCOUNTER (OUTPATIENT)
Facility: AMBULATORY SURGERY CENTER | Age: 34
End: 2024-01-29
Attending: INTERNAL MEDICINE
Payer: COMMERCIAL

## 2024-01-29 NOTE — TELEPHONE ENCOUNTER
Caller: Carol    Reason for Reschedule/Cancellation (please be detailed, any staff messages or encounters to note?): Provider out          Rescheduled: Yes  Procedure: Upper Endoscopy [EGD]   Date: 06/27/2024  Location: [facility]; [address] [place] [address], [address] Floor, [place] [address]  Surgeon: Stefanie  Sedation Level Scheduled  MAC,  Reason for Sedation Level Referral  Prep/Instructions updated and sent: y       Send In - basket message to Panc - Boris Pool if EUS  procedure is canceled or rescheduled: [ N/A, YES or NO] n/a

## 2024-01-31 ENCOUNTER — TELEPHONE (OUTPATIENT)
Dept: SLEEP MEDICINE | Facility: CLINIC | Age: 34
End: 2024-01-31
Payer: COMMERCIAL

## 2024-01-31 NOTE — TELEPHONE ENCOUNTER
Left message informing patient appointment scheduled with Dr. Raman on 2/6/2024 was scheduled in error and will need to be rescheduled. Patient needs a new patient CBTI appointment. Patient was given the phone number to the sleep scheduling line.

## 2024-02-02 ENCOUNTER — TELEPHONE (OUTPATIENT)
Dept: TRANSPLANT | Facility: CLINIC | Age: 34
End: 2024-02-02

## 2024-02-02 ENCOUNTER — LAB (OUTPATIENT)
Dept: LAB | Facility: CLINIC | Age: 34
End: 2024-02-02
Attending: INTERNAL MEDICINE
Payer: COMMERCIAL

## 2024-02-02 DIAGNOSIS — E03.9 HYPOTHYROIDISM, UNSPECIFIED TYPE: ICD-10-CM

## 2024-02-02 DIAGNOSIS — Z48.298 AFTERCARE FOLLOWING ORGAN TRANSPLANT: ICD-10-CM

## 2024-02-02 DIAGNOSIS — Z94.0 KIDNEY REPLACED BY TRANSPLANT: ICD-10-CM

## 2024-02-02 DIAGNOSIS — Z98.890 OTHER SPECIFIED POSTPROCEDURAL STATES: ICD-10-CM

## 2024-02-02 DIAGNOSIS — Z94.0 KIDNEY REPLACED BY TRANSPLANT: Primary | ICD-10-CM

## 2024-02-02 DIAGNOSIS — Z79.899 ENCOUNTER FOR LONG-TERM CURRENT USE OF MEDICATION: ICD-10-CM

## 2024-02-02 DIAGNOSIS — Z94.0 KIDNEY TRANSPLANTED: ICD-10-CM

## 2024-02-02 DIAGNOSIS — Z98.890 OTHER SPECIFIED POSTPROCEDURAL STATES: Primary | ICD-10-CM

## 2024-02-02 LAB
ANION GAP SERPL CALCULATED.3IONS-SCNC: 11 MMOL/L (ref 7–15)
BUN SERPL-MCNC: 37.3 MG/DL (ref 6–20)
CALCIUM SERPL-MCNC: 9.3 MG/DL (ref 8.6–10)
CHLORIDE SERPL-SCNC: 104 MMOL/L (ref 98–107)
CREAT SERPL-MCNC: 2.82 MG/DL (ref 0.51–0.95)
CYCLOSPORINE BLD LC/MS/MS-MCNC: 39 UG/L (ref 50–400)
CYCLOSPORINE BLD LC/MS/MS-MCNC: 39 UG/L (ref 50–400)
DEPRECATED HCO3 PLAS-SCNC: 24 MMOL/L (ref 22–29)
EGFRCR SERPLBLD CKD-EPI 2021: 22 ML/MIN/1.73M2
ERYTHROCYTE [DISTWIDTH] IN BLOOD BY AUTOMATED COUNT: 12.3 % (ref 10–15)
GLUCOSE SERPL-MCNC: 105 MG/DL (ref 70–99)
HCT VFR BLD AUTO: 33.7 % (ref 35–47)
HGB BLD-MCNC: 10.9 G/DL (ref 11.7–15.7)
MCH RBC QN AUTO: 27.3 PG (ref 26.5–33)
MCHC RBC AUTO-ENTMCNC: 32.3 G/DL (ref 31.5–36.5)
MCV RBC AUTO: 85 FL (ref 78–100)
PLATELET # BLD AUTO: 244 10E3/UL (ref 150–450)
POTASSIUM SERPL-SCNC: 4.6 MMOL/L (ref 3.4–5.3)
RBC # BLD AUTO: 3.99 10E6/UL (ref 3.8–5.2)
SODIUM SERPL-SCNC: 139 MMOL/L (ref 135–145)
TME LAST DOSE: ABNORMAL H
TSH SERPL DL<=0.005 MIU/L-ACNC: 2.72 UIU/ML (ref 0.3–4.2)
WBC # BLD AUTO: 8.2 10E3/UL (ref 4–11)

## 2024-02-02 PROCEDURE — 84443 ASSAY THYROID STIM HORMONE: CPT | Performed by: PATHOLOGY

## 2024-02-02 PROCEDURE — 80158 DRUG ASSAY CYCLOSPORINE: CPT | Performed by: INTERNAL MEDICINE

## 2024-02-02 PROCEDURE — 99000 SPECIMEN HANDLING OFFICE-LAB: CPT | Performed by: PATHOLOGY

## 2024-02-02 PROCEDURE — 36415 COLL VENOUS BLD VENIPUNCTURE: CPT | Performed by: PATHOLOGY

## 2024-02-02 PROCEDURE — 85027 COMPLETE CBC AUTOMATED: CPT | Performed by: PATHOLOGY

## 2024-02-02 PROCEDURE — 80048 BASIC METABOLIC PNL TOTAL CA: CPT | Performed by: PATHOLOGY

## 2024-02-02 NOTE — TELEPHONE ENCOUNTER
ISSUE:   Cyclosporine level 39 on 2/2/2024, goal , dose 75 mg in the AM and 50 mg in the PM .    PLAN:   Call Patientand confirm this was an accurate 12-hour trough.   Verify Cyclosporine dose 75 mg/ 50 mg BID.   Confirm no new medications or illness.   Confirm no missed doses.   If accurate trough and accurate dose, increase Cyclosporine dose to k75 mg BID     Is this more than a 50% increase or decrease in current IS dose: No  If YES, justification: na    Repeat labs in 1 weeks.  *If > 50% change in immunosuppression dose, repeat labs in 1 week.     OUTCOME:   Left Vm asking for return call or response to my chart regarding dose change.    Ronna Hughes RN   Transplant Coordinator  [phone]

## 2024-02-02 NOTE — TELEPHONE ENCOUNTER
Carol stated, she was to have a phos., ran with her labs that were drawn today 02/02/2024.   Also, needs updated lab orders in chart 02/02/2024.

## 2024-02-16 ENCOUNTER — LAB (OUTPATIENT)
Dept: LAB | Facility: CLINIC | Age: 34
End: 2024-02-16
Attending: INTERNAL MEDICINE
Payer: COMMERCIAL

## 2024-02-16 DIAGNOSIS — Z94.0 KIDNEY REPLACED BY TRANSPLANT: ICD-10-CM

## 2024-02-16 DIAGNOSIS — Z48.298 AFTERCARE FOLLOWING ORGAN TRANSPLANT: ICD-10-CM

## 2024-02-16 DIAGNOSIS — Z98.890 OTHER SPECIFIED POSTPROCEDURAL STATES: ICD-10-CM

## 2024-02-16 DIAGNOSIS — Z79.899 ENCOUNTER FOR LONG-TERM CURRENT USE OF MEDICATION: ICD-10-CM

## 2024-02-16 LAB
ALBUMIN MFR UR ELPH: 36.7 MG/DL
CREAT UR-MCNC: 92.9 MG/DL
CYCLOSPORINE BLD LC/MS/MS-MCNC: 67 UG/L (ref 50–400)
PROT/CREAT 24H UR: 0.4 MG/MG CR (ref 0–0.2)
TME LAST DOSE: NORMAL H
TME LAST DOSE: NORMAL H

## 2024-02-16 PROCEDURE — 80158 DRUG ASSAY CYCLOSPORINE: CPT | Performed by: INTERNAL MEDICINE

## 2024-02-16 PROCEDURE — 36415 COLL VENOUS BLD VENIPUNCTURE: CPT | Performed by: PATHOLOGY

## 2024-02-16 PROCEDURE — 99000 SPECIMEN HANDLING OFFICE-LAB: CPT | Performed by: PATHOLOGY

## 2024-02-16 PROCEDURE — 84156 ASSAY OF PROTEIN URINE: CPT | Performed by: PATHOLOGY

## 2024-02-19 ENCOUNTER — TELEPHONE (OUTPATIENT)
Dept: TRANSPLANT | Facility: CLINIC | Age: 34
End: 2024-02-19
Payer: COMMERCIAL

## 2024-02-19 NOTE — TELEPHONE ENCOUNTER
Left message and sent FilaExpresst message to patient regarding:  Cyclosporine level 67 on 2/16/2024, goal , dose 50 mg BID.     LPN TASK/ PLAN:   Call Patientand confirm this was an accurate 12-hour trough.   Verify Cyclosporine dose 50 mg BID.   Confirm no new medications or illness.   Confirm no missed doses.   If accurate trough and accurate dose, increase Cyclosporine dose to 75 mg int he AM and 50  mg in the PM      Is this more than a 50% increase or decrease in current IS dose: No  If YES, justification: na     Repeat labs in 2 weeks.  *If > 50% change in immunosuppression dose, repeat labs in 1 week.

## 2024-02-19 NOTE — TELEPHONE ENCOUNTER
ISSUE:   Cyclosporine level 67 on 2/16/2024, goal , dose 50 mg BID.    LPN TASK/ PLAN:   Call Patientand confirm this was an accurate 12-hour trough.   Verify Cyclosporine dose 50 mg BID.   Confirm no new medications or illness.   Confirm no missed doses.   If accurate trough and accurate dose, increase Cyclosporine dose to 75 mg int he AM and 50  mg in the PM     Is this more than a 50% increase or decrease in current IS dose: No  If YES, justification: na    Repeat labs in 2 weeks.  *If > 50% change in immunosuppression dose, repeat labs in 1 week.       Please update RX as well as place orders for repeat CSA in 2 weeks.  Thank you    Ronna Hughes RN   Transplant Coordinator  [phone]

## 2024-04-17 ENCOUNTER — LAB (OUTPATIENT)
Dept: LAB | Facility: CLINIC | Age: 34
End: 2024-04-17
Payer: COMMERCIAL

## 2024-04-17 ENCOUNTER — TELEPHONE (OUTPATIENT)
Dept: TRANSPLANT | Facility: CLINIC | Age: 34
End: 2024-04-17

## 2024-04-17 ENCOUNTER — VIRTUAL VISIT (OUTPATIENT)
Dept: FAMILY MEDICINE | Facility: CLINIC | Age: 34
End: 2024-04-17
Payer: COMMERCIAL

## 2024-04-17 DIAGNOSIS — N39.0 URINARY TRACT INFECTION WITHOUT HEMATURIA, SITE UNSPECIFIED: ICD-10-CM

## 2024-04-17 DIAGNOSIS — Z94.0 KIDNEY REPLACED BY TRANSPLANT: ICD-10-CM

## 2024-04-17 DIAGNOSIS — R30.0 DYSURIA: Primary | ICD-10-CM

## 2024-04-17 DIAGNOSIS — R30.0 DYSURIA: ICD-10-CM

## 2024-04-17 LAB
ALBUMIN UR-MCNC: 100 MG/DL
APPEARANCE UR: CLEAR
BILIRUB UR QL STRIP: NEGATIVE
COLOR UR AUTO: YELLOW
GLUCOSE UR STRIP-MCNC: NEGATIVE MG/DL
HGB UR QL STRIP: ABNORMAL
KETONES UR STRIP-MCNC: NEGATIVE MG/DL
LEUKOCYTE ESTERASE UR QL STRIP: ABNORMAL
NITRATE UR QL: NEGATIVE
PH UR STRIP: 5.5 [PH] (ref 5–7)
RBC #/AREA URNS AUTO: ABNORMAL /HPF
SP GR UR STRIP: 1.02 (ref 1–1.03)
UROBILINOGEN UR STRIP-ACNC: 0.2 E.U./DL
WBC #/AREA URNS AUTO: ABNORMAL /HPF

## 2024-04-17 PROCEDURE — 81001 URINALYSIS AUTO W/SCOPE: CPT

## 2024-04-17 PROCEDURE — 87088 URINE BACTERIA CULTURE: CPT

## 2024-04-17 PROCEDURE — 99441 PR PHYSICIAN TELEPHONE EVALUATION 5-10 MIN: CPT | Mod: 93 | Performed by: FAMILY MEDICINE

## 2024-04-17 PROCEDURE — 87086 URINE CULTURE/COLONY COUNT: CPT

## 2024-04-17 RX ORDER — CEPHALEXIN 500 MG/1
500 CAPSULE ORAL 2 TIMES DAILY
Qty: 14 CAPSULE | Refills: 0 | Status: SHIPPED | OUTPATIENT
Start: 2024-04-17 | End: 2024-08-29

## 2024-04-17 NOTE — TELEPHONE ENCOUNTER
Patient Call: General  Route to LPN    Reason for call: Carol stated she think she has a UTI, she's having some itching, pain in bladder area, and some discomfort in kidney area, and feeling like she has to urinate often. patient is wondering if you could put a order in for urine test, and in enter order for a  antibiotic.    Call back needed? Yes    Return Call Needed  Same as documented in contacts section  When to return call?: Same day: Route High Priority

## 2024-04-17 NOTE — RESULT ENCOUNTER NOTE
Luis Medina,  Your urine exam showed possible bladder infection  I have sent a prescription for cephalexin 500 mg to take twice daily for the next 7 days unless urine culture result shows different findings, we will let you know  If you do not feel any improvement in 2 to 3 days, please make sure to follow-up for further evaluation.  Make sure to drink plenty of liquids   Let me know if you have any questions. Take care.  Corbin Macedo MD

## 2024-04-17 NOTE — PROGRESS NOTES
"    Instructions Relayed to Patient by Virtual Roomer:     Patient is active on [employer]:   Relayed following to patient: \"It looks like you are active on [employer], are you able to join the visit this way? If not, do you need us to send you a link now or would you like your provider to send a link via text or email when they are ready to initiate the visit?\"    Reminded patient to ensure they were logged on to virtual visit by arrival time listed. Documented in appointment notes if patient had flexibility to initiate visit sooner than arrival time. If pediatric virtual visit, ensured pediatric patient along with parent/guardian will be present for video visit.     Patient offered the website [email or web address] and/or phone number to [employer] Help line: [phone]    Carol is a 33 year old who is being evaluated via a billable telephone visit.    What phone number would you like to be contacted at? [phone]   How would you like to obtain your AVS? [employer]  Originating Location (pt. Location): Home    Distant Location (provider location):  On-site    Assessment & Plan     Dysuria  Results for orders placed or performed in visit on 04/17/24   UA with Microscopic reflex to Culture - lab collect     Status: Abnormal    Specimen: Urine, Clean Catch   Result Value Ref Range    Color Urine Yellow Colorless, Straw, Light Yellow, Yellow    Appearance Urine Clear Clear    Glucose Urine Negative Negative mg/dL    Bilirubin Urine Negative Negative    Ketones Urine Negative Negative mg/dL    Specific Gravity Urine 1.020 1.003 - 1.035    Blood Urine Moderate (A) Negative    pH Urine 5.5 5.0 - 7.0    Protein Albumin Urine 100 (A) Negative mg/dL    Urobilinogen Urine 0.2 0.2, 1.0 E.U./dL    Nitrite Urine Negative Negative    Leukocyte Esterase Urine Small (A) Negative   UA Microscopic with Reflex to Culture     Status: Abnormal   Result Value Ref Range    RBC Urine 2-5 (A) 0-2 /HPF /HPF    WBC Urine 5-10 (A) " "0-5 /HPF /HPF    Narrative    Urine Culture not indicated       Recommended to proceed with a urine exam along with culture for further evaluation given the underlying history of kidney transplant  - UA with Microscopic reflex to Culture - lab collect; Future  - Urine Culture Aerobic Bacterial - lab collect; Future    Kidney replaced by transplant  as above    - UA with Microscopic reflex to Culture - lab collect; Future  - Urine Culture Aerobic Bacterial - lab collect; Future    (N39.0) Urinary tract infection without hematuria, site unspecified  Comment:   Plan: cephALEXin (KEFLEX) 500 MG capsule        Reviewed urine exam results showing microscopic hematuria and possible cystitis  Prescription sent for Keflex to take twice a day for 7 days   recommended to push fluids   will follow-up on urine culture results for further recommendations          Review of the result(s) of each unique test - urine exam, urine culture from August 2023        BMI  Estimated body mass index is 43.53 kg/m  as calculated from the following:    Height as of 12/4/23: 1.676 m (5' 6\").    Weight as of 12/4/23: 122.3 kg (269 lb 11.2 oz).         Chart documentation done in part with Dragon Voice recognition Software. Although reviewed after completion, some word and grammatical error may remain.    See Patient Instructions    Eliz Medina is a 33 year old, presenting for the following health issues:  UTI  Patient is here for a telephone visit instead of in person visit due to the current COVID-19 pandemic.  Patient is new to the provider, is here today for a telephone visit with concerns of having progressively worsening pain and frequency of urination for the past few days along with a sensation of burning and irritation over the renal transplant site  Patient denies current acute hematuria, fever, chills, low back or flank pain, vomiting though she has nausea since this morning  Denies recurrent UTI history  Had nephrolithiasis " once  He is currently on immunosuppression for history of renal transplant with mycophenolate  Patient denies abnormal vaginal bleeding, discharge  LMP-3/19/2024  Past medical history significant for hypothyroidism, hypertension, history of renal transplant for chronic kidney disease  Other HPI as mentioned below      4/17/2024     8:35 AM   Additional Questions   Roomed by Yang HINOJOSA     History of Present Illness       Reason for visit:  UTI Concern        Genitourinary - Female  Onset/Duration: Currently has kidney failure so hard to determine if it is an infection or pain. Noticed Sunday and Monday she had been using the bathroom more frequently. Incision where kidney transplant was is more irritated and burning  Description:   Painful urination (Dysuria): No           Frequency: YES  Blood in urine (Hematuria): YES- Nothing abnormal, typical amount for her  Delay in urine (Hesitency): No  Intensity: mild to moderate  Progression of Symptoms:  worsening  Accompanying Signs & Symptoms:  Fever/chills: No  Flank pain: YES  Nausea and vomiting: YES- Nausea as of Today  Vaginal symptoms: odor is not out of the normal and itching is new  Abdominal/Pelvic Pain: YES- Pelvic Pain which started on Sunday  History:   History of frequent UTI s: No  History of kidney stones: YES- Had a kidney stone back in 2009 but non as of recent  Sexually Active: YES  Possibility of pregnancy: No  Precipitating or alleviating factors: None  Therapies tried and outcome: Cranberry juice prn (contraindicated in Coumadin patients), Increase fluid intake, and OTC advil or tylenol         Review of Systems  CONSTITUTIONAL: NEGATIVE for fever, chills, change in weight  RESP: NEGATIVE for significant cough or SOB  CV: NEGATIVE for chest pain, palpitations or peripheral edema  CV: Hx HTN  GI: Nausea  : As above  MUSCULOSKELETAL: NEGATIVE for significant arthralgias or myalgia  NEURO: NEGATIVE for weakness, dizziness or paresthesias  ENDOCRINE:  History of hypothyroidism  HEME/ALLERGY/IMMUNE: NEGATIVE for bleeding problems  PSYCHIATRIC: NEGATIVE for changes in mood or affect      Objective           Vitals:  No vitals were obtained today due to virtual visit.    Physical Exam   General: Alert and no distress //Respiratory: No audible wheeze, cough, or shortness of breath // Psychiatric:  Appropriate affect, tone, and pace of words      Results for orders placed or performed in visit on 04/17/24   UA with Microscopic reflex to Culture - lab collect     Status: Abnormal    Specimen: Urine, Clean Catch   Result Value Ref Range    Color Urine Yellow Colorless, Straw, Light Yellow, Yellow    Appearance Urine Clear Clear    Glucose Urine Negative Negative mg/dL    Bilirubin Urine Negative Negative    Ketones Urine Negative Negative mg/dL    Specific Gravity Urine 1.020 1.003 - 1.035    Blood Urine Moderate (A) Negative    pH Urine 5.5 5.0 - 7.0    Protein Albumin Urine 100 (A) Negative mg/dL    Urobilinogen Urine 0.2 0.2, 1.0 E.U./dL    Nitrite Urine Negative Negative    Leukocyte Esterase Urine Small (A) Negative   UA Microscopic with Reflex to Culture     Status: Abnormal   Result Value Ref Range    RBC Urine 2-5 (A) 0-2 /HPF /HPF    WBC Urine 5-10 (A) 0-5 /HPF /HPF    Narrative    Urine Culture not indicated           Phone call duration: 7 minutes  Signed Electronically by: Corbin Macedo MD

## 2024-04-17 NOTE — TELEPHONE ENCOUNTER
Patient called this morning with a request for orders for UA UC.  Patient did have UA obtained earlier today,  ordering provider did prescript cephalexin.  LM asking for CB if she has any continued questions or concerns.    Ronna Hughes RN   Transplant Coordinator  [phone]

## 2024-04-18 LAB
BACTERIA UR CULT: ABNORMAL
BACTERIA UR CULT: ABNORMAL

## 2024-04-18 NOTE — RESULT ENCOUNTER NOTE
Luis Medina,  Your urine culture showed group B streptococcus that is sensitive to the current antibiotic thatt you are on .  If you do not feel any symptoms within a week, make sure to follow with medical physician for further evaluation.   Let me know if you have any questions. Take care.  Corbin Macedo MD

## 2024-04-26 ENCOUNTER — TELEPHONE (OUTPATIENT)
Dept: TRANSPLANT | Facility: CLINIC | Age: 34
End: 2024-04-26

## 2024-04-26 ENCOUNTER — LAB (OUTPATIENT)
Dept: LAB | Facility: CLINIC | Age: 34
End: 2024-04-26
Payer: COMMERCIAL

## 2024-04-26 DIAGNOSIS — Z48.298 AFTERCARE FOLLOWING ORGAN TRANSPLANT: ICD-10-CM

## 2024-04-26 DIAGNOSIS — Z98.890 OTHER SPECIFIED POSTPROCEDURAL STATES: Primary | ICD-10-CM

## 2024-04-26 DIAGNOSIS — Z79.899 ENCOUNTER FOR LONG-TERM CURRENT USE OF MEDICATION: ICD-10-CM

## 2024-04-26 DIAGNOSIS — Z94.0 KIDNEY REPLACED BY TRANSPLANT: ICD-10-CM

## 2024-04-26 DIAGNOSIS — Z98.890 OTHER SPECIFIED POSTPROCEDURAL STATES: ICD-10-CM

## 2024-04-26 LAB
ANION GAP SERPL CALCULATED.3IONS-SCNC: 11 MMOL/L (ref 7–15)
BUN SERPL-MCNC: 46.6 MG/DL (ref 6–20)
CALCIUM SERPL-MCNC: 9.4 MG/DL (ref 8.6–10)
CHLORIDE SERPL-SCNC: 107 MMOL/L (ref 98–107)
CREAT SERPL-MCNC: 2.56 MG/DL (ref 0.51–0.95)
CYCLOSPORINE BLD LC/MS/MS-MCNC: 45 UG/L (ref 50–400)
DEPRECATED HCO3 PLAS-SCNC: 20 MMOL/L (ref 22–29)
EGFRCR SERPLBLD CKD-EPI 2021: 24 ML/MIN/1.73M2
ERYTHROCYTE [DISTWIDTH] IN BLOOD BY AUTOMATED COUNT: 13 % (ref 10–15)
GLUCOSE SERPL-MCNC: 110 MG/DL (ref 70–99)
HCT VFR BLD AUTO: 32.7 % (ref 35–47)
HGB BLD-MCNC: 10.6 G/DL (ref 11.7–15.7)
MCH RBC QN AUTO: 27.6 PG (ref 26.5–33)
MCHC RBC AUTO-ENTMCNC: 32.4 G/DL (ref 31.5–36.5)
MCV RBC AUTO: 85 FL (ref 78–100)
PLATELET # BLD AUTO: 209 10E3/UL (ref 150–450)
POTASSIUM SERPL-SCNC: 4.8 MMOL/L (ref 3.4–5.3)
RBC # BLD AUTO: 3.84 10E6/UL (ref 3.8–5.2)
SODIUM SERPL-SCNC: 138 MMOL/L (ref 135–145)
TME LAST DOSE: ABNORMAL H
TME LAST DOSE: ABNORMAL H
WBC # BLD AUTO: 9 10E3/UL (ref 4–11)

## 2024-04-26 PROCEDURE — 80048 BASIC METABOLIC PNL TOTAL CA: CPT

## 2024-04-26 PROCEDURE — 80158 DRUG ASSAY CYCLOSPORINE: CPT

## 2024-04-26 PROCEDURE — 36415 COLL VENOUS BLD VENIPUNCTURE: CPT

## 2024-04-26 PROCEDURE — 85027 COMPLETE CBC AUTOMATED: CPT

## 2024-04-26 RX ORDER — CYCLOSPORINE 25 MG/1
75 CAPSULE, LIQUID FILLED ORAL 2 TIMES DAILY
Qty: 540 CAPSULE | Refills: 3 | Status: SHIPPED | OUTPATIENT
Start: 2024-04-26 | End: 2024-07-26

## 2024-04-26 NOTE — TELEPHONE ENCOUNTER
ISSUE:   Cyclosporine level 45 on 4/26/2024, goal , dose 75 mg in the AM and 50 mg in the PM.    PLAN:   Call Patient and confirm this was an accurate 12-hour trough.   Verify Cyclosporine dose 75 mg / 50 mg BID.   Confirm no new medications or or missed doses.   Confirm no new illness / infection / diarrhea.   If accurate trough and accurate dose, increase Cyclosporine dose to 75 mg BID     Is this more than a 50% increase or decrease in current IS dose: No  If YES, justification: na    Repeat labs in 1 weeks.  *If > 50% change in immunosuppression dose, repeat labs in 1 week.       OUTCOME:   Spoke with Patient, they confirm accurate trough level and current dose 75mg/50 mg BID.   Patient confirmed dose change to k75 mg BID.  Patient agreed to repeat labs in 1 weeks.   Orders sent to preferred pharmacy for dose change and lab for repeat labs.   Patient voiced understanding of plan.     Patient only felt comfortable with a small increase in the dose as she previously suffered from brain fog, headache, and muscle tremors on increased dose of CSA    Will have endoscopy in June,  GI symptoms continue    Will NIKKI Rueda.    Ronna Hughes RN   Transplant Coordinator  [phone]

## 2024-04-29 NOTE — TELEPHONE ENCOUNTER
Feliberto Douglas MD  You3 days ago     RE  With hx of chronic rejection and DSA, on lower MPA dose, would try to aim for goal closer to 75 if possible     You  Feliberto Douglas MD3 days ago     SS  Dr Gerhard Rueda,  CSA has been below goal of  the last 3 lab draws.  We have had to bring her dose down in the past as she has headaches, muscle tremors, and brain fog when her dose of at 75 mg bid or higher.      I did just get her to agree to 75 mg bid an increase from 75 mg in the AM and 50 in the PM with a level of 45- she confirms it was an accurate 12 hour trough.  Anyway we can shoot for goal of 50-75?    She is also on  mg bid, however has been suffering with GI symptoms for at least the last year.  Endoscopy scheduled in June here at .    Ronna Hughes RN  Transplant Coordinator  [phone]       OUTCOME: patient updated via my chart.    Ronna Hughes RN   Transplant Coordinator  [phone]

## 2024-05-03 ENCOUNTER — LAB (OUTPATIENT)
Dept: LAB | Facility: CLINIC | Age: 34
End: 2024-05-03
Payer: COMMERCIAL

## 2024-05-03 DIAGNOSIS — Z48.298 AFTERCARE FOLLOWING ORGAN TRANSPLANT: ICD-10-CM

## 2024-05-03 LAB
CYCLOSPORINE BLD LC/MS/MS-MCNC: 94 UG/L (ref 50–400)
TME LAST DOSE: NORMAL H
TME LAST DOSE: NORMAL H

## 2024-05-03 PROCEDURE — 36415 COLL VENOUS BLD VENIPUNCTURE: CPT

## 2024-05-03 PROCEDURE — 80158 DRUG ASSAY CYCLOSPORINE: CPT

## 2024-05-06 ENCOUNTER — VIRTUAL VISIT (OUTPATIENT)
Dept: FAMILY MEDICINE | Facility: CLINIC | Age: 34
End: 2024-05-06
Payer: COMMERCIAL

## 2024-05-06 DIAGNOSIS — Z87.440 PERSONAL HISTORY OF URINARY TRACT INFECTION: ICD-10-CM

## 2024-05-06 DIAGNOSIS — M25.551 CHRONIC HIP PAIN, RIGHT: Primary | ICD-10-CM

## 2024-05-06 DIAGNOSIS — Z94.0 KIDNEY REPLACED BY TRANSPLANT: ICD-10-CM

## 2024-05-06 DIAGNOSIS — R10.31 BILATERAL GROIN PAIN: ICD-10-CM

## 2024-05-06 DIAGNOSIS — G89.29 CHRONIC HIP PAIN, RIGHT: Primary | ICD-10-CM

## 2024-05-06 DIAGNOSIS — R10.32 BILATERAL GROIN PAIN: ICD-10-CM

## 2024-05-06 PROCEDURE — 99213 OFFICE O/P EST LOW 20 MIN: CPT | Mod: 95 | Performed by: FAMILY MEDICINE

## 2024-05-06 RX ORDER — LABETALOL 200 MG/1
TABLET, FILM COATED ORAL
COMMUNITY

## 2024-05-06 NOTE — PROGRESS NOTES
"    Instructions Relayed to Patient by Virtual Roomer:     Patient is active on [employer]:   Relayed following to patient: \"It looks like you are active on [employer], are you able to join the visit this way? If not, do you need us to send you a link now or would you like your provider to send a link via text or email when they are ready to initiate the visit?\"      Patient Confirmed they will join visit via: Email   Reminded patient to ensure they were logged on to virtual visit by arrival time listed.   Asked if patient has flexibility to initiate visit sooner than arrival time: patient is unable to initiate visit earlier than arrival time     If pediatric virtual visit, ensured pediatric patient along with parent/guardian will be present for video visit.     Patient offered the website [email or web address] and/or phone number to [employer] Help line: [phone]    Carol is a 33 year old who is being evaluated via a billable video visit.    How would you like to obtain your AVS? [employer]  If the video visit is dropped, the invitation should be resent by: Send to e-mail at: [email or web address]  Will anyone else be joining your video visit? No      Assessment & Plan     Chronic hip pain, right  Likely causing referred pain to the groin  Emphasized on importance of in person visit for clinical exam with PCP or with any available provider in the system  Due to chronicity of symptoms, patient will call to schedule the appointment with orthopedic physician for further evaluation  - Orthopedic  Referral; Future    Bilateral groin pain  as above    - Orthopedic  Referral; Future    Personal history of urinary tract infection  Symptoms resolved, continue to monitor    Kidney replaced by transplant  On immunosuppression with mycophenolate    Review of the result(s) of each unique test - endometrial pathology results from December/20, urine culture from last month        BMI  Estimated body " "mass index is 43.53 kg/m  as calculated from the following:    Height as of 12/4/23: 1.676 m (5' 6\").    Weight as of 12/4/23: 122.3 kg (269 lb 11.2 oz).         Chart documentation done in part with Dragon Voice recognition Software. Although reviewed after completion, some word and grammatical error may remain.    See Patient Instructions    Subjective   Carol is a 33 year old, presenting for the following health issues:  Follow Up (UTI)  - UTI Follow up , things have been okay      5/6/2024     8:03 AM   Additional Questions   Roomed by Yang HINOJOSA       Video Start Time: 8;20AM    History of Present Illness       Reason for visit:  Follow up - UTI    Patient is here for a video visit instead of in person visit due to the current COVID-19 pandemic.  Patient with past medical history significant for s/p renal transplant, on immunosuppression, hypertension, hypothyroidism is here for follow-up on recent UTI, for which she had a virtual visit with this writer on 4/17/2024 .  Patient was treated with a course of cephalexin for GBS UTI given her symptoms and underlying history of immunosuppression from renal transplant status.  Patient did complete exertional symptoms  She continues to have pain in both groins and right hip has been going on for a while  Patient also reports having to go through short cycle of chemotherapy for low-grade endometrial carcinoma of uterus  She denies history of fall or trauma to the hip  Denies concerns for vaginal itching, vaginal burning, discharge, urgency, frequency, hematuria, flank pain          Review of Systems  CONSTITUTIONAL: NEGATIVE for fever, chills, change in weight  RESP: NEGATIVE for significant cough or SOB  CV: History of hypertension  GI: NEGATIVE for nausea, abdominal pain, heartburn, or change in bowel habits  : As above  MUSCULOSKELETAL: As above  NEURO: NEGATIVE for weakness, dizziness or paresthesias  ENDOCRINE: History of hypothyroidism  HEME/ALLERGY/IMMUNE: " NEGATIVE for bleeding problems  PSYCHIATRIC: NEGATIVE for changes in mood or affect      Objective           Vitals:  No vitals were obtained today due to virtual visit.    Physical Exam   GENERAL: alert and no distress  EYES: Eyes grossly normal to inspection  RESP: No audible wheeze, cough, or visible cyanosis.    NEURO: Cranial nerves grossly intact.  Mentation and speech appropriate for age.  PSYCH: Appropriate affect, tone, and pace of words          Video-Visit Details    Type of service:  Video Visit   Video End Time:8:28 AM  Originating Location (pt. Location): Home    Distant Location (provider location):  Off-site  Platform used for Video Visit: [employer]  Signed Electronically by: Corbin Macedo MD

## 2024-05-07 DIAGNOSIS — E03.9 HYPOTHYROIDISM, UNSPECIFIED TYPE: ICD-10-CM

## 2024-05-08 DIAGNOSIS — E03.9 HYPOTHYROIDISM, UNSPECIFIED TYPE: ICD-10-CM

## 2024-05-08 RX ORDER — LEVOTHYROXINE SODIUM 50 UG/1
50 TABLET ORAL DAILY
Qty: 90 TABLET | Refills: 1 | Status: SHIPPED | OUTPATIENT
Start: 2024-05-08

## 2024-05-13 DIAGNOSIS — M25.551 PAIN OF RIGHT HIP: Primary | ICD-10-CM

## 2024-05-16 RX ORDER — LEVOTHYROXINE SODIUM 50 UG/1
50 TABLET ORAL DAILY
Qty: 90 TABLET | Refills: 1 | OUTPATIENT
Start: 2024-05-16

## 2024-05-18 DIAGNOSIS — Z94.0 KIDNEY REPLACED BY TRANSPLANT: ICD-10-CM

## 2024-05-18 DIAGNOSIS — Z48.298 AFTERCARE FOLLOWING ORGAN TRANSPLANT: ICD-10-CM

## 2024-05-19 NOTE — TELEPHONE ENCOUNTER
DIAGNOSIS: Chronic hip pain, right [M25.551, G89.29],Corbin Macedo MD in [facility]/PEDS,[facility]     APPOINTMENT DATE: 5.20.24   NOTES STATUS DETAILS   OFFICE NOTE from referring provider Internal 5.6.24  [place]  [facility]   MEDICATION LIST Internal    XRAYS (IMAGES & REPORTS) Internal *sched* for 5.20.24  XR Pelvis and Hip Right    8.2.06  XR Hip Right

## 2024-05-20 ENCOUNTER — PRE VISIT (OUTPATIENT)
Dept: ORTHOPEDICS | Facility: CLINIC | Age: 34
End: 2024-05-20

## 2024-05-20 RX ORDER — LOSARTAN POTASSIUM 25 MG/1
25 TABLET ORAL 2 TIMES DAILY
Qty: 180 TABLET | Refills: 3 | Status: SHIPPED | OUTPATIENT
Start: 2024-05-20

## 2024-05-31 ENCOUNTER — TELEPHONE (OUTPATIENT)
Dept: GASTROENTEROLOGY | Facility: CLINIC | Age: 34
End: 2024-05-31
Payer: COMMERCIAL

## 2024-06-04 NOTE — TELEPHONE ENCOUNTER
Pre assessment completed for upcoming procedure.   (Please see previous telephone encounter notes for complete details)    Patient  returned call.       Procedure details:    Arrival time and facility location reviewed.    Pre op exam needed? N/A    Designated  policy reviewed. Instructed to have someone stay 24  hours post procedure.       Medication review:    Medications reviewed. Please see supporting documentation below. Holding recommendations discussed (if applicable).       Prep for procedure:     Procedure prep instructions reviewed.        Any additional information needed:  N/A      Patient  verbalized understanding and had no questions or concerns at this time.      Skye Aguero RN  Endoscopy Procedure Pre Assessment   [phone] option 4

## 2024-06-07 ENCOUNTER — ANESTHESIA EVENT (OUTPATIENT)
Dept: SURGERY | Facility: AMBULATORY SURGERY CENTER | Age: 34
End: 2024-06-07
Payer: COMMERCIAL

## 2024-06-07 ENCOUNTER — ANESTHESIA (OUTPATIENT)
Dept: SURGERY | Facility: AMBULATORY SURGERY CENTER | Age: 34
End: 2024-06-07
Payer: COMMERCIAL

## 2024-06-07 ENCOUNTER — HOSPITAL ENCOUNTER (OUTPATIENT)
Facility: AMBULATORY SURGERY CENTER | Age: 34
Discharge: HOME | End: 2024-06-07
Attending: INTERNAL MEDICINE
Payer: COMMERCIAL

## 2024-06-07 VITALS
OXYGEN SATURATION: 96 % | DIASTOLIC BLOOD PRESSURE: 96 MMHG | HEART RATE: 90 BPM | BODY MASS INDEX: 42.59 KG/M2 | SYSTOLIC BLOOD PRESSURE: 138 MMHG | WEIGHT: 265 LBS | TEMPERATURE: 97 F | RESPIRATION RATE: 16 BRPM | HEIGHT: 66 IN

## 2024-06-07 VITALS — HEART RATE: 94 BPM

## 2024-06-07 DIAGNOSIS — Z94.0 KIDNEY REPLACED BY TRANSPLANT: ICD-10-CM

## 2024-06-07 LAB
HCG UR QL: NEGATIVE
INTERNAL QC OK POCT: NORMAL
POCT KIT EXPIRATION DATE: NORMAL
POCT KIT LOT NUMBER: NORMAL
UPPER GI ENDOSCOPY: NORMAL

## 2024-06-07 PROCEDURE — 88312 SPECIAL STAINS GROUP 1: CPT | Mod: 26 | Performed by: PATHOLOGY

## 2024-06-07 PROCEDURE — 43239 EGD BIOPSY SINGLE/MULTIPLE: CPT | Performed by: NURSE ANESTHETIST, CERTIFIED REGISTERED

## 2024-06-07 PROCEDURE — 88342 IMHCHEM/IMCYTCHM 1ST ANTB: CPT | Mod: 26 | Performed by: PATHOLOGY

## 2024-06-07 PROCEDURE — 43239 EGD BIOPSY SINGLE/MULTIPLE: CPT | Performed by: ANESTHESIOLOGY

## 2024-06-07 PROCEDURE — 88305 TISSUE EXAM BY PATHOLOGIST: CPT | Mod: 26 | Performed by: PATHOLOGY

## 2024-06-07 PROCEDURE — 88312 SPECIAL STAINS GROUP 1: CPT | Mod: TC | Performed by: INTERNAL MEDICINE

## 2024-06-07 PROCEDURE — 81025 URINE PREGNANCY TEST: CPT | Performed by: PATHOLOGY

## 2024-06-07 PROCEDURE — 43239 EGD BIOPSY SINGLE/MULTIPLE: CPT | Performed by: INTERNAL MEDICINE

## 2024-06-07 RX ORDER — LIDOCAINE 40 MG/G
CREAM TOPICAL
Status: DISCONTINUED | OUTPATIENT
Start: 2024-06-07 | End: 2024-06-07 | Stop reason: HOSPADM

## 2024-06-07 RX ORDER — ONDANSETRON 2 MG/ML
4 INJECTION INTRAMUSCULAR; INTRAVENOUS EVERY 6 HOURS PRN
Status: DISCONTINUED | OUTPATIENT
Start: 2024-06-07 | End: 2024-06-08 | Stop reason: HOSPADM

## 2024-06-07 RX ORDER — FLUMAZENIL 0.1 MG/ML
0.2 INJECTION, SOLUTION INTRAVENOUS
Status: ACTIVE | OUTPATIENT
Start: 2024-06-07 | End: 2024-06-07

## 2024-06-07 RX ORDER — PROPOFOL 10 MG/ML
INJECTION, EMULSION INTRAVENOUS PRN
Status: DISCONTINUED | OUTPATIENT
Start: 2024-06-07 | End: 2024-06-07

## 2024-06-07 RX ORDER — NALOXONE HYDROCHLORIDE 0.4 MG/ML
0.4 INJECTION, SOLUTION INTRAMUSCULAR; INTRAVENOUS; SUBCUTANEOUS
Status: DISCONTINUED | OUTPATIENT
Start: 2024-06-07 | End: 2024-06-08 | Stop reason: HOSPADM

## 2024-06-07 RX ORDER — ONDANSETRON 4 MG/1
4 TABLET, ORALLY DISINTEGRATING ORAL EVERY 6 HOURS PRN
Status: DISCONTINUED | OUTPATIENT
Start: 2024-06-07 | End: 2024-06-08 | Stop reason: HOSPADM

## 2024-06-07 RX ORDER — SODIUM CHLORIDE, SODIUM LACTATE, POTASSIUM CHLORIDE, CALCIUM CHLORIDE 600; 310; 30; 20 MG/100ML; MG/100ML; MG/100ML; MG/100ML
INJECTION, SOLUTION INTRAVENOUS CONTINUOUS
Status: DISCONTINUED | OUTPATIENT
Start: 2024-06-07 | End: 2024-06-07 | Stop reason: HOSPADM

## 2024-06-07 RX ORDER — NALOXONE HYDROCHLORIDE 0.4 MG/ML
0.2 INJECTION, SOLUTION INTRAMUSCULAR; INTRAVENOUS; SUBCUTANEOUS
Status: DISCONTINUED | OUTPATIENT
Start: 2024-06-07 | End: 2024-06-08 | Stop reason: HOSPADM

## 2024-06-07 RX ORDER — ONDANSETRON 2 MG/ML
4 INJECTION INTRAMUSCULAR; INTRAVENOUS
Status: DISCONTINUED | OUTPATIENT
Start: 2024-06-07 | End: 2024-06-07 | Stop reason: HOSPADM

## 2024-06-07 RX ORDER — LIDOCAINE HYDROCHLORIDE 20 MG/ML
INJECTION, SOLUTION INFILTRATION; PERINEURAL PRN
Status: DISCONTINUED | OUTPATIENT
Start: 2024-06-07 | End: 2024-06-07

## 2024-06-07 RX ORDER — GLYCOPYRROLATE 0.2 MG/ML
INJECTION, SOLUTION INTRAMUSCULAR; INTRAVENOUS PRN
Status: DISCONTINUED | OUTPATIENT
Start: 2024-06-07 | End: 2024-06-07

## 2024-06-07 RX ORDER — PROCHLORPERAZINE MALEATE 10 MG
10 TABLET ORAL EVERY 6 HOURS PRN
Status: DISCONTINUED | OUTPATIENT
Start: 2024-06-07 | End: 2024-06-08 | Stop reason: HOSPADM

## 2024-06-07 RX ORDER — PROPOFOL 10 MG/ML
INJECTION, EMULSION INTRAVENOUS CONTINUOUS PRN
Status: DISCONTINUED | OUTPATIENT
Start: 2024-06-07 | End: 2024-06-07

## 2024-06-07 RX ADMIN — SODIUM CHLORIDE, SODIUM LACTATE, POTASSIUM CHLORIDE, CALCIUM CHLORIDE: 600; 310; 30; 20 INJECTION, SOLUTION INTRAVENOUS at 07:19

## 2024-06-07 RX ADMIN — PROPOFOL 50 MG: 10 INJECTION, EMULSION INTRAVENOUS at 08:13

## 2024-06-07 RX ADMIN — LIDOCAINE HYDROCHLORIDE 100 MG: 20 INJECTION, SOLUTION INFILTRATION; PERINEURAL at 08:13

## 2024-06-07 RX ADMIN — PROPOFOL 250 MCG/KG/MIN: 10 INJECTION, EMULSION INTRAVENOUS at 08:13

## 2024-06-07 RX ADMIN — GLYCOPYRROLATE 0.2 MG: 0.2 INJECTION, SOLUTION INTRAMUSCULAR; INTRAVENOUS at 08:15

## 2024-06-07 NOTE — ANESTHESIA CARE TRANSFER NOTE
Patient: Carol Guillaume    Procedure: Procedure(s):  Esophagoscopy, gastroscopy, duodenoscopy (EGD), combined       Diagnosis: Left upper quadrant abdominal pain [R10.12]  Dysphagia, unspecified type [R13.10]  Diagnosis Additional Information: No value filed.    Anesthesia Type:   MAC     Note:    Oropharynx: oropharynx clear of all foreign objects and spontaneously breathing  Level of Consciousness: awake  Oxygen Supplementation: room air    Independent Airway: airway patency satisfactory and stable  Dentition: dentition unchanged  Vital Signs Stable: post-procedure vital signs reviewed and stable  Report to RN Given: handoff report given  Patient transferred to: Phase II    Handoff Report: Identifed the Patient, Identified the Reponsible Provider, Reviewed the pertinent medical history, Discussed the surgical course, Reviewed Intra-OP anesthesia mangement and issues during anesthesia, Set expectations for post-procedure period and Allowed opportunity for questions and acknowledgement of understanding      Vitals:  Vitals Value Taken Time   /87 06/07/24 0831   Temp 36.1  C (97  F) 06/07/24 0831   Pulse 95 06/07/24 0831   Resp 14 06/07/24 0831   SpO2 92 % 06/07/24 0831       Electronically Signed By: MATTHEW Alcaraz CRNA  June 7, 2024  8:32 AM

## 2024-06-07 NOTE — ANESTHESIA PREPROCEDURE EVALUATION
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    Depression     EIN (endometrial intraepithelial neoplasia)     Fatigue     Gastroesophageal reflux disease     High cholesterol     Hypertension     Insomnia     Insulin resistance     Legally blind     Obesity     PCOS (polycystic ovarian syndrome)     PPD positive, treated     9 months of INH    Pseudotumor cerebri     on Diamox    Retinitis pigmentosa     S/P kidney transplant     Senior-Loken syndrome     Uncomplicated asthma     as a child    Viremia       Past Surgical History:   Procedure Laterality Date    BIOPSY      cataract bilateral  2017    DILATION AND CURETTAGE N/A 2021    Procedure: DILATION AND CURETTAGE,;  Surgeon: Lisbeth Gallardo MD;  Location:  OR    DILATION AND CURETTAGE N/A 2022    Procedure: DILATION AND CURETTAGE;  Surgeon: Lisbeth Gallardo MD;  Location:  OR    DILATION AND CURETTAGE N/A 2023    Procedure: Dilation and Curettage of Endometrium;  Surgeon: Lisbeth Gallardo MD;  Location:  OR    DILATION AND CURETTAGE, OPERATIVE HYSTEROSCOPY WITH MORCELLATOR, COMBINED N/A 10/23/2020    Procedure: HYSTEROSCOPY, DILATION AND CURRETAGE, MYOSURE;  Surgeon: Kierra Tracy MD;  Location:  OR    DILATION AND CURETTAGE, OPERATIVE HYSTEROSCOPY WITH MORCELLATOR, COMBINED N/A 2021    Procedure: HYSTEROSCOPY, WITH DILATION AND CURETTAGE OF UTERUS USING  MORCELLATOR;  Surgeon: Fiorella Cunningham MD;  Location:  OR    ESOPHAGOSCOPY, GASTROSCOPY,  DUODENOSCOPY (EGD), COMBINED N/A 01/24/2023    Procedure: ESOPHAGOGASTRODUODENOSCOPY, WITH BIOPSY;  Surgeon: Talia Aguilar MD;  Location: [facility] OR    INSERT INTRAUTERINE DEVICE N/A 03/05/2021    Procedure: INSERTION MIRENA INTRAUTERINE DEVICE;  Surgeon: Fiorella Cunningham MD;  Location: [facility]    IR RENAL BIOPSY RIGHT  08/27/2021    LAPAROSCOPY DIAGNOSTIC (GYN)  03/05/2021    Procedure: Laparoscopy diagnostic (gyn);  Surgeon: Fiorella Cunningham MD;  Location: [facility]    LITHOTRIPSY      LITHOTRIPSY      REMOVE INTRAUTERINE DEVICE N/A 03/05/2021    Procedure: REMOVE MIRENA INTRAUTERINE DEVICE;  Surgeon: Fiorella Cunningham MD;  Location: [facility]    REMOVE INTRAUTERINE DEVICE N/A 05/09/2022    Procedure: EXCHANGE OF MIRENA INTRAUTERINE DEVICE;  Surgeon: Lisbeth Gallardo MD;  [facility]: [facility]    REPLACE INTRAUTERINE DEVICE N/A 09/13/2021    Procedure: MIRENA INTRAUTERINE DEVICE EXCHANGE;  Surgeon: Lisbeth Gallardo MD;  [facility]: [facility]    SINUS SURGERY  2001    TONSILLECTOMY      TRANSPLANT KIDNEY RECIPIENT LIVING RELATED Right 07/2006    wisdom teeth        No Known Allergies   Social History     Tobacco Use    Smoking status: Never    Smokeless tobacco: Never   Substance Use Topics    Alcohol use: Never      Wt Readings from Last 1 Encounters:   06/07/24 120.2 kg (265 lb)        Anesthesia Evaluation            ROS/MED HX  ENT/Pulmonary:     (+)                      asthma                  Neurologic:       Cardiovascular:     (+)  hypertension- -   -  - -                                      METS/Exercise Tolerance:     Hematologic:       Musculoskeletal:       GI/Hepatic:     (+) GERD,                   Renal/Genitourinary:     (+)     Pt has history of transplant,         Endo:     (+)          thyroid problem, hypothyroidism,    Obesity,       Psychiatric/Substance Use:       Infectious Disease:       Malignancy:       Other:            Physical Exam    Airway  airway exam normal      Mallampati: II   TM  distance: > 3 FB   Neck ROM: full   Mouth opening: > 3 cm    Respiratory Devices and Support         Dental       (+) Completely normal teeth      Cardiovascular          Rhythm and rate: regular and normal     Pulmonary   pulmonary exam normal        breath sounds clear to auscultation           OUTSIDE LABS:  CBC:   Lab Results   Component Value Date    WBC 9.0 04/26/2024    WBC 8.2 02/02/2024    HGB 10.6 (L) 04/26/2024    HGB 10.9 (L) 02/02/2024    HCT 32.7 (L) 04/26/2024    HCT 33.7 (L) 02/02/2024     04/26/2024     02/02/2024     BMP:   Lab Results   Component Value Date     04/26/2024     02/02/2024    POTASSIUM 4.8 04/26/2024    POTASSIUM 4.6 02/02/2024    CHLORIDE 107 04/26/2024    CHLORIDE 104 02/02/2024    CO2 20 (L) 04/26/2024    CO2 24 02/02/2024    BUN 46.6 (H) 04/26/2024    BUN 37.3 (H) 02/02/2024    CR 2.56 (H) 04/26/2024    CR 2.82 (H) 02/02/2024     (H) 04/26/2024     (H) 02/02/2024     COAGS:   Lab Results   Component Value Date    PTT 33 08/27/2021    INR 1.03 08/27/2021     POC:   Lab Results   Component Value Date     (H) 07/17/2007    HCG Negative 06/07/2024    HCGS Negative 07/17/2007     HEPATIC:   Lab Results   Component Value Date    ALBUMIN 4.1 06/16/2023    PROTTOTAL 7.5 06/16/2023    ALT 12 06/16/2023    AST 15 06/16/2023    ALKPHOS 103 06/16/2023    BILITOTAL 0.4 06/16/2023     OTHER:   Lab Results   Component Value Date    A1C 5.3 06/16/2023    LACHO 9.4 04/26/2024    PHOS 5.0 (H) 06/16/2023    MAG 1.8 01/07/2009    LIPASE 236 07/17/2007    AMYLASE 58 06/03/2008    TSH 2.72 02/02/2024    T4 1.17 10/26/2023    CRP <3.0 06/03/2008       Anesthesia Plan    ASA Status:  3    NPO Status:  NPO Appropriate    Anesthesia Type: MAC.     - Reason for MAC: immobility needed, straight local not clinically adequate   Induction: Intravenous.   Maintenance: TIVA.        Consents    Anesthesia Plan(s) and associated risks, benefits, and realistic  "alternatives discussed. Questions answered and patient/representative(s) expressed understanding.     - Discussed:     - Discussed with:  Patient      - Extended Intubation/Ventilatory Support Discussed: No.      - Patient is DNR/DNI Status: No     Use of blood products discussed: No .     Postoperative Care    Pain management: IV analgesics, Oral pain medications, Multi-modal analgesia.   PONV prophylaxis: Ondansetron (or other 5HT-3), Background Propofol Infusion     Comments:               Victorino Acevedo MD    I have reviewed the pertinent notes and labs in the chart from the past 30 days and (re)examined the patient.  Any updates or changes from those notes are reflected in this note.              # Severe Obesity: Estimated body mass index is 42.77 kg/m  as calculated from the following:    Height as of this encounter: 1.676 m (5' 6\").    Weight as of this encounter: 120.2 kg (265 lb).      "

## 2024-06-07 NOTE — H&P
Carol Guillaume  [id number]  female  33 year old      Reason for procedure/surgery: Dysphagia, LUQ pain.    Patient Active Problem List   Diagnosis    Kidney replaced by transplant    Senior-Loken syndrome    Pseudotumor cerebri    Legally blind    Retinitis pigmentosa    Vitamin D deficiency    Morbid obesity (H)    Insulin resistance    PCOS (polycystic ovarian syndrome)    Cataract    Palpitations    Dyslipidemia    HTN, kidney transplant related    Aftercare following organ transplant    Immunosuppression (H24)    EIN (endometrial intraepithelial neoplasia)    Chronic kidney disease, stage 3 (H)    Acquired hypothyroidism       Past Surgical History:    Past Surgical History:   Procedure Laterality Date    BIOPSY      cataract bilateral  06/2017    DILATION AND CURETTAGE N/A 09/13/2021    Procedure: DILATION AND CURETTAGE,;  Surgeon: Lisbeth Gallardo MD;  Location:  OR    DILATION AND CURETTAGE N/A 05/09/2022    Procedure: DILATION AND CURETTAGE;  Surgeon: Lisbeth Gallardo MD;  Location:  OR    DILATION AND CURETTAGE N/A 5/4/2023    Procedure: Dilation and Curettage of Endometrium;  Surgeon: Lisbeth Gallardo MD;  Location:  OR    DILATION AND CURETTAGE, OPERATIVE HYSTEROSCOPY WITH MORCELLATOR, COMBINED N/A 10/23/2020    Procedure: HYSTEROSCOPY, DILATION AND CURRETAGE, MYOSURE;  Surgeon: Kierra Tracy MD;  Location:  OR    DILATION AND CURETTAGE, OPERATIVE HYSTEROSCOPY WITH MORCELLATOR, COMBINED N/A 03/05/2021    Procedure: HYSTEROSCOPY, WITH DILATION AND CURETTAGE OF UTERUS USING  MORCELLATOR;  Surgeon: Fiorella Cunningham MD;  Location:  OR    ESOPHAGOSCOPY, GASTROSCOPY, DUODENOSCOPY (EGD), COMBINED N/A 01/24/2023    Procedure: ESOPHAGOGASTRODUODENOSCOPY, WITH BIOPSY;  Surgeon: Talia Aguilar MD;  Location: [facility] OR    INSERT INTRAUTERINE DEVICE N/A 03/05/2021    Procedure: INSERTION MIRENA INTRAUTERINE DEVICE;  Surgeon: Fiorella Cunningham MD;  Location:  OR    IR RENAL BIOPSY  RIGHT  08/27/2021    LAPAROSCOPY DIAGNOSTIC (GYN)  03/05/2021    Procedure: Laparoscopy diagnostic (gyn);  Surgeon: Fiorella Cunningham MD;  [facility]: [facility]    LITHOTRIPSY      LITHOTRIPSY      REMOVE INTRAUTERINE DEVICE N/A 03/05/2021    Procedure: REMOVE MIRENA INTRAUTERINE DEVICE;  Surgeon: Fiorella Cunningham MD;  [facility]    REMOVE INTRAUTERINE DEVICE N/A 05/09/2022    Procedure: EXCHANGE OF MIRENA INTRAUTERINE DEVICE;  Surgeon: Lisbeth Gallardo MD;  [facility]    REPLACE INTRAUTERINE DEVICE N/A 09/13/2021    Procedure: MIRENA INTRAUTERINE DEVICE EXCHANGE;  Surgeon: Lisbeth Gallardo MD;  [facility]: [facility]    SINUS SURGERY  2001    TONSILLECTOMY      TRANSPLANT KIDNEY RECIPIENT LIVING RELATED Right 07/2006    wisdom teeth         Past Medical History:   Past Medical History:   Diagnosis Date    Acquired hypothyroidism 8/2/2023    Anemia     Anxiety     Cancer (H) 10/30/2020    Atypical Hyperplasia endometriosis carcinoma    Cataract 2015    CMV (cytomegalovirus infection) (H)     CMV disease (H) 2006    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    Depression     EIN (endometrial intraepithelial neoplasia)     Fatigue     Gastroesophageal reflux disease     High cholesterol     Hypertension     Insomnia     Insulin resistance     Legally blind     Obesity     PCOS (polycystic ovarian syndrome)     PPD positive, treated 2006    9 months of INH    Pseudotumor cerebri     on Diamox    Retinitis pigmentosa     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     Uncomplicated asthma     as a child    Viremia        Social History:   Social History     Tobacco Use    Smoking status: Never    Smokeless tobacco: Never   Substance Use Topics    Alcohol use: Never       Family History:   Family History   Problem Relation Age of Onset    Hyperlipidemia Mother     Sjogren's Father     Hashimoto's thyroiditis Father     Other - See Comments Maternal Grandfather         surgical complication     "Atrial fibrillation Paternal Grandmother     Arthritis Paternal Grandmother     Skin Cancer Paternal Grandfather         melanoma    Parkinsonism Other         paternal uncle       Allergies: No Known Allergies    Active Medications:   Current Outpatient Medications   Medication Sig Dispense Refill    acetaminophen (TYLENOL) 325 MG tablet Take 2 tablets (650 mg) by mouth every 4 hours as needed for mild pain (Patient not taking: Reported on 5/6/2024) 50 tablet 0    amLODIPine (NORVASC) 5 MG tablet Take 1 tablet (5 mg) by mouth daily 90 tablet 3    carvedilol (COREG) 25 MG tablet Take 1 tablet (25 mg) by mouth 2 times daily (with meals) 180 tablet 3    cephALEXin (KEFLEX) 500 MG capsule Take 1 capsule (500 mg) by mouth 2 times daily 14 capsule 0    labetalol (NORMODYNE) 200 MG tablet Labetalol Oral    BID    inactive      levothyroxine (SYNTHROID/LEVOTHROID) 50 MCG tablet Take 1 tablet (50 mcg) by mouth daily 90 tablet 1    losartan (COZAAR) 25 MG tablet TAKE 1 TABLET BY MOUTH 2 TIMES DAILY 180 tablet 3    mycophenolic acid (GENERIC EQUIVALENT) 180 MG EC tablet Take 2 tablets (360 mg) by mouth 2 times daily 360 tablet 3    NEORAL (BRAND) 25 MG capsule Take 3 capsules (75 mg) by mouth 2 times daily 540 capsule 3    vitamin D3 (CHOLECALCIFEROL) 50 mcg (2000 units) tablet Take 1 tablet (50 mcg) by mouth daily 90 tablet 3       Systemic Review:   CONSTITUTIONAL: NEGATIVE for fever, chills, change in weight  ENT/MOUTH: NEGATIVE for ear, mouth and throat problems  RESP: NEGATIVE for significant cough or SOB  CV: NEGATIVE for chest pain, palpitations or peripheral edema    Physical Examination:   Vital Signs: /79 (BP Location: Right arm)   Pulse 78   Temp 97  F (36.1  C) (Temporal)   Resp 18   Ht 1.676 m (5' 6\")   Wt 120.2 kg (265 lb)   SpO2 97%   BMI 42.77 kg/m    GENERAL: healthy, alert and no distress  NECK: no adenopathy, no asymmetry, masses, or scars  RESP: lungs clear to auscultation - no rales, rhonchi " or wheezes  CV: regular rate and rhythm, normal S1 S2, no S3 or S4, no murmur, click or rub, no peripheral edema and peripheral pulses strong  ABDOMEN: soft, nontender, no hepatosplenomegaly, no masses and bowel sounds normal  MS: no gross musculoskeletal defects noted, no edema    Plan: Appropriate to proceed as scheduled.      Charli Watts MD  6/7/2024    PCP:  Danny Shi

## 2024-06-07 NOTE — ANESTHESIA POSTPROCEDURE EVALUATION
Patient: Carol Guillaume    Procedure: Procedure(s):  Esophagoscopy, gastroscopy, duodenoscopy (EGD), combined       Anesthesia Type:  MAC    Note:  Disposition: Outpatient   Postop Pain Control: Uneventful            Sign Out: Well controlled pain   PONV: No   Neuro/Psych: Uneventful            Sign Out: Acceptable/Baseline neuro status   Airway/Respiratory: Uneventful            Sign Out: Acceptable/Baseline resp. status   CV/Hemodynamics: Uneventful            Sign Out: Acceptable CV status   Other NRE: NONE   DID A NON-ROUTINE EVENT OCCUR? No           Last vitals:  Vitals Value Taken Time   /96 06/07/24 0838   Temp 36.1  C (97  F) 06/07/24 0831   Pulse 90 06/07/24 0838   Resp 16 06/07/24 0838   SpO2 96 % 06/07/24 0838       Electronically Signed By: Victorino Acevedo MD  June 7, 2024  11:44 AM

## 2024-06-11 ENCOUNTER — TELEPHONE (OUTPATIENT)
Dept: TRANSPLANT | Facility: CLINIC | Age: 34
End: 2024-06-11
Payer: COMMERCIAL

## 2024-06-11 DIAGNOSIS — Z94.0 KIDNEY REPLACED BY TRANSPLANT: Primary | ICD-10-CM

## 2024-06-11 NOTE — TELEPHONE ENCOUNTER
Patient Call: General  Route to LPN    Reason for call: Pt had Endoscopy on Fri- They recommend pt stop MMF can it get switched to something else?     Call back needed? Yes    Return Call Needed  Same as documented in contacts section  When to return call?: Same day: Route High Priority

## 2024-06-11 NOTE — TELEPHONE ENCOUNTER
Feliberto Douglas MD Sveiven, Ronna, RN  Recommend PPI (omeprazole or pantoprazole 40 mg po every day- if already taking increase to bid x 6-8 weeks then down to daily    Please check UPC, lipid, LFTs, to see if mTOR is an option and if covered by insurance, everolimus 1.25 mg po bid    Add TPMT to next labs to see if AZA is an option if still with significant proteinuria    Will need an update visit with GYN for endometrial bx if not done in 4/2024 to make sure she is still in remission    Will ask path to add CMV stain to make sure it is negative          OUTCOME:    Lab orders placed,  CMV stain add on order placed.  LM for patient asking for CB    Ronna Hughes RN   Transplant Coordinator  [phone]    ADDENDUM:     Carol confirms she is not currently taking PPI, Omeprazole RX sent to [facility] on file.  Will obtain labs tomorrow 6/13.      Endometrial biopsy was done this past week,  Carol has follow up appointment on Friday 6/14 to review results and have US done.  SOT fax provided to patient to request results be sent.      Ronna Hughes RN   Transplant Coordinator  [phone]

## 2024-06-11 NOTE — TELEPHONE ENCOUNTER
Patient Call: General  Route to LPN    Reason for call: Patient returning call to provide an update. Patient is currently not on any. More details with call back.     Call back needed? Yes    Return Call Needed  Same as documented in contacts section  When to return call?: Same day: Route High Priority

## 2024-06-12 RX ORDER — OMEPRAZOLE 40 MG/1
40 CAPSULE, DELAYED RELEASE ORAL DAILY
Qty: 90 CAPSULE | Refills: 3 | Status: SHIPPED | OUTPATIENT
Start: 2024-06-12

## 2024-06-12 NOTE — TELEPHONE ENCOUNTER
LM asking for CB or response to my chart sent 6/11    Ronna Hughes RN   Transplant Coordinator  [phone]

## 2024-06-13 ENCOUNTER — LAB (OUTPATIENT)
Dept: LAB | Facility: CLINIC | Age: 34
End: 2024-06-13
Payer: COMMERCIAL

## 2024-06-13 DIAGNOSIS — Z94.0 KIDNEY REPLACED BY TRANSPLANT: ICD-10-CM

## 2024-06-13 LAB
ALBUMIN MFR UR ELPH: 44.5 MG/DL
ALBUMIN SERPL BCG-MCNC: 4.3 G/DL (ref 3.5–5.2)
ALP SERPL-CCNC: 102 U/L (ref 40–150)
ALT SERPL W P-5'-P-CCNC: 12 U/L (ref 0–50)
AST SERPL W P-5'-P-CCNC: 17 U/L (ref 0–45)
BILIRUB DIRECT SERPL-MCNC: <0.2 MG/DL (ref 0–0.3)
BILIRUB SERPL-MCNC: 0.5 MG/DL
CHOLEST SERPL-MCNC: 213 MG/DL
CREAT UR-MCNC: 48 MG/DL
FASTING STATUS PATIENT QL REPORTED: YES
HDLC SERPL-MCNC: 30 MG/DL
LDLC SERPL CALC-MCNC: 132 MG/DL
NONHDLC SERPL-MCNC: 183 MG/DL
PATH REPORT.ADDENDUM SPEC: NORMAL
PATH REPORT.ADDENDUM SPEC: NORMAL
PATH REPORT.COMMENTS IMP SPEC: NORMAL
PATH REPORT.COMMENTS IMP SPEC: NORMAL
PATH REPORT.FINAL DX SPEC: NORMAL
PATH REPORT.GROSS SPEC: NORMAL
PATH REPORT.MICROSCOPIC SPEC OTHER STN: NORMAL
PATH REPORT.RELEVANT HX SPEC: NORMAL
PHOTO IMAGE: NORMAL
PROT SERPL-MCNC: 7.8 G/DL (ref 6.4–8.3)
PROT/CREAT 24H UR: 0.93 MG/MG CR (ref 0–0.2)
TRIGL SERPL-MCNC: 253 MG/DL

## 2024-06-13 PROCEDURE — 99000 SPECIMEN HANDLING OFFICE-LAB: CPT | Performed by: PATHOLOGY

## 2024-06-13 PROCEDURE — 80076 HEPATIC FUNCTION PANEL: CPT | Performed by: PATHOLOGY

## 2024-06-13 PROCEDURE — 84156 ASSAY OF PROTEIN URINE: CPT | Performed by: PATHOLOGY

## 2024-06-13 PROCEDURE — 80061 LIPID PANEL: CPT | Performed by: PATHOLOGY

## 2024-06-13 PROCEDURE — 36415 COLL VENOUS BLD VENIPUNCTURE: CPT | Performed by: PATHOLOGY

## 2024-06-13 PROCEDURE — 84433 ASY THIOPURIN S-MTHYLTRNSFRS: CPT | Mod: 90 | Performed by: PATHOLOGY

## 2024-06-14 LAB
BKR LAB AP ADD'L TEST STATUS: NORMAL
BKR PATH ADDL TEST FINAL COMMENTS: NORMAL

## 2024-06-16 LAB — TPMT BLD-CCNC: 28 U/ML

## 2024-06-17 ENCOUNTER — TELEPHONE (OUTPATIENT)
Dept: TRANSPLANT | Facility: CLINIC | Age: 34
End: 2024-06-17
Payer: COMMERCIAL

## 2024-06-17 DIAGNOSIS — Z94.0 KIDNEY REPLACED BY TRANSPLANT: Primary | ICD-10-CM

## 2024-06-17 NOTE — TELEPHONE ENCOUNTER
Patient Call: General  Route to LPN    Reason for call: Patient called to touch base about biopsy, ultrasound, and medications. More details with call back.     Call back needed? Yes    Return Call Needed  Same as documented in contacts section  When to return call?: Same day: Route High Priority

## 2024-06-18 RX ORDER — EVEROLIMUS 0.75 MG/1
0.75 TABLET ORAL 2 TIMES DAILY
Qty: 60 TABLET | Refills: 11 | Status: SHIPPED | OUTPATIENT
Start: 2024-06-18 | End: 2024-07-12

## 2024-06-18 RX ORDER — EVEROLIMUS 0.25 MG/1
0.25 TABLET ORAL 2 TIMES DAILY
Qty: 60 TABLET | Refills: 11 | Status: SHIPPED | OUTPATIENT
Start: 2024-06-18 | End: 2024-06-20

## 2024-06-18 NOTE — TELEPHONE ENCOUNTER
Aleida Fabian Sara, RN  Hello,  Everolimus 1mg 60/30=$0  Everolimus 1mg 60/30=$0    Thank you  Aleida          Previous Messages       ----- Message -----  From: Ronna Hughes RN  Sent: 6/13/2024   3:51 PM CDT  To: Aleida Fabian  Subject: coverage                                        Chandler Shin!    Can you check coverage for Start everolimus 1.25 mg po bid?    Thank you!      Ronna Hughes RN  Transplant Coordinator  [phone]  ----- Message -----  From: Feliberto Douglas MD  Sent: 6/13/2024   1:25 PM CDT  To: Ronna Hughes RN    UPC is acceptable LFT and lipids ok  Can switch MPA to everolimus goal 4-6. Keep CSA closer to 75 & everolimus closer to 4 given low egFR at baseline  Reduce MPA to 180 mg po bid  Start everolimus 1.25 mg po bid, overlap for 5 days then stop  Weekly labs x4 then every 2 weeks then monthly if stable  UPC monthly    Outcome: Patient V/U of IS plan.  Lab orders updated.  RX updated.    Ronna Hughes RN   Transplant Coordinator  [phone]

## 2024-06-20 DIAGNOSIS — Z94.0 KIDNEY REPLACED BY TRANSPLANT: ICD-10-CM

## 2024-06-20 RX ORDER — EVEROLIMUS 0.25 MG/1
0.5 TABLET ORAL 2 TIMES DAILY
Qty: 120 TABLET | Refills: 11 | Status: SHIPPED | OUTPATIENT
Start: 2024-06-20 | End: 2024-07-12

## 2024-06-24 ENCOUNTER — TELEPHONE (OUTPATIENT)
Dept: TRANSPLANT | Facility: CLINIC | Age: 34
End: 2024-06-24
Payer: COMMERCIAL

## 2024-06-24 NOTE — TELEPHONE ENCOUNTER
Carol wanted the care team to know she started taking everolimus (ZORTRESS) 0.25 MG tablet  and 0.75 on Friday evening 06/21/2024.  Was asked to call the office when she started may need an order in chart for check Everolimus.  Please call Carol.

## 2024-07-01 ENCOUNTER — TELEPHONE (OUTPATIENT)
Dept: TRANSPLANT | Facility: CLINIC | Age: 34
End: 2024-07-01

## 2024-07-01 ENCOUNTER — LAB (OUTPATIENT)
Dept: LAB | Facility: CLINIC | Age: 34
End: 2024-07-01
Attending: INTERNAL MEDICINE
Payer: COMMERCIAL

## 2024-07-01 DIAGNOSIS — R11.0 NAUSEA: ICD-10-CM

## 2024-07-01 DIAGNOSIS — Z98.890 OTHER SPECIFIED POSTPROCEDURAL STATES: ICD-10-CM

## 2024-07-01 DIAGNOSIS — R74.8 ELEVATED CREATINE KINASE: ICD-10-CM

## 2024-07-01 DIAGNOSIS — Z94.0 KIDNEY REPLACED BY TRANSPLANT: ICD-10-CM

## 2024-07-01 DIAGNOSIS — Z48.298 AFTERCARE FOLLOWING ORGAN TRANSPLANT: ICD-10-CM

## 2024-07-01 DIAGNOSIS — Z79.899 ENCOUNTER FOR LONG-TERM CURRENT USE OF MEDICATION: ICD-10-CM

## 2024-07-01 DIAGNOSIS — R53.83 FATIGUE: ICD-10-CM

## 2024-07-01 DIAGNOSIS — N18.30 CHRONIC KIDNEY DISEASE, STAGE 3 (H): Primary | ICD-10-CM

## 2024-07-01 DIAGNOSIS — R74.8 ELEVATED CREATINE KINASE: Primary | ICD-10-CM

## 2024-07-01 LAB
ALBUMIN MFR UR ELPH: 18.8 MG/DL
ANION GAP SERPL CALCULATED.3IONS-SCNC: 11 MMOL/L (ref 7–15)
BUN SERPL-MCNC: 55.4 MG/DL (ref 6–20)
CALCIUM SERPL-MCNC: 8.9 MG/DL (ref 8.6–10)
CHLORIDE SERPL-SCNC: 105 MMOL/L (ref 98–107)
CREAT SERPL-MCNC: 3.49 MG/DL (ref 0.51–0.95)
CREAT UR-MCNC: 92.5 MG/DL
CREAT UR-MCNC: 93.9 MG/DL
CYCLOSPORINE BLD LC/MS/MS-MCNC: 135 UG/L (ref 50–400)
DEPRECATED HCO3 PLAS-SCNC: 21 MMOL/L (ref 22–29)
EGFRCR SERPLBLD CKD-EPI 2021: 17 ML/MIN/1.73M2
ERYTHROCYTE [DISTWIDTH] IN BLOOD BY AUTOMATED COUNT: 12 % (ref 10–15)
GLUCOSE SERPL-MCNC: 115 MG/DL (ref 70–99)
HCT VFR BLD AUTO: 30.2 % (ref 35–47)
HGB BLD-MCNC: 10 G/DL (ref 11.7–15.7)
MCH RBC QN AUTO: 27.9 PG (ref 26.5–33)
MCHC RBC AUTO-ENTMCNC: 33.1 G/DL (ref 31.5–36.5)
MCV RBC AUTO: 84 FL (ref 78–100)
MICROALBUMIN UR-MCNC: 65.5 MG/L
MICROALBUMIN/CREAT UR: 70.81 MG/G CR (ref 0–25)
PLATELET # BLD AUTO: 160 10E3/UL (ref 150–450)
POTASSIUM SERPL-SCNC: 4.8 MMOL/L (ref 3.4–5.3)
PROT/CREAT 24H UR: 0.2 MG/MG CR (ref 0–0.2)
RBC # BLD AUTO: 3.58 10E6/UL (ref 3.8–5.2)
SODIUM SERPL-SCNC: 137 MMOL/L (ref 135–145)
TME LAST DOSE: NORMAL H
TME LAST DOSE: NORMAL H
WBC # BLD AUTO: 6.5 10E3/UL (ref 4–11)

## 2024-07-01 PROCEDURE — 87086 URINE CULTURE/COLONY COUNT: CPT | Performed by: INTERNAL MEDICINE

## 2024-07-01 PROCEDURE — 84156 ASSAY OF PROTEIN URINE: CPT | Performed by: PATHOLOGY

## 2024-07-01 PROCEDURE — 85027 COMPLETE CBC AUTOMATED: CPT | Performed by: PATHOLOGY

## 2024-07-01 PROCEDURE — 82043 UR ALBUMIN QUANTITATIVE: CPT | Performed by: INTERNAL MEDICINE

## 2024-07-01 PROCEDURE — 99000 SPECIMEN HANDLING OFFICE-LAB: CPT | Performed by: PATHOLOGY

## 2024-07-01 PROCEDURE — 36415 COLL VENOUS BLD VENIPUNCTURE: CPT | Performed by: PATHOLOGY

## 2024-07-01 PROCEDURE — 80169 DRUG ASSAY EVEROLIMUS: CPT | Performed by: INTERNAL MEDICINE

## 2024-07-01 PROCEDURE — 80158 DRUG ASSAY CYCLOSPORINE: CPT | Performed by: INTERNAL MEDICINE

## 2024-07-01 PROCEDURE — 80048 BASIC METABOLIC PNL TOTAL CA: CPT | Performed by: PATHOLOGY

## 2024-07-01 NOTE — TELEPHONE ENCOUNTER
Feliberto Douglas MD Sveiven, Sara, RN  Referred to CKD journey ?            Feliberto Douglas MD Sveiven, Sara, RN  any BMP since April? If no hard to tell if the change even before the switch  Will see if everolimus level is within goal  Yes UA Ucx and US kidney and check if she needs IVF          Previous Messages       ----- Message -----  From: Ronna Hughes RN  Sent: 7/1/2024   2:51 PM CDT  To: Feliberto Rueda MD    elevated creatinine: 3.49, above baseline of 2-2.2  started the everolimus 1.25 mg bid on 6/21, MMF overlap X5 days- last MMF dose on 6/25    Patient reports she was feeling fatigued and dizzy yesterday however dizziness is resolved.      Feels that she is hydrating and drinking 60-90 oz daily.    Denies S/S of UTI, however endorses some intermittent kidney pain.  Reports a dull pain on the R abdomen. Abdominal pain started late last evening/ early this morning.    Currently has weekly lab orders X4 with monthly UPC.    Would you like UA and kidney US?    Ronna Hughes RN  Transplant Coordinator  [phone]    OUTCOME: call attempted X2.  My chart message sent asking for CB or response.  Will attempted again if have not hear back by end of the week    Ronna Hughes RN   Transplant Coordinator  [phone]

## 2024-07-02 LAB
EVEROLIMUS BLD-MCNC: 16.6 UG/L (ref 3–8)
TME LAST DOSE: ABNORMAL H
TME LAST DOSE: ABNORMAL H

## 2024-07-02 NOTE — TELEPHONE ENCOUNTER
DATE:  7/2/2024     TIME OF RECEIPT FROM LAB:  1240    ORDERING PROVIDER: Dr Gerhard Maldonado    LAB TEST:  everolimus    LAB VALUE:  16.6    Patient called.  See phone encounter dated 7/1    Ronna Hughes RN   Transplant Coordinator  [phone]

## 2024-07-03 ENCOUNTER — TELEPHONE (OUTPATIENT)
Dept: TRANSPLANT | Facility: CLINIC | Age: 34
End: 2024-07-03
Payer: COMMERCIAL

## 2024-07-03 DIAGNOSIS — T86.19 DELAYED RENAL GRAFT FUNCTION: ICD-10-CM

## 2024-07-03 DIAGNOSIS — R74.8 ELEVATED CREATINE KINASE: Primary | ICD-10-CM

## 2024-07-03 NOTE — TELEPHONE ENCOUNTER
Feliberto Douglas MD Sveiven, Sara, RN  Referred to CKD journey ?              Feliberto Douglas MD Sveiven, Sara, RN  any BMP since April? If no hard to tell if the change even before the switch  Will see if everolimus level is within goal  Yes UA Ucx and US kidney and check if she needs IVF    OUTCOME: per my chart,  patient has been ill and is c/o fatigue.  She states that she is likely dehydrated.      Call back attempted.  No answer, another my chart message sent with instruction to call image scheduling to schedule US, UC added to todays specimen.  IVF orders placed- message sent to [facility] to call patient to schedule.      Instructed patient to decrease everolimus from 1.25 mg bid to 1 mg bid and repeat everolimus and CSA next week along with BMP and CBC    Ronna Hughes RN   Transplant Coordinator  [phone]

## 2024-07-03 NOTE — TELEPHONE ENCOUNTER
Patient Call: General  Route to LPN    Reason for call: Pt call returning call from Ronna Hughes to talk about meds and symptoms. Best call back time between 3pm-4pm    Call back needed? Yes    Return Call Needed  Same as documented in contacts section  When to return call?: Same day: Route High Priority

## 2024-07-04 ENCOUNTER — DOCUMENTATION ONLY (OUTPATIENT)
Dept: TRANSPLANT | Facility: CLINIC | Age: 34
End: 2024-07-04
Payer: COMMERCIAL

## 2024-07-04 ENCOUNTER — INFUSION THERAPY VISIT (OUTPATIENT)
Dept: INFUSION THERAPY | Facility: CLINIC | Age: 34
End: 2024-07-04
Attending: INTERNAL MEDICINE
Payer: COMMERCIAL

## 2024-07-04 VITALS
SYSTOLIC BLOOD PRESSURE: 115 MMHG | OXYGEN SATURATION: 99 % | DIASTOLIC BLOOD PRESSURE: 74 MMHG | RESPIRATION RATE: 16 BRPM | TEMPERATURE: 97.8 F | HEART RATE: 77 BPM

## 2024-07-04 DIAGNOSIS — R74.8 ELEVATED CREATINE KINASE: Primary | ICD-10-CM

## 2024-07-04 LAB
ANION GAP SERPL CALCULATED.3IONS-SCNC: 12 MMOL/L (ref 7–15)
BACTERIA UR CULT: NORMAL
BASOPHILS # BLD AUTO: 0 10E3/UL (ref 0–0.2)
BASOPHILS NFR BLD AUTO: 1 %
BUN SERPL-MCNC: 52.3 MG/DL (ref 6–20)
CALCIUM SERPL-MCNC: 8.8 MG/DL (ref 8.6–10)
CHLORIDE SERPL-SCNC: 105 MMOL/L (ref 98–107)
CREAT SERPL-MCNC: 3.19 MG/DL (ref 0.51–0.95)
CREAT SERPL-MCNC: 3.33 MG/DL (ref 0.51–0.95)
DEPRECATED HCO3 PLAS-SCNC: 20 MMOL/L (ref 22–29)
EGFRCR SERPLBLD CKD-EPI 2021: 18 ML/MIN/1.73M2
EGFRCR SERPLBLD CKD-EPI 2021: 19 ML/MIN/1.73M2
EOSINOPHIL # BLD AUTO: 0.1 10E3/UL (ref 0–0.7)
EOSINOPHIL NFR BLD AUTO: 2 %
ERYTHROCYTE [DISTWIDTH] IN BLOOD BY AUTOMATED COUNT: 11.9 % (ref 10–15)
GLUCOSE SERPL-MCNC: 137 MG/DL (ref 70–99)
HCT VFR BLD AUTO: 29.8 % (ref 35–47)
HGB BLD-MCNC: 9.9 G/DL (ref 11.7–15.7)
IMM GRANULOCYTES # BLD: 0 10E3/UL
IMM GRANULOCYTES NFR BLD: 0 %
LYMPHOCYTES # BLD AUTO: 1.9 10E3/UL (ref 0.8–5.3)
LYMPHOCYTES NFR BLD AUTO: 32 %
MCH RBC QN AUTO: 27.6 PG (ref 26.5–33)
MCHC RBC AUTO-ENTMCNC: 33.2 G/DL (ref 31.5–36.5)
MCV RBC AUTO: 83 FL (ref 78–100)
MONOCYTES # BLD AUTO: 0.3 10E3/UL (ref 0–1.3)
MONOCYTES NFR BLD AUTO: 4 %
NEUTROPHILS # BLD AUTO: 3.6 10E3/UL (ref 1.6–8.3)
NEUTROPHILS NFR BLD AUTO: 61 %
NRBC # BLD AUTO: 0 10E3/UL
NRBC BLD AUTO-RTO: 0 /100
PLATELET # BLD AUTO: 124 10E3/UL (ref 150–450)
POTASSIUM SERPL-SCNC: 4.8 MMOL/L (ref 3.4–5.3)
RBC # BLD AUTO: 3.59 10E6/UL (ref 3.8–5.2)
SODIUM SERPL-SCNC: 137 MMOL/L (ref 135–145)
WBC # BLD AUTO: 5.8 10E3/UL (ref 4–11)

## 2024-07-04 PROCEDURE — 85025 COMPLETE CBC W/AUTO DIFF WBC: CPT

## 2024-07-04 PROCEDURE — 36415 COLL VENOUS BLD VENIPUNCTURE: CPT

## 2024-07-04 PROCEDURE — 96360 HYDRATION IV INFUSION INIT: CPT

## 2024-07-04 PROCEDURE — 80048 BASIC METABOLIC PNL TOTAL CA: CPT

## 2024-07-04 PROCEDURE — 82565 ASSAY OF CREATININE: CPT | Performed by: NURSE PRACTITIONER

## 2024-07-04 PROCEDURE — 36415 COLL VENOUS BLD VENIPUNCTURE: CPT | Performed by: NURSE PRACTITIONER

## 2024-07-04 PROCEDURE — 258N000003 HC RX IP 258 OP 636: Performed by: NURSE PRACTITIONER

## 2024-07-04 RX ORDER — METHYLPREDNISOLONE SODIUM SUCCINATE 125 MG/2ML
125 INJECTION, POWDER, LYOPHILIZED, FOR SOLUTION INTRAMUSCULAR; INTRAVENOUS
Start: 2024-07-04

## 2024-07-04 RX ORDER — ALBUTEROL SULFATE 90 UG/1
1-2 AEROSOL, METERED RESPIRATORY (INHALATION)
Status: CANCELLED
Start: 2024-07-04

## 2024-07-04 RX ORDER — DIPHENHYDRAMINE HYDROCHLORIDE 50 MG/ML
50 INJECTION INTRAMUSCULAR; INTRAVENOUS
Start: 2024-07-04

## 2024-07-04 RX ORDER — EPINEPHRINE 1 MG/ML
0.3 INJECTION, SOLUTION INTRAMUSCULAR; SUBCUTANEOUS EVERY 5 MIN PRN
Status: CANCELLED | OUTPATIENT
Start: 2024-07-04

## 2024-07-04 RX ORDER — MEPERIDINE HYDROCHLORIDE 25 MG/ML
25 INJECTION INTRAMUSCULAR; INTRAVENOUS; SUBCUTANEOUS EVERY 30 MIN PRN
OUTPATIENT
Start: 2024-07-04

## 2024-07-04 RX ORDER — HEPARIN SODIUM,PORCINE 10 UNIT/ML
5-20 VIAL (ML) INTRAVENOUS DAILY PRN
Status: CANCELLED | OUTPATIENT
Start: 2024-07-04

## 2024-07-04 RX ORDER — ALBUTEROL SULFATE 90 UG/1
1-2 AEROSOL, METERED RESPIRATORY (INHALATION)
Start: 2024-07-04

## 2024-07-04 RX ORDER — DIPHENHYDRAMINE HYDROCHLORIDE 50 MG/ML
50 INJECTION INTRAMUSCULAR; INTRAVENOUS
Status: CANCELLED
Start: 2024-07-04

## 2024-07-04 RX ORDER — ALBUTEROL SULFATE 0.83 MG/ML
2.5 SOLUTION RESPIRATORY (INHALATION)
Status: CANCELLED | OUTPATIENT
Start: 2024-07-04

## 2024-07-04 RX ORDER — ALBUTEROL SULFATE 0.83 MG/ML
2.5 SOLUTION RESPIRATORY (INHALATION)
OUTPATIENT
Start: 2024-07-04

## 2024-07-04 RX ORDER — EPINEPHRINE 1 MG/ML
0.3 INJECTION, SOLUTION INTRAMUSCULAR; SUBCUTANEOUS EVERY 5 MIN PRN
OUTPATIENT
Start: 2024-07-04

## 2024-07-04 RX ORDER — HEPARIN SODIUM (PORCINE) LOCK FLUSH IV SOLN 100 UNIT/ML 100 UNIT/ML
5 SOLUTION INTRAVENOUS
Status: CANCELLED | OUTPATIENT
Start: 2024-07-04

## 2024-07-04 RX ORDER — HEPARIN SODIUM (PORCINE) LOCK FLUSH IV SOLN 100 UNIT/ML 100 UNIT/ML
5 SOLUTION INTRAVENOUS
OUTPATIENT
Start: 2024-07-04

## 2024-07-04 RX ORDER — HEPARIN SODIUM,PORCINE 10 UNIT/ML
5-20 VIAL (ML) INTRAVENOUS DAILY PRN
OUTPATIENT
Start: 2024-07-04

## 2024-07-04 RX ORDER — MEPERIDINE HYDROCHLORIDE 25 MG/ML
25 INJECTION INTRAMUSCULAR; INTRAVENOUS; SUBCUTANEOUS EVERY 30 MIN PRN
Status: CANCELLED | OUTPATIENT
Start: 2024-07-04

## 2024-07-04 RX ORDER — METHYLPREDNISOLONE SODIUM SUCCINATE 125 MG/2ML
125 INJECTION, POWDER, LYOPHILIZED, FOR SOLUTION INTRAMUSCULAR; INTRAVENOUS
Status: CANCELLED
Start: 2024-07-04

## 2024-07-04 RX ADMIN — SODIUM CHLORIDE 1000 ML: 9 INJECTION, SOLUTION INTRAVENOUS at 08:39

## 2024-07-04 NOTE — PROGRESS NOTES
Infusion Nursing Note:  Carol Guillaume presents today for IVF and lab draw.    Patient seen by provider today: No   present during visit today: Not Applicable.    Note: Per orders, 1 L NS infused over an hr.  Administrations This Visit       sodium chloride 0.9% BOLUS 1,000 mL       Admin Date  07/04/2024 Action  $New Bag Dose  1,000 mL Route  Intravenous Documented By  Shelby Santo, ALEX                     Intravenous Access:  Labs drawn without difficulty. Post infusion creatinine lab drawn per written order from KALLIE Starks.  Peripheral IV placed.    Treatment Conditions:  None.      Post Infusion Assessment:  Patient tolerated infusion without incident.  Blood return noted pre and post infusion.  Site patent and intact, free from redness, edema or discomfort.  No evidence of extravasations.  Access discontinued per protocol.       Discharge Plan:   Discharge instructions reviewed with: Patient.  Patient and/or family verbalized understanding of discharge instructions and all questions answered.  AVS to patient via [employer].  Patient will return to her provider as needed for next appointment.   Patient discharged in stable condition accompanied by: .  Departure Mode: Ambulatory.    /74 (BP Location: Right arm, Patient Position: Sitting, Cuff Size: Adult Large)   Pulse 77   Temp 97.8  F (36.6  C) (Oral)   Resp 16   SpO2 99%      Shelby Santo, ALEX

## 2024-07-04 NOTE — PATIENT INSTRUCTIONS
Dear Carol Guillaume    Thank you for choosing [facility] ([facility]) for your infusion.  The following information is a summary of our appointment as well as important reminders.          If you are a transplant patient and require transplant education, please click on this link: [email or web address].    We look forward in seeing you on your next appointment here at [facility] ([facility]).  Please don t hesitate to call us at [phone] to reschedule any of your appointments or to speak with one of the [facility] registered nurses.  It was a pleasure taking care of you today.    Sincerely,    [facility]  [address] [place] [address]  [id number]  Phone:  [phone]

## 2024-07-08 ENCOUNTER — TELEPHONE (OUTPATIENT)
Dept: TRANSPLANT | Facility: CLINIC | Age: 34
End: 2024-07-08
Payer: COMMERCIAL

## 2024-07-08 DIAGNOSIS — Z94.0 KIDNEY REPLACED BY TRANSPLANT: Primary | ICD-10-CM

## 2024-07-08 NOTE — TELEPHONE ENCOUNTER
Left message for the patient with instructions not to take supplements without prescription.  Let her know if she feels she needs iron to reach back out or to contact her PCP.    Ronna Hughes RN   Transplant Coordinator  [phone]

## 2024-07-10 ENCOUNTER — TELEPHONE (OUTPATIENT)
Dept: TRANSPLANT | Facility: CLINIC | Age: 34
End: 2024-07-10

## 2024-07-10 ENCOUNTER — ANCILLARY PROCEDURE (OUTPATIENT)
Dept: ULTRASOUND IMAGING | Facility: CLINIC | Age: 34
End: 2024-07-10
Attending: INTERNAL MEDICINE
Payer: COMMERCIAL

## 2024-07-10 DIAGNOSIS — R74.8 ELEVATED CREATINE KINASE: ICD-10-CM

## 2024-07-10 DIAGNOSIS — T86.19 DELAYED RENAL GRAFT FUNCTION: ICD-10-CM

## 2024-07-10 DIAGNOSIS — N20.0 KIDNEY STONE: ICD-10-CM

## 2024-07-10 DIAGNOSIS — D64.9 LOW HEMOGLOBIN: ICD-10-CM

## 2024-07-10 DIAGNOSIS — Z94.0 KIDNEY TRANSPLANTED: Primary | ICD-10-CM

## 2024-07-10 PROCEDURE — 76775 US EXAM ABDO BACK WALL LIM: CPT | Mod: GC | Performed by: RADIOLOGY

## 2024-07-10 NOTE — TELEPHONE ENCOUNTER
Feliberto Douglas MD Sveiven, Sara, RN4 hours ago (10:44 AM)       Agree with iron studies, anemia referral. Add smear haptoglobin LDH given recent switch to mTOR  EBV pcr LDH cmv pcr TSH  US kidney tx shows non obstructive kidney stone. Would add litholink     Is she following with GYN? any other symptoms with RLQ pain (N/V) or urinary symptoms     Felbierto Douglas MD Lavelle Peterson, Meghan M, RN  No obstructive kidney stone  Litholink  Urology referral     Call placed Carol M Markell to review above recommendations. Carol verbalized understanding and agreeable to recommendations. She will complete blood work 7/12.    She does not have UTI symptoms, will hold off on checking UA/UC. She denies any N/V/D. She reports RLQ pain is below transplanted kidney and can vary in exact location. She denies fevers. Pain is intermittent, can be severe but most often is like a dull ache. She has had recent follow up with GYN and does not feel this is the source of discomfort.

## 2024-07-12 ENCOUNTER — LAB (OUTPATIENT)
Dept: LAB | Facility: CLINIC | Age: 34
End: 2024-07-12
Payer: COMMERCIAL

## 2024-07-12 ENCOUNTER — TELEPHONE (OUTPATIENT)
Dept: TRANSPLANT | Facility: CLINIC | Age: 34
End: 2024-07-12

## 2024-07-12 ENCOUNTER — PATIENT OUTREACH (OUTPATIENT)
Dept: CARE COORDINATION | Facility: CLINIC | Age: 34
End: 2024-07-12

## 2024-07-12 DIAGNOSIS — I12.9 HYPERTENSIVE RENAL DISEASE: ICD-10-CM

## 2024-07-12 DIAGNOSIS — Z94.0 KIDNEY REPLACED BY TRANSPLANT: ICD-10-CM

## 2024-07-12 DIAGNOSIS — D63.8 ANEMIA IN OTHER CHRONIC DISEASES CLASSIFIED ELSEWHERE: Primary | ICD-10-CM

## 2024-07-12 DIAGNOSIS — D63.8 ANEMIA IN OTHER CHRONIC DISEASES CLASSIFIED ELSEWHERE: ICD-10-CM

## 2024-07-12 DIAGNOSIS — D64.9 LOW HEMOGLOBIN: ICD-10-CM

## 2024-07-12 DIAGNOSIS — Z94.0 KIDNEY TRANSPLANTED: ICD-10-CM

## 2024-07-12 DIAGNOSIS — Z94.0 KIDNEY REPLACED BY TRANSPLANT: Primary | ICD-10-CM

## 2024-07-12 LAB
ANION GAP SERPL CALCULATED.3IONS-SCNC: 12 MMOL/L (ref 7–15)
BUN SERPL-MCNC: 53.2 MG/DL (ref 6–20)
CALCIUM SERPL-MCNC: 9 MG/DL (ref 8.6–10)
CHLORIDE SERPL-SCNC: 106 MMOL/L (ref 98–107)
CMV DNA SPEC NAA+PROBE-ACNC: NOT DETECTED IU/ML
CREAT SERPL-MCNC: 3.33 MG/DL (ref 0.51–0.95)
DEPRECATED HCO3 PLAS-SCNC: 19 MMOL/L (ref 22–29)
EBV DNA SERPL NAA+PROBE-ACNC: NOT DETECTED IU/ML
EGFRCR SERPLBLD CKD-EPI 2021: 18 ML/MIN/1.73M2
ERYTHROCYTE [DISTWIDTH] IN BLOOD BY AUTOMATED COUNT: 11.6 % (ref 10–15)
EVEROLIMUS BLD-MCNC: 16.3 UG/L (ref 3–8)
FERRITIN SERPL-MCNC: 122 NG/ML (ref 6–175)
GLUCOSE SERPL-MCNC: 120 MG/DL (ref 70–99)
HAPTOGLOB SERPL-MCNC: 251 MG/DL (ref 30–200)
HCT VFR BLD AUTO: 29 % (ref 35–47)
HGB BLD-MCNC: 9.6 G/DL (ref 11.7–15.7)
IRON BINDING CAPACITY (ROCHE): 213 UG/DL (ref 240–430)
IRON SATN MFR SERPL: 14 % (ref 15–46)
IRON SERPL-MCNC: 30 UG/DL (ref 37–145)
LDH SERPL L TO P-CCNC: 213 U/L (ref 0–250)
MCH RBC QN AUTO: 27 PG (ref 26.5–33)
MCHC RBC AUTO-ENTMCNC: 33.1 G/DL (ref 31.5–36.5)
MCV RBC AUTO: 82 FL (ref 78–100)
PLATELET # BLD AUTO: 115 10E3/UL (ref 150–450)
POTASSIUM SERPL-SCNC: 4.3 MMOL/L (ref 3.4–5.3)
RBC # BLD AUTO: 3.56 10E6/UL (ref 3.8–5.2)
SODIUM SERPL-SCNC: 137 MMOL/L (ref 135–145)
TME LAST DOSE: ABNORMAL H
TME LAST DOSE: ABNORMAL H
TSH SERPL DL<=0.005 MIU/L-ACNC: 0.72 UIU/ML (ref 0.3–4.2)
WBC # BLD AUTO: 6.2 10E3/UL (ref 4–11)

## 2024-07-12 PROCEDURE — 82728 ASSAY OF FERRITIN: CPT | Performed by: PATHOLOGY

## 2024-07-12 PROCEDURE — 83550 IRON BINDING TEST: CPT | Performed by: PATHOLOGY

## 2024-07-12 PROCEDURE — 83010 ASSAY OF HAPTOGLOBIN QUANT: CPT | Performed by: INTERNAL MEDICINE

## 2024-07-12 PROCEDURE — 87799 DETECT AGENT NOS DNA QUANT: CPT | Performed by: INTERNAL MEDICINE

## 2024-07-12 PROCEDURE — 85027 COMPLETE CBC AUTOMATED: CPT | Performed by: PATHOLOGY

## 2024-07-12 PROCEDURE — 84443 ASSAY THYROID STIM HORMONE: CPT | Performed by: PATHOLOGY

## 2024-07-12 PROCEDURE — 99000 SPECIMEN HANDLING OFFICE-LAB: CPT | Performed by: PATHOLOGY

## 2024-07-12 PROCEDURE — 36415 COLL VENOUS BLD VENIPUNCTURE: CPT | Performed by: PATHOLOGY

## 2024-07-12 PROCEDURE — 83540 ASSAY OF IRON: CPT | Performed by: PATHOLOGY

## 2024-07-12 PROCEDURE — 80048 BASIC METABOLIC PNL TOTAL CA: CPT | Performed by: PATHOLOGY

## 2024-07-12 PROCEDURE — 80169 DRUG ASSAY EVEROLIMUS: CPT | Performed by: INTERNAL MEDICINE

## 2024-07-12 PROCEDURE — 83615 LACTATE (LD) (LDH) ENZYME: CPT | Performed by: PATHOLOGY

## 2024-07-12 RX ORDER — HEPARIN SODIUM (PORCINE) LOCK FLUSH IV SOLN 100 UNIT/ML 100 UNIT/ML
5 SOLUTION INTRAVENOUS
Status: CANCELLED | OUTPATIENT
Start: 2024-07-12

## 2024-07-12 RX ORDER — ALBUTEROL SULFATE 0.83 MG/ML
2.5 SOLUTION RESPIRATORY (INHALATION)
Status: CANCELLED | OUTPATIENT
Start: 2024-07-12

## 2024-07-12 RX ORDER — DIPHENHYDRAMINE HYDROCHLORIDE 50 MG/ML
50 INJECTION INTRAMUSCULAR; INTRAVENOUS
Status: CANCELLED
Start: 2024-07-12

## 2024-07-12 RX ORDER — MEPERIDINE HYDROCHLORIDE 25 MG/ML
25 INJECTION INTRAMUSCULAR; INTRAVENOUS; SUBCUTANEOUS EVERY 30 MIN PRN
Status: CANCELLED | OUTPATIENT
Start: 2024-07-12

## 2024-07-12 RX ORDER — EPINEPHRINE 1 MG/ML
0.3 INJECTION, SOLUTION, CONCENTRATE INTRAVENOUS EVERY 5 MIN PRN
Status: CANCELLED | OUTPATIENT
Start: 2024-07-12

## 2024-07-12 RX ORDER — EVEROLIMUS 0.25 MG/1
0.5 TABLET ORAL 2 TIMES DAILY
Qty: 120 TABLET | Refills: 11 | Status: SHIPPED | OUTPATIENT
Start: 2024-07-12 | End: 2024-07-17

## 2024-07-12 RX ORDER — HEPARIN SODIUM,PORCINE 10 UNIT/ML
5-20 VIAL (ML) INTRAVENOUS DAILY PRN
Status: CANCELLED | OUTPATIENT
Start: 2024-07-12

## 2024-07-12 RX ORDER — METHYLPREDNISOLONE SODIUM SUCCINATE 125 MG/2ML
125 INJECTION, POWDER, LYOPHILIZED, FOR SOLUTION INTRAMUSCULAR; INTRAVENOUS
Status: CANCELLED
Start: 2024-07-12

## 2024-07-12 RX ORDER — ALBUTEROL SULFATE 90 UG/1
1-2 AEROSOL, METERED RESPIRATORY (INHALATION)
Status: CANCELLED
Start: 2024-07-12

## 2024-07-12 RX ORDER — EVEROLIMUS 0.75 MG/1
0.75 TABLET ORAL 2 TIMES DAILY
Qty: 60 TABLET | Refills: 11 | Status: SHIPPED | OUTPATIENT
Start: 2024-07-12 | End: 2024-07-26

## 2024-07-12 NOTE — TELEPHONE ENCOUNTER
carvedilol (COREG) 25 MG tablet 180 tablet 3 8/2/2023 -- No   Sig - Route: Take 1 tablet (25 mg) by mouth 2 times daily (with meals) - Oral   ----------------------  Last Office Visit :     12/4/2023  [facility] [place]      Future Office visit:  none  ----------------------      Routing refill request to provider for review/approval because:  Medication not indicated for associated diagnosis:  Hypertensive renal disease [I12.9]          Pass/Fail Protocol Criteria:    Beta-Blockers Protocol Dlgbxo2807/12/2024 11:03 AM   Protocol Details Medication indicated for associated diagnosis    Blood pressure under 140/90 in past 12 months    Patient is age 6 or older    Medication is active on med list    Recent (12 mo) or future (90 days) visit within the authorizing provider's specialty

## 2024-07-12 NOTE — PROGRESS NOTES
Anemia Management Note - Enrollment    SUBJECTIVE/OBJECTIVE:    Referred by Dr. Feliberto Rueda on 7/10/2024  Primary Diagnosis: Anemia of Other Chronic Illness (D63.8)     Secondary Diagnosis: Organ or tissue replaced by transplant, kidney (Z94.0)  Date of Kidney Transplant: 06  Hgb goal range: 9-10  Epo/Darbo:   TBD  Iron regimen: Needs IV iron  Injectafer ordered [id number]    Labs : [id number]  RX/TX plans : TBD    Recent MARCO use, transfusion, IV iron: none  No history of stroke, MI, and blood clots. Atypical Hyperplasia endometriosis carcinoma ,     No consent to communicate on file        Latest Ref Rng & Units 2023 10/26/2023 2024 2024 2024 2024 2024   Anemia   Hemoglobin 11.7 - 15.7 g/dL 10.6  11.2  10.9  10.6  10.0  9.9  9.6    TSAT 15 - 46 % 21       14    Ferritin 6 - 175 ng/mL 99       122         BP Readings from Last 3 Encounters:   24 115/74   24 (!) 138/96   23 135/84     Wt Readings from Last 2 Encounters:   24 120.2 kg (265 lb)   23 122.3 kg (269 lb 11.2 oz)     Current Outpatient Medications   Medication Sig Dispense Refill    acetaminophen (TYLENOL) 325 MG tablet Take 2 tablets (650 mg) by mouth every 4 hours as needed for mild pain (Patient not taking: Reported on 2024) 50 tablet 0    amLODIPine (NORVASC) 5 MG tablet Take 1 tablet (5 mg) by mouth daily 90 tablet 3    carvedilol (COREG) 25 MG tablet Take 1 tablet (25 mg) by mouth 2 times daily (with meals) 180 tablet 3    cephALEXin (KEFLEX) 500 MG capsule Take 1 capsule (500 mg) by mouth 2 times daily 14 capsule 0    everolimus (ZORTRESS) 0.25 MG tablet Take 2 tablets (0.5 mg) by mouth 2 times daily TOTAL DOSE: 1.25 MG TWO TIMES DAILY 120 tablet 11    everolimus (ZORTRESS) 0.75 MG tablet Take 1 tablet (0.75 mg) by mouth 2 times daily TOTAL DOSE: 1.25 MG TWO TIMES DAILY 60 tablet 11    labetalol (NORMODYNE) 200 MG tablet Labetalol Oral    BID    inactive      levothyroxine  (SYNTHROID/LEVOTHROID) 50 MCG tablet Take 1 tablet (50 mcg) by mouth daily 90 tablet 1    losartan (COZAAR) 25 MG tablet TAKE 1 TABLET BY MOUTH 2 TIMES DAILY 180 tablet 3    NEORAL (BRAND) 25 MG capsule Take 3 capsules (75 mg) by mouth 2 times daily 540 capsule 3    omeprazole (PRILOSEC) 40 MG DR capsule Take 1 capsule (40 mg) by mouth daily 90 capsule 3    vitamin D3 (CHOLECALCIFEROL) 50 mcg (2000 units) tablet Take 1 tablet (50 mcg) by mouth daily 90 tablet 3       ASSESSMENT:  Hgb At goal   Ferritin: At goal (>100ng/mL)  TSat: not at goal of >30%  Iron regimen recommended: IV iron- Injectafer ordered  Recommended MARCO regimen: TBD- review cancer hx with provider prior to ordering MARCO  Blood Pressure: Stable   Goals Addressed    None         PLAN:  1. Patient called today for enrollment in Anemia Management Service. LVM  2. To be discussed: anemia overview, monitoring service, and goal hemoglobin range and rationale and risks of MARCO blood clots, stroke, and increase in blood pressure  3. Dose location: [facility]  4. Labs: [place]  5. Pharmacy: TBD    Addendum: Carol called back. Reviewed enrollment in anemia management services and need for IV iron. She reports feeling very fatigued. She says she's tried to increase iron intake however most foods are also high in phosphorus, so she's struggling to find the best options. Writer will send [employer] with link to website with iron-rich foods for her review.    Iron infusions scheduled 7/24 and 7/31/24    Next call date:  [id number]    Carrie Leopold, RN BSN  Anemia Services  [facility]  [email or web address]  Office: [phone]  Fax [phone]

## 2024-07-12 NOTE — TELEPHONE ENCOUNTER
ISSUE:   Everolimus level 16 on 07/12/24, goal 6, dose 1 mg BID.    PLAN:   Call Patient and confirm this was an accurate 12-hour trough.   Verify Everolimus dose 1 mg BID.   Confirm no new medications or illness.   Confirm no missed doses.     If accurate trough and accurate dose, decrease Everolimus dose to 0.5 mg BID  *Recommended dose rounded from calculated dose 0.38 mg  BID.      Repeat labs in Monday 7/15   For any dose change <50%, recheck labs per guideline or within 1 month.  For any dose change of more than 50%, recheck drug level based on timing to reach steady state:   Immediate release tacrolimus: 2-3 days  Extended-release tacrolimus: 7 days  Cyclosporine: 2 days  Sirolimus: 12 days  Everolimus: 8 days      OUTCOME:   Spoke with Patient, they confirm accurate trough level and current dose 1 mg BID.   Patient confirmed dose change to 0.5 mg BID.  Patient agreed to repeat labs in 3 days.   Orders sent to preferred pharmacy for dose change and lab for repeat labs.   Patient voiced understanding of plan.      Ronna Hughes RN   Transplant Coordinator  [phone]

## 2024-07-15 ENCOUNTER — LAB (OUTPATIENT)
Dept: LAB | Facility: CLINIC | Age: 34
End: 2024-07-15
Payer: COMMERCIAL

## 2024-07-15 ENCOUNTER — TELEPHONE (OUTPATIENT)
Dept: TRANSPLANT | Facility: CLINIC | Age: 34
End: 2024-07-15

## 2024-07-15 DIAGNOSIS — R11.0 NAUSEA: ICD-10-CM

## 2024-07-15 DIAGNOSIS — Z94.0 KIDNEY REPLACED BY TRANSPLANT: ICD-10-CM

## 2024-07-15 DIAGNOSIS — R53.83 FATIGUE: ICD-10-CM

## 2024-07-15 DIAGNOSIS — R74.8 ELEVATED CREATINE KINASE: ICD-10-CM

## 2024-07-15 DIAGNOSIS — D63.8 ANEMIA IN OTHER CHRONIC DISEASES CLASSIFIED ELSEWHERE: ICD-10-CM

## 2024-07-15 LAB
ALBUMIN UR-MCNC: 20 MG/DL
ANION GAP SERPL CALCULATED.3IONS-SCNC: 11 MMOL/L (ref 7–15)
APPEARANCE UR: CLEAR
BILIRUB UR QL STRIP: NEGATIVE
BUN SERPL-MCNC: 52.6 MG/DL (ref 6–20)
CALCIUM SERPL-MCNC: 9.4 MG/DL (ref 8.6–10)
CHLORIDE SERPL-SCNC: 106 MMOL/L (ref 98–107)
COLOR UR AUTO: ABNORMAL
CREAT SERPL-MCNC: 3.9 MG/DL (ref 0.51–0.95)
DEPRECATED HCO3 PLAS-SCNC: 20 MMOL/L (ref 22–29)
EBV DNA SERPL NAA+PROBE-ACNC: NOT DETECTED IU/ML
EGFRCR SERPLBLD CKD-EPI 2021: 15 ML/MIN/1.73M2
ERYTHROCYTE [DISTWIDTH] IN BLOOD BY AUTOMATED COUNT: 11.6 % (ref 10–15)
GLUCOSE SERPL-MCNC: 122 MG/DL (ref 70–99)
GLUCOSE UR STRIP-MCNC: NEGATIVE MG/DL
HCT VFR BLD AUTO: 29.2 % (ref 35–47)
HGB BLD-MCNC: 10 G/DL (ref 11.7–15.7)
HGB UR QL STRIP: NEGATIVE
IRON BINDING CAPACITY (ROCHE): 212 UG/DL (ref 240–430)
IRON SATN MFR SERPL: 17 % (ref 15–46)
IRON SERPL-MCNC: 36 UG/DL (ref 37–145)
KETONES UR STRIP-MCNC: NEGATIVE MG/DL
LEUKOCYTE ESTERASE UR QL STRIP: NEGATIVE
MCH RBC QN AUTO: 27.5 PG (ref 26.5–33)
MCHC RBC AUTO-ENTMCNC: 34.2 G/DL (ref 31.5–36.5)
MCV RBC AUTO: 80 FL (ref 78–100)
NITRATE UR QL: NEGATIVE
PH UR STRIP: 5 [PH] (ref 5–7)
PLATELET # BLD AUTO: 143 10E3/UL (ref 150–450)
POTASSIUM SERPL-SCNC: 4.5 MMOL/L (ref 3.4–5.3)
RBC # BLD AUTO: 3.63 10E6/UL (ref 3.8–5.2)
RBC URINE: 1 /HPF
SODIUM SERPL-SCNC: 137 MMOL/L (ref 135–145)
SP GR UR STRIP: 1.01 (ref 1–1.03)
UROBILINOGEN UR STRIP-MCNC: NORMAL MG/DL
WBC # BLD AUTO: 6.3 10E3/UL (ref 4–11)
WBC URINE: 1 /HPF

## 2024-07-15 PROCEDURE — 85027 COMPLETE CBC AUTOMATED: CPT | Performed by: PATHOLOGY

## 2024-07-15 PROCEDURE — 80169 DRUG ASSAY EVEROLIMUS: CPT | Performed by: INTERNAL MEDICINE

## 2024-07-15 PROCEDURE — 83550 IRON BINDING TEST: CPT | Performed by: PATHOLOGY

## 2024-07-15 PROCEDURE — 83540 ASSAY OF IRON: CPT | Performed by: PATHOLOGY

## 2024-07-15 PROCEDURE — 81001 URINALYSIS AUTO W/SCOPE: CPT | Performed by: PATHOLOGY

## 2024-07-15 PROCEDURE — 87799 DETECT AGENT NOS DNA QUANT: CPT | Performed by: INTERNAL MEDICINE

## 2024-07-15 PROCEDURE — 99000 SPECIMEN HANDLING OFFICE-LAB: CPT | Performed by: PATHOLOGY

## 2024-07-15 PROCEDURE — 80048 BASIC METABOLIC PNL TOTAL CA: CPT | Performed by: PATHOLOGY

## 2024-07-15 PROCEDURE — 36415 COLL VENOUS BLD VENIPUNCTURE: CPT | Performed by: PATHOLOGY

## 2024-07-15 NOTE — TELEPHONE ENCOUNTER
Patient Call: General  Route to LPN    Reason for call: Carol is at [facility] [address] [facility], she stated Coordinator, forgot to make her appointment for this morning, and lab made it for 1 pm patient stated she's not able to stay until 1 pm and wondering if Coordinator, can contact lab to see if they will see her sooner.    Call back needed? Yes    Return Call Needed  Same as documented in contacts section  When to return call?: Same day: Route High Priority

## 2024-07-16 ENCOUNTER — TELEPHONE (OUTPATIENT)
Dept: TRANSPLANT | Facility: CLINIC | Age: 34
End: 2024-07-16
Payer: COMMERCIAL

## 2024-07-16 DIAGNOSIS — Z94.0 KIDNEY REPLACED BY TRANSPLANT: ICD-10-CM

## 2024-07-16 LAB
EVEROLIMUS BLD-MCNC: 10.6 UG/L (ref 3–8)
TME LAST DOSE: ABNORMAL H
TME LAST DOSE: ABNORMAL H

## 2024-07-16 RX ORDER — CARVEDILOL 25 MG/1
25 TABLET ORAL 2 TIMES DAILY WITH MEALS
Qty: 180 TABLET | Refills: 1 | Status: SHIPPED | OUTPATIENT
Start: 2024-07-16

## 2024-07-16 NOTE — TELEPHONE ENCOUNTER
Patient called and only has one pill left, please refill ASAP, send to:      [facility] - [place], [place] - [address]

## 2024-07-16 NOTE — TELEPHONE ENCOUNTER
carvedilol (COREG) 25 MG tablet   Disp-180 tablet, R-3   Start: 08/02/2023 12/4/2023  [facility] [place]    Ike Molina MD  Internal Medicine    Routed because:     Beta-Blockers Protocol [id number]/16/2024 08:45 AM   Protocol Details Medication indicated for associated diagnosis      Medication is associated with one or more of the following diagnoses:                Hypertension (HTN)              Atrial fibrillation/flutter              Angina              ASCVD              Migraine              Heart Failure              Tremor              Anxiety              Ocular hypertension              Glaucoma              PTSD

## 2024-07-16 NOTE — TELEPHONE ENCOUNTER
Feliberto Douglas MD Sveiven, Sara, RN  Uptrend in Cr could still be due to everolimus+CSA levels , ATN  run everolimus closer to 3 and CSA closer to 75  Recommend labs twice weekly till CR stabilizes then weekly          Previous Messages       ----- Message -----  From: Ronna Hughes, ALEX  Sent: 7/15/2024  12:13 PM CDT  To: Feliberto Rueda MD    Creatinine continues to rise ,  Everolimus level in process- does decreased for level of 16 on Friday 7/12.  Last IVF 7/4.    Continue to c/o fatigue- EBV also in process though CMV and EBV both recently negative.    Intermittent abdominal pain- US 7/10 negative for hydro, nonobstructive calculus noted- litholink ordered as well as  urology referral placed.  UA negative today 7/15    Switched from MMF to Mtor ~7/1    Most recent BP on file from this month 115/74    Recommendations? Is currently in referral, waiting for eval    Ronna Hughes RN  Transplant Coordinator  [phone]

## 2024-07-17 ENCOUNTER — COMMITTEE REVIEW (OUTPATIENT)
Dept: TRANSPLANT | Facility: CLINIC | Age: 34
End: 2024-07-17
Payer: COMMERCIAL

## 2024-07-17 RX ORDER — EVEROLIMUS 0.25 MG/1
TABLET ORAL
Status: SHIPPED
Start: 2024-07-17

## 2024-07-17 NOTE — COMMITTEE REVIEW
Kidney/Pancreas Committee Review Note     Evaluation Date:    Committee Review Date: 7/17/2024    Organ being evaluated for: Kidney    Transplant Phase: Referral  Transplant Status: Active    Transplant Coordinator: Chanel Stinson  Transplant Surgeon: Dr. Mcmahan      Referring Physician: Feliberto Rueda    Primary Diagnosis: Nephronophthisis  Secondary Diagnosis: CKD 4    Committee Review Members:  Cardiology Leta Avila, APRN CNP   Dietitian Janet Dey RD   Nephrology Magaly Hitchcock MD, Raúl Rubin, APRN CNP, Feliberto Littlejohn MD   Nutrition Janet Dey, ISABEL   Pharmacist Tee Jaramillo, [facility]    - Clinical Leeanne Weeks, MSW, Chang Ann MSW, Mandi Moreno MSW   Transplant HANH GIBBONS, RN, David Blanchard MD, Ada Burkett, RN, Na Martinez, RN, MATTHEW Camarena CNP, Sera Ma, ALEX, Alee Obrien, RN, Tereza Harris, NP, Tereza Alvarado, RN, Chanel Pitts, RN, Michelle Gage, RN, Felipa Sheikh, RN   Transplant Surgery David Blanchard MD, Dillon Montelongo MD       Transplant Eligibility: Irreversible chronic kidney disease treated w/dialysis or expected need for dialysis    Committee Review Decision: Approved    Relative Contraindications: None    Absolute Contraindications: None     Committee Chair David Blanchard MD verbally attested to the committee's decision.    Committee Discussion Details: Discussed this patient's mini evaluation in conjunction with a qualifying GFR at this time. Noted that the patient should come back in for full eval to see a surgeon in person, nephrology, social work and nutrition at the time that her BMI reached 40. She should continue to work with our weight management team. Patient will be called and letter will be sent.

## 2024-07-17 NOTE — LETTER
2024    Carolhardeep Guillaume  [address] [address]      Dear Ms. Guillaume,    This letter is sent to confirm that you are a candidate in the kidney transplant program at the [facility].  You were placed on the kidney inactive waitlist on 2024.  This means you will accumulate waiting time but not receive  donor calls.       Items we will need from you:    We have received approval from you insurance company for the transplant procedure.  It is critical that you notify us if there is any change in your insurance.  It is also important that you familiarize yourself with the details of your specific insurance policy.  Our patient  is available to assist you if you should have any questions regarding your coverage.    Once you are approved for active listing status, an ALA or PRA blood sample will need to be sent here every 3 months to match you with  donors or any potential living donors.  At this time, special mailing boxes (called mailers) will be sent to you directly from the Outreach Department.  You should take the physician order form and the  to your home laboratory when it is time to for this testing to be done.  Additional mailers can be obtained by calling the Transplant Office and asking to speak to a kidney .    During this waiting period, we may request additional periodic laboratory tests with your primary physician.  It will be your responsibility to remind your physician to forward your results to the Transplant Office.    We need to be kept informed of any changes in your medical condition such as:    changes in your medications,   significant changes in your health  significant infections (such as pneumonia or abscesses)  blood transfusions  any condition which requires hospitalization  any surgery  initiation of dialysis     Remember to complete any routine  cancer screening tests required before your transplant.  This includes colonoscopy; prostrate screening for men, and mammogram and gynecologic testing for women, as well as dental work.  Your primary care clinic can assist you with arranging for these exams.  Remind your caregivers to forward copies of the records and final reports.    We want you to know that our program has physician and surgeon coverage 24 hours a day, 365 days a year. In addition, our transplant surgeons and physicians will not be on call for two or more transplant programs more than 30 miles apart unless the circumstances have been reviewed and approved by the [employer] for Organ Sharing (UNOS) Membership and Professional Standards Committee (MPSC). Finally, our primary physician and primary surgeons are not designated as the primary surgeon or primary physician at more than 1 transplant hospital. If this coverage changes or there are substantial program changes, you will be notified in writing by letter.     Attached is a letter from UNOS that describes the services and information offered to patients by UNOS and the Organ Procurement and Transplantation Network (OPTN).    We appreciate having had the opportunity to participate in your care.  If you have questions, please feel free to call the Transplant Office at [phone] or [phone].      Sincerely,   Chanel Mckinney RN   Pre-Kidney Transplant Coordinator  Direct line: [phone]    Kidney Transplant Program    Enclosures: ALA/THA Physician Order, Telephone Contact List, Travel Resources, UNOS Letter, Waitlist Information Update, and While You Are Waiting  CC:   Care Team                         The Organ Procurement and Transplantation Network Toll-free patient services line:  [phone]  Your resource for organ transplant information    Staffed 8:30 am - 5:00 pm ET Monday - Friday Leave a message 24/7 to receive a call back    The Organ Procurement and Transplantation  Network (OPTN) is the national transplant system. It makes the policies that decide how donated organs are matched to patients waiting for a transplant. The OPTN:    Makes sure donated organs get matched to people on the transplant waiting list  Tells people about the donation and transplant processes  Makes sure that the public knows about the need for more organ and tissue donations    The OPTN has a free patient services line that you can call to:  Get more information about:  Organ donation and organ transplants  Donation and transplant policies  Get an information kit with:  A list of transplant hospitals  Waiting list information  Talk about any questions you may have about your transplant hospital or organ procurement organization. The staff will do their best to help you or point you to others who may help.  Find out how you can volunteer with the OPTN and help shape transplant policy     The patient services line number is: 2-795-278-4108    Patient services line staff CANNOT answer questions about your own medical care, including:  Waiting list status  Test results  Medical records  You will need to call your transplant hospital for this information.    The following websites have more information about transplantation and donation:    OPTN: https://optn.transplant.hrsa.gov/    For potential living donors and transplant recipients:    Living with transplant: https://www.transplantliving.org/    Living donation process: https://optn.transplant.hrsa.gov/living-donation/    Financial assistance: https://www.livingdonorassistance.org/    Transplantation data: https://www.srtr.org/    Organ donation: https://www.organdonor.gov/    Volunteer with the OPTN: https://optn.transplant.hrsa.gov/get-involved/

## 2024-07-17 NOTE — TELEPHONE ENCOUNTER
ISSUE:   Everolimus level 10.6 on 7/15/2024, goal 3-5, dose 0.5 mg BID.    PLAN:   Call Patient and confirm this was an accurate 12-hour trough.   Verify Everolimus dose 0.5 mg BID.   Confirm no new medications or or missed doses.   Confirm no new illness / infection / diarrhea.   If accurate trough and accurate dose, decrease Everolimus dose to 0.5 mg in the AM and 0.25 mg in the PM      Is this more than a 50% increase or decrease in current IS dose: No  If YES, justification: na    Repeat labs on Friday 7/19 .  *If > 50% change in immunosuppression dose, repeat labs in 1 week.     OUTCOME:   Spoke with Patient, they confirm accurate trough level and current dose 0.5 mg BID.   Patient confirmed dose change to 0.5 mg in the AM and 0.25 mg in the PM.  Patient agreed to repeat labs this Friday .   Orders sent to preferred pharmacy for dose change and lab for repeat labs.   Patient voiced understanding of plan.       Ronna Hughes RN   Transplant Coordinator  [phone]

## 2024-07-18 ENCOUNTER — TELEPHONE (OUTPATIENT)
Dept: TRANSPLANT | Facility: CLINIC | Age: 34
End: 2024-07-18
Payer: COMMERCIAL

## 2024-07-18 DIAGNOSIS — T86.12 KIDNEY TRANSPLANT FAILURE: ICD-10-CM

## 2024-07-18 DIAGNOSIS — Z76.82 ORGAN TRANSPLANT CANDIDATE: ICD-10-CM

## 2024-07-18 DIAGNOSIS — N18.4 CHRONIC KIDNEY DISEASE, STAGE IV (SEVERE) (H): Primary | ICD-10-CM

## 2024-07-18 DIAGNOSIS — N18.9 CHRONIC RENAL FAILURE: ICD-10-CM

## 2024-07-18 DIAGNOSIS — I10 ESSENTIAL HYPERTENSION: ICD-10-CM

## 2024-07-18 DIAGNOSIS — Q61.5 CONGENITAL MEDULLARY CYSTIC KIDNEY: ICD-10-CM

## 2024-07-18 DIAGNOSIS — Z01.818 PRE-TRANSPLANT EVALUATION FOR KIDNEY TRANSPLANT: ICD-10-CM

## 2024-07-18 NOTE — TELEPHONE ENCOUNTER
Carol returned my call. She has an appointment with Amira Gomez on August 6th to discuss Ozempic. She has 17 lb to lose to meet BMI of 40 to start a full evaluation. She is hoping she can lose the weight by November. We decided on a PKE date of 11/4/2024 with the point of weight loss and if she is unable to lose the weight she will let Chanel know so she can reschedule. MTP is scheduled for 10/28/2024. Orders routed to scheduling.

## 2024-07-18 NOTE — TELEPHONE ENCOUNTER
Tried to reach Carol but was only able to leave a voice message. I let her know I was placing her on the inactive wait list to start her wait time while she continues to lose weight. Her target is 248 lb to start a full evaluation. I did mention we are booking out into the end of October and would like to get a date nailed down for her. Asked for a return call. Inactive listing letter sent.

## 2024-07-19 ENCOUNTER — TELEPHONE (OUTPATIENT)
Dept: TRANSPLANT | Facility: CLINIC | Age: 34
End: 2024-07-19

## 2024-07-19 ENCOUNTER — INFUSION THERAPY VISIT (OUTPATIENT)
Dept: INFUSION THERAPY | Facility: CLINIC | Age: 34
End: 2024-07-19
Attending: FAMILY MEDICINE
Payer: COMMERCIAL

## 2024-07-19 VITALS
TEMPERATURE: 97.7 F | HEART RATE: 92 BPM | SYSTOLIC BLOOD PRESSURE: 105 MMHG | DIASTOLIC BLOOD PRESSURE: 71 MMHG | OXYGEN SATURATION: 99 % | RESPIRATION RATE: 16 BRPM

## 2024-07-19 DIAGNOSIS — D63.8 ANEMIA IN OTHER CHRONIC DISEASES CLASSIFIED ELSEWHERE: Primary | ICD-10-CM

## 2024-07-19 DIAGNOSIS — Z94.0 KIDNEY REPLACED BY TRANSPLANT: Primary | ICD-10-CM

## 2024-07-19 DIAGNOSIS — Z94.0 KIDNEY REPLACED BY TRANSPLANT: ICD-10-CM

## 2024-07-19 LAB
ANION GAP SERPL CALCULATED.3IONS-SCNC: 11 MMOL/L (ref 7–15)
BUN SERPL-MCNC: 46.4 MG/DL (ref 6–20)
CALCIUM SERPL-MCNC: 8.9 MG/DL (ref 8.8–10.4)
CHLORIDE SERPL-SCNC: 108 MMOL/L (ref 98–107)
CREAT SERPL-MCNC: 3.66 MG/DL (ref 0.51–0.95)
EGFRCR SERPLBLD CKD-EPI 2021: 16 ML/MIN/1.73M2
ERYTHROCYTE [DISTWIDTH] IN BLOOD BY AUTOMATED COUNT: 11.6 % (ref 10–15)
EVEROLIMUS BLD-MCNC: 8.5 UG/L (ref 3–8)
GLUCOSE SERPL-MCNC: 159 MG/DL (ref 70–99)
HCO3 SERPL-SCNC: 18 MMOL/L (ref 22–29)
HCT VFR BLD AUTO: 26.5 % (ref 35–47)
HGB BLD-MCNC: 9 G/DL (ref 11.7–15.7)
MCH RBC QN AUTO: 27.3 PG (ref 26.5–33)
MCHC RBC AUTO-ENTMCNC: 34 G/DL (ref 31.5–36.5)
MCV RBC AUTO: 80 FL (ref 78–100)
PLATELET # BLD AUTO: 156 10E3/UL (ref 150–450)
POTASSIUM SERPL-SCNC: 4.3 MMOL/L (ref 3.4–5.3)
RBC # BLD AUTO: 3.3 10E6/UL (ref 3.8–5.2)
SODIUM SERPL-SCNC: 137 MMOL/L (ref 135–145)
TME LAST DOSE: ABNORMAL H
TME LAST DOSE: ABNORMAL H
WBC # BLD AUTO: 6.9 10E3/UL (ref 4–11)

## 2024-07-19 PROCEDURE — 96365 THER/PROPH/DIAG IV INF INIT: CPT

## 2024-07-19 PROCEDURE — 250N000011 HC RX IP 250 OP 636: Performed by: INTERNAL MEDICINE

## 2024-07-19 PROCEDURE — 82374 ASSAY BLOOD CARBON DIOXIDE: CPT

## 2024-07-19 PROCEDURE — 36415 COLL VENOUS BLD VENIPUNCTURE: CPT

## 2024-07-19 PROCEDURE — 85027 COMPLETE CBC AUTOMATED: CPT

## 2024-07-19 PROCEDURE — 258N000003 HC RX IP 258 OP 636: Performed by: INTERNAL MEDICINE

## 2024-07-19 PROCEDURE — 80169 DRUG ASSAY EVEROLIMUS: CPT

## 2024-07-19 RX ORDER — HEPARIN SODIUM,PORCINE 10 UNIT/ML
5-20 VIAL (ML) INTRAVENOUS DAILY PRN
Status: CANCELLED | OUTPATIENT
Start: 2024-07-26

## 2024-07-19 RX ORDER — ALBUTEROL SULFATE 0.83 MG/ML
2.5 SOLUTION RESPIRATORY (INHALATION)
Status: CANCELLED | OUTPATIENT
Start: 2024-07-26

## 2024-07-19 RX ORDER — MEPERIDINE HYDROCHLORIDE 25 MG/ML
25 INJECTION INTRAMUSCULAR; INTRAVENOUS; SUBCUTANEOUS EVERY 30 MIN PRN
Status: CANCELLED | OUTPATIENT
Start: 2024-07-26

## 2024-07-19 RX ORDER — EPINEPHRINE 1 MG/ML
0.3 INJECTION, SOLUTION INTRAMUSCULAR; SUBCUTANEOUS EVERY 5 MIN PRN
Status: CANCELLED | OUTPATIENT
Start: 2024-07-26

## 2024-07-19 RX ORDER — ALBUTEROL SULFATE 90 UG/1
1-2 AEROSOL, METERED RESPIRATORY (INHALATION)
Status: CANCELLED
Start: 2024-07-26

## 2024-07-19 RX ORDER — METHYLPREDNISOLONE SODIUM SUCCINATE 125 MG/2ML
125 INJECTION, POWDER, LYOPHILIZED, FOR SOLUTION INTRAMUSCULAR; INTRAVENOUS
Status: CANCELLED
Start: 2024-07-26

## 2024-07-19 RX ORDER — HEPARIN SODIUM (PORCINE) LOCK FLUSH IV SOLN 100 UNIT/ML 100 UNIT/ML
5 SOLUTION INTRAVENOUS
Status: CANCELLED | OUTPATIENT
Start: 2024-07-26

## 2024-07-19 RX ORDER — DIPHENHYDRAMINE HYDROCHLORIDE 50 MG/ML
50 INJECTION INTRAMUSCULAR; INTRAVENOUS
Status: CANCELLED
Start: 2024-07-26

## 2024-07-19 RX ADMIN — FERRIC CARBOXYMALTOSE INJECTION 750 MG: 50 INJECTION, SOLUTION INTRAVENOUS at 07:31

## 2024-07-19 ASSESSMENT — PAIN SCALES - GENERAL: PAINLEVEL: SEVERE PAIN (6)

## 2024-07-19 NOTE — PATIENT INSTRUCTIONS
Dear Carol Guillaume    Thank you for choosing [facility] ([facility] for your infusion.  The following information is a summary of our appointment as well as important reminders.      If you have any questions on your upcoming Specialty Infusion appointments, please call scheduling at [phone].  It was a pleasure taking care of you today.    Sincerely,    [facility]  [address] [place] [address]  [address]  Phone:  [phone]

## 2024-07-19 NOTE — PROGRESS NOTES
Infusion Nursing Note:  Carol Guillaume presents today for Injectafer (Dose #1 of 2).    Patient seen by provider today: No   present during visit today: Not Applicable.    Note: Injectafer infused over 15 mins, observation for 30 mins following infusion.    Intravenous Access:  Peripheral IV placed.  Labs drawn without difficulty.    Treatment Conditions:  Not Applicable.    /77 (BP Location: Left arm, Patient Position: Sitting, Cuff Size: Adult Regular)   Pulse 92   Temp 97.7  F (36.5  C) (Oral)   Resp 16   SpO2 99%     Administrations This Visit       ferric carboxymaltose (INJECTAFER) 750 mg in sodium chloride 0.9 % 125 mL intermittent infusion       Admin Date  07/19/2024 Action  $New Bag Dose  750 mg Rate  500 mL/hr Route  Intravenous Documented By  Camryn Abreu, ALEX                  Post Infusion Assessment:  Patient tolerated infusion without incident.  Site patent and intact, free from redness, edema or discomfort.  No evidence of extravasations.  Access discontinued per protocol.     Discharge Plan:   AVS to patient via MYCHART.  Patient will return 7/26/24 for next appointment.   Patient discharged in stable condition accompanied by: self.  Departure Mode: Ambulatory.    Camryn Abreu, RN

## 2024-07-19 NOTE — TELEPHONE ENCOUNTER
ISSUE:     level 8.3 on 07/19/24, goal 3, dose 0.5/0.25 mg BID    PLAN:   Call Patient and confirm this was an accurate 12-hour trough.   Verify   dose 0.75 mg daily.   Confirm no new medications or illness.   Confirm no missed doses.     If accurate trough and accurate dose, decrease   dose to 0.25  mg BID  *Recommended dose rounded from calculated dose 0.27 mg  daily.      Repeat labs in 4 days.   For any dose change <50%, recheck labs per guideline or within 1 month.  For any dose change of more than 50%, recheck drug level based on timing to reach steady state:   Immediate release tacrolimus: 2-3 days  Extended-release tacrolimus: 7 days  Cyclosporine: 2 days  Sirolimus: 12 days  Everolimus: 8 days      OUTCOME:   "[employer]hart message sent to pt with plan/recommendations.     Noted overdue for repeat CSA level. Standing orders placed so CSA level is drawn with twice weekly labs.

## 2024-07-24 ENCOUNTER — LAB (OUTPATIENT)
Dept: LAB | Facility: CLINIC | Age: 34
End: 2024-07-24
Payer: COMMERCIAL

## 2024-07-24 ENCOUNTER — TELEPHONE (OUTPATIENT)
Dept: TRANSPLANT | Facility: CLINIC | Age: 34
End: 2024-07-24

## 2024-07-24 DIAGNOSIS — Z94.0 IMMUNOSUPPRESSIVE MANAGEMENT ENCOUNTER FOLLOWING KIDNEY TRANSPLANT: Primary | ICD-10-CM

## 2024-07-24 DIAGNOSIS — Z94.0 KIDNEY REPLACED BY TRANSPLANT: ICD-10-CM

## 2024-07-24 DIAGNOSIS — Z48.298 AFTERCARE FOLLOWING ORGAN TRANSPLANT: ICD-10-CM

## 2024-07-24 DIAGNOSIS — Z79.899 IMMUNOSUPPRESSIVE MANAGEMENT ENCOUNTER FOLLOWING KIDNEY TRANSPLANT: Primary | ICD-10-CM

## 2024-07-24 DIAGNOSIS — D63.8 ANEMIA IN OTHER CHRONIC DISEASES CLASSIFIED ELSEWHERE: ICD-10-CM

## 2024-07-24 LAB
ANION GAP SERPL CALCULATED.3IONS-SCNC: 12 MMOL/L (ref 7–15)
BUN SERPL-MCNC: 49.8 MG/DL (ref 6–20)
CALCIUM SERPL-MCNC: 9 MG/DL (ref 8.8–10.4)
CHLORIDE SERPL-SCNC: 105 MMOL/L (ref 98–107)
CREAT SERPL-MCNC: 3.71 MG/DL (ref 0.51–0.95)
CYCLOSPORINE BLD LC/MS/MS-MCNC: 195 UG/L (ref 50–400)
EGFRCR SERPLBLD CKD-EPI 2021: 16 ML/MIN/1.73M2
ERYTHROCYTE [DISTWIDTH] IN BLOOD BY AUTOMATED COUNT: 11.7 % (ref 10–15)
EVEROLIMUS BLD-MCNC: 5.5 UG/L (ref 3–8)
GLUCOSE SERPL-MCNC: 111 MG/DL (ref 70–99)
HCO3 SERPL-SCNC: 19 MMOL/L (ref 22–29)
HCT VFR BLD AUTO: 28.9 % (ref 35–47)
HGB BLD-MCNC: 9.4 G/DL (ref 11.7–15.7)
MCH RBC QN AUTO: 26.4 PG (ref 26.5–33)
MCHC RBC AUTO-ENTMCNC: 32.5 G/DL (ref 31.5–36.5)
MCV RBC AUTO: 81 FL (ref 78–100)
PLATELET # BLD AUTO: 189 10E3/UL (ref 150–450)
POTASSIUM SERPL-SCNC: 4.5 MMOL/L (ref 3.4–5.3)
RBC # BLD AUTO: 3.56 10E6/UL (ref 3.8–5.2)
SODIUM SERPL-SCNC: 136 MMOL/L (ref 135–145)
TME LAST DOSE: NORMAL H
WBC # BLD AUTO: 7.4 10E3/UL (ref 4–11)

## 2024-07-24 PROCEDURE — 99000 SPECIMEN HANDLING OFFICE-LAB: CPT | Performed by: PATHOLOGY

## 2024-07-24 PROCEDURE — 85027 COMPLETE CBC AUTOMATED: CPT | Performed by: PATHOLOGY

## 2024-07-24 PROCEDURE — 80048 BASIC METABOLIC PNL TOTAL CA: CPT | Performed by: PATHOLOGY

## 2024-07-24 PROCEDURE — 36415 COLL VENOUS BLD VENIPUNCTURE: CPT | Performed by: PATHOLOGY

## 2024-07-24 PROCEDURE — 80169 DRUG ASSAY EVEROLIMUS: CPT | Performed by: INTERNAL MEDICINE

## 2024-07-24 PROCEDURE — 80158 DRUG ASSAY CYCLOSPORINE: CPT | Performed by: INTERNAL MEDICINE

## 2024-07-24 NOTE — TELEPHONE ENCOUNTER
MEDICAL RECORDS REQUEST   [facility] Clinic  [address] [place] [address]  PHONE: [phone]  Fax: [phone]        FUTURE VISIT INFORMATION                                                   Carol Guillaume, : 1990 scheduled for future visit at [facility]    APPOINTMENT INFORMATION:  Date: 2024  Provider:  Raúl Bautista MD  Reason for Visit/Diagnosis: KIDNEY STONE    REFERRAL INFORMATION:  Referring provider:  Feliberto Douglas MD in  [facility]      RECORDS REQUESTED FOR VISIT                                                     NOTES  STATUS/DETAILS   OFFICE NOTE from referring provider  yes   OFFICE NOTE from other specialist  yes   MEDICATION LIST  yes   LABS     URINALYSIS (UA)  yes   IMAGES  yes, 07/10/2024 -- US RENAL      PRE-VISIT CHECKLIST      Joint diagnostic appointment coordinated correctly          (ensure right order & amount of time) Yes   RECORD COLLECTION COMPLETE Yes

## 2024-07-25 NOTE — TELEPHONE ENCOUNTER
ISSUE:   Cyclosporine level 195 on 07/24/24, goal 75, dose 75 mg BID.    PLAN:   Call Patient and confirm this was an accurate 24-hour trough.   Verify Cyclosporine dose 75 mg BID.   Confirm no new medications or illness.   Confirm no missed doses.     OUTCOME: No answer, VM left      Suri Ramos RN   Transplant Coordinator  [phone]

## 2024-07-26 ENCOUNTER — TELEPHONE (OUTPATIENT)
Dept: TRANSPLANT | Facility: CLINIC | Age: 34
End: 2024-07-26

## 2024-07-26 ENCOUNTER — PRE VISIT (OUTPATIENT)
Dept: UROLOGY | Facility: CLINIC | Age: 34
End: 2024-07-26

## 2024-07-26 ENCOUNTER — VIRTUAL VISIT (OUTPATIENT)
Dept: UROLOGY | Facility: CLINIC | Age: 34
End: 2024-07-26
Attending: INTERNAL MEDICINE
Payer: COMMERCIAL

## 2024-07-26 ENCOUNTER — INFUSION THERAPY VISIT (OUTPATIENT)
Dept: INFUSION THERAPY | Facility: CLINIC | Age: 34
End: 2024-07-26
Attending: INTERNAL MEDICINE
Payer: COMMERCIAL

## 2024-07-26 VITALS
TEMPERATURE: 97.9 F | HEART RATE: 87 BPM | SYSTOLIC BLOOD PRESSURE: 116 MMHG | OXYGEN SATURATION: 99 % | DIASTOLIC BLOOD PRESSURE: 78 MMHG | RESPIRATION RATE: 16 BRPM

## 2024-07-26 VITALS — HEIGHT: 66 IN | WEIGHT: 268 LBS | BODY MASS INDEX: 43.07 KG/M2

## 2024-07-26 DIAGNOSIS — Z94.0 KIDNEY REPLACED BY TRANSPLANT: ICD-10-CM

## 2024-07-26 DIAGNOSIS — Z94.0 KIDNEY TRANSPLANTED: ICD-10-CM

## 2024-07-26 DIAGNOSIS — D63.8 ANEMIA IN OTHER CHRONIC DISEASES CLASSIFIED ELSEWHERE: Primary | ICD-10-CM

## 2024-07-26 DIAGNOSIS — N20.0 NEPHROLITHIASIS: Primary | ICD-10-CM

## 2024-07-26 DIAGNOSIS — N20.0 KIDNEY STONE: ICD-10-CM

## 2024-07-26 LAB
ANION GAP SERPL CALCULATED.3IONS-SCNC: 11 MMOL/L (ref 7–15)
BUN SERPL-MCNC: 46.3 MG/DL (ref 6–20)
CALCIUM SERPL-MCNC: 8.7 MG/DL (ref 8.8–10.4)
CHLORIDE SERPL-SCNC: 103 MMOL/L (ref 98–107)
CREAT SERPL-MCNC: 3.52 MG/DL (ref 0.51–0.95)
EGFRCR SERPLBLD CKD-EPI 2021: 17 ML/MIN/1.73M2
ERYTHROCYTE [DISTWIDTH] IN BLOOD BY AUTOMATED COUNT: 11.9 % (ref 10–15)
EVEROLIMUS BLD-MCNC: 4 UG/L (ref 3–8)
GLUCOSE SERPL-MCNC: 133 MG/DL (ref 70–99)
HCO3 SERPL-SCNC: 19 MMOL/L (ref 22–29)
HCT VFR BLD AUTO: 25.1 % (ref 35–47)
HGB BLD-MCNC: 8.5 G/DL (ref 11.7–15.7)
MCH RBC QN AUTO: 27.2 PG (ref 26.5–33)
MCHC RBC AUTO-ENTMCNC: 33.9 G/DL (ref 31.5–36.5)
MCV RBC AUTO: 80 FL (ref 78–100)
PLATELET # BLD AUTO: 165 10E3/UL (ref 150–450)
POTASSIUM SERPL-SCNC: 3.8 MMOL/L (ref 3.4–5.3)
RBC # BLD AUTO: 3.12 10E6/UL (ref 3.8–5.2)
SODIUM SERPL-SCNC: 133 MMOL/L (ref 135–145)
TME LAST DOSE: NORMAL H
TME LAST DOSE: NORMAL H
WBC # BLD AUTO: 7.6 10E3/UL (ref 4–11)

## 2024-07-26 PROCEDURE — 258N000003 HC RX IP 258 OP 636: Performed by: INTERNAL MEDICINE

## 2024-07-26 PROCEDURE — 250N000011 HC RX IP 250 OP 636: Performed by: INTERNAL MEDICINE

## 2024-07-26 PROCEDURE — 85014 HEMATOCRIT: CPT

## 2024-07-26 PROCEDURE — 99204 OFFICE O/P NEW MOD 45 MIN: CPT | Mod: 95 | Performed by: UROLOGY

## 2024-07-26 PROCEDURE — 36415 COLL VENOUS BLD VENIPUNCTURE: CPT

## 2024-07-26 PROCEDURE — 80048 BASIC METABOLIC PNL TOTAL CA: CPT

## 2024-07-26 PROCEDURE — 96365 THER/PROPH/DIAG IV INF INIT: CPT

## 2024-07-26 PROCEDURE — 80169 DRUG ASSAY EVEROLIMUS: CPT

## 2024-07-26 RX ORDER — EPINEPHRINE 1 MG/ML
0.3 INJECTION, SOLUTION INTRAMUSCULAR; SUBCUTANEOUS EVERY 5 MIN PRN
OUTPATIENT
Start: 2024-07-26

## 2024-07-26 RX ORDER — ALBUTEROL SULFATE 0.83 MG/ML
2.5 SOLUTION RESPIRATORY (INHALATION)
OUTPATIENT
Start: 2024-07-26

## 2024-07-26 RX ORDER — DIPHENHYDRAMINE HYDROCHLORIDE 50 MG/ML
50 INJECTION INTRAMUSCULAR; INTRAVENOUS
Start: 2024-07-26

## 2024-07-26 RX ORDER — EVEROLIMUS 0.75 MG/1
TABLET ORAL
Qty: 60 TABLET | Refills: 11 | Status: SHIPPED | OUTPATIENT
Start: 2024-07-26

## 2024-07-26 RX ORDER — HEPARIN SODIUM,PORCINE 10 UNIT/ML
5-20 VIAL (ML) INTRAVENOUS DAILY PRN
OUTPATIENT
Start: 2024-07-26

## 2024-07-26 RX ORDER — METHYLPREDNISOLONE SODIUM SUCCINATE 125 MG/2ML
125 INJECTION, POWDER, LYOPHILIZED, FOR SOLUTION INTRAMUSCULAR; INTRAVENOUS
Start: 2024-07-26

## 2024-07-26 RX ORDER — MEPERIDINE HYDROCHLORIDE 25 MG/ML
25 INJECTION INTRAMUSCULAR; INTRAVENOUS; SUBCUTANEOUS EVERY 30 MIN PRN
OUTPATIENT
Start: 2024-07-26

## 2024-07-26 RX ORDER — ALBUTEROL SULFATE 90 UG/1
1-2 AEROSOL, METERED RESPIRATORY (INHALATION)
Start: 2024-07-26

## 2024-07-26 RX ORDER — CYCLOSPORINE 25 MG/1
CAPSULE, LIQUID FILLED ORAL
Qty: 450 CAPSULE | Refills: 3 | Status: SHIPPED | OUTPATIENT
Start: 2024-07-26 | End: 2024-08-07

## 2024-07-26 RX ORDER — HEPARIN SODIUM (PORCINE) LOCK FLUSH IV SOLN 100 UNIT/ML 100 UNIT/ML
5 SOLUTION INTRAVENOUS
OUTPATIENT
Start: 2024-07-26

## 2024-07-26 RX ADMIN — FERRIC CARBOXYMALTOSE INJECTION 750 MG: 50 INJECTION, SOLUTION INTRAVENOUS at 07:23

## 2024-07-26 ASSESSMENT — PAIN SCALES - GENERAL: PAINLEVEL: NO PAIN (0)

## 2024-07-26 NOTE — TELEPHONE ENCOUNTER
Carol called back.  Stated CSA level should be around 13 H trough.  Previous level also elevated at 135.  Confirmed current CSA dose to be 75 mg BID  Decreased CSA dose to 75 mg / 50 mg  Verbalized understanding.  Rechecking CSA level in 1-2 weeks.  Lab order placed.    Reports she is taking Everolimus 0.5 mg in AM and 0.25 mg in PM.  Everolimus level is at goal at 5.5 7/24/24    [facility] updated Med list.    Prescriptions sent to  Specialty Pharmacy.

## 2024-07-26 NOTE — NURSING NOTE
Current patient location: [address] [place] [address]    Is the patient currently in the state of [place]? YES    Visit mode:VIDEO    If the visit is dropped, the patient can be reconnected by: VIDEO VISIT: Text to cell phone:   Telephone Information:   Mobile [phone]       Will anyone else be joining the visit? NO  (If patient encounters technical issues they should call [phone])    How would you like to obtain your AVS? MyChart    Are changes needed to the allergy or medication list? No    Are refills needed on medications prescribed by this physician? NO    Reason for visit: RECHECK    Shivani BAIRES

## 2024-07-26 NOTE — PROGRESS NOTES
Infusion Nursing Note:  Carol Guillaume presents today for Injectafer.    Patient seen by provider today: No   present during visit today: Not Applicable.    Note: Injectafer infused over 15 minutes. Patient observed for 30 minutes post infusion.  Administrations This Visit       ferric carboxymaltose (INJECTAFER) 750 mg in sodium chloride 0.9 % 125 mL intermittent infusion       Admin Date  07/26/2024 Action  $New Bag Dose  750 mg Rate  500 mL/hr Route  Intravenous Documented By  Shelby Santo, RN                   Intravenous Access:  Labs drawn without difficulty per patient request.  Peripheral IV placed.    Treatment Conditions:  None.      Post Infusion Assessment:  Patient tolerated infusion without incident.  Blood return noted pre and post infusion.  Site patent and intact, free from redness, edema or discomfort.  No evidence of extravasations.  Access discontinued per protocol.       Discharge Plan:   Discharge instructions reviewed with: Patient.  Patient and/or family verbalized understanding of discharge instructions and all questions answered.  AVS to patient via MYCHART.  Patient will return to her provider as needed for next appointment.   Patient discharged in stable condition accompanied by: self.  Departure Mode: Ambulatory.      /78 (BP Location: Right arm, Patient Position: Sitting, Cuff Size: Adult Regular)   Pulse 87   Temp 97.9  F (36.6  C) (Oral)   Resp 16   SpO2 99%          Shelby Santo, ALEX

## 2024-07-26 NOTE — LETTER
7/26/2024       RE: Carol Guillaume  [address] [facility] [address]     Dear Colleague,    Thank you for referring your patient, Carol Guillaume, to the [facility]OLOG[facility] CLINIC [place] [facility]. Please see a copy of my visit note below.     [facility] UROLOGY [facility] [place].  Phone: [phone]   Fax: [phone]  ELZA Bautista MD  Visit Date: Jul 26, 2024  Patient:  Carol Guillaume  Date of birth 1990, 33 year old female       ASSESSMENT and PLAN     Kidney stones  2009 Stone in transplant kidney  7/10/24 renal ultrasound of transplant shows 1.2cm kidney stone.    Kidney transplant  2006    Renal failure despite transplant kidney        Morbid obesity      Blind        Counseling during this visit:  We covered the natural history of kidney stones, the risk of progression to symptomatic pain/infection, and the possibility of renal failure/kidney damage.    We covered treatment options including observation, ureteroscopy, extracorporeal shock wave lithotripsy (ESWL), and percutaneous nephrolithotomy (PCNL).    We covered surgical risks which include but are not limited to heart attack, stroke, blood clot in the legs or lungs, death, injury to surrounding organs (intestine, liver, spleen, pancreas, lung, muscles, nerves), loss of sensation around incisions, decreased renal function, infection, injury to ureter, injury to bladder, and urethral strictures.  Additional procedures may be necessary in the perioperative period.    We discussed the options today and my main concern is that she is very close to needing dialysis and any treatment or kidney stone may be enough to move her to needing dialysis given that her GFR is already at 15.  Of the options ureteroscopy with a cbac may be our best option but I would like to obtain a CT scan to better about evaluate the stone prior to making any decisions.  I will also obtain a KUB in  case she would like to proceed with ESWL.    Plan  CT and KUB and then follow-up in clinic with me.  ____________________________________________________________________    HPI  She is referred by the transplant service for a newly diagnosed 1.2 cm kidney stone in her transplant kidney.  Of note she is had a significant decrease in the function of her transplant kidney with her current GFR being about 15.  She does note that she has some pain around the kidney periodically and this is new.  She did have 1 previous kidney stone in her transplant kidney which was treated years ago.  She currently is undergoing evaluation for repeat transplantation although she notes that she has to lose weight prior to being able to enter the transplant list.      I reviewed the radiologic images and reports from the radiologic exam (7/10/24 renal ultrasound) listed above.  My independent interpretation is listed there as well.      I reviewed the following laboratory data and went over findings with patient:  Recent Labs   Lab Test 07/26/24  0722 07/24/24  0710 07/19/24  0740 07/15/24  0714   WBC 7.6 7.4 6.9 6.3   HGB 8.5* 9.4* 9.0* 10.0*    189 156 143*     Recent Labs   Lab Test 07/26/24  0722 07/24/24  0710 07/19/24  0740 07/15/24  0714 08/16/21  1618 03/05/21  0559 02/01/21  0653 10/20/20  0640 08/17/20  0717   CR 3.52* 3.71* 3.66* 3.90*   < > 1.70* 1.72* 1.52* 1.60*   GFRESTIMATED 17* 16* 16* 15*   < > 39* 39* 45* 42*   GFRESTBLACK  --   --   --   --   --  45* 45* 52* 49*   * 111* 159* 122*   < >  --  106* 110* 111*    < > = values in this interval not displayed.       CC  Patient Care Team:  Danny Shi MD as PCP - General (Family Practice)  Mayo Lopez MD as MD (Cardiology)  Ethel Oquendo MD as MD (Dermatology)  Sary Espinosa PA-C as Physician Assistant (Gastroenterology)  Kenn Bryson RN, RN as Specialty Care Coordinator (Nephrology)  Michael Mcmahan MD as MD  (Surgery)  Amira Gomez PA-C as Physician Assistant (Surgery)  Kristie Dey RD as Registered Dietitian (Dietitian, Registered)  Sary Espinosa PA-C as Assigned Cancer Care Provider  Amira Gomez PA-C as Assigned Surgical Provider  Bloch, Lauren Turner, RPH as Pharmacist (Pharmacist)  Shannan Romo PA-C as Physician Assistant (Gastroenterology)  Bloch, Lauren Turner, RPH as Assigned MTM Pharmacist  Shannan Romo PA-C as Assigned Gastroenterology Provider  Corbin Macedo MD as Assigned PCP  Leopold, Carrie A, RN as Specialty Care Coordinator (Pharmacy)  Raúl Bautista MD as MD (Urology)  Raúl Rubin APRN CNP as Nurse Practitioner (Nephrology)  ELAN WHYTE    Copy to patient  KATHRYN PEÑALOZA  [address] [facility] [address]      Again, thank you for allowing me to participate in the care of your patient.      Sincerely,    Raúl Bautista MD

## 2024-07-26 NOTE — TELEPHONE ENCOUNTER
Patient Call: General  Route to LPN    Reason for call: Patient returning Coordinator's call regarding dose change    Call back needed? Yes    Return Call Needed  Same as documented in contacts section  When to return call?: Same day: Route High Priority

## 2024-07-26 NOTE — PROGRESS NOTES
[facility] UROLOGY CLINIC [place].  Phone: [phone]   Fax: [phone]  ELZA Bautista MD  Visit Date: Jul 26, 2024  Patient:  Carol Guillaume  Date of birth 1990, 33 year old female       ASSESSMENT and PLAN     Kidney stones  2009 Stone in transplant kidney  7/10/24 renal ultrasound of transplant shows 1.2cm kidney stone.    Kidney transplant  2006    Renal failure despite transplant kidney        Morbid obesity      Blind        Counseling during this visit:  We covered the natural history of kidney stones, the risk of progression to symptomatic pain/infection, and the possibility of renal failure/kidney damage.    We covered treatment options including observation, ureteroscopy, extracorporeal shock wave lithotripsy (ESWL), and percutaneous nephrolithotomy (PCNL).    We covered surgical risks which include but are not limited to heart attack, stroke, blood clot in the legs or lungs, death, injury to surrounding organs (intestine, liver, spleen, pancreas, lung, muscles, nerves), loss of sensation around incisions, decreased renal function, infection, injury to ureter, injury to bladder, and urethral strictures.  Additional procedures may be necessary in the perioperative period.    We discussed the options today and my main concern is that she is very close to needing dialysis and any treatment or kidney stone may be enough to move her to needing dialysis given that her GFR is already at 15.  Of the options ureteroscopy with a cbac may be our best option but I would like to obtain a CT scan to better about evaluate the stone prior to making any decisions.  I will also obtain a KUB in case she would like to proceed with ESWL.    Plan  CT and KUB and then follow-up in clinic with me.  ____________________________________________________________________    HPI  She is referred by the transplant service for a newly diagnosed 1.2 cm kidney stone in her transplant kidney.  Of note she is had a  significant decrease in the function of her transplant kidney with her current GFR being about 15.  She does note that she has some pain around the kidney periodically and this is new.  She did have 1 previous kidney stone in her transplant kidney which was treated years ago.  She currently is undergoing evaluation for repeat transplantation although she notes that she has to lose weight prior to being able to enter the transplant list.      I reviewed the radiologic images and reports from the radiologic exam (7/10/24 renal ultrasound) listed above.  My independent interpretation is listed there as well.      I reviewed the following laboratory data and went over findings with patient:  Recent Labs   Lab Test 07/26/24  0722 07/24/24  0710 07/19/24  0740 07/15/24  0714   WBC 7.6 7.4 6.9 6.3   HGB 8.5* 9.4* 9.0* 10.0*    189 156 143*     Recent Labs   Lab Test 07/26/24  0722 07/24/24  0710 07/19/24  0740 07/15/24  0714 08/16/21  1618 03/05/21  0559 02/01/21  0653 10/20/20  0640 08/17/20  0717   CR 3.52* 3.71* 3.66* 3.90*   < > 1.70* 1.72* 1.52* 1.60*   GFRESTIMATED 17* 16* 16* 15*   < > 39* 39* 45* 42*   GFRESTBLACK  --   --   --   --   --  45* 45* 52* 49*   * 111* 159* 122*   < >  --  106* 110* 111*    < > = values in this interval not displayed.     Video-Visit Details  Video Start Time: 1148  Video End Time: 1157  Time spent on pre-visit work, post visit work, and documentation on day of visit outside of time during video visit: 10 min  Total time on the day of the visit: 21 min  Originating Location (pt. Location): Home.    Distant Location (provider location):  [facility].  Platform used for Video Visit: [employer] Special [employer]  Patient Care Team:  Danny Shi MD as PCP - General (Family Practice)  Mayo Lopez MD as MD (Cardiology)  Ethel Oquendo MD as MD (Dermatology)  Sary Espinosa PA-C as Physician Assistant (Gastroenterology)  Kenn Bryson, RN, RN as  Specialty Care Coordinator (Nephrology)  Michael Mcmahan MD as MD (Surgery)  Amira Gomez PA-C as Physician Assistant (Surgery)  Kristie Dey RD as Registered Dietitian (Dietitian, Registered)  Sary Espinosa PA-C as Assigned Cancer Care Provider  Amira Gomez PA-C as Assigned Surgical Provider  Bloch, Lauren Turner, RPH as Pharmacist (Pharmacist)  Shannan Romo PA-C as Physician Assistant (Gastroenterology)  Bloch, Lauren Turner, RPH as Assigned MTM Pharmacist  Shannan Rmoo PA-C as Assigned Gastroenterology Provider  Corbin Macedo MD as Assigned PCP  Leopold, Carrie A, RN as Specialty Care Coordinator (Pharmacy)  Raúl Bautista MD as MD (Urology)  Raúl Rubin APRN CNP as Nurse Practitioner (Nephrology)  ELAN WHYTE    Copy to patient  KATHRYN M ANABELA  [address] [facility] [address]

## 2024-07-26 NOTE — PATIENT INSTRUCTIONS
Dear Carol Guillaume    Thank you for choosing [facility] ([facility] for your infusion.  The following information is a summary of our appointment as well as important reminders.          If you are a transplant patient and require transplant education, please click on this link: [email or web address].    If you have any questions on your upcoming Specialty Infusion appointments, please call scheduling at [phone].  It was a pleasure taking care of you today.    Sincerely,    [facility]  [address] [place] [address]  [address]  Phone:  [phone]

## 2024-07-26 NOTE — PROGRESS NOTES
"Virtual Visit Details    Type of service:  Video Visit   Video Start Time: {video visit start/end time for provider to select:169699}  Video End Time:{video visit start/end time for provider to select:060361}    Originating Location (pt. Location): {video visit patient location:783551::\"Home\"}  {PROVIDER LOCATION On-site should be selected for visits conducted from your clinic location or adjoining [facility] hospital, academic office, or other nearby [facility] building. Off-site should be selected for all other provider locations, including home:[address]}  Distant Location (provider location):  {virtual location provider:149193}  Platform used for Video Visit: {Virtual Visit Platforms:015345::\"[employer]\"}  "

## 2024-07-26 NOTE — TELEPHONE ENCOUNTER
Cyclosporine = 195  (7/24/24)  Goal   Current CSA dose 75 mg BID        PLAN:   Call and confirm this was a good 12 - hour trough.   Verify current dose.   Confirm no new medications or illness (virgil. Diarrhea).      OUTCOME:  Left detailed VM requesting above info.      ADDENDUM:  Called back. No answer.

## 2024-08-06 ENCOUNTER — LAB (OUTPATIENT)
Dept: LAB | Facility: CLINIC | Age: 34
End: 2024-08-06
Payer: COMMERCIAL

## 2024-08-06 ENCOUNTER — OFFICE VISIT (OUTPATIENT)
Dept: ENDOCRINOLOGY | Facility: CLINIC | Age: 34
End: 2024-08-06
Payer: COMMERCIAL

## 2024-08-06 ENCOUNTER — TELEPHONE (OUTPATIENT)
Dept: FAMILY MEDICINE | Facility: CLINIC | Age: 34
End: 2024-08-06

## 2024-08-06 VITALS
WEIGHT: 264.6 LBS | OXYGEN SATURATION: 99 % | HEART RATE: 79 BPM | SYSTOLIC BLOOD PRESSURE: 104 MMHG | HEIGHT: 66 IN | DIASTOLIC BLOOD PRESSURE: 71 MMHG | BODY MASS INDEX: 42.52 KG/M2

## 2024-08-06 DIAGNOSIS — E78.5 DYSLIPIDEMIA: ICD-10-CM

## 2024-08-06 DIAGNOSIS — E66.01 CLASS 3 SEVERE OBESITY WITH SERIOUS COMORBIDITY AND BODY MASS INDEX (BMI) OF 40.0 TO 44.9 IN ADULT, UNSPECIFIED OBESITY TYPE (H): Primary | ICD-10-CM

## 2024-08-06 DIAGNOSIS — Z94.0 IMMUNOSUPPRESSIVE MANAGEMENT ENCOUNTER FOLLOWING KIDNEY TRANSPLANT: ICD-10-CM

## 2024-08-06 DIAGNOSIS — Z79.899 IMMUNOSUPPRESSIVE MANAGEMENT ENCOUNTER FOLLOWING KIDNEY TRANSPLANT: ICD-10-CM

## 2024-08-06 DIAGNOSIS — E66.813 CLASS 3 SEVERE OBESITY WITH SERIOUS COMORBIDITY AND BODY MASS INDEX (BMI) OF 40.0 TO 44.9 IN ADULT, UNSPECIFIED OBESITY TYPE (H): Primary | ICD-10-CM

## 2024-08-06 DIAGNOSIS — Z94.0 KIDNEY REPLACED BY TRANSPLANT: ICD-10-CM

## 2024-08-06 DIAGNOSIS — D63.8 ANEMIA IN OTHER CHRONIC DISEASES CLASSIFIED ELSEWHERE: ICD-10-CM

## 2024-08-06 DIAGNOSIS — N18.4 CHRONIC KIDNEY DISEASE, STAGE IV (SEVERE) (H): ICD-10-CM

## 2024-08-06 DIAGNOSIS — Z48.298 AFTERCARE FOLLOWING ORGAN TRANSPLANT: ICD-10-CM

## 2024-08-06 LAB
ALBUMIN MFR UR ELPH: 26.7 MG/DL
ANION GAP SERPL CALCULATED.3IONS-SCNC: 11 MMOL/L (ref 7–15)
BUN SERPL-MCNC: 53.9 MG/DL (ref 6–20)
CALCIUM SERPL-MCNC: 8.8 MG/DL (ref 8.8–10.4)
CHLORIDE SERPL-SCNC: 108 MMOL/L (ref 98–107)
CREAT SERPL-MCNC: 3.47 MG/DL (ref 0.51–0.95)
CREAT UR-MCNC: 111 MG/DL
EGFRCR SERPLBLD CKD-EPI 2021: 17 ML/MIN/1.73M2
ERYTHROCYTE [DISTWIDTH] IN BLOOD BY AUTOMATED COUNT: 13 % (ref 10–15)
FERRITIN SERPL-MCNC: 678 NG/ML (ref 6–175)
GLUCOSE SERPL-MCNC: 107 MG/DL (ref 70–99)
HCO3 SERPL-SCNC: 19 MMOL/L (ref 22–29)
HCT VFR BLD AUTO: 26.6 % (ref 35–47)
HGB BLD-MCNC: 8.7 G/DL (ref 11.7–15.7)
IRON BINDING CAPACITY (ROCHE): 197 UG/DL (ref 240–430)
IRON SATN MFR SERPL: 30 % (ref 15–46)
IRON SERPL-MCNC: 60 UG/DL (ref 37–145)
MCH RBC QN AUTO: 26.9 PG (ref 26.5–33)
MCHC RBC AUTO-ENTMCNC: 32.7 G/DL (ref 31.5–36.5)
MCV RBC AUTO: 82 FL (ref 78–100)
PLATELET # BLD AUTO: 142 10E3/UL (ref 150–450)
POTASSIUM SERPL-SCNC: 4.2 MMOL/L (ref 3.4–5.3)
PROT/CREAT 24H UR: 0.24 MG/MG CR (ref 0–0.2)
RBC # BLD AUTO: 3.23 10E6/UL (ref 3.8–5.2)
SODIUM SERPL-SCNC: 138 MMOL/L (ref 135–145)
WBC # BLD AUTO: 6.3 10E3/UL (ref 4–11)

## 2024-08-06 PROCEDURE — 99215 OFFICE O/P EST HI 40 MIN: CPT | Performed by: NURSE PRACTITIONER

## 2024-08-06 PROCEDURE — 83550 IRON BINDING TEST: CPT | Performed by: PATHOLOGY

## 2024-08-06 PROCEDURE — 99000 SPECIMEN HANDLING OFFICE-LAB: CPT | Performed by: PATHOLOGY

## 2024-08-06 PROCEDURE — 83540 ASSAY OF IRON: CPT | Performed by: PATHOLOGY

## 2024-08-06 PROCEDURE — 36415 COLL VENOUS BLD VENIPUNCTURE: CPT | Performed by: PATHOLOGY

## 2024-08-06 PROCEDURE — 80169 DRUG ASSAY EVEROLIMUS: CPT | Performed by: INTERNAL MEDICINE

## 2024-08-06 PROCEDURE — 82728 ASSAY OF FERRITIN: CPT | Performed by: PATHOLOGY

## 2024-08-06 PROCEDURE — G2211 COMPLEX E/M VISIT ADD ON: HCPCS | Performed by: NURSE PRACTITIONER

## 2024-08-06 PROCEDURE — 80158 DRUG ASSAY CYCLOSPORINE: CPT | Performed by: INTERNAL MEDICINE

## 2024-08-06 PROCEDURE — 80048 BASIC METABOLIC PNL TOTAL CA: CPT | Performed by: PATHOLOGY

## 2024-08-06 PROCEDURE — 84156 ASSAY OF PROTEIN URINE: CPT | Performed by: PATHOLOGY

## 2024-08-06 PROCEDURE — 85027 COMPLETE CBC AUTOMATED: CPT | Performed by: PATHOLOGY

## 2024-08-06 ASSESSMENT — PAIN SCALES - GENERAL: PAINLEVEL: NO PAIN (0)

## 2024-08-06 NOTE — PROGRESS NOTES
Return Medical Weight Management Note     Carol Guillaume  MRN:  [id number]  :  1990  JOSE:  2024    Dear Danny Shi MD,    I had the pleasure of seeing your patient Carol Guillaume. She is a 33 year old female who I am continuing to see for treatment of obesity related to:        3/9/2020    12:22 PM   --   I have the following health issues associated with obesity Heart Disease    High Blood Pressure   I have the following symptoms associated with obesity Back Pain    Fatigue       Assessment & Plan   Problem List Items Addressed This Visit          Digestive    Class 3 severe obesity with serious comorbidity and body mass index (BMI) of 40.0 to 44.9 in adult, unspecified obesity type (H) - Primary     ESRD, working to continue to avoid dialysis. Anticipates kidney transplant soon with GFR 17. She has big work up/ follow up in transplant clinic in November and needs to lose 20lb to get listed for kidney transplant.     She was last seen 2023 and had difficult time getting medications covered by insurance. She has had resistant weight loss since last seen despite significant improvements to diet and lifestyle. With dyslipidemia (high trigs, low HLD) and elevated glucose and elevated waist circumference, I suspect insulin resistance given diagnosis of metabolic syndrome. Discussed metabolic syndrome and resistant weight loss. Unable to use metformin given GFR. Would like to reconsider GLp1 but she has medicare which will not cover this without prior hx MACE. She is open to considering low dose compounded semaglutide through [employer] compounding which would be out of pocket. She is aware of the risk for needing to continue use of medications for ongoing weight management. We may need to adjust treatment over time when approaching transplant and postop.     Discussed common risks/ benefits and side effects with GLP1. Discussed management of side effects. No hx pancreatitis. No personal/ family hx  MTC or MEN2.     Start semaglutide 0.25mg once weekly injection   Avoid missing meals   Start with smaller portions  Be mindful of protein   Be mindful of hydration   Follow up in 2-3 months          Relevant Medications    COMPOUNDED NON-CONTROLLED SUBSTANCE (CMPD RX) - PHARMACY TO MIX COMPOUNDED MEDICATION       Endocrine    Dyslipidemia    Relevant Medications    COMPOUNDED NON-CONTROLLED SUBSTANCE (CMPD RX) - PHARMACY TO MIX COMPOUNDED MEDICATION     Other Visit Diagnoses       Chronic kidney disease, stage IV (severe) (H)        Relevant Medications    COMPOUNDED NON-CONTROLLED SUBSTANCE (CMPD RX) - PHARMACY TO MIX COMPOUNDED MEDICATION               INTERVAL HISTORY:  new MWM with Amira Gomez PA-C 6/2023 BMI 43 with hx kidney transplant in 2006. Difficulty with kidney since chemo for uterine cancer in 2021 and now GFR is 22. Needing transplant again. Comorbidities include     17lb from transplant goal. Needing transplant soon. Has big eval coming up 11/4 and would like to be down by then     Past/Failed/contraindicated:   Metformin- GFR 17  Phentermine- contraindicated with kidney failure    Recent diet changes:   In the last couple years has changed a lot of her iet  -less processed foods  -more fruits/ veggies   -increasing protein - hit/ miss if she can do this     Typically eating 3 meals a day  Breakfast- protein shake OR derik Zbars   Lunch- 6 PB crackers OR slimfast shake OR leftover chicken/veggies   Dinner- soup and bread OR chicken/ veggies OR meat and veggie   Not usually snacking between meals   Usually tries to eat with meds - cheese, cracker - at bedtime - or she'll get reflux     New med in the last year changed appetite- reduced     Recent exercise/activity changes:   Fatigue limits activity - low hemoglobin - coordination difficult with his fatigue too     Recent stressors:   Lots of stress - dad dying of cancer   April had a lot of grief/ stress with emotional/ stress eating- improved  since then     Vitamins/Labs: reviewed labs from today     Reflux- only if taking meds without food     Just had kidney stone     CURRENT WEIGHT:   264 lbs 9.6 oz    Initial Weight (lbs): 275 lbs     Cumulative weight loss (lbs): 10.4  Weight Loss Percentage: 3.78%  Waist Circumference (cm): 124 cm        8/6/2024    10:00 AM   Changes and Difficulties   I have made the following changes to my diet since my last visit: Avoiding grains, drinking more protein shakes   With regards to my diet, I am still struggling with: The weight loss itself, stagnation   I have made the following changes to my activity/exercise since my last visit: Walking more   With regards to my activity/exercise, I am still struggling with: Swelling, shortness of breath easily. Hill are difficult         MEDICATIONS:   Current Outpatient Medications   Medication Sig Dispense Refill    acetaminophen (TYLENOL) 325 MG tablet Take 2 tablets (650 mg) by mouth every 4 hours as needed for mild pain 50 tablet 0    amLODIPine (NORVASC) 5 MG tablet Take 1 tablet (5 mg) by mouth daily 90 tablet 3    carvedilol (COREG) 25 MG tablet TAKE 1 TABLET BY MOUTH 2 TIMES DAILY (WITH MEALS) 180 tablet 1    COMPOUNDED NON-CONTROLLED SUBSTANCE (CMPD RX) - PHARMACY TO MIX COMPOUNDED MEDICATION Inject 0.25mg semaglutide once weekly 4 each 1    everolimus (ZORTRESS) 0.25 MG tablet Take 2 tablets (0.5 mg) by mouth every morning AND 1 tablet (0.25 mg) every evening.      everolimus (ZORTRESS) 0.75 MG tablet Hold for future dose changes 60 tablet 11    labetalol (NORMODYNE) 200 MG tablet Labetalol Oral    BID    inactive      levothyroxine (SYNTHROID/LEVOTHROID) 50 MCG tablet Take 1 tablet (50 mcg) by mouth daily 90 tablet 1    losartan (COZAAR) 25 MG tablet TAKE 1 TABLET BY MOUTH 2 TIMES DAILY 180 tablet 3    NEORAL (BRAND) 25 MG capsule Take 3 capsules (75 mg) by mouth every morning AND 2 capsules (50 mg) every evening. 450 capsule 3    omeprazole (PRILOSEC) 40 MG   capsule Take 1 capsule (40 mg) by mouth daily 90 capsule 3    vitamin D3 (CHOLECALCIFEROL) 50 mcg (2000 units) tablet Take 1 tablet (50 mcg) by mouth daily 90 tablet 3    cephALEXin (KEFLEX) 500 MG capsule Take 1 capsule (500 mg) by mouth 2 times daily (Patient not taking: Reported on 7/19/2024) 14 capsule 0           8/6/2024    10:00 AM   Weight Loss Medication History Reviewed With Patient   Which weight loss medications are you currently taking on a regular basis? None   Are you having any side effects from the weight loss medication that we have prescribed you? No       Lab on 08/06/2024   Component Date Value Ref Range Status    Sodium 08/06/2024 138  135 - 145 mmol/L Final    Potassium 08/06/2024 4.2  3.4 - 5.3 mmol/L Final    Chloride 08/06/2024 108 (H)  98 - 107 mmol/L Final    Carbon Dioxide (CO2) 08/06/2024 19 (L)  22 - 29 mmol/L Final    Anion Gap 08/06/2024 11  7 - 15 mmol/L Final    Urea Nitrogen 08/06/2024 53.9 (H)  6.0 - 20.0 mg/dL Final    Creatinine 08/06/2024 3.47 (H)  0.51 - 0.95 mg/dL Final    GFR Estimate 08/06/2024 17 (L)  >60 mL/min/1.73m2 Final    eGFR calculated using 2021 CKD-EPI equation.    Calcium 08/06/2024 8.8  8.8 - 10.4 mg/dL Final    Reference intervals for this test were updated on 7/16/2024 to reflect our healthy population more accurately. There may be differences in the flagging of prior results with similar values performed with this method. Those prior results can be interpreted in the context of the updated reference intervals.    Glucose 08/06/2024 107 (H)  70 - 99 mg/dL Final    WBC Count 08/06/2024 6.3  4.0 - 11.0 10e3/uL Final    RBC Count 08/06/2024 3.23 (L)  3.80 - 5.20 10e6/uL Final    Hemoglobin 08/06/2024 8.7 (L)  11.7 - 15.7 g/dL Final    Hematocrit 08/06/2024 26.6 (L)  35.0 - 47.0 % Final    MCV 08/06/2024 82  78 - 100 fL Final    MCH 08/06/2024 26.9  26.5 - 33.0 pg Final    MCHC 08/06/2024 32.7  31.5 - 36.5 g/dL Final    RDW 08/06/2024 13.0  10.0 - 15.0 % Final  "   Platelet Count 08/06/2024 142 (L)  150 - 450 10e3/uL Final    Total Protein Urine mg/dL 08/06/2024 26.7    mg/dL Final    Total Protein Urine mg/mg Creat 08/06/2024 0.24 (H)  0.00 - 0.20 mg/mg Cr Final    Creatinine Urine mg/dL 08/06/2024 111.0  mg/dL Final    Ferritin 08/06/2024 678 (H)  6 - 175 ng/mL Final    Iron 08/06/2024 60  37 - 145 ug/dL Final    Iron Binding Capacity 08/06/2024 197 (L)  240 - 430 ug/dL Final    Iron Sat Index 08/06/2024 30  15 - 46 % Final           5/17/2022     2:58 PM   CHU Score (Last Two)   CHU Raw Score 28   Activation Score 50   CHU Level 2         PHYSICAL EXAM:  Objective    /71 (BP Location: Left arm, Patient Position: Sitting, Cuff Size: Adult Regular)   Pulse 79   Ht 1.676 m (5' 6\")   Wt 120 kg (264 lb 9.6 oz)   SpO2 99%   BMI 42.71 kg/m      /71 (BP Location: Left arm, Patient Position: Sitting, Cuff Size: Adult Regular)   Pulse 79   Ht 1.676 m (5' 6\")   Wt 120 kg (264 lb 9.6 oz)   SpO2 99%   BMI 42.71 kg/m    Body mass index is 42.71 kg/m .  GENERAL: alert and no distress  EYES: Eyes grossly normal to inspection.  No discharge or erythema, or obvious scleral/conjunctival abnormalities.  RESP: No audible wheeze, cough, or visible cyanosis.    SKIN: Visible skin clear. No significant rash, abnormal pigmentation or lesions.  NEURO: Cranial nerves grossly intact.  Mentation and speech appropriate for age.  PSYCH: Appropriate affect, tone, and pace of words        Sincerely,    Sharon Olea NP      42 minutes spent by me on the date of the encounter doing chart review, history and exam, documentation and further activities per the note  The longitudinal plan of care for the diagnosis(es)/condition(s) as documented were addressed during this visit. Due to the added complexity in care, I will continue to support Carol in the subsequent management and with ongoing continuity of care.  "

## 2024-08-06 NOTE — LETTER
2024       RE: Carol Guillaume  [address] [address]     Dear Colleague,    Thank you for referring your patient, Carol Guillaume, to the [facility] [place] at [facility]. Please see a copy of my visit note below.      Return Medical Weight Management Note     Carol Guillaume  MRN:  [id number]  :  1990  JOSE:  2024    Dear Danny Shi MD,    I had the pleasure of seeing your patient Carol Guillaume. She is a 33 year old female who I am continuing to see for treatment of obesity related to:        3/9/2020    12:22 PM   --   I have the following health issues associated with obesity Heart Disease    High Blood Pressure   I have the following symptoms associated with obesity Back Pain    Fatigue       Assessment & Plan  Problem List Items Addressed This Visit          Digestive    Class 3 severe obesity with serious comorbidity and body mass index (BMI) of 40.0 to 44.9 in adult, unspecified obesity type (H) - Primary     ESRD, working to continue to avoid dialysis. Anticipates kidney transplant soon with GFR 17. She has big work up/ follow up in transplant clinic in November and needs to lose 20lb to get listed for kidney transplant.     She was last seen 2023 and had difficult time getting medications covered by insurance. She has had resistant weight loss since last seen despite significant improvements to diet and lifestyle. With dyslipidemia (high trigs, low HLD) and elevated glucose and elevated waist circumference, I suspect insulin resistance given diagnosis of metabolic syndrome. Discussed metabolic syndrome and resistant weight loss. Unable to use metformin given GFR. Would like to reconsider GLp1 but she has medicare which will not cover this without prior hx MACE. She is open to considering low dose compounded semaglutide through [facility] which would be out of pocket. She is  aware of the risk for needing to continue use of medications for ongoing weight management. We may need to adjust treatment over time when approaching transplant and postop.     Discussed common risks/ benefits and side effects with GLP1. Discussed management of side effects. No hx pancreatitis. No personal/ family hx MTC or MEN2.     Start semaglutide 0.25mg once weekly injection   Avoid missing meals   Start with smaller portions  Be mindful of protein   Be mindful of hydration   Follow up in 2-3 months          Relevant Medications    COMPOUNDED NON-CONTROLLED SUBSTANCE (CMPD RX) - PHARMACY TO MIX COMPOUNDED MEDICATION       Endocrine    Dyslipidemia    Relevant Medications    COMPOUNDED NON-CONTROLLED SUBSTANCE (CMPD RX) - PHARMACY TO MIX COMPOUNDED MEDICATION     Other Visit Diagnoses       Chronic kidney disease, stage IV (severe) (H)        Relevant Medications    COMPOUNDED NON-CONTROLLED SUBSTANCE (CMPD RX) - PHARMACY TO MIX COMPOUNDED MEDICATION               INTERVAL HISTORY:  new MWM with Amira Gomez PA-C 6/2023 BMI 43 with hx kidney transplant in 2006. Difficulty with kidney since chemo for uterine cancer in 2021 and now GFR is 22. Needing transplant again. Comorbidities include     17lb from transplant goal. Needing transplant soon. Has big eval coming up 11/4 and would like to be down by then     Past/Failed/contraindicated:   Metformin- GFR 17  Phentermine- contraindicated with kidney failure    Recent diet changes:   In the last couple years has changed a lot of her iet  -less processed foods  -more fruits/ veggies   -increasing protein - hit/ miss if she can do this     Typically eating 3 meals a day  Breakfast- protein shake OR derik Zbars   Lunch- 6 PB crackers OR slimfast shake OR leftover chicken/veggies   Dinner- soup and bread OR chicken/ veggies OR meat and veggie   Not usually snacking between meals   Usually tries to eat with meds - cheese, cracker - at bedtime - or she'll get reflux      New med in the last year changed appetite- reduced     Recent exercise/activity changes:   Fatigue limits activity - low hemoglobin - coordination difficult with his fatigue too     Recent stressors:   Lots of stress - dad dying of cancer   April had a lot of grief/ stress with emotional/ stress eating- improved since then     Vitamins/Labs: reviewed labs from today     Reflux- only if taking meds without food     Just had kidney stone     CURRENT WEIGHT:   264 lbs 9.6 oz    Initial Weight (lbs): 275 lbs     Cumulative weight loss (lbs): 10.4  Weight Loss Percentage: 3.78%  Waist Circumference (cm): 124 cm        8/6/2024    10:00 AM   Changes and Difficulties   I have made the following changes to my diet since my last visit: Avoiding grains, drinking more protein shakes   With regards to my diet, I am still struggling with: The weight loss itself, stagnation   I have made the following changes to my activity/exercise since my last visit: Walking more   With regards to my activity/exercise, I am still struggling with: Swelling, shortness of breath easily. Hill are difficult         MEDICATIONS:   Current Outpatient Medications   Medication Sig Dispense Refill     acetaminophen (TYLENOL) 325 MG tablet Take 2 tablets (650 mg) by mouth every 4 hours as needed for mild pain 50 tablet 0     amLODIPine (NORVASC) 5 MG tablet Take 1 tablet (5 mg) by mouth daily 90 tablet 3     carvedilol (COREG) 25 MG tablet TAKE 1 TABLET BY MOUTH 2 TIMES DAILY (WITH MEALS) 180 tablet 1     COMPOUNDED NON-CONTROLLED SUBSTANCE (CMPD RX) - PHARMACY TO MIX COMPOUNDED MEDICATION Inject 0.25mg semaglutide once weekly 4 each 1     everolimus (ZORTRESS) 0.25 MG tablet Take 2 tablets (0.5 mg) by mouth every morning AND 1 tablet (0.25 mg) every evening.       everolimus (ZORTRESS) 0.75 MG tablet Hold for future dose changes 60 tablet 11     labetalol (NORMODYNE) 200 MG tablet Labetalol Oral    BID    inactive       levothyroxine  (SYNTHROID/LEVOTHROID) 50 MCG tablet Take 1 tablet (50 mcg) by mouth daily 90 tablet 1     losartan (COZAAR) 25 MG tablet TAKE 1 TABLET BY MOUTH 2 TIMES DAILY 180 tablet 3     NEORAL (BRAND) 25 MG capsule Take 3 capsules (75 mg) by mouth every morning AND 2 capsules (50 mg) every evening. 450 capsule 3     omeprazole (PRILOSEC) 40 MG DR capsule Take 1 capsule (40 mg) by mouth daily 90 capsule 3     vitamin D3 (CHOLECALCIFEROL) 50 mcg (2000 units) tablet Take 1 tablet (50 mcg) by mouth daily 90 tablet 3     cephALEXin (KEFLEX) 500 MG capsule Take 1 capsule (500 mg) by mouth 2 times daily (Patient not taking: Reported on 7/19/2024) 14 capsule 0           8/6/2024    10:00 AM   Weight Loss Medication History Reviewed With Patient   Which weight loss medications are you currently taking on a regular basis? None   Are you having any side effects from the weight loss medication that we have prescribed you? No       Lab on 08/06/2024   Component Date Value Ref Range Status     Sodium 08/06/2024 138  135 - 145 mmol/L Final     Potassium 08/06/2024 4.2  3.4 - 5.3 mmol/L Final     Chloride 08/06/2024 108 (H)  98 - 107 mmol/L Final     Carbon Dioxide (CO2) 08/06/2024 19 (L)  22 - 29 mmol/L Final     Anion Gap 08/06/2024 11  7 - 15 mmol/L Final     Urea Nitrogen 08/06/2024 53.9 (H)  6.0 - 20.0 mg/dL Final     Creatinine 08/06/2024 3.47 (H)  0.51 - 0.95 mg/dL Final     GFR Estimate 08/06/2024 17 (L)  >60 mL/min/1.73m2 Final    eGFR calculated using 2021 CKD-EPI equation.     Calcium 08/06/2024 8.8  8.8 - 10.4 mg/dL Final    Reference intervals for this test were updated on 7/16/2024 to reflect our healthy population more accurately. There may be differences in the flagging of prior results with similar values performed with this method. Those prior results can be interpreted in the context of the updated reference intervals.     Glucose 08/06/2024 107 (H)  70 - 99 mg/dL Final     WBC Count 08/06/2024 6.3  4.0 - 11.0 10e3/uL  "Final     RBC Count 08/06/2024 3.23 (L)  3.80 - 5.20 10e6/uL Final     Hemoglobin 08/06/2024 8.7 (L)  11.7 - 15.7 g/dL Final     Hematocrit 08/06/2024 26.6 (L)  35.0 - 47.0 % Final     MCV 08/06/2024 82  78 - 100 fL Final     MCH 08/06/2024 26.9  26.5 - 33.0 pg Final     MCHC 08/06/2024 32.7  31.5 - 36.5 g/dL Final     RDW 08/06/2024 13.0  10.0 - 15.0 % Final     Platelet Count 08/06/2024 142 (L)  150 - 450 10e3/uL Final     Total Protein Urine mg/dL 08/06/2024 26.7    mg/dL Final     Total Protein Urine mg/mg Creat 08/06/2024 0.24 (H)  0.00 - 0.20 mg/mg Cr Final     Creatinine Urine mg/dL 08/06/2024 111.0  mg/dL Final     Ferritin 08/06/2024 678 (H)  6 - 175 ng/mL Final     Iron 08/06/2024 60  37 - 145 ug/dL Final     Iron Binding Capacity 08/06/2024 197 (L)  240 - 430 ug/dL Final     Iron Sat Index 08/06/2024 30  15 - 46 % Final           5/17/2022     2:58 PM   CHU Score (Last Two)   CHU Raw Score 28   Activation Score 50   CHU Level 2         PHYSICAL EXAM:  Objective   /71 (BP Location: Left arm, Patient Position: Sitting, Cuff Size: Adult Regular)   Pulse 79   Ht 1.676 m (5' 6\")   Wt 120 kg (264 lb 9.6 oz)   SpO2 99%   BMI 42.71 kg/m      /71 (BP Location: Left arm, Patient Position: Sitting, Cuff Size: Adult Regular)   Pulse 79   Ht 1.676 m (5' 6\")   Wt 120 kg (264 lb 9.6 oz)   SpO2 99%   BMI 42.71 kg/m    Body mass index is 42.71 kg/m .  GENERAL: alert and no distress  EYES: Eyes grossly normal to inspection.  No discharge or erythema, or obvious scleral/conjunctival abnormalities.  RESP: No audible wheeze, cough, or visible cyanosis.    SKIN: Visible skin clear. No significant rash, abnormal pigmentation or lesions.  NEURO: Cranial nerves grossly intact.  Mentation and speech appropriate for age.  PSYCH: Appropriate affect, tone, and pace of words        Sincerely,    Sharon Olea NP      42 minutes spent by me on the date of the encounter doing chart review, history and exam, " documentation and further activities per the note  The longitudinal plan of care for the diagnosis(es)/condition(s) as documented were addressed during this visit. Due to the added complexity in care, I will continue to support Carol in the subsequent management and with ongoing continuity of care.      Again, thank you for allowing me to participate in the care of your patient.      Sincerely,    Sharon Olea NP

## 2024-08-06 NOTE — TELEPHONE ENCOUNTER
Left Voicemail (1st Attempt) and Sent Mychart (1st Attempt) for the patient to call back and schedule the following:    Appointment type: [facility] Physical  Provider: Dr. Shi  Return date: 12/6/24

## 2024-08-06 NOTE — NURSING NOTE
"(   Chief Complaint   Patient presents with    RECHECK     Return MW    )    ( Weight: 120 kg (264 lb 9.6 oz) )  ( Height: 167.6 cm (5' 6\") )  ( BMI (Calculated): 42.71 )  (   )  ( Cumulative weight loss (lbs): 10.4 )  (   )  (   )  ( Waist Circumference (cm): 124 cm )  (   )    ( BP: 104/71 )  (   )  (   )  (   )  ( Pulse: 79 )  (   )  ( SpO2: 99 % )    (   Patient Active Problem List   Diagnosis    Kidney replaced by transplant    Senior-Loken syndrome    Pseudotumor cerebri    Legally blind    Retinitis pigmentosa    Vitamin D deficiency    Morbid obesity (H)    Insulin resistance    PCOS (polycystic ovarian syndrome)    Cataract    Palpitations    Dyslipidemia    HTN, kidney transplant related    Aftercare following organ transplant    Immunosuppression (H24)    EIN (endometrial intraepithelial neoplasia)    Chronic kidney disease, stage 3 (H)    Acquired hypothyroidism    Elevated creatine kinase    Anemia in other chronic diseases classified elsewhere    Nephrolithiasis    )  (   Current Outpatient Medications   Medication Sig Dispense Refill    acetaminophen (TYLENOL) 325 MG tablet Take 2 tablets (650 mg) by mouth every 4 hours as needed for mild pain 50 tablet 0    amLODIPine (NORVASC) 5 MG tablet Take 1 tablet (5 mg) by mouth daily 90 tablet 3    carvedilol (COREG) 25 MG tablet TAKE 1 TABLET BY MOUTH 2 TIMES DAILY (WITH MEALS) 180 tablet 1    everolimus (ZORTRESS) 0.25 MG tablet Take 2 tablets (0.5 mg) by mouth every morning AND 1 tablet (0.25 mg) every evening.      everolimus (ZORTRESS) 0.75 MG tablet Hold for future dose changes 60 tablet 11    labetalol (NORMODYNE) 200 MG tablet Labetalol Oral    BID    inactive      levothyroxine (SYNTHROID/LEVOTHROID) 50 MCG tablet Take 1 tablet (50 mcg) by mouth daily 90 tablet 1    losartan (COZAAR) 25 MG tablet TAKE 1 TABLET BY MOUTH 2 TIMES DAILY 180 tablet 3    NEORAL (BRAND) 25 MG capsule Take 3 capsules (75 mg) by mouth every morning AND 2 capsules (50 mg) " every evening. 450 capsule 3    omeprazole (PRILOSEC) 40 MG DR capsule Take 1 capsule (40 mg) by mouth daily 90 capsule 3    vitamin D3 (CHOLECALCIFEROL) 50 mcg (2000 units) tablet Take 1 tablet (50 mcg) by mouth daily 90 tablet 3    cephALEXin (KEFLEX) 500 MG capsule Take 1 capsule (500 mg) by mouth 2 times daily (Patient not taking: Reported on 7/19/2024) 14 capsule 0    )  ( Diabetes Eval:    )    ( Pain Eval:  No Pain (0) )    ( Wound Eval:       )    (   History   Smoking Status    Never   Smokeless Tobacco    Never    )    ( Signed By:  Sebastian Harris, EMT; August 6, 2024; 9:58 AM )

## 2024-08-06 NOTE — PATIENT INSTRUCTIONS
Start semaglutide 0.25mg once weekly injection   Avoid missing meals   Start with smaller portions  Be mindful of protein   Be mindful of hydration   Follow up in 2-3 months

## 2024-08-07 DIAGNOSIS — Z94.0 KIDNEY REPLACED BY TRANSPLANT: ICD-10-CM

## 2024-08-07 DIAGNOSIS — Z79.899 IMMUNOSUPPRESSIVE MANAGEMENT ENCOUNTER FOLLOWING KIDNEY TRANSPLANT: ICD-10-CM

## 2024-08-07 DIAGNOSIS — Z48.298 AFTERCARE FOLLOWING ORGAN TRANSPLANT: Primary | ICD-10-CM

## 2024-08-07 DIAGNOSIS — Z94.0 IMMUNOSUPPRESSIVE MANAGEMENT ENCOUNTER FOLLOWING KIDNEY TRANSPLANT: ICD-10-CM

## 2024-08-07 LAB
CYCLOSPORINE BLD LC/MS/MS-MCNC: 142 UG/L (ref 50–400)
EVEROLIMUS BLD-MCNC: 5.9 UG/L (ref 3–8)
TME LAST DOSE: NORMAL H

## 2024-08-07 NOTE — TELEPHONE ENCOUNTER
Call placed to patient. Patient confirms current dose and accurate trough level. Denies any recent illness, diarrhea or medication changes. Patient v\u to decrease CSA to 50 mg bid and repeat level in one week. Order/rx sent

## 2024-08-07 NOTE — ASSESSMENT & PLAN NOTE
ESRD, working to continue to avoid dialysis. Anticipates kidney transplant soon with GFR 17. She has big work up/ follow up in transplant clinic in November and needs to lose 20lb to get listed for kidney transplant.     She was last seen 6/2023 and had difficult time getting medications covered by insurance. She has had resistant weight loss since last seen despite significant improvements to diet and lifestyle. With dyslipidemia (high trigs, low HLD) and elevated glucose and elevated waist circumference, I suspect insulin resistance given diagnosis of metabolic syndrome. Discussed metabolic syndrome and resistant weight loss. Unable to use metformin given GFR. Would like to reconsider GLp1 but she has medicare which will not cover this without prior hx MACE. She is open to considering low dose compounded semaglutide through [facility] which would be out of pocket. She is aware of the risk for needing to continue use of medications for ongoing weight management. We may need to adjust treatment over time when approaching transplant and postop.     Discussed common risks/ benefits and side effects with GLP1. Discussed management of side effects. No hx pancreatitis. No personal/ family hx MTC or MEN2.     Start semaglutide 0.25mg once weekly injection   Avoid missing meals   Start with smaller portions  Be mindful of protein   Be mindful of hydration   Follow up in 2-3 months

## 2024-08-07 NOTE — TELEPHONE ENCOUNTER
ISSUE:   Cyclosporine level 142 on 8/6/2024, goal 75, dose 75 mg in the AM and 50 mg int he PM.    LPN TASK/ PLAN:   Call Patient and confirm this was an accurate 12-hour trough.   Verify Cyclosporine dose 75 mg/ 50 mg BID.   Confirm no new medications or or missed doses.   Confirm no new illness / infection / diarrhea.   If accurate trough and accurate dose, decrease Cyclosporine dose to 50 mg BID     Is this more than a 50% increase or decrease in current IS dose: No  If YES, justification: na    Repeat labs in 1 weeks.  *If > 50% change in immunosuppression dose, repeat labs in 1 week.     Please update the RX as well as place orders for repeat CSA, everolimus, BMP, and CBC    Ronna Hughes RN   Transplant Coordinator  [phone]

## 2024-08-08 RX ORDER — CYCLOSPORINE 25 MG/1
50 CAPSULE, LIQUID FILLED ORAL 2 TIMES DAILY
Qty: 360 CAPSULE | Refills: 3 | Status: SHIPPED | OUTPATIENT
Start: 2024-08-08

## 2024-08-08 NOTE — TELEPHONE ENCOUNTER
Left Voicemail (2nd Attempt) and Sent Mychart (2nd Attempt) for the patient to call back and schedule the following:    Appointment type: [facility] Physical  Provider: Dr. Shi  Return date: 12/9/24

## 2024-08-09 ENCOUNTER — PATIENT OUTREACH (OUTPATIENT)
Dept: CARE COORDINATION | Facility: CLINIC | Age: 34
End: 2024-08-09
Payer: COMMERCIAL

## 2024-08-09 ENCOUNTER — TELEPHONE (OUTPATIENT)
Dept: ENDOCRINOLOGY | Facility: CLINIC | Age: 34
End: 2024-08-09
Payer: COMMERCIAL

## 2024-08-09 NOTE — TELEPHONE ENCOUNTER
Patient confirmed scheduled appointment:  Date: 11/8/24  Time: 8am  Visit type: Return MWM  Provider: Sharon amin  Location: [facility]

## 2024-08-09 NOTE — PROGRESS NOTES
Anemia Management Note - Follow Up      SUBJECTIVE/OBJECTIVE:    Referred by Dr. 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 Atypical Hyperplasia endometriosis carcinoma ,      No consent to communicate on file        Latest Ref Rng & Units 2024 2024 7/15/2024 2024 2024 2024 2024   Anemia   Hemoglobin 11.7 - 15.7 g/dL 9.9  9.6  10.0  9.0  9.4  8.5  8.7    IV Iron Dose     750mg  750mg    TSAT 15 - 46 %  14  17     30    Ferritin 6 - 175 ng/mL  122      678         BP Readings from Last 3 Encounters:   24 104/71   24 116/78   24 105/71     Wt Readings from Last 2 Encounters:   24 120 kg (264 lb 9.6 oz)   24 121.6 kg (268 lb)         ASSESSMENT:    Hgb:Not at goal/Known  TSat: Due for iron studies 4 weeks after last IV iron infusion Ferritin: Due for iron studies 4 weeks after last IV iron infusion   Goals Addressed    None         PLAN:  RTC for anemia labs in 4 week(s).    Orders needed to be renewed (for next follow-up date) in EPIC: None    Iron labs due:  4 weeks    Plan discussed with:  Carol      NEXT FOLLOW-UP DATE:  827 review labs    Carrie Leopold, RN BSN  Anemia Services  [facility]  [email or web address]  Office: [phone]  Fax [phone]

## 2024-08-21 ENCOUNTER — TELEPHONE (OUTPATIENT)
Dept: TRANSPLANT | Facility: CLINIC | Age: 34
End: 2024-08-21
Payer: COMMERCIAL

## 2024-08-21 ENCOUNTER — TELEPHONE (OUTPATIENT)
Dept: ENDOCRINOLOGY | Facility: CLINIC | Age: 34
End: 2024-08-21
Payer: COMMERCIAL

## 2024-08-21 DIAGNOSIS — R11.0 NAUSEA: Primary | ICD-10-CM

## 2024-08-21 RX ORDER — ONDANSETRON 4 MG/1
4 TABLET, ORALLY DISINTEGRATING ORAL EVERY 8 HOURS PRN
Qty: 30 TABLET | Refills: 0 | Status: SHIPPED | OUTPATIENT
Start: 2024-08-21

## 2024-08-21 NOTE — TELEPHONE ENCOUNTER
Patient took her Compounded Semaglutide 0.25 mg dose on Friday afternoon. This was her second dose. Saturday around 0300 vomited. All day Saturday and Sunday she was constipated. Took Metamucil on Monday and had small BM. Starting yesterday had liquid stools and vomited.     Notes that after her first dose she felt a little nauseated but had no problems with constipation.     Today she feels tired, has had 2 loose stools. Denies nausea.     Advised patient to push fluids.    Will send the above information to Amira Gomez.     FYI-Patient is kidney transplant recipient but in kidney failure, needs 15 lbs weight loss for surgery.

## 2024-08-21 NOTE — TELEPHONE ENCOUNTER
General  Route to LPN    Reason for call: Pt is having a reaching to something and does not know it is her meds     Call back needed? Yes    Return Call Needed  Same as documented in contacts section  When to return call?: Same day: Route High Priority

## 2024-08-21 NOTE — TELEPHONE ENCOUNTER
Pt. is taking Semi globulin and is experiencing constipation and nausea and vomiting. Pt. Utilized a suppository on Monday and experienced pain with her intestines placing pressure on her kidney transplant and that was uncomfortable. Please contact Pt. at [phone] to discuss these symptoms.

## 2024-08-22 NOTE — TELEPHONE ENCOUNTER
LM asking for CB or response to my chart.    Ronna Hughes RN   Transplant Coordinator  [phone]

## 2024-08-27 DIAGNOSIS — E55.9 VITAMIN D DEFICIENCY: ICD-10-CM

## 2024-08-27 RX ORDER — CHOLECALCIFEROL (VITAMIN D3) 50 MCG
1 TABLET ORAL DAILY
Qty: 90 TABLET | Refills: 3 | Status: SHIPPED | OUTPATIENT
Start: 2024-08-27

## 2024-08-27 NOTE — PROGRESS NOTES
Anemia Management Note - Reminder     Follow-up with anemia management service:    Demetriushart reminder sent to Carol re: anemia labs being due ~9/6/24        Latest Ref Rng & Units 7/4/2024 7/12/2024 7/15/2024 7/19/2024 7/24/2024 7/26/2024 8/6/2024   Anemia   Hemoglobin 11.7 - 15.7 g/dL 9.9  9.6  10.0  9.0  9.4  8.5  8.7    IV Iron Dose     750mg  750mg    TSAT 15 - 46 %  14  17     30    Ferritin 6 - 175 ng/mL  122      678           Follow-up call date: 091024    Carrie Leopold, RN BSN  Anemia Services  [facility]  [email or web address]  Office: [phone]  Fax [phone]

## 2024-08-29 ENCOUNTER — VIRTUAL VISIT (OUTPATIENT)
Dept: CARDIOLOGY | Facility: CLINIC | Age: 34
End: 2024-08-29
Attending: PHYSICIAN ASSISTANT
Payer: COMMERCIAL

## 2024-08-29 VITALS — BODY MASS INDEX: 41.14 KG/M2 | WEIGHT: 256 LBS | HEIGHT: 66 IN

## 2024-08-29 DIAGNOSIS — E66.813 CLASS 3 SEVERE OBESITY WITH SERIOUS COMORBIDITY AND BODY MASS INDEX (BMI) OF 40.0 TO 44.9 IN ADULT, UNSPECIFIED OBESITY TYPE (H): Primary | ICD-10-CM

## 2024-08-29 DIAGNOSIS — E66.01 CLASS 3 SEVERE OBESITY WITH SERIOUS COMORBIDITY AND BODY MASS INDEX (BMI) OF 40.0 TO 44.9 IN ADULT, UNSPECIFIED OBESITY TYPE (H): Primary | ICD-10-CM

## 2024-08-29 DIAGNOSIS — K59.00 CONSTIPATION, UNSPECIFIED CONSTIPATION TYPE: ICD-10-CM

## 2024-08-29 RX ORDER — POLYETHYLENE GLYCOL 3350 17 G/17G
1 POWDER, FOR SOLUTION ORAL DAILY
COMMUNITY
Start: 2024-08-29

## 2024-08-29 ASSESSMENT — PAIN SCALES - GENERAL: PAINLEVEL: MILD PAIN (3)

## 2024-08-29 NOTE — PROGRESS NOTES
Medication Therapy Management (MTM) Encounter    ASSESSMENT:                            Medication Adherence/Access: No issues identified    Weight management:   Nausea and vomiting likely secondary to constipation. Cause likely a combination of starting GLP1, situational stress and lack of food intake. Ongoing fiber supplementation and adequate water intake will be important for efficacy. Given description of constipation and difficulty passing stool, addition of Miralax would be beneficial. Can consider senna in the future if needed. Likely safe to resume GLP1 therapy after 1-2 days of soft, formed stool during BM. Continuing fiber and Miralax with restart of medication should help achieve and maintain normal BM with medication.     PLAN:                            Continue Metamucil once daily.   Start Miralax 17 g, 1 capful daily as needed.   Continue to ensure at least 64 oz of fluid intake daily.  Restart semaglutide after 1-2 days of normal bowel movement, can restart this weekend if desired.   Reminder: Semaglutide 0.25 mg will be equal to a 5 unit dose on the syringe.     Mg to Unit Conversion Chart for Reference:               Follow-up: Pharmacist to send [employer] message on Tuesday 9/3/24 to check in on constipation and restart of semaglutide    SUBJECTIVE/OBJECTIVE:                          Carol Guillaume is a 33 year old female seen for an initial visit. She was referred to me from Amira Gomez.      Reason for visit: Compounded semaglutide follow-up, side effects.    Visually impairement    Allergies/ADRs: Reviewed in chart  Past Medical History: Reviewed in chart  Tobacco: She reports that she has never smoked. She has never used smokeless tobacco.  Alcohol: never    Medication Adherence/Access: no issues reported    Weight Management   Wegovy 0.25 mg once weekly   - First dose Fri 8/9 - some nausea Fri/Sat but management, on vacation, normal eating/sleeping patterns - remembers some medication  "leaked from injection site, not sure if she got the full dose  - Wed 8/14 - dad and  in ER, stressful situation, lack of sleep, ate dinner before ER visit but meals were less consistent for next 2 days  - Fri 8/16 - felt uncomfortable, upset stomach, not feeling well in the AM - took 2nd dose   Nausea started within 3 hrs, nausea all day, didn't eat much - vomited over night  Constipated sat/sun - pushed fluids but wasn't interested in eating  Vomited Link night 8/18  - Mon 8/19 - took Metamucil, BM Mon  Not interested in eating, 1 taco Monday night   Vomited Tues 8/20/24, loose stool  Continued to feel tired, loose stool Wed, Thurs   - Held dose on Fri 8/23/24   Continues to be constipated - Small stool, hard round small amounts  Metamucil has helped some - feels GI tract is moving, more difficulty passing stool     Haven't experienced constipation like this in the past  Mychophenolic acid x5 years - cycle of constipated x2 days, diarrhea x2 - no nausea with this     is drawing dose from the vial, confident in his accuracy - reminded of 5 units is equivalent to 0.25 mcg dose.     Low phosphorus diet with kidney failure - high fiber foods typically have phosphorus so eating less of those      Initial Consult Weight: 264 lbs (8/6/2024)     Current weight today: 256 lbs 0 oz  Cumulative Weight Loss: -9 lb, -3.5% from baseline    Wt Readings from Last 4 Encounters:   08/29/24 116.1 kg (256 lb)   08/06/24 120 kg (264 lb 9.6 oz)   07/26/24 121.6 kg (268 lb)   06/07/24 120.2 kg (265 lb)     Estimated body mass index is 41.32 kg/m  as calculated from the following:    Height as of this encounter: 1.676 m (5' 6\").    Weight as of this encounter: 116.1 kg (256 lb).        Today's Vitals: Ht 1.676 m (5' 6\")   Wt 116.1 kg (256 lb)   BMI 41.32 kg/m    ----------------      I spent 28 minutes with this patient today. All changes were made via collaborative practice agreement with Amira Gomez. A copy " of the visit note was provided to the patient's provider(s).    A summary of these recommendations was sent via [email or web address].    Breanna Barrera, PharmD, BCACP  Medication Therapy Management (MTM) Pharmacist   [facility]    Telemedicine Visit Details  Type of service:  Telephone visit  Start Time:  1:59 PM  End Time: 2:27 PM     Medication Therapy Recommendations  Constipation, unspecified constipation type    Current Medication: COMPOUNDED NON-CONTROLLED SUBSTANCE (CMPD RX) - PHARMACY TO MIX COMPOUNDED MEDICATION   Rationale: Undesirable effect - Adverse medication event - Safety   Recommendation: Start Medication - MiraLax 17 GM/SCOOP Powd   Status: Accepted - no CPA Needed

## 2024-08-29 NOTE — Clinical Note
FYI - nausea/vomiting seemed to be from constipation which may have been multifactorial. She is going to try Miralax and restart the semaglutide this weekend if she has normal BM. I'm going to check in on Tuesday 9/5 to see how she felt after the weekend.   Breanna

## 2024-08-29 NOTE — NURSING NOTE
Current patient location: [address] [address]    Is the patient currently in the state of [place]? YES    Visit mode:TELEPHONE    If the visit is dropped, the patient can be reconnected by: TELEPHONE VISIT: Phone number:   Telephone Information:   Mobile [phone]       Will anyone else be joining the visit? NO  (If patient encounters technical issues they should call [phone]    How would you like to obtain your AVS? MyChart    Are changes needed to the allergy or medication list? No    Are refills needed on medications prescribed by this physician? NO    Rooming Documentation:  Not applicable      Pt states pain fluctuates between 3/10 and 8/10 abdominal pain due to constipation starting 1 week prior.    Reason for visit: Medication Therapy Management    Bakari BAIRES

## 2024-08-29 NOTE — LETTER
8/29/2024      RE: Carol Guillaume  [address] [address]       Dear Colleague,    Thank you for the opportunity to participate in the care of your patient, Carol Guillaume, at the [facility]. Please see a copy of my visit note below.    Medication Therapy Management (MTM) Encounter    ASSESSMENT:                            Medication Adherence/Access: No issues identified    Weight management:   Nausea and vomiting likely secondary to constipation. Cause likely a combination of starting GLP1, situational stress and lack of food intake. Ongoing fiber supplementation and adequate water intake will be important for efficacy. Given description of constipation and difficulty passing stool, addition of Miralax would be beneficial. Can consider senna in the future if needed. Likely safe to resume GLP1 therapy after 1-2 days of soft, formed stool during BM. Continuing fiber and Miralax with restart of medication should help achieve and maintain normal BM with medication.     PLAN:                            Continue Metamucil once daily.   Start Miralax 17 g, 1 capful daily as needed.   Continue to ensure at least 64 oz of fluid intake daily.  Restart semaglutide after 1-2 days of normal bowel movement, can restart this weekend if desired.   Reminder: Semaglutide 0.25 mg will be equal to a 5 unit dose on the syringe.     Mg to Unit Conversion Chart for Reference:               Follow-up: Pharmacist to send [employer] message on Tuesday 9/3/24 to check in on constipation and restart of semaglutide    SUBJECTIVE/OBJECTIVE:                          Carol Guillaume is a 33 year old female seen for an initial visit. She was referred to me from Amira Gomez.      Reason for visit: Compounded semaglutide follow-up, side effects.    Visually impairement    Allergies/ADRs: Reviewed in chart  Past Medical History: Reviewed in  chart  Tobacco: She reports that she has never smoked. She has never used smokeless tobacco.  Alcohol: never    Medication Adherence/Access: no issues reported    Weight Management  Wegovy 0.25 mg once weekly   - First dose Fri 8/9 - some nausea Fri/Sat but management, on vacation, normal eating/sleeping patterns - remembers some medication leaked from injection site, not sure if she got the full dose  - Wed 8/14 - dad and  in ER, stressful situation, lack of sleep, ate dinner before ER visit but meals were less consistent for next 2 days  - Fri 8/16 - felt uncomfortable, upset stomach, not feeling well in the AM - took 2nd dose   Nausea started within 3 hrs, nausea all day, didn't eat much - vomited over night  Constipated sat/sun - pushed fluids but wasn't interested in eating  Vomited Link night 8/18  - Mon 8/19 - took Metamucil, BM Mon  Not interested in eating, 1 taco Monday night   Vomited Tues 8/20/24, loose stool  Continued to feel tired, loose stool Wed, Thurs   - Held dose on Fri 8/23/24   Continues to be constipated - Small stool, hard round small amounts  Metamucil has helped some - feels GI tract is moving, more difficulty passing stool     Haven't experienced constipation like this in the past  Mychophenolic acid x5 years - cycle of constipated x2 days, diarrhea x2 - no nausea with this     is drawing dose from the vial, confident in his accuracy - reminded of 5 units is equivalent to 0.25 mcg dose.     Low phosphorus diet with kidney failure - high fiber foods typically have phosphorus so eating less of those      Initial Consult Weight: 264 lbs (8/6/2024)     Current weight today: 256 lbs 0 oz  Cumulative Weight Loss: -9 lb, -3.5% from baseline    Wt Readings from Last 4 Encounters:   08/29/24 116.1 kg (256 lb)   08/06/24 120 kg (264 lb 9.6 oz)   07/26/24 121.6 kg (268 lb)   06/07/24 120.2 kg (265 lb)     Estimated body mass index is 41.32 kg/m  as calculated from the following:     "Height as of this encounter: 1.676 m (5' 6\").    Weight as of this encounter: 116.1 kg (256 lb).        Today's Vitals: Ht 1.676 m (5' 6\")   Wt 116.1 kg (256 lb)   BMI 41.32 kg/m    ----------------      I spent 28 minutes with this patient today. All changes were made via collaborative practice agreement with Amira Gomez. A copy of the visit note was provided to the patient's provider(s).    A summary of these recommendations was sent via [employer].    Breanna Barrera, PharmD, BCACP  Medication Therapy Management (MTM) Pharmacist   [facility]    Telemedicine Visit Details  Type of service:  Telephone visit  Start Time:  1:59 PM  End Time: 2:27 PM     Medication Therapy Recommendations  Constipation, unspecified constipation type    Current Medication: COMPOUNDED NON-CONTROLLED SUBSTANCE (CMPD RX) - PHARMACY TO MIX COMPOUNDED MEDICATION   Rationale: Undesirable effect - Adverse medication event - Safety   Recommendation: Start Medication - MiraLax 17 GM/SCOOP Powd   Status: Accepted - no CPA Needed                Please do not hesitate to contact me if you have any questions/concerns.     Sincerely,     Breanna Barrera [facility]  "

## 2024-08-29 NOTE — PATIENT INSTRUCTIONS
"Recommendations from today's MTM visit:                                                      Continue Metamucil once daily.   Start Miralax 17 g, 1 capful daily as needed.   Continue to ensure at least 64 oz of fluid intake daily.  Restart semaglutide after 1-2 days of normal bowel movement, can restart this weekend if desired.   Reminder: Semaglutide 0.25 mg will be equal to a 5 unit dose on the syringe.     Mg to Unit Conversion Chart for Reference:               Follow-up: Pharmacist to send [employer] message on Tuesday 9/3/24 to check in on constipation and restart of semaglutide    It was great speaking with you today.  I value your experience and would be very thankful for your time in providing feedback in our clinic survey. In the next few days, you may receive an email or text message from [employer] with a link to a survey related to your  clinical pharmacist.\"     To schedule another MTM appointment, please call the clinic directly or you may call the MTM scheduling line at [phone] or toll-free at [phone].     My Clinical Pharmacist's contact information:                                                      Please feel free to contact me with any questions or concerns you have.      Breanna Barrera, PharmD, BCACP  Medication Therapy Management (MTM) Pharmacist   [facility]     "

## 2024-09-09 DIAGNOSIS — E78.5 DYSLIPIDEMIA: ICD-10-CM

## 2024-09-09 DIAGNOSIS — N18.4 CHRONIC KIDNEY DISEASE, STAGE IV (SEVERE) (H): ICD-10-CM

## 2024-09-09 DIAGNOSIS — E66.01 CLASS 3 SEVERE OBESITY WITH SERIOUS COMORBIDITY AND BODY MASS INDEX (BMI) OF 40.0 TO 44.9 IN ADULT, UNSPECIFIED OBESITY TYPE (H): ICD-10-CM

## 2024-09-09 DIAGNOSIS — E66.813 CLASS 3 SEVERE OBESITY WITH SERIOUS COMORBIDITY AND BODY MASS INDEX (BMI) OF 40.0 TO 44.9 IN ADULT, UNSPECIFIED OBESITY TYPE (H): ICD-10-CM

## 2024-09-09 NOTE — TELEPHONE ENCOUNTER
COMPOUNDED NON-CONTROLLED SUBSTANCE (CMPD RX) - PHARMACY TO MIX COMPOUNDED MEDICATION   Disp-4 each, R-1   Start: 08/06/2024 8/6/2024  [facility] [place]    Sharon Olea NP  Surgery    Nv:  11/8/24.    Routed because: request per pt call. Not on protocol.

## 2024-09-09 NOTE — TELEPHONE ENCOUNTER
Medication Question or Refill    Contacts       Contact Date/Time Type Contact Phone/Fax    09/09/2024 10:24 AM CDT Phone (Incoming) MarkellCarol (Self) [phone] (M)            What medication are you calling about (include dose and sig)?:     COMPOUNDED NON-CONTROLLED SUBSTANCE (CMPD RX) - PHARMACY TO MIX COMPOUNDED MEDICATION     Preferred Pharmacy:      [facility] - [place] [address] [address] [facility]  [address] [address]  Phone: [phone] Fax: [phone]      Controlled Substance Agreement on file:   CSA -- Patient Level:    CSA: None found at the patient level.       Who prescribed the medication?: Emmie    Do you need a refill? Yes      Do you have any questions or concerns?  Yes:     Will get this month's refill, proactively asking for next months. Has Nov. appt      Could we send this information to you in [employer] or would you prefer to receive a phone call?:   Patient would prefer a phone call   Okay to leave a detailed message?: Yes at Cell number on file:    Telephone Information:   Mobile [phone]

## 2024-09-10 NOTE — PROGRESS NOTES
Follow-up with anemia management service:    Anemia labs are due.  LVM for Carol, requesting she have anemia labs done in the next couple weeks        Latest Ref Rng & Units 7/15/2024     7:14 AM 7/19/2024     7:40 AM 7/19/2024     9:25 AM 7/24/2024     7:10 AM 7/26/2024     7:22 AM 7/26/2024     9:25 AM 8/6/2024     9:03 AM   Anemia   Hemoglobin 11.7 - 15.7 g/dL 10.0  9.0   9.4  8.5   8.7    IV Iron Dose    750mg   750mg    TSAT 15 - 46 % 17       30    Ferritin 6 - 175 ng/mL       678      Follow-up call date: 092424    Carrie Leopold, RN BSN  Anemia Services  [facility]  [email or web address]  Office: [phone]  Fax [phone]

## 2024-09-11 NOTE — PROGRESS NOTES
Called patient to update about kidney bx results:    Preliminary kidney biopsy results:  Ordered for worsening Cr (note uptrend in Cr 1.1-1.3 to 1.7 in Aug 2020 now up to 2.2 and worsening proteinuria 2.7 g/g this year)  Chronic active ABMR t3,pvc2,cg3,c4d3+ 20% IFTA; no DSA sent  Current IS CSA 75/75 /540  Hx noted: early stage uterine Ca per last note  with plan for salvage, at the time some consideration for reducing IS in view of cancer      Of note, patient has been off MPA since Feb-->Aug just started last Mon which likely contributed to cABMR. limited options to up-titrate IS further or switch to tacrolimus given ongoing uterine cancer Tx and the plan for salvage Tx. Could also add ARB to control BP/proteinuria     Plan  - just resumed  mg po bid last week, cont CSA 75 mg po bid (obtain an accurate 12 hr trough)  -repeat labs 1 week after starting losartan 25 mg po every day   (hold the day of surgery; She is scheduled for 1 more surgery D & C in 1 week-atypical cells, D&C:  [place] [facility])  -monitor BP daily over the next 2 weeks, goal <130/80  - f/up clinic 1 month /         Yes

## 2024-09-17 DIAGNOSIS — I12.9 HYPERTENSION, RENAL: ICD-10-CM

## 2024-09-20 RX ORDER — AMLODIPINE BESYLATE 5 MG/1
5 TABLET ORAL DAILY
Qty: 90 TABLET | Refills: 3 | Status: SHIPPED | OUTPATIENT
Start: 2024-09-20

## 2024-09-24 NOTE — PROGRESS NOTES
Anemia Management Note - Reminder     Follow-up with anemia management service:    Anemia labs are overdue. Sent another [employer] message asking her to have labs done at her earliest convenience.        Latest Ref Rng & Units 7/4/2024 7/12/2024 7/15/2024 7/19/2024 7/24/2024 7/26/2024 8/6/2024   Anemia   Hemoglobin 11.7 - 15.7 g/dL 9.9  9.6  10.0  9.0  9.4  8.5  8.7    IV Iron Dose     750mg  750mg    TSAT 15 - 46 %  14  17     30    Ferritin 6 - 175 ng/mL  122      678           Follow-up call date: 101524    Carrie Leopold, RN BSN  Anemia Services  [facility]  [email or web address]  Office: [phone]  Fax [phone]

## 2024-09-30 ENCOUNTER — TELEPHONE (OUTPATIENT)
Dept: ENDOCRINOLOGY | Facility: CLINIC | Age: 34
End: 2024-09-30
Payer: COMMERCIAL

## 2024-09-30 NOTE — TELEPHONE ENCOUNTER
General Call    Contacts       Contact Date/Time Type Contact Phone/Fax    09/30/2024 01:36 PM CDT Phone (Incoming) Craol Guillaume (Self) [phone] (M)          Reason for Call:  medication questions    What are your questions or concerns:  pt would like to know if she is supposed to increase dose of compounded semaglutide     Could we send this information to you in Health system or would you prefer to receive a phone call?:   Patient would prefer a phone call   Okay to leave a detailed message?: Yes at Cell number on file:    Telephone Information:   Mobile [phone]

## 2024-10-07 ENCOUNTER — LAB (OUTPATIENT)
Dept: LAB | Facility: CLINIC | Age: 34
End: 2024-10-07
Payer: COMMERCIAL

## 2024-10-07 DIAGNOSIS — D63.8 ANEMIA IN OTHER CHRONIC DISEASES CLASSIFIED ELSEWHERE: ICD-10-CM

## 2024-10-07 DIAGNOSIS — Z48.298 AFTERCARE FOLLOWING ORGAN TRANSPLANT: ICD-10-CM

## 2024-10-07 DIAGNOSIS — Z94.0 KIDNEY REPLACED BY TRANSPLANT: ICD-10-CM

## 2024-10-07 LAB
ANION GAP SERPL CALCULATED.3IONS-SCNC: 12 MMOL/L (ref 7–15)
BASOPHILS # BLD AUTO: 0 10E3/UL (ref 0–0.2)
BASOPHILS NFR BLD AUTO: 0 %
BUN SERPL-MCNC: 42.7 MG/DL (ref 6–20)
CALCIUM SERPL-MCNC: 9.6 MG/DL (ref 8.8–10.4)
CHLORIDE SERPL-SCNC: 104 MMOL/L (ref 98–107)
CREAT SERPL-MCNC: 3.97 MG/DL (ref 0.51–0.95)
CYCLOSPORINE BLD LC/MS/MS-MCNC: 141 UG/L (ref 50–400)
EGFRCR SERPLBLD CKD-EPI 2021: 14 ML/MIN/1.73M2
EOSINOPHIL # BLD AUTO: 0.1 10E3/UL (ref 0–0.7)
EOSINOPHIL NFR BLD AUTO: 1 %
ERYTHROCYTE [DISTWIDTH] IN BLOOD BY AUTOMATED COUNT: 12.3 % (ref 10–15)
GLUCOSE SERPL-MCNC: 104 MG/DL (ref 70–99)
HCO3 SERPL-SCNC: 22 MMOL/L (ref 22–29)
HCT VFR BLD AUTO: 28.3 % (ref 35–47)
HGB BLD-MCNC: 9.2 G/DL (ref 11.7–15.7)
IMM GRANULOCYTES # BLD: 0 10E3/UL
IMM GRANULOCYTES NFR BLD: 1 %
IRON BINDING CAPACITY (ROCHE): 183 UG/DL (ref 240–430)
IRON SATN MFR SERPL: 25 % (ref 15–46)
IRON SERPL-MCNC: 46 UG/DL (ref 37–145)
LYMPHOCYTES # BLD AUTO: 1.6 10E3/UL (ref 0.8–5.3)
LYMPHOCYTES NFR BLD AUTO: 25 %
MCH RBC QN AUTO: 26.4 PG (ref 26.5–33)
MCHC RBC AUTO-ENTMCNC: 32.5 G/DL (ref 31.5–36.5)
MCV RBC AUTO: 81 FL (ref 78–100)
MONOCYTES # BLD AUTO: 0.4 10E3/UL (ref 0–1.3)
MONOCYTES NFR BLD AUTO: 6 %
NEUTROPHILS # BLD AUTO: 4.3 10E3/UL (ref 1.6–8.3)
NEUTROPHILS NFR BLD AUTO: 67 %
PLATELET # BLD AUTO: 192 10E3/UL (ref 150–450)
POTASSIUM SERPL-SCNC: 4.7 MMOL/L (ref 3.4–5.3)
RBC # BLD AUTO: 3.48 10E6/UL (ref 3.8–5.2)
SODIUM SERPL-SCNC: 138 MMOL/L (ref 135–145)
TME LAST DOSE: NORMAL H
TME LAST DOSE: NORMAL H
WBC # BLD AUTO: 6.4 10E3/UL (ref 4–11)

## 2024-10-07 PROCEDURE — 85025 COMPLETE CBC W/AUTO DIFF WBC: CPT

## 2024-10-07 PROCEDURE — 36415 COLL VENOUS BLD VENIPUNCTURE: CPT

## 2024-10-07 PROCEDURE — 83540 ASSAY OF IRON: CPT

## 2024-10-07 PROCEDURE — 80158 DRUG ASSAY CYCLOSPORINE: CPT

## 2024-10-07 PROCEDURE — 80048 BASIC METABOLIC PNL TOTAL CA: CPT

## 2024-10-07 PROCEDURE — 80169 DRUG ASSAY EVEROLIMUS: CPT

## 2024-10-07 PROCEDURE — 83550 IRON BINDING TEST: CPT

## 2024-10-08 ENCOUNTER — TELEPHONE (OUTPATIENT)
Dept: TRANSPLANT | Facility: CLINIC | Age: 34
End: 2024-10-08
Payer: COMMERCIAL

## 2024-10-08 DIAGNOSIS — Z48.298 AFTERCARE FOLLOWING ORGAN TRANSPLANT: Primary | ICD-10-CM

## 2024-10-08 DIAGNOSIS — Z94.0 IMMUNOSUPPRESSIVE MANAGEMENT ENCOUNTER FOLLOWING KIDNEY TRANSPLANT: ICD-10-CM

## 2024-10-08 DIAGNOSIS — Z79.899 IMMUNOSUPPRESSIVE MANAGEMENT ENCOUNTER FOLLOWING KIDNEY TRANSPLANT: ICD-10-CM

## 2024-10-08 DIAGNOSIS — Z94.0 KIDNEY REPLACED BY TRANSPLANT: ICD-10-CM

## 2024-10-08 PROBLEM — N39.0 URINARY TRACT INFECTION: Status: ACTIVE | Noted: 2024-04-17

## 2024-10-08 LAB
EVEROLIMUS BLD-MCNC: 5.7 UG/L (ref 3–8)
TME LAST DOSE: NORMAL H
TME LAST DOSE: NORMAL H

## 2024-10-08 RX ORDER — CYCLOSPORINE 25 MG/1
CAPSULE, LIQUID FILLED ORAL
Qty: 270 CAPSULE | Refills: 3 | Status: SHIPPED | OUTPATIENT
Start: 2024-10-08

## 2024-10-08 NOTE — TELEPHONE ENCOUNTER
ISSUE:   Cyclosporine level 141 on 10/7/2024, goal , dose 50 mg BID.    PLAN:   Call Patient and confirm this was an accurate 12-hour trough.   Verify Cyclosporine dose 50 mg BID.   Confirm no new medications or or missed doses.   Confirm no new illness / infection / diarrhea.   If accurate trough and accurate dose, decrease Cyclosporine dose to 50 mg in the AM and 25 mg in the PM      Is this more than a 50% increase or decrease in current IS dose: No  If YES, justification: na    Repeat labs in 1 weeks.  *If > 50% change in immunosuppression dose, repeat labs in 1 week.     OUTCOME:   Left detailed message with instruction to decrease CSA dose from 50 mg bid to 50 mg in the AM and 25 mg in the PM and repeat labs in one week if this draw was an accurate 12 hour trough level.  Asked for CB or response to my chart to confirm message received.    Ronna Hughes RN   Transplant Coordinator  [phone]

## 2024-10-15 ENCOUNTER — PATIENT OUTREACH (OUTPATIENT)
Dept: CARE COORDINATION | Facility: CLINIC | Age: 34
End: 2024-10-15
Payer: COMMERCIAL

## 2024-10-15 NOTE — PROGRESS NOTES
Anemia Management Note - Follow Up      SUBJECTIVE/OBJECTIVE:    Referred by Dr. 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 Atypical Hyperplasia endometriosis carcinoma ,      No consent to communicate on file        Latest Ref Rng & Units 2024 7/15/2024 2024 2024 2024 2024 10/7/2024   Anemia   Hemoglobin 11.7 - 15.7 g/dL 9.6  10.0  9.0  9.4  8.5  8.7  9.2    IV Iron Dose    750mg  750mg     TSAT 15 - 46 % 14  17     30  25    Ferritin 6 - 175 ng/mL 122      678          BP Readings from Last 3 Encounters:   24 104/71   24 116/78   24 105/71     Wt Readings from Last 2 Encounters:   24 116.1 kg (256 lb)   24 120 kg (264 lb 9.6 oz)         ASSESSMENT:    Hgb:at goal - continue to monitor  TSat: not at goal of >30% Ferritin: Due for labs   Goals Addressed    None         PLAN:  RTC for anemia labs in 2-4 week(s).    Orders needed to be renewed (for next follow-up date) in EPIC: None    Iron labs due:  monthly, due early 2024    Plan discussed with:  Carol via [employer]      NEXT FOLLOW-UP DATE:  1029    Carrie Leopold, RN BSN  Anemia Services  [facility]  [email or web address]  Office: [phone]  Fax [phone]

## 2024-10-22 ENCOUNTER — TELEPHONE (OUTPATIENT)
Dept: TRANSPLANT | Facility: CLINIC | Age: 34
End: 2024-10-22
Payer: COMMERCIAL

## 2024-10-22 NOTE — TELEPHONE ENCOUNTER
Reached out to the patient, no answer, left a VM:  Patient called on: 10/22/2024  Current Appt Date/Time: 11/04/2024  Reason for Call: 2-week Reminder Call     **For referral call, follow 3 attempt rule and on 3rd attempt, send a trying to reach you letter.

## 2024-10-22 NOTE — TELEPHONE ENCOUNTER
ERIC 2-week call appointment confirmation:  Patient confirmed appt date/time: Yes  Support Person attending with Patient: Yes, Spouse   Insurance verification: [employer] - Reminder to bring ID and Insurance card to appointment   Services Needed: No, Language: English

## 2024-10-28 ENCOUNTER — VIRTUAL VISIT (OUTPATIENT)
Dept: TRANSPLANT | Facility: CLINIC | Age: 34
End: 2024-10-28
Attending: SURGERY
Payer: COMMERCIAL

## 2024-10-28 DIAGNOSIS — Z76.82 ORGAN TRANSPLANT CANDIDATE: Primary | ICD-10-CM

## 2024-10-28 NOTE — TELEPHONE ENCOUNTER
Attempted to contact patient in order to complete pre assessment questions.     No answer. Left message to return call to [phone] option 4    Callback required communication sent via [employer].      Procedure details:    Patient scheduled for Upper endoscopy (EGD) on 6/7/24.     Arrival time: 0700. Procedure time 0800    Facility location: [facility]; [address] [place] [address], [address], [place] [address]. Check in location: 5th Floor.    Sedation type: MAC    Pre op exam needed? N/A    Indication for procedure:   Left upper quadrant abdominal pain [R10.12]  - Primary      Dysphagia, unspecified type            Chart review:     Electronic implanted devices? No    Recent diagnosis of diverticulitis within the last 6 weeks? No    Diabetic? No      Medication review:    Anticoagulants? No    NSAIDS? No    Other medication HOLDING recommendations:  N/A      Prep for procedure:     Prep instructions sent via [employer].       Gay Mcdonald RN  Endoscopy Procedure Pre Assessment       How Severe Is Your Rash?: moderate Is This A New Presentation, Or A Follow-Up?: Rash

## 2024-10-28 NOTE — GROUP NOTE
Date: 10/28/2024    Scheduled Start Time:  8:00 AM   Scheduled End Time:  9:00 AM    Department: [facility]    Facilitator(s): Felipa Sheikh, RN    Documentation:  Solid Organ Transplant Education      Patient attended the pre-transplant patient education class today. The [facility] website pre-transplant modules were viewed:     Content reviewed:  Living Donation and how to access that program  Paired exchange  Kidney Donor Profile Index (KDPI)  Waiting list issues (right to decline without penalty, high PHS risk donors, what to expect when called with an offer)  Hepatitis C Donor Acceptance education and risks  Hospital experience, length of stay, need to stay locally post-discharge (2-4 weeks)    Surgical complications with video                      Post-surgery lifting and driving restrictions  Post-transplant routines, frequency of lab work and clinic visits  Role of Transplant Coordinator    Participants were informed of the benefits of transplant as well as potential risks such as infection, cancer, and death. The need for total adherence with immunosuppression medications and following transplant regimens was stressed.  The overall evaluation/approval/listing process was reviewed.           Patient:   Carol Guillaume    Transplant Episode(s):  Kidney Transplant Waitlist - Qualified: 7/18/2024 - Inactive    Carol was actively engaged throughout class and asked appropriate questions.

## 2024-10-31 ENCOUNTER — PATIENT OUTREACH (OUTPATIENT)
Dept: CARE COORDINATION | Facility: CLINIC | Age: 34
End: 2024-10-31
Payer: COMMERCIAL

## 2024-10-31 NOTE — PROGRESS NOTES
Anemia Management Note - Reminder     Follow-up with anemia management service:    [employer] message sent to Carol asking her to have anemia labs done in the next few weeks        Latest Ref Rng & Units 7/12/2024 7/15/2024 7/19/2024 7/24/2024 7/26/2024 8/6/2024 10/7/2024   Anemia   Hemoglobin 11.7 - 15.7 g/dL 9.6  10.0  9.0  9.4  8.5  8.7  9.2    IV Iron Dose    750mg  750mg     TSAT 15 - 46 % 14  17     30  25    Ferritin 6 - 175 ng/mL 122      678            Follow-up call date: [id number]    Carrie Leopold, RN BSN  Anemia Services  [facility]  [email or web address]  Office: [phone]  Fax [phone]

## 2024-10-31 NOTE — PROGRESS NOTES
Opened in error    Carrie Leopold, RN BSN  Anemia Services  [facility]  [email or web address]  Office: [phone]  Fax [phone]

## 2024-11-01 NOTE — PROGRESS NOTES
[facility] message from Carol:   Received: Today  Hoff, Rachel M Leopold, Carrie A, RN  Phone Number: [phone]     Hi, sorry. We have hit a financial crisis and so since I am blind and rely on ride service to get to and from the clinic, I have not been able to get in to do my labs. I will try to get in next week but we are really pinching pennies so sorry. Thank you for the update.    [facility] [employer] message sent to pt that labs will be reviewed once done, but not to worry at this time. Advised her to reach out to  for resource assistance. Writer will also try to message SW.    Follow Up: [id number]    Carrie Leopold, RN BSN  Anemia Services  [facility]  [email or web address]  Office: [phone]  Fax [phone]

## 2024-11-01 NOTE — PROGRESS NOTES
[facility] SOLID ORGAN TRANSPLANT  OUTPATIENT MNT: TRANSPLANT WAITLIST    Current BMI: 39.3 (HT 65.75 in,  lbs/110 kg)  BMI guideline up to 40 for kidney transplant / per MD discretion    Frailty Assessment-- Not Frail (1/5 points)  Handgrip Strength: 26    Reference:  Score of 0-2 = Not Frail  Score of 3-5 = Frail      TIME SPENT: 15 minutes  VISIT TYPE: follow up (saw for high BMI 6/2023)  REFERRING PHYSICIAN: Sebastián  PT ACCOMPANIED BY: her      History of previous txp: kidney 2006; currently inactive on kidney waitlist d/t BMI  Dialysis: no    NUTRITION ASSESSMENT  She has been working with the  mgmt clinic and has a follow up appt on Friday. She started on semaglutide in August. She has also cut out gluten, soda, junk/fast food.     10/7 K 4.7- h/o wnl levels  No Phos since 6/2023- borderline- 5's    - Meal prep & grocery shopping: pt or her    - Previous RD education: yes  - Appetite: reduced with semaglutide   - Food allergies/intolerances: no  - Issues chewing or swallowing: some sharp pain w/ swallowing 1x/week- from mycophenolic acid? Stopped this in July, swallowing pain may be getting less frequent   - N/V/D/C: constipation with semaglutide - tried miralax, prunes (helps, but avoids due to phosphorus per pt), fiber, laxative  - Food access concerns: not asked     Vitamins, Supplements, Pertinent Meds: vit D   Herbal Medicines/Supplements: none   Protein supplements: none since starting Semaglutide; was doing Slim Fast before    Weight hx // fluid retention:   269 lbs/BMI 43.4 6/2023  Has lost 30 lbs since August    DIET RECALL   Breakfast Stomach hurts; reports no breakfast at baseline     Lunch PB crackers, leftovers, or skips     Dinner More consistent- pizza; taco salad; tomato soup & english muffins    Snacks HS- cheese    Beverages 4 oz apple juice, few sips of milk with meds, occasional lemonade (not daily), water, Sprite/Root Beer (1x/week)   Alcohol None    Dining  out Has cut down significantly since May (previously most days-->1x/month)     PHYSICAL ACTIVITY  Some walking- no issues with distance- uses guide cane d/t vision impairments   Gets more tired out easily- standing, putting away laundry, etc      NUTRITION DIAGNOSIS  No nutrition diagnosis identified at this time    NUTRITION INTERVENTION  Nutrition education provided:  Reviewed success with weight loss per injectable. Has follow up this week to ask if dose can be increased. Ongoing weight loss per transplant surgery.   Reviewed wnl K levels, so does not need to worry about eating prunes, especially since they most reliably help her with constipation. No phos on file >1 year; will add this lab on per pt request.     Patient Understanding: Pt verbalized understanding of education provided.  Expected Engagement: Good  Follow-Up Plans: PRN     NUTRITION GOALS  No nutrition goals identified at this time     Janet Dey, RD, LD, CCTD

## 2024-11-03 LAB
ABO/RH(D): NORMAL
ANTIBODY SCREEN: NEGATIVE
SPECIMEN EXPIRATION DATE: NORMAL

## 2024-11-04 ENCOUNTER — ANCILLARY PROCEDURE (OUTPATIENT)
Dept: GENERAL RADIOLOGY | Facility: CLINIC | Age: 34
End: 2024-11-04
Attending: NURSE PRACTITIONER
Payer: COMMERCIAL

## 2024-11-04 ENCOUNTER — ALLIED HEALTH/NURSE VISIT (OUTPATIENT)
Dept: TRANSPLANT | Facility: CLINIC | Age: 34
End: 2024-11-04

## 2024-11-04 ENCOUNTER — APPOINTMENT (OUTPATIENT)
Dept: TRANSPLANT | Facility: CLINIC | Age: 34
End: 2024-11-04
Attending: NURSE PRACTITIONER
Payer: COMMERCIAL

## 2024-11-04 ENCOUNTER — LAB (OUTPATIENT)
Dept: LAB | Facility: CLINIC | Age: 34
End: 2024-11-04
Attending: NURSE PRACTITIONER
Payer: COMMERCIAL

## 2024-11-04 ENCOUNTER — ANCILLARY PROCEDURE (OUTPATIENT)
Dept: CT IMAGING | Facility: CLINIC | Age: 34
End: 2024-11-04
Attending: SURGERY
Payer: COMMERCIAL

## 2024-11-04 ENCOUNTER — ANCILLARY PROCEDURE (OUTPATIENT)
Dept: CARDIOLOGY | Facility: CLINIC | Age: 34
End: 2024-11-04
Attending: NURSE PRACTITIONER
Payer: COMMERCIAL

## 2024-11-04 VITALS
WEIGHT: 241.9 LBS | SYSTOLIC BLOOD PRESSURE: 114 MMHG | BODY MASS INDEX: 38.87 KG/M2 | HEIGHT: 66 IN | OXYGEN SATURATION: 97 % | HEART RATE: 98 BPM | DIASTOLIC BLOOD PRESSURE: 78 MMHG

## 2024-11-04 VITALS
OXYGEN SATURATION: 97 % | HEIGHT: 66 IN | HEART RATE: 98 BPM | BODY MASS INDEX: 38.87 KG/M2 | WEIGHT: 241.9 LBS | SYSTOLIC BLOOD PRESSURE: 114 MMHG | DIASTOLIC BLOOD PRESSURE: 78 MMHG

## 2024-11-04 DIAGNOSIS — N18.9 CHRONIC RENAL FAILURE: ICD-10-CM

## 2024-11-04 DIAGNOSIS — Z11.59 ENCOUNTER FOR SCREENING FOR OTHER VIRAL DISEASES: ICD-10-CM

## 2024-11-04 DIAGNOSIS — Z86.39 PERSONAL HISTORY OF OTHER ENDOCRINE, NUTRITIONAL AND METABOLIC DISEASE: ICD-10-CM

## 2024-11-04 DIAGNOSIS — D84.9 IMMUNOSUPPRESSION (H): ICD-10-CM

## 2024-11-04 DIAGNOSIS — Q61.5 SENIOR-LOKEN SYNDROME: ICD-10-CM

## 2024-11-04 DIAGNOSIS — Z94.0 HTN, KIDNEY TRANSPLANT RELATED: ICD-10-CM

## 2024-11-04 DIAGNOSIS — T86.12 KIDNEY TRANSPLANT FAILURE: ICD-10-CM

## 2024-11-04 DIAGNOSIS — Q87.89 SENIOR-LOKEN SYNDROME: ICD-10-CM

## 2024-11-04 DIAGNOSIS — Z76.82 ORGAN TRANSPLANT CANDIDATE: ICD-10-CM

## 2024-11-04 DIAGNOSIS — Z94.0 KIDNEY REPLACED BY TRANSPLANT: ICD-10-CM

## 2024-11-04 DIAGNOSIS — Z01.818 PRE-TRANSPLANT EVALUATION FOR KIDNEY TRANSPLANT: ICD-10-CM

## 2024-11-04 DIAGNOSIS — I10 ESSENTIAL HYPERTENSION: ICD-10-CM

## 2024-11-04 DIAGNOSIS — N20.0 KIDNEY STONE: ICD-10-CM

## 2024-11-04 DIAGNOSIS — N18.4 CHRONIC KIDNEY DISEASE, STAGE IV (SEVERE) (H): ICD-10-CM

## 2024-11-04 DIAGNOSIS — Q87.89 SENIOR-LOKEN SYNDROME: Primary | ICD-10-CM

## 2024-11-04 DIAGNOSIS — Q61.5 CONGENITAL MEDULLARY CYSTIC KIDNEY: ICD-10-CM

## 2024-11-04 DIAGNOSIS — N18.4 CHRONIC KIDNEY DISEASE, STAGE 4 (SEVERE) (H): ICD-10-CM

## 2024-11-04 DIAGNOSIS — Z94.0 KIDNEY TRANSPLANTED: ICD-10-CM

## 2024-11-04 DIAGNOSIS — N18.4 CHRONIC KIDNEY DISEASE, STAGE IV (SEVERE) (H): Primary | ICD-10-CM

## 2024-11-04 DIAGNOSIS — H35.50 SENIOR-LOKEN SYNDROME: ICD-10-CM

## 2024-11-04 DIAGNOSIS — N20.0 NEPHROLITHIASIS: ICD-10-CM

## 2024-11-04 DIAGNOSIS — Q61.5 SENIOR-LOKEN SYNDROME: Primary | ICD-10-CM

## 2024-11-04 DIAGNOSIS — I15.1 HTN, KIDNEY TRANSPLANT RELATED: Primary | ICD-10-CM

## 2024-11-04 DIAGNOSIS — H35.50 SENIOR-LOKEN SYNDROME: Primary | ICD-10-CM

## 2024-11-04 DIAGNOSIS — I15.1 HTN, KIDNEY TRANSPLANT RELATED: ICD-10-CM

## 2024-11-04 DIAGNOSIS — Z94.0 HTN, KIDNEY TRANSPLANT RELATED: Primary | ICD-10-CM

## 2024-11-04 DIAGNOSIS — Z48.298 AFTERCARE FOLLOWING ORGAN TRANSPLANT: ICD-10-CM

## 2024-11-04 DIAGNOSIS — D63.8 ANEMIA IN OTHER CHRONIC DISEASES CLASSIFIED ELSEWHERE: ICD-10-CM

## 2024-11-04 DIAGNOSIS — Z76.82 ORGAN TRANSPLANT CANDIDATE: Primary | ICD-10-CM

## 2024-11-04 DIAGNOSIS — Z01.818 PRE-TRANSPLANT EVALUATION FOR KIDNEY TRANSPLANT: Primary | ICD-10-CM

## 2024-11-04 LAB
A1 AB TITR SERPL: 32 {TITER}
A1 AB TITR SERPL: 32 {TITER}
ABO/RH(D): NORMAL
ALBUMIN MFR UR ELPH: 23.5 MG/DL
ALBUMIN SERPL BCG-MCNC: 4.2 G/DL (ref 3.5–5.2)
ALBUMIN UR-MCNC: 20 MG/DL
ALP SERPL-CCNC: 120 U/L (ref 40–150)
ALT SERPL W P-5'-P-CCNC: 12 U/L (ref 0–50)
ANION GAP SERPL CALCULATED.3IONS-SCNC: 14 MMOL/L (ref 7–15)
ANTIBODY TITER IGM SCREEN: NEGATIVE
APPEARANCE UR: ABNORMAL
APTT PPP: 32 SECONDS (ref 22–38)
AST SERPL W P-5'-P-CCNC: 15 U/L (ref 0–45)
B IGG TITR SERPL: 32 {TITER}
B IGM TITR SERPL: 16 {TITER}
BASOPHILS # BLD AUTO: 0 10E3/UL (ref 0–0.2)
BASOPHILS NFR BLD AUTO: 1 %
BILIRUB SERPL-MCNC: 0.4 MG/DL
BILIRUB UR QL STRIP: NEGATIVE
BUN SERPL-MCNC: 45.8 MG/DL (ref 6–20)
CALCIUM SERPL-MCNC: 9.4 MG/DL (ref 8.8–10.4)
CHLORIDE SERPL-SCNC: 103 MMOL/L (ref 98–107)
COLOR UR AUTO: ABNORMAL
CREAT SERPL-MCNC: 4.07 MG/DL (ref 0.51–0.95)
CREAT UR-MCNC: 166 MG/DL
EGFRCR SERPLBLD CKD-EPI 2021: 14 ML/MIN/1.73M2
EOSINOPHIL # BLD AUTO: 0.2 10E3/UL (ref 0–0.7)
EOSINOPHIL NFR BLD AUTO: 3 %
ERYTHROCYTE [DISTWIDTH] IN BLOOD BY AUTOMATED COUNT: 13.1 % (ref 10–15)
EST. AVERAGE GLUCOSE BLD GHB EST-MCNC: 108 MG/DL
FACTOR 2 INTERPRETATION: NORMAL
FACTOR V INTERPRETATION: NORMAL
FERRITIN SERPL-MCNC: 506 NG/ML (ref 6–175)
GLUCOSE SERPL-MCNC: 81 MG/DL (ref 70–99)
GLUCOSE UR STRIP-MCNC: NEGATIVE MG/DL
HBA1C MFR BLD: 5.4 %
HBV CORE AB SERPL QL IA: NONREACTIVE
HBV SURFACE AB SERPL IA-ACNC: >1000 M[IU]/ML
HBV SURFACE AB SERPL IA-ACNC: REACTIVE M[IU]/ML
HBV SURFACE AG SERPL QL IA: NONREACTIVE
HCG SERPL QL: NEGATIVE
HCO3 SERPL-SCNC: 18 MMOL/L (ref 22–29)
HCT VFR BLD AUTO: 26.3 % (ref 35–47)
HCV AB SERPL QL IA: NONREACTIVE
HGB BLD-MCNC: 8.4 G/DL (ref 11.7–15.7)
HGB UR QL STRIP: NEGATIVE
HIV 1+2 AB+HIV1 P24 AG SERPL QL IA: NONREACTIVE
HYALINE CASTS: 5 /LPF
IMM GRANULOCYTES # BLD: 0.1 10E3/UL
IMM GRANULOCYTES NFR BLD: 1 %
INR PPP: 1.14 (ref 0.85–1.15)
IRON BINDING CAPACITY (ROCHE): 193 UG/DL (ref 240–430)
IRON SATN MFR SERPL: 30 % (ref 15–46)
IRON SERPL-MCNC: 57 UG/DL (ref 37–145)
KETONES UR STRIP-MCNC: NEGATIVE MG/DL
LAB DIRECTOR COMMENTS: NORMAL
LAB DIRECTOR DISCLAIMER: NORMAL
LAB DIRECTOR INTERPRETATION: NORMAL
LAB DIRECTOR METHODOLOGY: NORMAL
LAB DIRECTOR RESULTS: NORMAL
LEUKOCYTE ESTERASE UR QL STRIP: ABNORMAL
LVEF ECHO: NORMAL
LYMPHOCYTES # BLD AUTO: 1.5 10E3/UL (ref 0.8–5.3)
LYMPHOCYTES NFR BLD AUTO: 23 %
MCH RBC QN AUTO: 26.3 PG (ref 26.5–33)
MCHC RBC AUTO-ENTMCNC: 31.9 G/DL (ref 31.5–36.5)
MCV RBC AUTO: 82 FL (ref 78–100)
MONOCYTES # BLD AUTO: 0.4 10E3/UL (ref 0–1.3)
MONOCYTES NFR BLD AUTO: 6 %
NEUTROPHILS # BLD AUTO: 4.2 10E3/UL (ref 1.6–8.3)
NEUTROPHILS NFR BLD AUTO: 67 %
NITRATE UR QL: NEGATIVE
NRBC # BLD AUTO: 0 10E3/UL
NRBC BLD AUTO-RTO: 0 /100
PH UR STRIP: 5 [PH] (ref 5–7)
PHOSPHATE SERPL-MCNC: 4.5 MG/DL (ref 2.5–4.5)
PLATELET # BLD AUTO: 154 10E3/UL (ref 150–450)
POTASSIUM SERPL-SCNC: 4 MMOL/L (ref 3.4–5.3)
PROT SERPL-MCNC: 7.5 G/DL (ref 6.4–8.3)
PROT/CREAT 24H UR: 0.14 MG/MG CR (ref 0–0.2)
RBC # BLD AUTO: 3.19 10E6/UL (ref 3.8–5.2)
RBC URINE: 2 /HPF
SODIUM SERPL-SCNC: 135 MMOL/L (ref 135–145)
SP GR UR STRIP: 1.01 (ref 1–1.03)
SPECIMEN DESCRIPTION: NORMAL
SPECIMEN EXPIRATION DATE: NORMAL
SPECIMEN EXPIRATION DATE: NORMAL
SQUAMOUS EPITHELIAL: 2 /HPF
T PALLIDUM AB SER QL: NONREACTIVE
UROBILINOGEN UR STRIP-MCNC: NORMAL MG/DL
WBC # BLD AUTO: 6.3 10E3/UL (ref 4–11)
WBC URINE: 4 /HPF

## 2024-11-04 PROCEDURE — 85025 COMPLETE CBC W/AUTO DIFF WBC: CPT | Performed by: PATHOLOGY

## 2024-11-04 PROCEDURE — 83945 ASSAY OF OXALATE: CPT | Mod: 90 | Performed by: PATHOLOGY

## 2024-11-04 PROCEDURE — 86886 COOMBS TEST INDIRECT TITER: CPT

## 2024-11-04 PROCEDURE — 74019 RADEX ABDOMEN 2 VIEWS: CPT | Performed by: RADIOLOGY

## 2024-11-04 PROCEDURE — 86901 BLOOD TYPING SEROLOGIC RH(D): CPT

## 2024-11-04 PROCEDURE — G0452 MOLECULAR PATHOLOGY INTERPR: HCPCS | Mod: 26 | Performed by: PATHOLOGY

## 2024-11-04 PROCEDURE — 85670 THROMBIN TIME PLASMA: CPT | Performed by: NURSE PRACTITIONER

## 2024-11-04 PROCEDURE — 86706 HEP B SURFACE ANTIBODY: CPT | Performed by: NURSE PRACTITIONER

## 2024-11-04 PROCEDURE — 86481 TB AG RESPONSE T-CELL SUSP: CPT | Performed by: NURSE PRACTITIONER

## 2024-11-04 PROCEDURE — 93306 TTE W/DOPPLER COMPLETE: CPT | Performed by: INTERNAL MEDICINE

## 2024-11-04 PROCEDURE — 87086 URINE CULTURE/COLONY COUNT: CPT | Performed by: NURSE PRACTITIONER

## 2024-11-04 PROCEDURE — 86704 HEP B CORE ANTIBODY TOTAL: CPT | Performed by: NURSE PRACTITIONER

## 2024-11-04 PROCEDURE — G0463 HOSPITAL OUTPT CLINIC VISIT: HCPCS | Performed by: NURSE PRACTITIONER

## 2024-11-04 PROCEDURE — 86147 CARDIOLIPIN ANTIBODY EA IG: CPT | Performed by: NURSE PRACTITIONER

## 2024-11-04 PROCEDURE — 86832 HLA CLASS I HIGH DEFIN QUAL: CPT | Performed by: NURSE PRACTITIONER

## 2024-11-04 PROCEDURE — 86787 VARICELLA-ZOSTER ANTIBODY: CPT | Performed by: NURSE PRACTITIONER

## 2024-11-04 PROCEDURE — 80053 COMPREHEN METABOLIC PANEL: CPT | Performed by: PATHOLOGY

## 2024-11-04 PROCEDURE — 82728 ASSAY OF FERRITIN: CPT | Performed by: PATHOLOGY

## 2024-11-04 PROCEDURE — 85610 PROTHROMBIN TIME: CPT | Performed by: PATHOLOGY

## 2024-11-04 PROCEDURE — 86780 TREPONEMA PALLIDUM: CPT | Performed by: NURSE PRACTITIONER

## 2024-11-04 PROCEDURE — 83540 ASSAY OF IRON: CPT | Performed by: PATHOLOGY

## 2024-11-04 PROCEDURE — 99214 OFFICE O/P EST MOD 30 MIN: CPT | Performed by: SURGERY

## 2024-11-04 PROCEDURE — 85730 THROMBOPLASTIN TIME PARTIAL: CPT | Performed by: PATHOLOGY

## 2024-11-04 PROCEDURE — 83550 IRON BINDING TEST: CPT | Performed by: PATHOLOGY

## 2024-11-04 PROCEDURE — 36415 COLL VENOUS BLD VENIPUNCTURE: CPT | Performed by: PATHOLOGY

## 2024-11-04 PROCEDURE — 83036 HEMOGLOBIN GLYCOSYLATED A1C: CPT | Performed by: NURSE PRACTITIONER

## 2024-11-04 PROCEDURE — 84100 ASSAY OF PHOSPHORUS: CPT | Performed by: PATHOLOGY

## 2024-11-04 PROCEDURE — 85390 FIBRINOLYSINS SCREEN I&R: CPT | Mod: 26 | Performed by: PATHOLOGY

## 2024-11-04 PROCEDURE — 86900 BLOOD TYPING SEROLOGIC ABO: CPT

## 2024-11-04 PROCEDURE — 87340 HEPATITIS B SURFACE AG IA: CPT | Performed by: NURSE PRACTITIONER

## 2024-11-04 PROCEDURE — 85613 RUSSELL VIPER VENOM DILUTED: CPT | Performed by: NURSE PRACTITIONER

## 2024-11-04 PROCEDURE — 99417 PROLNG OP E/M EACH 15 MIN: CPT | Performed by: NURSE PRACTITIONER

## 2024-11-04 PROCEDURE — 86850 RBC ANTIBODY SCREEN: CPT

## 2024-11-04 PROCEDURE — 81240 F2 GENE: CPT | Performed by: NURSE PRACTITIONER

## 2024-11-04 PROCEDURE — 84703 CHORIONIC GONADOTROPIN ASSAY: CPT | Performed by: PATHOLOGY

## 2024-11-04 PROCEDURE — G0463 HOSPITAL OUTPT CLINIC VISIT: HCPCS | Performed by: SURGERY

## 2024-11-04 PROCEDURE — 81001 URINALYSIS AUTO W/SCOPE: CPT | Performed by: PATHOLOGY

## 2024-11-04 PROCEDURE — 74176 CT ABD & PELVIS W/O CONTRAST: CPT | Mod: GC | Performed by: RADIOLOGY

## 2024-11-04 PROCEDURE — 99000 SPECIMEN HANDLING OFFICE-LAB: CPT | Performed by: PATHOLOGY

## 2024-11-04 PROCEDURE — 99207 PR NO CHARGE COORDINATED CARE PS: CPT

## 2024-11-04 PROCEDURE — 84156 ASSAY OF PROTEIN URINE: CPT | Performed by: PATHOLOGY

## 2024-11-04 PROCEDURE — 86665 EPSTEIN-BARR CAPSID VCA: CPT | Performed by: NURSE PRACTITIONER

## 2024-11-04 PROCEDURE — 86644 CMV ANTIBODY: CPT | Performed by: NURSE PRACTITIONER

## 2024-11-04 PROCEDURE — 71046 X-RAY EXAM CHEST 2 VIEWS: CPT | Mod: GC | Performed by: RADIOLOGY

## 2024-11-04 PROCEDURE — 86803 HEPATITIS C AB TEST: CPT | Performed by: NURSE PRACTITIONER

## 2024-11-04 PROCEDURE — 86833 HLA CLASS II HIGH DEFIN QUAL: CPT | Performed by: NURSE PRACTITIONER

## 2024-11-04 PROCEDURE — 99215 OFFICE O/P EST HI 40 MIN: CPT | Performed by: NURSE PRACTITIONER

## 2024-11-04 PROCEDURE — 85730 THROMBOPLASTIN TIME PARTIAL: CPT | Performed by: NURSE PRACTITIONER

## 2024-11-04 NOTE — PROGRESS NOTES
TRANSPLANT NEPHROLOGY RECIPIENT EVALUATION NOTE    Assessment and Plan:  # Kidney Transplant Evaluation: Patient is a good candidate overall. Benefits of a living donor transplant were discussed.    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 Last biopsy in 8/2021 with Severe glomerulitis, moderate peritubular capillaritis and severe transplant glomerulopathy. C4d +. She remains on Immunosupression, CSA 50/25 and Everolimus 0.5/0.25 since July 2024. Had been on Cellcept for ~10 years but transitioned to Myfortic due to flu like symptoms but then developed significant heartburn and transitioned to everolimus. She has a qualifier since July 2024 with current GFR ~14. She is doing ok not yet on dialysis. When ready she will likely benefit from a kidney retransplant.      # Nephrolitiasis of transplanted kidney:     In 2009 and again in 7/24 with 1.2cm stone, saw Urology, Dr. Bautista. No intervention. Repeat imaging in 11/2024 with nonobstructive  1.1 cm stone in the lower pole. No hydronephrosis.     # Cardiac Risk: No known CAD  HTN: /70, norvasc 5, cozaar 25mg BID, Carvedilol 25mg BID.    # PAD Screening: Obtain updated CT imaging for Transplant Surgery to review    # Obesity: BMI 39.2 on semaglutide since August. . Follows with weight management.Per surgeon:Encouraged weight loss but is surgically feasible at current weight.     # Complex Atypical Hyperplasia (CAH/EIN), PCOS: follows with [facility]: Can't exclude foci of low grade cancer. Hormonally driven disease, previously managed with Mirena Q 6months. Removed 6/22.  Last endometrial biopsy 6/24 with atypical complex hyperplasia with squamous morular metaplasia-disordered proliferative endometrium.  Last Pelvic US with PCOS Monitor with Pelvic US every 6 months, Recommending a Mirena  IUD.    # Heartburn/GI:  EGD 6/24: LA Grade B reflux esophagitis with no bleeding . 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 CT with moderate stool burden. On Miralax and Fiber.    # Health Maintenance: PAP: Up to date, Dermatology: Up to date, and Dental: Up to date    - Discussed the risks and benefits of a transplant, including the risk of surgery and immunosuppression medications.  Patient's overall evaluation will be discussed in the Transplant Program's regular meeting with a final recommendation on the patients suitability for transplant to be made at that time.    Pending completion of the full evaluation, patient presently appears to be enough of an acceptable kidney transplant recipient candidate to have any potential kidney donors start the evaluation process.      Evaluation:  Carol Guillaume was seen in consultation at the request of Dr. Rhianna Lowery for evaluation as a potential kidney transplant recipient.    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 uptrend in Cr 1.1-1.3 to 1.7 in Aug 2020  up to 2.2 in 9/2022 with worsening proteinuria 2.7 g/g that year), bx showed chronic active ABMR t3,pvc2,cg3,c4d3+ 20% IFTA with multiple high titers class II DSA (DR/DQ) in the setting of being off MPA for months between Feb-Aug. GFR <20 since July 2024. Currently ~14-16 not yet on dialysis.     History of Present Illness:     Diagnosed at age 5 with senior loken syndrome. Transplanted in 7/2006.           Kidney Disease Hx:                Kidney Disease Dx: Senior Loken Syndrome       Biopsy Proven:  Last Tx biopsy in 8/2021 with Severe glomerulitis, moderate peritubular capillaritis and severe transplant glomerulopathy. C4d +.        On Dialysis: No       Primary  Nephrologist: Gerhard Maldonado       H/o Kidney Stones: Yes;  2009, 7/2004: 1.2cm nonobstructive renal calculus in the inferior pole of the transplanted kidney. No hydronephrosis.    Dr. Bautista-7/24: CT /KUB       H/o Recurrent/Frequent UTI: No         Diabetic Hx: None           Cardiac/Vascular Disease Risk Factors:        Cardiac Risk Factors: Hypertension and CKD       Known CAD: No       Known PAD/Caludication Symptoms: No       Known Heart Failure: No       Arrhythmia: No       Pulmonary Hypertension: No       Valvular Disease: No       Other: None         Viral Serology Status       CMV IgG Antibody: Positive       EBV IgG Antibody: Positive         Volume Status/Weight:        Volume status: Euvolemic       Weight:  BMI within guidelines, but truncal obesity       BMI: Body mass index is 39.34 kg/m .         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 Able to do about a mile     Fatigue/Decreased Energy: [] No [x] Yes    Chest Pain or SOB with Exertion: [x] No [] Yes    Significant Weight Change: [] No [x] Yes  Working with weight managment   Nausea, Vomiting or Diarrhea: [] No [x] Yes Nausea associated with constipation   Fever, Sweats or Chills:  [] No [x] Yes  Resolved following lowering Everolimus dose   Leg Swelling [] No [x] Yes        History of Cancer:    #Complex Atypical Hyperplasia (CAH/EIN)-Dx 2020.      Other Significant Medical Issues: None       Allergy Testing Questions:   Medication that caused a reaction None   Antibiotics used that didn't give an allergic reaction?  Patient doesn't know    COVID Vaccination Up To Date: Yes    Potential Living Kidney Donors: Yes    Review of Systems:  A comprehensive review of systems was obtained and negative, except as noted in the HPI or PMH.    Past Medical History:   Medical record was reviewed and PMH was discussed with patient and noted below.  Past Medical History:   Diagnosis Date    Acquired  hypothyroidism 08/02/2023    Anemia     Anxiety     Cancer (H) 10/30/2020    Atypical Hyperplasia endometriosis carcinoma    Cataract 2015    CMV (cytomegalovirus infection) (H)     CMV disease (H) 2006    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    Depression     EIN (endometrial intraepithelial neoplasia)     Fatigue     Gastroesophageal reflux disease     High cholesterol     Hypertension     Insomnia     Insulin resistance     Legally blind     Obesity     PCOS (polycystic ovarian syndrome)     PPD positive, treated 2006    9 months of INH    Pseudotumor cerebri     on Diamox    Retinitis pigmentosa     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     Uncomplicated asthma     as a child    Urinary tract infection 04/17/2024    Viremia        Past Social History:   Past Surgical History:   Procedure Laterality Date    BIOPSY      cataract bilateral  06/2017    DILATION AND CURETTAGE N/A 09/13/2021    Procedure: DILATION AND CURETTAGE,;  Surgeon: Lisbeth Gallardo MD;  Location:  OR    DILATION AND CURETTAGE N/A 05/09/2022    Procedure: DILATION AND CURETTAGE;  Surgeon: Lisbeth Gallardo MD;  Location:  OR    DILATION AND CURETTAGE N/A 5/4/2023    Procedure: Dilation and Curettage of Endometrium;  Surgeon: Lisbeth Gallardo MD;  Location:  OR    DILATION AND CURETTAGE, OPERATIVE HYSTEROSCOPY WITH MORCELLATOR, COMBINED N/A 10/23/2020    Procedure: HYSTEROSCOPY, DILATION AND CURRETAGE, MYOSURE;  Surgeon: Kierra Tracy MD;  Location:  OR    DILATION AND CURETTAGE, OPERATIVE HYSTEROSCOPY WITH MORCELLATOR, COMBINED N/A 03/05/2021    Procedure: HYSTEROSCOPY, WITH DILATION AND CURETTAGE OF UTERUS USING  MORCELLATOR;  Surgeon: Fiorella Cunningham MD;  Location:  OR    ESOPHAGOSCOPY, GASTROSCOPY, DUODENOSCOPY (EGD), COMBINED N/A 01/24/2023    Procedure: ESOPHAGOGASTRODUODENOSCOPY, WITH BIOPSY;  Surgeon: Talia Aguilar MD;  Location: [facility] OR    ESOPHAGOSCOPY, GASTROSCOPY,  DUODENOSCOPY (EGD), COMBINED N/A 6/7/2024    Procedure: Esophagoscopy, gastroscopy, duodenoscopy (EGD), combined;  Surgeon: Charli Watts MD;  Location: [facility] OR    INSERT INTRAUTERINE DEVICE N/A 03/05/2021    Procedure: INSERTION MIRENA INTRAUTERINE DEVICE;  Surgeon: Fiorella Cunningham MD;  Location: [facility] OR    IR RENAL BIOPSY RIGHT  08/27/2021    LAPAROSCOPY DIAGNOSTIC (GYN)  03/05/2021    Procedure: Laparoscopy diagnostic (gyn);  Surgeon: Fiorella Cunningham MD;  Location: [facility] OR    LITHOTRIPSY      LITHOTRIPSY      REMOVE INTRAUTERINE DEVICE N/A 03/05/2021    Procedure: REMOVE MIRENA INTRAUTERINE DEVICE;  Surgeon: Fiorella Cunningham MD;  Location: [facility] OR    REMOVE INTRAUTERINE DEVICE N/A 05/09/2022    Procedure: EXCHANGE OF MIRENA INTRAUTERINE DEVICE;  Surgeon: Lisbeth Gallardo MD;  Location: [facility] OR    REPLACE INTRAUTERINE DEVICE N/A 09/13/2021    Procedure: MIRENA INTRAUTERINE DEVICE EXCHANGE;  Surgeon: Lisbeth Gallardo MD;  Location: [facility] OR    SINUS SURGERY  2001    TONSILLECTOMY      TRANSPLANT KIDNEY RECIPIENT LIVING RELATED Right 07/2006    wisdom teeth       Personal history of bleeding or anesthesia problems: No    Family History:  Family History   Problem Relation Age of Onset    Hyperlipidemia Mother     Sjogren's Father     Hashimoto's thyroiditis Father     Other - See Comments Maternal Grandfather         surgical complication    Atrial fibrillation Paternal Grandmother     Arthritis Paternal Grandmother     Skin Cancer Paternal Grandfather         melanoma    Parkinsonism Other         paternal uncle       Personal History:   Social History     Socioeconomic History    Marital status:      Spouse name: David    Number of children: 0    Years of education: Not on file    Highest education level: Master's degree (e.g., MA, MS, Gracie, MEd, MSW, TRACY)   Occupational History    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   end of November 2018 to David has one adopted daughter David's  niece Daryn 2012 Blind.     Social Drivers of Health     Financial Resource Strain: Low Risk  (8/16/2021)    Overall Financial Resource Strain (CARDIA)     Difficulty of Paying Living Expenses: Not hard at all   Food Insecurity: No Food Insecurity (8/16/2021)    Hunger Vital Sign     Worried About Running Out of Food in the Last Year: Never true     Ran Out of Food in the Last Year: Never true   Transportation Needs: Unmet Transportation Needs (8/16/2021)    PRAPARE - Transportation     Lack of Transportation (Medical): Yes     Lack of Transportation (Non-Medical): Yes   Physical Activity: Insufficiently Active (8/16/2021)    Exercise Vital Sign     Days of Exercise per Week: 5 days     Minutes of Exercise per Session: 20 min   Stress: Stress Concern Present (8/16/2021)    [employer] - Occupational Stress Questionnaire     Feeling of Stress : Rather much   Social Connections: Unknown (8/9/2023)    Received from [employer] [employer] [facility]    Social Connections     Frequency of Communication with Friends and Family: Not on file   Interpersonal Safety: Low Risk  (12/4/2023)    Interpersonal Safety     Do you feel physically and emotionally safe where you currently live?: Yes     Within the past 12 months, have you been hit, slapped, kicked or otherwise physically hurt by someone?: No     Within the past 12 months, have you been humiliated or emotionally  abused in other ways by your partner or ex-partner?: No   Housing Stability: Low Risk  (5/4/2022)    Housing Stability Vital Sign     Unable to Pay for Housing in the Last Year: No     Number of Places Lived in the Last Year: 1     Unstable Housing in the Last Year: No       Allergies:  No Known Allergies    Medications:  Current Outpatient Medications   Medication Sig Dispense Refill    acetaminophen (TYLENOL) 325 MG tablet Take 2 tablets (650 mg) by mouth every 4 hours as needed for mild pain 50 tablet 0    amLODIPine (NORVASC) 5 MG tablet TAKE 1 TABLET BY MOUTH DAILY 90 tablet 3    carvedilol (COREG) 25 MG tablet TAKE 1 TABLET BY MOUTH 2 TIMES DAILY (WITH MEALS) 180 tablet 1    COMPOUNDED NON-CONTROLLED SUBSTANCE (CMPD RX) - PHARMACY TO MIX COMPOUNDED MEDICATION Inject 0.25mg semaglutide once weekly 4 each 1    everolimus (ZORTRESS) 0.25 MG tablet Take 2 tablets (0.5 mg) by mouth every morning AND 1 tablet (0.25 mg) every evening.      everolimus (ZORTRESS) 0.75 MG tablet Hold for future dose changes 60 tablet 11    labetalol (NORMODYNE) 200 MG tablet Labetalol Oral    BID    inactive      levothyroxine (SYNTHROID/LEVOTHROID) 50 MCG tablet Take 1 tablet (50 mcg) by mouth daily 90 tablet 1    losartan (COZAAR) 25 MG tablet TAKE 1 TABLET BY MOUTH 2 TIMES DAILY 180 tablet 3    NEORAL (BRAND) 25 MG capsule Take 2 capsules (50 mg) by mouth every morning AND 1 capsule (25 mg) every evening. 270 capsule 3    omeprazole (PRILOSEC) 40 MG DR capsule Take 1 capsule (40 mg) by mouth daily 90 capsule 3    ondansetron (ZOFRAN ODT) 4 MG ODT tab Take 1 tablet (4 mg) by mouth every 8 hours as needed for nausea. 30 tablet 0    polyethylene glycol (MIRALAX) 17 GM/Dose powder Take 17 g (1 Capful) by mouth daily.      psyllium (METAMUCIL/KONSYL) 58.6 % powder Take 1 teaspoonful by mouth daily.      vitamin D3 (CHOLECALCIFEROL) 50 mcg (2000 units) tablet Take 1 tablet (50 mcg) by mouth daily. 90 tablet 3     No current  "facility-administered medications for this visit.       Vitals:  /78   Pulse 98   Ht 1.67 m (5' 5.75\")   Wt 109.7 kg (241 lb 14.4 oz)   SpO2 97%   BMI 39.34 kg/m      Exam:  GENERAL APPEARANCE: alert and no distress  HENT: mouth without ulcers or lesions  RESP: lungs clear to auscultation - no rales, rhonchi or wheezes  CV: regular rhythm, normal rate, no rub, no murmur  EDEMA: no LE edema bilaterally  ABDOMEN: soft, obese, nondistended, nontender, bowel sounds normal, no graft tenderness  MS: extremities normal - no gross deformities noted, no evidence of inflammation in joints, no muscle tenderness  SKIN: no rash    Results:   Recent Results (from the past 2 weeks)   Echocardiogram Complete    Collection Time: 11/04/24  1:40 PM   Result Value Ref Range    LVEF  60-65%    EKG 12-lead, tracing only [EKG1]    Collection Time: 11/04/24  2:11 PM   Result Value Ref Range    Systolic Blood Pressure  mmHg    Diastolic Blood Pressure  mmHg    Ventricular Rate 87 BPM    Atrial Rate 87 BPM    OK Interval 158 ms    QRS Duration 86 ms     ms    QTc 447 ms    P Axis 60 degrees    R AXIS 19 degrees    T Axis 7 degrees    Interpretation ECG       Sinus rhythm  Minimal voltage criteria for LVH, may be normal variant ( R in aVL )  Borderline ECG  When compared with ECG of 17-Jan-2020 12:32,  No significant change was found  Confirmed by MD KERRIE, ESTEBAN ([id number]) on 11/5/2024 5:54:21 PM     Hemoglobin A1c    Collection Time: 11/04/24  2:30 PM   Result Value Ref Range    Estimated Average Glucose 108 <117 mg/dL    Hemoglobin A1C 5.4 <5.7 %   Oxalate serum or plasma    Collection Time: 11/04/24  2:30 PM   Result Value Ref Range    Oxalate 6.2 (H) <=2.0 umol/L   HCG qualitative [[id number]]    Collection Time: 11/04/24  2:30 PM   Result Value Ref Range    hCG Serum Qualitative Negative Negative   Varicella Zoster Virus Antibody IgG [[id number]]    Collection Time: 11/04/24  2:30 PM   Result Value Ref Range    Varicella Zoster " Virus Antibody IgG Interpretation Positive     Varicella Zoster Virus Antibody IgG Instrument Value 26.60 <1.00 S/CO   HIV Antigen Antibody Combo Pretransplant Cascade    Collection Time: 11/04/24  2:30 PM   Result Value Ref Range    HIV Antigen Antibody Combo Pretransplant Nonreactive Nonreactive   Hepatitis C antibody [[id number]]    Collection Time: 11/04/24  2:30 PM   Result Value Ref Range    Hepatitis C Antibody Nonreactive Nonreactive   Hepatitis B surface antigen [[id number]]    Collection Time: 11/04/24  2:30 PM   Result Value Ref Range    Hepatitis B Surface Antigen Nonreactive Nonreactive   Hepatitis B Surface Antibody [[id number]]    Collection Time: 11/04/24  2:30 PM   Result Value Ref Range    Hepatitis B Surface Antibody Reactive     Hepatitis B Surface Antibody Instrument Value >1,000.00 <8.5 m[IU]/mL   Hepatitis B core antibody [[id number]]    Collection Time: 11/04/24  2:30 PM   Result Value Ref Range    Hepatitis B Core Antibody Total Nonreactive Nonreactive   EBV Capsid Antibody IgG [[id number]]    Collection Time: 11/04/24  2:30 PM   Result Value Ref Range    EBV Capsid Karina IgG Instrument Value 557.0 (H) <18.0 U/mL    EBV Capsid Antibody IgG Positive (A) No detectable antibody.   CMV Antibody IgG [[id number]]    Collection Time: 11/04/24  2:30 PM   Result Value Ref Range    CMV Karina IgG Instrument Value >10.00 (H) <0.60 U/mL    CMV Antibody IgG Positive, suggests recent or past exposure. (A) No detectable antibody.    Thrombin time [[id number]]    Collection Time: 11/04/24  2:30 PM   Result Value Ref Range    Thrombin Time 18.8 13.0 - 19.0 Seconds   Partial thromboplastin time [LAB56]    Collection Time: 11/04/24  2:30 PM   Result Value Ref Range    aPTT 32 22 - 38 Seconds   Lupus Anticoagulant Panel [[id number]]    Collection Time: 11/04/24  2:30 PM   Result Value Ref Range    PTT Ratio 1.19 <1.30    DRVVT Screen Ratio 1.07 <1.08    Lupus Result Negative Negative    Lupus Interpretation       The INR is normal.  APTT  ratio is normal.    DRVVT Screen ratio is normal.  Thrombin time is normal.  NEGATIVE TEST; A LUPUS ANTICOAGULANT WAS NOT DETECTED IN THIS SPECIMEN WITHIN THE LIMITS OF THE TESTING REPERTOIRE.  If the clinical picture is strongly suggestive of an antiphospholipid syndrome, recommend anticardiolipin and beta-2-glycoprotein (IgG and IgM) antibody tests.    Katherine Kaplan MD, PhD  [facility]         INR [[id number]]    Collection Time: 11/04/24  2:30 PM   Result Value Ref Range    INR 1.14 0.85 - 1.15   Factor 2 and 5 mutation analysis    Collection Time: 11/04/24  2:30 PM   Result Value Ref Range    METHODOLOGY       The regions of genomic DNA containing the F5 gene mutation R506Q(1691G>A) and the Factor 2 (Prothrombin H64032R) gene mutation were simultaneously amplified using the polymerase chain reaction. The amplified products were digested with restriction endonuclease TaqI and products were analyzed by gel electrophoresis.        RESULTS         Factor V 1691G>A ([place])  RESULTS:  Mutation analyzed: 1691G>A  Factor V 1691G>A ([place])  Interpretation:  ABSENT  Factor V 1691G>A ([place]) mutation  genotype:  G/G    FACTOR 2/PROTHROMBIN RESULTS:  Mutation analyzed: 24365S>A  Factor 2 Mutation Interpretation:  ABSENT  Factor 2 Mutation genotype:  G/G        INTERPRETATION       The patient is negative for the Factor V 1691G>A ([place]) and negative for the Factor 2 mutation.  (Electronically signed by: Virginia Granados MD November 8, 2024 4:36 PM)      COMMENTS       If a patient is the recipient of an allogeneic bone marrow transplant, this test must be done on a pre-transplant sample or buccal swab.  A previous allogeneic bone marrow transplant will interfere with test results.  Call the [facility] ([phone]) for instructions on sample collection for these patients.      DISCLAIMER       This test was developed and its performance characteristics determined by [facility]. It has not been cleared or approved by the FDA. The laboratory is regulated under CLIA as qualified to perform high-complexity testing. This test is used for clinical purposes. It should not be regarded as investigational or for research.    A resident/fellow in an accredited training program was involved in the selection of testing, review of laboratory data, and/or interpretation of this case.  I, as the senior physician, attest that I: (i) confirmed appropriate testing, (ii) examined the relevant raw data for the specimen(s); and (iii) rendered or confirmed the interpretation(s).        FACTOR 2 INTERPRETATION         Factor 2 Mutation Interpretation:  ABSENT      FACTOR V INTERPRETATION       Factor V 1691G>A ([place])  Interpretation:  ABSENT      Specimen Description       Blood: ACD     Cardiolipin Karina IgG and IgM [LAB 6836]    Collection Time: 11/04/24  2:30 PM   Result Value Ref Range    Cardiolipin Karina IgG Instrument Value <2.0 <10.0 GPL-U/mL    Cardiolipin Antibody IgG Negative Negative    Cardiolipin Karina IgM Instrument Value 2.0 <10.0 MPL-U/mL    Cardiolipin Antibody IgM Negative Negative   Comprehensive metabolic panel [LAB17]    Collection Time: 11/04/24  2:30 PM   Result Value Ref Range    Sodium 135 135 - 145 mmol/L    Potassium 4.0 3.4 - 5.3 mmol/L    Carbon Dioxide (CO2) 18 (L) 22 - 29 mmol/L    Anion Gap 14 7 - 15 mmol/L    Urea Nitrogen 45.8 (H) 6.0 - 20.0 mg/dL    Creatinine 4.07 (H) 0.51 - 0.95 mg/dL    GFR Estimate 14 (L) >60 mL/min/1.73m2    Calcium 9.4 8.8 - 10.4 mg/dL    Chloride 103 98 - 107 mmol/L    Glucose 81 70 - 99 mg/dL    Alkaline Phosphatase 120 40 - 150 U/L    AST 15 0 - 45 U/L    ALT 12 0 - 50 U/L    Protein Total 7.5 6.4 - 8.3 g/dL    Albumin 4.2 3.5 - 5.2 g/dL    Bilirubin Total 0.4 <=1.2 mg/dL   Antibody titer red cell [[id number]]    Collection Time: 11/04/24  2:30 PM   Result Value Ref Range    Anti-A1 IgG Titer 32     Anti-A1 IgM Titer 32     Anti-B IgG  Titer 32     Anti-B IgM Titer 16     SPECIMEN EXPIRATION DATE [id number]     ANTIBODY TITER IGM SCREEN Negative    Phosphorus    Collection Time: 11/04/24  2:30 PM   Result Value Ref Range    Phosphorus 4.5 2.5 - 4.5 mg/dL   Iron & Iron Binding Capacity    Collection Time: 11/04/24  2:30 PM   Result Value Ref Range    Iron 57 37 - 145 ug/dL    Iron Binding Capacity 193 (L) 240 - 430 ug/dL    Iron Sat Index 30 15 - 46 %   Ferritin    Collection Time: 11/04/24  2:30 PM   Result Value Ref Range    Ferritin 506 (H) 6 - 175 ng/mL   Quantiferon TB Gold Plus Grey Tube    Collection Time: 11/04/24  2:30 PM    Specimen: Arm, Left; Blood   Result Value Ref Range    Quantiferon Nil Tube 0.02 IU/mL   Quantiferon TB Gold Plus Green Tube    Collection Time: 11/04/24  2:30 PM    Specimen: Arm, Left; Blood   Result Value Ref Range    Quantiferon TB1 Tube 0.05 IU/mL   Quantiferon TB Gold Plus Yellow Tube    Collection Time: 11/04/24  2:30 PM    Specimen: Arm, Left; Blood   Result Value Ref Range    Quantiferon TB2 Tube 0.05    Quantiferon TB Gold Plus Purple Tube    Collection Time: 11/04/24  2:30 PM    Specimen: Arm, Left; Blood   Result Value Ref Range    Quantiferon Mitogen 6.25 IU/mL   CBC with platelets and differential    Collection Time: 11/04/24  2:30 PM   Result Value Ref Range    WBC Count 6.3 4.0 - 11.0 10e3/uL    RBC Count 3.19 (L) 3.80 - 5.20 10e6/uL    Hemoglobin 8.4 (L) 11.7 - 15.7 g/dL    Hematocrit 26.3 (L) 35.0 - 47.0 %    MCV 82 78 - 100 fL    MCH 26.3 (L) 26.5 - 33.0 pg    MCHC 31.9 31.5 - 36.5 g/dL    RDW 13.1 10.0 - 15.0 %    Platelet Count 154 150 - 450 10e3/uL    % Neutrophils 67 %    % Lymphocytes 23 %    % Monocytes 6 %    % Eosinophils 3 %    % Basophils 1 %    % Immature Granulocytes 1 %    NRBCs per 100 WBC 0 <1 /100    Absolute Neutrophils 4.2 1.6 - 8.3 10e3/uL    Absolute Lymphocytes 1.5 0.8 - 5.3 10e3/uL    Absolute Monocytes 0.4 0.0 - 1.3 10e3/uL    Absolute Eosinophils 0.2 0.0 - 0.7 10e3/uL     Absolute Basophils 0.0 0.0 - 0.2 10e3/uL    Absolute Immature Granulocytes 0.1 <=0.4 10e3/uL    Absolute NRBCs 0.0 10e3/uL   Adult Type and Screen    Collection Time: 11/04/24  2:30 PM   Result Value Ref Range    ABO/RH(D) O POS     Antibody Screen Negative Negative    SPECIMEN EXPIRATION DATE [id number]    Quantiferon TB Gold Plus    Collection Time: 11/04/24  2:30 PM    Specimen: Arm, Left; Blood   Result Value Ref Range    Quantiferon-TB Gold Plus Negative Negative    TB1 Ag minus Nil Value 0.03 IU/mL    TB2 Ag minus Nil Value 0.03 IU/mL    Mitogen minus Nil Result 6.23 IU/mL    Nil Result 0.02 IU/mL   ABO and Rh    Collection Time: 11/04/24  2:40 PM   Result Value Ref Range    ABO/RH(D) O POS     SPECIMEN EXPIRATION DATE [id number]    UA with Microscopic reflex to Culture    Collection Time: 11/04/24  2:58 PM    Specimen: Urine, Midstream   Result Value Ref Range    Color Urine Light Yellow Colorless, Straw, Light Yellow, Yellow    Appearance Urine Slightly Cloudy (A) Clear    Glucose Urine Negative Negative mg/dL    Bilirubin Urine Negative Negative    Ketones Urine Negative Negative mg/dL    Specific Gravity Urine 1.009 1.003 - 1.035    Blood Urine Negative Negative    pH Urine 5.0 5.0 - 7.0    Protein Albumin Urine 20 (A) Negative mg/dL    Urobilinogen Urine Normal Normal, 2.0 mg/dL    Nitrite Urine Negative Negative    Leukocyte Esterase Urine Moderate (A) Negative    RBC Urine 2 <=2 /HPF    WBC Urine 4 <=5 /HPF    Squamous Epithelials Urine 2 (H) <=1 /HPF    Hyaline Casts Urine 5 (H) <=2 /LPF   Protein  random urine    Collection Time: 11/04/24  2:58 PM   Result Value Ref Range    Total Protein Urine mg/dL 23.5   mg/dL    Total Protein Urine mg/mg Creat 0.14 0.00 - 0.20 mg/mg Cr    Creatinine Urine mg/dL 166.0 mg/dL   Urine Culture    Collection Time: 11/04/24  2:58 PM    Specimen: Urine, Midstream   Result Value Ref Range    Culture 10,000-50,000 CFU/mL Mixture of Urogenital Geno    Treponema Abs  w Reflex to RPR and Titer [[id number]]    Collection Time: 11/04/24  3:38 PM   Result Value Ref Range    Treponema Antibody Total Nonreactive Nonreactive   Basic metabolic panel    Collection Time: 11/08/24 12:16 PM   Result Value Ref Range    Sodium 136 135 - 145 mmol/L    Potassium 4.6 3.4 - 5.3 mmol/L    Chloride 106 98 - 107 mmol/L    Carbon Dioxide (CO2) 20 (L) 22 - 29 mmol/L    Anion Gap 10 7 - 15 mmol/L    Urea Nitrogen 46.3 (H) 6.0 - 20.0 mg/dL    Creatinine 3.70 (H) 0.51 - 0.95 mg/dL    GFR Estimate 16 (L) >60 mL/min/1.73m2    Calcium 9.4 8.8 - 10.4 mg/dL    Glucose 103 (H) 70 - 99 mg/dL   CBC with platelets    Collection Time: 11/08/24 12:16 PM   Result Value Ref Range    WBC Count 6.4 4.0 - 11.0 10e3/uL    RBC Count 3.31 (L) 3.80 - 5.20 10e6/uL    Hemoglobin 8.6 (L) 11.7 - 15.7 g/dL    Hematocrit 27.1 (L) 35.0 - 47.0 %    MCV 82 78 - 100 fL    MCH 26.0 (L) 26.5 - 33.0 pg    MCHC 31.7 31.5 - 36.5 g/dL    RDW 13.0 10.0 - 15.0 %    Platelet Count 155 150 - 450 10e3/uL   Everolimus by Tandem Mass Spectrometry    Collection Time: 11/08/24 12:16 PM   Result Value Ref Range    Everolimus by Tandem Mass Spectrometry 9.2 (H) 3.0 - 8.0 ug/L    Everolimus Last Dose Date      Everolimus Last Dose Time     Ferritin    Collection Time: 11/08/24 12:16 PM   Result Value Ref Range    Ferritin 505 (H) 6 - 175 ng/mL   Iron & Iron Binding Capacity    Collection Time: 11/08/24 12:16 PM   Result Value Ref Range    Iron 61 37 - 145 ug/dL    Iron Binding Capacity 200 (L) 240 - 430 ug/dL    Iron Sat Index 31 15 - 46 %   Cyclosporine by Tandem Mass Spectrometry    Collection Time: 11/08/24 12:16 PM   Result Value Ref Range    Cyclosporine 303 50 - 400 ug/L    Cyclosporine Last Dose Date      Cyclosporine Last Dose Time     Protein  random urine    Collection Time: 11/08/24 12:20 PM   Result Value Ref Range    Total Protein Urine mg/dL 21.9   mg/dL    Total Protein Urine mg/mg Creat 0.17 0.00 - 0.20 mg/mg Cr    Creatinine Urine mg/dL  129.0 mg/dL       I spent a total of 60 minutes on the date of the encounter doing chart review, performing a history and physical exam, completing documentation and any further activities as noted above.

## 2024-11-04 NOTE — LETTER
11/4/2024      Carol Guillaume  [address] [address]      Dear Colleague,    Thank you for referring your patient, Carol Guillaume, to the [facility]. Please see a copy of my visit note below.    Transplant Surgery Consult Note     Medical record number: [id number]  YOB: 1990,   Consult requested  for evaluation of kidney transplant candidacy.    Assessment and Recommendations:Ms. Guillaume appears to be a good candidate for kidney transplantation and has a good understanding of the risks and benefits of this approach to the management of renal failure. 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  Recommend he be considered as a candidate for kidney transplant.  Cardiology consult for cardiac risk stratification to be ordered: No  CT abdomen and pelvis without contrast to be ordered for assessment of vascular targets: Yes  Transplant listing labs ordered to include HLA, ABOx2, Cr, etc.  Dietician consult ordered: Yes  Social work consult ordered: Yes  Imaging reports reviewed:  No recent scans to review  Radiology images reviewed:No recent scans to review  Recipient suitable to move forward with work up of living donors:  Yes  Dr. Bautista recommended CT and KUB to determine best treatment for current stone. Please assist her with follow up   Was on Cellcept for 10 yrs and started having flu like symptoms/ears hurting/malaise. Was switched due to that and was switched to Myfortic. Was having joint pain and GI bleeding and was switched to everolimus. She would like to re-trial Cellcept  Uterine potential cancer 2020 but atypical hyperplasia. Does q6m monitoring  Needs derm for skin check  Hemoglobin A1c  check today. Has had some increased fasting glucoses. Discussed diabetes and weight loss  Encouraged weight loss but is surgically feasible at current weight. We discussed the increased risks of wound complications.  CT scan today        The majority of our visit was spent in counselling, discussing the medical and surgical risks of kidney transplantation. We discussed approximate wait time and how that is influenced by issues such as blood type and sensitization (PRA) and access to a living donor. I contrasted potential waiting time for living vs  donor kidneys from  normal (0-85%) or higher (%) kidney donor profile index (KDPI) donors and their associated outcomes. I would not recommend this individual to consider kidneys from high KDPI donors. The reason for this decision is best summarized as: improved long term graft survival. Potential surgical complications of kidney transplantation include bleeding, superficial or deep wound complications (infection, hernia, lymphocele), ureteral anastomotic failure (leak or stenosis), graft thrombosis, need for reoperation and other issues such as cardiac complications, pneumonia, deep venous thrombosis, pulmonary embolism, post transplant diabetes and death. The potential for recurrent disease or need for retransplantation was also addressed. We discussed the possible need for ureteral stent (and subsequent removal), and the utility of protocol biopsy and laboratory studies to evaluate for rejection or recurrent disease. We discussed the risk of graft rejection, our center's average graft and patient survival rates, immunosuppression protocols, as well as the potential opportunity to participate in clinical trials.  We also discussed the average length of stay, recovery process, and posttransplant lab and monitoring protocol.  I emphasized the need for strict immunosuppression medication adherence and the potential for complications of immunosuppression such  as skin cancer or lymphoma, as well as a very low but not zero risk of donor-derived disease transmission risks (infection, cancer). Ms. Guillaume asked good questions and her candidacy will be reviewed at our Multidisciplinary Selection Committee. Thank you for the opportunity to participate in Ms. Guillaume's care.      Total time: 60 minutes        Rhianna Lowery MD FACS  Associate Professor of Surgery  Director, Living Kidney Donor Program.    ---------------------------------------------------------------------------------------------------    HPI: Ms. 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 The patient is non-diabetic.       The patient is not on dialysis.    Has potential kidney donors:  Yes .  Interested in participation in paired exchange if a donor is willing: Doesn't know     The patient has the following pertinent history:       No    Yes  Dialysis:    [x]      [] via:       Blood Transfusion                  [x]      []  Number of units:   Most recently:  Pregnancy:    [x]      [] Number:       Previous Transplant:  []      [x] Details:    Cancer    [x]      [] Comment:   Kidney stones   []      [x] Comment:      Recurrent infections  [x]      []  Type:                  Bladder dysfunction  [x]      [] Cause:    Claudication   [x]      [] Distance:    Previous Amputation  [x]      [] Cause:     Chronic anticoagulation  [x]      [] Indication:   Lutheran  [x]      []      Past Medical History:   Diagnosis Date     Acquired hypothyroidism 08/02/2023     Anemia      Anxiety      Cancer (H) 10/30/2020    Atypical Hyperplasia endometriosis carcinoma     Cataract 2015     CMV (cytomegalovirus infection) (H)      CMV disease (H) 2006    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     Depression      EIN (endometrial intraepithelial neoplasia)      Fatigue      Gastroesophageal reflux disease      High cholesterol      Hypertension      Insomnia      Insulin resistance       Legally blind      Obesity      PCOS (polycystic ovarian syndrome)      PPD positive, treated 2006    9 months of INH     Pseudotumor cerebri     on Diamox     Retinitis pigmentosa      S/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      Uncomplicated asthma     as a child     Urinary tract infection 04/17/2024     Viremia      Past Surgical History:   Procedure Laterality Date     BIOPSY       cataract bilateral  06/2017     DILATION AND CURETTAGE N/A 09/13/2021    Procedure: DILATION AND CURETTAGE,;  Surgeon: Lisbeth Gallardo MD;  Location:  OR     DILATION AND CURETTAGE N/A 05/09/2022    Procedure: DILATION AND CURETTAGE;  Surgeon: Lisbeth Gallardo MD;  Location:  OR     DILATION AND CURETTAGE N/A 5/4/2023    Procedure: Dilation and Curettage of Endometrium;  Surgeon: Lisbeth Gallardo MD;  Location: [facility] OR     DILATION AND CURETTAGE, OPERATIVE HYSTEROSCOPY WITH MORCELLATOR, COMBINED N/A 10/23/2020    Procedure: HYSTEROSCOPY, DILATION AND CURRETAGE, MYOSURE;  Surgeon: Kierra Tracy MD;  Location:  OR     DILATION AND CURETTAGE, OPERATIVE HYSTEROSCOPY WITH MORCELLATOR, COMBINED N/A 03/05/2021    Procedure: HYSTEROSCOPY, WITH DILATION AND CURETTAGE OF UTERUS USING  MORCELLATOR;  Surgeon: Fiorella Cunningham MD;  Location:  OR     ESOPHAGOSCOPY, GASTROSCOPY, DUODENOSCOPY (EGD), COMBINED N/A 01/24/2023    Procedure: ESOPHAGOGASTRODUODENOSCOPY, WITH BIOPSY;  Surgeon: Talia Aguilar MD;  Location: [facility] OR     ESOPHAGOSCOPY, GASTROSCOPY, DUODENOSCOPY (EGD), COMBINED N/A 6/7/2024    Procedure: Esophagoscopy, gastroscopy, duodenoscopy (EGD), combined;  Surgeon: Charli Watts MD;  Location: [facility] OR     INSERT INTRAUTERINE DEVICE N/A 03/05/2021    Procedure: INSERTION MIRENA INTRAUTERINE DEVICE;  Surgeon: Fiorella Cunningham MD;  Location: [facility] OR     IR RENAL BIOPSY RIGHT  08/27/2021     LAPAROSCOPY DIAGNOSTIC (GYN)  03/05/2021    Procedure: Laparoscopy diagnostic (gyn);  Surgeon:  Fiorella Cunningham MD;  Location: [facility] OR     LITHOTRIPSY       LITHOTRIPSY       REMOVE INTRAUTERINE DEVICE N/A 03/05/2021    Procedure: REMOVE MIRENA INTRAUTERINE DEVICE;  Surgeon: Fiorella Cunningham MD;  Location: [facility] OR     REMOVE INTRAUTERINE DEVICE N/A 05/09/2022    Procedure: EXCHANGE OF MIRENA INTRAUTERINE DEVICE;  Surgeon: Lisbeth Gallardo MD;  Location:  OR     REPLACE INTRAUTERINE DEVICE N/A 09/13/2021    Procedure: MIRENA INTRAUTERINE DEVICE EXCHANGE;  Surgeon: Lisbeth Gallardo MD;  Location: [facility] OR     SINUS SURGERY  2001     TONSILLECTOMY       TRANSPLANT KIDNEY RECIPIENT LIVING RELATED Right 07/2006     wisdom teeth       Family History   Problem Relation Age of Onset     Hyperlipidemia Mother      Osteoporosis Mother      Breast Cancer Mother      Sjogren's Father      Hashimoto's thyroiditis Father      Thyroid Cancer Father      Other - See Comments Maternal Grandfather         surgical complication     Atrial fibrillation Paternal Grandmother      Arthritis Paternal Grandmother      Melanoma Paternal Grandfather      Parkinsonism Paternal Uncle         paternal uncle     Pulmonary Embolism Paternal Uncle      Social History     Socioeconomic History     Marital status:      Spouse name: David     Number of children: 0     Years of education: Not on file     Highest education level: Master's degree (e.g., MA, MS, Gracie, MEd, MSW, TRACY)   Occupational History     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   end of  127 November 2018 to David has one adopted daughter David's  niece Daryn 2012 Blind.     Social Drivers of Health     Financial Resource Strain: Low Risk  (8/16/2021)    Overall Financial Resource Strain (CARDIA)      Difficulty of Paying Living Expenses: Not hard at all   Food Insecurity: No Food Insecurity (8/16/2021)    Hunger Vital Sign      Worried About Running Out of Food in the Last Year: Never true      Ran Out of Food in the Last Year: Never true   Transportation Needs: Unmet Transportation Needs (8/16/2021)    PRAPARE - Transportation      Lack of Transportation (Medical): Yes      Lack of Transportation (Non-Medical): Yes   Physical Activity: Insufficiently Active (8/16/2021)    Exercise Vital Sign      Days of Exercise per Week: 5 days      Minutes of Exercise per Session: 20 min   Stress: Stress Concern Present (8/16/2021)    [employer] - Occupational Stress Questionnaire      Feeling of Stress : Rather much   Social Connections: Unknown (8/9/2023)    [facility]    Social Connections      Frequency of Communication with Friends and Family: Not on file   Interpersonal Safety: Low Risk  (12/4/2023)    Interpersonal Safety      Do you feel physically and emotionally safe where you currently live?: Yes      Within the past 12 months, have you been hit, slapped, kicked or otherwise physically hurt by someone?: No      Within the past 12 months, have you been humiliated or emotionally abused in other ways by your partner or ex-partner?: No   Housing Stability: Low Risk  (5/4/2022)    Housing Stability Vital Sign      Unable to Pay for Housing in the Last Year: No      Number of Places Lived in the Last Year: 1      Unstable Housing in the Last Year: No       ROS:   CONSTITUTIONAL:  No fevers or chills  EYES: negative for icterus  ENT:  negative for hearing loss, tinnitus and  sore throat  RESPIRATORY:  negative for cough, sputum, dyspnea  CARDIOVASCULAR:  negative for chest pain Fatigue  GASTROINTESTINAL:  negative for nausea, vomiting, diarrhea or constipation  GENITOURINARY:  negative for incontinence, dysuria, bladder emptying problems  HEME:  No easy bruising  INTEGUMENT:  negative for rash and pruritus  NEURO:  Negative for headache, seizure disorder  Allergies:   No Known Allergies  Medications:  Prescription Medications as of 11/4/2024         Rx Number Disp Refills Start End Last Dispensed Date Next Fill Date Owning Pharmacy    acetaminophen (TYLENOL) 325 MG tablet  50 tablet 0 10/23/2020 --   [facility] [address]    Sig: Take 2 tablets (650 mg) by mouth every 4 hours as needed for mild pain    Class: Local Print    Route: Oral    amLODIPine (NORVASC) 5 MG tablet  90 tablet 3 9/20/2024 --   [facility] [address] [facility]    Sig: TAKE 1 TABLET BY MOUTH DAILY    Class: E-Prescribe    Route: Oral    carvedilol (COREG) 25 MG tablet  180 tablet 1 7/16/2024 --   [facility] - [address]    Sig: TAKE 1 TABLET BY MOUTH 2 TIMES DAILY (WITH MEALS)    Class: E-Prescribe    Route: Oral    COMPOUNDED NON-CONTROLLED SUBSTANCE (CMPD RX) - PHARMACY TO MIX COMPOUNDED MEDICATION  4 each 1 8/6/2024 --   [facility] [address] [facility]    Sig: Inject 0.25mg semaglutide once weekly    Class: E-Prescribe    everolimus (ZORTRESS) 0.25 MG tablet  -- -- 7/17/2024 --       Sig: Take 2 tablets (0.5 mg) by mouth every morning AND 1 tablet (0.25 mg) every evening.    Class: No Print Out    Notes to Pharmacy: TXP DT 7/18/2006 (Kidney) TXP Dischg DT   DX Kidney replaced by transplant Z94.0 [facility] ([place], [place])    Route: Oral    everolimus (ZORTRESS) 0.75 MG tablet  60 tablet 11 7/26/2024 --    "[facility] [place] [address] [address]    Sig: Hold for future dose changes    Class: E-Prescribe    levothyroxine (SYNTHROID/LEVOTHROID) 50 MCG tablet  90 tablet 1 5/8/2024 --   [facility] [place] [address] [address]    Sig: Take 1 tablet (50 mcg) by mouth daily    Class: E-Prescribe    Route: Oral    losartan (COZAAR) 25 MG tablet  180 tablet 3 5/20/2024 --   [facility] [place] [address] [address]    Sig: TAKE 1 TABLET BY MOUTH 2 TIMES DAILY    Class: E-Prescribe    Route: Oral    NEORAL (BRAND) 25 MG capsule  270 capsule 3 10/8/2024 --   [facility] [address] [address]    Sig: Take 2 capsules (50 mg) by mouth every morning AND 1 capsule (25 mg) every evening.    Class: E-Prescribe    Notes to Pharmacy: TXP DT 7/18/2006 (Kidney) TXP Dischg DT   DX Kidney replaced by transplant Z94.0 TX [facility] ([place], [place])    Route: Oral    ondansetron (ZOFRAN ODT) 4 MG ODT tab  30 tablet 0 8/21/2024 --   [id number] [address]    Sig: Take 1 tablet (4 mg) by mouth every 8 hours as needed for nausea.    Class: E-Prescribe    Route: Oral    Prior authorization: Approved    polyethylene glycol (MIRALAX) 17 GM/Dose powder  -- -- 8/29/2024 --       Sig: Take 17 g (1 Capful) by mouth daily.    Class: Historical    Route: Oral    psyllium (METAMUCIL/KONSYL) 58.6 % powder  -- --  --       Sig: Take 1 teaspoonful by mouth daily.    Class: Historical    Route: Oral    vitamin D3 (CHOLECALCIFEROL) 50 mcg (2000 units) tablet  90 tablet 3 8/27/2024 --   [facility] [place] [address] [address]    Sig: Take 1 tablet (50 mcg) by mouth daily.    Class: E-Prescribe    Route: Oral          Exam:     /78   Pulse 98   Ht 1.67 m (5' 5.75\")   Wt 109.7 kg (241 lb 14.4 oz)   SpO2 97%   " BMI 39.34 kg/m    Appearance: in no apparent distress.   Skin: normal  Eyes:  no redness or discharge.  Sclera anicteric  Head and Neck: Normal, no rashes or jaundice  Respiratory: easy respirations, no audible wheezing.  Abdomen: rounded, obese, and protuberant, No distention, Surgical scars consistent with history, and RLQ Ricks incision   Extremities: femoral 2+/2+, Edema, pitting and 1+  Neuro: without deficit, vision impaired(blind)   Psychiatric: Normal mood and affect    Diagnostics:   No results found for this or any previous visit (from the past 4 weeks).  OS CPRA   Date Value Ref Range Status   09/03/2021 98  Final          Again, thank you for allowing me to participate in the care of your patient.        Sincerely,        Rhianna Lowery MD, MD

## 2024-11-04 NOTE — PROGRESS NOTES
" The patient was provided with the following documents:  What You Need to Know About a Kidney Transplant  Adult Kidney Transplant - A Guide for Patients  SRTR Data Sheet - Kidney  What Every Patient Needs to Know (UNOS)  UNOS Facts and Figures  Finding a Donor  KDPI Consent  Receipt of Information form    Carol Guillaume signed the  Receipt of Information for Organ Transplant Recipient.\" She was provided Chanel Stinson's business card and instructed to call with additional questions.      Summary    Team s concerns/comments: pt \"ok\" at current weight but encouraged to continue to lose, will order CT today for both Urology and Transplant, pt david need follow up w/ Urology.     Candidacy category: No data was found    Action/Plan: continue evaluation    Expected Selection Meeting Discussion: 11/13/24      "

## 2024-11-04 NOTE — LETTER
11/4/2024      Carol Guillaume  [address] [facility] [address]      Dear Colleague,    Thank you for referring your patient, Carol Guillaume, to the [facility]. Please see a copy of my visit note below.    TRANSPLANT NEPHROLOGY RECIPIENT EVALUATION NOTE    Assessment and Plan:  # Kidney Transplant Evaluation: Patient is a good candidate overall. Benefits of a living donor transplant were discussed.    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 Last biopsy in 8/2021 with Severe glomerulitis, moderate peritubular capillaritis and severe transplant glomerulopathy. C4d +. She remains on Immunosupression, CSA 50/25 and Everolimus 0.5/0.25 since July 2024. Had been on Cellcept for ~10 years but transitioned to Myfortic due to flu like symptoms but then developed significant heartburn and transitioned to everolimus. She has a qualifier since July 2024 with current GFR ~14. She is doing ok not yet on dialysis. When ready she will likely benefit from a kidney retransplant.      # Nephrolitiasis of transplanted kidney:     In 2009 and again in 7/24 with 1.2cm stone, saw Urology, Dr. Bautista. No intervention. Repeat imaging in 11/2024 with nonobstructive  1.1 cm stone in the lower pole. No hydronephrosis.     # Cardiac Risk: No known CAD  HTN: /70, norvasc 5, cozaar 25mg BID, Carvedilol 25mg BID.    # PAD Screening: Obtain updated CT imaging for Transplant Surgery to review    # Obesity: BMI 39.2 on semaglutide since August. . Follows with weight management.Per surgeon:Encouraged weight loss but is surgically feasible at current weight.     # Complex Atypical Hyperplasia (CAH/EIN), PCOS: follows with [facility]: Can't exclude foci of low grade cancer. Hormonally driven disease, previously managed with Mirena Q 6months.  Removed 6/22.  Last endometrial biopsy 6/24 with atypical complex hyperplasia with squamous morular metaplasia-disordered proliferative endometrium.  Last Pelvic US with PCOS Monitor with Pelvic US every 6 months, Recommending a Mirena IUD.    # Heartburn/GI:  EGD 6/24: LA Grade B reflux esophagitis with no bleeding . 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 CT with moderate stool burden. On Miralax and Fiber.    # Health Maintenance: PAP: Up to date, Dermatology: Up to date, and Dental: Up to date    - Discussed the risks and benefits of a transplant, including the risk of surgery and immunosuppression medications.  Patient's overall evaluation will be discussed in the Transplant Program's regular meeting with a final recommendation on the patients suitability for transplant to be made at that time.    Pending completion of the full evaluation, patient presently appears to be enough of an acceptable kidney transplant recipient candidate to have any potential kidney donors start the evaluation process.      Evaluation:  Carol Guillaume was seen in consultation at the request of Dr. Rhianna Lowery for evaluation as a potential kidney transplant recipient.    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 uptrend in Cr 1.1-1.3 to 1.7 in Aug 2020  up to 2.2 in 9/2022 with worsening proteinuria 2.7 g/g that year), bx showed chronic active ABMR t3,pvc2,cg3,c4d3+ 20% IFTA with multiple high titers class II DSA (DR/DQ) in the setting of being off MPA for months between Feb-Aug. GFR <20 since July 2024. Currently ~14-16 not yet on dialysis.     History of Present Illness:     Diagnosed at age 5 with senior loken syndrome. Transplanted in 7/2006.           Kidney Disease Hx:                 Kidney Disease Dx: Senior Loken Syndrome       Biopsy Proven:  Last Tx biopsy in 8/2021 with Severe glomerulitis, moderate peritubular capillaritis and severe transplant glomerulopathy. C4d +.        On Dialysis: No       Primary Nephrologist: Gerhard Maldonado       H/o Kidney Stones: Yes;  2009, 7/2004: 1.2cm nonobstructive renal calculus in the inferior pole of the transplanted kidney. No hydronephrosis.    Dr. Bautista-7/24: CT /KUB       H/o Recurrent/Frequent UTI: No         Diabetic Hx: None           Cardiac/Vascular Disease Risk Factors:        Cardiac Risk Factors: Hypertension and CKD       Known CAD: No       Known PAD/Caludication Symptoms: No       Known Heart Failure: No       Arrhythmia: No       Pulmonary Hypertension: No       Valvular Disease: No       Other: None         Viral Serology Status       CMV IgG Antibody: Positive       EBV IgG Antibody: Positive         Volume Status/Weight:        Volume status: Euvolemic       Weight:  BMI within guidelines, but truncal obesity       BMI: Body mass index is 39.34 kg/m .         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 Able to do about a mile     Fatigue/Decreased Energy: [] No [x] Yes    Chest Pain or SOB with Exertion: [x] No [] Yes    Significant Weight Change: [] No [x] Yes  Working with weight managment   Nausea, Vomiting or Diarrhea: [] No [x] Yes Nausea associated with constipation   Fever, Sweats or Chills:  [] No [x] Yes  Resolved following lowering Everolimus dose   Leg Swelling [] No [x] Yes        History of Cancer:    #Complex Atypical Hyperplasia (CAH/EIN)-Dx 2020.      Other Significant Medical Issues: None       Allergy Testing Questions:   Medication that caused a reaction None   Antibiotics used that didn't give an allergic reaction?  Patient doesn't know    COVID Vaccination Up To Date: Yes    Potential Living Kidney Donors: Yes    Review of Systems:  A comprehensive review of  systems was obtained and negative, except as noted in the HPI or PMH.    Past Medical History:   Medical record was reviewed and PMH was discussed with patient and noted below.  Past Medical History:   Diagnosis Date     Acquired hypothyroidism 08/02/2023     Anemia      Anxiety      Cancer (H) 10/30/2020    Atypical Hyperplasia endometriosis carcinoma     Cataract 2015     CMV (cytomegalovirus infection) (H)      CMV disease (H) 2006    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     Depression      EIN (endometrial intraepithelial neoplasia)      Fatigue      Gastroesophageal reflux disease      High cholesterol      Hypertension      Insomnia      Insulin resistance      Legally blind      Obesity      PCOS (polycystic ovarian syndrome)      PPD positive, treated 2006    9 months of INH     Pseudotumor cerebri     on Diamox     Retinitis pigmentosa      S/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      Uncomplicated asthma     as a child     Urinary tract infection 04/17/2024     Viremia        Past Social History:   Past Surgical History:   Procedure Laterality Date     BIOPSY       cataract bilateral  06/2017     DILATION AND CURETTAGE N/A 09/13/2021    Procedure: DILATION AND CURETTAGE,;  Surgeon: Lisbeth Gallardo MD;  Location: [facility] OR     DILATION AND CURETTAGE N/A 05/09/2022    Procedure: DILATION AND CURETTAGE;  Surgeon: Lisbeth Gallardo MD;  Location: [facility] OR     DILATION AND CURETTAGE N/A 5/4/2023    Procedure: Dilation and Curettage of Endometrium;  Surgeon: Lisbeth Gallardo MD;  Location: [facility] OR     DILATION AND CURETTAGE, OPERATIVE HYSTEROSCOPY WITH MORCELLATOR, COMBINED N/A 10/23/2020    Procedure: HYSTEROSCOPY, DILATION AND CURRETAGE, MYOSURE;  Surgeon: Kierra Tracy MD;  Location: [facility] OR     DILATION AND CURETTAGE, OPERATIVE HYSTEROSCOPY WITH MORCELLATOR, COMBINED N/A 03/05/2021    Procedure: HYSTEROSCOPY, WITH DILATION AND CURETTAGE OF UTERUS USING  MORCELLATOR;   Surgeon: Fiorella Cunningham MD;  Location: [facility] OR     ESOPHAGOSCOPY, GASTROSCOPY, DUODENOSCOPY (EGD), COMBINED N/A 01/24/2023    Procedure: ESOPHAGOGASTRODUODENOSCOPY, WITH BIOPSY;  Surgeon: Talia Aguilar MD;  Location: [facility] OR     ESOPHAGOSCOPY, GASTROSCOPY, DUODENOSCOPY (EGD), COMBINED N/A 6/7/2024    Procedure: Esophagoscopy, gastroscopy, duodenoscopy (EGD), combined;  Surgeon: Charli Watts MD;  Location: [facility]     INSERT INTRAUTERINE DEVICE N/A 03/05/2021    Procedure: INSERTION MIRENA INTRAUTERINE DEVICE;  Surgeon: Fiorella Cunningham MD;  Location:  OR     IR RENAL BIOPSY RIGHT  08/27/2021     LAPAROSCOPY DIAGNOSTIC (GYN)  03/05/2021    Procedure: Laparoscopy diagnostic (gyn);  Surgeon: Fiorella Cunningham MD;  Location:  OR     LITHOTRIPSY       LITHOTRIPSY       REMOVE INTRAUTERINE DEVICE N/A 03/05/2021    Procedure: REMOVE MIRENA INTRAUTERINE DEVICE;  Surgeon: Fiorella Cunningham MD;  Location:  OR     REMOVE INTRAUTERINE DEVICE N/A 05/09/2022    Procedure: EXCHANGE OF MIRENA INTRAUTERINE DEVICE;  Surgeon: Lisbeth Gallardo MD;  Location:  OR     REPLACE INTRAUTERINE DEVICE N/A 09/13/2021    Procedure: MIRENA INTRAUTERINE DEVICE EXCHANGE;  Surgeon: Lisbeth Gallardo MD;  Location:  OR     SINUS SURGERY  2001     TONSILLECTOMY       TRANSPLANT KIDNEY RECIPIENT LIVING RELATED Right 07/2006     wisdom teeth       Personal history of bleeding or anesthesia problems: No    Family History:  Family History   Problem Relation Age of Onset     Hyperlipidemia Mother      Sjogren's Father      Hashimoto's thyroiditis Father      Other - See Comments Maternal Grandfather         surgical complication     Atrial fibrillation Paternal Grandmother      Arthritis Paternal Grandmother      Skin Cancer Paternal Grandfather         melanoma     Parkinsonism Other         paternal uncle       Personal History:   Social History     Socioeconomic History     Marital status:      Spouse name:  David     Number of children: 0     Years of education: Not on file     Highest education level: Master's degree (e.g., MA, MS, Gracie, MEd, MSW, TRACY)   Occupational History     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   end of November 2018 to David has one adopted daughter David's  niece Daryn 2012 Blind.     Social Drivers of Health     Financial Resource Strain: Low Risk  (8/16/2021)    Overall Financial Resource Strain (CARDIA)      Difficulty of Paying Living Expenses: Not hard at all   Food Insecurity: No Food Insecurity (8/16/2021)    Hunger Vital Sign      Worried About Running Out of Food in the Last Year: Never true      Ran Out of Food in the Last Year: Never true   Transportation Needs: Unmet Transportation Needs (8/16/2021)    PRAPARE - Transportation      Lack of Transportation (Medical): Yes      Lack of Transportation (Non-Medical): Yes   Physical Activity: Insufficiently Active (8/16/2021)    Exercise Vital Sign      Days of Exercise per Week: 5 days      Minutes of Exercise per Session: 20 min   Stress: Stress Concern Present (8/16/2021)    [employer] - Occupational Stress Questionnaire      Feeling of Stress : Rather much   Social Connections: Unknown (8/9/2023)    Received from [employer] [facility], [employer] [facility]    Social Connections      Frequency of Communication with Friends and Family: Not on file   Interpersonal Safety: Low  Risk  (12/4/2023)    Interpersonal Safety      Do you feel physically and emotionally safe where you currently live?: Yes      Within the past 12 months, have you been hit, slapped, kicked or otherwise physically hurt by someone?: No      Within the past 12 months, have you been humiliated or emotionally abused in other ways by your partner or ex-partner?: No   Housing Stability: Low Risk  (5/4/2022)    Housing Stability Vital Sign      Unable to Pay for Housing in the Last Year: No      Number of Places Lived in the Last Year: 1      Unstable Housing in the Last Year: No       Allergies:  No Known Allergies    Medications:  Current Outpatient Medications   Medication Sig Dispense Refill     acetaminophen (TYLENOL) 325 MG tablet Take 2 tablets (650 mg) by mouth every 4 hours as needed for mild pain 50 tablet 0     amLODIPine (NORVASC) 5 MG tablet TAKE 1 TABLET BY MOUTH DAILY 90 tablet 3     carvedilol (COREG) 25 MG tablet TAKE 1 TABLET BY MOUTH 2 TIMES DAILY (WITH MEALS) 180 tablet 1     COMPOUNDED NON-CONTROLLED SUBSTANCE (CMPD RX) - PHARMACY TO MIX COMPOUNDED MEDICATION Inject 0.25mg semaglutide once weekly 4 each 1     everolimus (ZORTRESS) 0.25 MG tablet Take 2 tablets (0.5 mg) by mouth every morning AND 1 tablet (0.25 mg) every evening.       everolimus (ZORTRESS) 0.75 MG tablet Hold for future dose changes 60 tablet 11     labetalol (NORMODYNE) 200 MG tablet Labetalol Oral    BID    inactive       levothyroxine (SYNTHROID/LEVOTHROID) 50 MCG tablet Take 1 tablet (50 mcg) by mouth daily 90 tablet 1     losartan (COZAAR) 25 MG tablet TAKE 1 TABLET BY MOUTH 2 TIMES DAILY 180 tablet 3     NEORAL (BRAND) 25 MG capsule Take 2 capsules (50 mg) by mouth every morning AND 1 capsule (25 mg) every evening. 270 capsule 3     omeprazole (PRILOSEC) 40 MG DR capsule Take 1 capsule (40 mg) by mouth daily 90 capsule 3     ondansetron (ZOFRAN ODT) 4 MG ODT tab Take 1 tablet (4 mg) by mouth every 8 hours as needed for nausea. 30  "tablet 0     polyethylene glycol (MIRALAX) 17 GM/Dose powder Take 17 g (1 Capful) by mouth daily.       psyllium (METAMUCIL/KONSYL) 58.6 % powder Take 1 teaspoonful by mouth daily.       vitamin D3 (CHOLECALCIFEROL) 50 mcg (2000 units) tablet Take 1 tablet (50 mcg) by mouth daily. 90 tablet 3     No current facility-administered medications for this visit.       Vitals:  /78   Pulse 98   Ht 1.67 m (5' 5.75\")   Wt 109.7 kg (241 lb 14.4 oz)   SpO2 97%   BMI 39.34 kg/m      Exam:  GENERAL APPEARANCE: alert and no distress  HENT: mouth without ulcers or lesions  RESP: lungs clear to auscultation - no rales, rhonchi or wheezes  CV: regular rhythm, normal rate, no rub, no murmur  EDEMA: no LE edema bilaterally  ABDOMEN: soft, obese, nondistended, nontender, bowel sounds normal, no graft tenderness  MS: extremities normal - no gross deformities noted, no evidence of inflammation in joints, no muscle tenderness  SKIN: no rash    Results:   Recent Results (from the past 2 weeks)   Echocardiogram Complete    Collection Time: 11/04/24  1:40 PM   Result Value Ref Range    LVEF  60-65%    EKG 12-lead, tracing only [EKG1]    Collection Time: 11/04/24  2:11 PM   Result Value Ref Range    Systolic Blood Pressure  mmHg    Diastolic Blood Pressure  mmHg    Ventricular Rate 87 BPM    Atrial Rate 87 BPM    WV Interval 158 ms    QRS Duration 86 ms     ms    QTc 447 ms    P Axis 60 degrees    R AXIS 19 degrees    T Axis 7 degrees    Interpretation ECG       Sinus rhythm  Minimal voltage criteria for LVH, may be normal variant ( R in aVL )  Borderline ECG  When compared with ECG of 17-Jan-2020 12:32,  No significant change was found  Confirmed by MD KERRIE, ESTEBAN ([id number]) on 11/5/2024 5:54:21 PM     Hemoglobin A1c    Collection Time: 11/04/24  2:30 PM   Result Value Ref Range    Estimated Average Glucose 108 <117 mg/dL    Hemoglobin A1C 5.4 <5.7 %   Oxalate serum or plasma    Collection Time: 11/04/24  2:30 PM   Result " Value Ref Range    Oxalate 6.2 (H) <=2.0 umol/L   HCG qualitative [[id number]]    Collection Time: 11/04/24  2:30 PM   Result Value Ref Range    hCG Serum Qualitative Negative Negative   Varicella Zoster Virus Antibody IgG [[id number]]    Collection Time: 11/04/24  2:30 PM   Result Value Ref Range    Varicella Zoster Virus Antibody IgG Interpretation Positive     Varicella Zoster Virus Antibody IgG Instrument Value 26.60 <1.00 S/CO   HIV Antigen Antibody Combo Pretransplant Cascade    Collection Time: 11/04/24  2:30 PM   Result Value Ref Range    HIV Antigen Antibody Combo Pretransplant Nonreactive Nonreactive   Hepatitis C antibody [[id number]]    Collection Time: 11/04/24  2:30 PM   Result Value Ref Range    Hepatitis C Antibody Nonreactive Nonreactive   Hepatitis B surface antigen [[id number]]    Collection Time: 11/04/24  2:30 PM   Result Value Ref Range    Hepatitis B Surface Antigen Nonreactive Nonreactive   Hepatitis B Surface Antibody [[id number]]    Collection Time: 11/04/24  2:30 PM   Result Value Ref Range    Hepatitis B Surface Antibody Reactive     Hepatitis B Surface Antibody Instrument Value >1,000.00 <8.5 m[IU]/mL   Hepatitis B core antibody [[id number]]    Collection Time: 11/04/24  2:30 PM   Result Value Ref Range    Hepatitis B Core Antibody Total Nonreactive Nonreactive   EBV Capsid Antibody IgG [[id number]]    Collection Time: 11/04/24  2:30 PM   Result Value Ref Range    EBV Capsid Karina IgG Instrument Value 557.0 (H) <18.0 U/mL    EBV Capsid Antibody IgG Positive (A) No detectable antibody.   CMV Antibody IgG [[id number]]    Collection Time: 11/04/24  2:30 PM   Result Value Ref Range    CMV Karina IgG Instrument Value >10.00 (H) <0.60 U/mL    CMV Antibody IgG Positive, suggests recent or past exposure. (A) No detectable antibody.    Thrombin time [[id number]]    Collection Time: 11/04/24  2:30 PM   Result Value Ref Range    Thrombin Time 18.8 13.0 - 19.0 Seconds   Partial thromboplastin time [LAB56]    Collection Time: 11/04/24   2:30 PM   Result Value Ref Range    aPTT 32 22 - 38 Seconds   Lupus Anticoagulant Panel [[id number]]    Collection Time: 11/04/24  2:30 PM   Result Value Ref Range    PTT Ratio 1.19 <1.30    DRVVT Screen Ratio 1.07 <1.08    Lupus Result Negative Negative    Lupus Interpretation       The INR is normal.  APTT ratio is normal.    DRVVT Screen ratio is normal.  Thrombin time is normal.  NEGATIVE TEST; A LUPUS ANTICOAGULANT WAS NOT DETECTED IN THIS SPECIMEN WITHIN THE LIMITS OF THE TESTING REPERTOIRE.  If the clinical picture is strongly suggestive of an antiphospholipid syndrome, recommend anticardiolipin and beta-2-glycoprotein (IgG and IgM) antibody tests.    Katherine Kaplan MD, PhD  [facility]         INR [[id number]]    Collection Time: 11/04/24  2:30 PM   Result Value Ref Range    INR 1.14 0.85 - 1.15   Factor 2 and 5 mutation analysis    Collection Time: 11/04/24  2:30 PM   Result Value Ref Range    METHODOLOGY       The regions of genomic DNA containing the F5 gene mutation R506Q(1691G>A) and the Factor 2 (Prothrombin X71334A) gene mutation were simultaneously amplified using the polymerase chain reaction. The amplified products were digested with restriction endonuclease TaqI and products were analyzed by gel electrophoresis.        RESULTS         Factor V 1691G>A ([place])  RESULTS:  Mutation analyzed: 1691G>A  Factor V 1691G>A ([place])  Interpretation:  ABSENT  Factor V 1691G>A ([place]) mutation  genotype:  G/G    FACTOR 2/PROTHROMBIN RESULTS:  Mutation analyzed: 85024G>A  Factor 2 Mutation Interpretation:  ABSENT  Factor 2 Mutation genotype:  G/G        INTERPRETATION       The patient is negative for the Factor V 1691G>A ([place]) and negative for the Factor 2 mutation.  (Electronically signed by: Virginia Granados MD November 8, 2024 4:36 PM)      COMMENTS       If a patient is the recipient of an allogeneic bone marrow transplant, this test must be done on a pre-transplant sample or buccal swab.  A previous  allogeneic bone marrow transplant will interfere with test results.  Call the [facility] ([phone]) for instructions on sample collection for these patients.      DISCLAIMER       This test was developed and its performance characteristics determined by [facility]. It has not been cleared or approved by the FDA. The laboratory is regulated under CLIA as qualified to perform high-complexity testing. This test is used for clinical purposes. It should not be regarded as investigational or for research.    A resident/fellow in an accredited training program was involved in the selection of testing, review of laboratory data, and/or interpretation of this case.  I, as the senior physician, attest that I: (i) confirmed appropriate testing, (ii) examined the relevant raw data for the specimen(s); and (iii) rendered or confirmed the interpretation(s).        FACTOR 2 INTERPRETATION         Factor 2 Mutation Interpretation:  ABSENT      FACTOR V INTERPRETATION       Factor V 1691G>A ([place])  Interpretation:  ABSENT      Specimen Description       Blood: ACD     Cardiolipin Karina IgG and IgM [LAB 6836]    Collection Time: 11/04/24  2:30 PM   Result Value Ref Range    Cardiolipin Karina IgG Instrument Value <2.0 <10.0 GPL-U/mL    Cardiolipin Antibody IgG Negative Negative    Cardiolipin Karina IgM Instrument Value 2.0 <10.0 MPL-U/mL    Cardiolipin Antibody IgM Negative Negative   Comprehensive metabolic panel [LAB17]    Collection Time: 11/04/24  2:30 PM   Result Value Ref Range    Sodium 135 135 - 145 mmol/L    Potassium 4.0 3.4 - 5.3 mmol/L    Carbon Dioxide (CO2) 18 (L) 22 - 29 mmol/L    Anion Gap 14 7 - 15 mmol/L    Urea Nitrogen 45.8 (H) 6.0 - 20.0 mg/dL    Creatinine 4.07 (H) 0.51 - 0.95 mg/dL    GFR Estimate 14 (L) >60 mL/min/1.73m2    Calcium 9.4 8.8 - 10.4 mg/dL    Chloride 103 98 - 107 mmol/L    Glucose 81 70 - 99 mg/dL    Alkaline Phosphatase 120 40 - 150 U/L     AST 15 0 - 45 U/L    ALT 12 0 - 50 U/L    Protein Total 7.5 6.4 - 8.3 g/dL    Albumin 4.2 3.5 - 5.2 g/dL    Bilirubin Total 0.4 <=1.2 mg/dL   Antibody titer red cell [[id number]]    Collection Time: 11/04/24  2:30 PM   Result Value Ref Range    Anti-A1 IgG Titer 32     Anti-A1 IgM Titer 32     Anti-B IgG Titer 32     Anti-B IgM Titer 16     SPECIMEN EXPIRATION DATE [id number]     ANTIBODY TITER IGM SCREEN Negative    Phosphorus    Collection Time: 11/04/24  2:30 PM   Result Value Ref Range    Phosphorus 4.5 2.5 - 4.5 mg/dL   Iron & Iron Binding Capacity    Collection Time: 11/04/24  2:30 PM   Result Value Ref Range    Iron 57 37 - 145 ug/dL    Iron Binding Capacity 193 (L) 240 - 430 ug/dL    Iron Sat Index 30 15 - 46 %   Ferritin    Collection Time: 11/04/24  2:30 PM   Result Value Ref Range    Ferritin 506 (H) 6 - 175 ng/mL   Quantiferon TB Gold Plus Grey Tube    Collection Time: 11/04/24  2:30 PM    Specimen: Arm, Left; Blood   Result Value Ref Range    Quantiferon Nil Tube 0.02 IU/mL   Quantiferon TB Gold Plus Green Tube    Collection Time: 11/04/24  2:30 PM    Specimen: Arm, Left; Blood   Result Value Ref Range    Quantiferon TB1 Tube 0.05 IU/mL   Quantiferon TB Gold Plus Yellow Tube    Collection Time: 11/04/24  2:30 PM    Specimen: Arm, Left; Blood   Result Value Ref Range    Quantiferon TB2 Tube 0.05    Quantiferon TB Gold Plus Purple Tube    Collection Time: 11/04/24  2:30 PM    Specimen: Arm, Left; Blood   Result Value Ref Range    Quantiferon Mitogen 6.25 IU/mL   CBC with platelets and differential    Collection Time: 11/04/24  2:30 PM   Result Value Ref Range    WBC Count 6.3 4.0 - 11.0 10e3/uL    RBC Count 3.19 (L) 3.80 - 5.20 10e6/uL    Hemoglobin 8.4 (L) 11.7 - 15.7 g/dL    Hematocrit 26.3 (L) 35.0 - 47.0 %    MCV 82 78 - 100 fL    MCH 26.3 (L) 26.5 - 33.0 pg    MCHC 31.9 31.5 - 36.5 g/dL    RDW 13.1 10.0 - 15.0 %    Platelet Count 154 150 - 450 10e3/uL    % Neutrophils 67 %    % Lymphocytes 23 %    %  Monocytes 6 %    % Eosinophils 3 %    % Basophils 1 %    % Immature Granulocytes 1 %    NRBCs per 100 WBC 0 <1 /100    Absolute Neutrophils 4.2 1.6 - 8.3 10e3/uL    Absolute Lymphocytes 1.5 0.8 - 5.3 10e3/uL    Absolute Monocytes 0.4 0.0 - 1.3 10e3/uL    Absolute Eosinophils 0.2 0.0 - 0.7 10e3/uL    Absolute Basophils 0.0 0.0 - 0.2 10e3/uL    Absolute Immature Granulocytes 0.1 <=0.4 10e3/uL    Absolute NRBCs 0.0 10e3/uL   Adult Type and Screen    Collection Time: 11/04/24  2:30 PM   Result Value Ref Range    ABO/RH(D) O POS     Antibody Screen Negative Negative    SPECIMEN EXPIRATION DATE [id number]    Quantiferon TB Gold Plus    Collection Time: 11/04/24  2:30 PM    Specimen: Arm, Left; Blood   Result Value Ref Range    Quantiferon-TB Gold Plus Negative Negative    TB1 Ag minus Nil Value 0.03 IU/mL    TB2 Ag minus Nil Value 0.03 IU/mL    Mitogen minus Nil Result 6.23 IU/mL    Nil Result 0.02 IU/mL   ABO and Rh    Collection Time: 11/04/24  2:40 PM   Result Value Ref Range    ABO/RH(D) O POS     SPECIMEN EXPIRATION DATE [id number]    UA with Microscopic reflex to Culture    Collection Time: 11/04/24  2:58 PM    Specimen: Urine, Midstream   Result Value Ref Range    Color Urine Light Yellow Colorless, Straw, Light Yellow, Yellow    Appearance Urine Slightly Cloudy (A) Clear    Glucose Urine Negative Negative mg/dL    Bilirubin Urine Negative Negative    Ketones Urine Negative Negative mg/dL    Specific Gravity Urine 1.009 1.003 - 1.035    Blood Urine Negative Negative    pH Urine 5.0 5.0 - 7.0    Protein Albumin Urine 20 (A) Negative mg/dL    Urobilinogen Urine Normal Normal, 2.0 mg/dL    Nitrite Urine Negative Negative    Leukocyte Esterase Urine Moderate (A) Negative    RBC Urine 2 <=2 /HPF    WBC Urine 4 <=5 /HPF    Squamous Epithelials Urine 2 (H) <=1 /HPF    Hyaline Casts Urine 5 (H) <=2 /LPF   Protein  random urine    Collection Time: 11/04/24  2:58 PM   Result Value Ref Range    Total Protein Urine  mg/dL 23.5   mg/dL    Total Protein Urine mg/mg Creat 0.14 0.00 - 0.20 mg/mg Cr    Creatinine Urine mg/dL 166.0 mg/dL   Urine Culture    Collection Time: 11/04/24  2:58 PM    Specimen: Urine, Midstream   Result Value Ref Range    Culture 10,000-50,000 CFU/mL Mixture of Urogenital Geno    Treponema Abs w Reflex to RPR and Titer [[id number]]    Collection Time: 11/04/24  3:38 PM   Result Value Ref Range    Treponema Antibody Total Nonreactive Nonreactive   Basic metabolic panel    Collection Time: 11/08/24 12:16 PM   Result Value Ref Range    Sodium 136 135 - 145 mmol/L    Potassium 4.6 3.4 - 5.3 mmol/L    Chloride 106 98 - 107 mmol/L    Carbon Dioxide (CO2) 20 (L) 22 - 29 mmol/L    Anion Gap 10 7 - 15 mmol/L    Urea Nitrogen 46.3 (H) 6.0 - 20.0 mg/dL    Creatinine 3.70 (H) 0.51 - 0.95 mg/dL    GFR Estimate 16 (L) >60 mL/min/1.73m2    Calcium 9.4 8.8 - 10.4 mg/dL    Glucose 103 (H) 70 - 99 mg/dL   CBC with platelets    Collection Time: 11/08/24 12:16 PM   Result Value Ref Range    WBC Count 6.4 4.0 - 11.0 10e3/uL    RBC Count 3.31 (L) 3.80 - 5.20 10e6/uL    Hemoglobin 8.6 (L) 11.7 - 15.7 g/dL    Hematocrit 27.1 (L) 35.0 - 47.0 %    MCV 82 78 - 100 fL    MCH 26.0 (L) 26.5 - 33.0 pg    MCHC 31.7 31.5 - 36.5 g/dL    RDW 13.0 10.0 - 15.0 %    Platelet Count 155 150 - 450 10e3/uL   Everolimus by Tandem Mass Spectrometry    Collection Time: 11/08/24 12:16 PM   Result Value Ref Range    Everolimus by Tandem Mass Spectrometry 9.2 (H) 3.0 - 8.0 ug/L    Everolimus Last Dose Date      Everolimus Last Dose Time     Ferritin    Collection Time: 11/08/24 12:16 PM   Result Value Ref Range    Ferritin 505 (H) 6 - 175 ng/mL   Iron & Iron Binding Capacity    Collection Time: 11/08/24 12:16 PM   Result Value Ref Range    Iron 61 37 - 145 ug/dL    Iron Binding Capacity 200 (L) 240 - 430 ug/dL    Iron Sat Index 31 15 - 46 %   Cyclosporine by Tandem Mass Spectrometry    Collection Time: 11/08/24 12:16 PM   Result Value Ref Range     Cyclosporine 303 50 - 400 ug/L    Cyclosporine Last Dose Date      Cyclosporine Last Dose Time     Protein  random urine    Collection Time: 11/08/24 12:20 PM   Result Value Ref Range    Total Protein Urine mg/dL 21.9   mg/dL    Total Protein Urine mg/mg Creat 0.17 0.00 - 0.20 mg/mg Cr    Creatinine Urine mg/dL 129.0 mg/dL       I spent a total of 60 minutes on the date of the encounter doing chart review, performing a history and physical exam, completing documentation and any further activities as noted above.       Again, thank you for allowing me to participate in the care of your patient.        Sincerely,        Ivanna Rollins NP

## 2024-11-04 NOTE — PROGRESS NOTES
Carol has appt with Miriam Hurd, , this morning.  She has many appts today, including labs. Will review labs for anemia specific ones on [id number]    Carrie Leopold, RN BSN  Anemia Services  [facility]  [email or web address]  Office: [phone]  Fax [phone]

## 2024-11-04 NOTE — PROGRESS NOTES
"Psychosocial Assessment For Kidney Transplantation  Patient Name/ Age: Carol Guillaume 34 year old   Medical Record #: [id number]  Duration of Interview:  30min  Process:   Face-to-Face Interview                (counseling < 50%)   Present at Appointment: Carol Guillaume (Patient), David Markell (), Sarah (Friends/\"Honorary\" Parents as described by patient).         : [facility] Hurd, [facility] Date:  November 4, 2024        Type of transplant: Kidney    Donor type:      Cadaver and has friends that may potentially be interested in donation.    Prior Transplants:    Yes  - Kidney 7/18/2006 Status of Transplant: Functioning.            Current Living Situation    Location:   [address] [place] [address]  With Whom:  Living in a home in [facility] with her , David, their daughter (12 yrs old), and their cat.        Family/ Social Support:     Available, helpful - Identified her , David as her primary support.    Committed Relationship:     Stable/Supportive.  to her , David.    Other Supports:    Available, helpful - Members/friends from [employer].  - Parents available for emotional support. Both diagnosed with cancer/managing their own medical concerns.        Activities/ Functional Ability    Current Level: Ambulatory, visually impaired, and independent with ADL's. Uses guide cane. Some assistance with IADL's as needed. Utilizes [employer] for grocery shopping, medication delivery for meds, etc.      Transportation Patient and her  do not drive. Relies on friends, [employer], or Uber/Lyft.        Vocational/Employment/Financial     Employment   Full Time. 24 clients/week.    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    Income   Salary/wages and " "Spouse's Income.   Insurance      At this time, patient can afford medication costs:  Yes  Yusef KIM [employer].        Medical Status    Current Mode of Treatment for ESRD None   Complications None. Not a diabetic.        Behavioral    Tobacco Use  No. Denied current or history of concerns.  Chemical Dependency No. Denied current or history of concerns.          Psychiatric Impairment  Endorsed hx of depression and PTSD. Reported this was more prevalent during COVID/riots, etc. Denied currently using medications for symptom management. Current therapist is on maternity leave, plans to resume sessions once they're back.   Denied SI today or within past two weeks. Denied prior hospitalizations, self-injurious behaviors, and previous suicide attempts. Alluded to some potential eating concerns. Described having \"closeted eating disorder\" concerns and shared that eating/drinking soda has been a way of coping in the past. Potential need for more specialized therapy/support around this.      Reading Ability: Good  Education Level: Masters Degree. Recent Legal History No      Coping Style/Strategies: Reported that she relies on her tanner and has strong relationships with members of her [employer] ([place]). Other coping strategies include getting massages 1x/month, spending time with family/friends, and playing with her cat.        Ability to Adhere to Complex Medical Regime: Yes .    Adherence History: Patient feels confident in their ability to manage their appointments and follow up with the doctor when needed. They report no concerns in maintaining this moving forward.         Education  _X_ Medicare  _X_ Rehabilitation  _X_ Donor issues  _X_ Community resources  _X_ Post discharge housing  _X_ Financial resources  _X_ Medical insurance options  _X_ Psych adjustment  _X_ Family adjustment  _X_ Health Care Directive - Provided education and a handout to complete.    Psychosocial Risks of Transplant Reviewed and Discussed:  _X_ " Increased stress related to emotional,            family, social, employment or financial           situation  _X_ Effect on work and/or disability benefits  _X_ Effect on future health and life           insurance  _X_ Transplant outcome expectations may           not be met  _X_ Mental Health Risks: anxiety,           depression, PTSD, guilt, grief and           chronic fatigue     Notable Items:   None noted.       Final Evaluation/Assessment   Patient seemed to process information well. Appeared well informed, motivated and able to follow post transplant requirements. Behavior was appropriate during interview. Has adequate income and insurance coverage. Adequate social support. No major contraindications noted for transplant.  At this time patient appears to understand the risks and benefits of transplant.      Recommendation  Acceptable    Selection Criteria Met:  Plan for support Yes   Chemical Dependence Yes   Smoking Yes   Mental Health Yes   Adequate Finances Yes    Signature: PAM Echevarria [facility]   Title: Clinical

## 2024-11-04 NOTE — PROGRESS NOTES
Transplant Surgery Consult Note     Medical record number: [id number]  YOB: 1990,   Consult requested  for evaluation of kidney transplant candidacy.    Assessment and Recommendations:Ms. Guillaume appears to be a good candidate for kidney transplantation and has a good understanding of the risks and benefits of this approach to the management of renal failure. 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  Recommend he be considered as a candidate for kidney transplant.  Cardiology consult for cardiac risk stratification to be ordered: No  CT abdomen and pelvis without contrast to be ordered for assessment of vascular targets: Yes  Transplant listing labs ordered to include HLA, ABOx2, Cr, etc.  Dietician consult ordered: Yes  Social work consult ordered: Yes  Imaging reports reviewed:  No recent scans to review  Radiology images reviewed:No recent scans to review  Recipient suitable to move forward with work up of living donors:  Yes  Dr. Bautista recommended CT and KUB to determine best treatment for current stone. Please assist her with follow up   Was on Cellcept for 10 yrs and started having flu like symptoms/ears hurting/malaise. Was switched due to that and was switched to Myfortic. Was having joint pain and GI bleeding and was switched to everolimus. She would like to re-trial Cellcept  Uterine potential cancer 2020 but atypical hyperplasia. Does q6m monitoring  Needs derm for skin check  Hemoglobin A1c check today. Has had some increased fasting glucoses. Discussed diabetes and weight loss  Encouraged weight loss but is surgically feasible at current weight. We discussed the increased risks of wound complications.  CT scan  today        The majority of our visit was spent in counselling, discussing the medical and surgical risks of kidney transplantation. We discussed approximate wait time and how that is influenced by issues such as blood type and sensitization (PRA) and access to a living donor. I contrasted potential waiting time for living vs  donor kidneys from  normal (0-85%) or higher (%) kidney donor profile index (KDPI) donors and their associated outcomes. I would not recommend this individual to consider kidneys from high KDPI donors. The reason for this decision is best summarized as: improved long term graft survival. Potential surgical complications of kidney transplantation include bleeding, superficial or deep wound complications (infection, hernia, lymphocele), ureteral anastomotic failure (leak or stenosis), graft thrombosis, need for reoperation and other issues such as cardiac complications, pneumonia, deep venous thrombosis, pulmonary embolism, post transplant diabetes and death. The potential for recurrent disease or need for retransplantation was also addressed. We discussed the possible need for ureteral stent (and subsequent removal), and the utility of protocol biopsy and laboratory studies to evaluate for rejection or recurrent disease. We discussed the risk of graft rejection, our center's average graft and patient survival rates, immunosuppression protocols, as well as the potential opportunity to participate in clinical trials.  We also discussed the average length of stay, recovery process, and posttransplant lab and monitoring protocol.  I emphasized the need for strict immunosuppression medication adherence and the potential for complications of immunosuppression such as skin cancer or lymphoma, as well as a very low but not zero risk of donor-derived disease transmission risks (infection, cancer). Ms. Guillaume asked good questions and her candidacy will be reviewed at our Multidisciplinary  Selection Committee. Thank you for the opportunity to participate in Ms. Guillaume's care.      Total time: 60 minutes        Rhianna Lowery MD FACS  Associate Professor of Surgery  Director, Living Kidney Donor Program.    ---------------------------------------------------------------------------------------------------    HPI: Ms. 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 The patient is non-diabetic.       The patient is not on dialysis.    Has potential kidney donors:  Yes .  Interested in participation in paired exchange if a donor is willing: Doesn't know     The patient has the following pertinent history:       No    Yes  Dialysis:    [x]      [] via:       Blood Transfusion                  [x]      []  Number of units:   Most recently:  Pregnancy:    [x]      [] Number:       Previous Transplant:  []      [x] Details:    Cancer    [x]      [] Comment:   Kidney stones   []      [x] Comment:      Recurrent infections  [x]      []  Type:                  Bladder dysfunction  [x]      [] Cause:    Claudication   [x]      [] Distance:    Previous Amputation  [x]      [] Cause:     Chronic anticoagulation  [x]      [] Indication:   Uatsdin  [x]      []      Past Medical History:   Diagnosis Date    Acquired hypothyroidism 08/02/2023    Anemia     Anxiety     Cancer (H) 10/30/2020    Atypical Hyperplasia endometriosis carcinoma    Cataract 2015    CMV (cytomegalovirus infection) (H)     CMV disease (H) 2006    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    Depression     EIN (endometrial intraepithelial neoplasia)     Fatigue     Gastroesophageal reflux disease     High cholesterol     Hypertension     Insomnia     Insulin resistance     Legally blind     Obesity     PCOS (polycystic ovarian syndrome)     PPD positive, treated 2006    9 months of INH    Pseudotumor cerebri     on Diamox    Retinitis pigmentosa     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      Uncomplicated asthma     as a child    Urinary tract infection 04/17/2024    Viremia      Past Surgical History:   Procedure Laterality Date    BIOPSY      cataract bilateral  06/2017    DILATION AND CURETTAGE N/A 09/13/2021    Procedure: DILATION AND CURETTAGE,;  Surgeon: Lisbeth Gallardo MD;  Location:  OR    DILATION AND CURETTAGE N/A 05/09/2022    Procedure: DILATION AND CURETTAGE;  Surgeon: Lisbeth Gallardo MD;  Location:  OR    DILATION AND CURETTAGE N/A 5/4/2023    Procedure: Dilation and Curettage of Endometrium;  Surgeon: Lisbeth Gallardo MD;  Location:  OR    DILATION AND CURETTAGE, OPERATIVE HYSTEROSCOPY WITH MORCELLATOR, COMBINED N/A 10/23/2020    Procedure: HYSTEROSCOPY, DILATION AND CURRETAGE, MYOSURE;  Surgeon: Kierra Tracy MD;  Location:  OR    DILATION AND CURETTAGE, OPERATIVE HYSTEROSCOPY WITH MORCELLATOR, COMBINED N/A 03/05/2021    Procedure: HYSTEROSCOPY, WITH DILATION AND CURETTAGE OF UTERUS USING  MORCELLATOR;  Surgeon: Fiorella Cunningham MD;  Location:  OR    ESOPHAGOSCOPY, GASTROSCOPY, DUODENOSCOPY (EGD), COMBINED N/A 01/24/2023    Procedure: ESOPHAGOGASTRODUODENOSCOPY, WITH BIOPSY;  Surgeon: Talia Aguilar MD;  Location: [facility] [place]    ESOPHAGOSCOPY, GASTROSCOPY, DUODENOSCOPY (EGD), COMBINED N/A 6/7/2024    Procedure: Esophagoscopy, gastroscopy, duodenoscopy (EGD), combined;  Surgeon: Charli Watts MD;  Location: [facility] [place]    INSERT INTRAUTERINE DEVICE N/A 03/05/2021    Procedure: INSERTION MIRENA INTRAUTERINE DEVICE;  Surgeon: Fiorella Cunningham MD;  Location:  OR    IR RENAL BIOPSY RIGHT  08/27/2021    LAPAROSCOPY DIAGNOSTIC (GYN)  03/05/2021    Procedure: Laparoscopy diagnostic (gyn);  Surgeon: Fiorella Cunningham MD;  Location:  OR    LITHOTRIPSY      LITHOTRIPSY      REMOVE INTRAUTERINE DEVICE N/A 03/05/2021    Procedure: REMOVE MIRENA INTRAUTERINE DEVICE;  Surgeon: Fiorella Cunningham MD;  Location:  OR    REMOVE INTRAUTERINE DEVICE N/A  05/09/2022    Procedure: EXCHANGE OF MIRENA INTRAUTERINE DEVICE;  Surgeon: Lisbeth Gallardo MD;  Location: [facility] OR    REPLACE INTRAUTERINE DEVICE N/A 09/13/2021    Procedure: MIRENA INTRAUTERINE DEVICE EXCHANGE;  Surgeon: Lisbeth Gallardo MD;  Location: [facility] OR    SINUS SURGERY  2001    TONSILLECTOMY      TRANSPLANT KIDNEY RECIPIENT LIVING RELATED Right 07/2006    wisdom teeth       Family History   Problem Relation Age of Onset    Hyperlipidemia Mother     Osteoporosis Mother     Breast Cancer Mother     Sjogren's Father     Hashimoto's thyroiditis Father     Thyroid Cancer Father     Other - See Comments Maternal Grandfather         surgical complication    Atrial fibrillation Paternal Grandmother     Arthritis Paternal Grandmother     Melanoma Paternal Grandfather     Parkinsonism Paternal Uncle         paternal uncle    Pulmonary Embolism Paternal Uncle      Social History     Socioeconomic History    Marital status:      Spouse name: David    Number of children: 0    Years of education: Not on file    Highest education level: Master's degree (e.g., MA, MS, Gracie, MEd, MSW, TRACY)   Occupational History    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   end of November 2018 to David has one adopted daughter David's  niece Daryn 2012 Blind.     Social Drivers of Health     Financial Resource Strain: Low Risk  (8/16/2021)    Overall Financial Resource Strain (CARDIA)     Difficulty of Paying Living Expenses: Not hard at all   Food  Insecurity: No Food Insecurity (8/16/2021)    Hunger Vital Sign     Worried About Running Out of Food in the Last Year: Never true     Ran Out of Food in the Last Year: Never true   Transportation Needs: Unmet Transportation Needs (8/16/2021)    PRAPARE - Transportation     Lack of Transportation (Medical): Yes     Lack of Transportation (Non-Medical): Yes   Physical Activity: Insufficiently Active (8/16/2021)    Exercise Vital Sign     Days of Exercise per Week: 5 days     Minutes of Exercise per Session: 20 min   Stress: Stress Concern Present (8/16/2021)    [facility] - Occupational Stress Questionnaire     Feeling of Stress : Rather much   Social Connections: Unknown (8/9/2023)    [facility]    Social Connections     Frequency of Communication with Friends and Family: Not on file   Interpersonal Safety: Low Risk  (12/4/2023)    Interpersonal Safety     Do you feel physically and emotionally safe where you currently live?: Yes     Within the past 12 months, have you been hit, slapped, kicked or otherwise physically hurt by someone?: No     Within the past 12 months, have you been humiliated or emotionally abused in other ways by your partner or ex-partner?: No   Housing Stability: Low Risk  (5/4/2022)    Housing Stability Vital Sign     Unable to Pay for Housing in the Last Year: No     Number of Places Lived in the Last Year: 1     Unstable Housing in the Last Year: No       ROS:   CONSTITUTIONAL:  No fevers or chills  EYES: negative for icterus  ENT:  negative for hearing loss, tinnitus and sore throat  RESPIRATORY:  negative for cough, sputum, dyspnea  CARDIOVASCULAR:  negative for chest pain Fatigue  GASTROINTESTINAL:  negative for nausea, vomiting, diarrhea or constipation  GENITOURINARY:  negative for incontinence, dysuria, bladder emptying problems  HEME:  No easy  bruising  INTEGUMENT:  negative for rash and pruritus  NEURO:  Negative for headache, seizure disorder  Allergies:   No Known Allergies  Medications:  Prescription Medications as of 11/4/2024         Rx Number Disp Refills Start End Last Dispensed Date Next Fill Date Owning Pharmacy    acetaminophen (TYLENOL) 325 MG tablet  50 tablet 0 10/23/2020 --   [facility] [address] [facility]    Sig: Take 2 tablets (650 mg) by mouth every 4 hours as needed for mild pain    Class: Local Print    Route: Oral    amLODIPine (NORVASC) 5 MG tablet  90 tablet 3 9/20/2024 --   [facility] [place] [address]    Sig: TAKE 1 TABLET BY MOUTH DAILY    Class: E-Prescribe    Route: Oral    carvedilol (COREG) 25 MG tablet  180 tablet 1 7/16/2024 --   [facility] [place] [address]    Sig: TAKE 1 TABLET BY MOUTH 2 TIMES DAILY (WITH MEALS)    Class: E-Prescribe    Route: Oral    COMPOUNDED NON-CONTROLLED SUBSTANCE (CMPD RX) - PHARMACY TO MIX COMPOUNDED MEDICATION  4 each 1 8/6/2024 --   [facility] [place] [address]    Sig: Inject 0.25mg semaglutide once weekly    Class: E-Prescribe    everolimus (ZORTRESS) 0.25 MG tablet  -- -- 7/17/2024 --       Sig: Take 2 tablets (0.5 mg) by mouth every morning AND 1 tablet (0.25 mg) every evening.    Class: No Print Out    Notes to Pharmacy: TXP DT 7/18/2006 (Kidney) TXP Dischg DT   DX Kidney replaced by transplant Z94.0 [facility] ([place], [place])    Route: Oral    everolimus (ZORTRESS) 0.75 MG tablet  60 tablet 11 7/26/2024 --   [facility] [place] [address]    Sig: Hold for future dose changes    Class: E-Prescribe    levothyroxine (SYNTHROID/LEVOTHROID) 50 MCG tablet  90 tablet 1 5/8/2024 --   [facility] [place] [address]  "   Sig: Take 1 tablet (50 mcg) by mouth daily    Class: E-Prescribe    Route: Oral    losartan (COZAAR) 25 MG tablet  180 tablet 3 5/20/2024 --   [facility] - [place] [address] [address]    Sig: TAKE 1 TABLET BY MOUTH 2 TIMES DAILY    Class: E-Prescribe    Route: Oral    NEORAL (BRAND) 25 MG capsule  270 capsule 3 10/8/2024 --   [facility] - [place] [address] [address]    Sig: Take 2 capsules (50 mg) by mouth every morning AND 1 capsule (25 mg) every evening.    Class: E-Prescribe    Notes to Pharmacy: TXP DT 7/18/2006 (Kidney) TXP Dischg DT   DX Kidney replaced by transplant Z94.0 TX Center [facility] ([place], [place])    Route: Oral    ondansetron (ZOFRAN ODT) 4 MG ODT tab  30 tablet 0 8/21/2024 --   [id number] [address]    Sig: Take 1 tablet (4 mg) by mouth every 8 hours as needed for nausea.    Class: E-Prescribe    Route: Oral    Prior authorization: Approved    polyethylene glycol (MIRALAX) 17 GM/Dose powder  -- -- 8/29/2024 --       Sig: Take 17 g (1 Capful) by mouth daily.    Class: Historical    Route: Oral    psyllium (METAMUCIL/KONSYL) 58.6 % powder  -- --  --       Sig: Take 1 teaspoonful by mouth daily.    Class: Historical    Route: Oral    vitamin D3 (CHOLECALCIFEROL) 50 mcg (2000 units) tablet  90 tablet 3 8/27/2024 --   [facility] [place] [address] [address]    Sig: Take 1 tablet (50 mcg) by mouth daily.    Class: E-Prescribe    Route: Oral          Exam:     /78   Pulse 98   Ht 1.67 m (5' 5.75\")   Wt 109.7 kg (241 lb 14.4 oz)   SpO2 97%   BMI 39.34 kg/m    Appearance: in no apparent distress.   Skin: normal  Eyes:  no redness or discharge.  Sclera anicteric  Head and Neck: Normal, no rashes or jaundice  Respiratory: easy respirations, no audible wheezing.  Abdomen: rounded, obese, and protuberant, No distention, " Surgical scars consistent with history, and RLQ Ricks incision   Extremities: femoral 2+/2+, Edema, pitting and 1+  Neuro: without deficit, vision impaired(blind)   Psychiatric: Normal mood and affect    Diagnostics:   No results found for this or any previous visit (from the past 4 weeks).  [facility] CPRA   Date Value Ref Range Status   09/03/2021 98  Final

## 2024-11-05 ENCOUNTER — PATIENT OUTREACH (OUTPATIENT)
Dept: CARE COORDINATION | Facility: CLINIC | Age: 34
End: 2024-11-05
Payer: COMMERCIAL

## 2024-11-05 LAB
BACTERIA UR CULT: NORMAL
CMV IGG SERPL IA-ACNC: >10 U/ML
CMV IGG SERPL IA-ACNC: ABNORMAL
DRVVT SCREEN RATIO: 1.07
EBV VCA IGG SER IA-ACNC: 557 U/ML
EBV VCA IGG SER IA-ACNC: POSITIVE
LA PPP-IMP: NEGATIVE
LUPUS INTERPRETATION: NORMAL
PTT RATIO: 1.19
QUANTIFERON MITOGEN: 6.25 IU/ML
QUANTIFERON NIL TUBE: 0.02 IU/ML
QUANTIFERON TB1 TUBE: 0.05 IU/ML
QUANTIFERON TB2 TUBE: 0.05
THROMBIN TIME: 18.8 SECONDS (ref 13–19)
VZV IGG SER QL IA: 26.6 S/CO
VZV IGG SER QL IA: POSITIVE

## 2024-11-05 NOTE — PROGRESS NOTES
RE: Is MARCO appropriate?  Received: Today  El Rifai, Rasha, MD Leopold, Jamila PUENTE RN  Ok to proceed and target hb~9-10    Rifinitit message sent to Carol re: starting MARCO, administration details given, and risks/benefits explained. Waiting to hear back from pt with questions/concerns, preference for administration location.    Follow up [id number]    Carrie Leopold, RN BSN  Anemia Services  [facility]  [email or web address]  Office: [phone]  Fax [phone]

## 2024-11-05 NOTE — PROGRESS NOTES
Anemia Management Note - Follow Up      SUBJECTIVE/OBJECTIVE:    Referred by Dr. Feliberto Rueda on 7/10/2024  Primary Diagnosis: Anemia of Other Chronic Illness (D63.8)     Secondary Diagnosis: Organ or tissue replaced by transplant, kidney (Z94.0)  Date of Kidney Transplant: 06  Hgb goal range: 9-10  Epo/Darbo:   TBD  Iron regimen: Needs IV iron  Injectafer completed [id number]     Labs : 712  RX/TX plans : TBD     Recent MARCO use, transfusion, IV iron: none  No history of stroke, MI, and blood clots. Atypical Hyperplasia endometriosis carcinoma ,      No consent to communicate on file        Latest Ref Rng & Units 7/15/2024 2024 2024 2024 2024 10/7/2024 2024   Anemia   Hemoglobin 11.7 - 15.7 g/dL 10.0  9.0  9.4  8.5  8.7  9.2  8.4    IV Iron Dose   750mg  750mg      TSAT 15 - 46 % 17     30  25  30    Ferritin 6 - 175 ng/mL     678   506         BP Readings from Last 3 Encounters:   24 114/78   24 114/78   24 104/71     Wt Readings from Last 2 Encounters:   24 109.7 kg (241 lb 14.4 oz)   24 109.7 kg (241 lb 14.4 oz)         ASSESSMENT:    Hgb:Not at goal/Known  TSat: at goal >30% Ferritin: At goal (>100ng/mL)   Goals Addressed    None         PLAN:  Staff message sent to provider re: starting MARCO, in light of her 2020 endometrial cancer dx     Orders needed to be renewed (for next follow-up date) in EPIC: None    Iron labs due:  monthly, due 2024    Plan discussed with:  no call to pt, chart reviewed      NEXT FOLLOW-UP DATE:  105580    Carrie Leopold, RN BSN  Anemia Services  [facility]  [email or web address]  Office: [phone]  Fax [phone]

## 2024-11-05 NOTE — PROGRESS NOTES
Carol called in, in follow up to email sent. Asked questions re: medication options, administration and frequency, lab monitoring, and SE/risks. Writer answered her questions to her satisfaction.     If possible, she'd like to see if she has coverage for home administration. Writer will begin that process and will update her once BI is complete    She would prefer prefilled syringes d/t loss of vision and she has experience with semaglutide, but she does note that she has close family/friends who would be able to draw up medication if needed.     Invited her to call anytime for questions.    Carrie Leopold, RN BSN  [facility] Services  [facility]  [email or web address]  Office: [phone]  Fax [phone]

## 2024-11-06 LAB
CARDIOLIPIN IGG SER IA-ACNC: <2 GPL-U/ML
CARDIOLIPIN IGG SER IA-ACNC: NEGATIVE
CARDIOLIPIN IGM SER IA-ACNC: 2 MPL-U/ML
CARDIOLIPIN IGM SER IA-ACNC: NEGATIVE
GAMMA INTERFERON BACKGROUND BLD IA-ACNC: 0.02 IU/ML
M TB IFN-G BLD-IMP: NEGATIVE
M TB IFN-G CD4+ BCKGRND COR BLD-ACNC: 6.23 IU/ML
MITOGEN IGNF BCKGRD COR BLD-ACNC: 0.03 IU/ML
MITOGEN IGNF BCKGRD COR BLD-ACNC: 0.03 IU/ML

## 2024-11-06 NOTE — PROGRESS NOTES
Per pharmacy , pt does not have rx benefits for MARCO. She will need to go to clinic for administration.    Spoke to Carol, she agrees to go to transplant clinic for Aranesp. Advised she make appts every 2 weeks through end of the year. Scheduling number provided. Invited her to call if any questions/concerns    Therapy plan entered for Aranesp 60mcg every 2 weeks.    Follow up date: 111224- check aranesp appts    Carrie Leopold, RN BSN  Anemia Services  [facility]  [email or web address]  Office: [phone]  Fax [phone]

## 2024-11-07 NOTE — PROGRESS NOTES
Aranesp first dose is scheduled [id number] at 1p.    Carrie Leopold, RN BSN  Anemia Services  [facility]  [email or web address]  Office: [phone]  Fax [phone]

## 2024-11-08 ENCOUNTER — ALLIED HEALTH/NURSE VISIT (OUTPATIENT)
Dept: TRANSPLANT | Facility: CLINIC | Age: 34
End: 2024-11-08
Attending: INTERNAL MEDICINE
Payer: COMMERCIAL

## 2024-11-08 ENCOUNTER — VIRTUAL VISIT (OUTPATIENT)
Dept: ENDOCRINOLOGY | Facility: CLINIC | Age: 34
End: 2024-11-08
Attending: NURSE PRACTITIONER
Payer: COMMERCIAL

## 2024-11-08 ENCOUNTER — LAB (OUTPATIENT)
Dept: LAB | Facility: CLINIC | Age: 34
End: 2024-11-08
Payer: COMMERCIAL

## 2024-11-08 VITALS — HEIGHT: 66 IN | WEIGHT: 241 LBS | BODY MASS INDEX: 38.73 KG/M2

## 2024-11-08 VITALS — SYSTOLIC BLOOD PRESSURE: 93 MMHG | DIASTOLIC BLOOD PRESSURE: 63 MMHG | HEART RATE: 101 BPM

## 2024-11-08 DIAGNOSIS — D63.8 ANEMIA IN OTHER CHRONIC DISEASES CLASSIFIED ELSEWHERE: ICD-10-CM

## 2024-11-08 DIAGNOSIS — N18.4 CHRONIC KIDNEY DISEASE, STAGE IV (SEVERE) (H): ICD-10-CM

## 2024-11-08 DIAGNOSIS — Z94.0 KIDNEY REPLACED BY TRANSPLANT: ICD-10-CM

## 2024-11-08 DIAGNOSIS — Z94.0 HTN, KIDNEY TRANSPLANT RELATED: Primary | ICD-10-CM

## 2024-11-08 DIAGNOSIS — E66.813 CLASS 3 SEVERE OBESITY WITH SERIOUS COMORBIDITY AND BODY MASS INDEX (BMI) OF 40.0 TO 44.9 IN ADULT, UNSPECIFIED OBESITY TYPE (H): Primary | ICD-10-CM

## 2024-11-08 DIAGNOSIS — R11.0 NAUSEA: ICD-10-CM

## 2024-11-08 DIAGNOSIS — E78.5 DYSLIPIDEMIA: ICD-10-CM

## 2024-11-08 DIAGNOSIS — E28.2 PCOS (POLYCYSTIC OVARIAN SYNDROME): ICD-10-CM

## 2024-11-08 DIAGNOSIS — I15.1 HTN, KIDNEY TRANSPLANT RELATED: Primary | ICD-10-CM

## 2024-11-08 DIAGNOSIS — E66.01 CLASS 3 SEVERE OBESITY WITH SERIOUS COMORBIDITY AND BODY MASS INDEX (BMI) OF 40.0 TO 44.9 IN ADULT, UNSPECIFIED OBESITY TYPE (H): Primary | ICD-10-CM

## 2024-11-08 LAB
ALBUMIN MFR UR ELPH: 21.9 MG/DL
ANION GAP SERPL CALCULATED.3IONS-SCNC: 10 MMOL/L (ref 7–15)
BUN SERPL-MCNC: 46.3 MG/DL (ref 6–20)
CALCIUM SERPL-MCNC: 9.4 MG/DL (ref 8.8–10.4)
CHLORIDE SERPL-SCNC: 106 MMOL/L (ref 98–107)
CREAT SERPL-MCNC: 3.7 MG/DL (ref 0.51–0.95)
CREAT UR-MCNC: 129 MG/DL
CYCLOSPORINE BLD LC/MS/MS-MCNC: 303 UG/L (ref 50–400)
EGFRCR SERPLBLD CKD-EPI 2021: 16 ML/MIN/1.73M2
ERYTHROCYTE [DISTWIDTH] IN BLOOD BY AUTOMATED COUNT: 13 % (ref 10–15)
FERRITIN SERPL-MCNC: 505 NG/ML (ref 6–175)
GLUCOSE SERPL-MCNC: 103 MG/DL (ref 70–99)
HCO3 SERPL-SCNC: 20 MMOL/L (ref 22–29)
HCT VFR BLD AUTO: 27.1 % (ref 35–47)
HGB BLD-MCNC: 8.6 G/DL (ref 11.7–15.7)
IRON BINDING CAPACITY (ROCHE): 200 UG/DL (ref 240–430)
IRON SATN MFR SERPL: 31 % (ref 15–46)
IRON SERPL-MCNC: 61 UG/DL (ref 37–145)
MCH RBC QN AUTO: 26 PG (ref 26.5–33)
MCHC RBC AUTO-ENTMCNC: 31.7 G/DL (ref 31.5–36.5)
MCV RBC AUTO: 82 FL (ref 78–100)
OXALATE SERPL-SCNC: 6.2 UMOL/L
PLATELET # BLD AUTO: 155 10E3/UL (ref 150–450)
POTASSIUM SERPL-SCNC: 4.6 MMOL/L (ref 3.4–5.3)
PROT/CREAT 24H UR: 0.17 MG/MG CR (ref 0–0.2)
RBC # BLD AUTO: 3.31 10E6/UL (ref 3.8–5.2)
SODIUM SERPL-SCNC: 136 MMOL/L (ref 135–145)
TME LAST DOSE: NORMAL H
TME LAST DOSE: NORMAL H
WBC # BLD AUTO: 6.4 10E3/UL (ref 4–11)

## 2024-11-08 PROCEDURE — 99213 OFFICE O/P EST LOW 20 MIN: CPT | Mod: 95 | Performed by: NURSE PRACTITIONER

## 2024-11-08 PROCEDURE — 36415 COLL VENOUS BLD VENIPUNCTURE: CPT | Performed by: PATHOLOGY

## 2024-11-08 PROCEDURE — 99000 SPECIMEN HANDLING OFFICE-LAB: CPT | Performed by: PATHOLOGY

## 2024-11-08 PROCEDURE — 80158 DRUG ASSAY CYCLOSPORINE: CPT | Performed by: INTERNAL MEDICINE

## 2024-11-08 PROCEDURE — 96372 THER/PROPH/DIAG INJ SC/IM: CPT | Performed by: INTERNAL MEDICINE

## 2024-11-08 PROCEDURE — 83550 IRON BINDING TEST: CPT | Performed by: PATHOLOGY

## 2024-11-08 PROCEDURE — 80048 BASIC METABOLIC PNL TOTAL CA: CPT | Performed by: PATHOLOGY

## 2024-11-08 PROCEDURE — 250N000011 HC RX IP 250 OP 636: Mod: JZ | Performed by: INTERNAL MEDICINE

## 2024-11-08 PROCEDURE — 80169 DRUG ASSAY EVEROLIMUS: CPT | Performed by: INTERNAL MEDICINE

## 2024-11-08 PROCEDURE — 84156 ASSAY OF PROTEIN URINE: CPT | Performed by: PATHOLOGY

## 2024-11-08 PROCEDURE — G2211 COMPLEX E/M VISIT ADD ON: HCPCS | Mod: 95 | Performed by: NURSE PRACTITIONER

## 2024-11-08 PROCEDURE — 85027 COMPLETE CBC AUTOMATED: CPT | Performed by: PATHOLOGY

## 2024-11-08 PROCEDURE — 83540 ASSAY OF IRON: CPT | Performed by: PATHOLOGY

## 2024-11-08 PROCEDURE — 82728 ASSAY OF FERRITIN: CPT | Performed by: PATHOLOGY

## 2024-11-08 RX ORDER — MEPERIDINE HYDROCHLORIDE 25 MG/ML
25 INJECTION INTRAMUSCULAR; INTRAVENOUS; SUBCUTANEOUS
OUTPATIENT
Start: 2024-11-08

## 2024-11-08 RX ORDER — EPINEPHRINE 1 MG/ML
0.3 INJECTION, SOLUTION, CONCENTRATE INTRAVENOUS EVERY 5 MIN PRN
OUTPATIENT
Start: 2024-11-08

## 2024-11-08 RX ORDER — ALBUTEROL SULFATE 90 UG/1
1-2 INHALANT RESPIRATORY (INHALATION)
Start: 2024-11-08

## 2024-11-08 RX ORDER — DIPHENHYDRAMINE HYDROCHLORIDE 50 MG/ML
25 INJECTION INTRAMUSCULAR; INTRAVENOUS
Start: 2024-11-08

## 2024-11-08 RX ORDER — ALBUTEROL SULFATE 0.83 MG/ML
2.5 SOLUTION RESPIRATORY (INHALATION)
OUTPATIENT
Start: 2024-11-08

## 2024-11-08 RX ORDER — ONDANSETRON 4 MG/1
4 TABLET, ORALLY DISINTEGRATING ORAL EVERY 8 HOURS PRN
Qty: 30 TABLET | Status: CANCELLED | OUTPATIENT
Start: 2024-11-08

## 2024-11-08 RX ORDER — DIPHENHYDRAMINE HYDROCHLORIDE 50 MG/ML
50 INJECTION INTRAMUSCULAR; INTRAVENOUS
Start: 2024-11-08

## 2024-11-08 RX ORDER — METHYLPREDNISOLONE SODIUM SUCCINATE 40 MG/ML
40 INJECTION INTRAMUSCULAR; INTRAVENOUS
Start: 2024-11-08

## 2024-11-08 RX ADMIN — DARBEPOETIN ALFA 60 MCG: 60 INJECTION, SOLUTION INTRAVENOUS; SUBCUTANEOUS at 13:23

## 2024-11-08 NOTE — PROGRESS NOTES
Chief Complaint   Patient presents with    Allied Health Visit     Aranesp     Frequency: q14  Most recent or today's HGB: 8.6   Date:   Date of lat dose: First Dose  HGB associated with last dose given: First Dose    Blood Pressure:    Diagnosis: Anemia due CKD    Ordered by: Feliberto Littlejohn MD  VIS Offered: Yes    Double Checked by: Gisselle ALLEN CMA    See MAR for administration details    Pt's first name, last name and  verified prior to medication administration, injection given without complications or questions.       Jolly Russo CMA

## 2024-11-08 NOTE — PROGRESS NOTES
Return Medical Weight Management Note     Carol Guillaume  MRN:  [id number]  :  1990  JOSE:  2024    Dear Danny Shi MD,    I had the pleasure of seeing your patient Carol Guillaume. She is a 34 year old female who I am continuing to see for treatment of obesity related to:        3/9/2020    12:22 PM   --   I have the following health issues associated with obesity Heart Disease    High Blood Pressure   I have the following symptoms associated with obesity Back Pain    Fatigue       Assessment & Plan   Problem List Items Addressed This Visit          Digestive    Class 3 severe obesity with serious comorbidity and body mass index (BMI) of 40.0 to 44.9 in adult, unspecified obesity type (H) - Primary     Down 20lb since starting compounded semaglutide. Minimal appetite so has to be mindful of getting meals. Has been having some constipation and added miralax and fiber which has started to help but now has alternating constipation and diarrhea. Discussed adjustments that can be made. Added prunes which was maybe helpful. Some financial issues recently but feels compounded semaglutide still worth continuing. Do not think she'll tolerate dose increase until constipation improves.     Continues to want to avoid sleeve gastrectomy for more sustainable weight loss. Mentioned Endoscopic sleeve which is a new and reversible option at our site.     Listed for transplant but nothing official.     Continue 0.25mg semaglutide   Can try gradually increasing metamucil (psyllium) - 1.5tsp to start   Can try reducing miralax to 1/2 capful daily   Can continue prunes  Continue with good hydration   Be mindful of getting enough protein   Follow up 3-4 months          Relevant Medications    COMPOUNDED NON-CONTROLLED SUBSTANCE (CMPD RX) - PHARMACY TO MIX COMPOUNDED MEDICATION       Endocrine    PCOS (polycystic ovarian syndrome)    Dyslipidemia    Relevant Medications    COMPOUNDED NON-CONTROLLED SUBSTANCE (CMPD RX)  - PHARMACY TO MIX COMPOUNDED MEDICATION       Other    Kidney replaced by transplant     Other Visit Diagnoses       Chronic kidney disease, stage IV (severe) (H)        Relevant Medications    COMPOUNDED NON-CONTROLLED SUBSTANCE (CMPD RX) - PHARMACY TO MIX COMPOUNDED MEDICATION               INTERVAL HISTORY:    new MWM with Amira Gomez PA-C 6/2023 BMI 43 with hx kidney transplant in 2006. Difficulty with kidney since chemo for uterine cancer in 2021 and now GFR is 22. Needing transplant again. Trying to avoid dialysis. Difficulty getting  antiobesity medications approved/ started.     Last seen 8/2024 discussed [facility] semaglutide to achieve weight loss goal to get on transplant list.   11/2024- okay for transplant but encouraged to continue to lose weight - transplant conference next week to make things official     Continues to want to avoid surgery     Anti-obesity medication history    Current:   Semaglutide 0.25mg  -constipation - taking miralax and fiber, added some prunes- now a couple days without BM then very loose stools - concerns her when she leave home   -reduced appetite - not really hungry - harder to eat when she hasn't had BM     Past/Failed/contraindicated:   Metformin- GFR 17  Phentermine- contraindicated with kidney failure  Topiramate- hx of kidney stones     Recent diet changes:   No fluid restrictions. Drinks 3L daily     Recent exercise/activity changes: really tired     Recent stressors:   Dad has stage 4 cancer   Mom has stage 1 cancer   Daughter in some trouble   Some financial stressors     Recent sleep changes: really tired     Vitamins/Labs:   Anemia     CURRENT WEIGHT:   241 lbs 0 oz    Initial Weight (lbs): 275 lbs  Last Visits Weight: 119.7 kg (264 lb)  Cumulative weight loss (lbs): 34  Weight Loss Percentage: 12.36%        8/6/2024    10:00 AM   Changes and Difficulties   I have made the following changes to my diet since my last visit: Avoiding grains, drinking  more protein shakes   With regards to my diet, I am still struggling with: The weight loss itself, stagnation   I have made the following changes to my activity/exercise since my last visit: Walking more   With regards to my activity/exercise, I am still struggling with: Swelling, shortness of breath easily. Hill are difficult         MEDICATIONS:   Current Outpatient Medications   Medication Sig Dispense Refill    COMPOUNDED NON-CONTROLLED SUBSTANCE (CMPD RX) - PHARMACY TO MIX COMPOUNDED MEDICATION Inject 0.25mg semaglutide once weekly 4 each 3    acetaminophen (TYLENOL) 325 MG tablet Take 2 tablets (650 mg) by mouth every 4 hours as needed for mild pain 50 tablet 0    amLODIPine (NORVASC) 5 MG tablet TAKE 1 TABLET BY MOUTH DAILY 90 tablet 3    carvedilol (COREG) 25 MG tablet TAKE 1 TABLET BY MOUTH 2 TIMES DAILY (WITH MEALS) 180 tablet 1    everolimus (ZORTRESS) 0.25 MG tablet Take 2 tablets (0.5 mg) by mouth every morning AND 1 tablet (0.25 mg) every evening.      everolimus (ZORTRESS) 0.75 MG tablet Hold for future dose changes 60 tablet 11    levothyroxine (SYNTHROID/LEVOTHROID) 50 MCG tablet Take 1 tablet (50 mcg) by mouth daily 90 tablet 1    losartan (COZAAR) 25 MG tablet TAKE 1 TABLET BY MOUTH 2 TIMES DAILY 180 tablet 3    NEORAL (BRAND) 25 MG capsule Take 2 capsules (50 mg) by mouth every morning AND 1 capsule (25 mg) every evening. 270 capsule 3    ondansetron (ZOFRAN ODT) 4 MG ODT tab Take 1 tablet (4 mg) by mouth every 8 hours as needed for nausea. 30 tablet 0    polyethylene glycol (MIRALAX) 17 GM/Dose powder Take 17 g (1 Capful) by mouth daily.      psyllium (METAMUCIL/KONSYL) 58.6 % powder Take 1 teaspoonful by mouth daily.      vitamin D3 (CHOLECALCIFEROL) 50 mcg (2000 units) tablet Take 1 tablet (50 mcg) by mouth daily. 90 tablet 3           11/8/2024     7:44 AM   Weight Loss Medication History Reviewed With Patient   Are you having any side effects from the weight loss medication that we have  prescribed you? Yes       Lab on 11/04/2024   Component Date Value Ref Range Status    Estimated Average Glucose 11/04/2024 108  <117 mg/dL Final    Hemoglobin A1C 11/04/2024 5.4  <5.7 % Final    Normal <5.7%   Prediabetes 5.7-6.4%    Diabetes 6.5% or higher     Note: Adopted from ADA consensus guidelines.    hCG Serum Qualitative 11/04/2024 Negative  Negative Final    This test is for screening purposes.  Results should be interpreted along with the clinical picture.  Confirmation testing is available if warranted by ordering [id number], HCG Quantitative Pregnancy.    Color Urine 11/04/2024 Light Yellow  Colorless, Straw, Light Yellow, Yellow Final    Appearance Urine 11/04/2024 Slightly Cloudy (A)  Clear Final    Glucose Urine 11/04/2024 Negative  Negative mg/dL Final    Bilirubin Urine 11/04/2024 Negative  Negative Final    Ketones Urine 11/04/2024 Negative  Negative mg/dL Final    Specific Gravity Urine 11/04/2024 1.009  1.003 - 1.035 Final    Blood Urine 11/04/2024 Negative  Negative Final    pH Urine 11/04/2024 5.0  5.0 - 7.0 Final    Protein Albumin Urine 11/04/2024 20 (A)  Negative mg/dL Final    Urobilinogen Urine 11/04/2024 Normal  Normal, 2.0 mg/dL Final    Nitrite Urine 11/04/2024 Negative  Negative Final    Leukocyte Esterase Urine 11/04/2024 Moderate (A)  Negative Final    RBC Urine 11/04/2024 2  <=2 /HPF Final    WBC Urine 11/04/2024 4  <=5 /HPF Final    Squamous Epithelials Urine 11/04/2024 2 (H)  <=1 /HPF Final    Hyaline Casts Urine 11/04/2024 5 (H)  <=2 /LPF Final    Varicella Zoster Virus Antibody Ig* 11/04/2024 Positive   Final    Varicella Zoster Virus Antibody Ig* 11/04/2024 26.60  <1.00 S/CO Final    Treponema Antibody Total 11/04/2024 Nonreactive  Nonreactive Final    HIV Antigen Antibody Combo Pretran* 11/04/2024 Nonreactive  Nonreactive Final    Negative HIV-1 p24 antigen and HIV-1/2 antibody screening test results usually indicate the absence of HIV-1 and HIV-2 infection. However, such  negative results do not rule-out acute HIV infection.  If acute HIV-1 or HIV-2 infection is suspected, detection of HIV-1 or HIV-2 RNA  is recommended. This result is obtained using the Roche Elecsys HIV Duo method on the eliane e801 immunoassay analyzer.    Hepatitis C Antibody 11/04/2024 Nonreactive  Nonreactive Final    A nonreactive screening test result does not exclude the possibility of exposure to or infection with HCV. Nonreactive screening test results in individuals with prior exposure to HCV may be due to antibody levels below the limit of detection of this assay or lack of reactivity to the HCV antigens used in this assay. Patients with recent HCV infections (<3 months from time of exposure) may have false-negative HCV antibody results due to the time needed for seroconversion (average of 8 to 9 weeks).    Hepatitis B Surface Antigen 11/04/2024 Nonreactive  Nonreactive Final    Hepatitis B Surface Antibody 11/04/2024 Reactive   Final    A reactive result indicates recovery from acute or chronic hepatitis B virus (HBV) infection or acquired immunity from HBV vaccination. This assay does not differentiate between a vaccine-induced immune response and an immune response induced by infection with HBV. A positive total antihepatitis B core result would indicate that the hepatitis B surface antibody response is due to past HBV infection.    Hepatitis B Surface Antibody Instr* 11/04/2024 >1,000.00  <8.5 m[IU]/mL Final    Hepatitis B Core Antibody Total 11/04/2024 Nonreactive  Nonreactive Final    Nonreactive hepatitis B core antibody test results indicate the absence of exposure to hepatitis B virus and no evidence of recent, past/resolved, or chronic hepatitis B.     EBV Capsid Karina IgG Instrument Value 11/04/2024 557.0 (H)  <18.0 U/mL Final    EBV Capsid Antibody IgG 11/04/2024 Positive (A)  No detectable antibody. Final    Suggests recent or past exposure.    CMV Karina IgG Instrument Value 11/04/2024 >10.00 (H)   <0.60 U/mL Final    CMV Antibody IgG 11/04/2024 Positive, suggests recent or past exposure. (A)  No detectable antibody.  Final    Thrombin Time 11/04/2024 18.8  13.0 - 19.0 Seconds Final    aPTT 11/04/2024 32  22 - 38 Seconds Final    PTT Ratio 11/04/2024 1.19  <1.30 Final    Effective 7/31/2024, the reference range for this assay has changed. There may be difference in the flagging of prior results with similar values performed with this method.    DRVVT Screen Ratio 11/04/2024 1.07  <1.08 Final    Lupus Result 11/04/2024 Negative  Negative Final    Lupus Interpretation 11/04/2024    Final                    Value:The INR is normal.  APTT ratio is normal.    DRVVT Screen ratio is normal.  Thrombin time is normal.  NEGATIVE TEST; A LUPUS ANTICOAGULANT WAS NOT DETECTED IN THIS SPECIMEN WITHIN THE LIMITS OF THE TESTING REPERTOIRE.  If the clinical picture is strongly suggestive of an antiphospholipid syndrome, recommend anticardiolipin and beta-2-glycoprotein (IgG and IgM) antibody tests.    Katherine Kaplan MD, PhD  [facility]          INR 11/04/2024 1.14  0.85 - 1.15 Final    Cardiolipin Karina IgG Instrument [place]* 11/04/2024 <2.0  <10.0 GPL-U/mL Final    Cardiolipin Antibody IgG 11/04/2024 Negative  Negative Final    Cardiolipin Karina IgM Instrument [place]* 11/04/2024 2.0  <10.0 MPL-U/mL Final    Cardiolipin Antibody IgM 11/04/2024 Negative  Negative Final    Sodium 11/04/2024 135  135 - 145 mmol/L Final    Potassium 11/04/2024 4.0  3.4 - 5.3 mmol/L Final    Carbon Dioxide (CO2) 11/04/2024 18 (L)  22 - 29 mmol/L Final    Anion Gap 11/04/2024 14  7 - 15 mmol/L Final    Urea Nitrogen 11/04/2024 45.8 (H)  6.0 - 20.0 mg/dL Final    Creatinine 11/04/2024 4.07 (H)  0.51 - 0.95 mg/dL Final    GFR Estimate 11/04/2024 14 (L)  >60 mL/min/1.73m2 Final    eGFR calculated using 2021 CKD-EPI equation.    Calcium 11/04/2024 9.4  8.8 - 10.4 mg/dL Final    Reference intervals for this test were updated on 7/16/2024 to reflect our  healthy population more accurately. There may be differences in the flagging of prior results with similar values performed with this method. Those prior results can be interpreted in the context of the updated reference intervals.    Chloride 11/04/2024 103  98 - 107 mmol/L Final    Glucose 11/04/2024 81  70 - 99 mg/dL Final    Alkaline Phosphatase 11/04/2024 120  40 - 150 U/L Final    AST 11/04/2024 15  0 - 45 U/L Final    ALT 11/04/2024 12  0 - 50 U/L Final    Protein Total 11/04/2024 7.5  6.4 - 8.3 g/dL Final    Albumin 11/04/2024 4.2  3.5 - 5.2 g/dL Final    Bilirubin Total 11/04/2024 0.4  <=1.2 mg/dL Final    Anti-A1 IgG Titer 11/04/2024 32   Final    Anti-A1 IgM Titer 11/04/2024 32   Final    Anti-B IgG Titer 11/04/2024 32   Final    Anti-B IgM Titer 11/04/2024 16   Final    SPECIMEN EXPIRATION DATE 11/04/2024 [id number]   Final    ANTIBODY TITER IGM SCREEN 11/04/2024 Negative   Final    ABO/RH(D) 11/04/2024 O POS   Final    SPECIMEN EXPIRATION DATE 11/04/2024 [id number]   Final    Phosphorus 11/04/2024 4.5  2.5 - 4.5 mg/dL Final    Iron 11/04/2024 57  37 - 145 ug/dL Final    Iron Binding Capacity 11/04/2024 193 (L)  240 - 430 ug/dL Final    Iron Sat Index 11/04/2024 30  15 - 46 % Final    Total Protein Urine mg/dL 11/04/2024 23.5    mg/dL Final    Total Protein Urine mg/mg Creat 11/04/2024 0.14  0.00 - 0.20 mg/mg Cr Final    Creatinine Urine mg/dL 11/04/2024 166.0  mg/dL Final    Ferritin 11/04/2024 506 (H)  6 - 175 ng/mL Final    Quantiferon Nil Tube 11/04/2024 0.02  IU/mL Final    Quantiferon TB1 Tube 11/04/2024 0.05  IU/mL Final    Quantiferon TB2 Tube 11/04/2024 0.05   Final    Quantiferon Mitogen 11/04/2024 6.25  IU/mL Final    WBC Count 11/04/2024 6.3  4.0 - 11.0 10e3/uL Final    RBC Count 11/04/2024 3.19 (L)  3.80 - 5.20 10e6/uL Final    Hemoglobin 11/04/2024 8.4 (L)  11.7 - 15.7 g/dL Final    Hematocrit 11/04/2024 26.3 (L)  35.0 - 47.0 % Final    MCV 11/04/2024 82  78 - 100 fL Final    MCH  11/04/2024 26.3 (L)  26.5 - 33.0 pg Final    MCHC 11/04/2024 31.9  31.5 - 36.5 g/dL Final    RDW 11/04/2024 13.1  10.0 - 15.0 % Final    Platelet Count 11/04/2024 154  150 - 450 10e3/uL Final    % Neutrophils 11/04/2024 67  % Final    % Lymphocytes 11/04/2024 23  % Final    % Monocytes 11/04/2024 6  % Final    % Eosinophils 11/04/2024 3  % Final    % Basophils 11/04/2024 1  % Final    % Immature Granulocytes 11/04/2024 1  % Final    NRBCs per 100 WBC 11/04/2024 0  <1 /100 Final    Absolute Neutrophils 11/04/2024 4.2  1.6 - 8.3 10e3/uL Final    Absolute Lymphocytes 11/04/2024 1.5  0.8 - 5.3 10e3/uL Final    Absolute Monocytes 11/04/2024 0.4  0.0 - 1.3 10e3/uL Final    Absolute Eosinophils 11/04/2024 0.2  0.0 - 0.7 10e3/uL Final    Absolute Basophils 11/04/2024 0.0  0.0 - 0.2 10e3/uL Final    Absolute Immature Granulocytes 11/04/2024 0.1  <=0.4 10e3/uL Final    Absolute NRBCs 11/04/2024 0.0  10e3/uL Final    ABO/RH(D) 11/04/2024 O POS   Final    Antibody Screen 11/04/2024 Negative  Negative Final    SPECIMEN EXPIRATION DATE 11/04/2024 [id number]   Final    Culture 11/04/2024 10,000-50,000 CFU/mL Mixture of Urogenital Geno   Final    Quantiferon-TB Gold Plus 11/04/2024 Negative  Negative Final    No interferon gamma response to M.tuberculosis antigens was detected. Infection with M.tuberculosis is unlikely, however a single negative result does not exclude infection. In patients at high risk for infection, a second test should be considered in accordance with the 2017 ATS/IDSA/CDC Clinical Pract  ice Guidelines for Diagnosis of Tuberculosis in Adults and Children     TB1 Ag minus Nil Value 11/04/2024 0.03  IU/mL Final    TB2 Ag minus Nil Value 11/04/2024 0.03  IU/mL Final    Mitogen minus Nil Result 11/04/2024 6.23  IU/mL Final    Nil Result 11/04/2024 0.02  IU/mL Final           5/17/2022     2:58 PM   CHU Score (Last Two)   CHU Raw Score 28   Activation Score 50   CHU Level 2         PHYSICAL EXAM:  Objective   "  Ht 1.67 m (5' 5.75\")   Wt 109.3 kg (241 lb)   BMI 39.20 kg/m             Vitals:  No vitals were obtained today due to virtual visit.    GENERAL: alert and no distress  EYES: Eyes grossly normal to inspection.  No discharge or erythema, or obvious scleral/conjunctival abnormalities.  RESP: No audible wheeze, cough, or visible cyanosis.    SKIN: Visible skin clear. No significant rash, abnormal pigmentation or lesions.  NEURO: Cranial nerves grossly intact.  Mentation and speech appropriate for age.  PSYCH: Appropriate affect, tone, and pace of words        Sincerely,    Sharon Olea NP      27 minutes spent by me on the date of the encounter doing chart review, history and exam, documentation and further activities per the note    The longitudinal plan of care for the diagnosis(es)/condition(s) as documented were addressed during this visit. Due to the added complexity in care, I will continue to support Carol in the subsequent management and with ongoing continuity of care.  "

## 2024-11-08 NOTE — LETTER
2024       RE: Carol Guillaume  [address] [place] [facility] [address]     Dear Colleague,    Thank you for referring your patient, Carol Guillaume, to the [facility]. Please see a copy of my visit note below.      Return Medical Weight Management Note     Carol Guillaume  MRN:  [id number]  :  1990  JOSE:  2024    Dear Danny Shi MD,    I had the pleasure of seeing your patient Carol Guillaume. She is a 34 year old female who I am continuing to see for treatment of obesity related to:        3/9/2020    12:22 PM   --   I have the following health issues associated with obesity Heart Disease    High Blood Pressure   I have the following symptoms associated with obesity Back Pain    Fatigue       Assessment & Plan  Problem List Items Addressed This Visit          Digestive    Class 3 severe obesity with serious comorbidity and body mass index (BMI) of 40.0 to 44.9 in adult, unspecified obesity type (H) - Primary     Down 20lb since starting compounded semaglutide. Minimal appetite so has to be mindful of getting meals. Has been having some constipation and added miralax and fiber which has started to help but now has alternating constipation and diarrhea. Discussed adjustments that can be made. Added prunes which was maybe helpful. Some financial issues recently but feels compounded semaglutide still worth continuing. Do not think she'll tolerate dose increase until constipation improves.     Continues to want to avoid sleeve gastrectomy for more sustainable weight loss. Mentioned Endoscopic sleeve which is a new and reversible option at our site.     Listed for transplant but nothing official.     Continue 0.25mg semaglutide   Can try gradually increasing metamucil (psyllium) - 1.5tsp to start   Can try reducing miralax to 1/2 capful daily   Can continue prunes  Continue with good hydration    Be mindful of getting enough protein   Follow up 3-4 months          Relevant Medications    COMPOUNDED NON-CONTROLLED SUBSTANCE (CMPD RX) - PHARMACY TO MIX COMPOUNDED MEDICATION       Endocrine    PCOS (polycystic ovarian syndrome)    Dyslipidemia    Relevant Medications    COMPOUNDED NON-CONTROLLED SUBSTANCE (CMPD RX) - PHARMACY TO MIX COMPOUNDED MEDICATION       Other    Kidney replaced by transplant     Other Visit Diagnoses       Chronic kidney disease, stage IV (severe) (H)        Relevant Medications    COMPOUNDED NON-CONTROLLED SUBSTANCE (CMPD RX) - PHARMACY TO MIX COMPOUNDED MEDICATION               INTERVAL HISTORY:    new MWM with Amira Gomez PA-C 6/2023 BMI 43 with hx kidney transplant in 2006. Difficulty with kidney since chemo for uterine cancer in 2021 and now GFR is 22. Needing transplant again. Trying to avoid dialysis. Difficulty getting  antiobesity medications approved/ started.     Last seen 8/2024 discussed [facility] compounding semaglutide to achieve weight loss goal to get on transplant list.   11/2024- okay for transplant but encouraged to continue to lose weight - transplant conference next week to make things official     Continues to want to avoid surgery     Anti-obesity medication history    Current:   Semaglutide 0.25mg  -constipation - taking miralax and fiber, added some prunes- now a couple days without BM then very loose stools - concerns her when she leave home   -reduced appetite - not really hungry - harder to eat when she hasn't had BM     Past/Failed/contraindicated:   Metformin- GFR 17  Phentermine- contraindicated with kidney failure  Topiramate- hx of kidney stones     Recent diet changes:   No fluid restrictions. Drinks 3L daily     Recent exercise/activity changes: really tired     Recent stressors:   Dad has stage 4 cancer   Mom has stage 1 cancer   Daughter in some trouble   Some financial stressors     Recent sleep changes: really tired     Vitamins/Labs:   Anemia      CURRENT WEIGHT:   241 lbs 0 oz    Initial Weight (lbs): 275 lbs  Last Visits Weight: 119.7 kg (264 lb)  Cumulative weight loss (lbs): 34  Weight Loss Percentage: 12.36%        8/6/2024    10:00 AM   Changes and Difficulties   I have made the following changes to my diet since my last visit: Avoiding grains, drinking more protein shakes   With regards to my diet, I am still struggling with: The weight loss itself, stagnation   I have made the following changes to my activity/exercise since my last visit: Walking more   With regards to my activity/exercise, I am still struggling with: Swelling, shortness of breath easily. Hill are difficult         MEDICATIONS:   Current Outpatient Medications   Medication Sig Dispense Refill     COMPOUNDED NON-CONTROLLED SUBSTANCE (CMPD RX) - PHARMACY TO MIX COMPOUNDED MEDICATION Inject 0.25mg semaglutide once weekly 4 each 3     acetaminophen (TYLENOL) 325 MG tablet Take 2 tablets (650 mg) by mouth every 4 hours as needed for mild pain 50 tablet 0     amLODIPine (NORVASC) 5 MG tablet TAKE 1 TABLET BY MOUTH DAILY 90 tablet 3     carvedilol (COREG) 25 MG tablet TAKE 1 TABLET BY MOUTH 2 TIMES DAILY (WITH MEALS) 180 tablet 1     everolimus (ZORTRESS) 0.25 MG tablet Take 2 tablets (0.5 mg) by mouth every morning AND 1 tablet (0.25 mg) every evening.       everolimus (ZORTRESS) 0.75 MG tablet Hold for future dose changes 60 tablet 11     levothyroxine (SYNTHROID/LEVOTHROID) 50 MCG tablet Take 1 tablet (50 mcg) by mouth daily 90 tablet 1     losartan (COZAAR) 25 MG tablet TAKE 1 TABLET BY MOUTH 2 TIMES DAILY 180 tablet 3     NEORAL (BRAND) 25 MG capsule Take 2 capsules (50 mg) by mouth every morning AND 1 capsule (25 mg) every evening. 270 capsule 3     ondansetron (ZOFRAN ODT) 4 MG ODT tab Take 1 tablet (4 mg) by mouth every 8 hours as needed for nausea. 30 tablet 0     polyethylene glycol (MIRALAX) 17 GM/Dose powder Take 17 g (1 Capful) by mouth daily.       psyllium  (METAMUCIL/KONSYL) 58.6 % powder Take 1 teaspoonful by mouth daily.       vitamin D3 (CHOLECALCIFEROL) 50 mcg (2000 units) tablet Take 1 tablet (50 mcg) by mouth daily. 90 tablet 3           11/8/2024     7:44 AM   Weight Loss Medication History Reviewed With Patient   Are you having any side effects from the weight loss medication that we have prescribed you? Yes       Lab on 11/04/2024   Component Date Value Ref Range Status     Estimated Average Glucose 11/04/2024 108  <117 mg/dL Final     Hemoglobin A1C 11/04/2024 5.4  <5.7 % Final    Normal <5.7%   Prediabetes 5.7-6.4%    Diabetes 6.5% or higher     Note: Adopted from ADA consensus guidelines.     hCG Serum Qualitative 11/04/2024 Negative  Negative Final    This test is for screening purposes.  Results should be interpreted along with the clinical picture.  Confirmation testing is available if warranted by ordering [id number], HCG Quantitative Pregnancy.     Color Urine 11/04/2024 Light Yellow  Colorless, Straw, Light Yellow, Yellow Final     Appearance Urine 11/04/2024 Slightly Cloudy (A)  Clear Final     Glucose Urine 11/04/2024 Negative  Negative mg/dL Final     Bilirubin Urine 11/04/2024 Negative  Negative Final     Ketones Urine 11/04/2024 Negative  Negative mg/dL Final     Specific Gravity Urine 11/04/2024 1.009  1.003 - 1.035 Final     Blood Urine 11/04/2024 Negative  Negative Final     pH Urine 11/04/2024 5.0  5.0 - 7.0 Final     Protein Albumin Urine 11/04/2024 20 (A)  Negative mg/dL Final     Urobilinogen Urine 11/04/2024 Normal  Normal, 2.0 mg/dL Final     Nitrite Urine 11/04/2024 Negative  Negative Final     Leukocyte Esterase Urine 11/04/2024 Moderate (A)  Negative Final     RBC Urine 11/04/2024 2  <=2 /HPF Final     WBC Urine 11/04/2024 4  <=5 /HPF Final     Squamous Epithelials Urine 11/04/2024 2 (H)  <=1 /HPF Final     Hyaline Casts Urine 11/04/2024 5 (H)  <=2 /LPF Final     Varicella Zoster Virus Antibody Ig* 11/04/2024 Positive   Final      Varicella Zoster Virus Antibody Ig* 11/04/2024 26.60  <1.00 S/CO Final     Treponema Antibody Total 11/04/2024 Nonreactive  Nonreactive Final     HIV Antigen Antibody Combo Pretran* 11/04/2024 Nonreactive  Nonreactive Final    Negative HIV-1 p24 antigen and HIV-1/2 antibody screening test results usually indicate the absence of HIV-1 and HIV-2 infection. However, such negative results do not rule-out acute HIV infection.  If acute HIV-1 or HIV-2 infection is suspected, detection of HIV-1 or HIV-2 RNA  is recommended. This result is obtained using the Roche Elecsys HIV Duo method on the eliane e801 immunoassay analyzer.     Hepatitis C Antibody 11/04/2024 Nonreactive  Nonreactive Final    A nonreactive screening test result does not exclude the possibility of exposure to or infection with HCV. Nonreactive screening test results in individuals with prior exposure to HCV may be due to antibody levels below the limit of detection of this assay or lack of reactivity to the HCV antigens used in this assay. Patients with recent HCV infections (<3 months from time of exposure) may have false-negative HCV antibody results due to the time needed for seroconversion (average of 8 to 9 weeks).     Hepatitis B Surface Antigen 11/04/2024 Nonreactive  Nonreactive Final     Hepatitis B Surface Antibody 11/04/2024 Reactive   Final    A reactive result indicates recovery from acute or chronic hepatitis B virus (HBV) infection or acquired immunity from HBV vaccination. This assay does not differentiate between a vaccine-induced immune response and an immune response induced by infection with HBV. A positive total antihepatitis B core result would indicate that the hepatitis B surface antibody response is due to past HBV infection.     Hepatitis B Surface Antibody Instr* 11/04/2024 >1,000.00  <8.5 m[IU]/mL Final     Hepatitis B Core Antibody Total 11/04/2024 Nonreactive  Nonreactive Final    Nonreactive hepatitis B core antibody test  results indicate the absence of exposure to hepatitis B virus and no evidence of recent, past/resolved, or chronic hepatitis B.      EBV Capsid Karina IgG Instrument Value 11/04/2024 557.0 (H)  <18.0 U/mL Final     EBV Capsid Antibody IgG 11/04/2024 Positive (A)  No detectable antibody. Final    Suggests recent or past exposure.     CMV Karina IgG Instrument Value 11/04/2024 >10.00 (H)  <0.60 U/mL Final     CMV Antibody IgG 11/04/2024 Positive, suggests recent or past exposure. (A)  No detectable antibody.  Final     Thrombin Time 11/04/2024 18.8  13.0 - 19.0 Seconds Final     aPTT 11/04/2024 32  22 - 38 Seconds Final     PTT Ratio 11/04/2024 1.19  <1.30 Final    Effective 7/31/2024, the reference range for this assay has changed. There may be difference in the flagging of prior results with similar values performed with this method.     DRVVT Screen Ratio 11/04/2024 1.07  <1.08 Final     Lupus Result 11/04/2024 Negative  Negative Final     Lupus Interpretation 11/04/2024    Final                    Value:The INR is normal.  APTT ratio is normal.    DRVVT Screen ratio is normal.  Thrombin time is normal.  NEGATIVE TEST; A LUPUS ANTICOAGULANT WAS NOT DETECTED IN THIS SPECIMEN WITHIN THE LIMITS OF THE TESTING REPERTOIRE.  If the clinical picture is strongly suggestive of an antiphospholipid syndrome, recommend anticardiolipin and beta-2-glycoprotein (IgG and IgM) antibody tests.    Katherine Kaplan MD, PhD  [facility]           INR 11/04/2024 1.14  0.85 - 1.15 Final     Cardiolipin Karina IgG Instrument Selma* 11/04/2024 <2.0  <10.0 GPL-U/mL Final     Cardiolipin Antibody IgG 11/04/2024 Negative  Negative Final     Cardiolipin Karina IgM Instrument [place]* 11/04/2024 2.0  <10.0 MPL-U/mL Final     Cardiolipin Antibody IgM 11/04/2024 Negative  Negative Final     Sodium 11/04/2024 135  135 - 145 mmol/L Final     Potassium 11/04/2024 4.0  3.4 - 5.3 mmol/L Final     Carbon Dioxide (CO2) 11/04/2024 18 (L)  22 - 29 mmol/L Final      Anion Gap 11/04/2024 14  7 - 15 mmol/L Final     Urea Nitrogen 11/04/2024 45.8 (H)  6.0 - 20.0 mg/dL Final     Creatinine 11/04/2024 4.07 (H)  0.51 - 0.95 mg/dL Final     GFR Estimate 11/04/2024 14 (L)  >60 mL/min/1.73m2 Final    eGFR calculated using 2021 CKD-EPI equation.     Calcium 11/04/2024 9.4  8.8 - 10.4 mg/dL Final    Reference intervals for this test were updated on 7/16/2024 to reflect our healthy population more accurately. There may be differences in the flagging of prior results with similar values performed with this method. Those prior results can be interpreted in the context of the updated reference intervals.     Chloride 11/04/2024 103  98 - 107 mmol/L Final     Glucose 11/04/2024 81  70 - 99 mg/dL Final     Alkaline Phosphatase 11/04/2024 120  40 - 150 U/L Final     AST 11/04/2024 15  0 - 45 U/L Final     ALT 11/04/2024 12  0 - 50 U/L Final     Protein Total 11/04/2024 7.5  6.4 - 8.3 g/dL Final     Albumin 11/04/2024 4.2  3.5 - 5.2 g/dL Final     Bilirubin Total 11/04/2024 0.4  <=1.2 mg/dL Final     Anti-A1 IgG Titer 11/04/2024 32   Final     Anti-A1 IgM Titer 11/04/2024 32   Final     Anti-B IgG Titer 11/04/2024 32   Final     Anti-B IgM Titer 11/04/2024 16   Final     SPECIMEN EXPIRATION DATE 11/04/2024 [id number]   Final     ANTIBODY TITER IGM SCREEN 11/04/2024 Negative   Final     ABO/RH(D) 11/04/2024 O POS   Final     SPECIMEN EXPIRATION DATE 11/04/2024 [id number]   Final     Phosphorus 11/04/2024 4.5  2.5 - 4.5 mg/dL Final     Iron 11/04/2024 57  37 - 145 ug/dL Final     Iron Binding Capacity 11/04/2024 193 (L)  240 - 430 ug/dL Final     Iron Sat Index 11/04/2024 30  15 - 46 % Final     Total Protein Urine mg/dL 11/04/2024 23.5    mg/dL Final     Total Protein Urine mg/mg Creat 11/04/2024 0.14  0.00 - 0.20 mg/mg Cr Final     Creatinine Urine mg/dL 11/04/2024 166.0  mg/dL Final     Ferritin 11/04/2024 506 (H)  6 - 175 ng/mL Final     Quantiferon Nil Tube 11/04/2024 0.02  IU/mL Final      Quantiferon TB1 Tube 11/04/2024 0.05  IU/mL Final     Quantiferon TB2 Tube 11/04/2024 0.05   Final     Quantiferon Mitogen 11/04/2024 6.25  IU/mL Final     WBC Count 11/04/2024 6.3  4.0 - 11.0 10e3/uL Final     RBC Count 11/04/2024 3.19 (L)  3.80 - 5.20 10e6/uL Final     Hemoglobin 11/04/2024 8.4 (L)  11.7 - 15.7 g/dL Final     Hematocrit 11/04/2024 26.3 (L)  35.0 - 47.0 % Final     MCV 11/04/2024 82  78 - 100 fL Final     MCH 11/04/2024 26.3 (L)  26.5 - 33.0 pg Final     MCHC 11/04/2024 31.9  31.5 - 36.5 g/dL Final     RDW 11/04/2024 13.1  10.0 - 15.0 % Final     Platelet Count 11/04/2024 154  150 - 450 10e3/uL Final     % Neutrophils 11/04/2024 67  % Final     % Lymphocytes 11/04/2024 23  % Final     % Monocytes 11/04/2024 6  % Final     % Eosinophils 11/04/2024 3  % Final     % Basophils 11/04/2024 1  % Final     % Immature Granulocytes 11/04/2024 1  % Final     NRBCs per 100 WBC 11/04/2024 0  <1 /100 Final     Absolute Neutrophils 11/04/2024 4.2  1.6 - 8.3 10e3/uL Final     Absolute Lymphocytes 11/04/2024 1.5  0.8 - 5.3 10e3/uL Final     Absolute Monocytes 11/04/2024 0.4  0.0 - 1.3 10e3/uL Final     Absolute Eosinophils 11/04/2024 0.2  0.0 - 0.7 10e3/uL Final     Absolute Basophils 11/04/2024 0.0  0.0 - 0.2 10e3/uL Final     Absolute Immature Granulocytes 11/04/2024 0.1  <=0.4 10e3/uL Final     Absolute NRBCs 11/04/2024 0.0  10e3/uL Final     ABO/RH(D) 11/04/2024 O POS   Final     Antibody Screen 11/04/2024 Negative  Negative Final     SPECIMEN EXPIRATION DATE 11/04/2024 [id number]   Final     Culture 11/04/2024 10,000-50,000 CFU/mL Mixture of Urogenital Geno   Final     Quantiferon-TB Gold Plus 11/04/2024 Negative  Negative Final    No interferon gamma response to M.tuberculosis antigens was detected. Infection with M.tuberculosis is unlikely, however a single negative result does not exclude infection. In patients at high risk for infection, a second test should be considered in accordance with the  "2017 ATS/IDSA/CDC Clinical Pract  ice Guidelines for Diagnosis of Tuberculosis in Adults and Children      TB1 Ag minus Nil Value 11/04/2024 0.03  IU/mL Final     TB2 Ag minus Nil Value 11/04/2024 0.03  IU/mL Final     Mitogen minus Nil Result 11/04/2024 6.23  IU/mL Final     Nil Result 11/04/2024 0.02  IU/mL Final           5/17/2022     2:58 PM   CHU Score (Last Two)   CHU Raw Score 28   Activation Score 50   CHU Level 2         PHYSICAL EXAM:  Objective   Ht 1.67 m (5' 5.75\")   Wt 109.3 kg (241 lb)   BMI 39.20 kg/m             Vitals:  No vitals were obtained today due to virtual visit.    GENERAL: alert and no distress  EYES: Eyes grossly normal to inspection.  No discharge or erythema, or obvious scleral/conjunctival abnormalities.  RESP: No audible wheeze, cough, or visible cyanosis.    SKIN: Visible skin clear. No significant rash, abnormal pigmentation or lesions.  NEURO: Cranial nerves grossly intact.  Mentation and speech appropriate for age.  PSYCH: Appropriate affect, tone, and pace of words        Sincerely,    Sharon Olea NP      27 minutes spent by me on the date of the encounter doing chart review, history and exam, documentation and further activities per the note    The longitudinal plan of care for the diagnosis(es)/condition(s) as documented were addressed during this visit. Due to the added complexity in care, I will continue to support Carol in the subsequent management and with ongoing continuity of care.    Virtual Visit Details    Type of service:  Video Visit   Video Start Time:  0803  Video End Time: 0819    Originating Location (pt. Location): Home    Distant Location (provider location):  Off-site  Platform used for Video Visit: Vijay          Again, thank you for allowing me to participate in the care of your patient.      Sincerely,    Sharon Olea NP    "

## 2024-11-08 NOTE — ASSESSMENT & PLAN NOTE
Down 20lb since starting compounded semaglutide. Minimal appetite so has to be mindful of getting meals. Has been having some constipation and added miralax and fiber which has started to help but now has alternating constipation and diarrhea. Discussed adjustments that can be made. Added prunes which was maybe helpful. Some financial issues recently but feels compounded semaglutide still worth continuing. Do not think she'll tolerate dose increase until constipation improves.     Continues to want to avoid sleeve gastrectomy for more sustainable weight loss. Mentioned Endoscopic sleeve which is a new and reversible option at our site.     Listed for transplant but nothing official.     Continue 0.25mg semaglutide   Can try gradually increasing metamucil (psyllium) - 1.5tsp to start   Can try reducing miralax to 1/2 capful daily   Can continue prunes  Continue with good hydration   Be mindful of getting enough protein   Follow up 3-4 months

## 2024-11-08 NOTE — PROGRESS NOTES
Virtual Visit Details    Type of service:  Video Visit   Video Start Time:  0803  Video End Time: 0819    Originating Location (pt. Location): Home    Distant Location (provider location):  Off-site  Platform used for Video Visit: Letsmake

## 2024-11-08 NOTE — TELEPHONE ENCOUNTER
Medication Question or Refill        What medication are you calling about (include dose and sig)?:   ondansetron  ondansetron (ZOFRAN ODT) 4 MG ODT tab    Preferred Pharmacy:   [facility] - [place], [place] - [address]   [address] [facility] [address]  Phone: [phone] Fax: [phone]          Controlled Substance Agreement on file:   CSA -- Patient Level:    CSA: None found at the patient level.       Who prescribed the medication?: Amira Gomez    Do you need a refill? Yes    When did you use the medication last? About 2 wks ago    Patient offered an appointment? No    Do you have any questions or concerns?  No      Could we send this information to you in [facility] or would you prefer to receive a phone call?:   Patient would prefer a phone call   Okay to leave a detailed message?: Yes at Cell number on file:    Telephone Information:   Mobile [phone]

## 2024-11-08 NOTE — PATIENT INSTRUCTIONS
Continue 0.25mg semaglutide   Can try gradually increasing metamucil (psyllium) - 1.5tsp to start   Can try reducing miralax to 1/2 capful daily   Can continue prunes  Continue with good hydration   Be mindful of getting enough protein   Follow up 3-4 months

## 2024-11-08 NOTE — NURSING NOTE
Current patient location: [address] [place] [id number]    Is the patient currently in the state of [place]? YES    Visit mode:VIDEO    If the visit is dropped, the patient can be reconnected by: VIDEO VISIT: Text to cell phone:   Telephone Information:   Mobile [phone]       Will anyone else be joining the visit? NO  (If patient encounters technical issues they should call [phone]    Are changes needed to the allergy or medication list? Pt stated no changes to allergies and Pt stated no med changes    Are refills needed on medications prescribed by this physician? YES compound     Reason for visit: RECHECK    Sherri BAIRES

## 2024-11-08 NOTE — TELEPHONE ENCOUNTER
"Medication Requested:   Disp Refills Start End ANNA   ondansetron (ZOFRAN ODT) 4 MG ODT tab 30 tablet 0 8/21/2024 -- --   Sig - Route: Take 1 tablet (4 mg) by mouth every 8 hours as needed for nausea. - Oral     ----------------------  Last Office Visit : 11/8/2024  [facility] [place]      Future Office visit:  0  ----------------------  Per last visit note:  \"Follow up 3-4 months \"    [x]Medication unable to be refilled by RN due to criteria not met as indicated below:          [] Eligibility - Pt not seen within past _ months, no future appointment       []Supervision: no future appointment; >30 days before next appointment       [] Compliance - lapse in therapy/gap in refills; No Shows; Cancellations       [] Verification - order discrepancy, clarification needed, modification needed       [] Controlled medication -        [x] Medication not included in refill protocol policy       [] Abnormal labs/test:        [] Overdue labs/test:         [] 90 day weston refill given with reminder to complete:         [] [facility] refill given x1, Pt did not follow-up, pended to care team for decision       [] Medication not active on Pt's med list       [] Drug interaction Warning       [] Medication is Reported/Historical       [] > 30-day supply request       []Advanced refill request: > 7 days before refill date       []Review: new med; med adjusted <= 30 days; safety alert; requires lab monitoring       []Other:        "

## 2024-11-11 ENCOUNTER — TELEPHONE (OUTPATIENT)
Dept: TRANSPLANT | Facility: CLINIC | Age: 34
End: 2024-11-11
Payer: COMMERCIAL

## 2024-11-11 ENCOUNTER — TEAM CONFERENCE (OUTPATIENT)
Dept: TRANSPLANT | Facility: CLINIC | Age: 34
End: 2024-11-11
Payer: COMMERCIAL

## 2024-11-11 DIAGNOSIS — Z94.0 HTN, KIDNEY TRANSPLANT RELATED: Primary | ICD-10-CM

## 2024-11-11 DIAGNOSIS — I15.1 HTN, KIDNEY TRANSPLANT RELATED: Primary | ICD-10-CM

## 2024-11-11 NOTE — TELEPHONE ENCOUNTER
Cyclosporine level 303 on 11/8/2024.    Carol confirms that she did take her CSA prior to her lab draw. Not an accurate trough level, no dose changes.      Ronna Hughes RN   Transplant Coordinator  [phone]

## 2024-11-11 NOTE — TELEPHONE ENCOUNTER
Image Review Meeting    ATTENDEES: Dr. Ching    IMAGES REVIEWED: CT A/P from 11/04/2024    DECISION: Vessels suitable for kidney transplant.     INCIDENTALS: No

## 2024-11-12 ENCOUNTER — TELEPHONE (OUTPATIENT)
Dept: TRANSPLANT | Facility: CLINIC | Age: 34
End: 2024-11-12
Payer: COMMERCIAL

## 2024-11-12 ENCOUNTER — PATIENT OUTREACH (OUTPATIENT)
Dept: CARE COORDINATION | Facility: CLINIC | Age: 34
End: 2024-11-12
Payer: COMMERCIAL

## 2024-11-12 DIAGNOSIS — Z94.0 HTN, KIDNEY TRANSPLANT RELATED: Primary | ICD-10-CM

## 2024-11-12 DIAGNOSIS — I15.1 HTN, KIDNEY TRANSPLANT RELATED: Primary | ICD-10-CM

## 2024-11-12 LAB
EVEROLIMUS BLD-MCNC: 9.2 UG/L (ref 3–8)
TME LAST DOSE: ABNORMAL H
TME LAST DOSE: ABNORMAL H

## 2024-11-12 NOTE — TELEPHONE ENCOUNTER
Patient Call: General  Route to LPN    Reason for call: Pt called to speak to coordinator. Pt states that, since her phosphorus level is back in normal range, she would like to add some phosphorus-rich foods to her diet. Pt is free today until 1:30pm    Call back needed? Yes    Return Call Needed  Same as documented in contacts section  When to return call?: Same day: Route High Priority    
No

## 2024-11-12 NOTE — PROGRESS NOTES
Anemia Management Note - Follow Up      SUBJECTIVE/OBJECTIVE:    Referred by Dr. Feliberto Rueda on 7/10/2024  Primary Diagnosis: Anemia of Other Chronic Illness (D63.8)     Secondary Diagnosis: Organ or tissue replaced by transplant, kidney (Z94.0)  Date of Kidney Transplant: 06  Hgb goal range: 9-10  Epo/Darbo:   Aranesp 60mcg every 2 weeks, PRN Hgb <10, 3rd floor  Iron regimen: TBD  Injectafer completed 488855     Labs : 808409  RX/TX plans : 153993     Recent MARCO use, transfusion, IV iron: none  No history of stroke, MI, and blood clots. Atypical Hyperplasia endometriosis carcinoma ,      No consent to communicate on file        Latest Ref Rng & Units 2024 2024 2024 2024 10/7/2024 2024 2024   Anemia   MARCO Dose        60 mcg   Hemoglobin 11.7 - 15.7 g/dL 9.0  9.4  8.5  8.7  9.2  8.4  8.6    IV Iron Dose  750mg  750mg       TSAT 15 - 46 %    30  25  30  31    Ferritin 6 - 175 ng/mL    678   506  505         BP Readings from Last 3 Encounters:   24 93/63   24 114/78   24 114/78     Wt Readings from Last 2 Encounters:   24 109.3 kg (241 lb)   24 109.7 kg (241 lb 14.4 oz)         ASSESSMENT:    Hgb:Not at goal/Initiation of therapy  TSat: at goal >30% Ferritin: At goal (>100ng/mL)   Goals Addressed    None         PLAN:  Dose with Aranesp.  RTC for hgb then Aranesp if needed in 2 week(s).    Orders needed to be renewed (for next follow-up date) in EPIC: None    Iron labs due:  monthly, due early 2024    Plan discussed with:  DEVON Medina    Addendum: Carol called back today, discussed above. She asked about adding in high iron foods, but it's best for her to talk to transplant team as her phosphorus has been an issue as well as other minerals. Writer will reach out to her if any changes need to be made and she was invited to reach out PRN    NEXT FOLLOW-UP DATE:  125951, labs/dose  10a    Carrie Leopold, RN BSN  Anemia  [facility]ethan [email or web address]  Office: [phone]  Fax [phone]

## 2024-11-14 ENCOUNTER — TELEPHONE (OUTPATIENT)
Dept: TRANSPLANT | Facility: CLINIC | Age: 34
End: 2024-11-14
Payer: COMMERCIAL

## 2024-11-14 ENCOUNTER — COMMITTEE REVIEW (OUTPATIENT)
Dept: TRANSPLANT | Facility: CLINIC | Age: 34
End: 2024-11-14
Payer: COMMERCIAL

## 2024-11-14 DIAGNOSIS — Z94.0 HTN, KIDNEY TRANSPLANT RELATED: Primary | ICD-10-CM

## 2024-11-14 DIAGNOSIS — I15.1 HTN, KIDNEY TRANSPLANT RELATED: Primary | ICD-10-CM

## 2024-11-14 NOTE — COMMITTEE REVIEW
Kidney/Pancreas Committee Review Note     Evaluation Date:    Committee Review Date: 11/13/2024    Organ being evaluated for: Kidney    Transplant Phase: Waitlist  Transplant Status: Inactive    Transplant Coordinator: Chanel Stinson  Transplant Surgeon:  Dr. Lowery         Referring Physician: Feliberto Rueda    Primary Diagnosis: Nephronophthisis  Secondary Diagnosis: CKD 4    Committee Review Members:  Nephrology Ivanna Rollins, NP, Magaly Hitchcock MD, Raúl Rubin, APRN CNP, Feliberto Littlejohn MD, Jonathan Ordonez MD   Nutrition Janet Dey, RD   Pharmacist Carol Baca, [facility]    - Clinical Leeanne Weeks, MSMARYAM, Miriam Hurd, [facility]   Transplant HANH GIBBONS, RN, Ada Burkett, ALEX, Na Martinez, ALEX, Ankita Goldsmith, ALEX, Christie Jacobs, APRN CNP, Sera Ma, RN, Alee Obrien, RN, Tereza Alvarado, RN, Chanel Pitts, RN, Michelle Gage, RN, Felipa Sheikh, RN   Transplant Surgery Manny Rowe MD       Transplant Eligibility: Irreversible chronic kidney disease treated w/dialysis or expected need for dialysis    Committee Review Decision: Remain Inactive    Relative Contraindications: None    Absolute Contraindications: None     Committee Chair Manny Rowe MD verbally attested to the committee's decision.    Committee Discussion Details: Reviewed pt's medical status and evaluation results to date with multidisciplinary committee.    Recommended the following evaluation items:    Urology: Needs to follow up with urology after most recent CT scan.  Stone measuring 1.1 cm. Needs a litholink.   Oncology: Needs to see oncology provider to sort out treatment plan and risk for CAH  BMI: Should continue to try and lose weight, but doable at current weight (241 lbs)   Health Maintenance:  Dental, Derm, Continue with pap every six months  KDPI and Receipt of Information:completed  My Transplant Place Class Review:  completed    Patient should have live donors register now to initiate donor evaluation: Yes per Dr. Lowery note 11/4/2024    Committee determined that patient is a Good candidate for Kidney     Listing plan: Remain inactive while she continues the remainder of her workup.     A2B Candidate: No    Patient will be called and summary letter will be sent.

## 2024-11-14 NOTE — TELEPHONE ENCOUNTER
Called patient at this time to discuss the results of the Selection Committee from Wednesday.      She had her dental work done in August- will send clearance letter. She is due for derm but states she was not able to get in to see someone from derm until June. Will attempt to get this moved up.     She needs to see urology - will message team to see about getting her in.     We dsicussed that she should follow up with oncology. She states she no longer follows with them. Will clarify with Transplant Nephrology whether or not she should get onc clearance or follow up with her gyn who she follows with every six months for CAH.     Will follow up with her once I know the above.

## 2024-11-14 NOTE — TELEPHONE ENCOUNTER
Called patient at this time to discuss the results of the Selection Committee from yesterday. Unable to reach her but did leave VM with my direct line requesting a call back at her earliest convenience to discuss next steps and moving over to the wait list.

## 2024-11-14 NOTE — LETTER
11/14/24        Carol Guillaume  [address] [address]        Dear Carol,    It was a pleasure to see you recently for consideration of kidney transplantation. Your pre-transplant evaluation results were reviewed at our Multidisciplinary Selection Committee on 11/13/2024. The committee is requesting the following items are completed as part of your evaluation.     We are asking that you update your dermatology visit every year while on the wait list. It looks like you are due for an updated visit. Please arrange this with your Dermatology provider and let me know when this is complete.   We are asking that you update your dental if you have not done so in the last 6-12 months. If you have, please le me know the dental office that you visited so that I can request a letter of dental clearance as part of your transplant workup.   We are asking that you follow up with urology. The CT scan that was performed at the time of your evaluation day showed that the stone is measuring 1.1 cm which is stable from the last time you were scanned. We are asking that urology perform a litholink. Someone from our scheduling team will reach out to you to arrange this urology appointment.   We are asking that you follow up with your oncology providers regarding the treatment and the risk of transplant with your known Complex Abnormal Hyperplasia. Please arrange a visit with them and let me know when this is complete so I can update your transplant checklist.   Your current weight is OK for kidney transplant surgery. We are asking that you do not gain any weight and attempt to continue to lose weight.       For any questions, please contact the Transplant Office at [phone].      Sincerely,  Chanel Mckinney RN   Pre-Kidney/Pancreas Transplant Coordinator  Direct line: [phone]    Solid Organ Transplant  [facility], [facility]  [facility]

## 2024-11-15 LAB
A2: 938
DONOR IDENTIFICATION: NORMAL
DQA1*02: 7735
DQB2: NORMAL
DR53: NORMAL
DSA COMMENTS: NORMAL
DSA PRESENT: YES
DSA TEST METHOD: NORMAL
ORGAN: NORMAL
PROTOCOL CUTOFF: NORMAL
SA 1  COMMENTS: NORMAL
SA 1 CELL: NORMAL
SA 1 TEST METHOD: NORMAL
SA 2 CELL: NORMAL
SA 2 COMMENTS: NORMAL
SA 2 TEST METHOD: NORMAL
SA1 HI RISK ABY: NORMAL
SA1 MOD RISK ABY: NORMAL
SA2 HI RISK ABY: NORMAL
SA2 MOD RISK ABY: NORMAL
UNACCEPTABLE ANTIGENS: NORMAL
UNOS CPRA: 98

## 2024-11-19 ENCOUNTER — TELEPHONE (OUTPATIENT)
Dept: TRANSPLANT | Facility: CLINIC | Age: 34
End: 2024-11-19
Payer: COMMERCIAL

## 2024-11-19 DIAGNOSIS — I15.1 HTN, KIDNEY TRANSPLANT RELATED: Primary | ICD-10-CM

## 2024-11-19 DIAGNOSIS — Z94.0 HTN, KIDNEY TRANSPLANT RELATED: Primary | ICD-10-CM

## 2024-11-19 NOTE — TELEPHONE ENCOUNTER
Calling patient's OBGYN office -  Violette Corea MD at [facility]. Left message discussing that we need her to comment on a risk assessment for this patient with re-transplant and the risk of not treating the CAH with IS. Left my name and direct line for call back number.

## 2024-11-20 ENCOUNTER — TELEPHONE (OUTPATIENT)
Dept: TRANSPLANT | Facility: CLINIC | Age: 34
End: 2024-11-20
Payer: COMMERCIAL

## 2024-11-20 DIAGNOSIS — I15.1 HTN, KIDNEY TRANSPLANT RELATED: Primary | ICD-10-CM

## 2024-11-20 DIAGNOSIS — Z94.0 HTN, KIDNEY TRANSPLANT RELATED: Primary | ICD-10-CM

## 2024-11-20 NOTE — TELEPHONE ENCOUNTER
Returning call to patient. Unable to reach her but was able to leave a  VM requesting a call back at her earliest convenience.

## 2024-11-20 NOTE — TELEPHONE ENCOUNTER
Pt's OBGYN office returning call to the RNCC stated she is off tomorrow and will be able to chat on Friday caller will call the RNCC on her direct line.

## 2024-11-20 NOTE — TELEPHONE ENCOUNTER
Please call Carol #[phone].  Wanted to follow up with care team on  a dermatology recommendation.

## 2024-11-22 ENCOUNTER — LAB (OUTPATIENT)
Dept: LAB | Facility: CLINIC | Age: 34
End: 2024-11-22
Payer: COMMERCIAL

## 2024-11-22 DIAGNOSIS — Z48.298 AFTERCARE FOLLOWING ORGAN TRANSPLANT: ICD-10-CM

## 2024-11-22 DIAGNOSIS — Z79.899 ENCOUNTER FOR LONG-TERM CURRENT USE OF MEDICATION: ICD-10-CM

## 2024-11-22 DIAGNOSIS — Z94.0 KIDNEY REPLACED BY TRANSPLANT: ICD-10-CM

## 2024-11-22 DIAGNOSIS — D63.8 ANEMIA IN OTHER CHRONIC DISEASES CLASSIFIED ELSEWHERE: ICD-10-CM

## 2024-11-22 DIAGNOSIS — Z98.890 OTHER SPECIFIED POSTPROCEDURAL STATES: ICD-10-CM

## 2024-11-22 LAB
ANION GAP SERPL CALCULATED.3IONS-SCNC: 11 MMOL/L (ref 7–15)
BUN SERPL-MCNC: 37.4 MG/DL (ref 6–20)
CALCIUM SERPL-MCNC: 9.2 MG/DL (ref 8.8–10.4)
CHLORIDE SERPL-SCNC: 106 MMOL/L (ref 98–107)
CREAT SERPL-MCNC: 3.58 MG/DL (ref 0.51–0.95)
CYCLOSPORINE BLD LC/MS/MS-MCNC: 96 UG/L (ref 50–400)
EGFRCR SERPLBLD CKD-EPI 2021: 16 ML/MIN/1.73M2
ERYTHROCYTE [DISTWIDTH] IN BLOOD BY AUTOMATED COUNT: 13.2 % (ref 10–15)
FERRITIN SERPL-MCNC: 387 NG/ML (ref 6–175)
GLUCOSE SERPL-MCNC: 90 MG/DL (ref 70–99)
HCO3 SERPL-SCNC: 21 MMOL/L (ref 22–29)
HCT VFR BLD AUTO: 28.3 % (ref 35–47)
HGB BLD-MCNC: 9.2 G/DL (ref 11.7–15.7)
IRON BINDING CAPACITY (ROCHE): 195 UG/DL (ref 240–430)
IRON SATN MFR SERPL: 19 % (ref 15–46)
IRON SERPL-MCNC: 37 UG/DL (ref 37–145)
MCH RBC QN AUTO: 26.4 PG (ref 26.5–33)
MCHC RBC AUTO-ENTMCNC: 32.5 G/DL (ref 31.5–36.5)
MCV RBC AUTO: 81 FL (ref 78–100)
PLATELET # BLD AUTO: 155 10E3/UL (ref 150–450)
POTASSIUM SERPL-SCNC: 4.5 MMOL/L (ref 3.4–5.3)
RBC # BLD AUTO: 3.48 10E6/UL (ref 3.8–5.2)
SODIUM SERPL-SCNC: 138 MMOL/L (ref 135–145)
TME LAST DOSE: NORMAL H
TME LAST DOSE: NORMAL H
WBC # BLD AUTO: 6.1 10E3/UL (ref 4–11)

## 2024-11-22 PROCEDURE — 36415 COLL VENOUS BLD VENIPUNCTURE: CPT | Performed by: PATHOLOGY

## 2024-11-22 PROCEDURE — 85027 COMPLETE CBC AUTOMATED: CPT | Performed by: PATHOLOGY

## 2024-11-22 PROCEDURE — 83540 ASSAY OF IRON: CPT | Performed by: PATHOLOGY

## 2024-11-22 PROCEDURE — 80158 DRUG ASSAY CYCLOSPORINE: CPT | Performed by: INTERNAL MEDICINE

## 2024-11-22 PROCEDURE — 99000 SPECIMEN HANDLING OFFICE-LAB: CPT | Performed by: PATHOLOGY

## 2024-11-22 PROCEDURE — 80048 BASIC METABOLIC PNL TOTAL CA: CPT | Performed by: PATHOLOGY

## 2024-11-22 PROCEDURE — 83550 IRON BINDING TEST: CPT | Performed by: PATHOLOGY

## 2024-11-22 PROCEDURE — 82728 ASSAY OF FERRITIN: CPT | Performed by: PATHOLOGY

## 2024-11-25 ENCOUNTER — PATIENT OUTREACH (OUTPATIENT)
Dept: CARE COORDINATION | Facility: CLINIC | Age: 34
End: 2024-11-25
Payer: COMMERCIAL

## 2024-11-25 NOTE — PROGRESS NOTES
Anemia Management Note - Follow Up      SUBJECTIVE/OBJECTIVE:    Referred by Dr. Feliberto Rueda on 7/10/2024  Primary Diagnosis: Anemia of Other Chronic Illness (D63.8)     Secondary Diagnosis: Organ or tissue replaced by transplant, kidney (Z94.0)  Date of Kidney Transplant: 06  Hgb goal range: 9-10  Epo/Darbo:   Aranesp 60mcg every 2 weeks, PRN Hgb <10, 3rd floor  Iron regimen: TBD  Injectafer completed 062154     Labs : 897223  RX/TX plans : 627223     Recent MARCO use, transfusion, IV iron: none  No history of stroke, MI, and blood clots. Atypical Hyperplasia endometriosis carcinoma ,      No consent to communicate on file        Latest Ref Rng & Units 2024 2024 2024 10/7/2024 2024 2024 2024   Anemia   MARCO Dose       60 mcg 60 mcg   Hemoglobin 11.7 - 15.7 g/dL 9.4  8.5  8.7  9.2  8.4  8.6  9.2    IV Iron Dose   750mg        TSAT 15 - 46 %   30  25  30  31  19    Ferritin 6 - 175 ng/mL   678   506  505  387         BP Readings from Last 3 Encounters:   24 93/63   24 114/78   24 114/78     Wt Readings from Last 2 Encounters:   24 109.3 kg (241 lb)   24 109.7 kg (241 lb 14.4 oz)         ASSESSMENT:    Hgb:at goal - received dose in clinic - recommend continue current regimen  TSat: not at goal of >30% Ferritin: At goal (>100ng/mL)       Goals Addressed    None         PLAN:  Dosed with Aranesp.  Staff message sent to Dr. Gerhard Rodríguez, clarifying safety of Aranesp in light of documentation found regarding conversation between RNCC and OBGYN, noting OBGYN's concern that she has active endometrial cancer.      Orders needed to be renewed (for next follow-up date) in EPIC: None    Iron labs due:  monthly    Plan discussed with:  no call, chart reviewed      NEXT FOLLOW-UP DATE:  1202    Carrie Leopold, RN BSN  Anemia Services  [facility]  [email or web address]  Office: [phone]  Fax [phone]

## 2024-11-26 ENCOUNTER — TELEPHONE (OUTPATIENT)
Dept: TRANSPLANT | Facility: CLINIC | Age: 34
End: 2024-11-26
Payer: COMMERCIAL

## 2024-11-26 DIAGNOSIS — Z94.0 HTN, KIDNEY TRANSPLANT RELATED: Primary | ICD-10-CM

## 2024-11-26 DIAGNOSIS — I15.1 HTN, KIDNEY TRANSPLANT RELATED: Primary | ICD-10-CM

## 2024-11-26 NOTE — TELEPHONE ENCOUNTER
Called patient at this time to discuss the next steps. Unable to reach her - went directly to . Left message noting that I spoke with urology and they will be calling her to set up an appointment. Also discussed that we need to go over her OBGYN history as she is currently unable to be cleared from that standpoint. Left direct line and requested a call back at her earliest convenience. Did note I will be out this coming Thursday and Friday.

## 2024-11-26 NOTE — PROGRESS NOTES
Received: Today  El Rifai, Rasha, MD Leopold, Carrie A, RN  Recommend involving OB/GYN in the decision. Would hold off on further MARCO doses at this time    Next dose would be due on 271006      LVM for Dr. Violette Corea at [facility], [phone], giving reason for the call and looking for guidance re: use of MARCO with pt's med hx.    Carrie Leopold, RN BSN  Anemia Services  [facility]  [email or web address]  Office: [phone]  Fax [phone]

## 2024-11-27 ENCOUNTER — TELEPHONE (OUTPATIENT)
Dept: TRANSPLANT | Facility: CLINIC | Age: 34
End: 2024-11-27
Payer: COMMERCIAL

## 2024-11-27 DIAGNOSIS — Z94.0 HTN, KIDNEY TRANSPLANT RELATED: Primary | ICD-10-CM

## 2024-11-27 DIAGNOSIS — I15.1 HTN, KIDNEY TRANSPLANT RELATED: Primary | ICD-10-CM

## 2024-11-27 NOTE — TELEPHONE ENCOUNTER
Patient returning my phone call. We discussed that her dermatology appointment is unable to be moved up at this time but I will continue to try.  She will call urology to get scheduled for her appointment with them. We discussed at length my conversation with Dr. Anmol GARDNER and the fact that we cannot clear her for transplant until we know there is no cancer and that there are preventative measures for preventing the cancer. She will call them and set up an appointment to discuss.

## 2024-12-02 ENCOUNTER — TELEPHONE (OUTPATIENT)
Dept: TRANSPLANT | Facility: CLINIC | Age: 34
End: 2024-12-02
Payer: COMMERCIAL

## 2024-12-02 DIAGNOSIS — I15.1 HTN, KIDNEY TRANSPLANT RELATED: Primary | ICD-10-CM

## 2024-12-02 DIAGNOSIS — Z94.0 HTN, KIDNEY TRANSPLANT RELATED: Primary | ICD-10-CM

## 2024-12-02 NOTE — TELEPHONE ENCOUNTER
Patient Call: General  Route to LPN    Reason for call: patient's provider looking to do biospy. They are wanting to touch base about evorlimus. More details with call back.       Call back needed? Yes    Return Call Needed  Same as documented in contacts section  When to return call?: Same day: Route High Priority

## 2024-12-03 ENCOUNTER — TELEPHONE (OUTPATIENT)
Dept: UROLOGY | Facility: CLINIC | Age: 34
End: 2024-12-03
Payer: COMMERCIAL

## 2024-12-03 NOTE — TELEPHONE ENCOUNTER
Left Voicemail (1st Attempt) for the patient to call back and schedule the following:    Appointment type: RTN VV  Provider: Ayo  Return date: 1st Available  Specialty phone number: [phone]  Additional appointment(s) needed: NA  Additonal Notes: NA

## 2024-12-03 NOTE — TELEPHONE ENCOUNTER
Feliberto Douglas MD Sveiven, Sara, RN  Yes recommend switching everolimus to AZA at 1.5 mg/kg ~150 mg po every day ~4 weeks prior to procedure. No need for overlap with everolimus given long half life  Would avoid MPA switch given severe GI issues          Previous Messages       ----- Message -----  From: Ronna Hughes, RN  Sent: 12/3/2024   1:15 PM CST  To: Feliberto Rueda MD  Subject: IS question, possible D&C on Mtor                Dr Gerhard Rueda    Carol is currently on kidney wait list, though non active as she is awaiting clearance from OBGY.    With her history of endometrial intraepithelial hyperplasia she may need to have a diagnostic D&C before being cleared.  She is on everolimus currently, would we need to make a change to IS for this procedure?  We switched her from MPA for extensive GI issues.    She sees OBGYN this Friday,  I will review the clinic note and will update you with plan.    Ronna Hughes RN  Transplant Coordinator  [phone]    OUTCOME: will connect with patient following OBGYN apt as well as will review clinic note prior to making IS change.    Ronna Hughes RN   Transplant Coordinator  [phone]

## 2024-12-04 ENCOUNTER — MYC MEDICAL ADVICE (OUTPATIENT)
Dept: CARE COORDINATION | Facility: CLINIC | Age: 34
End: 2024-12-04
Payer: COMMERCIAL

## 2024-12-06 ENCOUNTER — TELEPHONE (OUTPATIENT)
Dept: TRANSPLANT | Facility: CLINIC | Age: 34
End: 2024-12-06

## 2024-12-06 DIAGNOSIS — Z94.0 HTN, KIDNEY TRANSPLANT RELATED: ICD-10-CM

## 2024-12-06 DIAGNOSIS — Z94.0 KIDNEY REPLACED BY TRANSPLANT: Primary | ICD-10-CM

## 2024-12-06 DIAGNOSIS — Z79.899 ENCOUNTER FOR LONG-TERM CURRENT USE OF MEDICATION: ICD-10-CM

## 2024-12-06 DIAGNOSIS — I15.1 HTN, KIDNEY TRANSPLANT RELATED: ICD-10-CM

## 2024-12-06 NOTE — TELEPHONE ENCOUNTER
Pt called regarding an upcoming procedure and needs to change her transplant medication and would like to do it next week.

## 2024-12-06 NOTE — TELEPHONE ENCOUNTER
Spoke to patient who reports that she will be 4 weeks out from her procedure starting next week.  Per previous recommendations:  switch your everolimus to azathioprine for 4 weeks prior to the procedure   Confirming azathioprine dose with Dr. Littlejohn.

## 2024-12-09 ENCOUNTER — OFFICE VISIT (OUTPATIENT)
Dept: DERMATOLOGY | Facility: CLINIC | Age: 34
End: 2024-12-09
Payer: COMMERCIAL

## 2024-12-09 DIAGNOSIS — D48.5 NEOPLASM OF UNCERTAIN BEHAVIOR OF SKIN: Primary | ICD-10-CM

## 2024-12-09 DIAGNOSIS — Z94.0 KIDNEY REPLACED BY TRANSPLANT: ICD-10-CM

## 2024-12-09 PROCEDURE — 88305 TISSUE EXAM BY PATHOLOGIST: CPT | Mod: 26 | Performed by: PATHOLOGY

## 2024-12-09 PROCEDURE — 88305 TISSUE EXAM BY PATHOLOGIST: CPT | Mod: TC | Performed by: STUDENT IN AN ORGANIZED HEALTH CARE EDUCATION/TRAINING PROGRAM

## 2024-12-09 RX ORDER — AZATHIOPRINE 50 MG/1
150 TABLET ORAL DAILY
Qty: 84 TABLET | Refills: 0 | Status: SHIPPED | OUTPATIENT
Start: 2024-12-09

## 2024-12-09 ASSESSMENT — PAIN SCALES - GENERAL: PAINLEVEL_OUTOF10: NO PAIN (0)

## 2024-12-09 NOTE — PROGRESS NOTES
Lidocaine-epinephrine 1-1:565956 % injection   1mL once for one use, starting 12/9/2024 ending 12/9/2024,  2mL disp, R-0, injection  Injected by Ponce Segal CA

## 2024-12-09 NOTE — PROGRESS NOTES
[facility] Dermatology Note  Encounter Date: Dec 9, 2024  Office Visit     Dermatology Problem List:  # Pertinent PMH: h/o kidney transplant in 2006.  1. Family h/o melanoma in grandfather  2. 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   # Fhx melanoma.  Counseled on ABCDEs of melanoma and sun protection. Asked patient to return sooner if noticing changing or symptomatic lesions.  - Monitor nevi bilateral ears, photos in chart (ddx lentigo)    #. D48.5   DDX: pBCC vs nevus  - Reviewed reason for biopsy today, procedure and risks/benefits of biopsy and observation. Verbal consent was obtained.  - A photo was taken for the chart.     # Hypopigmented patch, L back, measuring 5.5 cm with few uniform brown macules within. Favor pigmentary mosaicism but she isn't sure how long it's been there and doesn't think she was born with it. She is going to check with her mom and let me know.     # Seborrheic keratosis. Discussed the natural history and benign nature of this lesion. Reassurance provided that no additional treatment is necessary. Interestingly the one on her right inferior breast does have a two-tone appearance but still favor SK and benign.     # History of organ transplant. Patient aware that she is at higher risk for cutaneous malignancy given history of transplant. Monitor for changing or symptomatic lesions. Continue frequent skin checks and photoprotection.  - Recently switched to everlimus.    Procedures Performed:   Shave biopsy procedure note, location(s): Left arm   DDX:     After discussion of benefits and risks including but not limited to bleeding, infection, scar, incomplete removal, recurrence, and non-diagnostic biopsy, verbal consent and photographs were obtained. The area was cleaned with isopropyl alcohol. 0.5mL of 1% lidocaine with epinephrine was injected to  obtain adequate anesthesia of lesion(s). Shave biopsy at site(s) performed. Hemostasis was achieved with aluminium chloride. Petrolatum ointment and a sterile dressing were applied. The patient tolerated the procedure and no complications were noted. The patient was provided with verbal and written post care instructions.      Follow-up: 6-12 months pending biopsy report. Reviewed that treatment would include MMS with our derm surgeons. She will hear from me via [employer].     Staff:     Pat Bautista MD PhD  Dermatology, Dermatopathology     ____________________________________________    CC: Derm Problem (FBSE - no areas of concern)    HPI:  Ms. Carol Guillaume is a(n) 34 year old female who presents today as a return patient for skin check.  No major concerns.  No personal hx of skin cancer.  No painful, bleeding, non-healing, or otherwise symptomatic lesions.     Patient is otherwise feeling well, without additional skin concerns.     Labs Reviewed:  N/A    Physical Exam:  Vitals: There were no vitals taken for this visit.  SKIN: Total skin excluding the undergarment areas was performed. The exam included the head/face, neck, both arms, chest, back, abdomen, both legs, digits and/or nails.   - 5.5 cm hypopigmented patch left back with few uniform brown macules within  - waxy stuck on papule under left breast with medial light brown and lateral dark brown.  - few medium brown macules on trunk and extremities, one on left ear measures 2 mm and one on R ear measures 1 mm.    - Left arm with pearly pink papule with spoke and wheel on dermoscopy -BIOPSY    - No other lesions of concern on areas examined.         Medications:  Current Outpatient Medications   Medication Sig Dispense Refill    acetaminophen (TYLENOL) 325 MG tablet Take 2 tablets (650 mg) by mouth every 4 hours as needed for mild pain 50 tablet 0    amLODIPine (NORVASC) 5 MG tablet TAKE 1 TABLET BY MOUTH DAILY 90 tablet 3    azaTHIOprine (IMURAN) 50 MG  tablet Take 3 tablets (150 mg) by mouth daily. 84 tablet 0    carvedilol (COREG) 25 MG tablet TAKE 1 TABLET BY MOUTH 2 TIMES DAILY (WITH MEALS) 180 tablet 1    COMPOUNDED NON-CONTROLLED SUBSTANCE (CMPD RX) - PHARMACY TO MIX COMPOUNDED MEDICATION Inject 0.25mg semaglutide once weekly 4 each 3    everolimus (ZORTRESS) 0.25 MG tablet Take 2 tablets (0.5 mg) by mouth every morning AND 1 tablet (0.25 mg) every evening.      everolimus (ZORTRESS) 0.75 MG tablet Hold for future dose changes 60 tablet 11    levothyroxine (SYNTHROID/LEVOTHROID) 50 MCG tablet TAKE 1 TABLET BY MOUTH ONCE DAILY 90 tablet 1    losartan (COZAAR) 25 MG tablet TAKE 1 TABLET BY MOUTH 2 TIMES DAILY 180 tablet 3    NEORAL (BRAND) 25 MG capsule Take 2 capsules (50 mg) by mouth every morning AND 1 capsule (25 mg) every evening. 270 capsule 3    ondansetron (ZOFRAN ODT) 4 MG ODT tab Take 1 tablet (4 mg) by mouth every 8 hours as needed for nausea. 15 tablet 0    polyethylene glycol (MIRALAX) 17 GM/Dose powder Take 17 g (1 Capful) by mouth daily.      psyllium (METAMUCIL/KONSYL) 58.6 % powder Take 1 teaspoonful by mouth daily.      vitamin D3 (CHOLECALCIFEROL) 50 mcg (2000 units) tablet Take 1 tablet (50 mcg) by mouth daily. 90 tablet 3     No current facility-administered medications for this visit.      Past Medical History:   Patient Active Problem List   Diagnosis    Kidney replaced by transplant    Senior-Loken syndrome    Pseudotumor cerebri    Legally blind    Retinitis pigmentosa    Vitamin D deficiency    Class 3 severe obesity with serious comorbidity and body mass index (BMI) of 40.0 to 44.9 in adult, unspecified obesity type (H)    Insulin resistance    PCOS (polycystic ovarian syndrome)    Cataract    Palpitations    Dyslipidemia    HTN, kidney transplant related    Aftercare following organ transplant    Immunosuppression (H)    EIN (endometrial intraepithelial neoplasia)    Chronic kidney disease, stage 3 (H)    Acquired hypothyroidism     Elevated creatine kinase    Anemia in other chronic diseases classified elsewhere    Nephrolithiasis    Urinary tract infection     Past Medical History:   Diagnosis Date    Acquired hypothyroidism 08/02/2023    Anemia     Anxiety     Cancer (H) 10/30/2020    Atypical Hyperplasia endometriosis carcinoma    Cataract 2015    CMV (cytomegalovirus infection) (H)     CMV disease (H) 2006    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    Depression     EIN (endometrial intraepithelial neoplasia)     Fatigue     Gastroesophageal reflux disease     High cholesterol     Hypertension     Insomnia     Insulin resistance     Legally blind     Obesity     PCOS (polycystic ovarian syndrome)     PPD positive, treated 2006    9 months of INH    Pseudotumor cerebri     on Diamox    Retinitis pigmentosa     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     Uncomplicated asthma     as a child    Urinary tract infection 04/17/2024    Viremia        CC Referred Self, MD  No address on file on close of this encounter.

## 2024-12-09 NOTE — LETTER
12/9/2024       RE: Carol Guillaume  [address]  [facility] [address]     Dear Colleague,    Thank you for referring your patient, Carol Guillaume, to the [facility] DERMATOLOG[facility] [place] [facility]. Please see a copy of my visit note below.    [facility] Dermatology Note  Encounter Date: Dec 9, 2024  Office Visit     Dermatology Problem List:  # Pertinent PMH: h/o kidney transplant in 2006.  1. Family h/o melanoma in grandfather  2. 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   # Fhx melanoma.  Counseled on ABCDEs of melanoma and sun protection. Asked patient to return sooner if noticing changing or symptomatic lesions.  - Monitor nevi bilateral ears, photos in chart (ddx lentigo)    #. D48.5   DDX: pBCC vs nevus  - Reviewed reason for biopsy today, procedure and risks/benefits of biopsy and observation. Verbal consent was obtained.  - A photo was taken for the chart.     # Hypopigmented patch, L back, measuring 5.5 cm with few uniform brown macules within. Favor pigmentary mosaicism but she isn't sure how long it's been there and doesn't think she was born with it. She is going to check with her mom and let me know.     # Seborrheic keratosis. Discussed the natural history and benign nature of this lesion. Reassurance provided that no additional treatment is necessary. Interestingly the one on her right inferior breast does have a two-tone appearance but still favor SK and benign.     # History of organ transplant. Patient aware that she is at higher risk for cutaneous malignancy given history of transplant. Monitor for changing or symptomatic lesions. Continue frequent skin checks and photoprotection.  - Recently switched to everlimus.    Procedures Performed:   Shave biopsy procedure note, location(s): Left  arm   DDX:     After discussion of benefits and risks including but not limited to bleeding, infection, scar, incomplete removal, recurrence, and non-diagnostic biopsy, verbal consent and photographs were obtained. The area was cleaned with isopropyl alcohol. 0.5mL of 1% lidocaine with epinephrine was injected to obtain adequate anesthesia of lesion(s). Shave biopsy at site(s) performed. Hemostasis was achieved with aluminium chloride. Petrolatum ointment and a sterile dressing were applied. The patient tolerated the procedure and no complications were noted. The patient was provided with verbal and written post care instructions.      Follow-up: 6-12 months pending biopsy report. Reviewed that treatment would include MMS with our derm surgeons. She will hear from me via MyChart.     Staff:     Pat Bautista MD PhD  Dermatology, Dermatopathology     ____________________________________________    CC: Derm Problem (FBSE - no areas of concern)    HPI:  Ms. Carol Guillaume is a(n) 34 year old female who presents today as a return patient for skin check.  No major concerns.  No personal hx of skin cancer.  No painful, bleeding, non-healing, or otherwise symptomatic lesions.     Patient is otherwise feeling well, without additional skin concerns.     Labs Reviewed:  N/A    Physical Exam:  Vitals: There were no vitals taken for this visit.  SKIN: Total skin excluding the undergarment areas was performed. The exam included the head/face, neck, both arms, chest, back, abdomen, both legs, digits and/or nails.   - 5.5 cm hypopigmented patch left back with few uniform brown macules within  - waxy stuck on papule under left breast with medial light brown and lateral dark brown.  - few medium brown macules on trunk and extremities, one on left ear measures 2 mm and one on R ear measures 1 mm.    - Left arm with pearly pink papule with spoke and wheel on dermoscopy -BIOPSY    - No other lesions of concern on areas examined.          Medications:  Current Outpatient Medications   Medication Sig Dispense Refill     acetaminophen (TYLENOL) 325 MG tablet Take 2 tablets (650 mg) by mouth every 4 hours as needed for mild pain 50 tablet 0     amLODIPine (NORVASC) 5 MG tablet TAKE 1 TABLET BY MOUTH DAILY 90 tablet 3     azaTHIOprine (IMURAN) 50 MG tablet Take 3 tablets (150 mg) by mouth daily. 84 tablet 0     carvedilol (COREG) 25 MG tablet TAKE 1 TABLET BY MOUTH 2 TIMES DAILY (WITH MEALS) 180 tablet 1     COMPOUNDED NON-CONTROLLED SUBSTANCE (CMPD RX) - PHARMACY TO MIX COMPOUNDED MEDICATION Inject 0.25mg semaglutide once weekly 4 each 3     everolimus (ZORTRESS) 0.25 MG tablet Take 2 tablets (0.5 mg) by mouth every morning AND 1 tablet (0.25 mg) every evening.       everolimus (ZORTRESS) 0.75 MG tablet Hold for future dose changes 60 tablet 11     levothyroxine (SYNTHROID/LEVOTHROID) 50 MCG tablet TAKE 1 TABLET BY MOUTH ONCE DAILY 90 tablet 1     losartan (COZAAR) 25 MG tablet TAKE 1 TABLET BY MOUTH 2 TIMES DAILY 180 tablet 3     NEORAL (BRAND) 25 MG capsule Take 2 capsules (50 mg) by mouth every morning AND 1 capsule (25 mg) every evening. 270 capsule 3     ondansetron (ZOFRAN ODT) 4 MG ODT tab Take 1 tablet (4 mg) by mouth every 8 hours as needed for nausea. 15 tablet 0     polyethylene glycol (MIRALAX) 17 GM/Dose powder Take 17 g (1 Capful) by mouth daily.       psyllium (METAMUCIL/KONSYL) 58.6 % powder Take 1 teaspoonful by mouth daily.       vitamin D3 (CHOLECALCIFEROL) 50 mcg (2000 units) tablet Take 1 tablet (50 mcg) by mouth daily. 90 tablet 3     No current facility-administered medications for this visit.      Past Medical History:   Patient Active Problem List   Diagnosis     Kidney replaced by transplant     Senior-Loken syndrome     Pseudotumor cerebri     Legally blind     Retinitis pigmentosa     Vitamin D deficiency     Class 3 severe obesity with serious comorbidity and body mass index (BMI) of 40.0 to 44.9 in adult, unspecified  obesity type (H)     Insulin resistance     PCOS (polycystic ovarian syndrome)     Cataract     Palpitations     Dyslipidemia     HTN, kidney transplant related     Aftercare following organ transplant     Immunosuppression (H)     EIN (endometrial intraepithelial neoplasia)     Chronic kidney disease, stage 3 (H)     Acquired hypothyroidism     Elevated creatine kinase     Anemia in other chronic diseases classified elsewhere     Nephrolithiasis     Urinary tract infection     Past Medical History:   Diagnosis Date     Acquired hypothyroidism 08/02/2023     Anemia      Anxiety      Cancer (H) 10/30/2020    Atypical Hyperplasia endometriosis carcinoma     Cataract 2015     CMV (cytomegalovirus infection) (H)      CMV disease (H) 2006    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     Depression      EIN (endometrial intraepithelial neoplasia)      Fatigue      Gastroesophageal reflux disease      High cholesterol      Hypertension      Insomnia      Insulin resistance      Legally blind      Obesity      PCOS (polycystic ovarian syndrome)      PPD positive, treated 2006    9 months of INH     Pseudotumor cerebri     on Diamox     Retinitis pigmentosa      S/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      Uncomplicated asthma     as a child     Urinary tract infection 04/17/2024     Viremia        CC Referred Self, MD  No address on file on close of this encounter.    Lidocaine-epinephrine 1-1:214411 % injection   1mL once for one use, starting 12/9/2024 ending 12/9/2024,  2mL disp, R-0, injection  Injected by CHIRSTA Cordova      Again, thank you for allowing me to participate in the care of your patient.      Sincerely,    Pat Bautista MD

## 2024-12-09 NOTE — NURSING NOTE
Dermatology Rooming Note    Carol Guillaume's goals for this visit include:   Chief Complaint   Patient presents with    Derm Problem     FBSE - no areas of concern     Nydia Yee, EMT

## 2024-12-09 NOTE — PATIENT INSTRUCTIONS
Wound Care After a Biopsy    What is a skin biopsy?  A skin biopsy allows the doctor to examine a very small piece of tissue under the microscope to determine the diagnosis and the best treatment for the skin condition. A local anesthetic (numbing medicine) is injected with a very small needle into the skin area to be tested. A small piece of skin is taken from the area. Sometimes a suture (stitch) is used.     What are the risks of a skin biopsy?  I will experience scar, bleeding, swelling, pain, crusting and redness. I may experience incomplete removal or recurrence. Risks of this procedure are excessive bleeding, bruising, infection, nerve damage, numbness, thick (hypertrophic or keloidal) scar and non-diagnostic biopsy.    How should I care for my wound for the first 24 hours?  Keep the wound dry and covered for 24 hours  If it bleeds, hold direct pressure on the area for 15 minutes. If bleeding does not stop, call us or go to the emergency room  Avoid strenuous exercise the first 1-2 days or as your doctor instructs you    How should I care for the wound after 24 hours?  After 24 hours, remove the bandage  You may bathe or shower as normal  If you had a scalp biopsy, you can shampoo as usual and can use shower water to clean the biopsy site daily  Clean the wound once a day with gentle soap and water  Do not scrub, be gentle  Apply white petroleum/Vaseline after cleaning the wound with a cotton swab or a clean finger, and keep the site covered with a Bandaid /bandage. Bandages are not necessary with a scalp biopsy  If you are unable to cover the site with a Bandaid /bandage, re-apply ointment 2-3 times a day to keep the site moist. Moisture will help with healing  Avoid strenuous activity for first 1-2 days  Avoid lakes, rivers, pools, and oceans until the stitches are removed or the site is healed    How do I clean my wound?  Wash hands thoroughly with soap or use hand  before all wound care  Clean  the wound with gentle soap and water  Apply white petroleum/Vaseline  to wound after it is clean  Replace the Bandaid /bandage to keep the wound covered for the first few days or as instructed by your doctor  If you had a scalp biopsy, warm shower water to the area on a daily basis should suffice    What should I use to clean my wound?   Cotton-tipped applicators (Qtips )  White petroleum jelly (Vaseline ). Use a clean new container and use Q-tips to apply.  Bandaids  as needed  Gentle soap     How should I care for my wound long term?  Do not get your wound dirty  Keep up with wound care for one week or until the area is healed.  If you have stitches, stitches need to be removed in 7 days. You may return to our clinic for this or you may have it done locally at your doctor s office.  A small scab will form and fall off by itself when the area is completely healed. The area will be red and will become pink in color as it heals. Sun protection is very important for how your scar will turn out. Sunscreen with an SPF 30 or greater is recommended once the area is healed.  You should have some soreness but it should be mild and slowly go away over several days. Talk to your doctor about using tylenol for pain,    When should I call my doctor?  If you have increased:   Pain or swelling  Pus or drainage (clear or slightly yellow drainage is ok)  Temperature over 100F  Spreading redness or warmth around wound    When will I hear about my results?  The biopsy results can take 2 weeks to come back.  Your results will automatically release to My Point...Exactly before your provider has even reviewed them.  The clinic will call you with the results, send you a My Point...Exactly message, or have you schedule a follow-up clinic or phone time to discuss the results.  Contact our clinics if you do not hear from us in 2 weeks.    Who should I call with questions?  [facility]: [phone]  [facility]  [facility]: [phone]  [facility]: [phone]  For urgent needs outside of business hours call the [facility] at [phone] and ask for the dermatology resident on call

## 2024-12-09 NOTE — TELEPHONE ENCOUNTER
Issue:   Patient 4 weeks from procedure (switching from Everolimus to Azathioprine)     Plan:   Feliberto Douglas MD Langren, Linda, RN; Ronna Hughes RN   mg poqd  Cbc weekly to monitor wbc trends    Outcome:   Called patient, left detailed message and asked her to call me back. prescription for Azathioprine 150mg PO daily sent to [place] pharmacy.

## 2024-12-11 ENCOUNTER — TEAM CONFERENCE (OUTPATIENT)
Dept: TRANSPLANT | Facility: CLINIC | Age: 34
End: 2024-12-11
Payer: COMMERCIAL

## 2024-12-11 ENCOUNTER — TELEPHONE (OUTPATIENT)
Dept: TRANSPLANT | Facility: CLINIC | Age: 34
End: 2024-12-11
Payer: COMMERCIAL

## 2024-12-11 DIAGNOSIS — I15.1 HTN, KIDNEY TRANSPLANT RELATED: ICD-10-CM

## 2024-12-11 DIAGNOSIS — Z94.0 HTN, KIDNEY TRANSPLANT RELATED: ICD-10-CM

## 2024-12-11 LAB
PATH REPORT.COMMENTS IMP SPEC: NORMAL
PATH REPORT.COMMENTS IMP SPEC: NORMAL
PATH REPORT.FINAL DX SPEC: NORMAL
PATH REPORT.GROSS SPEC: NORMAL
PATH REPORT.MICROSCOPIC SPEC OTHER STN: NORMAL
PATH REPORT.RELEVANT HX SPEC: NORMAL

## 2024-12-11 NOTE — TELEPHONE ENCOUNTER
Discussed Carol's case at Nephrology Desk Rounds with Dr. Peña.  He notes that to be cleared for renal transplant, the patient will need to have confirmation that they do not have cancer - Needs a D & C with endometrial cell sampling and will need to follow preventative measures recommended by OBGYN.  Patient and gynecology provider will be called.

## 2024-12-12 ENCOUNTER — TELEPHONE (OUTPATIENT)
Dept: TRANSPLANT | Facility: CLINIC | Age: 34
End: 2024-12-12
Payer: COMMERCIAL

## 2024-12-12 DIAGNOSIS — I15.1 HTN, KIDNEY TRANSPLANT RELATED: ICD-10-CM

## 2024-12-12 DIAGNOSIS — Z94.0 HTN, KIDNEY TRANSPLANT RELATED: ICD-10-CM

## 2024-12-12 NOTE — TELEPHONE ENCOUNTER
Called patient at this time to discuss the recommendations from the nephrology desk rounds from yesterday,  discussed that we will need the D and C with tissue sampling and that she will need to have preventative measures moving forward to prevent.  She would need to be compliant with recommendations from the team whether that is transplant or OBGYN. She was in understanding of this plan. Will call her OBGYN provider.

## 2024-12-17 ENCOUNTER — PATIENT OUTREACH (OUTPATIENT)
Dept: CARE COORDINATION | Facility: CLINIC | Age: 34
End: 2024-12-17
Payer: COMMERCIAL

## 2024-12-17 ENCOUNTER — TELEPHONE (OUTPATIENT)
Dept: TRANSPLANT | Facility: CLINIC | Age: 34
End: 2024-12-17
Payer: COMMERCIAL

## 2024-12-17 ENCOUNTER — TELEPHONE (OUTPATIENT)
Dept: ENDOCRINOLOGY | Facility: CLINIC | Age: 34
End: 2024-12-17
Payer: COMMERCIAL

## 2024-12-17 ENCOUNTER — TELEPHONE (OUTPATIENT)
Dept: DERMATOLOGY | Facility: CLINIC | Age: 34
End: 2024-12-17
Payer: COMMERCIAL

## 2024-12-17 DIAGNOSIS — Z94.0 HTN, KIDNEY TRANSPLANT RELATED: ICD-10-CM

## 2024-12-17 DIAGNOSIS — I15.1 HTN, KIDNEY TRANSPLANT RELATED: ICD-10-CM

## 2024-12-17 NOTE — PROGRESS NOTES
Anemia Management Note - Reminder     Follow-up with anemia management service:    Spoke to Carol. Since D&C is scheduled in Feb, 2025, Aranesp will be discontinued until endo cancer can be ruled out. She should continue with labs, as IV iron can be done if levels drop.   She voiced understanding and agreement.   Aranesp appt for 12/20 was cancelled, and she preferred to keep lab appt scheduled  Writer will monitor labs monthly and she was invited to call anytime.  Once results of D&C/hysteroscopy are known, writer will reach out re: next steps for anemia management.        Latest Ref Rng & Units 7/24/2024 7/26/2024 8/6/2024 10/7/2024 11/4/2024 11/8/2024 11/22/2024   Anemia   MARCO Dose       60 mcg 60 mcg   Hemoglobin 11.7 - 15.7 g/dL 9.4  8.5  8.7  9.2  8.4  8.6  9.2    IV Iron Dose   750mg        TSAT 15 - 46 %   30  25  30  31  19    Ferritin 6 - 175 ng/mL   678   506  505  387           Follow-up call date: 022525- D&C scheduled on 021825    Carrie Leopold, RN BSN  Anemia Services  [facility]  [email or web address]  Office: [phone]  Fax [phone]

## 2024-12-17 NOTE — TELEPHONE ENCOUNTER
Received call from Dr Violette Corea from gynecology. Carol has been seeing her for at least a year. Carol has EIN and the recommendations have been an IUD or oral hormones but definitive treatment would be a hysterectomy. Carol is declining all options but has agreed to a D&C scheduled for 2/18/2025. According to Dr Corea there is a 40% risk of her EIN turning into endometrial cancer. Carol is hopeful for a pregnancy and she has been advised not to become pregnant. She has no showed a couple of times for follow up and did not bring her  in for discussion. Will discuss with Selection Committee. Dr Corea can be reached at [phone].

## 2024-12-17 NOTE — TELEPHONE ENCOUNTER
Called and left message for patient in regards to medication request to increase dose of compounded semaglutide. Asked for return call with discuss and get an update on constipation and nausea.

## 2024-12-17 NOTE — TELEPHONE ENCOUNTER
Medication Question or Refill        What medication are you calling about (include dose and sig)?:   COMPOUNDED NON-CONTROLLED SUBSTANCE  COMPOUNDED NON-CONTROLLED SUBSTANCE (CMPD RX) - PHARMACY TO MIX COMPOUNDED MEDICATION    Preferred Pharmacy:     [facility] - [place], [place] - [address] [place] [id number]  Phone: [phone] Fax: [phone]      Controlled Substance Agreement on file:   CSA -- Patient Level:    CSA: None found at the patient level.       Who prescribed the medication?: Sharon Olea    Do you need a refill? No    When did you use the medication last? 12/14/24    Patient offered an appointment? No    Do you have any questions or concerns?  Yes: patient stated she has plateaued & would like to move up to the next dose.      Could we send this information to you in [facility] or would you prefer to receive a phone call?:   Patient would prefer a phone call   Okay to leave a detailed message?: Yes at Cell number on file:    Telephone Information:   Mobile [phone]

## 2025-01-02 NOTE — TELEPHONE ENCOUNTER
FUTURE VISIT INFORMATION      FUTURE VISIT INFORMATION:  Date: 2.3.25  Time: 9:30  Location: [facility]  REFERRAL INFORMATION:  Referring provider:  Michele  Referring providers clinic:  Derm  Reason for visit/diagnosis  BCC L upper arm  pt is blind.      RECORDS REQUESTED FROM:       Clinic name Comments Records Status Imaging Status   Derm 12.9.24  Path # [id number] Epic Epic

## 2025-01-06 ENCOUNTER — TELEPHONE (OUTPATIENT)
Dept: TRANSPLANT | Facility: CLINIC | Age: 35
End: 2025-01-06
Payer: COMMERCIAL

## 2025-01-06 ENCOUNTER — PRE VISIT (OUTPATIENT)
Dept: UROLOGY | Facility: CLINIC | Age: 35
End: 2025-01-06
Payer: COMMERCIAL

## 2025-01-06 DIAGNOSIS — I15.1 HTN, KIDNEY TRANSPLANT RELATED: Primary | ICD-10-CM

## 2025-01-06 DIAGNOSIS — Z94.0 HTN, KIDNEY TRANSPLANT RELATED: Primary | ICD-10-CM

## 2025-01-06 NOTE — TELEPHONE ENCOUNTER
Reason for visit: follow up     Relevant information: kidney stones, transplanted kidney    Records/imaging/labs/orders: imaging available    Pt called: No need for a call    At Rooming: virtual visit    Gil Brown  1/6/2025  2:16 PM

## 2025-01-06 NOTE — TELEPHONE ENCOUNTER
Patient Call: General  Route to LPN    Reason for call: Patient returning call back. No further details provided.     Call back needed? Yes    Return Call Needed  Same as documented in contacts section  When to return call?: Same day: Route High Priority

## 2025-01-06 NOTE — TELEPHONE ENCOUNTER
Patient Call: General  Route to LPN    Reason for call: Pt's  is being admitted today for Influenza A-  The ER Dr told her to call us this AM to see RX for Tamaflu-  She just switched meds so not sure if it is drug related - has nausea-vomiting and diarrhea     Call back needed? Yes    Return Call Needed  Same as documented in contacts section  When to return call?: Same day: Route High Priority

## 2025-01-07 ENCOUNTER — MYC MEDICAL ADVICE (OUTPATIENT)
Dept: TRANSPLANT | Facility: CLINIC | Age: 35
End: 2025-01-07
Payer: COMMERCIAL

## 2025-01-07 DIAGNOSIS — I15.1 HTN, KIDNEY TRANSPLANT RELATED: Primary | ICD-10-CM

## 2025-01-07 DIAGNOSIS — Z94.0 HTN, KIDNEY TRANSPLANT RELATED: Primary | ICD-10-CM

## 2025-01-09 NOTE — TELEPHONE ENCOUNTER
ISSUE: Carol reports ongoing nausea, diarrhea, and intermittent dizziness that started Saturday 12/28/2024.    Because patient has an upcoming D&C with Gyn on 2/18/2025 Everolimus was switched to Azathioprine, which was started two days prior to symptom onset on 12/26/2024.    Patient voices concern about influenza exposure as her  was admitted for influenza 1/6 -1/8.  She was at the hospital with him most of his stay.    She denies fever, headache, body aches, cough or sore throat.    She is having transplant labs drawn tomorrow 1/10/2025, will touch base with tx neph and inquire about resp panel and or stool studies.     Ronna Hughes RN   Transplant Coordinator  [phone]

## 2025-01-15 ENCOUNTER — DOCUMENTATION ONLY (OUTPATIENT)
Dept: TRANSPLANT | Facility: CLINIC | Age: 35
End: 2025-01-15
Payer: COMMERCIAL

## 2025-01-15 DIAGNOSIS — Z94.0 HTN, KIDNEY TRANSPLANT RELATED: Primary | ICD-10-CM

## 2025-01-15 DIAGNOSIS — I15.1 HTN, KIDNEY TRANSPLANT RELATED: Primary | ICD-10-CM

## 2025-01-15 NOTE — PROGRESS NOTES
Spoke to [employer] company and they will be faxing to us the results of [employer] [email or web address].They stated it was in 7/24---nothing after that.

## 2025-01-16 DIAGNOSIS — I12.9 HYPERTENSIVE RENAL DISEASE: ICD-10-CM

## 2025-01-16 DIAGNOSIS — Z79.899 ENCOUNTER FOR LONG-TERM CURRENT USE OF MEDICATION: ICD-10-CM

## 2025-01-16 DIAGNOSIS — I15.1 HTN, KIDNEY TRANSPLANT RELATED: Primary | ICD-10-CM

## 2025-01-16 DIAGNOSIS — Z94.0 HTN, KIDNEY TRANSPLANT RELATED: Primary | ICD-10-CM

## 2025-01-16 DIAGNOSIS — Z94.0 KIDNEY REPLACED BY TRANSPLANT: ICD-10-CM

## 2025-01-16 RX ORDER — AZATHIOPRINE 50 MG/1
150 TABLET ORAL DAILY
Qty: 84 TABLET | Refills: 0 | Status: SHIPPED | OUTPATIENT
Start: 2025-01-16

## 2025-01-16 NOTE — TELEPHONE ENCOUNTER
Medication Refill  Route to [facility] - [place], [place] - [address] Phone: [phone]   Fax: [phone]      Name of Medication: azaTHIOprine (IMURAN) 50 MG tablet    Quantity / Dose: 84 / 50MG

## 2025-01-20 ENCOUNTER — TELEPHONE (OUTPATIENT)
Dept: TRANSPLANT | Facility: CLINIC | Age: 35
End: 2025-01-20
Payer: COMMERCIAL

## 2025-01-20 DIAGNOSIS — I15.1 HTN, KIDNEY TRANSPLANT RELATED: Primary | ICD-10-CM

## 2025-01-20 DIAGNOSIS — Z94.0 HTN, KIDNEY TRANSPLANT RELATED: Primary | ICD-10-CM

## 2025-01-20 NOTE — TELEPHONE ENCOUNTER
Called patient to discuss our plan with her OBGYN.  Will plan to call Dr. Corea to get an idea of what the surveillance plan would be.  I also discussed with our nephrology team regarding her most recent urology visit. She had wondered what we recommend as far as the stone in the transplanted kidney. Per Dr. Gerhard Rueda, she recommends moving forward with the procedure to reduce the risk of pyelonephritis/obstruction.  Noted that if patient chooses to wait until she is on dialysis to have this done or chooses close monitoring instead, that this would be required prior to her being listed active for transplant. Left a very detailed message. Left my direct line for call back at her convenience.

## 2025-01-21 ENCOUNTER — TELEPHONE (OUTPATIENT)
Dept: UROLOGY | Facility: CLINIC | Age: 35
End: 2025-01-21
Payer: COMMERCIAL

## 2025-01-21 ENCOUNTER — TELEPHONE (OUTPATIENT)
Dept: TRANSPLANT | Facility: CLINIC | Age: 35
End: 2025-01-21
Payer: COMMERCIAL

## 2025-01-21 DIAGNOSIS — I15.1 HTN, KIDNEY TRANSPLANT RELATED: Primary | ICD-10-CM

## 2025-01-21 DIAGNOSIS — Z94.0 HTN, KIDNEY TRANSPLANT RELATED: Primary | ICD-10-CM

## 2025-01-21 NOTE — TELEPHONE ENCOUNTER
Last Written Prescription:    carvedilol (COREG) 25 MG tablet  25 mg, 2 TIMES DAILY WITH MEALS           Summary: TAKE 1 TABLET BY MOUTH 2 TIMES DAILY (WITH MEALS), Disp-180 tablet, R-1, E-Prescribe  Dose, Route, Frequency: 25 mg, Oral, 2 TIMES DAILY WITH MEALSStart: 07/16/2024Ord/Sold: 07/16/2024 (O)Ordered On: 07/16/2024Pharmacy: [facility] - [place], [place] - [facility] [address] Associated: Taking: Long-term: Med Note:              Patient Sig: TAKE 1 TABLET BY MOUTH 2 TIMES DAILY (WITH MEALS)  Ordering Department: [facility]  Authorized By: Danny Shi MD  Dispense: 180 tablet  Refills: 1 ordered       ----------------------  Last Visit Date: 12/4/23  Future Visit Date: 2/3/25  ----------------------  REFILL DECISION:  Failed protocol/Overdue office visit, forwarded to provider    Beta-Blockers Protocol [id number]/21/2025 04:25 PM   Protocol Details Medication indicated for associated diagnosis    Recent (12 mo) or future (90 days) visit within the authorizing provider's specialty      Tali LYNN RN  [facility] Central Nursing/Red Flag Triage & Med Refill Team

## 2025-01-21 NOTE — TELEPHONE ENCOUNTER
Calling patient to let her know the team's recommendations from a urology standpoint - we feel it is most safe to have the ureteroscopy to prevent pyelo and obstruction. She will call Dr. Bautista's office and get scheduled for this.

## 2025-01-21 NOTE — TELEPHONE ENCOUNTER
[facility] Call Center    Phone Message    May a detailed message be left on voicemail: yes     Reason for Call: Other: Patient has called to book for a procedure in which the kidney stone is removed . Please call patient     Action Taken: Message routed to:  [facility] Uro    Travel Screening: Not Applicable     Date of Service:

## 2025-01-22 DIAGNOSIS — I12.9 HYPERTENSIVE RENAL DISEASE: ICD-10-CM

## 2025-01-22 DIAGNOSIS — I15.1 HTN, KIDNEY TRANSPLANT RELATED: Primary | ICD-10-CM

## 2025-01-22 DIAGNOSIS — Z94.0 HTN, KIDNEY TRANSPLANT RELATED: Primary | ICD-10-CM

## 2025-01-22 RX ORDER — CARVEDILOL 25 MG/1
25 TABLET ORAL 2 TIMES DAILY WITH MEALS
Qty: 180 TABLET | Refills: 0 | Status: SHIPPED | OUTPATIENT
Start: 2025-01-22

## 2025-01-24 ENCOUNTER — TELEPHONE (OUTPATIENT)
Dept: TRANSPLANT | Facility: CLINIC | Age: 35
End: 2025-01-24

## 2025-01-24 DIAGNOSIS — I15.1 HTN, KIDNEY TRANSPLANT RELATED: Primary | ICD-10-CM

## 2025-01-24 DIAGNOSIS — Z94.0 HTN, KIDNEY TRANSPLANT RELATED: Primary | ICD-10-CM

## 2025-01-24 NOTE — TELEPHONE ENCOUNTER
Feliberto Douglas MD Sveiven, Sara, RN  I want to make sure her pain is not due to other reasons. Recommend evaluation by PCP/ER if severe ongoing left sided pain and make sure it is not due to diverticulitis, colitis or gynecologic cause . If evaluation is negative-imaging/exam, can consider switching AZA.    If she would like to switch AZA now, is she willing to try MPA just so she can get trhough D&C then would switch back to everolimus?          Previous Messages       ----- Message -----  From: Ronna Hughes RN  Sent: 1/22/2025  12:20 PM CST  To: Feliberto Rueda MD  Subject: RE: GI symptoms after starting Aza              Carol Murray's infectious work up was negative and she is still complaining of headache, nausea.  She complains of left sided abdominal pain- especially when eating.    Please advise.    Ronna Hughes RN  Transplant Coordinator  [phone]

## 2025-01-27 ENCOUNTER — VIRTUAL VISIT (OUTPATIENT)
Dept: DERMATOLOGY | Facility: CLINIC | Age: 35
End: 2025-01-27
Payer: COMMERCIAL

## 2025-01-27 DIAGNOSIS — C44.619 BASAL CELL CARCINOMA (BCC) OF LEFT UPPER ARM: Primary | ICD-10-CM

## 2025-01-27 ASSESSMENT — PAIN SCALES - GENERAL: PAINLEVEL_OUTOF10: NO PAIN (0)

## 2025-01-27 NOTE — LETTER
1/27/2025       RE: Carol Guillaume  [address] [facility] [address]     Dear Colleague,    Thank you for referring your patient, Carol Guillaume, to the [facility] DERMATOLOGIC SURGERY CLINIC [place] at [facility]. Please see a copy of my visit note below.    [facility] Dermatology Note  Encounter Date: Jan 27, 2025  Store-and-Forward and Telephone. Location of teledermatologist: [facility] DERMATOLOGIC SURGERY CLINIC [place].  Start time: 3:38. End time: 3:41.    Dermatologic Surgery Telemedicine Consult Note    Dermatology Problem List:  # Pertinent PMH: h/o kidney transplant in 2006.  1. snBCC, left upper arm, s/p bx 12/09/24, pending MMS 02/03/25  2. Family h/o melanoma in grandfather  3. Benign nevus biopsied years ago       CC: consult (BCC left upper arm./)      Subjective: Carol Guillaume is a 34 year old female who presents today for Mohs micrographic surgery consultation for a recent diagnosis of skin cancer.  - Skin cancer(s): BCC  - Location(s): left upper arm  - This will be her first Mohs procedure.  - no other concerns today    Hx of Skin Cancer: No  Hx of Mohs Surgery: No  Transplant: Kidney  Year of Kidney Transplant:: 2006  Immunocompromised: Yes  Immunosuppressive medication(s): Cyclosporine; Imuran/Azathioprine  Current Anticoagulant(s): None  Bleeding Disorder(s): No  Stent: No  Pacemaker: No  Defibrillator: No  Brain/Nerve Stimulator: No  Endocarditis/Rheumatic Fever Hx: No  Prophylactic Antibiotic Needed: No  Congenital heart defect: No  Prosthetic Heart Valve: No  Prosthetic Joint : No  Prior Problem with Local Anesthesia: No  Photoprotection: occasionally  Sunburns: 1-5 in lifetime  Tanning Bed Use: never  Current Tobacco Use: No  Current Alcohol Use: No  Extended Care Facility: No  Do you have any issues with lying for long periods of time?: No    Objective:   Skin: Focused examination of the left  upper arm within the teledermatology photograph(s) on 01/27/25 was performed.   - Hyperpigmented lesion on left upper arm with central islands and asymmetry.  - See photos from bx date 12/09/24 below.            Path report:   Case: [id number]  Date collected: 12/09/24  Final Diagnosis   A(1). Skin, Left Upper Arm, shave:  - Basal cell carcinoma, superficial and nodular types, pigmented       Assessment and Plan:     1. Plan for Mohs micrographic surgery for skin cancer(s) above:  *Review lab result(s): Dermpath report   - We discussed the nature of the diagnosis/condition above. We discussed the treatment options, including the risks benefits and expectations of these options. We recommend micrographic surgery as the most effective and most tissue sparing option for treatment, and the patient agrees to proceed with this.  The patient is aware of the risks, benefits and expectations of this procedure. The patient will be scheduled for this procedure, if not already done so.  - We anticipate the following closure type: Intermediate closure    Patient was discussed with and evaluated by attending physician Dr. Chava Shin and Dr. Moi Marquis, MDSO fellow, PGY5.    Staff Involved:  Staff/Scribe/Fellow    Scribe Disclosure:   I, Patty [facility], am serving as a scribe; to document services personally performed by Chava Shin MD, based on data collection and the provider's statements to me.     Provider Disclosure:  I agree with the above history, review of systems, physical exam and plan.  I have reviewed the content of the documentation and have edited it as needed. I have personally performed the services documented here and the documentation accurately represents those services and the decisions I have made.      Electronically signed by:  Moi Marquis MD     Attestation signed by Chava Shin MD at 1/28/2025 10:48 AM:  Attending Attestation  I attest that I discussed the case with the Fellow.  I agree with  the plan.   I have reviewed the note and edited it as necessary.  I was present with the fellow for the entire interview.  Chava Shin M.D.  Professor  Director of Dermatologic Surgery  Department of Dermatology  [facility]      Again, thank you for allowing me to participate in the care of your patient.      Sincerely,    Chava Shin MD

## 2025-01-27 NOTE — PROGRESS NOTES
[facility] Dermatology Note  Encounter Date: Jan 27, 2025  Store-and-Forward and Telephone. Location of teledermatologist: [facility] DERMATOLOGIC SURGERY CLINIC [place].  Start time: 3:38. End time: 3:41.    Dermatologic Surgery Telemedicine Consult Note    Dermatology Problem List:  # Pertinent PMH: h/o kidney transplant in 2006.  1. snBCC, left upper arm, s/p bx 12/09/24, pending MMS 02/03/25  2. Family h/o melanoma in grandfather  3. Benign nevus biopsied years ago       CC: consult (BCC left upper arm./)      Subjective: Carol Guillaume is a 34 year old female who presents today for Mohs micrographic surgery consultation for a recent diagnosis of skin cancer.  - Skin cancer(s): BCC  - Location(s): left upper arm  - This will be her first Mohs procedure.  - no other concerns today    Hx of Skin Cancer: No  Hx of Mohs Surgery: No  Transplant: Kidney  Year of Kidney Transplant:: 2006  Immunocompromised: Yes  Immunosuppressive medication(s): Cyclosporine; Imuran/Azathioprine  Current Anticoagulant(s): None  Bleeding Disorder(s): No  Stent: No  Pacemaker: No  Defibrillator: No  Brain/Nerve Stimulator: No  Endocarditis/Rheumatic Fever Hx: No  Prophylactic Antibiotic Needed: No  Congenital heart defect: No  Prosthetic Heart Valve: No  Prosthetic Joint : No  Prior Problem with Local Anesthesia: No  Photoprotection: occasionally  Sunburns: 1-5 in lifetime  Tanning Bed Use: never  Current Tobacco Use: No  Current Alcohol Use: No  Extended Care Facility: No  Do you have any issues with lying for long periods of time?: No    Objective:   Skin: Focused examination of the left upper arm within the teledermatology photograph(s) on 01/27/25 was performed.   - Hyperpigmented lesion on left upper arm with central islands and asymmetry.  - See photos from bx date 12/09/24 below.            Path report:   Case: [id number]  Date collected: 12/09/24  Final Diagnosis   A(1). Skin, Left Upper Arm,  shave:  - Basal cell carcinoma, superficial and nodular types, pigmented       Assessment and Plan:     1. Plan for Mohs micrographic surgery for skin cancer(s) above:  *Review lab result(s): Dermpath report   - We discussed the nature of the diagnosis/condition above. We discussed the treatment options, including the risks benefits and expectations of these options. We recommend micrographic surgery as the most effective and most tissue sparing option for treatment, and the patient agrees to proceed with this.  The patient is aware of the risks, benefits and expectations of this procedure. The patient will be scheduled for this procedure, if not already done so.  - We anticipate the following closure type: Intermediate closure    Patient was discussed with and evaluated by attending physician Dr. Chava Shin and Dr. Moi Marquis, MDSO fellow, PGY5.    Staff Involved:  Staff/Scribe/Fellow    Scribe Disclosure:   I, Patty OlivierFaraz, am serving as a scribe; to document services personally performed by Chava Shin MD, based on data collection and the provider's statements to me.     Provider Disclosure:  I agree with the above history, review of systems, physical exam and plan.  I have reviewed the content of the documentation and have edited it as needed. I have personally performed the services documented here and the documentation accurately represents those services and the decisions I have made.      Electronically signed by:  Moi Marquis MD

## 2025-01-27 NOTE — TELEPHONE ENCOUNTER
RNCC spoke with Carol - she reports that she currently feels 'ok', the discomfort is 'tolerable.' She continues to struggle with nausea and her left abdomen hurts after any food intake.   She was primarily concerned as she thought her symptoms may be due to lashay influenza, as he was exposed with her 's illness.    Carol was very pleasant and willing to continue her azathioprine until her D&C is completed and she is healed. She is scheduled for about 3 weeks from now.   She will notify SOT if her abdominal discomfort worsens.     She would like to resume Everolimus as soon as possible. She will get insight from her gynecologist about when she should be fully healed from her D&C.  Message sent to Dr. Littlejohn to confirm as well.

## 2025-02-01 ENCOUNTER — HEALTH MAINTENANCE LETTER (OUTPATIENT)
Age: 35
End: 2025-02-01

## 2025-02-03 ENCOUNTER — OFFICE VISIT (OUTPATIENT)
Dept: INTERNAL MEDICINE | Facility: CLINIC | Age: 35
End: 2025-02-03
Payer: COMMERCIAL

## 2025-02-03 ENCOUNTER — TELEPHONE (OUTPATIENT)
Dept: DERMATOLOGY | Facility: CLINIC | Age: 35
End: 2025-02-03

## 2025-02-03 ENCOUNTER — PRE VISIT (OUTPATIENT)
Dept: DERMATOLOGY | Facility: CLINIC | Age: 35
End: 2025-02-03

## 2025-02-03 ENCOUNTER — OFFICE VISIT (OUTPATIENT)
Dept: DERMATOLOGY | Facility: CLINIC | Age: 35
End: 2025-02-03
Payer: COMMERCIAL

## 2025-02-03 VITALS
WEIGHT: 228 LBS | DIASTOLIC BLOOD PRESSURE: 72 MMHG | BODY MASS INDEX: 37.08 KG/M2 | OXYGEN SATURATION: 98 % | HEART RATE: 101 BPM | SYSTOLIC BLOOD PRESSURE: 105 MMHG

## 2025-02-03 VITALS — HEART RATE: 98 BPM | SYSTOLIC BLOOD PRESSURE: 105 MMHG | DIASTOLIC BLOOD PRESSURE: 73 MMHG

## 2025-02-03 DIAGNOSIS — D48.5 NEOPLASM OF UNCERTAIN BEHAVIOR OF SKIN: ICD-10-CM

## 2025-02-03 DIAGNOSIS — I15.1 HTN, KIDNEY TRANSPLANT RELATED: Primary | ICD-10-CM

## 2025-02-03 DIAGNOSIS — Z94.0 KIDNEY REPLACED BY TRANSPLANT: ICD-10-CM

## 2025-02-03 DIAGNOSIS — Z94.0 HTN, KIDNEY TRANSPLANT RELATED: Primary | ICD-10-CM

## 2025-02-03 DIAGNOSIS — C44.619 BASAL CELL CARCINOMA (BCC) OF LEFT UPPER ARM: Primary | ICD-10-CM

## 2025-02-03 NOTE — LETTER
2/3/2025       RE: Carol Guillaume  [address] [facility] [address]     Dear Colleague,    Thank you for referring your patient, Carol Guillaume, to the [facility] DERMATOLOGIC SURGERY CLINIC [place] at [facility]. Please see a copy of my visit note below.    [facility] Dermatologic Surgery Clinic [place] Procedure Note    Dermatology Problem List:  # Pertinent PMH: h/o kidney transplant in 2006.  1. snBCC, left upper arm, s/p bx 12/09/24, s/p MMS 02/03/25  2. Family h/o melanoma in grandfather  3. Benign nevus biopsied years ago    Date of Service:  Feb 3, 2025  Surgery: Mohs micrographic surgery    Case 1  Repair Type: intermediate  Repair Size: 2.5 cm  Suture Material: monocryl 4-0  Tumor Type: BCC - Basal cell carcinoma  Location: Left upper arm  Derm-Path Accession #: [id number]  PreOp Size: 1.1 x 1.0 cm  PostOp Size: 2.2 x 1.6 cm  Mohs Accession #:   Level of Defect: fat      Procedure:  We discussed the principles of treatment and most likely complications including scarring, bleeding, infection, swelling, pain, crusting, nerve damage, large wound,  incomplete excision, wound dehiscence,  nerve damage, recurrence, and a second procedure may be recommended to obtain the best cosmetic or functional result.    Informed consent was obtained and the patient underwent the procedure as follows:  The patient was placed supine on the operating table.  The cancer was identified, outlined with a marker, and verified by the patient.  The entire surgical field was prepped with Hibiclens.  The surgical site was anesthetized using Lidocaine 1% with epi 1:100,000.      The area of clinically apparent tumor was debulked. The layer of tissue was then surgically excised using a #15 blade and was then transferred onto a specimen sheet maintaining the orientation of the specimen. Hemostasis was obtained using heat cautery. The wound site was then  covered with a dressing while the tissue samples were processed for examination.    The excised tissue was transported to the Mohs histology laboratory maintaining the tissue orientation.  The tissue specimen was relaxed so that the entire surgical margin was in a a single horizontal plane for sectioning and inked for precise mapping.  A precise reference map was drawn to reflect the sectioning of the specimen, colored inking of the margins, and orientation on the patient. The tissue was processed using horizontal sectioning of the base and continuous peripheral margins.  The histopathologic sections were reviewed in conjunction with the reference map.    Total blocks: 1    Total slides:  2    There were no cancer cells visualized on examination, therefore Mohs surgery was complete.     Reconstruction: Intermediate Linear Closure      The patient was taken to the operative suite and placed supine on the operating room table. The defect was identified. Appropriate markings were made with a marking pen to plan the repair. The area was infiltrated with Lidocaine 1% with epi 1:100,000 and prepped with Hibiclens and draped with sterile towels.     The wound was debeveled and undermined widely. Cones were excised within relaxed skin tension lines on both sides of the defect. Hemostasis was obtained using heat cautery. The deeper layers of subcutaneous and superficial (nonmuscle) fascia tissues were then approximated using monocryl 4-0 buried vertical mattress sutures (deep layer suturing). The wound edges were then approximated; additional buried sutures were placed in a similar fashion where needed. monocryl 4-0 running subcuticular (superficial layer suturing) were carefully placed for maximum eversion and meticulous approximation.      Estimated blood loss was less than 10 ml for all surgical sites. A sterile pressure dressing was applied and wound care instructions, with a written handout, were given. The patient was  discharged from the [facility]ologic Surgery Center alert and ambulatory.    The patient elected for pathology results to automatically release and understands that the clinical staff will contact them as soon as possible to notify them of the results.    Repair Size: 2.5 cm    The wound was cleansed with saline and ointment was applied along the wound surface.     A sterile pressure dressing was applied.  Wound care instructions were given verbally and in writing.  The patient left the operating suite in stable condition.  Patient was informed that additional refinement of the resulting surgical scar may be used as a second stage of this reconstruction.     The attending surgeon was present for key portions of the above major procedure and examination.      Staff Involved:  Staff/Scribe/Fellow    Scribe Disclosure:   I, Patty Ruth, am serving as a scribe; to document services personally performed by Chava Shin MD, based on data collection and the provider's statements to me.     Provider Disclosure:  I agree with the above history, review of systems, physical exam and plan.  I have reviewed the content of the documentation and have edited it as needed. I have personally performed the services documented here and the documentation accurately represents those services and the decisions I have made.      Electronically signed by:  Moi Marquis MD     Attestation signed by Chava Shin MD at 2/25/2025  3:03 PM:    Attending attestation:  I was present for key elements of the procedure and immediately available for all other portions of the procedure.  I have reviewed the note and edited it as necessary.    Chava Shin M.D.  Professor  Director of Dermatologic Surgery  Department of Dermatology  [facility]    Dermatology Surgery Clinic  [facility] [place] [id number]      Again, thank you for allowing me to participate in the care  of your patient.      Sincerely,    Chava Shin MD

## 2025-02-03 NOTE — PROGRESS NOTES
Preoperative Evaluation  [facility] INTERNAL MEDICINE [address] [address]  Phone: [phone]  Fax: [phone]  Primary Provider: Danny Shi MD  Pre-op Performing Provider: Patricia Lozano MD  Feb 3, 2025             2/3/2025   Surgical Information   What procedure is being done? Hysteroscopy, dilation and curettage    Facility or Hospital where procedure/surgery will be performed: [facility]    Who is doing the procedure / surgery? Violette Corea MD    Date of surgery / procedure: 2/18/2025    Time of surgery / procedure: 8:58 AM    Where do you plan to recover after surgery? at home with family        Proxy-reported     Fax number for surgical facility: [phone]     Assessment & Plan     The proposed surgical procedure is considered LOW risk.    Kidney replaced by transplant  Patient was instructed to continue with her usual transplant medications without any changes.  Recommend taking medications in the morning of her procedure with small sips of water.    HTN, kidney transplant related  Blood pressure is currently well-controlled.  Recommend to continue with current antihypertensive therapy.  Patient was instructed to hold her losartan in the morning of surgery and take it after the procedure.            - No identified additional risk factors other than previously addressed         Recommendation  Approval given to proceed with proposed procedure, without further diagnostic evaluation.      I spent a total of 30 minutes on the day of the visit.   Time spent by me today doing chart review, history and exam, documentation and further activities per the note    Subjective   Carol is a 34 year old, presenting for the following:  Pre-Op Exam          2/3/2025     8:39 AM   Additional Questions   Roomed by Lashawn BURRELL related to upcoming procedure: Patient reports that she was diagnosed with endometrial  atypia in 2020. She was treated with D&C and Mirena IUD. She is scheduled for hysteroscopy and D&C as part of surveillance and clearance for kidney transplant.   Patient reports that overall she has been stable. She has some fatigue that fluctuates over time.         2/3/2025   Pre-Op Questionnaire   Have you ever had a heart attack or stroke? No    Have you ever had surgery on your heart or blood vessels, such as a stent placement, a coronary artery bypass, or surgery on an artery in your head, neck, heart, or legs? (!) UNKNOWN     Do you have chest pain with activity? No    Do you have a history of heart failure? No    Do you currently have a cold, bronchitis or symptoms of other infection? (!) YES     Do you have a cough, shortness of breath, or wheezing? (!) YES     Do you or anyone in your family have previous history of blood clots? No    Do you or does anyone in your family have a serious bleeding problem such as prolonged bleeding following surgeries or cuts? No    Have you ever had problems with anemia or been told to take iron pills? (!) YES     Have you had any abnormal blood loss such as black, tarry or bloody stools, or abnormal vaginal bleeding? (!) YES     Have you ever had a blood transfusion? No    Are you willing to have a blood transfusion if it is medically needed before, during, or after your surgery? Yes    Have you or any of your relatives ever had problems with anesthesia? No    Do you have sleep apnea, excessive snoring or daytime drowsiness? No    Do you have any artifical heart valves or other implanted medical devices like a pacemaker, defibrillator, or continuous glucose monitor? No    Do you have artificial joints? No    Are you allergic to latex? No        Proxy-reported     Health Care Directive  Patient does not have a Health Care Directive: Advance Directive received and scanned. Click on Code in the patient header to view.    Preoperative Review of    reviewed - no record of  controlled substances prescribed.      Status of Chronic Conditions:  ANEMIA - Patient has a recent history of moderate-severe anemia, which has been symptomatic. Work up to date has revealed CKD-related anemia. Treatment has been iron infusion.     HYPERTENSION - Patient has longstanding history of HTN , currently denies any symptoms referable to elevated blood pressure. Specifically denies chest pain, palpitations, dyspnea, orthopnea, PND or peripheral edema. Blood pressure readings have been in normal range. Current medication regimen is as listed below. Patient denies any side effects of medication.     HYPOTHYROIDISM - Patient has a longstanding history of chronic Hypothyroidism. Patient has been doing well, noting no tremor, insomnia, hair loss or changes in skin texture. Continues to take medications as directed, without adverse reactions or side effects. Last TSH   Lab Results   Component Value Date    TSH 0.72 07/12/2024   .      RENAL INSUFFICIENCY - Patient has a longstanding history of moderate-severe chronic renal insufficiency. Last Cr 3.04.     Patient Active Problem List    Diagnosis Date Noted    Nephrolithiasis 07/26/2024     Priority: Medium    Anemia in other chronic diseases classified elsewhere 07/12/2024     Priority: Medium    Elevated creatine kinase 07/03/2024     Priority: Medium    Urinary tract infection 04/17/2024     Priority: Medium    Acquired hypothyroidism 08/02/2023     Priority: Medium    Chronic kidney disease, stage 3 (H) 08/16/2021     Priority: Medium    EIN (endometrial intraepithelial neoplasia) 08/09/2021     Priority: Medium    Aftercare following organ transplant 08/25/2020     Priority: Medium    Immunosuppression 08/25/2020     Priority: Medium    Dyslipidemia 01/17/2020     Priority: Medium    HTN, kidney transplant related 01/17/2020     Priority: Medium    Palpitations 03/20/2018     Priority: Medium    Cataract      Priority: Medium    Vitamin D deficiency  07/26/2013     Priority: Medium     Problem list name updated by automated process. Provider to review      Class 3 severe obesity with serious comorbidity and body mass index (BMI) of 40.0 to 44.9 in adult, unspecified obesity type (H) 07/26/2013     Priority: Medium    Insulin resistance 07/26/2013     Priority: Medium    PCOS (polycystic ovarian syndrome) 07/26/2013     Priority: Medium    Kidney replaced by transplant 05/16/2012     Priority: Medium    Senior-Loken syndrome      Priority: Medium    Pseudotumor cerebri      Priority: Medium    Legally blind      Priority: Medium    Retinitis pigmentosa      Priority: Medium      Past Medical History:   Diagnosis Date    Acquired hypothyroidism 08/02/2023    Anemia     Anxiety     Cancer (H) 10/30/2020    Atypical Hyperplasia endometriosis carcinoma    Cataract 2015    CMV (cytomegalovirus infection) (H)     CMV disease (H) 2006    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    Depression     EIN (endometrial intraepithelial neoplasia)     Fatigue     Gastroesophageal reflux disease     High cholesterol     Hypertension     Insomnia     Insulin resistance     Legally blind     Obesity     PCOS (polycystic ovarian syndrome)     PPD positive, treated 2006    9 months of INH    Pseudotumor cerebri     on Diamox    Retinitis pigmentosa     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     Uncomplicated asthma     as a child    Urinary tract infection 04/17/2024    Viremia      Past Surgical History:   Procedure Laterality Date    BIOPSY      cataract bilateral  06/2017    DILATION AND CURETTAGE N/A 09/13/2021    Procedure: DILATION AND CURETTAGE,;  Surgeon: Lisbeth Gallardo MD;  Location:  OR    DILATION AND CURETTAGE N/A 05/09/2022    Procedure: DILATION AND CURETTAGE;  Surgeon: Lisbeth Gallardo MD;  Location:  OR    DILATION AND CURETTAGE N/A 5/4/2023    Procedure: Dilation and Curettage of Endometrium;  Surgeon: Lisbeth Gallardo MD;   Location: [facility] OR    DILATION AND CURETTAGE, OPERATIVE HYSTEROSCOPY WITH MORCELLATOR, COMBINED N/A 10/23/2020    Procedure: HYSTEROSCOPY, DILATION AND CURRETAGE, MYOSURE;  Surgeon: Kierra Tracy MD;  Location: [facility] OR    DILATION AND CURETTAGE, OPERATIVE HYSTEROSCOPY WITH MORCELLATOR, COMBINED N/A 03/05/2021    Procedure: HYSTEROSCOPY, WITH DILATION AND CURETTAGE OF UTERUS USING  MORCELLATOR;  Surgeon: Fiorella Cunningham MD;  Location: [facility] OR    ESOPHAGOSCOPY, GASTROSCOPY, DUODENOSCOPY (EGD), COMBINED N/A 01/24/2023    Procedure: ESOPHAGOGASTRODUODENOSCOPY, WITH BIOPSY;  Surgeon: Talia Aguilar MD;  Location: [facility] OR    ESOPHAGOSCOPY, GASTROSCOPY, DUODENOSCOPY (EGD), COMBINED N/A 6/7/2024    Procedure: Esophagoscopy, gastroscopy, duodenoscopy (EGD), combined;  Surgeon: Charli Watts MD;  Location: [facility] OR    INSERT INTRAUTERINE DEVICE N/A 03/05/2021    Procedure: INSERTION MIRENA INTRAUTERINE DEVICE;  Surgeon: Fiorella Cunningham MD;  Location: [facility] OR    IR RENAL BIOPSY RIGHT  08/27/2021    LAPAROSCOPY DIAGNOSTIC (GYN)  03/05/2021    Procedure: Laparoscopy diagnostic (gyn);  Surgeon: Fiorella Cunningham MD;  Location: [facility] OR    LITHOTRIPSY      LITHOTRIPSY      REMOVE INTRAUTERINE DEVICE N/A 03/05/2021    Procedure: REMOVE MIRENA INTRAUTERINE DEVICE;  Surgeon: Fiorella Cunningham MD;  Location: [facility] OR    REMOVE INTRAUTERINE DEVICE N/A 05/09/2022    Procedure: EXCHANGE OF MIRENA INTRAUTERINE DEVICE;  Surgeon: Lisbeth Gallardo MD;  Location: [facility] OR    REPLACE INTRAUTERINE DEVICE N/A 09/13/2021    Procedure: MIRENA INTRAUTERINE DEVICE EXCHANGE;  Surgeon: Lisbeth Gallardo MD;  Location: [facility] OR    SINUS SURGERY  2001    TONSILLECTOMY      TRANSPLANT KIDNEY RECIPIENT LIVING RELATED Right 07/2006    wisdom teeth       Current Outpatient Medications   Medication Sig Dispense Refill    acetaminophen (TYLENOL) 325 MG tablet Take 2 tablets (650 mg) by mouth every 4 hours as needed for mild pain 50 tablet 0     amLODIPine (NORVASC) 5 MG tablet TAKE 1 TABLET BY MOUTH DAILY 90 tablet 3    azaTHIOprine (IMURAN) 50 MG tablet Take 3 tablets (150 mg) by mouth daily. 84 tablet 0    carvedilol (COREG) 25 MG tablet TAKE 1 TABLET BY MOUTH 2 TIMES DAILY (WITH MEALS) 180 tablet 0    carvedilol (COREG) 25 MG tablet Take 1 tablet (25 mg) by mouth 2 times daily (with meals). 180 tablet 0    COMPOUNDED NON-CONTROLLED SUBSTANCE (CMPD RX) - PHARMACY TO MIX COMPOUNDED MEDICATION Inject 0.25mg semaglutide once weekly 4 each 3    everolimus (ZORTRESS) 0.25 MG tablet Take 2 tablets (0.5 mg) by mouth every morning AND 1 tablet (0.25 mg) every evening.      everolimus (ZORTRESS) 0.75 MG tablet Hold for future dose changes 60 tablet 11    levothyroxine (SYNTHROID/LEVOTHROID) 50 MCG tablet TAKE 1 TABLET BY MOUTH ONCE DAILY 90 tablet 1    losartan (COZAAR) 25 MG tablet TAKE 1 TABLET BY MOUTH 2 TIMES DAILY 180 tablet 3    NEORAL (BRAND) 25 MG capsule Take 2 capsules (50 mg) by mouth every morning AND 1 capsule (25 mg) every evening. 270 capsule 3    ondansetron (ZOFRAN ODT) 4 MG ODT tab Take 1 tablet (4 mg) by mouth every 8 hours as needed for nausea. 15 tablet 0    polyethylene glycol (MIRALAX) 17 GM/Dose powder Take 17 g (1 Capful) by mouth daily.      psyllium (METAMUCIL/KONSYL) 58.6 % powder Take 1 teaspoonful by mouth daily.      vitamin D3 (CHOLECALCIFEROL) 50 mcg (2000 units) tablet Take 1 tablet (50 mcg) by mouth daily. 90 tablet 3       No Known Allergies     Social History     Tobacco Use    Smoking status: Never    Smokeless tobacco: Never   Substance Use Topics    Alcohol use: Never     Family History   Problem Relation Age of Onset    Hyperlipidemia Mother     Osteoporosis Mother     Breast Cancer Mother     Sjogren's Father     Hashimoto's thyroiditis Father     Thyroid Cancer Father     Other - See Comments Maternal Grandfather         surgical complication    Atrial fibrillation Paternal Grandmother     Arthritis Paternal Grandmother   "   Melanoma Paternal Grandfather     Parkinsonism Paternal Uncle         paternal uncle    Pulmonary Embolism Paternal Uncle      History   Drug Use Unknown               Objective    /72 (BP Location: Right arm, Patient Position: Sitting, Cuff Size: Adult Large)   Pulse 101   Wt 103.4 kg (228 lb)   SpO2 98%   BMI 37.08 kg/m     Estimated body mass index is 37.08 kg/m  as calculated from the following:    Height as of 11/8/24: 1.67 m (5' 5.75\").    Weight as of this encounter: 103.4 kg (228 lb).  Physical Exam  GENERAL: alert and no distress  RESP: lungs clear to auscultation - no rales, rhonchi or wheezes  CV: regular rate and rhythm, normal S1 S2, no S3 or S4, no murmur, click or rub, no peripheral edema  ABDOMEN: soft, nontender and bowel sounds normal  MS: no gross musculoskeletal defects noted, no edema  SKIN: no suspicious lesions or rashes  NEURO: Normal strength and tone, mentation intact and speech normal  PSYCH: mentation appears normal, affect normal/bright    Recent Labs   Lab Test 01/10/25  0942 01/03/25  0934 11/08/24  1216 11/04/24  1430   HGB 8.8* 9.5*   < > 8.4*    209   < > 154   INR  --   --   --  1.14    137   < > 135   POTASSIUM 3.7 3.8   < > 4.0   CR 3.04* 3.29*   < > 4.07*   A1C  --   --   --  5.4    < > = values in this interval not displayed.        Diagnostics  No labs were ordered during this visit.   No EKG required, no history of coronary heart disease, significant arrhythmia, peripheral arterial disease or other structural heart disease.    Revised Cardiac Risk Index (RCRI)  The patient has the following serious cardiovascular risks for perioperative complications:   - No serious cardiac risks = 0 points     RCRI Interpretation: 0 points: Class I (very low risk - 0.4% complication rate)         Signed Electronically by: Patricia Lozano MD  A copy of this evaluation report is provided to the requesting physician.        "

## 2025-02-03 NOTE — TELEPHONE ENCOUNTER
Follow up call attempted & left voicemail following Mohs procedure with Dr. Shin.       Are you having pain?   Are you taking pain medication?   Are you applying ice?    Have you had any noticeable bleeding through the bandage?    Do you have any other concerns?        Please call [phone] if you have any questions or concerns during business hours. For concerns after hours, call the hospital  to reach the dermatology resident on call at [phone], option 4.     Dorothy PETE RN  Dermatology Surgery

## 2025-02-03 NOTE — PATIENT INSTRUCTIONS
Wound care    I will experience scar, bleeding, swelling, pain, crusting and redness. I may experience incomplete removal or recurrence. Risks are bleeding, bruising, swelling, infection, nerve damage, & a large wound. A second procedure may be recommended to obtain the best cosmetic or functional result.       A three month office visit with your Surgeon is recommended for scar evaluation. Please reach out sooner if you have concerns about you surgical site/wound.    Caring for your skin after surgery    After your surgery, a pressure bandage will be placed over the area that has stitches. This is important to prevent bleeding. Please follow these instructions over the next 1 to 2 weeks. Following this regimen will help to prevent complications as your wound heals.     For the first 48 hours after your surgery:    Leave the pressure dressing on and keep it dry. If it should come loose, you may re-tape it, but do not take it off.  Relax and take it easy. Do not do any vigorous exercise or heavy lifting. This could cause the wound to bleed.  Post-Operative pain is usually mild. If you are able to take tylenol, You may take plain or extra-strength Tylenol (acetaminophen) As directed on the bottle (do not take more than 4,000mg in one day). If you are able to take ibuprofen, you can alternate the tylenol and ibuprofen.   Avoid alcohol as this may increase your tendency to bleed.   You may put an ice pack around the bandaged area for 20 minutes at a time as needed. This may help reduce swelling, bruising, and pain. Make sure the ice pack is waterproof so that the pressure bandage doesn t get wet.  If the wound is on the face try to sleep with your head elevated. Either in a recliner or propped up in bed, this will decrease swelling around the eyes.   You may see a small amount of drainage or blood on your pressure bandage. This is normal. However:  If drainage or bleeding continues or saturates the bandage, you will  need to apply firm pressure over the bandage with a piece of gauze for 15 minutes.  If bleeding continues after applying pressure for 15 minutes, apply an ice pack to the bandaged area for 15 minutes.  If bleeding still continues, call our office or go to the nearest emergency room.    Remove pressure dressing 48 hours after surgery:    Carefully remove the pressure bandage. If it seems sticky or too difficult to get off, you may need to soak it off in the shower.  After the pressure dressing is removed, you may shower and get the wound wet. However, Do Not let the forceful stream of the shower hit the wound directly.  Follow these wound care and dressing change instructions:    You have skin glue over the stitches. This will dry and flake off with time (about 2 weeks). If the stitches are still hanging around after 2 weeks, let us know, we can clip them out for you if they do not fall out with washing.   You may allow water to run over the site. Do not soak.  Do Not rub or scrub the site.  Pat dry after the shower or bath.  Avoid topical medications, lotions, creams, ointment,or oils.  Do not use tanning lamps or expose the site to sun.   Check wound appearance daily, some swelling and redness is normal after a procedure but should go away as your would is healing. If the swelling and redness or pain increases or if any other signs of infection occur listed below please send in a photo via my chart and or call us to let us know.  The clear glue film should start peeling and flaking off approximately around 2 weeks. By this time your wound should be sufficiently healed. If it still looks to be healing when the glue comes off you can clean the wound with soapy warm water daily in the shower. No need to bandage further, but you can put a bandage on the area daily for the two weeks if you would like.   If skin glue and sutures are still intact at 2 weeks after your procedure, you can start applying Vaseline daily to  "help soften up the skin glue. It will come off easier this way.        If you are able to take acetaminophen (\"Tylenol,\" etc.) and ibuprofen (\"Advil\" or \"Motrin,\" etc.), then you may STAGGER these medications by taking 400 mg of ibuprofen (usually two tabs) every 8 hours and 1,000 mg of acetaminophen (e.g., two tabs of extra-strength Tylenol) every 8 hours.    This means, for example, that you could take the followin,000 mg of Tylenol, followed 4 hours later by 400 mg Ibuprofen, followed 4 hours later with 1,000 mg of Tylenol, followed 4 hours later by 400 mg Ibuprofen, followed 4 hours later with 1,000 mg of Tylenol, and so forth.     Essentially, you can take either 1,000 mg of Tylenol or 400 mg of ibuprofen in alternating fashion EVERY FOUR HOURS.    Do NOT exceed more than 4,000 mg of Tylenol or 3,200 mg of ibuprofen per 24 hours. If you are not able to take Tylenol or ibuprofen as above due to other health issues (or a physician has told you directly that you are not allowed to take one of them, say due to pre-existing severe liver or kidney issues), then disregard the above directions.    Scientific evidence supports that this combination/schedule of pain medications is just as effective, if not more effective, than taking a narcotic pain medicine.       Follow up will be a 3 month scar evaluation either in person or via a telephone visit with you sending in a photo via tsumobi. Unless you have been told to follow up sooner or if you have concerns and would like to be see sooner. Please call or send us in a tsumobi message if possible and attach a photo.        What to expect:    The first couple of days your wound may be tender and may bleed slightly when doing wound care.  There may be swelling and bruising around the wound, especially if it is near the eyes. For your comfort, you may apply ice or cold compresses to the bruises after your have removed the pressure bandage.  The area around your wound may " be numb for several weeks or even months.  You may experience periodic sharp pain or mild itching around the wound as it heals.   The suture line will look dark for a while but will lighten over time.       When to call us:    You have bleeding that will not stop after applying pressure and ice.  You have pain that is not controlled with Tylenol (acetaminophen.)  You have signs or symptoms of an infection such as:  Fever over 100 degrees Fahrenheit  Redness, warmth or foul-smelling drainage from the wound  If you have any questions, or are not sure how to take care of the wound.    Phone numbers:    During business hours (M-F 8:00-4:30 p.m.)  [facility]-  [phone] Option 1 appt. Desk and ask for the Dermatology Surgery Team  [phone] [facility] .     ---------------------------------------------------------  Evenings/Weekends/Holidays  Hospital - [phone]   TTY for hearing ntyzreqv-[phone]  *Ask  to page dermatologist on-call  Emergency Bcnv-[phone]  TTY for hearing impaired- [phone]

## 2025-02-03 NOTE — PROGRESS NOTES
[facility] [place] Procedure Note    Dermatology Problem List:  # Pertinent PMH: h/o kidney transplant in 2006.  1. snBCC, left upper arm, s/p bx 12/09/24, s/p MMS 02/03/25  2. Family h/o melanoma in grandfather  3. Benign nevus biopsied years ago    Date of Service:  Feb 3, 2025  Surgery: Mohs micrographic surgery    Case 1  Repair Type: intermediate  Repair Size: 2.5 cm  Suture Material: monocryl 4-0  Tumor Type: BCC - Basal cell carcinoma  Location: Left upper arm  Derm-Path Accession #: [id number]  PreOp Size: 1.1 x 1.0 cm  PostOp Size: 2.2 x 1.6 cm  Mohs Accession #:   Level of Defect: fat      Procedure:  We discussed the principles of treatment and most likely complications including scarring, bleeding, infection, swelling, pain, crusting, nerve damage, large wound,  incomplete excision, wound dehiscence,  nerve damage, recurrence, and a second procedure may be recommended to obtain the best cosmetic or functional result.    Informed consent was obtained and the patient underwent the procedure as follows:  The patient was placed supine on the operating table.  The cancer was identified, outlined with a marker, and verified by the patient.  The entire surgical field was prepped with Hibiclens.  The surgical site was anesthetized using Lidocaine 1% with epi 1:100,000.      The area of clinically apparent tumor was debulked. The layer of tissue was then surgically excised using a #15 blade and was then transferred onto a specimen sheet maintaining the orientation of the specimen. Hemostasis was obtained using heat cautery. The wound site was then covered with a dressing while the tissue samples were processed for examination.    The excised tissue was transported to the Mohs histology laboratory maintaining the tissue orientation.  The tissue specimen was relaxed so that the entire surgical margin was in a a single horizontal plane for sectioning and inked for precise  mapping.  A precise reference map was drawn to reflect the sectioning of the specimen, colored inking of the margins, and orientation on the patient. The tissue was processed using horizontal sectioning of the base and continuous peripheral margins.  The histopathologic sections were reviewed in conjunction with the reference map.    Total blocks: 1    Total slides:  2    There were no cancer cells visualized on examination, therefore Mohs surgery was complete.     Reconstruction: Intermediate Linear Closure      The patient was taken to the operative suite and placed supine on the operating room table. The defect was identified. Appropriate markings were made with a marking pen to plan the repair. The area was infiltrated with Lidocaine 1% with epi 1:100,000 and prepped with Hibiclens and draped with sterile towels.     The wound was debeveled and undermined widely. Cones were excised within relaxed skin tension lines on both sides of the defect. Hemostasis was obtained using heat cautery. The deeper layers of subcutaneous and superficial (nonmuscle) fascia tissues were then approximated using monocryl 4-0 buried vertical mattress sutures (deep layer suturing). The wound edges were then approximated; additional buried sutures were placed in a similar fashion where needed. monocryl 4-0 running subcuticular (superficial layer suturing) were carefully placed for maximum eversion and meticulous approximation.      Estimated blood loss was less than 10 ml for all surgical sites. A sterile pressure dressing was applied and wound care instructions, with a written handout, were given. The patient was discharged from the [facility] alert and ambulatory.    The patient elected for pathology results to automatically release and understands that the clinical staff will contact them as soon as possible to notify them of the results.    Repair Size: 2.5 cm    The wound was cleansed with saline and ointment was  applied along the wound surface.     A sterile pressure dressing was applied.  Wound care instructions were given verbally and in writing.  The patient left the operating suite in stable condition.  Patient was informed that additional refinement of the resulting surgical scar may be used as a second stage of this reconstruction.     The attending surgeon was present for key portions of the above major procedure and examination.      Staff Involved:  Staff/Scribe/Fellow    Scribe Disclosure:   I, Patty Ruth, am serving as a scribe; to document services personally performed by Chava Shin MD, based on data collection and the provider's statements to me.     Provider Disclosure:  I agree with the above history, review of systems, physical exam and plan.  I have reviewed the content of the documentation and have edited it as needed. I have personally performed the services documented here and the documentation accurately represents those services and the decisions I have made.      Electronically signed by:  Moi Marquis MD

## 2025-02-03 NOTE — NURSING NOTE
Chief Complaint   Patient presents with    Derm Problem     BCC L upper arm     Allison RN-BSN  Dermatology Surgery

## 2025-02-05 ENCOUNTER — TELEPHONE (OUTPATIENT)
Dept: TRANSPLANT | Facility: CLINIC | Age: 35
End: 2025-02-05
Payer: COMMERCIAL

## 2025-02-05 DIAGNOSIS — Z94.0 HTN, KIDNEY TRANSPLANT RELATED: Primary | ICD-10-CM

## 2025-02-05 DIAGNOSIS — I15.1 HTN, KIDNEY TRANSPLANT RELATED: Primary | ICD-10-CM

## 2025-02-05 NOTE — TELEPHONE ENCOUNTER
Pt called on call coordinator on 2/4 at 1806. Her  has been sick with Hand Foot Mouth disease and she is inquiring what she needs to do to avoid getting it. Writer reviewed keeping distance as much as possible (sleep in different bed if able), no sharing linens, towels, or kitchen utensils, no kissing, and to wash hands thoroughly. If patient gets symptoms such as fever, rash, red spots, blisters, or sore throat to notify primary coordinator so we can discuss plan with doctor. Pt verbalized understanding and agrees with the plan.

## 2025-02-10 ENCOUNTER — TELEPHONE (OUTPATIENT)
Dept: TRANSPLANT | Facility: CLINIC | Age: 35
End: 2025-02-10
Payer: COMMERCIAL

## 2025-02-10 ENCOUNTER — PREP FOR PROCEDURE (OUTPATIENT)
Dept: UROLOGY | Facility: CLINIC | Age: 35
End: 2025-02-10
Payer: COMMERCIAL

## 2025-02-10 DIAGNOSIS — Z94.0 HTN, KIDNEY TRANSPLANT RELATED: Primary | ICD-10-CM

## 2025-02-10 DIAGNOSIS — I15.1 HTN, KIDNEY TRANSPLANT RELATED: Primary | ICD-10-CM

## 2025-02-10 DIAGNOSIS — N20.0 NEPHROLITHIASIS: Primary | ICD-10-CM

## 2025-02-10 RX ORDER — ACETAMINOPHEN 650 MG/1
650 SUPPOSITORY RECTAL ONCE
OUTPATIENT
Start: 2025-02-10

## 2025-02-10 RX ORDER — CEFAZOLIN SODIUM 2 G/50ML
2 SOLUTION INTRAVENOUS
OUTPATIENT
Start: 2025-02-10

## 2025-02-10 RX ORDER — CEFAZOLIN SODIUM 2 G/50ML
2 SOLUTION INTRAVENOUS SEE ADMIN INSTRUCTIONS
OUTPATIENT
Start: 2025-02-10

## 2025-02-10 RX ORDER — ACETAMINOPHEN 325 MG/1
975 TABLET ORAL ONCE
OUTPATIENT
Start: 2025-02-10 | End: 2025-02-10

## 2025-02-10 NOTE — TELEPHONE ENCOUNTER
Left message for patient regarding scheduling surgery with Dr. Bautista      Direct call back number given  Ph: [phone]      Noemi Crenshaw on 2/10/2025 at 3:33 PM     Patient due for her HTN f/u visit with her PCP. After multiple attempts to reach patient by phone a letter was sent to patient requesting a call back to discuss.

## 2025-02-10 NOTE — TELEPHONE ENCOUNTER
Called patient and left VM to let her know that Dr. Bautista has placed the orders and urology will call her to schedule per his note to me today. Left my direct line for additional questions or concerns.    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

## 2025-02-10 NOTE — TELEPHONE ENCOUNTER
Called patient to schedule surgery with Dr. Bautista    Spoke with: Patient    Date of Surgery: 03/03/2025 - First Available     Approximate surgery arrival time given:  No    Location of surgery: [facility]     Pre-Op H&P: Instructed to schedule w/ PCP - Danny Shi MD within 10-30 days of surgery     Labs: UA/UC 2/14 at 8:15AM - RN to enter order    Pre-op Imaging: No     Post-Op Appt Date: 1 week cysto stent removal w/ Dr. Bautista - Nothing available/Needs scheduled      Post-op Imaging: N/A     Discussed with patient pre-op RN will call 2-3 days prior to surgery with arrival time and instructions:  Yes     Packet sent out: Yes 02/10/25  via [employer] Message      Additional Comments:  Patient is aware to have a  for surgery.     Patient inquired as to if pre-op w/ PCP done on 02/03/2025 could be used for this surgery - informed patient that it's possible but it would need to state that she is okay for this surgery with Dr. Bautista and she will need to contact her PCP regarding this.     Patient is aware that if PCP is unable to update pre-op on 2/3 then patient will need a new pre-op.       All patients questions were answered and was instructed to review surgical packet and call back with any questions or concerns.       Noemi Crenshaw on 2/10/2025 at 3:54 PM

## 2025-02-10 NOTE — TELEPHONE ENCOUNTER
Called patient at this time to discuss the message that was left. She is wondering about scheduling her urology apt. I sent a message to Dr. Bautista's office to see about getting her scheduled. Will call patient once I have received confirmation that they will contact her.

## 2025-02-10 NOTE — TELEPHONE ENCOUNTER
Pt inés kidney stone issues- seen Dr Michele Almodovar 10th  and told that a  would call her, She called them today - it's been a month- and they told her they did not have any orders.  Wonders if we can help out to get it scheduled   Has pain  off and on.

## 2025-02-10 NOTE — TELEPHONE ENCOUNTER
Received call from patient requesting to schedule surgery with Dr. Bautista.     Informed patient that Michele's RN had attempted to contact her to discuss and that no surgery orders have been placed.     Informed pt a message will sent to RN to call pt back to discuss and place orders if indicated.     Patient asked if this could be expedited as she needs to have this procedure done to get on the transplant list.         Noemi Crenshaw on 2/10/2025 at 8:25 AM

## 2025-02-12 ENCOUNTER — PREP FOR PROCEDURE (OUTPATIENT)
Dept: SURGERY | Facility: CLINIC | Age: 35
End: 2025-02-12
Payer: COMMERCIAL

## 2025-02-13 ENCOUNTER — MYC MEDICAL ADVICE (OUTPATIENT)
Dept: INTERNAL MEDICINE | Facility: CLINIC | Age: 35
End: 2025-02-13
Payer: COMMERCIAL

## 2025-02-14 PROCEDURE — 87088 URINE BACTERIA CULTURE: CPT | Performed by: UROLOGY

## 2025-02-14 PROCEDURE — 99000 SPECIMEN HANDLING OFFICE-LAB: CPT | Performed by: PATHOLOGY

## 2025-02-18 ENCOUNTER — MYC MEDICAL ADVICE (OUTPATIENT)
Dept: OTHER | Age: 35
End: 2025-02-18

## 2025-02-18 DIAGNOSIS — Z94.0 KIDNEY TRANSPLANTED: Primary | ICD-10-CM

## 2025-02-19 ENCOUNTER — TELEPHONE (OUTPATIENT)
Dept: TRANSPLANT | Facility: CLINIC | Age: 35
End: 2025-02-19

## 2025-02-19 ENCOUNTER — TELEPHONE (OUTPATIENT)
Dept: TRANSPLANT | Facility: CLINIC | Age: 35
End: 2025-02-19
Payer: COMMERCIAL

## 2025-02-19 DIAGNOSIS — I15.1 HTN, KIDNEY TRANSPLANT RELATED: Primary | ICD-10-CM

## 2025-02-19 DIAGNOSIS — Z94.0 KIDNEY REPLACED BY TRANSPLANT: ICD-10-CM

## 2025-02-19 DIAGNOSIS — Z94.0 HTN, KIDNEY TRANSPLANT RELATED: Primary | ICD-10-CM

## 2025-02-19 NOTE — TELEPHONE ENCOUNTER
ISSUE: Patient has not been seen for RKT since 2018.  However has been seen in the last year for pre transplant.    PLAN: place order for RKT     OUTCOME: left message for the patient with call back request or response to my chart message regarding over due RKT apt.  Order placed.    Ronna Hughes RN   Transplant Coordinator  [phone]

## 2025-02-19 NOTE — TELEPHONE ENCOUNTER
Patient Call: General  Route to LPN    Reason for call: patient returning called about dehydration and current symptoms. They are available until 12pm today.  More details with call back.     Call back needed? Yes    Return Call Needed  Same as documented in contacts section  When to return call?: Same day: Route High Priority

## 2025-02-20 DIAGNOSIS — I15.1 HTN, KIDNEY TRANSPLANT RELATED: ICD-10-CM

## 2025-02-20 DIAGNOSIS — Z94.0 HTN, KIDNEY TRANSPLANT RELATED: ICD-10-CM

## 2025-02-20 DIAGNOSIS — Z94.0 KIDNEY REPLACED BY TRANSPLANT: Primary | ICD-10-CM

## 2025-02-20 DIAGNOSIS — Z79.899 ENCOUNTER FOR LONG-TERM CURRENT USE OF MEDICATION: ICD-10-CM

## 2025-02-20 RX ORDER — AZATHIOPRINE 50 MG/1
150 TABLET ORAL DAILY
Qty: 90 TABLET | Refills: 11 | Status: SHIPPED | OUTPATIENT
Start: 2025-02-20

## 2025-02-24 ENCOUNTER — DOCUMENTATION ONLY (OUTPATIENT)
Dept: UROLOGY | Facility: CLINIC | Age: 35
End: 2025-02-24
Payer: COMMERCIAL

## 2025-02-24 NOTE — PROGRESS NOTES
Addended by: KEYSHAWN RHOADES on: 7/3/2024 06:33 PM     Modules accepted: Orders     Per Dr. Michele waterman to not treat based off of culture prior to surgery.    Lupe Ordoñez RN, BSN  [facility] Urology

## 2025-02-25 ENCOUNTER — TELEPHONE (OUTPATIENT)
Dept: TRANSPLANT | Facility: CLINIC | Age: 35
End: 2025-02-25
Payer: COMMERCIAL

## 2025-02-25 ENCOUNTER — PATIENT OUTREACH (OUTPATIENT)
Dept: CARE COORDINATION | Facility: CLINIC | Age: 35
End: 2025-02-25
Payer: COMMERCIAL

## 2025-02-25 DIAGNOSIS — I15.1 HTN, KIDNEY TRANSPLANT RELATED: ICD-10-CM

## 2025-02-25 DIAGNOSIS — Z94.0 HTN, KIDNEY TRANSPLANT RELATED: ICD-10-CM

## 2025-02-25 NOTE — PROGRESS NOTES
Anemia Management Note - Reminder     Follow-up with anemia management service:    Per pathology report from 2/18/25 D&C:  Final Diagnosis A) ENDOMETRIUM,CURETTAGE:  1. Atypical (complex) endometrial hyperplasia, persistent/residual  2. Negative for carcinoma as sampled       Staff message sent to Dr. Gerhard Rueda re: resuming Aranesp.        Latest Ref Rng & Units 10/7/2024 11/4/2024 11/8/2024 11/22/2024 1/3/2025 1/10/2025 2/14/2025   Anemia   MARCO Dose    60 mcg 60 mcg      Hemoglobin 11.7 - 15.7 g/dL 9.2  8.4  8.6  9.2  9.5  8.8  8.4    TSAT 15 - 46 % 25  30  31  19  40   59    Ferritin 6 - 175 ng/mL  506  505  387  399   488          Follow-up call date: 022725    Carrie Leopold, RN BSN  Anemia Services  [facility]  [email or web address]  Office: [phone]  Fax [phone]

## 2025-02-25 NOTE — TELEPHONE ENCOUNTER
Returning call to patient. She had called and left VM wondering our recommendations for her hyperplasia.  She just had D and C with Dr. Corea who is recommending hormone therapy. She would like to avoid this. The path showed that there is no malignancy.  She is wondering if transplant would be ok with an alternative option for her to move forward. Let her know this would have to be discussed with the team.

## 2025-02-26 ENCOUNTER — PATIENT OUTREACH (OUTPATIENT)
Dept: CARE COORDINATION | Facility: CLINIC | Age: 35
End: 2025-02-26
Payer: COMMERCIAL

## 2025-02-26 RX ORDER — MEPERIDINE HYDROCHLORIDE 25 MG/ML
25 INJECTION INTRAMUSCULAR; INTRAVENOUS; SUBCUTANEOUS
OUTPATIENT
Start: 2025-02-26

## 2025-02-26 RX ORDER — ALBUTEROL SULFATE 0.83 MG/ML
2.5 SOLUTION RESPIRATORY (INHALATION)
OUTPATIENT
Start: 2025-02-26

## 2025-02-26 RX ORDER — DIPHENHYDRAMINE HYDROCHLORIDE 50 MG/ML
50 INJECTION INTRAMUSCULAR; INTRAVENOUS
Start: 2025-02-26

## 2025-02-26 RX ORDER — EPINEPHRINE 1 MG/ML
0.3 INJECTION, SOLUTION, CONCENTRATE INTRAVENOUS EVERY 5 MIN PRN
OUTPATIENT
Start: 2025-02-26

## 2025-02-26 RX ORDER — DIPHENHYDRAMINE HYDROCHLORIDE 50 MG/ML
25 INJECTION INTRAMUSCULAR; INTRAVENOUS
Start: 2025-02-26

## 2025-02-26 RX ORDER — ALBUTEROL SULFATE 90 UG/1
1-2 INHALANT RESPIRATORY (INHALATION)
Start: 2025-02-26

## 2025-02-26 RX ORDER — METHYLPREDNISOLONE SODIUM SUCCINATE 40 MG/ML
40 INJECTION INTRAMUSCULAR; INTRAVENOUS
Start: 2025-02-26

## 2025-02-26 NOTE — PROGRESS NOTES
Anemia Management Note - Follow Up      SUBJECTIVE/OBJECTIVE:    Referred by Dr. Feliberto Rueda on 7/10/2024  Primary Diagnosis: Anemia of Other Chronic Illness (D63.8)     Secondary Diagnosis: Organ or tissue replaced by transplant, kidney (Z94.0)  Date of Kidney Transplant: 06  Hgb goal range: 9-10  Epo/Darbo:   Aranesp 60mcg every 2 weeks, PRN Hgb <10, 3rd floor  685322: Ok to resume Aranesp per Dr. Gerhard Rueda  Iron regimen: TBD  Injectafer completed 726     Labs : 712  RX/TX plans : 226     Recent MARCO use, transfusion, IV iron: none  No history of stroke, MI, and blood clots. Atypical Hyperplasia endometriosis carcinoma ,      No consent to communicate on file           Latest Ref Rng & Units 10/7/2024 2024 2024 2024 1/3/2025 1/10/2025 2025   Anemia   MARCO Dose    60 mcg 60 mcg      Hemoglobin 11.7 - 15.7 g/dL 9.2  8.4  8.6  9.2  9.5  8.8  8.4    TSAT 15 - 46 % 25  30  31  19  40   59    Ferritin 6 - 175 ng/mL  506  505  387  399   488      BP Readings from Last 3 Encounters:   25 105/73   25 105/72   24 93/63     Wt Readings from Last 2 Encounters:   25 103.4 kg (228 lb)   24 109.3 kg (241 lb)         ASSESSMENT:    Hgb:Not at goal/Intentional hold  TSat: elevated at >50% Ferritin: At goal (>100ng/mL)   Goals Addressed    None         PLAN:  Restart Aranesp.  RTC for hgb then Aranesp if needed in 2 week(s).    Orders needed to be renewed (for next follow-up date) in EPIC: None    Iron labs due:  mid 2025    Plan discussed with:  Carol. Gave her scheduling phone number, and she will call to schedule lab/Aranesp appts.       NEXT FOLLOW-UP DATE:  313    Carrie Leopold, RN BSN  Anemia Services  [facility]  [email or web address] [email or web address]  Office: [phone]  Fax [phone]

## 2025-02-26 NOTE — PROGRESS NOTES
Received: Yesterday  El Rifai, Rasha, MD Leopold, Carrie A, RN  Yes please resume MARCO            See encounter for today for more details.    Carrie Leopold, RN BSN  Anemia Services  [facility]  [email or web address]  Office: [phone]  Fax [phone]

## 2025-03-02 ENCOUNTER — ANESTHESIA EVENT (OUTPATIENT)
Dept: SURGERY | Facility: CLINIC | Age: 35
End: 2025-03-02
Payer: COMMERCIAL

## 2025-03-03 ENCOUNTER — HOSPITAL ENCOUNTER (OUTPATIENT)
Facility: CLINIC | Age: 35
End: 2025-03-03
Attending: UROLOGY | Admitting: UROLOGY
Payer: COMMERCIAL

## 2025-03-03 ENCOUNTER — APPOINTMENT (OUTPATIENT)
Dept: GENERAL RADIOLOGY | Facility: CLINIC | Age: 35
End: 2025-03-03
Attending: UROLOGY
Payer: COMMERCIAL

## 2025-03-03 ENCOUNTER — PREP FOR PROCEDURE (OUTPATIENT)
Dept: UROLOGY | Facility: CLINIC | Age: 35
End: 2025-03-03

## 2025-03-03 ENCOUNTER — ANESTHESIA (OUTPATIENT)
Dept: SURGERY | Facility: CLINIC | Age: 35
End: 2025-03-03
Payer: COMMERCIAL

## 2025-03-03 VITALS
HEART RATE: 77 BPM | OXYGEN SATURATION: 100 % | BODY MASS INDEX: 36.42 KG/M2 | WEIGHT: 226.63 LBS | RESPIRATION RATE: 11 BRPM | DIASTOLIC BLOOD PRESSURE: 88 MMHG | TEMPERATURE: 97.7 F | SYSTOLIC BLOOD PRESSURE: 124 MMHG | HEIGHT: 66 IN

## 2025-03-03 DIAGNOSIS — N20.0 NEPHROLITHIASIS: Primary | ICD-10-CM

## 2025-03-03 LAB
CREAT SERPL-MCNC: 2.81 MG/DL (ref 0.51–0.95)
EGFRCR SERPLBLD CKD-EPI 2021: 22 ML/MIN/1.73M2
HOLD SPECIMEN: NORMAL

## 2025-03-03 PROCEDURE — C2617 STENT, NON-COR, TEM W/O DEL: HCPCS | Performed by: UROLOGY

## 2025-03-03 PROCEDURE — 999N000179 XR SURGERY CARM FLUORO LESS THAN 5 MIN W STILLS

## 2025-03-03 PROCEDURE — C1747 HC OR ENDOSCOPE, SGL USE,URINARY TRACT, IMAGING/ILLUMINATION DEVICE: HCPCS | Performed by: UROLOGY

## 2025-03-03 PROCEDURE — 272N000001 HC OR GENERAL SUPPLY STERILE: Performed by: UROLOGY

## 2025-03-03 PROCEDURE — 250N000013 HC RX MED GY IP 250 OP 250 PS 637: Performed by: UROLOGY

## 2025-03-03 PROCEDURE — 999N000141 HC STATISTIC PRE-PROCEDURE NURSING ASSESSMENT: Performed by: UROLOGY

## 2025-03-03 PROCEDURE — 258N000003 HC RX IP 258 OP 636: Performed by: NURSE ANESTHETIST, CERTIFIED REGISTERED

## 2025-03-03 PROCEDURE — 250N000009 HC RX 250: Performed by: NURSE ANESTHETIST, CERTIFIED REGISTERED

## 2025-03-03 PROCEDURE — 53899 UNLISTED PX URINARY SYSTEM: CPT | Mod: RT | Performed by: UROLOGY

## 2025-03-03 PROCEDURE — 52356 CYSTO/URETERO W/LITHOTRIPSY: CPT | Mod: RT | Performed by: UROLOGY

## 2025-03-03 PROCEDURE — 250N000011 HC RX IP 250 OP 636: Performed by: UROLOGY

## 2025-03-03 PROCEDURE — 255N000002 HC RX 255 OP 636: Performed by: UROLOGY

## 2025-03-03 PROCEDURE — 710N000010 HC RECOVERY PHASE 1, LEVEL 2, PER MIN: Performed by: UROLOGY

## 2025-03-03 PROCEDURE — C1769 GUIDE WIRE: HCPCS | Performed by: UROLOGY

## 2025-03-03 PROCEDURE — 250N000011 HC RX IP 250 OP 636: Performed by: ANESTHESIOLOGY

## 2025-03-03 PROCEDURE — 250N000011 HC RX IP 250 OP 636: Performed by: STUDENT IN AN ORGANIZED HEALTH CARE EDUCATION/TRAINING PROGRAM

## 2025-03-03 PROCEDURE — 250N000011 HC RX IP 250 OP 636: Performed by: NURSE ANESTHETIST, CERTIFIED REGISTERED

## 2025-03-03 PROCEDURE — 82365 CALCULUS SPECTROSCOPY: CPT | Performed by: UROLOGY

## 2025-03-03 PROCEDURE — 370N000017 HC ANESTHESIA TECHNICAL FEE, PER MIN: Performed by: UROLOGY

## 2025-03-03 PROCEDURE — 360N000083 HC SURGERY LEVEL 3 W/ FLUORO, PER MIN: Performed by: UROLOGY

## 2025-03-03 PROCEDURE — 82565 ASSAY OF CREATININE: CPT | Performed by: UROLOGY

## 2025-03-03 PROCEDURE — C1894 INTRO/SHEATH, NON-LASER: HCPCS | Performed by: UROLOGY

## 2025-03-03 PROCEDURE — 710N000012 HC RECOVERY PHASE 2, PER MINUTE: Performed by: UROLOGY

## 2025-03-03 PROCEDURE — 36415 COLL VENOUS BLD VENIPUNCTURE: CPT | Performed by: UROLOGY

## 2025-03-03 PROCEDURE — C1758 CATHETER, URETERAL: HCPCS | Performed by: UROLOGY

## 2025-03-03 DEVICE — URETERAL STENT
Type: IMPLANTABLE DEVICE | Site: ABDOMEN | Status: FUNCTIONAL
Brand: PERCUFLEX™ PLUS

## 2025-03-03 RX ORDER — HYDROXYZINE HYDROCHLORIDE 10 MG/1
10 TABLET, FILM COATED ORAL
Status: DISCONTINUED | OUTPATIENT
Start: 2025-03-03 | End: 2025-03-04

## 2025-03-03 RX ORDER — ACETAMINOPHEN 325 MG/1
975 TABLET ORAL ONCE
Status: COMPLETED | OUTPATIENT
Start: 2025-03-03 | End: 2025-03-03

## 2025-03-03 RX ORDER — FENTANYL CITRATE 50 UG/ML
50 INJECTION, SOLUTION INTRAMUSCULAR; INTRAVENOUS EVERY 5 MIN PRN
Status: DISCONTINUED | OUTPATIENT
Start: 2025-03-03 | End: 2025-03-03 | Stop reason: HOSPADM

## 2025-03-03 RX ORDER — NALOXONE HYDROCHLORIDE 0.4 MG/ML
0.1 INJECTION, SOLUTION INTRAMUSCULAR; INTRAVENOUS; SUBCUTANEOUS
Status: DISCONTINUED | OUTPATIENT
Start: 2025-03-03 | End: 2025-03-07 | Stop reason: HOSPADM

## 2025-03-03 RX ORDER — HYDROXYZINE HYDROCHLORIDE 25 MG/1
25 TABLET, FILM COATED ORAL
Status: DISCONTINUED | OUTPATIENT
Start: 2025-03-03 | End: 2025-03-07 | Stop reason: HOSPADM

## 2025-03-03 RX ORDER — DEXAMETHASONE SODIUM PHOSPHATE 4 MG/ML
4 INJECTION, SOLUTION INTRA-ARTICULAR; INTRALESIONAL; INTRAMUSCULAR; INTRAVENOUS; SOFT TISSUE
Status: DISCONTINUED | OUTPATIENT
Start: 2025-03-03 | End: 2025-03-07 | Stop reason: HOSPADM

## 2025-03-03 RX ORDER — ONDANSETRON 4 MG/1
4 TABLET, ORALLY DISINTEGRATING ORAL
Status: DISCONTINUED | OUTPATIENT
Start: 2025-03-03 | End: 2025-03-07 | Stop reason: HOSPADM

## 2025-03-03 RX ORDER — PROPOFOL 10 MG/ML
INJECTION, EMULSION INTRAVENOUS CONTINUOUS PRN
Status: DISCONTINUED | OUTPATIENT
Start: 2025-03-03 | End: 2025-03-03

## 2025-03-03 RX ORDER — ONDANSETRON 2 MG/ML
4 INJECTION INTRAMUSCULAR; INTRAVENOUS EVERY 30 MIN PRN
Status: DISCONTINUED | OUTPATIENT
Start: 2025-03-03 | End: 2025-03-07 | Stop reason: HOSPADM

## 2025-03-03 RX ORDER — TAMSULOSIN HYDROCHLORIDE 0.4 MG/1
0.4 CAPSULE ORAL DAILY
Qty: 14 CAPSULE | Refills: 0 | Status: SHIPPED | OUTPATIENT
Start: 2025-03-03 | End: 2025-03-17

## 2025-03-03 RX ORDER — FENTANYL CITRATE 50 UG/ML
INJECTION, SOLUTION INTRAMUSCULAR; INTRAVENOUS PRN
Status: DISCONTINUED | OUTPATIENT
Start: 2025-03-03 | End: 2025-03-03

## 2025-03-03 RX ORDER — SODIUM CHLORIDE, SODIUM LACTATE, POTASSIUM CHLORIDE, CALCIUM CHLORIDE 600; 310; 30; 20 MG/100ML; MG/100ML; MG/100ML; MG/100ML
INJECTION, SOLUTION INTRAVENOUS CONTINUOUS PRN
Status: DISCONTINUED | OUTPATIENT
Start: 2025-03-03 | End: 2025-03-03

## 2025-03-03 RX ORDER — HYDROMORPHONE HCL IN WATER/PF 6 MG/30 ML
0.4 PATIENT CONTROLLED ANALGESIA SYRINGE INTRAVENOUS EVERY 5 MIN PRN
Status: DISCONTINUED | OUTPATIENT
Start: 2025-03-03 | End: 2025-03-03 | Stop reason: HOSPADM

## 2025-03-03 RX ORDER — NALOXONE HYDROCHLORIDE 0.4 MG/ML
0.1 INJECTION, SOLUTION INTRAMUSCULAR; INTRAVENOUS; SUBCUTANEOUS
Status: DISCONTINUED | OUTPATIENT
Start: 2025-03-03 | End: 2025-03-03 | Stop reason: HOSPADM

## 2025-03-03 RX ORDER — OXYCODONE HYDROCHLORIDE 5 MG/1
5 TABLET ORAL
Status: DISCONTINUED | OUTPATIENT
Start: 2025-03-03 | End: 2025-03-07 | Stop reason: HOSPADM

## 2025-03-03 RX ORDER — LIDOCAINE HYDROCHLORIDE 20 MG/ML
INJECTION, SOLUTION INFILTRATION; PERINEURAL PRN
Status: DISCONTINUED | OUTPATIENT
Start: 2025-03-03 | End: 2025-03-03

## 2025-03-03 RX ORDER — ONDANSETRON 4 MG/1
4 TABLET, ORALLY DISINTEGRATING ORAL EVERY 30 MIN PRN
Status: DISCONTINUED | OUTPATIENT
Start: 2025-03-03 | End: 2025-03-03 | Stop reason: HOSPADM

## 2025-03-03 RX ORDER — DEXAMETHASONE SODIUM PHOSPHATE 4 MG/ML
4 INJECTION, SOLUTION INTRA-ARTICULAR; INTRALESIONAL; INTRAMUSCULAR; INTRAVENOUS; SOFT TISSUE
Status: DISCONTINUED | OUTPATIENT
Start: 2025-03-03 | End: 2025-03-03 | Stop reason: HOSPADM

## 2025-03-03 RX ORDER — DEXAMETHASONE SODIUM PHOSPHATE 4 MG/ML
INJECTION, SOLUTION INTRA-ARTICULAR; INTRALESIONAL; INTRAMUSCULAR; INTRAVENOUS; SOFT TISSUE PRN
Status: DISCONTINUED | OUTPATIENT
Start: 2025-03-03 | End: 2025-03-03

## 2025-03-03 RX ORDER — OXYCODONE HYDROCHLORIDE 5 MG/1
5 TABLET ORAL EVERY 6 HOURS PRN
Qty: 6 TABLET | Refills: 0 | Status: SHIPPED | OUTPATIENT
Start: 2025-03-03 | End: 2025-03-06

## 2025-03-03 RX ORDER — SODIUM CHLORIDE, SODIUM LACTATE, POTASSIUM CHLORIDE, CALCIUM CHLORIDE 600; 310; 30; 20 MG/100ML; MG/100ML; MG/100ML; MG/100ML
INJECTION, SOLUTION INTRAVENOUS CONTINUOUS
Status: DISCONTINUED | OUTPATIENT
Start: 2025-03-03 | End: 2025-03-03 | Stop reason: HOSPADM

## 2025-03-03 RX ORDER — ACETAMINOPHEN 650 MG/1
650 SUPPOSITORY RECTAL ONCE
Status: COMPLETED | OUTPATIENT
Start: 2025-03-03 | End: 2025-03-03

## 2025-03-03 RX ORDER — METHOCARBAMOL 750 MG/1
750 TABLET, FILM COATED ORAL
Status: DISCONTINUED | OUTPATIENT
Start: 2025-03-03 | End: 2025-03-04

## 2025-03-03 RX ORDER — ACETAMINOPHEN 325 MG/1
650 TABLET ORAL
Status: DISCONTINUED | OUTPATIENT
Start: 2025-03-03 | End: 2025-03-07 | Stop reason: HOSPADM

## 2025-03-03 RX ORDER — OXYCODONE HYDROCHLORIDE 10 MG/1
10 TABLET ORAL
Status: DISCONTINUED | OUTPATIENT
Start: 2025-03-03 | End: 2025-03-07 | Stop reason: HOSPADM

## 2025-03-03 RX ORDER — ACETAMINOPHEN 500 MG
500-1000 TABLET ORAL EVERY 6 HOURS PRN
Qty: 20 TABLET | Refills: 0 | Status: SHIPPED | OUTPATIENT
Start: 2025-03-03 | End: 2025-03-08

## 2025-03-03 RX ORDER — CEFAZOLIN SODIUM/WATER 2 G/20 ML
2 SYRINGE (ML) INTRAVENOUS SEE ADMIN INSTRUCTIONS
Status: DISCONTINUED | OUTPATIENT
Start: 2025-03-03 | End: 2025-03-03 | Stop reason: HOSPADM

## 2025-03-03 RX ORDER — ONDANSETRON 2 MG/ML
INJECTION INTRAMUSCULAR; INTRAVENOUS PRN
Status: DISCONTINUED | OUTPATIENT
Start: 2025-03-03 | End: 2025-03-03

## 2025-03-03 RX ORDER — ONDANSETRON 4 MG/1
4 TABLET, ORALLY DISINTEGRATING ORAL EVERY 30 MIN PRN
Status: DISCONTINUED | OUTPATIENT
Start: 2025-03-03 | End: 2025-03-07 | Stop reason: HOSPADM

## 2025-03-03 RX ORDER — CEFAZOLIN SODIUM/WATER 2 G/20 ML
2 SYRINGE (ML) INTRAVENOUS
Status: COMPLETED | OUTPATIENT
Start: 2025-03-03 | End: 2025-03-03

## 2025-03-03 RX ORDER — HYDROMORPHONE HCL IN WATER/PF 6 MG/30 ML
0.2 PATIENT CONTROLLED ANALGESIA SYRINGE INTRAVENOUS EVERY 5 MIN PRN
Status: DISCONTINUED | OUTPATIENT
Start: 2025-03-03 | End: 2025-03-03 | Stop reason: HOSPADM

## 2025-03-03 RX ORDER — LABETALOL HYDROCHLORIDE 5 MG/ML
10 INJECTION, SOLUTION INTRAVENOUS
Status: DISCONTINUED | OUTPATIENT
Start: 2025-03-03 | End: 2025-03-03 | Stop reason: HOSPADM

## 2025-03-03 RX ORDER — IOPAMIDOL 510 MG/ML
INJECTION, SOLUTION INTRAVASCULAR PRN
Status: DISCONTINUED | OUTPATIENT
Start: 2025-03-03 | End: 2025-03-03 | Stop reason: HOSPADM

## 2025-03-03 RX ORDER — ONDANSETRON 2 MG/ML
4 INJECTION INTRAMUSCULAR; INTRAVENOUS EVERY 30 MIN PRN
Status: DISCONTINUED | OUTPATIENT
Start: 2025-03-03 | End: 2025-03-03 | Stop reason: HOSPADM

## 2025-03-03 RX ORDER — FENTANYL CITRATE 50 UG/ML
25 INJECTION, SOLUTION INTRAMUSCULAR; INTRAVENOUS EVERY 5 MIN PRN
Status: DISCONTINUED | OUTPATIENT
Start: 2025-03-03 | End: 2025-03-03 | Stop reason: HOSPADM

## 2025-03-03 RX ORDER — PROPOFOL 10 MG/ML
INJECTION, EMULSION INTRAVENOUS PRN
Status: DISCONTINUED | OUTPATIENT
Start: 2025-03-03 | End: 2025-03-03

## 2025-03-03 RX ADMIN — FENTANYL CITRATE 50 MCG: 50 INJECTION INTRAMUSCULAR; INTRAVENOUS at 12:47

## 2025-03-03 RX ADMIN — FENTANYL CITRATE 50 MCG: 50 INJECTION INTRAMUSCULAR; INTRAVENOUS at 12:06

## 2025-03-03 RX ADMIN — PROPOFOL 200 MG: 10 INJECTION, EMULSION INTRAVENOUS at 12:06

## 2025-03-03 RX ADMIN — ACETAMINOPHEN 975 MG: 325 TABLET, FILM COATED ORAL at 08:46

## 2025-03-03 RX ADMIN — SODIUM CHLORIDE, POTASSIUM CHLORIDE, SODIUM LACTATE AND CALCIUM CHLORIDE: 600; 310; 30; 20 INJECTION, SOLUTION INTRAVENOUS at 11:55

## 2025-03-03 RX ADMIN — FENTANYL CITRATE 25 MCG: 50 INJECTION, SOLUTION INTRAMUSCULAR; INTRAVENOUS at 13:55

## 2025-03-03 RX ADMIN — PROPOFOL 150 MCG/KG/MIN: 10 INJECTION, EMULSION INTRAVENOUS at 12:07

## 2025-03-03 RX ADMIN — Medication 2 G: at 12:10

## 2025-03-03 RX ADMIN — LIDOCAINE HYDROCHLORIDE 100 MG: 20 INJECTION, SOLUTION INFILTRATION; PERINEURAL at 12:06

## 2025-03-03 RX ADMIN — DEXAMETHASONE SODIUM PHOSPHATE 8 MG: 4 INJECTION, SOLUTION INTRA-ARTICULAR; INTRALESIONAL; INTRAMUSCULAR; INTRAVENOUS; SOFT TISSUE at 12:18

## 2025-03-03 RX ADMIN — ONDANSETRON 4 MG: 2 INJECTION INTRAMUSCULAR; INTRAVENOUS at 12:18

## 2025-03-03 ASSESSMENT — ACTIVITIES OF DAILY LIVING (ADL)
ADLS_ACUITY_SCORE: 41

## 2025-03-03 ASSESSMENT — ENCOUNTER SYMPTOMS: SEIZURES: 0

## 2025-03-03 NOTE — OP NOTE
OPERATIVE REPORT    PREOPERATIVE DIAGNOSIS:  Nephrolithiasis     POSTOPERATIVE DIAGNOSIS:  Same    PROCEDURES PERFORMED:   -Cystourethroscopy  -Right retrograde pyelogram with intraoperative interpretation of images  -Right ureteroscopy  -Right holmium laser lithotripsy  -Right basket stone retrieval  -Right ureteral stent placement  -Stone vacuum device     STAFF SURGEON: Surgeon(s):  Raúl Bautista MD Mubarak, Naser, MD    RESIDENT(S): Jez Quiñonez MD    ANESTHESIA: General    ESTIMATED BLOOD LOSS: * No values recorded between 3/3/2025 12:26 PM and 3/3/2025  1:37 PM *    DRAINS/TUBES: 6 Upper sorbian x 22 cm double-J ureteral stent    IV FLUIDS: Please see dictated anesthesia record  COMPLICATIONS: None.   SPECIMEN:   ID Type Source Tests Collected by Time Destination   A : Stones, Kidney, Right Calculus/Stone Kidney, Right STONE ANALYSIS Raúl Bautista MD 3/3/2025  1:09 PM        SIGNIFICANT FINDINGS:   1. Patient is stone free in right transplant kidney  2. Proximal curl of the ureteral stent seen in the renal pelvis under fluoroscopy and distal curl seen in the bladder under direct vision.     BRIEF OPERATIVE INDICATIONS: Carol Guillaume is a(n) 34 year old female with a hx of a right-sided transplant kidney with ESRD (GFR 15) with incidentally identified 0.9 mm kidney stone in the lower pole of her transplant kidney. After a discussion of all risks, benefits, and alternatives, the patient elected to proceed with definitive stone management. The patient understands the potential need for more than one procedure to eliminate all stone burden.     A 22-Upper sorbian rigid cystoscope was inserted into a well-lubricated urethra. The urethra was unremarkable without stricture. The transplant ureteral orifice was identified along the right, postero-lateral wall and cannulated with a sensor wire with the aid of a 5 Upper sorbian open-ended catheter. The wire passed without resistance into the upper pole. A 5 fr open-ended  catheter was then advanced over the wire and into the proximal ureter.  A retrograde pyelogram was performed to serve as a roadmap. The wire was replaced and the 5 Fr open-ended catheter was removed.    A dual lumen catheter was used to establish access with a second, Super Stiff guide wire. The previous sensor wire was then clamped to the drape to act as our safety wire. A 36 cm 12/14 ureteral access sheath was advanced up to the distal ureter. The CVAC ureteroscope was used to identify the stone 0.9 mm stone within the lower pole. A 200 micron laser fiber was advanced through the scope and lithotripsy was performed. A Halo basket was used to remove all fragments greater than 1 mm.  Pullback ureteroscopy was performed and showed no retained stone fragments or significant ureteral injury    A 6Fr x 22-cm double-J stent was advanced over the Sensor wire, and a good proximal curl was seen in the renal pelvis on fluoroscopy and the distal curl was seen in the bladder. The bladder was drained.    The patient tolerated the procedure well and there were no apparent complications. The patient  was transported to the postanesthesia care unit in stable condition.     POSTOP PLAN:  -Follow up with Dr. Bautista's clinic in 1-2 weeks for stent removal  -Okay to discharge from PACU once meeting criteria   PLAN:  -Follow up with Dr. Bautista's clinic in 1-2 weeks for stent removal  -Okay to discharge from PACU once meeting criteria

## 2025-03-03 NOTE — ANESTHESIA PREPROCEDURE EVALUATION
Anesthesia Pre-Procedure Evaluation    Patient: Carol Guillaume   MRN: [id number] : 1990        Procedure : Procedure(s):  CYSTOURETEROSCOPY, WITH RETROGRADE PYELOGRAM, HOLMIUM LASER LITHOTRIPSY OF URETERAL CALCULUS, AND STENT INSERTION.  Right sided transplant kidney.          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    Depression     EIN (endometrial intraepithelial neoplasia)     Fatigue     Gastroesophageal reflux disease     High cholesterol     Hypertension     Insomnia     Insulin resistance     Legally blind     Obesity     PCOS (polycystic ovarian syndrome)     PPD positive, treated     9 months of INH    Pseudotumor cerebri     on Diamox    Retinitis pigmentosa     S/P kidney transplant     Senior-Loken syndrome     Uncomplicated asthma     as a child    Urinary tract infection 2024    Viremia       Past Surgical History:   Procedure Laterality Date    BIOPSY      cataract bilateral  2017    DILATION AND CURETTAGE N/A 2021    Procedure: DILATION AND CURETTAGE,;  Surgeon: Lisbeth Gallardo MD;  Location: [facility] OR    DILATION AND CURETTAGE N/A 2022    Procedure: DILATION AND CURETTAGE;  Surgeon: Lisbeth Gallardo MD;  Location: [facility] OR    DILATION AND CURETTAGE N/A 2023    Procedure: Dilation and Curettage of Endometrium;  Surgeon: Lisbeth Gallardo MD;  Location: [facility] OR    DILATION AND CURETTAGE, OPERATIVE HYSTEROSCOPY WITH MORCELLATOR, COMBINED N/A 10/23/2020    Procedure: HYSTEROSCOPY, DILATION AND CURRETAGE, MYOSURE;  Surgeon: Kierra Tracy MD;  Location: [facility] OR    DILATION AND CURETTAGE, OPERATIVE HYSTEROSCOPY WITH MORCELLATOR, COMBINED N/A 2021    Procedure: HYSTEROSCOPY, WITH DILATION  AND CURETTAGE OF UTERUS USING  MORCELLATOR;  Surgeon: Fiorella Cunningham MD;  Location: [facility] OR    ESOPHAGOSCOPY, GASTROSCOPY, DUODENOSCOPY (EGD), COMBINED N/A 01/24/2023    Procedure: ESOPHAGOGASTRODUODENOSCOPY, WITH BIOPSY;  Surgeon: Tlaia Aguilar MD;  Location: [facility] OR    ESOPHAGOSCOPY, GASTROSCOPY, DUODENOSCOPY (EGD), COMBINED N/A 6/7/2024    Procedure: Esophagoscopy, gastroscopy, duodenoscopy (EGD), combined;  Surgeon: Charli Watts MD;  Location: [facility] OR    INSERT INTRAUTERINE DEVICE N/A 03/05/2021    Procedure: INSERTION MIRENA INTRAUTERINE DEVICE;  Surgeon: Fiorella Cunningham MD;  Location:  OR    IR RENAL BIOPSY RIGHT  08/27/2021    LAPAROSCOPY DIAGNOSTIC (GYN)  03/05/2021    Procedure: Laparoscopy diagnostic (gyn);  Surgeon: Fiorella Cunningham MD;  Location:  OR    LITHOTRIPSY      LITHOTRIPSY      REMOVE INTRAUTERINE DEVICE N/A 03/05/2021    Procedure: REMOVE MIRENA INTRAUTERINE DEVICE;  Surgeon: Fiorella Cunningham MD;  Location: [facility] OR    REMOVE INTRAUTERINE DEVICE N/A 05/09/2022    Procedure: EXCHANGE OF MIRENA INTRAUTERINE DEVICE;  Surgeon: Lisbeth Gallardo MD;  Location:  OR    REPLACE INTRAUTERINE DEVICE N/A 09/13/2021    Procedure: MIRENA INTRAUTERINE DEVICE EXCHANGE;  Surgeon: Lisbeth Gallardo MD;  Location:  OR    SINUS SURGERY  2001    TONSILLECTOMY      TRANSPLANT KIDNEY RECIPIENT LIVING RELATED Right 07/2006    wisdom teeth        No Known Allergies   Social History     Tobacco Use    Smoking status: Never    Smokeless tobacco: Never   Substance Use Topics    Alcohol use: Never      Wt Readings from Last 1 Encounters:   02/03/25 103.4 kg (228 lb)        Anesthesia Evaluation   Pt has had prior anesthetic. Type: General.    No history of anesthetic complications       ROS/MED HX  ENT/Pulmonary: Comment: Legally blind  Pseudotumor cerebri    (+)                      asthma                  Neurologic:    (-) no seizures and no CVA   Cardiovascular:     (+) Dyslipidemia  hypertension- -   -  - -                                      METS/Exercise Tolerance:     Hematologic:     (+)      anemia,          Musculoskeletal:       GI/Hepatic:     (+) GERD, Asymptomatic on medication,                  Renal/Genitourinary:     (+) renal disease, type: CRI,   Pt has history of transplant,  Nephrolithiasis ,       Endo:     (+)          thyroid problem, hypothyroidism,    Obesity,       Psychiatric/Substance Use:       Infectious Disease:       Malignancy:       Other:            Physical Exam    Airway        Mallampati: II   TM distance: > 3 FB   Neck ROM: full   Mouth opening: > 3 cm    Respiratory Devices and Support         Dental       (+) Modest Abnormalities - crowns, retainers, 1 or 2 missing teeth      Cardiovascular          Rhythm and rate: regular and normal     Pulmonary           breath sounds clear to auscultation           OUTSIDE LABS:  CBC:   Lab Results   Component Value Date    WBC 6.4 02/14/2025    WBC 5.5 01/10/2025    HGB 8.4 (L) 02/14/2025    HGB 8.8 (L) 01/10/2025    HCT 26.6 (L) 02/14/2025    HCT 27.1 (L) 01/10/2025     02/14/2025     01/10/2025     BMP:   Lab Results   Component Value Date     02/14/2025     01/10/2025    POTASSIUM 4.6 02/14/2025    POTASSIUM 3.7 01/10/2025    CHLORIDE 106 02/14/2025    CHLORIDE 107 01/10/2025    CO2 18 (L) 02/14/2025    CO2 20 (L) 01/10/2025    BUN 51.3 (H) 02/14/2025    BUN 29.8 (H) 01/10/2025    CR 2.72 (H) 02/14/2025    CR 3.04 (H) 01/10/2025    GLC  02/14/2025      Comment:      Unable to report due to glucose stability for add on       GLC 98 01/10/2025     COAGS:   Lab Results   Component Value Date    PTT 32 11/04/2024    INR 1.14 11/04/2024     POC:   Lab Results   Component Value Date     (H) 07/17/2007    HCG Negative 06/07/2024    HCGS Negative 11/04/2024     HEPATIC:   Lab Results   Component Value Date    ALBUMIN 4.2 11/04/2024    PROTTOTAL 7.5 11/04/2024    ALT 12 11/04/2024    AST 15  11/04/2024    ALKPHOS 120 11/04/2024    BILITOTAL 0.4 11/04/2024     OTHER:   Lab Results   Component Value Date    A1C 5.4 11/04/2024    LACHO 8.7 (L) 02/14/2025    PHOS 4.5 11/04/2024    MAG 1.8 01/07/2009    LIPASE 236 07/17/2007    AMYLASE 58 06/03/2008    TSH 0.72 07/12/2024    T4 1.17 10/26/2023    CRP <3.0 06/03/2008       Anesthesia Plan    ASA Status:  3    NPO Status:  NPO Appropriate    Anesthesia Type: General.     - Airway: LMA   Induction: Intravenous, Propofol.   Maintenance: TIVA.        Consents    Anesthesia Plan(s) and associated risks, benefits, and realistic alternatives discussed. Questions answered and patient/representative(s) expressed understanding.     - Discussed:     - Discussed with:  Patient            Postoperative Care    Pain management: IV analgesics, Multi-modal analgesia.   PONV prophylaxis: Ondansetron (or other 5HT-3), Dexamethasone or Solumedrol     Comments:               SABRINA WHITTINGTON MD    I have reviewed the pertinent notes and labs in the chart from the past 30 days and (re)examined the patient.  Any updates or changes from those notes are reflected in this note.    Clinically Significant Risk Factors Present on Admission                   # Hypertension: Noted on problem list

## 2025-03-03 NOTE — DISCHARGE INSTRUCTIONS
Stent/Ureteroscopy:    Activity  - There are no activity restrictions    Diet:  You may resume your regular diet.     Common symptoms related to the stent:  - You will likely notice some blood in the urine for as long as the stent is in place. You may also notice more blood in the urine after physical activity. Normal urine color can range from yellow to dark red. This is not problematic as long as you are able to empty your bladder. Although urine may seem quite bloody, a few drops of blood can color the entire toilet bowl red- much like food coloring. Increase your fluid intake to help flush the blood out of your bladder.   - You may also notice a twinge of pain in your kidney during urination. This can be severe, but should get better after the first few days with the stent. This is due to urine traveling backward (up the stent into your kidney) when the bladder squeezes. It is normal and not a cause for concern.  - You may feel like you need to urinate more frequently than usual. Some patients experience a lower abdominal cramping or  spasm . You may notice urine leakage from your urethra after this happens.   - You may experience some burning with urination due to minor trauma from the scope used during your surgery. This should disappear over the next few days.     Medications:  Anti-inflammatories/NSAIDs (Ibuprofen, Advil, Aleve) are the most effective pain relievers for stent-related discomfort. You may safely use these in combination with Tylenol.   You may also take Tylenol for pain control- but not more than 3000 mg daily.  Depending on your procedure, you may be given a limited supply of narcotic pain medications that you should use sparingly. These are typically not as effective as NSAIDs. These medications causes constipation so make sure to take a stool softener along with it. Do not drive while under the influence of narcotic pain medications.  Flomax (Tamsulosin): This is a medication used to relax  "the ureter. It should help with flank pain associated with the stent. Take 0.4 mg (1 pill) every day.  Oxybutynin (Ditropan): This is a medication used to treat overactive bladder/bladder spasms related to the stent. You may take up to 5 mg three times a day. This medication causes constipation so make sure to take a stool softener along with it.   Pyridium: This is a bladder anesthetic that can help with urinary burning. It will turn your urine bright orange. You may take 100 mg up to three times daily, but limit using this to three days maximum if possible.   We recommend over the counter fiber (metamucil or benefiber) and stool softeners (miralax, docusate or senna) to prevent postoperative constipation, but stop if you develop diarrhea.      Follow-Up:  - Follow up with Dr. Bautista in 1-2 weeks for stent removal.   - Call or return sooner than your regularly scheduled visit if you develop any of the following: fever (greater than 101.5), uncontrolled pain, uncontrolled nausea or vomiting, or inability to urinate.    Phone numbers:   - Monday through Friday 8am to 4:30pm: Call [phone] with questions, requests for medication refills, or to schedule or confirm an appointment.  - Nights or weekends: call the after hours emergency pager - [phone] and tell the  \"I would like to page the Urology Resident on call.\" Please note, due to prescribing laws, resident physicians are unable to prescribe narcotics after-hours. If you feel as though you will need a refill of a narcotic pain medication, you will need to call the clinic during business hours OR seek emergency care.  - For emergencies, call [phone]    Contacting your Doctor -   To contact a doctor, call Dr Raúl Bautista at [phone]--Urology Clinic or:  [phone] and ask for the resident on call for Urology (answered 24 hours a day)   Emergency Department:  [facility]: [phone]  [facility]: [phone]  911 if you are in " need of immediate or emergent help

## 2025-03-03 NOTE — BRIEF OP NOTE
[facility]    Brief Operative Note    Pre-operative diagnosis: Nephrolithiasis [N20.0]  Post-operative diagnosis Same as pre-operative diagnosis    Procedure: CYSTOURETEROSCOPY, WITH RETROGRADE PYELOGRAM, HOLMIUM LASER LITHOTRIPSY OF URETERAL CALCULUS, AND STENT INSERTION.  Right sided transplant kidney., Right - Flank    Surgeon: Surgeons and Role:     * Raúl Bautista MD - Primary     * Jez Quiñonez MD - Resident - Assisting  Anesthesia: General   Estimated Blood Loss: None    Drains: 6 Fr x 22 cm JJ stent  Specimens:   ID Type Source Tests Collected by Time Destination   A : Stones, Kidney, Right Calculus/Stone Kidney, Right STONE ANALYSIS aRúl Bautista MD 3/3/2025  1:09 PM      Findings:   0.9 mm stone in lower pole as seen on CT imaging .  Complications: None.  Implants:   Implant Name Type Inv. Item Serial No.  Lot No. LRB No. Used Action   STENT URETERAL PERCUFLEX PLUS [id number] - [id number] Stent STENT URETERAL PERCUFLEX PLUS [id number]  [id number] [id number] Right 1 Implanted

## 2025-03-03 NOTE — ANESTHESIA POSTPROCEDURE EVALUATION
Patient: Carol Guillaume    Procedure: Procedure(s):  CYSTOURETEROSCOPY, WITH RETROGRADE PYELOGRAM, HOLMIUM LASER LITHOTRIPSY OF URETERAL CALCULUS, AND STENT INSERTION.  Right sided transplant kidney.       Anesthesia Type:  General    Note:  Disposition: Outpatient   Postop Pain Control: Uneventful            Sign Out: Well controlled pain   PONV: No   Neuro/Psych: Uneventful            Sign Out: Acceptable/Baseline neuro status   Airway/Respiratory: Uneventful            Sign Out: Acceptable/Baseline resp. status   CV/Hemodynamics: Uneventful            Sign Out: Acceptable CV status; No obvious hypovolemia; No obvious fluid overload   Other NRE: NONE   DID A NON-ROUTINE EVENT OCCUR?            Last vitals:  Vitals Value Taken Time   /86 03/03/25 1415   Temp 36.7  C (98  F) 03/03/25 1345   Pulse 85 03/03/25 1421   Resp 13 03/03/25 1421   SpO2 100 % 03/03/25 1421   Vitals shown include unfiled device data.    Electronically Signed By: Dillon Bautista MD  March 3, 2025  2:22 PM

## 2025-03-03 NOTE — ANESTHESIA CARE TRANSFER NOTE
Patient: Carol Guillaume    Procedure: Procedure(s):  CYSTOURETEROSCOPY, WITH RETROGRADE PYELOGRAM, HOLMIUM LASER LITHOTRIPSY OF URETERAL CALCULUS, AND STENT INSERTION.  Right sided transplant kidney.       Diagnosis: Nephrolithiasis [N20.0]  Diagnosis Additional Information: No value filed.    Anesthesia Type:   General     Note:    Oropharynx: oropharynx clear of all foreign objects  Level of Consciousness: awake  Oxygen Supplementation: face mask  Level of Supplemental Oxygen (L/min / FiO2): 6  Independent Airway: airway patency satisfactory and stable  Dentition: dentition unchanged  Vital Signs Stable: post-procedure vital signs reviewed and stable  Report to RN Given: handoff report given  Patient transferred to: PACU    Handoff Report: Identifed the Patient, Identified the Reponsible Provider, Reviewed the pertinent medical history, Discussed the surgical course, Reviewed Intra-OP anesthesia mangement and issues during anesthesia, Set expectations for post-procedure period and Allowed opportunity for questions and acknowledgement of understanding      Vitals:  Vitals Value Taken Time   /76 03/03/25 1345   Temp     Pulse 88 03/03/25 1346   Resp 12 03/03/25 1346   SpO2 100 % 03/03/25 1346   Vitals shown include unfiled device data.    Electronically Signed By: MATTHEW Jain CRNA  March 3, 2025  1:47 PM

## 2025-03-03 NOTE — OR NURSING
"Page sent to surgeon: \"Carol Guillaume: pt needs a script for oxycodone; cannot take NSAIDs s/p kidney tx. Please advise, thanks. Mekhi in 3C [phone]\"  "

## 2025-03-04 ENCOUNTER — PATIENT OUTREACH (OUTPATIENT)
Dept: CARE COORDINATION | Facility: CLINIC | Age: 35
End: 2025-03-04
Payer: COMMERCIAL

## 2025-03-04 ENCOUNTER — TELEPHONE (OUTPATIENT)
Dept: TRANSPLANT | Facility: CLINIC | Age: 35
End: 2025-03-04
Payer: COMMERCIAL

## 2025-03-04 ENCOUNTER — COMMITTEE REVIEW (OUTPATIENT)
Dept: TRANSPLANT | Facility: CLINIC | Age: 35
End: 2025-03-04
Payer: COMMERCIAL

## 2025-03-04 DIAGNOSIS — Z94.0 HTN, KIDNEY TRANSPLANT RELATED: ICD-10-CM

## 2025-03-04 DIAGNOSIS — I15.1 HTN, KIDNEY TRANSPLANT RELATED: ICD-10-CM

## 2025-03-04 ASSESSMENT — ACTIVITIES OF DAILY LIVING (ADL)
ADLS_ACUITY_SCORE: 41

## 2025-03-04 NOTE — TELEPHONE ENCOUNTER
Called patient at this time to discuss the results of the Selection Committee last week. Noted that she was discussed on Friday and we had additional questions for Dr. Corea regarding the malignancy risk/recurrence risk and whether or not that increased with our IS plan and/or the timeframe. Let her know I do not have an answer at this time but once I hear back I will let her know.

## 2025-03-04 NOTE — COMMITTEE REVIEW
Kidney/Pancreas Committee Review Note     Evaluation Date:    Committee Review Date: 2/28/2025    Organ being evaluated for: Kidney    Transplant Phase: Waitlist  Transplant Status: Inactive    Transplant Coordinator: Chanel Stinson  Transplant Surgeon:   Sebastián      Referring Physician: Feliebrto Rueda    Primary Diagnosis: Nephronophthisis  Secondary Diagnosis: CKD     Committee Review Members:  Nephrology Feliberto Littlejohn MD   Nutrition Janet Dey, ISABEL   Pharmacist Carol Baca, [facility]    - Clinical Leeanne Weeks, MSW   Transplant HANH GIBBONS, ALEX, Ada Burkett, ALEX   Transplant Surgery Rhianna Lowery MD, MD       Transplant Eligibility: Irreversible chronic kidney disease treated w/dialysis or expected need for dialysis    Committee Review Decision: Remain Inactive    Relative Contraindications: None    Absolute Contraindications: None     Committee Chair Rhianna Lowery MD verbally attested to the committee's decision.    Committee Discussion Details: Patient was brought to the Selection Committee today to discuss the atypical EIN and the OBGYN recommendations for either hysterectomy vs. Hormonal therapy neither of which the patient would like to do as she would like to attempt to become pregnant at some point.  There is concern regarding this from our team based on the medications she requires to maintain transplant. Our team notes that we need more information from OBGYN regarding the recurrence risk.  Noted in prior notes to be as high as 40% chance for malignancy. We need to know if this is over one year, five years etc - what is the timeframe?  We also need to know if there is increased risk for malignancy (over the prior noted 40%) when we use immunosuppression?     I will reach out to Dr. Corea to ask these questions. Will also reach out to Carol with what we discussed.

## 2025-03-04 NOTE — PROGRESS NOTES
Carol called in re: scheduling Aranesp doses. She has made 3 appts, first one on 3/7.   We discussed that 3 appts is good for now, and RN will follow up after her dose on 031225 to check in. She was invited to reach out anytime PRN    Follow up date: 032425    Carrie Leopold, RN BSN  Anemia Services  [facility]  [email or web address]  Office: [phone]  Fax [phone]            
[id number] [place] [address]  Phone: [phone]  Fax:   Follow Up Time:

## 2025-03-04 NOTE — TELEPHONE ENCOUNTER
Calling office of Dr. Corea to discuss Carol's case further. Left message for Dr. Corea discussing the 40 percent risk of malignancy - does this increase with immunosuppression? Is this a risk over one year, five years etc? Left direct line requesting call back sometime this week.

## 2025-03-05 ASSESSMENT — ACTIVITIES OF DAILY LIVING (ADL)
ADLS_ACUITY_SCORE: 41

## 2025-03-06 ENCOUNTER — TELEPHONE (OUTPATIENT)
Dept: TRANSPLANT | Facility: CLINIC | Age: 35
End: 2025-03-06
Payer: COMMERCIAL

## 2025-03-06 DIAGNOSIS — I15.1 HTN, KIDNEY TRANSPLANT RELATED: ICD-10-CM

## 2025-03-06 DIAGNOSIS — Z94.0 HTN, KIDNEY TRANSPLANT RELATED: ICD-10-CM

## 2025-03-06 LAB
APPEARANCE STONE: NORMAL
COMPN STONE: NORMAL
SPECIMEN WT: 104 MG

## 2025-03-06 ASSESSMENT — ACTIVITIES OF DAILY LIVING (ADL)
ADLS_ACUITY_SCORE: 41

## 2025-03-07 ENCOUNTER — TELEPHONE (OUTPATIENT)
Dept: TRANSPLANT | Facility: CLINIC | Age: 35
End: 2025-03-07

## 2025-03-07 ENCOUNTER — LAB (OUTPATIENT)
Dept: LAB | Facility: CLINIC | Age: 35
End: 2025-03-07
Payer: COMMERCIAL

## 2025-03-07 DIAGNOSIS — D63.8 ANEMIA IN OTHER CHRONIC DISEASES CLASSIFIED ELSEWHERE: ICD-10-CM

## 2025-03-07 DIAGNOSIS — Z94.0 KIDNEY REPLACED BY TRANSPLANT: Primary | ICD-10-CM

## 2025-03-07 DIAGNOSIS — Z94.0 KIDNEY REPLACED BY TRANSPLANT: ICD-10-CM

## 2025-03-07 DIAGNOSIS — I15.1 HTN, KIDNEY TRANSPLANT RELATED: ICD-10-CM

## 2025-03-07 DIAGNOSIS — Z94.0 HTN, KIDNEY TRANSPLANT RELATED: ICD-10-CM

## 2025-03-07 LAB
ALBUMIN MFR UR ELPH: 48.9 MG/DL
ANION GAP SERPL CALCULATED.3IONS-SCNC: 9 MMOL/L (ref 7–15)
BUN SERPL-MCNC: 50.1 MG/DL (ref 6–20)
CALCIUM SERPL-MCNC: 8.8 MG/DL (ref 8.8–10.4)
CHLORIDE SERPL-SCNC: 108 MMOL/L (ref 98–107)
CREAT SERPL-MCNC: 2.34 MG/DL (ref 0.51–0.95)
CREAT UR-MCNC: 49.1 MG/DL
CYCLOSPORINE BLD LC/MS/MS-MCNC: 361 UG/L (ref 50–400)
EGFRCR SERPLBLD CKD-EPI 2021: 27 ML/MIN/1.73M2
ERYTHROCYTE [DISTWIDTH] IN BLOOD BY AUTOMATED COUNT: 18.5 % (ref 10–15)
FERRITIN SERPL-MCNC: 460 NG/ML (ref 6–175)
GLUCOSE SERPL-MCNC: 93 MG/DL (ref 70–99)
HCO3 SERPL-SCNC: 22 MMOL/L (ref 22–29)
HCT VFR BLD AUTO: 25.4 % (ref 35–47)
HGB BLD-MCNC: 8.1 G/DL (ref 11.7–15.7)
IRON BINDING CAPACITY (ROCHE): 189 UG/DL (ref 240–430)
IRON SATN MFR SERPL: 31 % (ref 15–46)
IRON SERPL-MCNC: 58 UG/DL (ref 37–145)
MCH RBC QN AUTO: 28.5 PG (ref 26.5–33)
MCHC RBC AUTO-ENTMCNC: 31.9 G/DL (ref 31.5–36.5)
MCV RBC AUTO: 89 FL (ref 78–100)
PLATELET # BLD AUTO: 220 10E3/UL (ref 150–450)
POTASSIUM SERPL-SCNC: 4.5 MMOL/L (ref 3.4–5.3)
PROT/CREAT 24H UR: 1 MG/MG CR (ref 0–0.2)
RBC # BLD AUTO: 2.84 10E6/UL (ref 3.8–5.2)
SODIUM SERPL-SCNC: 139 MMOL/L (ref 135–145)
TME LAST DOSE: NORMAL H
TME LAST DOSE: NORMAL H
WBC # BLD AUTO: 6.8 10E3/UL (ref 4–11)

## 2025-03-07 PROCEDURE — 80048 BASIC METABOLIC PNL TOTAL CA: CPT | Performed by: PATHOLOGY

## 2025-03-07 PROCEDURE — 36415 COLL VENOUS BLD VENIPUNCTURE: CPT | Performed by: PATHOLOGY

## 2025-03-07 PROCEDURE — 83540 ASSAY OF IRON: CPT | Performed by: PATHOLOGY

## 2025-03-07 PROCEDURE — 84156 ASSAY OF PROTEIN URINE: CPT | Performed by: PATHOLOGY

## 2025-03-07 PROCEDURE — 80158 DRUG ASSAY CYCLOSPORINE: CPT | Performed by: INTERNAL MEDICINE

## 2025-03-07 PROCEDURE — 83550 IRON BINDING TEST: CPT | Performed by: PATHOLOGY

## 2025-03-07 PROCEDURE — 85027 COMPLETE CBC AUTOMATED: CPT | Performed by: PATHOLOGY

## 2025-03-07 PROCEDURE — 82728 ASSAY OF FERRITIN: CPT | Performed by: PATHOLOGY

## 2025-03-07 PROCEDURE — 99000 SPECIMEN HANDLING OFFICE-LAB: CPT | Performed by: PATHOLOGY

## 2025-03-07 ASSESSMENT — ACTIVITIES OF DAILY LIVING (ADL)
ADLS_ACUITY_SCORE: 41

## 2025-03-07 NOTE — TELEPHONE ENCOUNTER
ISSUE: elevated UPC   Latest Reference Range & Units 02/14/25 08:06 03/07/25 09:32   Total Protein Urine mg/mg Creat 0.00 - 0.20 mg/mg Cr 0.28 (H) 1.00 (H)   (H): Data is abnormally high    Cystoureteroscopy with retrograde pyelogram, holmium laser lithotripsy of ureteral calculus, and stent insertion preformed by Dr Bautista, urologist on 3/3/2025    PLAN: call to assess for signs of infection    OUTCOME:    See my chart message dated 3/12    Ronna Hughes RN   Transplant Coordinator  [phone]    ADDENDUM:  Patient denies fever,  states she does have some discomfort with urination, however she believes it could be from the stent that will be removed in clinic with Dr Bautista tomorrow 3/14.    Will update Dr Bautista and Dr Gerhard Rueda,  inquire about obtaining UA UC before repeating UPC.    Ronna Hughes RN   Transplant Coordinator  [phone]

## 2025-03-10 ENCOUNTER — TELEPHONE (OUTPATIENT)
Dept: TRANSPLANT | Facility: CLINIC | Age: 35
End: 2025-03-10
Payer: COMMERCIAL

## 2025-03-10 DIAGNOSIS — I15.1 HTN, KIDNEY TRANSPLANT RELATED: ICD-10-CM

## 2025-03-10 DIAGNOSIS — Z94.0 HTN, KIDNEY TRANSPLANT RELATED: ICD-10-CM

## 2025-03-10 NOTE — TELEPHONE ENCOUNTER
"Call from Dr. Corea. Patient in the room with provider. We discussed the following:     Carol did have a D and C which pathology showed was negative for cancer. However, approximately 50 percent of the sample was precancerous tissue -EIN.  The risk of progression to cancer is 20-40 percent over five years when left untreated. The traditional treatment for this is either hysterectomy or hormone therapy, both of which patient has declined in the past. Provider did note that since this is mostly hormone driven vs. Antibody driven she is not sure that the IS drugs post transplant would effect those numbers but she did note referring back to Gyn/Onc as a potential to have them weigh in if patient is agreeable to this given she does have a history of endometrial cancer. Most likely as far as surveillance goes, the patient may need every 3 month endometrial biopsies indefinitely. It would be important to note that the biopsy only encompasses about 20 percent of the tissue so would not be as accurate as a D&C and we would run the risk of not \"catching\" the cancer on these biopsies.     Dr. Corea did continue to emphasize that this is a preventable disease and that waiting until this becomes a cancer or a problem may put patient's health in jeopardy. This will be forwarded to patient's transplant nephrologist and also discussed at the next Selection Committee discussion.   "

## 2025-03-11 ENCOUNTER — PATIENT OUTREACH (OUTPATIENT)
Dept: ONCOLOGY | Facility: CLINIC | Age: 35
End: 2025-03-11
Payer: COMMERCIAL

## 2025-03-11 ENCOUNTER — TRANSCRIBE ORDERS (OUTPATIENT)
Dept: OTHER | Age: 35
End: 2025-03-11

## 2025-03-11 DIAGNOSIS — N85.02 EIN (ENDOMETRIAL INTRAEPITHELIAL NEOPLASIA): Primary | ICD-10-CM

## 2025-03-11 NOTE — PROGRESS NOTES
New Patient Hematology / Oncology Nurse Navigator Note     Referral Date: 3/11/25    Referring provider: Dr. Erica Corea    Referring Clinic/Organization:        Referred to: [facility]    Requested provider (if applicable): Dr. Brewer (spoke with [facility] directly regarding referral to [facility])    Evaluation for : H/o kidney transplant, atypical endometrial hyperplasia      Clinical History (per Nurse review of records provided):    3/10/25 Office Visit with OBGYN--BOOKMARKED  2/18/25 Path:  Final Diagnosis A) ENDOMETRIUM,CURETTAGE:  1. Atypical (complex) endometrial hyperplasia, persistent/residual  2. Negative for carcinoma as sampled    6/16/24 Pelvic US   Impression  1. The uterus is normal in appearance.  2. Polycystic appearance to bilateral ovaries.  -- BOOKMARKED  11/4/24 CT Abd/Pelvis -- BOOKMARKED  4/20/23 PAP-- BOOKMARKED    Clinical Assessment / Barriers to Care (Per Nurse):  Pt lives in [place]    Records Location: [facility] Everywhere   Faxed - Media tab/Scanned     Records Needed:   Images from Pelvic US done at [facility]    Please obtain records from [facility], appears pt has been seen there in the past    Additional testing needed prior to consult:   N/A    Referral updates and Plan:   OUTGOING CALL to pt:  Introduced my role as nurse navigator with [facility] Hematology/Oncology dept and that we have recd the referral to [facility] from Dr Corea (who discussed directly with Dr. Brewer)     Pt confirms they are aware of the referral and ready to schedule. She is agreeable to seeing Dr. Brewer at [place]  Provided contact information if future questions arise.     -Transferred pt to NPS line [phone] to schedule appt per scheduling instructions. Did offer sooner appt options, she opted for 3/24 at [place].     Lupe Santoro, BSN, RN, PHN, OCN  Hematology/Oncology Nurse Navigator  [facility]  [phone]

## 2025-03-12 ENCOUNTER — OFFICE VISIT (OUTPATIENT)
Dept: INTERNAL MEDICINE | Facility: CLINIC | Age: 35
End: 2025-03-12
Payer: COMMERCIAL

## 2025-03-12 ENCOUNTER — COMMITTEE REVIEW (OUTPATIENT)
Dept: TRANSPLANT | Facility: CLINIC | Age: 35
End: 2025-03-12

## 2025-03-12 VITALS
OXYGEN SATURATION: 98 % | HEART RATE: 88 BPM | RESPIRATION RATE: 16 BRPM | WEIGHT: 233.7 LBS | BODY MASS INDEX: 37.56 KG/M2 | SYSTOLIC BLOOD PRESSURE: 114 MMHG | TEMPERATURE: 97.7 F | HEIGHT: 66 IN | DIASTOLIC BLOOD PRESSURE: 77 MMHG

## 2025-03-12 DIAGNOSIS — K64.4 EXTERNAL HEMORRHOIDS: Primary | ICD-10-CM

## 2025-03-12 DIAGNOSIS — Z94.0 HTN, KIDNEY TRANSPLANT RELATED: ICD-10-CM

## 2025-03-12 DIAGNOSIS — I15.1 HTN, KIDNEY TRANSPLANT RELATED: ICD-10-CM

## 2025-03-12 PROCEDURE — 3078F DIAST BP <80 MM HG: CPT

## 2025-03-12 PROCEDURE — 3074F SYST BP LT 130 MM HG: CPT

## 2025-03-12 PROCEDURE — 99213 OFFICE O/P EST LOW 20 MIN: CPT

## 2025-03-12 NOTE — COMMITTEE REVIEW
Kidney/Pancreas Committee Review Note     Evaluation Date:    Committee Review Date: 3/12/2025    Organ being evaluated for: Kidney    Transplant Phase: Waitlist  Transplant Status: Inactive    Transplant Coordinator: Chanel Stinson  Transplant Surgeon:   Dr. Lowery      Referring Physician: Feliberto Rueda    Primary Diagnosis: Nephronophthisis  Secondary Diagnosis: CKD    Committee Review Members:  James, Registered Gaby Grayson RD   Nephrology Ivanna Rollins, KALLIE, Magaly Hitchocck MD, Cole Rose MD   Nurse Domenic Sewell RN   Physician Assistant - Medical Leta Avila, APRN CNP    - Clinical Leeanne Weeks, [facility] [place] [place], Miriam Hurd, [facility], Mihir Macedo, [facility]   Transplant HANH GIBBONS, ALEX, Ada Burkett, ALEX, Na Martinez, ALEX, Sera Ma, ALEX, Rodrigo Arce, RN, Felipa Sheikh, RN   Transplant Surgery MATTHEW Camarena CNP, Manny Rowe MD       Transplant Eligibility: Irreversible chronic kidney disease treated w/dialysis or expected need for dialysis    Committee Review Decision: Remain Inactive    Relative Contraindications: Other    Absolute Contraindications: Other     Committee Chair Manny Rowe MD verbally attested to the committee's decision.    Committee Discussion Details: Brought patient to the Selection Committee to discuss her EIN and the recommendations from her OBGYN, Dr. Corea.  We discussed that the five year risk of progression being 20-40% is quite high especially on immunosuppression.  The committee would like the patient to discuss her fertility goals with her OBGYN and make a plan from there. She is not yet needing dialysis and has waiting time since December 2024. The committee does not feel that it is safe to transplant her with her uterus due to the high risk of recurrence and limited ability to completely survey the endometrial tissue every three months. Patient will be  called. Letter will be sent. Will discuss this with Dr. Corea as well.

## 2025-03-12 NOTE — PROGRESS NOTES
"  Assessment & Plan     External hemorrhoids  Hemorrhoid on exam today. Recommend OTC management, lifestyle modification. Discussed stool softeners, fiber, topicals for pain management. Discussed avoiding straining, ensure hydration, routine exercise. Follow-up if symptoms worsen or do not improve. Can prescribe a hydrocortisone suppository if needed.       Return if symptoms worsen or fail to improve.    Subjective   Carol is a 34 year old, presenting for the following health issues:  Follow Up (Constipation and possible hemorrhoid and some slight pain on right side of trunk after kidney stone removal )        3/12/2025     6:50 AM   Additional Questions   Roomed by MR     History of Present Illness       Reason for visit:  Constipation and possible hemorrhoid         Carol Guillaume presents to the clinic today for evaluation of possible hemorrhoid. She had a procedure last week in which she received sedation. Noticed constipation afterwards. She then noticed a new lump in her perineal area. Tender with wiping. No noticed bleeding. Has been struggling with constipation since August since she started semaglutide. Will sometimes have diarrhea. Uses metamucil.           Objective    /77 (BP Location: Right arm, Patient Position: Sitting, Cuff Size: Adult Large)   Pulse 88   Temp 97.7  F (36.5  C) (Oral)   Resp 16   Ht 1.676 m (5' 6\")   Wt 106 kg (233 lb 11.2 oz)   LMP 03/05/2025 (Exact Date)   SpO2 98%   BMI 37.72 kg/m    Body mass index is 37.72 kg/m .  Physical Exam   GENERAL: alert and no distress  RECTAL (female): hemorrhoid present without bleeding            Signed Electronically by: Zehra Olsen NP    "

## 2025-03-13 ENCOUNTER — PRE VISIT (OUTPATIENT)
Dept: UROLOGY | Facility: CLINIC | Age: 35
End: 2025-03-13
Payer: COMMERCIAL

## 2025-03-13 NOTE — TELEPHONE ENCOUNTER
Reason for visit: Cystoscopy      Relevant information: Stent Removal    Records/imaging/labs/orders: Available in Epic    Pt called: No need for a call    At Rooming: Cystoscope with Graspers    David Carson, EMT-P  3/13/2025  2:01 PM

## 2025-03-14 ENCOUNTER — TELEPHONE (OUTPATIENT)
Dept: TRANSPLANT | Facility: CLINIC | Age: 35
End: 2025-03-14

## 2025-03-14 DIAGNOSIS — I15.1 HTN, KIDNEY TRANSPLANT RELATED: ICD-10-CM

## 2025-03-14 DIAGNOSIS — Z94.0 HTN, KIDNEY TRANSPLANT RELATED: ICD-10-CM

## 2025-03-17 NOTE — TELEPHONE ENCOUNTER
RECORDS STATUS - ALL OTHER DIAGNOSIS      Action    Action Taken 3/17/25  Spoke keshia/ [facility] - they will push I'm/aging shortly.     Spoke  w/ Joie @ [facility] - advised pt last seen 4/20/23 - in [facility].    Imaging resolved to PACS  10:36 AM      RECORDS RECEIVED FROM: Bin Hilario [facility]   DATE RECEIVED: 3/17   NOTES STATUS DETAILS   OFFICE NOTE from referring provider POWER - [facility]: 3/10/25   OFFICE NOTE from medical oncologist Bin Gallardo: 4/20/23   OPERATIVE REPORT Epic/POWER - [facility]  3/3/25: Cystourethroscopy  6/7/24, 1/24/23: EGD  5/4/23, 9/13/21: D & C  3/5/21, 10/23/20: Hysteroscopy  7/18/06: Living Related Kidney Transplant    [facility]  2/18/25: Hysteroscopy, D & C   MEDICATION LIST Epic/POWER - [facility]    LABS     PATHOLOGY REPORTS [facility] (Reports in CE), Epic [facility]  2/18/25: [id number]  6/14/24: [id number]    [facility]  5/4/23: [id number]  5/9/22: [id number]  9/13/21: [id number]     ANYTHING RELATED TO DIAGNOSIS Epic/CE 3/12/25   IMAGING (NEED IMAGES & REPORT)     ULTRASOUND PACS 6/14/24, 11/3/23: [facility]

## 2025-03-18 ENCOUNTER — TELEPHONE (OUTPATIENT)
Dept: TRANSPLANT | Facility: CLINIC | Age: 35
End: 2025-03-18
Payer: COMMERCIAL

## 2025-03-18 DIAGNOSIS — Z94.0 HTN, KIDNEY TRANSPLANT RELATED: ICD-10-CM

## 2025-03-18 DIAGNOSIS — I15.1 HTN, KIDNEY TRANSPLANT RELATED: ICD-10-CM

## 2025-03-18 NOTE — TELEPHONE ENCOUNTER
Call placed to Dr. Corea's office regarding our team's recommendation that patient not pursue transplant until after hysterectomy given high risk of malignancy.  Noted that we advised her to speak with OBGYN regarding her fertility goals and timeline of that.  Left my number for call back and will ultimately be requesting that OBGYN speak directly with transplant nephrology moving forward.

## 2025-03-20 ENCOUNTER — TELEPHONE (OUTPATIENT)
Dept: TRANSPLANT | Facility: CLINIC | Age: 35
End: 2025-03-20
Payer: COMMERCIAL

## 2025-03-20 DIAGNOSIS — Z94.0 HTN, KIDNEY TRANSPLANT RELATED: ICD-10-CM

## 2025-03-20 DIAGNOSIS — I15.1 HTN, KIDNEY TRANSPLANT RELATED: ICD-10-CM

## 2025-03-20 NOTE — TELEPHONE ENCOUNTER
Returning call to Erna JOHNSON at [facility] regarding the last message I had left in regard to patient. They noted that the patient has now been referred to Gynecology/oncology at the [facility]. We discussed that we told the patient she has to figure out her fertility goals as we will not do the transplant without a hysterectomy. They noted that since the team that would be responsible for performing the hysterectomy would be gynecology/oncology they would defer that conversation to them as she has been advised against pregnancy according to notes from Dr. Corea's office.     Will send message to Gyn/Onc provider and include transplant nephrology to discuss further.

## 2025-03-21 ENCOUNTER — ANCILLARY PROCEDURE (OUTPATIENT)
Dept: ULTRASOUND IMAGING | Facility: CLINIC | Age: 35
End: 2025-03-21
Attending: UROLOGY
Payer: COMMERCIAL

## 2025-03-21 ENCOUNTER — LAB (OUTPATIENT)
Dept: LAB | Facility: CLINIC | Age: 35
End: 2025-03-21
Payer: COMMERCIAL

## 2025-03-21 DIAGNOSIS — N20.0 NEPHROLITHIASIS: ICD-10-CM

## 2025-03-21 DIAGNOSIS — Z94.0 KIDNEY REPLACED BY TRANSPLANT: ICD-10-CM

## 2025-03-21 LAB
ALBUMIN MFR UR ELPH: 38.2 MG/DL
ANION GAP SERPL CALCULATED.3IONS-SCNC: 10 MMOL/L (ref 7–15)
BUN SERPL-MCNC: 41.3 MG/DL (ref 6–20)
CALCIUM SERPL-MCNC: 9.5 MG/DL (ref 8.8–10.4)
CHLORIDE SERPL-SCNC: 105 MMOL/L (ref 98–107)
CREAT SERPL-MCNC: 2.44 MG/DL (ref 0.51–0.95)
CREAT UR-MCNC: 50.7 MG/DL
EGFRCR SERPLBLD CKD-EPI 2021: 26 ML/MIN/1.73M2
ERYTHROCYTE [DISTWIDTH] IN BLOOD BY AUTOMATED COUNT: 19.1 % (ref 10–15)
GLUCOSE SERPL-MCNC: 92 MG/DL (ref 70–99)
HCO3 SERPL-SCNC: 23 MMOL/L (ref 22–29)
HCT VFR BLD AUTO: 29.6 % (ref 35–47)
HGB BLD-MCNC: 9.5 G/DL (ref 11.7–15.7)
MCH RBC QN AUTO: 29.7 PG (ref 26.5–33)
MCHC RBC AUTO-ENTMCNC: 32.1 G/DL (ref 31.5–36.5)
MCV RBC AUTO: 93 FL (ref 78–100)
PLATELET # BLD AUTO: 215 10E3/UL (ref 150–450)
POTASSIUM SERPL-SCNC: 5.2 MMOL/L (ref 3.4–5.3)
PROT/CREAT 24H UR: 0.75 MG/MG CR (ref 0–0.2)
RBC # BLD AUTO: 3.2 10E6/UL (ref 3.8–5.2)
SODIUM SERPL-SCNC: 138 MMOL/L (ref 135–145)
WBC # BLD AUTO: 5.4 10E3/UL (ref 4–11)

## 2025-03-21 PROCEDURE — 76775 US EXAM ABDO BACK WALL LIM: CPT | Mod: GC | Performed by: RADIOLOGY

## 2025-03-21 PROCEDURE — 36415 COLL VENOUS BLD VENIPUNCTURE: CPT | Performed by: PATHOLOGY

## 2025-03-21 PROCEDURE — 80048 BASIC METABOLIC PNL TOTAL CA: CPT | Performed by: PATHOLOGY

## 2025-03-21 PROCEDURE — 85027 COMPLETE CBC AUTOMATED: CPT | Performed by: PATHOLOGY

## 2025-03-21 PROCEDURE — 84156 ASSAY OF PROTEIN URINE: CPT | Performed by: PATHOLOGY

## 2025-03-21 NOTE — PROGRESS NOTES
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 She is s/p kidney transplant in , now with Stage IV CKD.    She is here, referred by OBGYN Dr Corea due to complex medical/surgerical history and persistent diagnosis of EIN. She reports that she feels largely normal. She has been working on weight loss, loss about 30lb and hoping to lose 30 more. She has had a regular period for the past three months (/feb/mar) without intermenstrual bleeding. Before this she had periods sep/oct/2024, but no period in December. She very strongly desires to keep fertility options open, would like to have a pregnancy in the future.     She previously met with [facility] in  who said it would be ok for her to pursue pregnancy, however at that time her transplant kidney had no issues. She has not had MFM counseling about pregnancy since her worsening kidney function, though she is aware that pregnancy is not recommended unless she undergoes another successful transplant.    She is eating without issue, no abdominal pain, no bowel or bladder concerns    Disease History:  Summary below reviewed from 3/10 note from Dr Corea    10/2020: hysteroscopy, D and C and MyoSure polypectomy with Dr. Kierra Tracy, revealed complex atypical hyperplasia with foci bordering on well-differentiated adenocarcinoma > referral to GYN/ONC      2020: GYN/ONC consultation with Dr. Gallardo, discussed various options including definitive surgery with hysterectomy versus conservative management because of her young age and desire for future fertility. She elected conservative management. Her MRI was negative for  myometrial invasion.      11/2020: EUA, hysteroscopy and Mirena IUD placement      3/2021: EUA, hysteroscopy, endometrial curettings with myosure device, removal of IUD, replacement of Mirena intrauterine device, diagnostic laparoscopy for uterine perforation, small area of residual complex atypical hyperplasia, but was negative for malignancy.      9/2021: D&C, replacement of Mirena, small focus of residual atypical hyperplasia.     05/2022: D&C, Mirena exchange > no evidence of hyperplasia or malignancy, Mirena IUD removed per patient request      1/2023: pelvic US revealed a normal size uterus with a 9 mm endometrial stripe containing cystic areas within the endometrial complex     5/2023: D&C: Proliferative phase endometrium with focal squamous morular metaplasia.  No residual atypical hyperplasia identified Although no atypical hyperplasia is identified, focal squamous morular metaplasia is present.  Squamous morular metaplasia of the endometrium can be associated with endometrial hyperplasia and endometrial carcinoma.       12/15/2023: Care transferred to [facility] per GYN/ONC. Office endometrial biopsy showed disordered proliferative endometrium suggestive of anovulatory bleeding due to unopposed estrogen. Counseled on recommendation to treat anovulatory bleeding pattern with at a minimum a cyclic progesterone withdrawal bleed every 90 days. Reviewed all treatment/preventative options but patient considering pregnancy and preferred to avoid exogenous hormones. Recommended endometrial biopsy in 4 months. Reviewed risks of progression if untreated.      6/10/2024: Endometrial biopsy: atypical complex hyperplasia. Telephone visit to review the results. Advised against pregnancy or deferring treatment for trying to conceive. Referral to multiple locations for GYN/ONC follow up provided. [facility] recommended hysterectomy or Mirena IUD, multiple attempts to call patient without hearing back. Our office reached out   and patient declined either treatment option as she would like to get pregnant. Patient was scheduled for further counseling visit on  and patient no show. Requested different appointment and different dates offered same week. Scheduled  for follow up and patient cancelled.       2024: Telephone visit to review recommendations. Agreed to undergo further sampling as a requirement for transplant clearance.      2025 - Hysteroscopy, D&C  - Atypical (complex) endometrial hyperplasia, persistent/residual  - Negative for carcinoma as sampled        Screening History  Colonoscopy: never  Mammogram: once in , negative, no screening since then  Cervical cancer screening: NIL , no hx of abnormal  DEXA: NA    OB/Gynecologic History:  Deliveries:    Menopausal status: premenopausal, sexually active with , hoping for future fertility  - reports feeling very irregular on Mirena in the past, doesn't like how it made her feel  History of HRT: NA  Cervical cancer screening: See screening history above.     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    Depression     EIN (endometrial intraepithelial neoplasia)     Fatigue     Gastroesophageal reflux disease     High cholesterol     Hypertension     Insomnia     Insulin resistance     Legally blind     Obesity     PCOS (polycystic ovarian syndrome)     PPD positive, treated     9 months of INH    Pseudotumor cerebri     on Diamox    Retinitis pigmentosa     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     Uncomplicated asthma     as a child    Urinary tract infection 2024    Viremia        Past Surgical History:   Procedure Laterality Date    BIOPSY      cataract  bilateral  06/2017    CYSTOURETEROSCOPY, W/RETROGRD PYELOGRAM, LASER LITHO OF URET CALC W/STRBL ASP, & STENT INSERTION Right 3/3/2025    Procedure: CYSTOURETEROSCOPY, WITH RETROGRADE PYELOGRAM, HOLMIUM LASER LITHOTRIPSY OF URETERAL CALCULUS, STEERABLE ASPIRATION AND STENT INSERTION.  Right sided transplant kidney.;  Surgeon: Raúl Bautista MD;  Location: [facility] OR    DILATION AND CURETTAGE N/A 09/13/2021    Procedure: DILATION AND CURETTAGE,;  Surgeon: Lisbeth Gallardo MD;  Location: [facility] OR    DILATION AND CURETTAGE N/A 05/09/2022    Procedure: DILATION AND CURETTAGE;  Surgeon: Lisbeth Gallardo MD;  Location: [facility] OR    DILATION AND CURETTAGE N/A 5/4/2023    Procedure: Dilation and Curettage of Endometrium;  Surgeon: Lisbeth Gallardo MD;  Location: [facility] OR    DILATION AND CURETTAGE, OPERATIVE HYSTEROSCOPY WITH MORCELLATOR, COMBINED N/A 10/23/2020    Procedure: HYSTEROSCOPY, DILATION AND CURRETAGE, MYOSURE;  Surgeon: Kierra Tracy MD;  Location: [facility] OR    DILATION AND CURETTAGE, OPERATIVE HYSTEROSCOPY WITH MORCELLATOR, COMBINED N/A 03/05/2021    Procedure: HYSTEROSCOPY, WITH DILATION AND CURETTAGE OF UTERUS USING  MORCELLATOR;  Surgeon: Fiorella Cunningham MD;  Location:  OR    ESOPHAGOSCOPY, GASTROSCOPY, DUODENOSCOPY (EGD), COMBINED N/A 01/24/2023    Procedure: ESOPHAGOGASTRODUODENOSCOPY, WITH BIOPSY;  Surgeon: Talia Aguilar MD;  Location: [facility] OR    ESOPHAGOSCOPY, GASTROSCOPY, DUODENOSCOPY (EGD), COMBINED N/A 6/7/2024    Procedure: Esophagoscopy, gastroscopy, duodenoscopy (EGD), combined;  Surgeon: Charli Watts MD;  Location: [facility] OR    INSERT INTRAUTERINE DEVICE N/A 03/05/2021    Procedure: INSERTION MIRENA INTRAUTERINE DEVICE;  Surgeon: Fiorella Cunningham MD;  Location: [facility]    IR RENAL BIOPSY RIGHT  08/27/2021    LAPAROSCOPY DIAGNOSTIC (GYN)  03/05/2021    Procedure: Laparoscopy diagnostic (gyn);  Surgeon: Fiorella Cunningham MD;  Location: [facility] OR    LITHOTRIPSY      LITHOTRIPSY       REMOVE INTRAUTERINE DEVICE N/A 03/05/2021    Procedure: REMOVE MIRENA INTRAUTERINE DEVICE;  Surgeon: Fiorella Cunningham MD;  Location: [facility]    REMOVE INTRAUTERINE DEVICE N/A 05/09/2022    Procedure: EXCHANGE OF MIRENA INTRAUTERINE DEVICE;  Surgeon: Lisbeth Gallardo MD;  Location: [facility]    REPLACE INTRAUTERINE DEVICE N/A 09/13/2021    Procedure: MIRENA INTRAUTERINE DEVICE EXCHANGE;  Surgeon: Lisbeth Gallardo MD;  Location: [facility] OR    SINUS SURGERY  2001    TONSILLECTOMY      TRANSPLANT KIDNEY RECIPIENT LIVING RELATED Right 07/2006    wisdom teeth       Dx lsc at time of uterine perforation, otherwise no abdominal surgeries apart from kidney transplant       Social History     Tobacco Use    Smoking status: Never    Smokeless tobacco: Never   Vaping Use    Vaping status: Never Used   Substance Use Topics    Alcohol use: Never    Drug use: Never   Current living situation: with      Family History   Problem Relation Age of Onset    Hyperlipidemia Mother     Osteoporosis Mother     Breast Cancer Mother     Sjogren's Father     Hashimoto's thyroiditis Father     Thyroid Cancer Father     Other - See Comments Maternal Grandfather         surgical complication    Atrial fibrillation Paternal Grandmother     Arthritis Paternal Grandmother     Melanoma Paternal Grandfather     Parkinsonism Paternal Uncle         paternal uncle    Pulmonary Embolism Paternal Uncle      Cancer history reviewed as above  Mother w/ breast cancer in her 70s    Allergies  No Known Allergies    Current Outpatient Medications   Medication Sig Dispense Refill    acetaminophen (TYLENOL) 325 MG tablet Take 2 tablets (650 mg) by mouth every 4 hours as needed for mild pain 50 tablet 0    amLODIPine (NORVASC) 5 MG tablet TAKE 1 TABLET BY MOUTH DAILY 90 tablet 3    azaTHIOprine (IMURAN) 50 MG tablet Take 3 tablets (150 mg) by mouth daily. 90 tablet 11    carvedilol (COREG) 25 MG tablet TAKE 1 TABLET BY MOUTH 2 TIMES DAILY (WITH MEALS)  "180 tablet 0    COMPOUNDED NON-CONTROLLED SUBSTANCE (CMPD RX) - PHARMACY TO MIX COMPOUNDED MEDICATION Inject 0.25mg semaglutide once weekly 4 each 3    levothyroxine (SYNTHROID/LEVOTHROID) 50 MCG tablet TAKE 1 TABLET BY MOUTH ONCE DAILY 90 tablet 1    losartan (COZAAR) 25 MG tablet TAKE 1 TABLET BY MOUTH 2 TIMES DAILY 180 tablet 3    NEORAL (BRAND) 25 MG capsule Take 2 capsules (50 mg) by mouth every morning AND 1 capsule (25 mg) every evening. 270 capsule 3    ondansetron (ZOFRAN ODT) 4 MG ODT tab Take 1 tablet (4 mg) by mouth every 8 hours as needed for nausea. 15 tablet 0    polyethylene glycol (MIRALAX) 17 GM/Dose powder Take 1 Capful by mouth daily as needed.      psyllium (METAMUCIL/KONSYL) 58.6 % powder Take 1 teaspoonful by mouth daily.      vitamin D3 (CHOLECALCIFEROL) 50 mcg (2000 units) tablet Take 1 tablet (50 mcg) by mouth daily. 90 tablet 3    carvedilol (COREG) 25 MG tablet Take 1 tablet (25 mg) by mouth 2 times daily (with meals). (Patient not taking: Reported on 3/24/2025) 180 tablet 0     No current facility-administered medications for this visit.       Physical Exam:   /75 (BP Location: Right arm, Patient Position: Sitting, Cuff Size: Adult Large)   Pulse 91   Temp 98.3  F (36.8  C) (Oral)   Resp 18   Ht 1.665 m (5' 5.55\")   Wt 104.8 kg (231 lb)   LMP 03/05/2025 (Exact Date)   SpO2 98%   BMI 37.80 kg/m    Gen: Well-appearing, NAD  HEENT: Normocephalic, atraumatic  Abd: deferred  :  Deferred for next visit and Mirena placement  Ext: No LE edema, extremities warm and well perfused    Labs/Pathology: (personally reviewed today)  3/21/25  WBC 5.4.  Hemoglobin 9.5.  Platelets 215.  Creatinine 2.44.  Potassium 5.2.  Remainder of electrolytes within normal limits.      Imaging: (personally reviewed imaging and results today)  11/4/24 - CT AP - personally reviewed, no overt abnormalities with gyn oragns      Assessment:  Carol Guillaume is a 34 year old patient with Senior-Loken syndrome " s/p kidney transplanst in 2006, now with Stage IV renal failure. She has a diagnosis of EIN, which resolved with fertility-sparing mgmt with levonorgestrel IUD, s/p IUD removal and attempted pregnancy. Now with return of EIN on most recent D&C 2/18/2025.    We discussed the etiology of EIN and that upwards of 40% of EIN cases show concurrent endometrial cancer at time of hysterectomy. Therefore, gold standard recommendation is for surgical removal with hysterectomy. Patient is aware of this risk but she did have regression after 12-18mo with IUD previously. We discussed that the risk of recurrence in this scenario is still ~25% and therefore it is not surprising EIN has returned. We discussed again my recommendation for either hysterectomy or progestin therapy. She is in an understandably difficult spot with also likely needing a kidney transplant. I recommended further discussion with MFM for pregnancy recommendations but ultimately if patient would like to keep fertility option open I think it would be reasonable if we plan Pelvic MRI and IUD placement. Would plan q3mo D&C until regression of EIN. In the meantime would work with transplant team for recommendations and plan. Ultimately I would recommend keeping IUD in place until post transplant, then can consider removal for attempted pregnancy. Ultimately if EIN does not regress would recommend hysterectomy. We discussed that at 34 the rate of spontaneous pregnancy decreases and this may take 1-2 years to complete so that at age 36 spontaneous pregnancy rates are even lower. As such, offered referral to [facility] to discuss fertility options and potential egg retrieval. Carol would like to hold on MFM and [facility] referrals at this time      Plan:     1.)           EIN  - Pelvic MRI ordered to r/o overt myometrial invasion  - Schedule IUD placement in clinic - UPT at time of placement    2.) Disease/treatment related toxicities                 NA     3.) Genetic risk  factors were assessed and the patient NA     4.) Labs and/or tests ordered include: NA                   5.) Health maintenance issues addressed today include pt is up to date.       Elva Brewer MD  Gynecologic Oncology  3/24/2025 10:07 AM

## 2025-03-24 ENCOUNTER — ONCOLOGY VISIT (OUTPATIENT)
Dept: ONCOLOGY | Facility: CLINIC | Age: 35
End: 2025-03-24
Attending: STUDENT IN AN ORGANIZED HEALTH CARE EDUCATION/TRAINING PROGRAM
Payer: COMMERCIAL

## 2025-03-24 ENCOUNTER — PRE VISIT (OUTPATIENT)
Dept: ONCOLOGY | Facility: CLINIC | Age: 35
End: 2025-03-24
Payer: COMMERCIAL

## 2025-03-24 ENCOUNTER — PATIENT OUTREACH (OUTPATIENT)
Dept: CARE COORDINATION | Facility: CLINIC | Age: 35
End: 2025-03-24

## 2025-03-24 VITALS
OXYGEN SATURATION: 98 % | RESPIRATION RATE: 18 BRPM | DIASTOLIC BLOOD PRESSURE: 75 MMHG | HEIGHT: 66 IN | HEART RATE: 91 BPM | TEMPERATURE: 98.3 F | SYSTOLIC BLOOD PRESSURE: 112 MMHG | BODY MASS INDEX: 37.12 KG/M2 | WEIGHT: 231 LBS

## 2025-03-24 DIAGNOSIS — R74.8 ELEVATED CREATINE KINASE: ICD-10-CM

## 2025-03-24 DIAGNOSIS — Z30.018 ENCOUNTER FOR INITIAL PRESCRIPTION OF OTHER CONTRACEPTIVES: ICD-10-CM

## 2025-03-24 DIAGNOSIS — N85.02 EIN (ENDOMETRIAL INTRAEPITHELIAL NEOPLASIA): Primary | ICD-10-CM

## 2025-03-24 PROBLEM — Z30.9 CONTRACEPTIVE MANAGEMENT: Status: ACTIVE | Noted: 2025-03-24

## 2025-03-24 PROCEDURE — 99205 OFFICE O/P NEW HI 60 MIN: CPT | Performed by: STUDENT IN AN ORGANIZED HEALTH CARE EDUCATION/TRAINING PROGRAM

## 2025-03-24 PROCEDURE — G0463 HOSPITAL OUTPT CLINIC VISIT: HCPCS | Performed by: STUDENT IN AN ORGANIZED HEALTH CARE EDUCATION/TRAINING PROGRAM

## 2025-03-24 ASSESSMENT — PAIN SCALES - GENERAL: PAINLEVEL_OUTOF10: MODERATE PAIN (4)

## 2025-03-24 NOTE — PROGRESS NOTES
Anemia Management Note - Follow Up      SUBJECTIVE/OBJECTIVE:    Referred by Dr. Feliberto Rueda on 7/10/2024  Primary Diagnosis: Anemia of Other Chronic Illness (D63.8)     Secondary Diagnosis: Organ or tissue replaced by transplant, kidney (Z94.0)  Date of Kidney Transplant: 06  Hgb goal range: 9-10  Epo/Darbo:   Aranesp 60mcg every 2 weeks, PRN Hgb <10, 3rd floor  [id number]: Ok to resume Aranesp per Dr. Gerhard Rueda  Iron regimen: TBD  Injectafer completed 726     Labs : 712  RX/TX plans : 226     Recent MARCO use, transfusion, IV iron: none  No history of stroke, MI, and blood clots. Atypical Hyperplasia endometriosis carcinoma ,      No consent to communicate on file        Latest Ref Rng & Units 2024 2024 1/3/2025 1/10/2025 2025 3/7/2025 3/21/2025   Anemia   HGB Goal       9 - 10 9 - 10   MARCO Dose  60 mcg 60 mcg    60 mcg 60 mcg   Hemoglobin 11.7 - 15.7 g/dL 8.6  9.2  9.5  8.8  8.4  8.1  9.5    TSAT 15 - 46 % 31  19  40   59  31     Ferritin 6 - 175 ng/mL 505  387  399   488  460       BP Readings from Last 3 Encounters:   25 112/75   25 116/80   25 121/83     Wt Readings from Last 2 Encounters:   25 104.8 kg (231 lb)   25 104.3 kg (230 lb)         ASSESSMENT:    Hgb:at goal - received dose in clinic - recommend continue current regimen  TSat: at goal >30% Ferritin: At goal (>100ng/mL)   Goals Addressed    None         PLAN:  Dose with Aranesp. Doses given on 3/7 and 3/21 added to flowsheet. Dr Gerhard Rueda notified regarding 1.4 pt increase in 2 weeks.  RTC for hgb then Aranesp if needed in 2 week(s).    Orders needed to be renewed (for next follow-up date) in EPIC: None    Iron labs due:  monthly, early 2025    Plan discussed with:  Carol, recommending moving her 4/4 dose appt to the following week since she likely won't need a dose based on the response to the Aranesp earlier this month. Her  is having surgery the week of ,  so she's going to keep the 4/4 appt for now and reconsider as it gets closer. She's unsure if she'd be able to find the time that week after her 's surgery.  RN will check her chart in 2 weeks to see if she got the dose on 4/4 or rescheduled it, and will follow up as needed. She notes that the last week was hard for her emotionally, and RN offered support if needed. She declined but voiced appreciation.      NEXT FOLLOW-UP DATE:  [id number]    Carrie Leopold, RN BSN  [facility]  [facility]  [email or web address]  Office: [phone]  Fax [phone]

## 2025-03-24 NOTE — LETTER
3/24/2025      Carol Guillaume  [address]  [facility] [address]      Dear Colleague,    Thank you for referring your patient, Carol Guillaume, to the [facility]. Please see a copy of my visit note below.    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 She is s/p kidney transplant in , now with Stage IV CKD.    She is here, referred by OBGYN Dr Corea due to complex medical/surgerical history and persistent diagnosis of EIN. She reports that she feels largely normal. She has been working on weight loss, loss about 30lb and hoping to lose 30 more. She has had a regular period for the past three months (/feb/mar) without intermenstrual bleeding. Before this she had periods sep/oct/2024, but no period in December. She very strongly desires to keep fertility options open, would like to have a pregnancy in the future.     She previously met with M in  who said it would be ok for her to pursue pregnancy, however at that time her transplant kidney had no issues. She has not had MFM counseling about pregnancy since her worsening kidney function, though she is aware that pregnancy is not recommended unless she undergoes another successful transplant.    She is eating without issue, no abdominal pain, no bowel or bladder concerns    Disease History:  Summary below reviewed from 3/10 note from Dr Corea    10/2020: hysteroscopy, D and C and MyoSure polypectomy with Dr. Kierra Tracy, revealed complex atypical hyperplasia with foci bordering on well-differentiated adenocarcinoma > referral to GYN/ONC      2020: GYN/ONC consultation  with Dr. Gallardo, discussed various options including definitive surgery with hysterectomy versus conservative management because of her young age and desire for future fertility. She elected conservative management. Her MRI was negative for myometrial invasion.      11/2020: EUA, hysteroscopy and Mirena IUD placement      3/2021: EUA, hysteroscopy, endometrial curettings with myosure device, removal of IUD, replacement of Mirena intrauterine device, diagnostic laparoscopy for uterine perforation, small area of residual complex atypical hyperplasia, but was negative for malignancy.      9/2021: D&C, replacement of Mirena, small focus of residual atypical hyperplasia.     05/2022: D&C, Mirena exchange > no evidence of hyperplasia or malignancy, Mirena IUD removed per patient request      1/2023: pelvic US revealed a normal size uterus with a 9 mm endometrial stripe containing cystic areas within the endometrial complex     5/2023: D&C: Proliferative phase endometrium with focal squamous morular metaplasia.  No residual atypical hyperplasia identified Although no atypical hyperplasia is identified, focal squamous morular metaplasia is present.  Squamous morular metaplasia of the endometrium can be associated with endometrial hyperplasia and endometrial carcinoma.       12/15/2023: Care transferred to [facility] per GYN/ONC. Office endometrial biopsy showed disordered proliferative endometrium suggestive of anovulatory bleeding due to unopposed estrogen. Counseled on recommendation to treat anovulatory bleeding pattern with at a minimum a cyclic progesterone withdrawal bleed every 90 days. Reviewed all treatment/preventative options but patient considering pregnancy and preferred to avoid exogenous hormones. Recommended endometrial biopsy in 4 months. Reviewed risks of progression if untreated.      6/10/2024: Endometrial biopsy: atypical complex hyperplasia. Telephone visit to review the results. Advised against pregnancy or  deferring treatment for trying to conceive. Referral to multiple locations for GYN/ONC follow up provided. [facility] recommended hysterectomy or Mirena IUD, multiple attempts to call patient without hearing back. Our office reached out  and patient declined either treatment option as she would like to get pregnant. Patient was scheduled for further counseling visit on  and patient no show. Requested different appointment and different dates offered same week. Scheduled  for follow up and patient cancelled.       2024: Telephone visit to review recommendations. Agreed to undergo further sampling as a requirement for transplant clearance.      2025 - Hysteroscopy, D&C  - Atypical (complex) endometrial hyperplasia, persistent/residual  - Negative for carcinoma as sampled        Screening History  Colonoscopy: never  Mammogram: once in , negative, no screening since then  Cervical cancer screening: NIL , no hx of abnormal  DEXA: NA    OB/Gynecologic History:  Deliveries:    Menopausal status: premenopausal, sexually active with , hoping for future fertility  - reports feeling very irregular on Mirena in the past, doesn't like how it made her feel  History of HRT: NA  Cervical cancer screening: See screening history above.     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     Depression      EIN (endometrial intraepithelial neoplasia)      Fatigue      Gastroesophageal reflux disease      High cholesterol      Hypertension      Insomnia      Insulin resistance      Legally blind      Obesity      PCOS (polycystic ovarian syndrome)      PPD positive, treated     9 months of INH     Pseudotumor cerebri     on  Diamox     Retinitis pigmentosa      S/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      Uncomplicated asthma     as a child     Urinary tract infection 04/17/2024     Viremia        Past Surgical History:   Procedure Laterality Date     BIOPSY       cataract bilateral  06/2017     CYSTOURETEROSCOPY, W/RETROGRD PYELOGRAM, LASER LITHO OF URET CALC W/STRBL ASP, & STENT INSERTION Right 3/3/2025    Procedure: CYSTOURETEROSCOPY, WITH RETROGRADE PYELOGRAM, HOLMIUM LASER LITHOTRIPSY OF URETERAL CALCULUS, STEERABLE ASPIRATION AND STENT INSERTION.  Right sided transplant kidney.;  Surgeon: Raúl Bautista MD;  Location: [facility] OR     DILATION AND CURETTAGE N/A 09/13/2021    Procedure: DILATION AND CURETTAGE,;  Surgeon: Lisbeth Gallardo MD;  Location: [facility] OR     DILATION AND CURETTAGE N/A 05/09/2022    Procedure: DILATION AND CURETTAGE;  Surgeon: Lisbeth Gallardo MD;  Location: [facility] OR     DILATION AND CURETTAGE N/A 5/4/2023    Procedure: Dilation and Curettage of Endometrium;  Surgeon: Lisbeth Gallardo MD;  Location: [facility] OR     DILATION AND CURETTAGE, OPERATIVE HYSTEROSCOPY WITH MORCELLATOR, COMBINED N/A 10/23/2020    Procedure: HYSTEROSCOPY, DILATION AND CURRETAGE, MYOSURE;  Surgeon: Kierra Tracy MD;  Location:  OR     DILATION AND CURETTAGE, OPERATIVE HYSTEROSCOPY WITH MORCELLATOR, COMBINED N/A 03/05/2021    Procedure: HYSTEROSCOPY, WITH DILATION AND CURETTAGE OF UTERUS USING  MORCELLATOR;  Surgeon: Fiorella Cunningham MD;  Location:  OR     ESOPHAGOSCOPY, GASTROSCOPY, DUODENOSCOPY (EGD), COMBINED N/A 01/24/2023    Procedure: ESOPHAGOGASTRODUODENOSCOPY, WITH BIOPSY;  Surgeon: Talia Aguilar MD;  Location: [facility] OR     ESOPHAGOSCOPY, GASTROSCOPY, DUODENOSCOPY (EGD), COMBINED N/A 6/7/2024    Procedure: Esophagoscopy, gastroscopy, duodenoscopy (EGD), combined;  Surgeon: Charli Watts MD;  Location: [facility] OR     INSERT INTRAUTERINE DEVICE N/A 03/05/2021    Procedure: INSERTION MIRENA  INTRAUTERINE DEVICE;  Surgeon: Fiorella Cunningham MD;  Location:  OR     IR RENAL BIOPSY RIGHT  08/27/2021     LAPAROSCOPY DIAGNOSTIC (GYN)  03/05/2021    Procedure: Laparoscopy diagnostic (gyn);  Surgeon: Fiorella Cunningham MD;  Location: [facility] OR     LITHOTRIPSY       LITHOTRIPSY       REMOVE INTRAUTERINE DEVICE N/A 03/05/2021    Procedure: REMOVE MIRENA INTRAUTERINE DEVICE;  Surgeon: Fiorella Cunningham MD;  Location: [facility] OR     REMOVE INTRAUTERINE DEVICE N/A 05/09/2022    Procedure: EXCHANGE OF MIRENA INTRAUTERINE DEVICE;  Surgeon: Lisbeth Gallardo MD;  Location:  OR     REPLACE INTRAUTERINE DEVICE N/A 09/13/2021    Procedure: MIRENA INTRAUTERINE DEVICE EXCHANGE;  Surgeon: Lisbeth Gallardo MD;  Location:  OR     SINUS SURGERY  2001     TONSILLECTOMY       TRANSPLANT KIDNEY RECIPIENT LIVING RELATED Right 07/2006     wisdom teeth       Dx lsc at time of uterine perforation, otherwise no abdominal surgeries apart from kidney transplant       Social History     Tobacco Use     Smoking status: Never     Smokeless tobacco: Never   Vaping Use     Vaping status: Never Used   Substance Use Topics     Alcohol use: Never     Drug use: Never   Current living situation: with      Family History   Problem Relation Age of Onset     Hyperlipidemia Mother      Osteoporosis Mother      Breast Cancer Mother      Sjogren's Father      Hashimoto's thyroiditis Father      Thyroid Cancer Father      Other - See Comments Maternal Grandfather         surgical complication     Atrial fibrillation Paternal Grandmother      Arthritis Paternal Grandmother      Melanoma Paternal Grandfather      Parkinsonism Paternal Uncle         paternal uncle     Pulmonary Embolism Paternal Uncle      Cancer history reviewed as above  Mother w/ breast cancer in her 70s    Allergies  No Known Allergies    Current Outpatient Medications   Medication Sig Dispense Refill     acetaminophen (TYLENOL) 325 MG tablet Take 2 tablets (650 mg) by  "mouth every 4 hours as needed for mild pain 50 tablet 0     amLODIPine (NORVASC) 5 MG tablet TAKE 1 TABLET BY MOUTH DAILY 90 tablet 3     azaTHIOprine (IMURAN) 50 MG tablet Take 3 tablets (150 mg) by mouth daily. 90 tablet 11     carvedilol (COREG) 25 MG tablet TAKE 1 TABLET BY MOUTH 2 TIMES DAILY (WITH MEALS) 180 tablet 0     COMPOUNDED NON-CONTROLLED SUBSTANCE (CMPD RX) - PHARMACY TO MIX COMPOUNDED MEDICATION Inject 0.25mg semaglutide once weekly 4 each 3     levothyroxine (SYNTHROID/LEVOTHROID) 50 MCG tablet TAKE 1 TABLET BY MOUTH ONCE DAILY 90 tablet 1     losartan (COZAAR) 25 MG tablet TAKE 1 TABLET BY MOUTH 2 TIMES DAILY 180 tablet 3     NEORAL (BRAND) 25 MG capsule Take 2 capsules (50 mg) by mouth every morning AND 1 capsule (25 mg) every evening. 270 capsule 3     ondansetron (ZOFRAN ODT) 4 MG ODT tab Take 1 tablet (4 mg) by mouth every 8 hours as needed for nausea. 15 tablet 0     polyethylene glycol (MIRALAX) 17 GM/Dose powder Take 1 Capful by mouth daily as needed.       psyllium (METAMUCIL/KONSYL) 58.6 % powder Take 1 teaspoonful by mouth daily.       vitamin D3 (CHOLECALCIFEROL) 50 mcg (2000 units) tablet Take 1 tablet (50 mcg) by mouth daily. 90 tablet 3     carvedilol (COREG) 25 MG tablet Take 1 tablet (25 mg) by mouth 2 times daily (with meals). (Patient not taking: Reported on 3/24/2025) 180 tablet 0     No current facility-administered medications for this visit.       Physical Exam:   /75 (BP Location: Right arm, Patient Position: Sitting, Cuff Size: Adult Large)   Pulse 91   Temp 98.3  F (36.8  C) (Oral)   Resp 18   Ht 1.665 m (5' 5.55\")   Wt 104.8 kg (231 lb)   LMP 03/05/2025 (Exact Date)   SpO2 98%   BMI 37.80 kg/m    Gen: Well-appearing, NAD  HEENT: Normocephalic, atraumatic  Abd: deferred  :  Deferred for next visit and Mirena placement  Ext: No LE edema, extremities warm and well perfused    Labs/Pathology: (personally reviewed today)  3/21/25  WBC 5.4.  Hemoglobin 9.5.  " Platelets 215.  Creatinine 2.44.  Potassium 5.2.  Remainder of electrolytes within normal limits.      Imaging: (personally reviewed imaging and results today)  11/4/24 - CT AP - personally reviewed, no overt abnormalities with gyn oragns      Assessment:  Carol Guillaume is a 34 year old patient with Senior-Loken syndrome s/p kidney transplanst in 2006, now with Stage IV renal failure. She has a diagnosis of EIN, which resolved with fertility-sparing mgmt with levonorgestrel IUD, s/p IUD removal and attempted pregnancy. Now with return of EIN on most recent D&C 2/18/2025.    We discussed the etiology of EIN and that upwards of 40% of EIN cases show concurrent endometrial cancer at time of hysterectomy. Therefore, gold standard recommendation is for surgical removal with hysterectomy. Patient is aware of this risk but she did have regression after 12-18mo with IUD previously. We discussed that the risk of recurrence in this scenario is still ~25% and therefore it is not surprising EIN has returned. We discussed again my recommendation for either hysterectomy or progestin therapy. She is in an understandably difficult spot with also likely needing a kidney transplant. I recommended further discussion with MFM for pregnancy recommendations but ultimately if patient would like to keep fertility option open I think it would be reasonable if we plan Pelvic MRI and IUD placement. Would plan q3mo D&C until regression of EIN. In the meantime would work with transplant team for recommendations and plan. Ultimately I would recommend keeping IUD in place until post transplant, then can consider removal for attempted pregnancy. Ultimately if EIN does not regress would recommend hysterectomy. We discussed that at 34 the rate of spontaneous pregnancy decreases and this may take 1-2 years to complete so that at age 36 spontaneous pregnancy rates are even lower. As such, offered referral to [facility] to discuss fertility options and  potential egg retrieval. Carol would like to hold on MFM and [facility] referrals at this time      Plan:     1.)           EIN  - Pelvic MRI ordered to r/o overt myometrial invasion  - Schedule IUD placement in clinic - UPT at time of placement    2.) Disease/treatment related toxicities                 NA     3.) Genetic risk factors were assessed and the patient NA     4.) Labs and/or tests ordered include: NA                   5.) Health maintenance issues addressed today include pt is up to date.       Elva Brewer MD  Gynecologic Oncology  3/24/2025 10:07 AM                Again, thank you for allowing me to participate in the care of your patient.        Sincerely,        Elva Brewer MD    Electronically signed

## 2025-04-03 ENCOUNTER — ANCILLARY PROCEDURE (OUTPATIENT)
Dept: MRI IMAGING | Facility: CLINIC | Age: 35
End: 2025-04-03
Attending: STUDENT IN AN ORGANIZED HEALTH CARE EDUCATION/TRAINING PROGRAM
Payer: COMMERCIAL

## 2025-04-03 ENCOUNTER — ANCILLARY ORDERS (OUTPATIENT)
Dept: ONCOLOGY | Facility: CLINIC | Age: 35
End: 2025-04-03

## 2025-04-03 DIAGNOSIS — Z30.018 ENCOUNTER FOR INITIAL PRESCRIPTION OF OTHER CONTRACEPTIVES: ICD-10-CM

## 2025-04-03 DIAGNOSIS — R74.8 ELEVATED CREATINE KINASE: ICD-10-CM

## 2025-04-03 DIAGNOSIS — N85.02 EIN (ENDOMETRIAL INTRAEPITHELIAL NEOPLASIA): Primary | ICD-10-CM

## 2025-04-03 DIAGNOSIS — N85.02 EIN (ENDOMETRIAL INTRAEPITHELIAL NEOPLASIA): ICD-10-CM

## 2025-04-03 PROCEDURE — 72195 MRI PELVIS W/O DYE: CPT

## 2025-04-04 PROCEDURE — 99000 SPECIMEN HANDLING OFFICE-LAB: CPT | Performed by: PATHOLOGY

## 2025-04-04 PROCEDURE — 80158 DRUG ASSAY CYCLOSPORINE: CPT | Performed by: INTERNAL MEDICINE

## 2025-04-07 ENCOUNTER — PATIENT OUTREACH (OUTPATIENT)
Dept: CARE COORDINATION | Facility: CLINIC | Age: 35
End: 2025-04-07
Payer: COMMERCIAL

## 2025-04-07 NOTE — PROGRESS NOTES
Anemia Management Note - Follow Up      SUBJECTIVE/OBJECTIVE:    Referred by Dr. Feliberto Rueda on 7/10/2024  Primary Diagnosis: Anemia of Other Chronic Illness (D63.8)     Secondary Diagnosis: Organ or tissue replaced by transplant, kidney (Z94.0)  Date of Kidney Transplant: 06  Hgb goal range: 9-10  Epo/Darbo:   Aranesp 60mcg every 2 weeks, PRN Hgb <10, 3rd floor  [id number]: Ok to resume Aranesp per Dr. Gerhard Rueda  Iron regimen: TBD  Injectafer completed 726     Labs : 712  RX/TX plans : 226     Recent MARCO use, transfusion, IV iron: none  No history of stroke, MI, and blood clots. Atypical Hyperplasia endometriosis carcinoma ,      No consent to communicate on file        Latest Ref Rng & Units 2024 1/3/2025 1/10/2025 2025 3/7/2025 3/21/2025 2025   Anemia   HGB Goal      9 - 10 9 - 10    MARCO Dose  60 mcg    60 mcg 60 mcg    Hemoglobin 11.7 - 15.7 g/dL 9.2  9.5  8.8  8.4  8.1  9.5  10.4    TSAT 15 - 46 % 19  40   59  31   36    Ferritin 6 - 175 ng/mL 387  399   488  460   337      BP Readings from Last 3 Encounters:   25 112/75   25 116/80   25 121/83     Wt Readings from Last 2 Encounters:   25 104.8 kg (231 lb)   25 104.3 kg (230 lb)         ASSESSMENT:    Hgb:Above goal - recommend hold dose  TSat: at goal >30% Ferritin: At goal (>100ng/mL)   Goals Addressed    None         PLAN:  Hold Aranesp.  RTC for hgb then Aranesp if needed in 2 week(s).    Orders needed to be renewed (for next follow-up date) in EPIC: None    Iron labs due:  monthly, early May 2025    Plan discussed with:  no call, chart reviewed      NEXT FOLLOW-UP DATE:  422    Carrie Leopold, RN BSN  Anemia Services  [facility]  [email or web address]  Office: [phone]  Fax [phone]

## 2025-04-10 ENCOUNTER — TELEPHONE (OUTPATIENT)
Dept: ENDOCRINOLOGY | Facility: CLINIC | Age: 35
End: 2025-04-10
Payer: COMMERCIAL

## 2025-04-10 NOTE — TELEPHONE ENCOUNTER
Left Voicemail (1st Attempt) for the patient to call back and schedule the following:    Appointment type: return   Provider: Sharon Olea   Return date: 4/11/2025  Specialty phone number: [phone]   Additional appointment(s) needed:   Additonal Notes:

## 2025-04-14 ENCOUNTER — TELEPHONE (OUTPATIENT)
Dept: ENDOCRINOLOGY | Facility: CLINIC | Age: 35
End: 2025-04-14
Payer: COMMERCIAL

## 2025-04-14 NOTE — TELEPHONE ENCOUNTER
Called and left message for patient in regards to medication questions. Left call back number and sent mychart message to further discuss.

## 2025-04-14 NOTE — TELEPHONE ENCOUNTER
Patient Returning Call    Reason for call:  Returning call    Information relayed to patient:  Patient called requesting to speak with Skye.    Patient has additional questions:  Med questions      Could we send this information to you in [facility] or would you prefer to receive a phone call?:   Patient would prefer a phone call   Okay to leave a detailed message?: Yes at Cell number on file:    Telephone Information:   Mobile [phone]

## 2025-04-14 NOTE — TELEPHONE ENCOUNTER
General Call    Contacts       Contact Date/Time Type Contact Phone/Fax    04/14/2025 02:47 PM CDT Phone (Incoming) Carol Guillaume (Self) [phone] (M)          Reason for Call:  call back    What are your questions or concerns:  pt would like a call back regarding her medication    Could we send this information to you in [facility] or would you prefer to receive a phone call?:   Patient would prefer a phone call   Okay to leave a detailed message?: Yes at Cell number on file:    Telephone Information:   Mobile [phone]

## 2025-04-15 NOTE — TELEPHONE ENCOUNTER
Left message for patient. Suggested mychart message may be easier to communicate since we are unable to connect by phone. Left call back number.

## 2025-04-22 ENCOUNTER — PATIENT OUTREACH (OUTPATIENT)
Dept: CARE COORDINATION | Facility: CLINIC | Age: 35
End: 2025-04-22
Payer: COMMERCIAL

## 2025-04-22 NOTE — PROGRESS NOTES
Anemia Management Note - Reminder     Follow-up with anemia management service:    Carol is due for labs with possible Aranesp injection. No appts are scheduled at this time.  LVM for Carol with 3rd floor scheduling phone number, suggesting appt in next 1-2 weeks, and provided call back number if needed          Latest Ref Rng & Units 11/22/2024 1/3/2025 1/10/2025 2/14/2025 3/7/2025 3/21/2025 4/4/2025   Anemia   HGB Goal      9 - 10 9 - 10    MARCO Dose  60 mcg    60 mcg 60 mcg    Hemoglobin 11.7 - 15.7 g/dL 9.2  9.5  8.8  8.4  8.1  9.5  10.4    TSAT 15 - 46 % 19  40   59  31   36    Ferritin 6 - 175 ng/mL 387  399   488  169   337        Follow-up call date: 042925    Carrie Leopold, RN BSN  Anemia Services  [facility]  [email or web address]  Office: [phone]  Fax [phone]

## 2025-04-23 ENCOUNTER — TELEPHONE (OUTPATIENT)
Dept: ONCOLOGY | Facility: CLINIC | Age: 35
End: 2025-04-23
Payer: COMMERCIAL

## 2025-04-23 NOTE — CONFIDENTIAL NOTE
Left detailed message for Carol to call back and reschedule her appointment for Dr. Brewer for IUD placement.    Thanks    Deja Monteiro RN Care Coordinator  [facility]  Ph.[phone] Fax. [phone]

## 2025-04-29 ENCOUNTER — TELEPHONE (OUTPATIENT)
Dept: ENDOCRINOLOGY | Facility: CLINIC | Age: 35
End: 2025-04-29
Payer: COMMERCIAL

## 2025-04-29 ENCOUNTER — PATIENT OUTREACH (OUTPATIENT)
Dept: CARE COORDINATION | Facility: CLINIC | Age: 35
End: 2025-04-29
Payer: COMMERCIAL

## 2025-04-29 DIAGNOSIS — E78.5 DYSLIPIDEMIA: ICD-10-CM

## 2025-04-29 DIAGNOSIS — N18.4 CHRONIC KIDNEY DISEASE, STAGE IV (SEVERE) (H): ICD-10-CM

## 2025-04-29 DIAGNOSIS — E66.813 CLASS 3 SEVERE OBESITY WITH SERIOUS COMORBIDITY AND BODY MASS INDEX (BMI) OF 40.0 TO 44.9 IN ADULT, UNSPECIFIED OBESITY TYPE (H): ICD-10-CM

## 2025-04-29 NOTE — PROGRESS NOTES
Carol called in, scheduled labs for 050225. Will need call if Aranesp is needed.  Says she's feeling well    Follow up date: 0945 labs on 050225    Carrie Leopold, RN BSN  Anemia Services  [facility]  [email or web address]  Office: [phone]  Fax [phone]

## 2025-04-29 NOTE — PROGRESS NOTES
Anemia Management Note - Reminder     Follow-up with anemia management service:    Carol is due for anemia labs, last drawn on 040425.   LVM x2 with reminder to schedule appt, with scheduling number for 3rd floor provided.        Latest Ref Rng & Units 11/22/2024 1/3/2025 1/10/2025 2/14/2025 3/7/2025 3/21/2025 4/4/2025   Anemia   HGB Goal      9 - 10 9 - 10    MARCO Dose  60 mcg    60 mcg 60 mcg    Hemoglobin 11.7 - 15.7 g/dL 9.2  9.5  8.8  8.4  8.1  9.5  10.4    TSAT 15 - 46 % 19  40   59  31   36    Ferritin 6 - 175 ng/mL 387  399   488  503   337        Follow-up call date: 050625    Carrie Leopold, RN BSN  Anemia Services  [facility]  [email or web address]  Office: [phone]  Fax [phone]

## 2025-04-29 NOTE — TELEPHONE ENCOUNTER
General Call    Contacts       Contact Date/Time Type Contact Phone/Fax    04/29/2025 03:18 PM CDT Phone (Incoming) Carol Guillaume (Self) [phone] (M)          Reason for Call:  Please call pt.  She's been trying to get (non-compounded) WEGOVY scripted, as her insur will now cover it.         Could we send this information to you in [employer] or would you prefer to receive a phone call?:   Patient would prefer a phone call   Okay to leave a detailed message?: Yes at Cell number on file:    Telephone Information:   Mobile [phone]

## 2025-04-29 NOTE — TELEPHONE ENCOUNTER
----------------------  Last Visit Date: 11/8/24  Future Visit Date: 9/19/25  ----------------------    Refill decision: Medication unable to be refilled by RN due to:  Medication not active on Pt's med list     11/8/24-  Instructions    Continue 0.25mg semaglutide   Can try gradually increasing metamucil (psyllium) - 1.5tsp to start   Can try reducing miralax to 1/2 capful daily   Can continue prunes  Continue with good hydration   Be mindful of getting enough protein   Follow up 3-4 months         Need new order for semaglutide-not on medication list.   Routing to clinic to review and refill     Radha Guthrie RN  [facility] Central Nursing/Red Flag Triage & Med Refill Team

## 2025-04-30 ENCOUNTER — TELEPHONE (OUTPATIENT)
Dept: ONCOLOGY | Facility: CLINIC | Age: 35
End: 2025-04-30
Payer: COMMERCIAL

## 2025-04-30 NOTE — TELEPHONE ENCOUNTER
Left final voicemail in regards to setting up follow up appointment with Dr. Brewer for IUD placement.  Will send letter to her home on follow up plan.    Thanks    Deja Monteiro RN Care Coordinator  [facility]  Ph.[phone] Fax. [phone]

## 2025-05-01 ENCOUNTER — TELEPHONE (OUTPATIENT)
Dept: ENDOCRINOLOGY | Facility: CLINIC | Age: 35
End: 2025-05-01
Payer: COMMERCIAL

## 2025-05-01 NOTE — TELEPHONE ENCOUNTER
General Call    Contacts       Contact Date/Time Type Contact Phone/Fax    05/01/2025 01:18 PM CDT Phone (Incoming) Markell Carol GARSIA (Self) [phone] (M)          Reason for Call:  Please call pt today about restarting WEGOVY. Her number works, Sharon perhaps had trouble reaching?         Could we send this information to you in [facility] or would you prefer to receive a phone call?:   Patient would prefer a phone call   Okay to leave a detailed message?: Yes at Cell number on file:    Telephone Information:   Mobile [phone]

## 2025-05-02 ENCOUNTER — TELEPHONE (OUTPATIENT)
Dept: ENDOCRINOLOGY | Facility: CLINIC | Age: 35
End: 2025-05-02

## 2025-05-02 NOTE — TELEPHONE ENCOUNTER
PA Initiation    Medication: WEGOVY 0.5 MG/0.5ML SC SOAJ  Insurance Company: [facility] - Phone [phone] Fax [phone]  Pharmacy Filling the Rx:    Filling Pharmacy Phone:    Filling Pharmacy Fax:    Start Date: 5/2/2025

## 2025-05-05 ENCOUNTER — PATIENT OUTREACH (OUTPATIENT)
Dept: CARE COORDINATION | Facility: CLINIC | Age: 35
End: 2025-05-05
Payer: COMMERCIAL

## 2025-05-05 ENCOUNTER — TELEPHONE (OUTPATIENT)
Dept: TRANSPLANT | Facility: CLINIC | Age: 35
End: 2025-05-05
Payer: COMMERCIAL

## 2025-05-05 DIAGNOSIS — Z94.0 HTN, KIDNEY TRANSPLANT RELATED: ICD-10-CM

## 2025-05-05 DIAGNOSIS — Z94.0 IMMUNOSUPPRESSIVE MANAGEMENT ENCOUNTER FOLLOWING KIDNEY TRANSPLANT: ICD-10-CM

## 2025-05-05 DIAGNOSIS — Z48.298 AFTERCARE FOLLOWING ORGAN TRANSPLANT: ICD-10-CM

## 2025-05-05 DIAGNOSIS — Z79.899 IMMUNOSUPPRESSIVE MANAGEMENT ENCOUNTER FOLLOWING KIDNEY TRANSPLANT: ICD-10-CM

## 2025-05-05 DIAGNOSIS — I15.1 HTN, KIDNEY TRANSPLANT RELATED: ICD-10-CM

## 2025-05-05 DIAGNOSIS — Z94.0 KIDNEY REPLACED BY TRANSPLANT: ICD-10-CM

## 2025-05-05 RX ORDER — CYCLOSPORINE 25 MG/1
50 CAPSULE, LIQUID FILLED ORAL 2 TIMES DAILY
Qty: 360 CAPSULE | Refills: 3 | Status: SHIPPED | OUTPATIENT
Start: 2025-05-05

## 2025-05-05 NOTE — TELEPHONE ENCOUNTER
ISSUE:   Cyclosporine level 50 on 5/2/2025, goal 75, dose 50 mg in the AM and 25 mg in the PM.    PLAN:   Call Patient and confirm this was an accurate 12-hour trough.   Verify Cyclosporine dose 50 mg in the AM and 25 mg in the PM.   Confirm no new medications or or missed doses.   Confirm no new illness / infection / diarrhea.   If accurate trough and accurate dose, increase Cyclosporine dose to 50 mg BID     Is this more than a 50% increase or decrease in current IS dose: No  If YES, justification: na    Repeat labs in 1-2 weeks.  *If > 50% change in immunosuppression dose, repeat labs in 1 week.     OUTCOME:   See my chart message     Ronna Hughes RN   Transplant Coordinator  [phone]

## 2025-05-05 NOTE — PROGRESS NOTES
Anemia Management Note - Follow Up      SUBJECTIVE/OBJECTIVE:    Referred by Dr. Feliberto Rueda on 7/10/2024  Primary Diagnosis: Anemia of Other Chronic Illness (D63.8)     Secondary Diagnosis: Organ or tissue replaced by transplant, kidney (Z94.0)  Date of Kidney Transplant: 06  Hgb goal range: 9-10  Epo/Darbo:   Aranesp 60mcg every 2 weeks, PRN Hgb <10, 3rd floor  809323: Ok to resume Aranesp per Dr. Gerhard Rueda  Iron regimen: TBD  Injectafer completed 726     Labs : 712  RX/TX plans : 226     Recent MARCO use, transfusion, IV iron: none  No history of stroke, MI, and blood clots. Atypical Hyperplasia endometriosis carcinoma ,      No consent to communicate on file        Latest Ref Rng & Units 1/3/2025 1/10/2025 2025 3/7/2025 3/21/2025 2025 2025   Anemia   HGB Goal     9 - 10 9 - 10     MARCO Dose     60 mcg 60 mcg     Hemoglobin 11.7 - 15.7 g/dL 9.5  8.8  8.4  8.1  9.5  10.4  10.2    TSAT 15 - 46 % 40   59  31   36  36    Ferritin 6 - 175 ng/mL 399   488  460   337  329      BP Readings from Last 3 Encounters:   25 112/75   25 116/80   25 121/83     Wt Readings from Last 2 Encounters:   25 104.8 kg (231 lb)   25 104.3 kg (230 lb)         ASSESSMENT:    Hgb:Above goal - recommend hold dose  TSat: at goal >30% Ferritin: At goal (>100ng/mL)   Goals Addressed    None         PLAN:  Hold Aranesp.  RTC for hgb then Aranesp if needed in 2-4 week(s).    Orders needed to be renewed (for next follow-up date) in EPIC: None    Iron labs due:  monthly, due early 2025    Plan discussed with:  no call, chart reviewed      NEXT FOLLOW-UP DATE:  529 review OV notes, labs?    Carrie Leopold, RN BSN  Anemia Services  [facility]  [email or web address]  Office: [phone]  Fax [phone]

## 2025-05-06 NOTE — TELEPHONE ENCOUNTER
Prior Authorization Approval    Medication: WEGOVY 0.5 MG/0.5ML SC SOAJ  Authorization Effective Date: 5/2/2025  Authorization Expiration Date: 5/2/2026  Approved Dose/Quantity: 2  Reference #: [id number]   Insurance Company: [employer] - Phone [phone] Fax [phone]  Expected CoPay: $    CoPay Card Available:      Financial Assistance Needed:    Which Pharmacy is filling the prescription:    Pharmacy Notified: y  Patient Notified: y

## 2025-05-22 DIAGNOSIS — I12.9 HYPERTENSIVE RENAL DISEASE: Primary | ICD-10-CM

## 2025-05-22 DIAGNOSIS — Z94.0 KIDNEY REPLACED BY TRANSPLANT: ICD-10-CM

## 2025-05-22 DIAGNOSIS — E03.9 HYPOTHYROIDISM, UNSPECIFIED TYPE: ICD-10-CM

## 2025-05-22 DIAGNOSIS — Z48.298 AFTERCARE FOLLOWING ORGAN TRANSPLANT: ICD-10-CM

## 2025-05-27 RX ORDER — LOSARTAN POTASSIUM 25 MG/1
25 TABLET ORAL 2 TIMES DAILY
Qty: 180 TABLET | Refills: 3 | Status: SHIPPED | OUTPATIENT
Start: 2025-05-27

## 2025-05-27 RX ORDER — CARVEDILOL 25 MG/1
25 TABLET ORAL 2 TIMES DAILY WITH MEALS
Qty: 180 TABLET | Refills: 3 | Status: SHIPPED | OUTPATIENT
Start: 2025-05-27

## 2025-05-27 RX ORDER — LEVOTHYROXINE SODIUM 50 UG/1
50 TABLET ORAL DAILY
Qty: 90 TABLET | Refills: 1 | Status: SHIPPED | OUTPATIENT
Start: 2025-05-27

## 2025-05-29 ENCOUNTER — PATIENT OUTREACH (OUTPATIENT)
Dept: CARE COORDINATION | Facility: CLINIC | Age: 35
End: 2025-05-29
Payer: COMMERCIAL

## 2025-05-29 NOTE — PROGRESS NOTES
Carol had virtual visit with Dr. Gerhard Rueda on 052825, so no labs were drawn. It appears that she is due for monthly transplant labs next week.     Plan: review lab results, check in with Carol if Aranesp is warranted.    Follow up date: 060425    Carrie Leopold, RN BSN  Anemia Services  [facility]  [email or web address]  Office: [phone]  Fax [phone]

## 2025-06-02 ENCOUNTER — PATIENT OUTREACH (OUTPATIENT)
Dept: NEPHROLOGY | Facility: CLINIC | Age: 35
End: 2025-06-02
Payer: COMMERCIAL

## 2025-06-02 DIAGNOSIS — I15.1 HTN, KIDNEY TRANSPLANT RELATED: Primary | ICD-10-CM

## 2025-06-02 DIAGNOSIS — Z94.0 HTN, KIDNEY TRANSPLANT RELATED: Primary | ICD-10-CM

## 2025-06-02 NOTE — PROGRESS NOTES
6/2- Called Carol to check in on how she is feeling and let her know that Dr. Gerhard Meng would like her to see general nephrology as well. I spoke to Carol and introduced myself and explain my role. I gave her my contact info through my chart per request. Attached to care team and placed on the journey.   Kenn ISRAEL RN  Nephrology care coordinator  [facility]

## 2025-06-11 ENCOUNTER — PATIENT OUTREACH (OUTPATIENT)
Dept: CARE COORDINATION | Facility: CLINIC | Age: 35
End: 2025-06-11
Payer: COMMERCIAL

## 2025-06-11 NOTE — PROGRESS NOTES
Anemia Management Note - Reminder     Follow-up with anemia management service:    Carol is due for monthly labs. Last drawn on 050225.    Spoke to Carol, she has plans to have labs done on Friday, 6/13, but has not yet made the appt. RN will check for results next week and follow up if any interventions are needed. She voiced understanding.        Latest Ref Rng & Units 1/3/2025 1/10/2025 2/14/2025 3/7/2025 3/21/2025 4/4/2025 5/2/2025   Anemia   HGB Goal     9 - 10  9 - 10      MARCO Dose     60 mcg 60 mcg     Hemoglobin 11.7 - 15.7 g/dL 9.5  8.8  8.4  8.1  9.5  10.4  10.2    TSAT 15 - 46 % 40   59  31   36  36    Ferritin 6 - 175 ng/mL 399   488  460   337  329        Data saved with a previous flowsheet row definition         Follow-up call date: 061725    Carrie Leopold, RN BSN  Anemia Services  [facility]  [email or web address]  Office: [phone]  Fax [phone]

## 2025-06-20 ENCOUNTER — RESULTS FOLLOW-UP (OUTPATIENT)
Dept: TRANSPLANT | Facility: CLINIC | Age: 35
End: 2025-06-20

## 2025-06-20 DIAGNOSIS — Z94.0 HTN, KIDNEY TRANSPLANT RELATED: Primary | ICD-10-CM

## 2025-06-20 DIAGNOSIS — I15.1 HTN, KIDNEY TRANSPLANT RELATED: Primary | ICD-10-CM

## 2025-06-24 ENCOUNTER — PATIENT OUTREACH (OUTPATIENT)
Dept: CARE COORDINATION | Facility: CLINIC | Age: 35
End: 2025-06-24
Payer: COMMERCIAL

## 2025-06-24 NOTE — PROGRESS NOTES
Anemia Management Note - Follow Up      SUBJECTIVE/OBJECTIVE:    Referred by Dr. Feliberto Rueda on 7/10/2024  Primary Diagnosis: Anemia of Other Chronic Illness (D63.8)     Secondary Diagnosis: Organ or tissue replaced by transplant, kidney (Z94.0)  Date of Kidney Transplant: 06  Hgb goal range: 9-10  Epo/Darbo:   Aranesp 60mcg every 2 weeks, PRN Hgb <10, 3rd floor  125328: Ok to resume Aranesp per Dr. Gerhard Rueda  Iron regimen: TBD  Injectafer completed 726     Labs : 712  RX/TX plans : 226     Recent MARCO use, transfusion, IV iron: none  No history of stroke, MI, and blood clots. Atypical Hyperplasia endometriosis carcinoma ,      No consent to communicate on file        Latest Ref Rng & Units 1/10/2025 2025 3/7/2025 3/21/2025 2025 2025 2025   Anemia   HGB Goal    9 - 10  9 - 10       MARCO Dose    60 mcg 60 mcg      Hemoglobin 11.7 - 15.7 g/dL 8.8  8.4  8.1  9.5  10.4  10.2  9.9    TSAT 15 - 46 %  59  31   36  36  51    Ferritin 6 - 175 ng/mL  488  460   337  329  277        Data saved with a previous flowsheet row definition     BP Readings from Last 3 Encounters:   25 112/75   25 116/80   25 121/83     Wt Readings from Last 2 Encounters:   25 104.8 kg (231 lb)   25 104.3 kg (230 lb)         ASSESSMENT:    Hgb:At goal - recommend dose  TSat: elevated at >50% Ferritin: At goal (>100ng/mL)   Goals Addressed    None         PLAN:  Dose with Aranesp. She has OV planned on 627.   RTC for hgb then Aranesp if needed in 2 week(s).    Orders needed to be renewed (for next follow-up date) in EPIC: Hgb standing lab orders and Iron standing lab orders-  712    Iron labs due:  monthly, later 2025    Plan discussed with:  Leela to call back. Details given in MyChart message      NEXT FOLLOW-UP DATE:  630    Carrie Leopold, RN BSN  Anemia Services  [facility]  [email or web address]  Office: [phone]  Fax  [phone]

## 2025-06-27 ENCOUNTER — DOCUMENTATION ONLY (OUTPATIENT)
Dept: NEPHROLOGY | Facility: CLINIC | Age: 35
End: 2025-06-27

## 2025-06-27 DIAGNOSIS — N18.32 STAGE 3B CHRONIC KIDNEY DISEASE (H): Primary | ICD-10-CM

## 2025-06-27 DIAGNOSIS — E66.813 CLASS 3 SEVERE OBESITY WITH SERIOUS COMORBIDITY AND BODY MASS INDEX (BMI) OF 40.0 TO 44.9 IN ADULT, UNSPECIFIED OBESITY TYPE (H): ICD-10-CM

## 2025-06-27 DIAGNOSIS — E28.2 PCOS (POLYCYSTIC OVARIAN SYNDROME): ICD-10-CM

## 2025-06-27 DIAGNOSIS — Z94.0 KIDNEY REPLACED BY TRANSPLANT: ICD-10-CM

## 2025-06-27 DIAGNOSIS — Z94.0 HTN, KIDNEY TRANSPLANT RELATED: ICD-10-CM

## 2025-06-27 DIAGNOSIS — I15.1 HTN, KIDNEY TRANSPLANT RELATED: ICD-10-CM

## 2025-06-27 NOTE — TELEPHONE ENCOUNTER
Medication Question or Refill    Contacts       Contact Date/Time Type Contact Phone/Fax    06/27/2025 03:56 PM CDT Phone (Incoming) Carol Guillaume (Self) [phone] (M)            What medication are you calling about (include dose and sig)?: Wegovy point 5 mg     Preferred Pharmacy:   [facility] - [place], [place] [facility]  [address] [facility] [address]  Phone: [phone] Fax: [phone]          Controlled Substance Agreement on file:   CSA -- Patient Level:    CSA: None found at the patient level.       Who prescribed the medication?: Sharon Olea     Do you need a refill? Yes    When did you use the medication last? 06/27/2025    Patient offered an appointment? No, Pt. Already has an upcoming appointment.     Do you have any questions or concerns?  Yes: Pt. already has a note in to refill the prescription. Pt. took her last dose of Wegovy today and is going out of town next Thursday and needs the medication sent via mail order pharmacy as soon as possible.        Could we send this information to you in [employer] or would you prefer to receive a phone call?:   Patient would like to be contacted via [employer]

## 2025-06-29 RX ORDER — SEMAGLUTIDE 0.5 MG/.5ML
0.5 INJECTION, SOLUTION SUBCUTANEOUS
Qty: 2 ML | Status: CANCELLED | OUTPATIENT
Start: 2025-06-29

## 2025-06-29 NOTE — TELEPHONE ENCOUNTER
Last Written Prescription:   Disp Refills Start End ANNA   Semaglutide-Weight Management (WEGOVY) 0.5 MG/0.5ML pen 2 mL 1 5/2/2025 -- --   Sig - Route: Inject 0.5 mg subcutaneously every 7 days. - Subcutaneous     ----------------------  Last Visit Date: 11/8/2024  [facility] [place]      Future Visit Date:   9/19/2025 8:30 AM (30 min)  [place]    Arrive by:  8:15 AM   RETURN MEDICAL WEIGHT MGMT    [facility])   Sharon Olea APRN CNP     ----------------------      Refill decision:   [] Medication refilled per  Medication Refill in Ambulatory Care  policy.  [x] Medication unable to be refilled by RN due to: Pt not seen within past 12 months, No FOV or FOV exceeds timeframe per protocol      Creatinine   Date Value Ref Range Status   06/20/2025 2.68 (H) 0.51 - 0.95 mg/dL Final   03/05/2021 1.70 (H) 0.52 - 1.04 mg/dL Final         *If no FOV, Routing to CC to contact patient for appointment?   []  Yes  []  No  [x]  N/A        Request from pharmacy:  Requested Prescriptions

## 2025-07-01 ENCOUNTER — TELEPHONE (OUTPATIENT)
Dept: NEPHROLOGY | Facility: CLINIC | Age: 35
End: 2025-07-01
Payer: COMMERCIAL

## 2025-07-01 DIAGNOSIS — I15.1 HTN, KIDNEY TRANSPLANT RELATED: Primary | ICD-10-CM

## 2025-07-01 DIAGNOSIS — R00.2 PALPITATIONS: ICD-10-CM

## 2025-07-01 DIAGNOSIS — Z94.0 KIDNEY REPLACED BY TRANSPLANT: ICD-10-CM

## 2025-07-01 DIAGNOSIS — E28.2 PCOS (POLYCYSTIC OVARIAN SYNDROME): ICD-10-CM

## 2025-07-01 DIAGNOSIS — Z94.0 HTN, KIDNEY TRANSPLANT RELATED: Primary | ICD-10-CM

## 2025-07-01 DIAGNOSIS — E66.813 CLASS 3 SEVERE OBESITY WITH SERIOUS COMORBIDITY AND BODY MASS INDEX (BMI) OF 40.0 TO 44.9 IN ADULT, UNSPECIFIED OBESITY TYPE (H): ICD-10-CM

## 2025-07-01 DIAGNOSIS — N18.32 STAGE 3B CHRONIC KIDNEY DISEASE (H): ICD-10-CM

## 2025-07-01 RX ORDER — SEMAGLUTIDE 0.5 MG/.5ML
0.5 INJECTION, SOLUTION SUBCUTANEOUS
Qty: 2 ML | Refills: 1 | Status: CANCELLED | OUTPATIENT
Start: 2025-07-01

## 2025-07-01 NOTE — TELEPHONE ENCOUNTER
Hello we are needing clarification is the pt staying on the wegovy 0.5 to moving to the 1 mg we needs new script for either or

## 2025-07-01 NOTE — TELEPHONE ENCOUNTER
[facility] Call Center    Phone Message    May a detailed message be left on voicemail: yes     Reason for Call: Other: order sent over for blood pressure cuff needs to be signed by MD not a nurse     Action Taken: Other: neph    Travel Screening: Not Applicable     Date of Service:

## 2025-07-07 DIAGNOSIS — I15.1 HTN, KIDNEY TRANSPLANT RELATED: Primary | ICD-10-CM

## 2025-07-07 DIAGNOSIS — Z94.0 HTN, KIDNEY TRANSPLANT RELATED: Primary | ICD-10-CM

## 2025-07-11 PROCEDURE — 99000 SPECIMEN HANDLING OFFICE-LAB: CPT | Performed by: PATHOLOGY

## 2025-07-11 PROCEDURE — 80158 DRUG ASSAY CYCLOSPORINE: CPT | Performed by: INTERNAL MEDICINE

## 2025-07-21 ENCOUNTER — PATIENT OUTREACH (OUTPATIENT)
Dept: CARE COORDINATION | Facility: CLINIC | Age: 35
End: 2025-07-21
Payer: COMMERCIAL

## 2025-07-21 DIAGNOSIS — Z94.0 KIDNEY REPLACED BY TRANSPLANT: ICD-10-CM

## 2025-07-21 DIAGNOSIS — D63.8 ANEMIA IN OTHER CHRONIC DISEASES CLASSIFIED ELSEWHERE: Primary | ICD-10-CM

## 2025-07-21 NOTE — PROGRESS NOTES
Anemia Management Note - Follow Up      SUBJECTIVE/OBJECTIVE:    Referred by Dr. Feliberto Rueda on 7/10/2024  Primary Diagnosis: Anemia of Other Chronic Illness (D63.8)     Secondary Diagnosis: Organ or tissue replaced by transplant, kidney (Z94.0)  Date of Kidney Transplant: 06  Hgb goal range: 9-10  Epo/Darbo:   Aranesp 60mcg every 2 weeks, PRN Hgb <10, 3rd floor- pt has been going in every 2-3 weeks   807164: Ok to resume Aranesp per Dr. Gerhard Rueda  Iron regimen: TBD  Injectafer completed 726     Labs : 721  RX/TX plans : 226     Recent MARCO use, transfusion, IV iron: none  No history of stroke, MI, and blood clots. Atypical Hyperplasia endometriosis carcinoma ,      No consent to communicate on file        Latest Ref Rng & Units 2025 3/7/2025 3/21/2025 2025 2025 2025 2025   Anemia   HGB Goal   9 - 10  9 - 10     9 - 10   MARCO Dose   60 mcg 60 mcg    60 mcg   Hemoglobin 11.7 - 15.7 g/dL 8.4  8.1  9.5  10.4  10.2  9.9  9.2    TSAT 15 - 46 % 59  31   36  36  51  55    Ferritin 6 - 175 ng/mL 488  460   337  329  277  457        Data saved with a previous flowsheet row definition     BP Readings from Last 3 Encounters:   25 127/80   25 109/66   25 112/75     Wt Readings from Last 2 Encounters:   25 104.8 kg (231 lb)   25 104.8 kg (231 lb)         ASSESSMENT:    Hgb:at goal - received dose in clinic - recommend continue current regimen  TSat: elevated at >50% Ferritin: At goal (>100ng/mL)   Goals Addressed    None         PLAN:  Dose with Aranesp.  RTC for hgb then Aranesp if needed in 2 week(s).    Orders needed to be renewed (for next follow-up date) in EPIC: Hgb standing lab orders and Iron standing lab orders-- orders renewed today for 1 year    Iron labs due:  3 months, 2025    Plan discussed with:  no call, chart reviewed      NEXT FOLLOW-UP DATE:  728    Carrie Leopold, RN BSN  Anemia Services   Health  [email or web address]  Office: [phone]  Fax [phone]

## 2025-07-24 ENCOUNTER — TELEPHONE (OUTPATIENT)
Dept: TRANSPLANT | Facility: CLINIC | Age: 35
End: 2025-07-24
Payer: COMMERCIAL

## 2025-07-24 DIAGNOSIS — Z94.0 HTN, KIDNEY TRANSPLANT RELATED: Primary | ICD-10-CM

## 2025-07-24 DIAGNOSIS — I15.1 HTN, KIDNEY TRANSPLANT RELATED: Primary | ICD-10-CM

## 2025-07-24 NOTE — TELEPHONE ENCOUNTER
Called pt to introduce myself as WL coordinator and encouraged pt to stay up on health maintenance and to let us know if insurance changes, contact info updates. Explained WL protocol for pt's status and when follow up is needed. Gave pt direct information and encouraged to reach out with any questions/concerns. Discussed outstanding items: current GFR too well for transplant needs to be <20, also GYN/ onc clearance pending. Previous committee decision was pt wants hysterectomy but reports she is trying to have kids so she does not want to do this. September would be her 1 year santosh from getting her menstrual cycle after IUD removal. She has not moved forward with new IUD recommended by Gyn/onc in 3/2025 as she would like to have kids. Message sent to Dr. Gerhard Rueda to discuss most recent visit and current status. Will update pt once response received. Pt verbalized understanding of information and has no further questions. Encouraged to reach out if questions arise.

## 2025-07-28 ENCOUNTER — PATIENT OUTREACH (OUTPATIENT)
Dept: CARE COORDINATION | Facility: CLINIC | Age: 35
End: 2025-07-28
Payer: COMMERCIAL

## 2025-07-28 ENCOUNTER — DOCUMENTATION ONLY (OUTPATIENT)
Dept: TRANSPLANT | Facility: CLINIC | Age: 35
End: 2025-07-28
Payer: COMMERCIAL

## 2025-07-28 DIAGNOSIS — I15.1 HTN, KIDNEY TRANSPLANT RELATED: Primary | ICD-10-CM

## 2025-07-28 DIAGNOSIS — Z94.0 HTN, KIDNEY TRANSPLANT RELATED: Primary | ICD-10-CM

## 2025-07-28 NOTE — PROGRESS NOTES
Anemia Management Note - Reminder     Follow-up with anemia management service:    Carol is due for Aranesp injection. LVM and clinic phone number to schedule appts        Latest Ref Rng & Units 2/14/2025 3/7/2025 3/21/2025 4/4/2025 5/2/2025 6/20/2025 7/11/2025   Anemia   HGB Goal   9 - 10  9 - 10     9 - 10   MARCO Dose   60 mcg 60 mcg    60 mcg   Hemoglobin 11.7 - 15.7 g/dL 8.4  8.1  9.5  10.4  10.2  9.9  9.2    TSAT 15 - 46 % 59  31   36  36  51  55    Ferritin 6 - 175 ng/mL 488  460   337  329  161  457        Data saved with a previous flowsheet row definition         Follow-up call date: 080425    Carrie Leopold, RN BSN  Anemia Services  [facility]  [email or web address]  Office: [phone]  Fax [phone]

## 2025-07-28 NOTE — PROGRESS NOTES
Dr. Gerhard Rueda updated on comments below:     Received: 3 days ago  Elva Brewer MD Ray, Krista, RN  Chandler Vickers,    Good questions. There will not be any regression without an IUD placement or taking PO progestin therapy. 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 I strongly recommended that she get her IUD placed but she has cancelled multiple appointments with me. I think rather than just moving to a hysterectomy, an IUD is a reasonable option to bridge her to transplant if she really desires pregnancy but as it currently stands pregnancy is unsafe in her situation (though, please don't take my word for that, the actual pregnancy counseling needs to come from OBGYN or Maternal Fetal medicine). She has met with OBGYN and now with me in Gyn Onc and she has declined my recommendations.    Ideal situation for her would be get the IUD, watch that the hyperplasia regresses (with q6mo sampling), get a transplant, after the transplant once cleared by your team and if her hyperplasia remains gone, take out IUD and attempt pregnancy.    Does that help clarify the situation?    Thanks  Elva Brewer          Previous Messages       ----- Message -----  From: Alee Beckett RN  Sent: 7/25/2025  10:40 AM CDT  To: Elva Brewer MD  Subject: transplant question                              Hey Dr. Brewer,    I am the waitlist coordinator working with Carol for her kidney transplant status. I recently started working with her so trying to put together the story. Her status is currently on hold as her GFR is too well for kidney transplant right now but I know her GYN/onc clearance has also been pending.    I think you last saw her 3/24/25. She was to get a pelvic MRI and IUD placed but she has told me that she did not move forward with IUD as she is trying to get pregnant. (I think the MRI was done 4/2025)    Transplant was  previously not comfortable with the frequency of the D&C monitoring and was recommending moving forward with the hysterectomy prior to active status consideration for kidney transplant (which pt is not in favor of as she is trying to get pregnant).    Our team was wondering what is the likelihood of regression and the frequency of D&c to go down this year? And  if a 6 month period of monitoring for regression is reasonable prior to reconsidering active listing?    Again not a rush since her GFR is too well but just trying to get a better understanding    Thank you!  Alee Beckett RN, BSN  Kidney Transplant Waitlist Coordinator  [phone]

## 2025-07-29 DIAGNOSIS — N18.6 ESRD (END STAGE RENAL DISEASE) (H): ICD-10-CM

## 2025-07-29 DIAGNOSIS — Z94.0 HTN, KIDNEY TRANSPLANT RELATED: Primary | ICD-10-CM

## 2025-07-29 DIAGNOSIS — Z76.82 ORGAN TRANSPLANT CANDIDATE: ICD-10-CM

## 2025-07-29 DIAGNOSIS — I15.1 HTN, KIDNEY TRANSPLANT RELATED: Primary | ICD-10-CM

## 2025-07-29 DIAGNOSIS — E55.9 VITAMIN D DEFICIENCY: ICD-10-CM

## 2025-07-30 ENCOUNTER — TELEPHONE (OUTPATIENT)
Dept: TRANSPLANT | Facility: CLINIC | Age: 35
End: 2025-07-30
Payer: COMMERCIAL

## 2025-07-30 DIAGNOSIS — I15.1 HTN, KIDNEY TRANSPLANT RELATED: Primary | ICD-10-CM

## 2025-07-30 DIAGNOSIS — Z94.0 HTN, KIDNEY TRANSPLANT RELATED: Primary | ICD-10-CM

## 2025-07-30 RX ORDER — CHOLECALCIFEROL (VITAMIN D3) 50 MCG
1 TABLET ORAL DAILY
Qty: 90 TABLET | Refills: 3 | Status: SHIPPED | OUTPATIENT
Start: 2025-07-30

## 2025-07-30 NOTE — TELEPHONE ENCOUNTER
Called pt to update virtual visit scheduled with Dr. Gerhard Rueda to discuss pre transplant GYN recommendations. Included date and time of appt. Left VM with direct line for return call.

## 2025-08-01 ENCOUNTER — ALLIED HEALTH/NURSE VISIT (OUTPATIENT)
Dept: TRANSPLANT | Facility: CLINIC | Age: 35
End: 2025-08-01
Payer: COMMERCIAL

## 2025-08-01 VITALS — SYSTOLIC BLOOD PRESSURE: 99 MMHG | HEART RATE: 105 BPM | DIASTOLIC BLOOD PRESSURE: 66 MMHG

## 2025-08-01 DIAGNOSIS — Z94.0 KIDNEY REPLACED BY TRANSPLANT: ICD-10-CM

## 2025-08-01 DIAGNOSIS — Z94.0 HTN, KIDNEY TRANSPLANT RELATED: Primary | ICD-10-CM

## 2025-08-01 DIAGNOSIS — I15.1 HTN, KIDNEY TRANSPLANT RELATED: Primary | ICD-10-CM

## 2025-08-01 DIAGNOSIS — D63.8 ANEMIA IN OTHER CHRONIC DISEASES CLASSIFIED ELSEWHERE: ICD-10-CM

## 2025-08-01 PROCEDURE — 99000 SPECIMEN HANDLING OFFICE-LAB: CPT | Performed by: PATHOLOGY

## 2025-08-01 PROCEDURE — 250N000011 HC RX IP 250 OP 636: Mod: JZ | Performed by: INTERNAL MEDICINE

## 2025-08-01 PROCEDURE — 80158 DRUG ASSAY CYCLOSPORINE: CPT | Performed by: INTERNAL MEDICINE

## 2025-08-01 PROCEDURE — 96372 THER/PROPH/DIAG INJ SC/IM: CPT | Performed by: INTERNAL MEDICINE

## 2025-08-01 RX ORDER — MEPERIDINE HYDROCHLORIDE 25 MG/ML
25 INJECTION INTRAMUSCULAR; INTRAVENOUS; SUBCUTANEOUS
OUTPATIENT
Start: 2025-08-01

## 2025-08-01 RX ORDER — METHYLPREDNISOLONE SODIUM SUCCINATE 40 MG/ML
40 INJECTION INTRAMUSCULAR; INTRAVENOUS
Start: 2025-08-01

## 2025-08-01 RX ORDER — EPINEPHRINE 1 MG/ML
0.3 INJECTION, SOLUTION, CONCENTRATE INTRAVENOUS EVERY 5 MIN PRN
OUTPATIENT
Start: 2025-08-01

## 2025-08-01 RX ORDER — ALBUTEROL SULFATE 90 UG/1
1-2 INHALANT RESPIRATORY (INHALATION)
Start: 2025-08-01

## 2025-08-01 RX ORDER — DIPHENHYDRAMINE HYDROCHLORIDE 50 MG/ML
50 INJECTION, SOLUTION INTRAMUSCULAR; INTRAVENOUS
Start: 2025-08-01

## 2025-08-01 RX ORDER — DIPHENHYDRAMINE HYDROCHLORIDE 50 MG/ML
25 INJECTION, SOLUTION INTRAMUSCULAR; INTRAVENOUS
Start: 2025-08-01

## 2025-08-01 RX ORDER — ALBUTEROL SULFATE 0.83 MG/ML
2.5 SOLUTION RESPIRATORY (INHALATION)
OUTPATIENT
Start: 2025-08-01

## 2025-08-01 RX ADMIN — DARBEPOETIN ALFA 60 MCG: 60 INJECTION, SOLUTION INTRAVENOUS; SUBCUTANEOUS at 16:37

## 2025-08-04 ENCOUNTER — MYC MEDICAL ADVICE (OUTPATIENT)
Dept: TRANSPLANT | Facility: CLINIC | Age: 35
End: 2025-08-04
Payer: COMMERCIAL

## 2025-08-04 ENCOUNTER — TELEPHONE (OUTPATIENT)
Dept: TRANSPLANT | Facility: CLINIC | Age: 35
End: 2025-08-04
Payer: COMMERCIAL

## 2025-08-04 ENCOUNTER — PATIENT OUTREACH (OUTPATIENT)
Dept: CARE COORDINATION | Facility: CLINIC | Age: 35
End: 2025-08-04
Payer: COMMERCIAL

## 2025-08-04 DIAGNOSIS — I15.1 HTN, KIDNEY TRANSPLANT RELATED: Primary | ICD-10-CM

## 2025-08-04 DIAGNOSIS — Z94.0 HTN, KIDNEY TRANSPLANT RELATED: Primary | ICD-10-CM

## 2025-08-05 ENCOUNTER — TELEPHONE (OUTPATIENT)
Dept: ENDOCRINOLOGY | Facility: CLINIC | Age: 35
End: 2025-08-05

## 2025-08-05 ENCOUNTER — VIRTUAL VISIT (OUTPATIENT)
Dept: ENDOCRINOLOGY | Facility: CLINIC | Age: 35
End: 2025-08-05
Attending: NURSE PRACTITIONER
Payer: COMMERCIAL

## 2025-08-05 VITALS — WEIGHT: 232 LBS | HEIGHT: 66 IN | BODY MASS INDEX: 37.28 KG/M2

## 2025-08-05 DIAGNOSIS — E66.01 CLASS 2 SEVERE OBESITY WITH SERIOUS COMORBIDITY AND BODY MASS INDEX (BMI) OF 37.0 TO 37.9 IN ADULT, UNSPECIFIED OBESITY TYPE (H): Primary | ICD-10-CM

## 2025-08-05 DIAGNOSIS — E66.812 CLASS 2 SEVERE OBESITY WITH SERIOUS COMORBIDITY AND BODY MASS INDEX (BMI) OF 37.0 TO 37.9 IN ADULT, UNSPECIFIED OBESITY TYPE (H): Primary | ICD-10-CM

## 2025-08-05 DIAGNOSIS — E28.2 PCOS (POLYCYSTIC OVARIAN SYNDROME): ICD-10-CM

## 2025-08-05 DIAGNOSIS — N18.32 STAGE 3B CHRONIC KIDNEY DISEASE (H): ICD-10-CM

## 2025-08-07 ENCOUNTER — LAB (OUTPATIENT)
Dept: LAB | Facility: CLINIC | Age: 35
End: 2025-08-07
Payer: COMMERCIAL

## 2025-08-07 DIAGNOSIS — Z94.0 KIDNEY REPLACED BY TRANSPLANT: ICD-10-CM

## 2025-08-07 DIAGNOSIS — D63.8 ANEMIA IN OTHER CHRONIC DISEASES CLASSIFIED ELSEWHERE: ICD-10-CM

## 2025-08-07 LAB
ANION GAP SERPL CALCULATED.3IONS-SCNC: 12 MMOL/L (ref 7–15)
BUN SERPL-MCNC: 38 MG/DL (ref 6–20)
CALCIUM SERPL-MCNC: 9.8 MG/DL (ref 8.8–10.4)
CHLORIDE SERPL-SCNC: 105 MMOL/L (ref 98–107)
CREAT SERPL-MCNC: 2.63 MG/DL (ref 0.51–0.95)
CYCLOSPORINE BLD LC/MS/MS-MCNC: 128 UG/L (ref 50–400)
EGFRCR SERPLBLD CKD-EPI 2021: 23 ML/MIN/1.73M2
ERYTHROCYTE [DISTWIDTH] IN BLOOD BY AUTOMATED COUNT: 14.2 % (ref 10–15)
FERRITIN SERPL-MCNC: 410 NG/ML (ref 6–175)
GLUCOSE SERPL-MCNC: 97 MG/DL (ref 70–99)
HCO3 SERPL-SCNC: 22 MMOL/L (ref 22–29)
HCT VFR BLD AUTO: 29.8 % (ref 35–47)
HGB BLD-MCNC: 10.1 G/DL (ref 11.7–15.7)
IRON BINDING CAPACITY (ROCHE): 198 UG/DL (ref 240–430)
IRON SATN MFR SERPL: 35 % (ref 15–46)
IRON SERPL-MCNC: 70 UG/DL (ref 37–145)
MCH RBC QN AUTO: 31.5 PG (ref 26.5–33)
MCHC RBC AUTO-ENTMCNC: 33.9 G/DL (ref 31.5–36.5)
MCV RBC AUTO: 93 FL (ref 78–100)
PLATELET # BLD AUTO: 230 10E3/UL (ref 150–450)
POTASSIUM SERPL-SCNC: 5 MMOL/L (ref 3.4–5.3)
RBC # BLD AUTO: 3.21 10E6/UL (ref 3.8–5.2)
SODIUM SERPL-SCNC: 139 MMOL/L (ref 135–145)
TME LAST DOSE: NORMAL H
TME LAST DOSE: NORMAL H
WBC # BLD AUTO: 5.6 10E3/UL (ref 4–11)

## 2025-08-12 ENCOUNTER — PATIENT OUTREACH (OUTPATIENT)
Dept: CARE COORDINATION | Facility: CLINIC | Age: 35
End: 2025-08-12
Payer: COMMERCIAL

## 2025-08-29 ENCOUNTER — TRANSFERRED RECORDS (OUTPATIENT)
Dept: HEALTH INFORMATION MANAGEMENT | Facility: CLINIC | Age: 35
End: 2025-08-29
Payer: COMMERCIAL

## 2025-09-03 ENCOUNTER — TRANSCRIBE ORDERS (OUTPATIENT)
Dept: OTHER | Age: 35
End: 2025-09-03

## 2025-09-03 DIAGNOSIS — H35.52 RETINITIS PIGMENTOSA OF BOTH EYES: Primary | ICD-10-CM

## 2025-09-04 ENCOUNTER — PATIENT OUTREACH (OUTPATIENT)
Dept: CARE COORDINATION | Facility: CLINIC | Age: 35
End: 2025-09-04
Payer: COMMERCIAL

## (undated) DEVICE — GLOVE PROTEXIS POWDER FREE 7.5 ORTHOPEDIC 2D73ET75

## (undated) DEVICE — SOL NACL 0.9% IRRIG 1000ML BOTTLE 2F7124

## (undated) DEVICE — CVAC ASPIRATION SYSTEM CVC127020-1

## (undated) DEVICE — SOL WATER IRRIG 1000ML BOTTLE 2F7114

## (undated) DEVICE — GLOVE PROTEXIS MICRO 6.5  2D73PM65

## (undated) DEVICE — JELLY LUBRICATING SURGILUBE 2OZ TUBE

## (undated) DEVICE — PACK GOWN 3/PK DISP XL SBA32GPFCB

## (undated) DEVICE — SUCTION MANIFOLD NEPTUNE 2 SYS 4 PORT 0702-020-000

## (undated) DEVICE — GLOVE BIOGEL PI MICRO SZ 8.0 48580

## (undated) DEVICE — CATH URETERAL DUAL LUMEN 10FRX54CM M0064051000

## (undated) DEVICE — LINEN TOWEL PACK X5 5464

## (undated) DEVICE — CATH URETERAL OPEN END 05FR 120CM 020015-S13

## (undated) DEVICE — GLOVE EXAM NITRILE LG PF LATEX FREE 5064

## (undated) DEVICE — KIT PROCEDURE FLUENT IN/OUT FLOWPAK TISS TRAP FLT-112S

## (undated) DEVICE — DRAPE STERI TOWEL LG 1010

## (undated) DEVICE — GLOVE PROTEXIS POWDER FREE 6.5 ORTHOPEDIC 2D73ET65

## (undated) DEVICE — SYR 10ML FINGER CONTROL W/O NDL 309695

## (undated) DEVICE — NDL INSUFFLATION 13GA 120MM C2201

## (undated) DEVICE — KIT ENDO FIRST STEP DISINFECTANT 200ML W/POUCH EP-4

## (undated) DEVICE — GUIDEWIRE AMPLATZ 0.035"X145CM  SUPER STIFF 640-104

## (undated) DEVICE — Device

## (undated) DEVICE — TUBING SUCTION 10'X3/16" N510

## (undated) DEVICE — GLOVE BIOGEL PI MICRO SZ 8.5 48585

## (undated) DEVICE — ENDO SYSTEM WATER BOTTLE & TUBING W/CO2 FILTER 00711549

## (undated) DEVICE — PAD CHUX UNDERPAD 23X24" 7136

## (undated) DEVICE — PACK TVT HYSTEROSCOPY SMA15HYFSE

## (undated) DEVICE — ENDO TROCAR SLEEVE KII Z-THREADED 05X100MM CTS02

## (undated) DEVICE — GLOVE PROTEXIS W/NEU-THERA 6.5  2D73TE65

## (undated) DEVICE — PACK LAP CHOLE SLC15LCFSD

## (undated) DEVICE — NDL SPINAL 22GA 5" QUINCKE 405148

## (undated) DEVICE — SPECIMEN CONTAINER 5OZ STERILE 2600SA

## (undated) DEVICE — DRAPE SHEET MED 44X70" 9355

## (undated) DEVICE — LINEN TOWEL PACK X30 5481

## (undated) DEVICE — CATH INTERMITTENT CLEAN-CATH FEMALE 14FR 6" VINYL LF 420614

## (undated) DEVICE — SUCTION CANISTER MEDIVAC LINER 3000ML W/LID 65651-530

## (undated) DEVICE — CATH FOLEY 24FR 5ML SILICONE LUBRI-SIL 175824

## (undated) DEVICE — TUBING VACUUM COLLECTION 6FT 23116

## (undated) DEVICE — SOL NACL 0.9% IRRIG 3000ML BAG 2B7477

## (undated) DEVICE — GLOVE PROTEXIS BLUE W/NEU-THERA 6.5  2D73EB65

## (undated) DEVICE — SOL WATER IRRIG 500ML BOTTLE 2F7113

## (undated) DEVICE — ENDO SEAL BX PORT BPS-A

## (undated) DEVICE — TUBING SUCTION 12"X1/4" N612

## (undated) DEVICE — SEAL SET MYOSURE ROD LENS SCOPE SINGLE USE 40-902

## (undated) DEVICE — GOWN IMPERVIOUS 2XL BLUE

## (undated) DEVICE — ENDO BITE BLOCK ADULT OMNI-BLOC

## (undated) DEVICE — TUBING SET THERMEDX UROLOGY SGL USE LL0006

## (undated) DEVICE — GUIDEWIRE SENSOR DUAL FLEX STR 0.035"X150CM M0066703080

## (undated) DEVICE — BASIN SET SINGLE STERILE 13752-624

## (undated) DEVICE — SHEATH URETERAL ACCESS NAVIGATOR HD 12/14FRX46CM M0062502260

## (undated) DEVICE — ENDO FORCEP BX CAPTURA PRO SPIKE G50696

## (undated) DEVICE — DRAPE C-ARM W/STRAPS 42X72" 07-CA104

## (undated) DEVICE — CATH BALLOON MERIT ESOPH FIVE-STAGE 17X21MMX180CM EX18

## (undated) DEVICE — DRAPE SHEET REV FOLD 3/4 9349

## (undated) DEVICE — ENDO TROCAR FIRST ENTRY KII FIOS Z-THRD 05X100MM CTF03

## (undated) DEVICE — SYR PISTON IRRIGATION 60 ML DYND20325

## (undated) DEVICE — CONNECTOR WATER VALVE PERFUSION PACK STR 020272801

## (undated) DEVICE — SHEATH URETERAL ACCESS NAVIGATOR HD 12/14FRX36CM M0062502250

## (undated) DEVICE — SYR 50ML SLIP TIP W/O NDL 309654

## (undated) DEVICE — SOL NACL 0.9% INJ 1000ML BAG 2B1324X

## (undated) DEVICE — DEVICE RETRIEVAL ROTH NET PLATINUM UNIV 2.5MMX230CM 00715050

## (undated) DEVICE — BAG URINARY DRAIN 4000ML LF 153509

## (undated) DEVICE — PREP CHLORAPREP 26ML TINTED ORANGE  260815

## (undated) DEVICE — SUCTION IRR STRYKERFLOW II W/TIP 250-070-520

## (undated) DEVICE — SYR 30ML LL W/O NDL 302832

## (undated) DEVICE — PACK CYSTO UMMC CUSTOM

## (undated) DEVICE — KIT ENDO TURNOVER/PROCEDURE CARRY-ON 101822

## (undated) DEVICE — SYR 50ML CATH TIP W/O NDL 309620

## (undated) DEVICE — LASER FIBER FLEXIVA PULSE 242 M006L8405910

## (undated) DEVICE — SYR PISTON URETHRAL 60ML 68000

## (undated) DEVICE — BASKET STONE EXTRACTOR NITINOL NCIRCLE 2.2FRX115CM G18788

## (undated) DEVICE — DEVICE TISSUE REMOVAL HYSTEROSCOPIC MYOSURE LITE 30-401LITE

## (undated) DEVICE — SYR 50ML LL W/O NDL 309653

## (undated) DEVICE — SYR 30ML SLIP TIP W/O NDL 302833

## (undated) DEVICE — BNDG ABDOMINAL BINDER 9X45-62" 79-89071

## (undated) DEVICE — ADH SKIN CLOSURE PREMIERPRO EXOFIN 1.0ML 3470

## (undated) DEVICE — GLOVE BIOGEL PI ULTRATOUCH G SZ 6.5 42165

## (undated) DEVICE — NDL SCLEROTHERAPY 25GA CARR-LOCK  00711811

## (undated) RX ORDER — DEXAMETHASONE SODIUM PHOSPHATE 4 MG/ML
INJECTION, SOLUTION INTRA-ARTICULAR; INTRALESIONAL; INTRAMUSCULAR; INTRAVENOUS; SOFT TISSUE
Status: DISPENSED
Start: 2022-05-09

## (undated) RX ORDER — LIDOCAINE HYDROCHLORIDE 20 MG/ML
INJECTION, SOLUTION EPIDURAL; INFILTRATION; INTRACAUDAL; PERINEURAL
Status: DISPENSED
Start: 2020-10-23

## (undated) RX ORDER — FENTANYL CITRATE 0.05 MG/ML
INJECTION, SOLUTION INTRAMUSCULAR; INTRAVENOUS
Status: DISPENSED
Start: 2020-10-23

## (undated) RX ORDER — PROPOFOL 10 MG/ML
INJECTION, EMULSION INTRAVENOUS
Status: DISPENSED
Start: 2021-03-05

## (undated) RX ORDER — ACETAMINOPHEN 325 MG/1
TABLET ORAL
Status: DISPENSED
Start: 2025-03-03

## (undated) RX ORDER — PROPOFOL 10 MG/ML
INJECTION, EMULSION INTRAVENOUS
Status: DISPENSED
Start: 2023-05-04

## (undated) RX ORDER — FENTANYL CITRATE 0.05 MG/ML
INJECTION, SOLUTION INTRAMUSCULAR; INTRAVENOUS
Status: DISPENSED
Start: 2021-03-05

## (undated) RX ORDER — ONDANSETRON 2 MG/ML
INJECTION INTRAMUSCULAR; INTRAVENOUS
Status: DISPENSED
Start: 2020-10-23

## (undated) RX ORDER — FENTANYL CITRATE 50 UG/ML
INJECTION, SOLUTION INTRAMUSCULAR; INTRAVENOUS
Status: DISPENSED
Start: 2022-05-09

## (undated) RX ORDER — LIDOCAINE HYDROCHLORIDE 20 MG/ML
INJECTION, SOLUTION EPIDURAL; INFILTRATION; INTRACAUDAL; PERINEURAL
Status: DISPENSED
Start: 2021-03-05

## (undated) RX ORDER — PROPOFOL 10 MG/ML
INJECTION, EMULSION INTRAVENOUS
Status: DISPENSED
Start: 2021-09-13

## (undated) RX ORDER — PROPOFOL 10 MG/ML
INJECTION, EMULSION INTRAVENOUS
Status: DISPENSED
Start: 2022-05-09

## (undated) RX ORDER — METRONIDAZOLE 500 MG/100ML
INJECTION, SOLUTION INTRAVENOUS
Status: DISPENSED
Start: 2023-05-04

## (undated) RX ORDER — FENTANYL CITRATE 50 UG/ML
INJECTION, SOLUTION INTRAMUSCULAR; INTRAVENOUS
Status: DISPENSED
Start: 2021-09-13

## (undated) RX ORDER — CEFAZOLIN SODIUM 1 G/3ML
INJECTION, POWDER, FOR SOLUTION INTRAMUSCULAR; INTRAVENOUS
Status: DISPENSED
Start: 2023-05-04

## (undated) RX ORDER — ONDANSETRON 2 MG/ML
INJECTION INTRAMUSCULAR; INTRAVENOUS
Status: DISPENSED
Start: 2021-03-05

## (undated) RX ORDER — HYDROMORPHONE HCL IN WATER/PF 6 MG/30 ML
PATIENT CONTROLLED ANALGESIA SYRINGE INTRAVENOUS
Status: DISPENSED
Start: 2022-05-09

## (undated) RX ORDER — FENTANYL CITRATE 0.05 MG/ML
INJECTION, SOLUTION INTRAMUSCULAR; INTRAVENOUS
Status: DISPENSED
Start: 2023-05-04

## (undated) RX ORDER — FENTANYL CITRATE 50 UG/ML
INJECTION, SOLUTION INTRAMUSCULAR; INTRAVENOUS
Status: DISPENSED
Start: 2021-03-05

## (undated) RX ORDER — HYDRALAZINE HYDROCHLORIDE 20 MG/ML
INJECTION INTRAMUSCULAR; INTRAVENOUS
Status: DISPENSED
Start: 2021-08-27

## (undated) RX ORDER — HYDROMORPHONE HYDROCHLORIDE 1 MG/ML
INJECTION, SOLUTION INTRAMUSCULAR; INTRAVENOUS; SUBCUTANEOUS
Status: DISPENSED
Start: 2021-03-05

## (undated) RX ORDER — ONDANSETRON 2 MG/ML
INJECTION INTRAMUSCULAR; INTRAVENOUS
Status: DISPENSED
Start: 2021-09-13

## (undated) RX ORDER — GLYCOPYRROLATE 0.2 MG/ML
INJECTION, SOLUTION INTRAMUSCULAR; INTRAVENOUS
Status: DISPENSED
Start: 2021-03-05

## (undated) RX ORDER — ACETAMINOPHEN 325 MG/1
TABLET ORAL
Status: DISPENSED
Start: 2020-10-23

## (undated) RX ORDER — DEXAMETHASONE SODIUM PHOSPHATE 4 MG/ML
INJECTION, SOLUTION INTRA-ARTICULAR; INTRALESIONAL; INTRAMUSCULAR; INTRAVENOUS; SOFT TISSUE
Status: DISPENSED
Start: 2021-03-05

## (undated) RX ORDER — PROPOFOL 10 MG/ML
INJECTION, EMULSION INTRAVENOUS
Status: DISPENSED
Start: 2020-10-23

## (undated) RX ORDER — ACETAMINOPHEN 325 MG/1
TABLET ORAL
Status: DISPENSED
Start: 2022-05-09

## (undated) RX ORDER — CEFAZOLIN SODIUM IN 0.9 % NACL 3 G/100 ML
INTRAVENOUS SOLUTION, PIGGYBACK (ML) INTRAVENOUS
Status: DISPENSED
Start: 2021-09-13

## (undated) RX ORDER — PROPOFOL 10 MG/ML
INJECTION, EMULSION INTRAVENOUS
Status: DISPENSED
Start: 2025-03-03

## (undated) RX ORDER — ACETAMINOPHEN 325 MG/1
TABLET ORAL
Status: DISPENSED
Start: 2023-05-04

## (undated) RX ORDER — ONDANSETRON 2 MG/ML
INJECTION INTRAMUSCULAR; INTRAVENOUS
Status: DISPENSED
Start: 2022-05-09

## (undated) RX ORDER — LIDOCAINE HYDROCHLORIDE 20 MG/ML
INJECTION, SOLUTION EPIDURAL; INFILTRATION; INTRACAUDAL; PERINEURAL
Status: DISPENSED
Start: 2021-09-13

## (undated) RX ORDER — HYDROXYZINE HYDROCHLORIDE 50 MG/ML
INJECTION, SOLUTION INTRAMUSCULAR
Status: DISPENSED
Start: 2021-03-05

## (undated) RX ORDER — OXYCODONE HYDROCHLORIDE 5 MG/1
TABLET ORAL
Status: DISPENSED
Start: 2022-05-09

## (undated) RX ORDER — FENTANYL CITRATE 50 UG/ML
INJECTION, SOLUTION INTRAMUSCULAR; INTRAVENOUS
Status: DISPENSED
Start: 2020-10-23

## (undated) RX ORDER — LIDOCAINE HYDROCHLORIDE 20 MG/ML
INJECTION, SOLUTION EPIDURAL; INFILTRATION; INTRACAUDAL; PERINEURAL
Status: DISPENSED
Start: 2022-05-09

## (undated) RX ORDER — FENTANYL CITRATE 50 UG/ML
INJECTION, SOLUTION INTRAMUSCULAR; INTRAVENOUS
Status: DISPENSED
Start: 2025-03-03

## (undated) RX ORDER — FENTANYL CITRATE 50 UG/ML
INJECTION, SOLUTION INTRAMUSCULAR; INTRAVENOUS
Status: DISPENSED
Start: 2023-05-04

## (undated) RX ORDER — SODIUM CHLORIDE 9 MG/ML
INJECTION, SOLUTION INTRAVENOUS
Status: DISPENSED
Start: 2021-08-27

## (undated) RX ORDER — LIDOCAINE HYDROCHLORIDE 10 MG/ML
INJECTION, SOLUTION EPIDURAL; INFILTRATION; INTRACAUDAL; PERINEURAL
Status: DISPENSED
Start: 2021-08-27

## (undated) RX ORDER — LIDOCAINE HYDROCHLORIDE AND EPINEPHRINE 15; 5 MG/ML; UG/ML
INJECTION, SOLUTION EPIDURAL
Status: DISPENSED
Start: 2018-10-29

## (undated) RX ORDER — OXYCODONE HYDROCHLORIDE 5 MG/1
TABLET ORAL
Status: DISPENSED
Start: 2023-05-04

## (undated) RX ORDER — ACETAMINOPHEN 325 MG/1
TABLET ORAL
Status: DISPENSED
Start: 2021-09-13

## (undated) RX ORDER — ACETAMINOPHEN 325 MG/1
TABLET ORAL
Status: DISPENSED
Start: 2021-03-05

## (undated) RX ORDER — IOPAMIDOL 510 MG/ML
INJECTION, SOLUTION INTRAVASCULAR
Status: DISPENSED
Start: 2025-03-03